# Patient Record
Sex: FEMALE | Race: WHITE | NOT HISPANIC OR LATINO | Employment: FULL TIME | URBAN - NONMETROPOLITAN AREA
[De-identification: names, ages, dates, MRNs, and addresses within clinical notes are randomized per-mention and may not be internally consistent; named-entity substitution may affect disease eponyms.]

---

## 2017-05-10 ENCOUNTER — OFFICE VISIT (OUTPATIENT)
Dept: FAMILY MEDICINE CLINIC | Facility: CLINIC | Age: 27
End: 2017-05-10

## 2017-05-10 VITALS
HEART RATE: 83 BPM | HEIGHT: 68 IN | SYSTOLIC BLOOD PRESSURE: 110 MMHG | OXYGEN SATURATION: 98 % | DIASTOLIC BLOOD PRESSURE: 72 MMHG | TEMPERATURE: 97.7 F | BODY MASS INDEX: 39.25 KG/M2 | WEIGHT: 259 LBS

## 2017-05-10 DIAGNOSIS — Z14.8 HEMOCHROMATOSIS CARRIER: ICD-10-CM

## 2017-05-10 DIAGNOSIS — E55.9 VITAMIN D DEFICIENCY: ICD-10-CM

## 2017-05-10 DIAGNOSIS — R74.8 ELEVATED LIVER ENZYMES: ICD-10-CM

## 2017-05-10 DIAGNOSIS — F43.23 ADJUSTMENT DISORDER WITH MIXED ANXIETY AND DEPRESSED MOOD: Primary | ICD-10-CM

## 2017-05-10 DIAGNOSIS — G56.03 BILATERAL CARPAL TUNNEL SYNDROME: ICD-10-CM

## 2017-05-10 DIAGNOSIS — Z83.49 FAMILY HISTORY OF HEMOCHROMATOSIS: ICD-10-CM

## 2017-05-10 LAB
B-HCG UR QL: NEGATIVE
INTERNAL NEGATIVE CONTROL: NEGATIVE
INTERNAL POSITIVE CONTROL: POSITIVE
Lab: NORMAL

## 2017-05-10 PROCEDURE — 81025 URINE PREGNANCY TEST: CPT | Performed by: PHYSICIAN ASSISTANT

## 2017-05-10 PROCEDURE — 99214 OFFICE O/P EST MOD 30 MIN: CPT | Performed by: PHYSICIAN ASSISTANT

## 2017-07-19 ENCOUNTER — OFFICE VISIT (OUTPATIENT)
Dept: FAMILY MEDICINE CLINIC | Facility: CLINIC | Age: 27
End: 2017-07-19

## 2017-07-19 VITALS
DIASTOLIC BLOOD PRESSURE: 74 MMHG | WEIGHT: 263 LBS | HEIGHT: 68 IN | SYSTOLIC BLOOD PRESSURE: 102 MMHG | BODY MASS INDEX: 39.86 KG/M2 | OXYGEN SATURATION: 97 % | HEART RATE: 90 BPM | TEMPERATURE: 98.6 F

## 2017-07-19 DIAGNOSIS — F43.23 ADJUSTMENT DISORDER WITH MIXED ANXIETY AND DEPRESSED MOOD: Primary | ICD-10-CM

## 2017-07-19 DIAGNOSIS — F33.1 MODERATE EPISODE OF RECURRENT MAJOR DEPRESSIVE DISORDER (HCC): ICD-10-CM

## 2017-07-19 DIAGNOSIS — F41.9 ANXIETY: ICD-10-CM

## 2017-07-19 DIAGNOSIS — F51.02 ADJUSTMENT INSOMNIA: ICD-10-CM

## 2017-07-19 PROCEDURE — 99213 OFFICE O/P EST LOW 20 MIN: CPT | Performed by: PHYSICIAN ASSISTANT

## 2017-07-19 RX ORDER — TRAZODONE HYDROCHLORIDE 50 MG/1
TABLET ORAL
Qty: 60 TABLET | Refills: 0 | Status: SHIPPED | OUTPATIENT
Start: 2017-07-19 | End: 2017-08-14 | Stop reason: SDUPTHER
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
## 2017-08-09 ENCOUNTER — OFFICE VISIT (OUTPATIENT)
Dept: FAMILY MEDICINE CLINIC | Facility: CLINIC | Age: 27
End: 2017-08-09

## 2017-08-09 VITALS — RESPIRATION RATE: 16 BRPM | WEIGHT: 258 LBS | HEIGHT: 68 IN | BODY MASS INDEX: 39.1 KG/M2

## 2017-08-09 DIAGNOSIS — F43.23 ADJUSTMENT DISORDER WITH MIXED ANXIETY AND DEPRESSED MOOD: Primary | ICD-10-CM

## 2017-08-09 DIAGNOSIS — L73.2 SUPPURATIVE HIDRADENITIS: ICD-10-CM

## 2017-08-09 PROCEDURE — 99213 OFFICE O/P EST LOW 20 MIN: CPT | Performed by: PHYSICIAN ASSISTANT

## 2017-08-09 NOTE — PROGRESS NOTES
Subjective   Jocelyn Mcadams is a 26 y.o. female seen today for medication follow-up for anxiety/depression     History of Present Illness     MOODS HAVE IMPROVED. COULD NOT TOLERATE TRAZODONE- KEPT UP ALL NIGHT. PATIENT WITHOUT MEDICATION. HAS BEEN TO 2 SESSIONS OF MARRIAGE COUNSELING BUT IS GOING TO 1 SESSION FOR INDIVIDUAL TODAY.  IS NOW ON SUBOXONE AND HAS MOVED BACK IN WITH HIS DAUGHTER. THEY ARE GETTING ALONG WELL. HE IS STARTING NEW JOB AS Guide[employer] THIS WEEK. STILL NEEDS COUNSELING BUT OVERALL DOING BETTER.     SUPPURATIVE HIDRADENITIS IS WORSE WITH MORE SWELLING AND INFLAMMATION AND PAIN. WOULD LIKE TO SEE SPECIALIST.     The following portions of the patient's history were reviewed and updated as appropriate: allergies, current medications, past family history, past medical history, past social history, past surgical history and problem list.    Review of Systems   Skin: Positive for wound.   All other systems reviewed and are negative.      Objective   Physical Exam   Constitutional: She is oriented to person, place, and time. She appears well-developed and well-nourished.   HENT:   Head: Normocephalic and atraumatic.   Right Ear: External ear normal.   Left Ear: External ear normal.   Eyes: Conjunctivae are normal.   Neck: Neck supple.   Cardiovascular: Normal rate, regular rhythm, normal heart sounds and intact distal pulses.  Exam reveals no gallop and no friction rub.    No murmur heard.  Pulmonary/Chest: Effort normal and breath sounds normal. No respiratory distress. She has no wheezes. She has no rales.   Musculoskeletal: She exhibits no edema or deformity.   Neurological: She is alert and oriented to person, place, and time.   Skin: Skin is warm and dry.   Psychiatric: She has a normal mood and affect. Her speech is normal and behavior is normal. Judgment and thought content normal. Cognition and memory are normal.   Nursing note and vitals reviewed.      Assessment/Plan   Jocelyn was seen today  for medication check for anxiety and depression.    Diagnoses and all orders for this visit:    Adjustment disorder with mixed anxiety and depressed mood    Suppurative hidradenitis  -     Ambulatory Referral to Dermatology      Patient Instructions   26 YEAR OLD FEMALE WHO PRESENTS TODAY IN FOLLOW UP OF DEPRESSION AND ANXIETY. SHE IS DOING BETTER, HOPEFUL FOR POSITIVE CHANGES WITH HER  AND WITH HERSELF WITH COUNSELING. SHE TOOK WHAT SHE THINKS IS TRAZODONE AND WAS UP ALL NIGHT, UNABLE TO SLEEP. I ASKED HER TO ENSURE SHE WAS NOT TAKING TRINTELLIX AT BEDTIME. CALL TO LET ME KNOW THE MEDICATION. IF SHE HAS NOT RECEIVED TRINTELLIX, I WILL HER TRY TRINTELLIX TO SEE IF THIS WORKS TO HELP MOODS WITH COUNSELING.     PATIENT ALSO HAS HAD WORSENING OF SUPPURATIVE HIDRADENITIS. I WILL REFER TO DERMATOLOGY TODAY.     FOLLOW UP IN 4-6 WEEKS AFTER STARTING TRINTELLIX OR ASAP IF NEEDED OR AE TO MEDICATION.

## 2017-08-14 DIAGNOSIS — F51.02 ADJUSTMENT INSOMNIA: ICD-10-CM

## 2017-08-14 RX ORDER — TRAZODONE HYDROCHLORIDE 50 MG/1
TABLET ORAL
Qty: 60 TABLET | Refills: 0 | Status: SHIPPED | OUTPATIENT
Start: 2017-08-14 | End: 2018-11-20

## 2017-08-15 NOTE — PATIENT INSTRUCTIONS
26 YEAR OLD FEMALE WHO PRESENTS TODAY IN FOLLOW UP OF DEPRESSION AND ANXIETY. SHE IS DOING BETTER, HOPEFUL FOR POSITIVE CHANGES WITH HER  AND WITH HERSELF WITH COUNSELING. SHE TOOK WHAT SHE THINKS IS TRAZODONE AND WAS UP ALL NIGHT, UNABLE TO SLEEP. I ASKED HER TO ENSURE SHE WAS NOT TAKING TRINTELLIX AT BEDTIME. CALL TO LET ME KNOW THE MEDICATION. IF SHE HAS NOT RECEIVED TRINTELLIX, I WILL HER TRY TRINTELLIX TO SEE IF THIS WORKS TO HELP MOODS WITH COUNSELING.     PATIENT ALSO HAS HAD WORSENING OF SUPPURATIVE HIDRADENITIS. I WILL REFER TO DERMATOLOGY TODAY.     FOLLOW UP IN 4-6 WEEKS AFTER STARTING TRINTELLIX OR ASAP IF NEEDED OR AE TO MEDICATION.

## 2017-09-05 ENCOUNTER — OFFICE VISIT (OUTPATIENT)
Dept: FAMILY MEDICINE CLINIC | Facility: CLINIC | Age: 27
End: 2017-09-05

## 2017-09-05 VITALS
WEIGHT: 257.6 LBS | SYSTOLIC BLOOD PRESSURE: 112 MMHG | OXYGEN SATURATION: 96 % | BODY MASS INDEX: 39.04 KG/M2 | TEMPERATURE: 98.8 F | HEIGHT: 68 IN | HEART RATE: 86 BPM | DIASTOLIC BLOOD PRESSURE: 72 MMHG

## 2017-09-05 DIAGNOSIS — N93.8 DUB (DYSFUNCTIONAL UTERINE BLEEDING): Primary | ICD-10-CM

## 2017-09-05 DIAGNOSIS — L91.8 SKIN TAG: ICD-10-CM

## 2017-09-05 LAB
EXPIRATION DATE: NORMAL
HBA1C MFR BLD: 5.2 %
Lab: 698

## 2017-09-05 PROCEDURE — 83036 HEMOGLOBIN GLYCOSYLATED A1C: CPT | Performed by: PHYSICIAN ASSISTANT

## 2017-09-05 PROCEDURE — 99214 OFFICE O/P EST MOD 30 MIN: CPT | Performed by: PHYSICIAN ASSISTANT

## 2017-09-05 PROCEDURE — 11200 RMVL SKIN TAGS UP TO&INC 15: CPT | Performed by: PHYSICIAN ASSISTANT

## 2017-09-05 NOTE — PROGRESS NOTES
Subjective   Jocelyn Mcadams is a 27 y.o. female here today for skin tag removal from left side of neck, fasting labs    History of Present Illness     MISSED MENSES IN AUGUST. WAS TO START 8/11/17 AND STARTED TODAY. PATIENT WENT TO URGENT CARE 8/30/17 FOR SEVERE ABDOMINAL PAIN.     SKIN TAGS FOR YEARS. LEFT SIDE OF NECK.     FASTING TODAY FOR LABS.     The following portions of the patient's history were reviewed and updated as appropriate: allergies, current medications, past family history, past medical history, past social history, past surgical history and problem list.    Review of Systems   Skin:        Skin tag left side of neck        Objective   Physical Exam   Constitutional: She is oriented to person, place, and time. She appears well-developed and well-nourished.   HENT:   Head: Normocephalic and atraumatic.   Right Ear: External ear normal.   Left Ear: External ear normal.   Eyes: Conjunctivae are normal.   Neck: Neck supple.   Cardiovascular: Normal rate.    Pulmonary/Chest: Effort normal.   Musculoskeletal: She exhibits no edema or deformity.   Neurological: She is alert and oriented to person, place, and time.   Skin: Skin is warm and dry.   1 SMALL AND 2 TINY SKIN TAGS LEFT LATERAL NECK. 3 AREAS CLEANED WITH ALCOHOL X 3. 27 GAUGE NEEDLE USED TO INJECT TINY AMOUNT OF LIDOCAINE WITH EPINEPHRINE AT BASE OF SKIN TAG- TOTAL LOCAL ANESTHETIC < 0.5 ML. LESIONS GRASPED WITH FORCEPS AND CUT WITH TISSUE SCISSORS AT THE BASE. SENT FOR PATHOLOGY. HEMOSTASIS ACHIEVED. TRIPLE ANTIBIOTIC OINTMENT AND BANDAID APPLIED. PATIENT TOLERATED PROCEDURE WELL.    Psychiatric: She has a normal mood and affect. Her speech is normal and behavior is normal. Judgment and thought content normal. Cognition and memory are normal.   Nursing note and vitals reviewed.      Assessment/Plan   Jocelyn was seen today for skin tag removal from left side of neck.    Diagnoses and all orders for this visit:    DUB (dysfunctional uterine  bleeding)  -     DHEA  -     Estradiol  -     Follicle Stimulating Hormone  -     Luteinizing Hormone  -     Progesterone  -     Testosterone, Total, Women & Children  -     POC Glycated Hemoglobin, Total    Skin tag  -     DHEA  -     Estradiol  -     Follicle Stimulating Hormone  -     Luteinizing Hormone  -     Progesterone  -     Testosterone, Total, Women & Children  -     POC Glycated Hemoglobin, Total      Patient Instructions   27 YEAR OLD FEMALE WHO PRESENTS TODAY IN FOLLOW UP OF SKIN TAGS, MISSED MENSES, AND FOR FASTING LABS TO INCLUDE IRON. PATIENT HAD 3 SMALL SKIN TAGS ON LEFT NECK. THESE WERE REMOVED WITHOUT DIFFICULTY AND SENT FOR PATHOLOGY. PATIENT TO CONTINUE TO APPLY BACITRACIN OR TRIPLE ANTIBIOTIC OINTMENT AND KEEP COVERED UNTIL HEALED. TO BE SEEN IF DEVELOPS ANY ERYTHEMA, EDEMA, INDURATION, OR D/C.     SHE MISSED MENSES IN AUGUST, WHICH IS ABNORMAL FOR HER BUT STARTED MENSES TODAY. PATIENT SHOULD HAVE STARTED MENSES 8/11/17. ON 8/30/17, SHE WAS SEEN AT URGENT CARE FOR SEVERE ABDOMINAL PAIN. SHE MAY HAVE HAD OVARIAN CYST THAT RUPTURED. I WILL CHECK FEMALE HORMONES TODAY WITH ADDITIONAL LABS. PATIENT TO CALL IF NO RESULTS IN 1 WEEK. CONSIDERATION OF FOLLOW UP WITH GYN IF CONTINUED ABNORMAL MENSES.

## 2017-09-06 NOTE — PATIENT INSTRUCTIONS
27 YEAR OLD FEMALE WHO PRESENTS TODAY IN FOLLOW UP OF SKIN TAGS, MISSED MENSES, AND FOR FASTING LABS TO INCLUDE IRON. PATIENT HAD 3 SMALL SKIN TAGS ON LEFT NECK. THESE WERE REMOVED WITHOUT DIFFICULTY AND SENT FOR PATHOLOGY. PATIENT TO CONTINUE TO APPLY BACITRACIN OR TRIPLE ANTIBIOTIC OINTMENT AND KEEP COVERED UNTIL HEALED. TO BE SEEN IF DEVELOPS ANY ERYTHEMA, EDEMA, INDURATION, OR D/C.     SHE MISSED MENSES IN AUGUST, WHICH IS ABNORMAL FOR HER BUT STARTED MENSES TODAY. PATIENT SHOULD HAVE STARTED MENSES 8/11/17. ON 8/30/17, SHE WAS SEEN AT URGENT CARE FOR SEVERE ABDOMINAL PAIN. SHE MAY HAVE HAD OVARIAN CYST THAT RUPTURED. I WILL CHECK FEMALE HORMONES TODAY WITH ADDITIONAL LABS. PATIENT TO CALL IF NO RESULTS IN 1 WEEK. CONSIDERATION OF FOLLOW UP WITH GYN IF CONTINUED ABNORMAL MENSES.

## 2017-09-07 LAB
DX ICD CODE: NORMAL
DX ICD CODE: NORMAL
PATH REPORT.FINAL DX SPEC: NORMAL
PATH REPORT.GROSS SPEC: NORMAL
PATH REPORT.SITE OF ORIGIN SPEC: NORMAL
PATHOLOGIST NAME: NORMAL
PAYMENT PROCEDURE: NORMAL

## 2017-09-10 LAB
25(OH)D3+25(OH)D2 SERPL-MCNC: 24.7 NG/ML (ref 30–100)
ALBUMIN SERPL-MCNC: 4.6 G/DL (ref 3.5–5.5)
ALBUMIN/GLOB SERPL: 1.7 {RATIO} (ref 1.2–2.2)
ALP SERPL-CCNC: 92 IU/L (ref 39–117)
ALT SERPL-CCNC: 46 IU/L (ref 0–32)
AST SERPL-CCNC: 38 IU/L (ref 0–40)
BASOPHILS # BLD AUTO: 0 X10E3/UL (ref 0–0.2)
BASOPHILS NFR BLD AUTO: 1 %
BILIRUB SERPL-MCNC: 0.4 MG/DL (ref 0–1.2)
BUN SERPL-MCNC: 10 MG/DL (ref 6–20)
BUN/CREAT SERPL: 15 (ref 9–23)
CALCIUM SERPL-MCNC: 9.5 MG/DL (ref 8.7–10.2)
CHLORIDE SERPL-SCNC: 100 MMOL/L (ref 96–106)
CHOLEST SERPL-MCNC: 203 MG/DL (ref 100–199)
CO2 SERPL-SCNC: 21 MMOL/L (ref 18–29)
CREAT SERPL-MCNC: 0.68 MG/DL (ref 0.57–1)
DHEA SERPL-MCNC: 129 NG/DL (ref 31–701)
EOSINOPHIL # BLD AUTO: 0.2 X10E3/UL (ref 0–0.4)
EOSINOPHIL NFR BLD AUTO: 3 %
ERYTHROCYTE [DISTWIDTH] IN BLOOD BY AUTOMATED COUNT: 13.7 % (ref 12.3–15.4)
ESTRADIOL SERPL-MCNC: 33.9 PG/ML
FERRITIN SERPL-MCNC: 132 NG/ML (ref 15–150)
FOLATE SERPL-MCNC: 11 NG/ML
FSH SERPL-ACNC: 5.8 MIU/ML
FT4I SERPL CALC-MCNC: 1.8 (ref 1.2–4.9)
GLOBULIN SER CALC-MCNC: 2.7 G/DL (ref 1.5–4.5)
GLUCOSE SERPL-MCNC: 82 MG/DL (ref 65–99)
HCT VFR BLD AUTO: 41.8 % (ref 34–46.6)
HDLC SERPL-MCNC: 37 MG/DL
HGB BLD-MCNC: 13.8 G/DL (ref 11.1–15.9)
IMM GRANULOCYTES # BLD: 0 X10E3/UL (ref 0–0.1)
IMM GRANULOCYTES NFR BLD: 0 %
IRON SATN MFR SERPL: 29 % (ref 15–55)
IRON SERPL-MCNC: 99 UG/DL (ref 27–159)
LDLC SERPL CALC-MCNC: 132 MG/DL (ref 0–99)
LDLC/HDLC SERPL: 3.6 RATIO UNITS (ref 0–3.2)
LH SERPL-ACNC: 3.2 MIU/ML
LYMPHOCYTES # BLD AUTO: 2.5 X10E3/UL (ref 0.7–3.1)
LYMPHOCYTES NFR BLD AUTO: 30 %
MCH RBC QN AUTO: 29.7 PG (ref 26.6–33)
MCHC RBC AUTO-ENTMCNC: 33 G/DL (ref 31.5–35.7)
MCV RBC AUTO: 90 FL (ref 79–97)
MONOCYTES # BLD AUTO: 0.5 X10E3/UL (ref 0.1–0.9)
MONOCYTES NFR BLD AUTO: 6 %
NEUTROPHILS # BLD AUTO: 5.2 X10E3/UL (ref 1.4–7)
NEUTROPHILS NFR BLD AUTO: 60 %
PLATELET # BLD AUTO: 372 X10E3/UL (ref 150–379)
POTASSIUM SERPL-SCNC: 4.6 MMOL/L (ref 3.5–5.2)
PROGEST SERPL-MCNC: 0.1 NG/ML
PROT SERPL-MCNC: 7.3 G/DL (ref 6–8.5)
RBC # BLD AUTO: 4.64 X10E6/UL (ref 3.77–5.28)
SODIUM SERPL-SCNC: 139 MMOL/L (ref 134–144)
T3RU NFR SERPL: 22 % (ref 24–39)
T4 SERPL-MCNC: 8.2 UG/DL (ref 4.5–12)
TESTOST SERPL-MCNC: 28.2 NG/DL (ref 10–55)
TIBC SERPL-MCNC: 345 UG/DL (ref 250–450)
TRIGL SERPL-MCNC: 172 MG/DL (ref 0–149)
TSH SERPL DL<=0.005 MIU/L-ACNC: 1.81 UIU/ML (ref 0.45–4.5)
UIBC SERPL-MCNC: 246 UG/DL (ref 131–425)
VIT B12 SERPL-MCNC: 763 PG/ML (ref 211–946)
VLDLC SERPL CALC-MCNC: 34 MG/DL (ref 5–40)
WBC # BLD AUTO: 8.5 X10E3/UL (ref 3.4–10.8)

## 2017-11-28 PROCEDURE — 88304 TISSUE EXAM BY PATHOLOGIST: CPT | Performed by: OTOLARYNGOLOGY

## 2017-11-29 ENCOUNTER — LAB REQUISITION (OUTPATIENT)
Dept: LAB | Facility: HOSPITAL | Age: 27
End: 2017-11-29

## 2017-11-29 DIAGNOSIS — J35.01 CHRONIC TONSILLITIS: ICD-10-CM

## 2017-11-30 LAB
CYTO UR: NORMAL
LAB AP CASE REPORT: NORMAL
LAB AP CLINICAL INFORMATION: NORMAL
Lab: NORMAL
PATH REPORT.FINAL DX SPEC: NORMAL
PATH REPORT.GROSS SPEC: NORMAL

## 2018-05-01 ENCOUNTER — OFFICE VISIT (OUTPATIENT)
Dept: FAMILY MEDICINE CLINIC | Facility: CLINIC | Age: 28
End: 2018-05-01

## 2018-05-01 VITALS
TEMPERATURE: 98.7 F | SYSTOLIC BLOOD PRESSURE: 112 MMHG | DIASTOLIC BLOOD PRESSURE: 74 MMHG | WEIGHT: 256.4 LBS | HEIGHT: 68 IN | HEART RATE: 83 BPM | OXYGEN SATURATION: 98 % | BODY MASS INDEX: 38.86 KG/M2

## 2018-05-01 DIAGNOSIS — E55.9 VITAMIN D DEFICIENCY: ICD-10-CM

## 2018-05-01 DIAGNOSIS — E78.49 OTHER HYPERLIPIDEMIA: ICD-10-CM

## 2018-05-01 DIAGNOSIS — J30.89 CHRONIC NON-SEASONAL ALLERGIC RHINITIS, UNSPECIFIED TRIGGER: Primary | ICD-10-CM

## 2018-05-01 DIAGNOSIS — R74.8 ELEVATED LIVER ENZYMES: ICD-10-CM

## 2018-05-01 DIAGNOSIS — M77.11 LATERAL EPICONDYLITIS OF BOTH ELBOWS: ICD-10-CM

## 2018-05-01 DIAGNOSIS — M77.12 LATERAL EPICONDYLITIS OF BOTH ELBOWS: ICD-10-CM

## 2018-05-01 DIAGNOSIS — F43.23 ADJUSTMENT DISORDER WITH MIXED ANXIETY AND DEPRESSED MOOD: ICD-10-CM

## 2018-05-01 DIAGNOSIS — M77.02 MEDIAL EPICONDYLITIS OF LEFT ELBOW: ICD-10-CM

## 2018-05-01 PROCEDURE — 99214 OFFICE O/P EST MOD 30 MIN: CPT | Performed by: PHYSICIAN ASSISTANT

## 2018-05-01 NOTE — PATIENT INSTRUCTIONS
27 YEAR OLD FEMALE WHO PRESENTS TODAY IN FOLLOW UP OF HYPERLIPIDEMIA, ELEVATED LFT, AND VITAMIN D DEFICIENCY, AS WELL AS ANXIETY AND DEPRESSION. FASTING LABS TODAY- CALL IF NO RESULTS IN 1 WEEK. FURTHER RECOMMENDATIONS PENDING LABS. SHE STARTED WITH THE [facility] THEN STARTED WEEKLY INDIVIDUAL COUNSELING. SHE REPORTS THIS HAS HELPED HER MOODS AND CLARITY TREMENDOUSLY. PATIENT CONTINUES WITH SOME ANXIETY AND SLEEP DISTURBANCE. SHE CONTINUES WITH STRUGGLING WITH HER RELATIONSHIP AND THE RELATIONSHIP WITH HER STEP CHILDREN, HOWEVER, SHE IS WORKING WITH HER COUNSELOR TO ENSURE SHE IS MAKING HEALTHY, PRODUCTIVE DECISIONS AND LIFE CHOICES. PATIENT PREFERS NOT TO TAKE MEDICATION, AND HAS BEEN SUCCESSFUL WITH THERAPY. SHE MAY CONSIDER PSYCHIATRY IF SHE NEEDS MEDICATION IN ADDITION TO COUNSELING.     PATIENT HAS CONTINUED WITH ALLERGY ISSUES. SHE IS CONCERNED SHE COULD HAVE A DOG ALLERGY AND HAS 3 DOGS. I WILL REFER TO DR ALISON HAYES FOR EVALUATION AND TREATMENT IF INDICATED. SHE IS NOT CURRENTLY TAKING MEDICATIONS, AS SHE DOES NOT WANT TO TAKE MEDICATIONS DAILY. SHE WOULD PREFER TO FIND HER ALLERGENS AND MAKE CHANGES IF NEEDED OR DO IMMUNOTHERAPY RATHER THAN TAKE DAILY MEDICATIONS. SHE MAY TRY NASACORT 2 SPRAYS IN EACH NOSTRIL ONCE DAILY IF NEEDED.     SHE HAS NOTED BILATERAL ELBOW PAIN. SHE WAS SEEN AT MEDICAL DEPARTMENT AT [facility] AND WAS ADVISED SHE HAD TENNIS ELBOW AND WAS GIVEN TENDON STRAPS. PATIENT HAS BILATERAL LATERAL EPICONDYLITIS AND LEFT MEDIAL EPICONDYLITIS ON EXAM TODAY. I DISCUSSED ICING BEFORE AND AFTER WORK OR REPETITIVE USE ACTIVITIES. ALSO TO CONSIDER TENDONITIS STRAP FOR 4-6 WEEKS WITH WORKING. I WILL GIVE STRETCHES AND REHAB INFORMATION FOR THIS TODAY. CONSIDER PHYSICAL THERAPY IF CONTINUED SYMPTOMS.       Lateral Epicondylitis With Rehab  Lateral epicondylitis involves inflammation and pain around the outer portion of the elbow. The pain is caused by inflammation of the tendons in the forearm that bring  back (extend) the wrist. Lateral epicondylitis is also called tennis elbow, because it is very common in tennis players. However, it may occur in any individual who extends the wrist repetitively. If lateral epicondylitis is left untreated, it may become a chronic problem.  SYMPTOMS   · Pain, tenderness, and inflammation on the outer (lateral) side of the elbow.  · Pain or weakness with gripping activities.  · Pain that increases with wrist-twisting motions (playing tennis, using a screwdriver, opening a door or a jar).  · Pain with lifting objects, including a coffee cup.  CAUSES   Lateral epicondylitis is caused by inflammation of the tendons that extend the wrist. Causes of injury may include:  · Repetitive stress and strain on the muscles and tendons that extend the wrist.  · Sudden change in activity level or intensity.  · Incorrect  in racquet sports.  · Incorrect  size of racquet (often too large).  · Incorrect hitting position or technique (usually backhand, leading with the elbow).  · Using a racket that is too heavy.  RISK INCREASES WITH:  · Sports or occupations that require repetitive and/or strenuous forearm and wrist movements (tennis, squash, racquetball, carpentry).  · Poor wrist and forearm strength and flexibility.  · Failure to warm up properly before activity.  · Resuming activity before healing, rehabilitation, and conditioning are complete.  PREVENTION   · Warm up and stretch properly before activity.  · Maintain physical fitness:    Strength, flexibility, and endurance.    Cardiovascular fitness.  · Wear and use properly fitted equipment.  · Learn and use proper technique and have a  correct improper technique.  · Wear a tennis elbow (counterforce) brace.  PROGNOSIS   The course of this condition depends on the degree of the injury. If treated properly, acute cases (symptoms lasting less than 4 weeks) are often resolved in 2 to 6 weeks. Chronic (longer lasting cases) often resolve  in 3 to 6 months but may require physical therapy.  RELATED COMPLICATIONS   · Frequently recurring symptoms, resulting in a chronic problem. Properly treating the problem the first time decreases frequency of recurrence.  · Chronic inflammation, scarring tendon degeneration, and partial tendon tear, requiring surgery.  · Delayed healing or resolution of symptoms.  TREATMENT   Treatment first involves the use of ice and medicine to reduce pain and inflammation. Strengthening and stretching exercises may help reduce discomfort if performed regularly. These exercises may be performed at home if the condition is an acute injury. Chronic cases may require a referral to a physical therapist for evaluation and treatment. Your caregiver may advise a corticosteroid injection to help reduce inflammation. Rarely, surgery is needed.  MEDICATION  · If pain medicine is needed, nonsteroidal anti-inflammatory medicines (aspirin and ibuprofen), or other minor pain relievers (acetaminophen), are often advised.  · Do not take pain medicine for 7 days before surgery.  · Prescription pain relievers may be given, if your caregiver thinks they are needed. Use only as directed and only as much as you need.  · Corticosteroid injections may be recommended. These injections should be reserved only for the most severe cases, because they can only be given a certain number of times.  HEAT AND COLD  · Cold treatment (icing) should be applied for 10 to 15 minutes every 2 to 3 hours for inflammation and pain, and immediately after activity that aggravates your symptoms. Use ice packs or an ice massage.  · Heat treatment may be used before performing stretching and strengthening activities prescribed by your caregiver, physical therapist, or . Use a heat pack or a warm water soak.  SEEK MEDICAL CARE IF:  Symptoms get worse or do not improve in 2 weeks, despite treatment.  EXERCISES   RANGE OF MOTION (ROM) AND STRETCHING EXERCISES -  Epicondylitis, Lateral (Tennis Elbow)  These exercises may help you when beginning to rehabilitate your injury. Your symptoms may go away with or without further involvement from your physician, physical therapist, or . While completing these exercises, remember:   · Restoring tissue flexibility helps normal motion to return to the joints. This allows healthier, less painful movement and activity.  · An effective stretch should be held for at least 30 seconds.  · A stretch should never be painful. You should only feel a gentle lengthening or release in the stretched tissue.  RANGE OF MOTION - Wrist Flexion, Active-Assisted  · Extend your right / left elbow with your fingers pointing down.*  · Gently pull the back of your hand towards you, until you feel a gentle stretch on the top of your forearm.  · Hold this position for __________ seconds.  Repeat __________ times. Complete this exercise __________ times per day.   *If directed by your physician, physical therapist or , complete this stretch with your elbow bent, rather than extended.  RANGE OF MOTION - Wrist Extension, Active-Assisted  · Extend your right / left elbow and turn your palm upwards.*  · Gently pull your palm and fingertips back, so your wrist extends and your fingers point more toward the ground.  · You should feel a gentle stretch on the inside of your forearm.  · Hold this position for __________ seconds.  Repeat __________ times. Complete this exercise __________ times per day.  *If directed by your physician, physical therapist or , complete this stretch with your elbow bent, rather than extended.  STRETCH - Wrist Flexion  · Place the back of your right / left hand on a tabletop, leaving your elbow slightly bent. Your fingers should point away from your body.  · Gently press the back of your hand down onto the table by straightening your elbow. You should feel a stretch on the top of your  forearm.  · Hold this position for __________ seconds.  Repeat __________ times. Complete this stretch __________ times per day.   STRETCH - Wrist Extension   · Place your right / left fingertips on a tabletop, leaving your elbow slightly bent. Your fingers should point backwards.  · Gently press your fingers and palm down onto the table by straightening your elbow. You should feel a stretch on the inside of your forearm.  · Hold this position for __________ seconds.  Repeat __________ times. Complete this stretch __________ times per day.   STRENGTHENING EXERCISES - Epicondylitis, Lateral (Tennis Elbow)  These exercises may help you when beginning to rehabilitate your injury. They may resolve your symptoms with or without further involvement from your physician, physical therapist, or . While completing these exercises, remember:   · Muscles can gain both the endurance and the strength needed for everyday activities through controlled exercises.  · Complete these exercises as instructed by your physician, physical therapist or . Increase the resistance and repetitions only as guided.  · You may experience muscle soreness or fatigue, but the pain or discomfort you are trying to eliminate should never worsen during these exercises. If this pain does get worse, stop and make sure you are following the directions exactly. If the pain is still present after adjustments, discontinue the exercise until you can discuss the trouble with your caregiver.  STRENGTH - Wrist Flexors  · Sit with your right / left forearm palm-up and fully supported on a table or countertop. Your elbow should be resting below the height of your shoulder. Allow your wrist to extend over the edge of the surface.  · Loosely holding a __________ weight, or a piece of rubber exercise band or tubing, slowly curl your hand up toward your forearm.  · Hold this position for __________ seconds. Slowly lower the wrist back to  the starting position in a controlled manner.  Repeat __________ times. Complete this exercise __________ times per day.   STRENGTH - Wrist Extensors  · Sit with your right / left forearm palm-down and fully supported on a table or countertop. Your elbow should be resting below the height of your shoulder. Allow your wrist to extend over the edge of the surface.  · Loosely holding a __________ weight, or a piece of rubber exercise band or tubing, slowly curl your hand up toward your forearm.  · Hold this position for __________ seconds. Slowly lower the wrist back to the starting position in a controlled manner.  Repeat __________ times. Complete this exercise __________ times per day.   STRENGTH - Ulnar Deviators  · Stand with a ____________________ weight in your right / left hand, or sit while holding a rubber exercise band or tubing, with your healthy arm supported on a table or countertop.  · Move your wrist, so that your pinkie travels toward your forearm and your thumb moves away from your forearm.  · Hold this position for __________ seconds and then slowly lower the wrist back to the starting position.  Repeat __________ times. Complete this exercise __________ times per day  STRENGTH - Radial Deviators  · Stand with a ____________________ weight in your right / left hand, or sit while holding a rubber exercise band or tubing, with your injured arm supported on a table or countertop.  · Raise your hand upward in front of you or pull up on the rubber tubing.  · Hold this position for __________ seconds and then slowly lower the wrist back to the starting position.  Repeat __________ times. Complete this exercise __________ times per day.  STRENGTH - Forearm Supinators   · Sit with your right / left forearm supported on a table, keeping your elbow below shoulder height. Rest your hand over the edge, palm down.  · Gently  a hammer or a soup ladle.  · Without moving your elbow, slowly turn your palm and  "hand upward to a \"thumbs-up\" position.  · Hold this position for __________ seconds. Slowly return to the starting position.  Repeat __________ times. Complete this exercise __________ times per day.   STRENGTH - Forearm Pronators   · Sit with your right / left forearm supported on a table, keeping your elbow below shoulder height. Rest your hand over the edge, palm up.  · Gently  a hammer or a soup ladle.  · Without moving your elbow, slowly turn your palm and hand upward to a \"thumbs-up\" position.  · Hold this position for __________ seconds. Slowly return to the starting position.  Repeat __________ times. Complete this exercise __________ times per day.   STRENGTH -   · Grasp a tennis ball, a dense sponge, or a large, rolled sock in your hand.  · Squeeze as hard as you can, without increasing any pain.  · Hold this position for __________ seconds. Release your  slowly.  Repeat __________ times. Complete this exercise __________ times per day.   STRENGTH - Elbow Extensors, Isometric  · Stand or sit upright, on a firm surface. Place your right / left arm so that your palm faces your stomach, and it is at the height of your waist.  · Place your opposite hand on the underside of your forearm. Gently push up as your right / left arm resists. Push as hard as you can with both arms, without causing any pain or movement at your right / left elbow. Hold this stationary position for __________ seconds.  Gradually release the tension in both arms. Allow your muscles to relax completely before repeating.     This information is not intended to replace advice given to you by your health care provider. Make sure you discuss any questions you have with your health care provider.     Document Released: 12/18/2006 Document Revised: 01/08/2016 Document Reviewed: 09/15/2016  [employer] Interactive Patient Education ©2017 [employer]    Golfer's Elbow Rehab  Ask your health care provider which exercises are safe for " you. Do exercises exactly as told by your health care provider and adjust them as directed. It is normal to feel mild stretching, pulling, tightness, or discomfort as you do these exercises, but you should stop right away if you feel sudden pain or your pain gets worse. Do not begin these exercises until told by your health care provider.  Stretching and range of motion exercises  These exercises warm up your muscles and joints and improve the movement and flexibility of your elbow.  Exercise A: Wrist flexors     1. Straighten your left / right elbow in front of you with your palm up. If told by your health care provider, do this stretch with your elbow bent rather than straight.  2. With your other hand, gently pull your left / right hand and fingers toward you until you feel a gentle stretch on the top of your forearm.  3. Hold this position for __________ seconds.  Repeat __________ times. Complete this exercise __________ times a day.  Strengthening exercises  These exercises build strength and endurance in your elbow. Endurance is the ability to use your muscles for a long time, even after several repetitions.  Exercise B: Wrist flexion     1. Sit with your left / right forearm palm-up and supported on a table or other surface.  2. Let your left / right wrist extend over the edge of the surface.  3. Hold a __________ weight or a piece of rubber exercise band or tubing. Slowly curl your hand up toward your forearm. Try to only move your hand and keep the rest of your arm still.  4. Hold this position for __________ seconds.  5. Slowly return to the starting position.  Repeat __________ times. Complete this exercise __________ times a day.  Exercise C: Eccentric wrist flexion   1. Sit with your left / right forearm palm-up and supported on a table or other surface.  2. Let your left / right wrist extend over the edge of the surface.  3. Hold a __________ weight or a piece of rubber exercise band or  tubing.  4. Use your other hand to move your left / right hand up toward your forearm.  5. Slowly return to the starting position using only your left / right hand.  Repeat __________ times. Complete this exercise __________ times a day.  Exercise D: Hand turns, pronation i   1. Sit with your left / right forearm supported on a table or other surface.  2. Move your wrist so your pinkie travels toward your forearm and your thumb moves away from your forearm.  3. Hold this position for __________ seconds.  4. Slowly return to the starting position.  Exercise E: Hand turns, pronation ii     1. Sit with your left / right forearm supported on a table or other surface.  2. Hold a hammer in your left / right hand.  ¨ The exercise will be easier if you hold on near the head of the hammer.  ¨ If you hold on toward the end of the handle, the exercise will be harder.  3. Rest your left / right hand over the edge of the table with your palm facing up.  4. Without moving your elbow, slowly turn your palm and your hand down toward the table.  5. Hold this position for __________ seconds.  6. Slowly return to the starting position.  Repeat __________ times. Complete this exercise __________ times a day.  Exercise F: Shoulder blade squeeze   1. Sit in a stable chair with good posture. Do not let your back touch the back of the chair.  2. Your arms should be at your sides with your elbows bent. You can rest your forearms on a pillow.  3. Squeeze your shoulder blades together. Keep your shoulders level. Do not lift your shoulders up toward your ears.  4. Hold this position for __________ seconds.  5. Slowly release.  Repeat __________ times. Complete this exercise __________ times a day.  This information is not intended to replace advice given to you by your health care provider. Make sure you discuss any questions you have with your health care provider.  Document Released: 12/18/2006 Document Revised: 08/24/2017 Document Reviewed:  08/29/2016  [employer] Interactive Patient Education © 2017 [employer]

## 2018-05-01 NOTE — PROGRESS NOTES
Subjective   Jocelyn Merlos is a 27 y.o. female. Patient seen today for follow-up anxiety/depression, requesting labs    History of Present Illness     PAIN IN BILATERAL ELBOWS- LATERAL EPICONDYLE BILATERALLY AND LEFT MEDIAL EPICONDYLE. WAS SEEN AT MEDICAL AT WORK AND TOLD TO TAKE NSAIDS. SHE TOLD THEM SHE SHOULD NOT TAKE THEM AND THEY GAVE TENDONITIS STRAPS.     WENT TO THE [facility] FOR IOP 5 HOURS PER DAY- GROUP THERAPY - HELPED A LOT. THEN STARTED SEEING COUNSELOR IN [place]- [facility]- SEEING ONCE WEEKLY. HAS HELPED A LOT WITH COPING.  FROM  OVER PARENTING ISSUES. HE IS ALSO STILL ON SUBOXONE.     MOODS HAVE BEEN BETTER- STILL ANXIETY BUT THEY ARE WORKING ON CBT AND MEDITATION.     CONTINUES WITH SIGNIFICANT CONGESTION, PND, AND ALLERGY ISSUES. SHE TRIED TO CALL DR HAYES, HOWEVER, THEY ADVISED SHE WOULD HAVE TO SEE HIM AS PCP. SHE IS CONCERNED SHE COULD BE ALLERGIC TO DOGS AND SHE HAS 3 DOGS. SHE WOULD LIKE TESTING RATHER THAN TAKING DAILY MEDICATION.     The following portions of the patient's history were reviewed and updated as appropriate: allergies, current medications, past family history, past medical history, past social history, past surgical history and problem list.    Review of Systems   Psychiatric/Behavioral: Positive for sleep disturbance. The patient is nervous/anxious.    All other systems reviewed and are negative.      Objective   Physical Exam   Constitutional: She is oriented to person, place, and time. She appears well-developed and well-nourished.   HENT:   Head: Normocephalic and atraumatic.   Right Ear: External ear normal.   Left Ear: External ear normal.   Nose: Nose normal.   Eyes: Conjunctivae and lids are normal.   Neck: Neck supple. Carotid bruit is not present.   Cardiovascular: Normal rate, regular rhythm, normal heart sounds and intact distal pulses.  Exam reveals no gallop and no friction rub.    No murmur heard.  Pulmonary/Chest: Effort normal and breath  sounds normal. No respiratory distress. She has no wheezes. She has no rhonchi. She has no rales.   Musculoskeletal: She exhibits no edema or deformity.        Right elbow: She exhibits normal range of motion, no swelling, no effusion, no deformity and no laceration. Tenderness found. Lateral epicondyle tenderness noted. No medial epicondyle tenderness noted.        Left elbow: She exhibits normal range of motion, no swelling, no effusion, no deformity and no laceration. Tenderness found. Medial epicondyle and lateral epicondyle tenderness noted.   Neurological: She is alert and oriented to person, place, and time. Gait normal.   Skin: Skin is warm and dry.   Psychiatric: She has a normal mood and affect. Her speech is normal and behavior is normal. Judgment and thought content normal. Cognition and memory are normal.   Nursing note and vitals reviewed.      Assessment/Plan   Jocelyn was seen today for follow-up.    Diagnoses and all orders for this visit:    Chronic non-seasonal allergic rhinitis, unspecified trigger  -     Ambulatory Referral to Allergy  -     CBC & Differential    Vitamin D deficiency  -     CBC & Differential  -     Comprehensive Metabolic Panel  -     Vitamin D 25 Hydroxy    Adjustment disorder with mixed anxiety and depressed mood  -     CBC & Differential  -     TSH    Elevated liver enzymes  -     Comprehensive Metabolic Panel    Other hyperlipidemia  -     Comprehensive Metabolic Panel  -     TSH  -     Lipid Panel With LDL / HDL Ratio    Lateral epicondylitis of both elbows    Medial epicondylitis of left elbow        Patient Instructions   27 YEAR OLD FEMALE WHO PRESENTS TODAY IN FOLLOW UP OF HYPERLIPIDEMIA, ELEVATED LFT, AND VITAMIN D DEFICIENCY, AS WELL AS ANXIETY AND DEPRESSION. FASTING LABS TODAY- CALL IF NO RESULTS IN 1 WEEK. FURTHER RECOMMENDATIONS PENDING LABS. SHE STARTED WITH THE [facility] THEN STARTED WEEKLY INDIVIDUAL COUNSELING. SHE REPORTS THIS HAS HELPED HER MOODS AND CLARITY  TREMENDOUSLY. PATIENT CONTINUES WITH SOME ANXIETY AND SLEEP DISTURBANCE. SHE CONTINUES WITH STRUGGLING WITH HER RELATIONSHIP AND THE RELATIONSHIP WITH HER STEP CHILDREN, HOWEVER, SHE IS WORKING WITH HER COUNSELOR TO ENSURE SHE IS MAKING HEALTHY, PRODUCTIVE DECISIONS AND LIFE CHOICES. PATIENT PREFERS NOT TO TAKE MEDICATION, AND HAS BEEN SUCCESSFUL WITH THERAPY. SHE MAY CONSIDER PSYCHIATRY IF SHE NEEDS MEDICATION IN ADDITION TO COUNSELING.     PATIENT HAS CONTINUED WITH ALLERGY ISSUES. SHE IS CONCERNED SHE COULD HAVE A DOG ALLERGY AND HAS 3 DOGS. I WILL REFER TO DR ALISON HAYES FOR EVALUATION AND TREATMENT IF INDICATED. SHE IS NOT CURRENTLY TAKING MEDICATIONS, AS SHE DOES NOT WANT TO TAKE MEDICATIONS DAILY. SHE WOULD PREFER TO FIND HER ALLERGENS AND MAKE CHANGES IF NEEDED OR DO IMMUNOTHERAPY RATHER THAN TAKE DAILY MEDICATIONS. SHE MAY TRY [facility] 2 SPRAYS IN EACH NOSTRIL ONCE DAILY IF NEEDED.     SHE HAS NOTED BILATERAL ELBOW PAIN. SHE WAS SEEN AT MEDICAL DEPARTMENT AT [facility] AND WAS ADVISED SHE HAD TENNIS ELBOW AND WAS GIVEN TENDON STRAPS. PATIENT HAS BILATERAL LATERAL EPICONDYLITIS AND LEFT MEDIAL EPICONDYLITIS ON EXAM TODAY. I DISCUSSED ICING BEFORE AND AFTER WORK OR REPETITIVE USE ACTIVITIES. ALSO TO CONSIDER TENDONITIS STRAP FOR 4-6 WEEKS WITH WORKING. I WILL GIVE STRETCHES AND REHAB INFORMATION FOR THIS TODAY. CONSIDER PHYSICAL THERAPY IF CONTINUED SYMPTOMS.       Lateral Epicondylitis With Rehab  Lateral epicondylitis involves inflammation and pain around the outer portion of the elbow. The pain is caused by inflammation of the tendons in the forearm that bring back (extend) the wrist. Lateral epicondylitis is also called tennis elbow, because it is very common in tennis players. However, it may occur in any individual who extends the wrist repetitively. If lateral epicondylitis is left untreated, it may become a chronic problem.  SYMPTOMS   · Pain, tenderness, and inflammation on the outer (lateral) side of the  elbow.  · Pain or weakness with gripping activities.  · Pain that increases with wrist-twisting motions (playing tennis, using a screwdriver, opening a door or a jar).  · Pain with lifting objects, including a coffee cup.  CAUSES   Lateral epicondylitis is caused by inflammation of the tendons that extend the wrist. Causes of injury may include:  · Repetitive stress and strain on the muscles and tendons that extend the wrist.  · Sudden change in activity level or intensity.  · Incorrect  in racquet sports.  · Incorrect  size of racquet (often too large).  · Incorrect hitting position or technique (usually backhand, leading with the elbow).  · Using a racket that is too heavy.  RISK INCREASES WITH:  · Sports or occupations that require repetitive and/or strenuous forearm and wrist movements (tennis, squash, racquetball, carpentry).  · Poor wrist and forearm strength and flexibility.  · Failure to warm up properly before activity.  · Resuming activity before healing, rehabilitation, and conditioning are complete.  PREVENTION   · Warm up and stretch properly before activity.  · Maintain physical fitness:    Strength, flexibility, and endurance.    Cardiovascular fitness.  · Wear and use properly fitted equipment.  · Learn and use proper technique and have a  correct improper technique.  · Wear a tennis elbow (counterforce) brace.  PROGNOSIS   The course of this condition depends on the degree of the injury. If treated properly, acute cases (symptoms lasting less than 4 weeks) are often resolved in 2 to 6 weeks. Chronic (longer lasting cases) often resolve in 3 to 6 months but may require physical therapy.  RELATED COMPLICATIONS   · Frequently recurring symptoms, resulting in a chronic problem. Properly treating the problem the first time decreases frequency of recurrence.  · Chronic inflammation, scarring tendon degeneration, and partial tendon tear, requiring surgery.  · Delayed healing or resolution of  symptoms.  TREATMENT   Treatment first involves the use of ice and medicine to reduce pain and inflammation. Strengthening and stretching exercises may help reduce discomfort if performed regularly. These exercises may be performed at home if the condition is an acute injury. Chronic cases may require a referral to a physical therapist for evaluation and treatment. Your caregiver may advise a corticosteroid injection to help reduce inflammation. Rarely, surgery is needed.  MEDICATION  · If pain medicine is needed, nonsteroidal anti-inflammatory medicines (aspirin and ibuprofen), or other minor pain relievers (acetaminophen), are often advised.  · Do not take pain medicine for 7 days before surgery.  · Prescription pain relievers may be given, if your caregiver thinks they are needed. Use only as directed and only as much as you need.  · Corticosteroid injections may be recommended. These injections should be reserved only for the most severe cases, because they can only be given a certain number of times.  HEAT AND COLD  · Cold treatment (icing) should be applied for 10 to 15 minutes every 2 to 3 hours for inflammation and pain, and immediately after activity that aggravates your symptoms. Use ice packs or an ice massage.  · Heat treatment may be used before performing stretching and strengthening activities prescribed by your caregiver, physical therapist, or . Use a heat pack or a warm water soak.  SEEK MEDICAL CARE IF:  Symptoms get worse or do not improve in 2 weeks, despite treatment.  EXERCISES   RANGE OF MOTION (ROM) AND STRETCHING EXERCISES - Epicondylitis, Lateral (Tennis Elbow)  These exercises may help you when beginning to rehabilitate your injury. Your symptoms may go away with or without further involvement from your physician, physical therapist, or . While completing these exercises, remember:   · Restoring tissue flexibility helps normal motion to return to the  joints. This allows healthier, less painful movement and activity.  · An effective stretch should be held for at least 30 seconds.  · A stretch should never be painful. You should only feel a gentle lengthening or release in the stretched tissue.  RANGE OF MOTION - Wrist Flexion, Active-Assisted  · Extend your right / left elbow with your fingers pointing down.*  · Gently pull the back of your hand towards you, until you feel a gentle stretch on the top of your forearm.  · Hold this position for __________ seconds.  Repeat __________ times. Complete this exercise __________ times per day.   *If directed by your physician, physical therapist or , complete this stretch with your elbow bent, rather than extended.  RANGE OF MOTION - Wrist Extension, Active-Assisted  · Extend your right / left elbow and turn your palm upwards.*  · Gently pull your palm and fingertips back, so your wrist extends and your fingers point more toward the ground.  · You should feel a gentle stretch on the inside of your forearm.  · Hold this position for __________ seconds.  Repeat __________ times. Complete this exercise __________ times per day.  *If directed by your physician, physical therapist or , complete this stretch with your elbow bent, rather than extended.  STRETCH - Wrist Flexion  · Place the back of your right / left hand on a tabletop, leaving your elbow slightly bent. Your fingers should point away from your body.  · Gently press the back of your hand down onto the table by straightening your elbow. You should feel a stretch on the top of your forearm.  · Hold this position for __________ seconds.  Repeat __________ times. Complete this stretch __________ times per day.   STRETCH - Wrist Extension   · Place your right / left fingertips on a tabletop, leaving your elbow slightly bent. Your fingers should point backwards.  · Gently press your fingers and palm down onto the table by straightening  your elbow. You should feel a stretch on the inside of your forearm.  · Hold this position for __________ seconds.  Repeat __________ times. Complete this stretch __________ times per day.   STRENGTHENING EXERCISES - Epicondylitis, Lateral (Tennis Elbow)  These exercises may help you when beginning to rehabilitate your injury. They may resolve your symptoms with or without further involvement from your physician, physical therapist, or . While completing these exercises, remember:   · Muscles can gain both the endurance and the strength needed for everyday activities through controlled exercises.  · Complete these exercises as instructed by your physician, physical therapist or . Increase the resistance and repetitions only as guided.  · You may experience muscle soreness or fatigue, but the pain or discomfort you are trying to eliminate should never worsen during these exercises. If this pain does get worse, stop and make sure you are following the directions exactly. If the pain is still present after adjustments, discontinue the exercise until you can discuss the trouble with your caregiver.  STRENGTH - Wrist Flexors  · Sit with your right / left forearm palm-up and fully supported on a table or countertop. Your elbow should be resting below the height of your shoulder. Allow your wrist to extend over the edge of the surface.  · Loosely holding a __________ weight, or a piece of rubber exercise band or tubing, slowly curl your hand up toward your forearm.  · Hold this position for __________ seconds. Slowly lower the wrist back to the starting position in a controlled manner.  Repeat __________ times. Complete this exercise __________ times per day.   STRENGTH - Wrist Extensors  · Sit with your right / left forearm palm-down and fully supported on a table or countertop. Your elbow should be resting below the height of your shoulder. Allow your wrist to extend over the edge of the  "surface.  · Loosely holding a __________ weight, or a piece of rubber exercise band or tubing, slowly curl your hand up toward your forearm.  · Hold this position for __________ seconds. Slowly lower the wrist back to the starting position in a controlled manner.  Repeat __________ times. Complete this exercise __________ times per day.   STRENGTH - Ulnar Deviators  · Stand with a ____________________ weight in your right / left hand, or sit while holding a rubber exercise band or tubing, with your healthy arm supported on a table or countertop.  · Move your wrist, so that your pinkie travels toward your forearm and your thumb moves away from your forearm.  · Hold this position for __________ seconds and then slowly lower the wrist back to the starting position.  Repeat __________ times. Complete this exercise __________ times per day  STRENGTH - Radial Deviators  · Stand with a ____________________ weight in your right / left hand, or sit while holding a rubber exercise band or tubing, with your injured arm supported on a table or countertop.  · Raise your hand upward in front of you or pull up on the rubber tubing.  · Hold this position for __________ seconds and then slowly lower the wrist back to the starting position.  Repeat __________ times. Complete this exercise __________ times per day.  STRENGTH - Forearm Supinators   · Sit with your right / left forearm supported on a table, keeping your elbow below shoulder height. Rest your hand over the edge, palm down.  · Gently  a hammer or a soup ladle.  · Without moving your elbow, slowly turn your palm and hand upward to a \"thumbs-up\" position.  · Hold this position for __________ seconds. Slowly return to the starting position.  Repeat __________ times. Complete this exercise __________ times per day.   STRENGTH - Forearm Pronators   · Sit with your right / left forearm supported on a table, keeping your elbow below shoulder height. Rest your hand over the " "edge, palm up.  · Gently  a hammer or a soup ladle.  · Without moving your elbow, slowly turn your palm and hand upward to a \"thumbs-up\" position.  · Hold this position for __________ seconds. Slowly return to the starting position.  Repeat __________ times. Complete this exercise __________ times per day.   STRENGTH -   · Grasp a tennis ball, a dense sponge, or a large, rolled sock in your hand.  · Squeeze as hard as you can, without increasing any pain.  · Hold this position for __________ seconds. Release your  slowly.  Repeat __________ times. Complete this exercise __________ times per day.   STRENGTH - Elbow Extensors, Isometric  · Stand or sit upright, on a firm surface. Place your right / left arm so that your palm faces your stomach, and it is at the height of your waist.  · Place your opposite hand on the underside of your forearm. Gently push up as your right / left arm resists. Push as hard as you can with both arms, without causing any pain or movement at your right / left elbow. Hold this stationary position for __________ seconds.  Gradually release the tension in both arms. Allow your muscles to relax completely before repeating.     This information is not intended to replace advice given to you by your health care provider. Make sure you discuss any questions you have with your health care provider.     Document Released: 12/18/2006 Document Revised: 01/08/2016 Document Reviewed: 09/15/2016  [employer] Interactive Patient Education ©2017 [employer]    Golfer's Elbow Rehab  Ask your health care provider which exercises are safe for you. Do exercises exactly as told by your health care provider and adjust them as directed. It is normal to feel mild stretching, pulling, tightness, or discomfort as you do these exercises, but you should stop right away if you feel sudden pain or your pain gets worse. Do not begin these exercises until told by your health care provider.  Stretching and " range of motion exercises  These exercises warm up your muscles and joints and improve the movement and flexibility of your elbow.  Exercise A: Wrist flexors     1. Straighten your left / right elbow in front of you with your palm up. If told by your health care provider, do this stretch with your elbow bent rather than straight.  2. With your other hand, gently pull your left / right hand and fingers toward you until you feel a gentle stretch on the top of your forearm.  3. Hold this position for __________ seconds.  Repeat __________ times. Complete this exercise __________ times a day.  Strengthening exercises  These exercises build strength and endurance in your elbow. Endurance is the ability to use your muscles for a long time, even after several repetitions.  Exercise B: Wrist flexion     1. Sit with your left / right forearm palm-up and supported on a table or other surface.  2. Let your left / right wrist extend over the edge of the surface.  3. Hold a __________ weight or a piece of rubber exercise band or tubing. Slowly curl your hand up toward your forearm. Try to only move your hand and keep the rest of your arm still.  4. Hold this position for __________ seconds.  5. Slowly return to the starting position.  Repeat __________ times. Complete this exercise __________ times a day.  Exercise C: Eccentric wrist flexion   1. Sit with your left / right forearm palm-up and supported on a table or other surface.  2. Let your left / right wrist extend over the edge of the surface.  3. Hold a __________ weight or a piece of rubber exercise band or tubing.  4. Use your other hand to move your left / right hand up toward your forearm.  5. Slowly return to the starting position using only your left / right hand.  Repeat __________ times. Complete this exercise __________ times a day.  Exercise D: Hand turns, pronation i   1. Sit with your left / right forearm supported on a table or other surface.  2. Move your  wrist so your pinkie travels toward your forearm and your thumb moves away from your forearm.  3. Hold this position for __________ seconds.  4. Slowly return to the starting position.  Exercise E: Hand turns, pronation ii     1. Sit with your left / right forearm supported on a table or other surface.  2. Hold a hammer in your left / right hand.  ¨ The exercise will be easier if you hold on near the head of the hammer.  ¨ If you hold on toward the end of the handle, the exercise will be harder.  3. Rest your left / right hand over the edge of the table with your palm facing up.  4. Without moving your elbow, slowly turn your palm and your hand down toward the table.  5. Hold this position for __________ seconds.  6. Slowly return to the starting position.  Repeat __________ times. Complete this exercise __________ times a day.  Exercise F: Shoulder blade squeeze   1. Sit in a stable chair with good posture. Do not let your back touch the back of the chair.  2. Your arms should be at your sides with your elbows bent. You can rest your forearms on a pillow.  3. Squeeze your shoulder blades together. Keep your shoulders level. Do not lift your shoulders up toward your ears.  4. Hold this position for __________ seconds.  5. Slowly release.  Repeat __________ times. Complete this exercise __________ times a day.  This information is not intended to replace advice given to you by your health care provider. Make sure you discuss any questions you have with your health care provider.  Document Released: 12/18/2006 Document Revised: 08/24/2017 Document Reviewed: 08/29/2016  [employer] Interactive Patient Education © 2017 [employer]

## 2018-05-02 LAB
25(OH)D3+25(OH)D2 SERPL-MCNC: 18.3 NG/ML (ref 30–100)
ALBUMIN SERPL-MCNC: 4.3 G/DL (ref 3.5–5.5)
ALBUMIN/GLOB SERPL: 1.7 {RATIO} (ref 1.2–2.2)
ALP SERPL-CCNC: 84 IU/L (ref 39–117)
ALT SERPL-CCNC: 30 IU/L (ref 0–32)
AST SERPL-CCNC: 22 IU/L (ref 0–40)
BASOPHILS # BLD AUTO: 0 X10E3/UL (ref 0–0.2)
BASOPHILS NFR BLD AUTO: 0 %
BILIRUB SERPL-MCNC: 0.4 MG/DL (ref 0–1.2)
BUN SERPL-MCNC: 14 MG/DL (ref 6–20)
BUN/CREAT SERPL: 18 (ref 9–23)
CALCIUM SERPL-MCNC: 9 MG/DL (ref 8.7–10.2)
CHLORIDE SERPL-SCNC: 101 MMOL/L (ref 96–106)
CHOLEST SERPL-MCNC: 170 MG/DL (ref 100–199)
CO2 SERPL-SCNC: 22 MMOL/L (ref 18–29)
CREAT SERPL-MCNC: 0.77 MG/DL (ref 0.57–1)
EOSINOPHIL # BLD AUTO: 0.4 X10E3/UL (ref 0–0.4)
EOSINOPHIL NFR BLD AUTO: 4 %
ERYTHROCYTE [DISTWIDTH] IN BLOOD BY AUTOMATED COUNT: 13.7 % (ref 12.3–15.4)
GFR SERPLBLD CREATININE-BSD FMLA CKD-EPI: 106 ML/MIN/1.73
GFR SERPLBLD CREATININE-BSD FMLA CKD-EPI: 122 ML/MIN/1.73
GLOBULIN SER CALC-MCNC: 2.5 G/DL (ref 1.5–4.5)
GLUCOSE SERPL-MCNC: 82 MG/DL (ref 65–99)
HCT VFR BLD AUTO: 42.6 % (ref 34–46.6)
HDLC SERPL-MCNC: 40 MG/DL
HGB BLD-MCNC: 13.5 G/DL (ref 11.1–15.9)
IMM GRANULOCYTES # BLD: 0 X10E3/UL (ref 0–0.1)
IMM GRANULOCYTES NFR BLD: 0 %
LDLC SERPL CALC-MCNC: 114 MG/DL (ref 0–99)
LDLC/HDLC SERPL: 2.9 RATIO (ref 0–3.2)
LYMPHOCYTES # BLD AUTO: 2.4 X10E3/UL (ref 0.7–3.1)
LYMPHOCYTES NFR BLD AUTO: 27 %
MCH RBC QN AUTO: 29.1 PG (ref 26.6–33)
MCHC RBC AUTO-ENTMCNC: 31.7 G/DL (ref 31.5–35.7)
MCV RBC AUTO: 92 FL (ref 79–97)
MONOCYTES # BLD AUTO: 0.7 X10E3/UL (ref 0.1–0.9)
MONOCYTES NFR BLD AUTO: 8 %
NEUTROPHILS # BLD AUTO: 5.3 X10E3/UL (ref 1.4–7)
NEUTROPHILS NFR BLD AUTO: 61 %
PLATELET # BLD AUTO: 388 X10E3/UL (ref 150–379)
POTASSIUM SERPL-SCNC: 4.6 MMOL/L (ref 3.5–5.2)
PROT SERPL-MCNC: 6.8 G/DL (ref 6–8.5)
RBC # BLD AUTO: 4.64 X10E6/UL (ref 3.77–5.28)
SODIUM SERPL-SCNC: 140 MMOL/L (ref 134–144)
TRIGL SERPL-MCNC: 82 MG/DL (ref 0–149)
TSH SERPL DL<=0.005 MIU/L-ACNC: 1.21 UIU/ML (ref 0.45–4.5)
VLDLC SERPL CALC-MCNC: 16 MG/DL (ref 5–40)
WBC # BLD AUTO: 8.9 X10E3/UL (ref 3.4–10.8)

## 2018-05-06 NOTE — PROGRESS NOTES
I have reviewed the notes, assessments, and/or procedures performed by Mirta Bergeron, I concur with her/his documentation of Jocelyn Merlos.

## 2018-06-07 ENCOUNTER — TELEPHONE (OUTPATIENT)
Dept: FAMILY MEDICINE CLINIC | Facility: CLINIC | Age: 28
End: 2018-06-07

## 2018-06-07 DIAGNOSIS — Z23 NEED FOR HEPATITIS A VACCINATION: Primary | ICD-10-CM

## 2018-06-07 NOTE — TELEPHONE ENCOUNTER
Patient called and would like to get the Hepatitis A vaccine, she has not been exposed or ever had the disease  Her best call back is [phone]

## 2018-06-10 NOTE — TELEPHONE ENCOUNTER
ENSURE NO PREVIOUS HEP A VACCINE AND NO VACCINE ADVERSE REACTIONS. SHE SHOULD CHECK WITH HER INSURANCE TO ENSURE COVERED FROM HERE. I WILL PLACE ORDER. SHE NEEDS TO RETURN IN 6 MONTHS FOR 2ND VACCINE

## 2018-06-19 ENCOUNTER — CLINICAL SUPPORT (OUTPATIENT)
Dept: FAMILY MEDICINE CLINIC | Facility: CLINIC | Age: 28
End: 2018-06-19

## 2018-06-19 PROCEDURE — 90471 IMMUNIZATION ADMIN: CPT | Performed by: PHYSICIAN ASSISTANT

## 2018-06-19 PROCEDURE — 90632 HEPA VACCINE ADULT IM: CPT | Performed by: PHYSICIAN ASSISTANT

## 2018-10-31 ENCOUNTER — OFFICE VISIT (OUTPATIENT)
Dept: FAMILY MEDICINE CLINIC | Facility: CLINIC | Age: 28
End: 2018-10-31

## 2018-10-31 VITALS
HEIGHT: 68 IN | DIASTOLIC BLOOD PRESSURE: 80 MMHG | WEIGHT: 261 LBS | TEMPERATURE: 98.4 F | BODY MASS INDEX: 39.56 KG/M2 | SYSTOLIC BLOOD PRESSURE: 120 MMHG | OXYGEN SATURATION: 98 % | HEART RATE: 85 BPM

## 2018-10-31 DIAGNOSIS — Z87.440 HISTORY OF UTI: ICD-10-CM

## 2018-10-31 DIAGNOSIS — R35.0 URINE FREQUENCY: Primary | ICD-10-CM

## 2018-10-31 DIAGNOSIS — M67.979 ACHILLES TENDON DISORDER, UNSPECIFIED LATERALITY: ICD-10-CM

## 2018-10-31 DIAGNOSIS — M77.11 LATERAL EPICONDYLITIS OF BOTH ELBOWS: ICD-10-CM

## 2018-10-31 DIAGNOSIS — M77.12 LATERAL EPICONDYLITIS OF BOTH ELBOWS: ICD-10-CM

## 2018-10-31 LAB
B-HCG UR QL: NEGATIVE
BILIRUB BLD-MCNC: NEGATIVE MG/DL
CLARITY, POC: CLEAR
COLOR UR: YELLOW
GLUCOSE UR STRIP-MCNC: NEGATIVE MG/DL
INTERNAL NEGATIVE CONTROL: NEGATIVE
INTERNAL POSITIVE CONTROL: POSITIVE
KETONES UR QL: NEGATIVE
LEUKOCYTE EST, POC: ABNORMAL
Lab: NORMAL
NITRITE UR-MCNC: NEGATIVE MG/ML
PH UR: 5.5 [PH] (ref 5–8)
PROT UR STRIP-MCNC: NEGATIVE MG/DL
RBC # UR STRIP: ABNORMAL /UL
SP GR UR: 1.03 (ref 1–1.03)
UROBILINOGEN UR QL: NORMAL

## 2018-10-31 PROCEDURE — 81003 URINALYSIS AUTO W/O SCOPE: CPT | Performed by: PHYSICIAN ASSISTANT

## 2018-10-31 PROCEDURE — 81025 URINE PREGNANCY TEST: CPT | Performed by: PHYSICIAN ASSISTANT

## 2018-10-31 PROCEDURE — 99213 OFFICE O/P EST LOW 20 MIN: CPT | Performed by: PHYSICIAN ASSISTANT

## 2018-11-01 NOTE — PATIENT INSTRUCTIONS
28 YEAR OLD FEMALE WHO PRESENTS TODAY IN FOLLOW UP OF URGENT CARE FOR UTI AND ABDOMINAL PAIN. SHE HAS VERY MILD, INTERMITTENT ABDOMINAL PRESSURE. I WILL CHECK URINE CULTURE TODAY. TO BE SEEN ASAP IF WORSENING OR NEW SYMPTOMS.     REPORTS SHE HAD DECREASED FLEXIBILITY IN BILATERAL ACHILLES TENDONS. SHE HAS SEEN A PODIATRIST WITH INSERTS AND NO IMPROVEMENT. I WILL HAVE HER TRY PT AND IF NO RESOLUTION, I WILL REFER TO ORTHOPEDIST. SHE ALSO HAS LATERAL EPICONDYLITIS BILATERALLY. I WILL REFER TO PT FOR THAT AS WELL. CONSIDER ORTHO REFERRAL IF NEEDED FOR THAT AS WELL.     PATIENT IS DUE FOR ROUTINE FOLLOW UP. SHE WILL BE SEEN IN A COUPLE WEEKS FOR THAT.

## 2018-11-02 LAB
BACTERIA UR CULT: NORMAL
BACTERIA UR CULT: NORMAL

## 2018-11-20 ENCOUNTER — OFFICE VISIT (OUTPATIENT)
Dept: FAMILY MEDICINE CLINIC | Facility: CLINIC | Age: 28
End: 2018-11-20

## 2018-11-20 VITALS
HEIGHT: 68 IN | DIASTOLIC BLOOD PRESSURE: 78 MMHG | WEIGHT: 256 LBS | OXYGEN SATURATION: 98 % | SYSTOLIC BLOOD PRESSURE: 120 MMHG | TEMPERATURE: 97.9 F | HEART RATE: 63 BPM | BODY MASS INDEX: 38.8 KG/M2

## 2018-11-20 DIAGNOSIS — N76.0 BACTERIAL VAGINOSIS: ICD-10-CM

## 2018-11-20 DIAGNOSIS — Z83.49 FAMILY HISTORY OF HEMOCHROMATOSIS: ICD-10-CM

## 2018-11-20 DIAGNOSIS — F43.23 ADJUSTMENT DISORDER WITH MIXED ANXIETY AND DEPRESSED MOOD: Primary | ICD-10-CM

## 2018-11-20 DIAGNOSIS — Z14.8 HEMOCHROMATOSIS CARRIER: ICD-10-CM

## 2018-11-20 DIAGNOSIS — R74.8 ELEVATED LIVER ENZYMES: ICD-10-CM

## 2018-11-20 DIAGNOSIS — B96.89 BACTERIAL VAGINOSIS: ICD-10-CM

## 2018-11-20 DIAGNOSIS — G43.909 MIGRAINE WITHOUT STATUS MIGRAINOSUS, NOT INTRACTABLE, UNSPECIFIED MIGRAINE TYPE: ICD-10-CM

## 2018-11-20 DIAGNOSIS — E55.9 VITAMIN D DEFICIENCY: ICD-10-CM

## 2018-11-20 PROCEDURE — 99214 OFFICE O/P EST MOD 30 MIN: CPT | Performed by: PHYSICIAN ASSISTANT

## 2018-11-20 RX ORDER — METRONIDAZOLE 7.5 MG/G
GEL VAGINAL NIGHTLY
Qty: 70 G | Refills: 0 | Status: SHIPPED | OUTPATIENT
Start: 2018-11-20 | End: 2018-11-25

## 2018-11-20 NOTE — PROGRESS NOTES
Subjective   Jcoelyn Merlos is a 28 y.o. female. Patient seen today for anxiety, FMLA papers     History of Present Illness     Has been doing pretty well. Has intermittent anxiety that causes occasionally missing work. Needs FMLA renewed. She continues with psychotherapy. She has tried a couple medications with AE or not tolerating.     Patient with BV about twice yearly. She needs Rx for BV.     Ready for fasting labs.     The following portions of the patient's history were reviewed and updated as appropriate: allergies, current medications, past family history, past medical history, past social history, past surgical history and problem list.    Review of Systems   All other systems reviewed and are negative.      Objective   Physical Exam   Constitutional: She is oriented to person, place, and time. She appears well-developed and well-nourished.   HENT:   Head: Normocephalic and atraumatic.   Right Ear: External ear normal.   Left Ear: External ear normal.   Nose: Nose normal.   Eyes: Conjunctivae and lids are normal.   Neck: Neck supple. Carotid bruit is not present.   Cardiovascular: Normal rate, regular rhythm, normal heart sounds and intact distal pulses. Exam reveals no gallop and no friction rub.   No murmur heard.  Pulmonary/Chest: Effort normal and breath sounds normal. No respiratory distress. She has no wheezes. She has no rhonchi. She has no rales.   Musculoskeletal: She exhibits no edema or deformity.   Neurological: She is alert and oriented to person, place, and time. Gait normal.   Skin: Skin is warm and dry.   Psychiatric: She has a normal mood and affect. Her speech is normal and behavior is normal. Judgment and thought content normal. Cognition and memory are normal.   Nursing note and vitals reviewed.      Assessment/Plan   Jocelyn was seen today for follow-up and fmla.    Diagnoses and all orders for this visit:    Adjustment disorder with mixed anxiety and depressed mood  -     Thyroid Panel With  TSH  -     Vitamin B12 & Folate  -     POC Urinalysis Dipstick, Automated    Migraine without status migrainosus, not intractable, unspecified migraine type  -     Thyroid Panel With TSH  -     Vitamin B12 & Folate  -     POC Urinalysis Dipstick, Automated    Vitamin D deficiency  -     Comprehensive Metabolic Panel  -     Thyroid Panel With TSH  -     Vitamin B12 & Folate  -     Vitamin D 25 Hydroxy  -     POC Urinalysis Dipstick, Automated    Elevated liver enzymes  -     Comprehensive Metabolic Panel  -     Lipid Panel With LDL / HDL Ratio  -     Thyroid Panel With TSH  -     Iron and TIBC  -     Ferritin  -     Vitamin B12 & Folate  -     POC Urinalysis Dipstick, Automated    Hemochromatosis carrier  -     CBC & Differential  -     Thyroid Panel With TSH  -     Vitamin B12 & Folate  -     POC Urinalysis Dipstick, Automated    Family history of hemochromatosis  -     CBC & Differential  -     Thyroid Panel With TSH  -     Vitamin B12 & Folate  -     POC Urinalysis Dipstick, Automated    Bacterial vaginosis    Other orders  -     metroNIDAZOLE (METROGEL VAGINAL) 0.75 % vaginal gel; Insert  into the vagina Every Night for 5 days.      Patient Instructions   28 year old female who presents today in follow up of depression with anxiety, hemochromatosis carrier, elevated LFT, and vitamin D deficiency. She has continued to do well and continues with mental health counseling. I will complete FMLA for her anxiety in case of exacerbations. I will also check fasting labs today- call if no results in 1 week. Follow up pending labs.     Patient has had recurrent BV about 1-2 x yearly. She is having an episode of BV currently. I will give Metrogel nightly for 5 days. To see GYN if recurrence of symptoms.

## 2018-11-21 LAB
25(OH)D3+25(OH)D2 SERPL-MCNC: 29.7 NG/ML (ref 30–100)
ALBUMIN SERPL-MCNC: 4.8 G/DL (ref 3.5–5.5)
ALBUMIN/GLOB SERPL: 2.1 {RATIO} (ref 1.2–2.2)
ALP SERPL-CCNC: 75 IU/L (ref 39–117)
ALT SERPL-CCNC: 41 IU/L (ref 0–32)
AST SERPL-CCNC: 30 IU/L (ref 0–40)
BASOPHILS # BLD AUTO: 0 X10E3/UL (ref 0–0.2)
BASOPHILS NFR BLD AUTO: 0 %
BILIRUB SERPL-MCNC: 0.4 MG/DL (ref 0–1.2)
BUN SERPL-MCNC: 12 MG/DL (ref 6–20)
BUN/CREAT SERPL: 16 (ref 9–23)
CALCIUM SERPL-MCNC: 9.2 MG/DL (ref 8.7–10.2)
CHLORIDE SERPL-SCNC: 102 MMOL/L (ref 96–106)
CHOLEST SERPL-MCNC: 187 MG/DL (ref 100–199)
CO2 SERPL-SCNC: 21 MMOL/L (ref 20–29)
CREAT SERPL-MCNC: 0.76 MG/DL (ref 0.57–1)
EOSINOPHIL # BLD AUTO: 0.3 X10E3/UL (ref 0–0.4)
EOSINOPHIL NFR BLD AUTO: 3 %
ERYTHROCYTE [DISTWIDTH] IN BLOOD BY AUTOMATED COUNT: 13 % (ref 12.3–15.4)
FERRITIN SERPL-MCNC: 62 NG/ML (ref 15–150)
FOLATE SERPL-MCNC: 13.8 NG/ML
FT4I SERPL CALC-MCNC: 1.9 (ref 1.2–4.9)
GLOBULIN SER CALC-MCNC: 2.3 G/DL (ref 1.5–4.5)
GLUCOSE SERPL-MCNC: 83 MG/DL (ref 65–99)
HCT VFR BLD AUTO: 42 % (ref 34–46.6)
HDLC SERPL-MCNC: 37 MG/DL
HGB BLD-MCNC: 13.9 G/DL (ref 11.1–15.9)
IMM GRANULOCYTES # BLD: 0 X10E3/UL (ref 0–0.1)
IMM GRANULOCYTES NFR BLD: 0 %
IRON SATN MFR SERPL: 36 % (ref 15–55)
IRON SERPL-MCNC: 122 UG/DL (ref 27–159)
LDLC SERPL CALC-MCNC: 125 MG/DL (ref 0–99)
LDLC/HDLC SERPL: 3.4 RATIO (ref 0–3.2)
LYMPHOCYTES # BLD AUTO: 2.5 X10E3/UL (ref 0.7–3.1)
LYMPHOCYTES NFR BLD AUTO: 26 %
MCH RBC QN AUTO: 29.2 PG (ref 26.6–33)
MCHC RBC AUTO-ENTMCNC: 33.1 G/DL (ref 31.5–35.7)
MCV RBC AUTO: 88 FL (ref 79–97)
MONOCYTES # BLD AUTO: 0.8 X10E3/UL (ref 0.1–0.9)
MONOCYTES NFR BLD AUTO: 9 %
NEUTROPHILS # BLD AUTO: 5.9 X10E3/UL (ref 1.4–7)
NEUTROPHILS NFR BLD AUTO: 62 %
PLATELET # BLD AUTO: 359 X10E3/UL (ref 150–379)
POTASSIUM SERPL-SCNC: 4.4 MMOL/L (ref 3.5–5.2)
PROT SERPL-MCNC: 7.1 G/DL (ref 6–8.5)
RBC # BLD AUTO: 4.76 X10E6/UL (ref 3.77–5.28)
SODIUM SERPL-SCNC: 140 MMOL/L (ref 134–144)
T3RU NFR SERPL: 23 % (ref 24–39)
T4 SERPL-MCNC: 8.3 UG/DL (ref 4.5–12)
TIBC SERPL-MCNC: 338 UG/DL (ref 250–450)
TRIGL SERPL-MCNC: 124 MG/DL (ref 0–149)
TSH SERPL DL<=0.005 MIU/L-ACNC: 1.45 UIU/ML (ref 0.45–4.5)
UIBC SERPL-MCNC: 216 UG/DL (ref 131–425)
VIT B12 SERPL-MCNC: 642 PG/ML (ref 232–1245)
VLDLC SERPL CALC-MCNC: 25 MG/DL (ref 5–40)
WBC # BLD AUTO: 9.5 X10E3/UL (ref 3.4–10.8)

## 2018-11-22 NOTE — PATIENT INSTRUCTIONS
28 year old female who presents today in follow up of depression with anxiety, hemochromatosis carrier, elevated LFT, and vitamin D deficiency. She has continued to do well and continues with mental health counseling. I will complete FMLA for her anxiety in case of exacerbations. I will also check fasting labs today- call if no results in 1 week. Follow up pending labs.     Patient has had recurrent BV about 1-2 x yearly. She is having an episode of BV currently. I will give Metrogel nightly for 5 days. To see GYN if recurrence of symptoms.

## 2019-01-10 ENCOUNTER — OFFICE VISIT (OUTPATIENT)
Dept: OBSTETRICS AND GYNECOLOGY | Facility: CLINIC | Age: 29
End: 2019-01-10

## 2019-01-10 VITALS
BODY MASS INDEX: 39.71 KG/M2 | DIASTOLIC BLOOD PRESSURE: 82 MMHG | HEIGHT: 68 IN | SYSTOLIC BLOOD PRESSURE: 128 MMHG | WEIGHT: 262 LBS

## 2019-01-10 DIAGNOSIS — Z13.9 SCREENING FOR CONDITION: ICD-10-CM

## 2019-01-10 DIAGNOSIS — Z01.419 ENCOUNTER FOR GYNECOLOGICAL EXAMINATION WITHOUT ABNORMAL FINDING: Primary | ICD-10-CM

## 2019-01-10 LAB
B-HCG UR QL: NEGATIVE
BILIRUB BLD-MCNC: NEGATIVE MG/DL
CLARITY, POC: CLEAR
COLOR UR: YELLOW
GLUCOSE UR STRIP-MCNC: NEGATIVE MG/DL
INTERNAL NEGATIVE CONTROL: NEGATIVE
INTERNAL POSITIVE CONTROL: POSITIVE
KETONES UR QL: NEGATIVE
LEUKOCYTE EST, POC: NEGATIVE
Lab: NORMAL
NITRITE UR-MCNC: NEGATIVE MG/ML
PH UR: 5 [PH] (ref 5–8)
PROT UR STRIP-MCNC: NEGATIVE MG/DL
RBC # UR STRIP: NEGATIVE /UL
SP GR UR: 1 (ref 1–1.03)
UROBILINOGEN UR QL: NORMAL

## 2019-01-10 PROCEDURE — 81002 URINALYSIS NONAUTO W/O SCOPE: CPT | Performed by: OBSTETRICS & GYNECOLOGY

## 2019-01-10 PROCEDURE — 99395 PREV VISIT EST AGE 18-39: CPT | Performed by: OBSTETRICS & GYNECOLOGY

## 2019-01-10 PROCEDURE — 81025 URINE PREGNANCY TEST: CPT | Performed by: OBSTETRICS & GYNECOLOGY

## 2019-01-10 NOTE — PROGRESS NOTES
GYN Annual Exam     CC- Here for annual exam.     Jocelyn Merlos is a 28 y.o. female who presents for annual well woman exam. Periods are regular every 28-30 days, lasting 5 days. Dysmenorrhea:none. Cyclic symptoms include none. No intermenstrual bleeding, spotting, or discharge.    OB History     No data available          Current contraception: none  History of abnormal Pap smear: yes - LEEP  Family history of uterine, colon or ovarian cancer: no  History of abnormal mammogram: no  Family history of breast cancer: no  Last Pap : 2017    Past Medical History:   Diagnosis Date   • Abnormal Pap smear of cervix    • Palpitations        Past Surgical History:   Procedure Laterality Date   • BREAST AUGMENTATION     • CERVICAL BIOPSY  W/ LOOP ELECTRODE EXCISION     • LAPAROSCOPIC GASTRIC BANDING     • LEEP     • TONSILLECTOMY         No current outpatient medications on file.    Allergies   Allergen Reactions   • No Known Drug Allergy        Social History     Tobacco Use   • Smoking status: Former Smoker     Packs/day: 0.25   • Smokeless tobacco: Never Used   Substance Use Topics   • Alcohol use: Yes     Alcohol/week: 2.4 - 3.0 oz     Types: 4 - 5 Standard drinks or equivalent per week     Comment: Socially.   • Drug use: No       Family History   Problem Relation Age of Onset   • Skin cancer Mother    • Depression Mother    • Anxiety disorder Mother    • Stroke Mother        Review of Systems   Constitutional: Negative for appetite change, fever and unexpected weight change.   HENT: Negative for congestion and sore throat.    Respiratory: Negative for cough and shortness of breath.    Cardiovascular: Negative for chest pain and palpitations.   Gastrointestinal: Negative for abdominal distention, abdominal pain, constipation, diarrhea, nausea and vomiting.   Endocrine: Negative.    Genitourinary: Negative for dyspareunia, menstrual problem, pelvic pain and vaginal discharge.   Skin: Negative.    Neurological: Negative for  "dizziness and syncope.   Hematological: Negative.    Psychiatric/Behavioral: Negative for dysphoric mood and sleep disturbance. The patient is not nervous/anxious.        /82   Ht 172.7 cm (68\")   Wt 119 kg (262 lb)   LMP 12/03/2018   BMI 39.84 kg/m²     Physical Exam   Constitutional: She is oriented to person, place, and time. She appears well-developed and well-nourished.   HENT:   Head: Normocephalic and atraumatic.   Neck: Normal range of motion. Neck supple. No thyromegaly present.   Cardiovascular: Normal rate and regular rhythm.   Pulmonary/Chest: Effort normal and breath sounds normal. Right breast exhibits no mass and no nipple discharge. Left breast exhibits no mass and no nipple discharge. Breasts are symmetrical. There is no breast swelling.   Abdominal: Soft. Bowel sounds are normal. She exhibits no distension and no mass. There is no tenderness. There is no rebound and no guarding.   Genitourinary: Vagina normal and uterus normal. No breast tenderness, discharge or bleeding. Pelvic exam was performed with patient prone. There is no lesion on the right labia. There is no lesion on the left labia. Cervix exhibits no motion tenderness and no discharge. Right adnexum displays no mass. Left adnexum displays no mass.   Musculoskeletal: Normal range of motion. She exhibits no edema.   Neurological: She is alert and oriented to person, place, and time.   Skin: Skin is warm and dry.   Psychiatric: She has a normal mood and affect. Her behavior is normal. Judgment and thought content normal.   Nursing note and vitals reviewed.      Diagnoses and all orders for this visit:    Encounter for gynecological examination without abnormal finding    Screening for condition  -     POC Urinalysis Dipstick  -     POC Pregnancy, Urine        Assessment     1) GYN annual well woman exam.   2) Attempting pregnancy - recommend daily PNV     Plan     1) Breast Health - Clinical breast exam & mammogram yearly, Self " breast awareness monthly  2) Pap - done today  3) Smoking status- Never smoker  4) Colon health - screening colonoscopy recommended if not up to date  5) Bone health - Weight bearing exercise, dietary calcium recommendations and vitamin D reviewed.   6) Seat belts recommended  7) Follow up prn and one year    Encounter Diagnoses   Name Primary?   • Encounter for gynecological examination without abnormal finding Yes   • Screening for condition          Henrique Giles MD  1/10/2019  1:07 PM

## 2019-01-11 LAB
CONV .: NORMAL
CYTOLOGIST CVX/VAG CYTO: NORMAL
CYTOLOGY CVX/VAG DOC THIN PREP: NORMAL
DX ICD CODE: NORMAL
HIV 1 & 2 AB SER-IMP: NORMAL
OTHER STN SPEC: NORMAL
PATH REPORT.FINAL DX SPEC: NORMAL
STAT OF ADQ CVX/VAG CYTO-IMP: NORMAL

## 2019-05-30 ENCOUNTER — OFFICE VISIT (OUTPATIENT)
Dept: OBSTETRICS AND GYNECOLOGY | Facility: CLINIC | Age: 29
End: 2019-05-30

## 2019-05-30 VITALS
SYSTOLIC BLOOD PRESSURE: 118 MMHG | DIASTOLIC BLOOD PRESSURE: 82 MMHG | BODY MASS INDEX: 39.4 KG/M2 | WEIGHT: 260 LBS | HEIGHT: 68 IN

## 2019-05-30 DIAGNOSIS — Z13.9 SCREENING FOR CONDITION: Primary | ICD-10-CM

## 2019-05-30 DIAGNOSIS — B00.9 HERPES SIMPLEX: ICD-10-CM

## 2019-05-30 PROCEDURE — 81025 URINE PREGNANCY TEST: CPT | Performed by: OBSTETRICS & GYNECOLOGY

## 2019-05-30 PROCEDURE — 99213 OFFICE O/P EST LOW 20 MIN: CPT | Performed by: OBSTETRICS & GYNECOLOGY

## 2019-05-30 PROCEDURE — 81002 URINALYSIS NONAUTO W/O SCOPE: CPT | Performed by: OBSTETRICS & GYNECOLOGY

## 2019-05-30 RX ORDER — VALACYCLOVIR HYDROCHLORIDE 500 MG/1
500 TABLET, FILM COATED ORAL 2 TIMES DAILY
Qty: 6 TABLET | Refills: 6 | Status: SHIPPED | OUTPATIENT
Start: 2019-05-30 | End: 2019-06-02

## 2019-05-30 NOTE — PROGRESS NOTES
"      Jocelyn Merlos is a 28 y.o. patient who presents for follow up of   Chief Complaint   Patient presents with   • Follow-up     alexandra       HPI 28-year-old with second episode of vulvar lesion with pain.  This once lasted 4 days.  One previously was 2 weeks ago that lasted about 5 days.  It is painful and swollen.  Patient got on the Internet and thinks she has vulvar cancer.  She has a history of herpes in the past.  She is not taking any medicine or done any local treatment for this.    The following portions of the patient's history were reviewed and updated as appropriate: allergies, current medications and problem list.    Review of Systems   Constitutional: Negative for appetite change, fever and unexpected weight change.   HENT: Negative for congestion and sore throat.    Respiratory: Negative for cough and shortness of breath.    Cardiovascular: Negative for chest pain and palpitations.   Gastrointestinal: Negative for abdominal distention, abdominal pain, constipation, diarrhea, nausea and vomiting.   Endocrine: Negative.    Genitourinary: Negative for dyspareunia, menstrual problem, pelvic pain and vaginal discharge.   Skin: Negative.    Neurological: Negative for dizziness and syncope.   Hematological: Negative.    Psychiatric/Behavioral: Negative for dysphoric mood and sleep disturbance. The patient is not nervous/anxious.        /82   Ht 172.7 cm (68\")   Wt 118 kg (260 lb)   LMP 04/16/2019   BMI 39.53 kg/m²     Physical Exam   Constitutional: She is oriented to person, place, and time. She appears well-developed and well-nourished.   HENT:   Head: Normocephalic and atraumatic.   Pulmonary/Chest: Effort normal. No respiratory distress.   Abdominal: Soft. She exhibits no distension and no mass. There is no tenderness. There is no rebound and no guarding.   Genitourinary:       There is no rash, tenderness or lesion on the right labia. There is tenderness and lesion on the left labia. "   Musculoskeletal: Normal range of motion.   Neurological: She is alert and oriented to person, place, and time.   Skin: Skin is warm and dry.   Psychiatric: She has a normal mood and affect. Her behavior is normal. Judgment and thought content normal.   Nursing note and vitals reviewed.        Assessment/Plan    Jocelyn was seen today for follow-up.    Diagnoses and all orders for this visit:    Screening for condition  -     POC Urinalysis Dipstick  -     POC Pregnancy, Urine    Herpes simplex    Other orders  -     valACYclovir (VALTREX) 500 MG tablet; Take 1 tablet by mouth 2 (Two) Times a Day for 3 days.    Treat with Valtrex.  Send 1 swab for PCR confirmation.    Return if symptoms worsen or fail to improve.      Henrique Giles MD  5/30/2019  9:26 AM

## 2019-06-04 LAB
A VAGINAE DNA VAG QL NAA+PROBE: ABNORMAL SCORE
BVAB2 DNA VAG QL NAA+PROBE: ABNORMAL SCORE
C ALBICANS DNA VAG QL NAA+PROBE: NEGATIVE
C GLABRATA DNA VAG QL NAA+PROBE: NEGATIVE
C TRACH RRNA SPEC QL NAA+PROBE: NEGATIVE
HSV1 DNA SPEC QL NAA+PROBE: NEGATIVE
HSV2 DNA SPEC QL NAA+PROBE: NEGATIVE
MEGA1 DNA VAG QL NAA+PROBE: ABNORMAL SCORE
N GONORRHOEA RRNA SPEC QL NAA+PROBE: NEGATIVE
T VAGINALIS RRNA SPEC QL NAA+PROBE: NEGATIVE

## 2020-01-16 ENCOUNTER — OFFICE VISIT (OUTPATIENT)
Dept: OBSTETRICS AND GYNECOLOGY | Facility: CLINIC | Age: 30
End: 2020-01-16

## 2020-01-16 VITALS
HEIGHT: 68 IN | BODY MASS INDEX: 40.47 KG/M2 | WEIGHT: 267 LBS | SYSTOLIC BLOOD PRESSURE: 120 MMHG | DIASTOLIC BLOOD PRESSURE: 80 MMHG

## 2020-01-16 DIAGNOSIS — Z01.419 WELL WOMAN EXAM: Primary | ICD-10-CM

## 2020-01-16 DIAGNOSIS — Z01.419 PAP SMEAR, LOW-RISK: ICD-10-CM

## 2020-01-16 PROCEDURE — 99395 PREV VISIT EST AGE 18-39: CPT | Performed by: OBSTETRICS & GYNECOLOGY

## 2020-01-16 PROCEDURE — 81025 URINE PREGNANCY TEST: CPT | Performed by: OBSTETRICS & GYNECOLOGY

## 2020-01-16 PROCEDURE — 81002 URINALYSIS NONAUTO W/O SCOPE: CPT | Performed by: OBSTETRICS & GYNECOLOGY

## 2020-01-16 NOTE — PROGRESS NOTES
GYN Annual Exam     CC- Here for annual exam.     Jocelyn Merlos is a 29 y.o. female who presents for annual well woman exam. Periods are irregular, lasting 6 days. Dysmenorrhea:none. Cyclic symptoms include none. No intermenstrual bleeding, spotting, or discharge.    OB History    None         Current contraception: none  History of abnormal Pap smear: yes - LEEP  Family history of uterine, colon or ovarian cancer: no  History of abnormal mammogram: no  Family history of breast cancer: no  Last Pap : 2019 normal    Past Medical History:   Diagnosis Date   • Abnormal Pap smear of cervix    • Palpitations        Past Surgical History:   Procedure Laterality Date   • BREAST AUGMENTATION     • CERVICAL BIOPSY  W/ LOOP ELECTRODE EXCISION     • LAPAROSCOPIC GASTRIC BANDING     • LEEP     • TONSILLECTOMY         No current outpatient medications on file.    Allergies   Allergen Reactions   • No Known Drug Allergy        Social History     Tobacco Use   • Smoking status: Former Smoker     Packs/day: 0.25   • Smokeless tobacco: Never Used   Substance Use Topics   • Alcohol use: Yes     Alcohol/week: 4.0 - 5.0 standard drinks     Types: 4 - 5 Standard drinks or equivalent per week     Comment: Socially.   • Drug use: No       Family History   Problem Relation Age of Onset   • Skin cancer Mother    • Depression Mother    • Anxiety disorder Mother    • Stroke Mother        Review of Systems   Constitutional: Negative for appetite change, fever and unexpected weight change.   HENT: Negative for congestion and sore throat.    Respiratory: Negative for cough and shortness of breath.    Cardiovascular: Negative for chest pain and palpitations.   Gastrointestinal: Negative for abdominal distention, abdominal pain, constipation, diarrhea, nausea and vomiting.   Endocrine: Negative.    Genitourinary: Negative for dyspareunia, menstrual problem, pelvic pain and vaginal discharge.   Skin: Negative.    Neurological: Negative for dizziness  "and syncope.   Hematological: Negative.    Psychiatric/Behavioral: Negative for dysphoric mood and sleep disturbance. The patient is not nervous/anxious.        /80   Ht 172.7 cm (68\")   Wt 121 kg (267 lb)   LMP 12/30/2019 (Exact Date)   Breastfeeding No   BMI 40.60 kg/m²     Physical Exam   Constitutional: She is oriented to person, place, and time. She appears well-developed and well-nourished.   HENT:   Head: Normocephalic and atraumatic.   Neck: Normal range of motion. Neck supple. No thyromegaly present.   Cardiovascular: Normal rate and regular rhythm.   Pulmonary/Chest: Effort normal and breath sounds normal. Right breast exhibits no mass and no nipple discharge. Left breast exhibits no mass and no nipple discharge. No breast swelling, tenderness, discharge or bleeding. Breasts are symmetrical.   Abdominal: Soft. Bowel sounds are normal. She exhibits no distension and no mass. There is no tenderness. There is no rebound and no guarding.   Genitourinary: Vagina normal and uterus normal. No breast swelling, tenderness, discharge or bleeding. Pelvic exam was performed with patient prone. There is no lesion on the right labia. There is no lesion on the left labia. Cervix exhibits no motion tenderness and no discharge. Right adnexum displays no mass. Left adnexum displays no mass.   Musculoskeletal: Normal range of motion. She exhibits no edema.   Neurological: She is alert and oriented to person, place, and time.   Skin: Skin is warm and dry.   Psychiatric: She has a normal mood and affect. Her behavior is normal. Judgment and thought content normal.   Nursing note and vitals reviewed.      Diagnoses and all orders for this visit:    Well woman exam  -     POC Pregnancy, Urine  -     POC Urinalysis Dipstick  -     Pap IG, Rfx HPV ASCU    Pap smear, low-risk  -     Pap IG, Rfx HPV ASCU        Assessment     1) GYN annual well woman exam.   2) to STEPHANIE for consult     Plan     1) Breast Health - Clinical " breast exam & mammogram yearly, Self breast awareness monthly  2) Pap - done today  3) Smoking status- Never smoker  4) Colon health - screening colonoscopy recommended if not up to date  5) Bone health - Weight bearing exercise, dietary calcium recommendations and vitamin D reviewed.   6) Seat belts recommended  7) Follow up prn and one year    Encounter Diagnoses   Name Primary?   • Well woman exam Yes   • Pap smear, low-risk          Henrique Giles MD  1/16/2020  1:20 PM

## 2020-01-20 LAB
CONV .: NORMAL
CYTOLOGIST CVX/VAG CYTO: NORMAL
CYTOLOGY CVX/VAG DOC CYTO: NORMAL
CYTOLOGY CVX/VAG DOC THIN PREP: NORMAL
DX ICD CODE: NORMAL
HIV 1 & 2 AB SER-IMP: NORMAL
OTHER STN SPEC: NORMAL
STAT OF ADQ CVX/VAG CYTO-IMP: NORMAL

## 2020-10-05 ENCOUNTER — OFFICE VISIT (OUTPATIENT)
Dept: FAMILY MEDICINE CLINIC | Facility: CLINIC | Age: 30
End: 2020-10-05

## 2020-10-05 VITALS
BODY MASS INDEX: 39.71 KG/M2 | HEIGHT: 68 IN | DIASTOLIC BLOOD PRESSURE: 80 MMHG | WEIGHT: 262 LBS | SYSTOLIC BLOOD PRESSURE: 120 MMHG | TEMPERATURE: 98.1 F | OXYGEN SATURATION: 98 % | HEART RATE: 104 BPM

## 2020-10-05 DIAGNOSIS — E55.9 VITAMIN D DEFICIENCY: ICD-10-CM

## 2020-10-05 DIAGNOSIS — N97.9 INFERTILITY, FEMALE: ICD-10-CM

## 2020-10-05 DIAGNOSIS — G43.709 CHRONIC MIGRAINE WITHOUT AURA WITHOUT STATUS MIGRAINOSUS, NOT INTRACTABLE: ICD-10-CM

## 2020-10-05 DIAGNOSIS — R74.8 ELEVATED LIVER ENZYMES: ICD-10-CM

## 2020-10-05 DIAGNOSIS — F33.42 MAJOR DEPRESSIVE DISORDER, RECURRENT, IN FULL REMISSION (HCC): ICD-10-CM

## 2020-10-05 DIAGNOSIS — R00.2 PALPITATIONS: ICD-10-CM

## 2020-10-05 DIAGNOSIS — Z14.8 HEMOCHROMATOSIS CARRIER: Primary | ICD-10-CM

## 2020-10-05 DIAGNOSIS — F41.9 ANXIETY: ICD-10-CM

## 2020-10-05 PROCEDURE — 99214 OFFICE O/P EST MOD 30 MIN: CPT | Performed by: PHYSICIAN ASSISTANT

## 2020-10-05 RX ORDER — METFORMIN HYDROCHLORIDE 750 MG/1
750 TABLET, EXTENDED RELEASE ORAL 2 TIMES DAILY
COMMUNITY
Start: 2020-09-28 | End: 2021-12-17

## 2020-10-05 NOTE — PROGRESS NOTES
Subjective   Jocelyn Merlos is a 30 y.o. female who presents today in follow up of hemochromatosis carrier, elevated LFT, vitamin D deficiency, migraine headaches, moods, and fertility.     History of Present Illness     Hemochromatosis carrier- is not taking iron and has had no recent phlebotomy or blood donation.  Elevated liver function tests- patient is under the care of fertility specialist and has had regular labs without significantly abnormal liver tests.  Vitamin D- taking vitamin D 2000IU daily and women's one a day    Has lost about 15 lbs. Has restricted carbs and sugar. Eating eggs- 2-3 daily. Also eating allison, rotisserie chicken, [place] or ranch, increased cheese- 2 cheese strings and cheese on  Tortilla with ranch, burger with may/mustard on lettuce,      Moods- he has been doing very well with her moods.  She states recently, she has been able to lose weight and is in a good place mentally and in her relationship with her .  Work is going well.  Fertility- patient is seeing Dr. Brand for fertility treatments.  She is on metformin twice daily and just underwent intrauterine insemination.  Migraine headaches- increased headaches with keto diet.     Patient has been experiencing rapid heart beats, worse when she is laying down at night.  He may have some of this while she is up moving around, however, she dreads going to bed at night because she is having worsening palpitations which creates more anxiety.  She is also having some anxiety at that time.  She is not sure if this is related to anxiety or something cardiac.  She does have family history of cardiac disease.    The following portions of the patient's history were reviewed and updated as appropriate: allergies, current medications, past family history, past medical history, past social history, past surgical history and problem list.    Review of Systems   Constitutional: Negative.    HENT: Negative for congestion, ear pain and sore  throat.    Respiratory: Negative.    Cardiovascular: Positive for palpitations.   Gastrointestinal: Negative.    Genitourinary: Negative.    Musculoskeletal: Negative.    Neurological: Positive for headaches.   Psychiatric/Behavioral: Positive for dysphoric mood and sleep disturbance. Negative for self-injury and suicidal ideas. The patient is nervous/anxious.    All other systems reviewed and are negative.      Objective    Vitals:    10/05/20 1446   BP: 120/80   Pulse: 104   Temp: 98.1 °F (36.7 °C)   SpO2: 98%     Body mass index is 40.17 kg/m².    Physical Exam   Constitutional: She is oriented to person, place, and time. She appears well-developed.   HENT:   Head: Normocephalic and atraumatic.   Right Ear: External ear normal.   Left Ear: External ear normal.   Nose: Nose normal.   Eyes: Conjunctivae and lids are normal.   Neck: Neck supple. Carotid bruit is not present.   Cardiovascular: Normal rate, regular rhythm and normal heart sounds. Exam reveals no gallop and no friction rub.   No murmur heard.  Pulmonary/Chest: Effort normal and breath sounds normal. No respiratory distress. She has no wheezes. She has no rhonchi. She has no rales.   Musculoskeletal: No deformity.   Neurological: She is alert and oriented to person, place, and time. Gait normal.   Skin: Skin is warm and dry.   Psychiatric: Her speech is normal and behavior is normal. Judgment and thought content normal. Her mood appears anxious. Her affect is not angry, not blunt and not labile. She does not exhibit a depressed mood. She does not have a flat affect.   Nursing note and vitals reviewed.      Assessment/Plan   Jocelyn was seen today for anxiety, headache and vitamin d deficiency.    Diagnoses and all orders for this visit:    Hemochromatosis carrier  -     CBC & Differential  -     Comprehensive Metabolic Panel  -     Iron and TIBC  -     Ferritin  -     Vitamin B12 & Folate    Elevated liver enzymes  -     Comprehensive Metabolic Panel  -      Lipid Panel With LDL / HDL Ratio  -     Iron and TIBC  -     Ferritin  -     TSH  -     T4, free  -     T3, Free    Vitamin D deficiency  -     Comprehensive Metabolic Panel  -     Lipid Panel With LDL / HDL Ratio  -     Vitamin B12 & Folate  -     Vitamin D 25 Hydroxy  -     TSH  -     T4, free  -     T3, Free    Major depressive disorder, recurrent, in full remission (CMS/HCC)  -     Vitamin B12 & Folate  -     TSH  -     T4, free  -     T3, Free    Anxiety  -     Vitamin B12 & Folate  -     TSH  -     T4, free  -     T3, Free    Chronic migraine without aura without status migrainosus, not intractable  -     Vitamin B12 & Folate  -     TSH  -     T4, free  -     T3, Free    Infertility, female  -     TSH  -     T4, free  -     T3, Free    Palpitations  -     Ambulatory Referral to Cardiology      Assessment and plan  30 y.o. female who presents today in follow up of hemochromatosis carrier, elevated LFT, vitamin D deficiency, migraine headaches, moods, and fertility. Patient will have fasting labs. Call if no results in 1 week. Stability of conditions, plan, follow up, and further recommendations pending labs.  If stable, follow-up in no more than 6 months.    1. Hemochromatosis carrier- Patient is not taking any iron products, multivitamin with iron, and has had no recent blood donations or phlebotomy.  Her mother does have hemochromatosis, however, she is a carrier and likely unaffected.  We will continue to monitor and make recommendations.  I will have her see hematology again if she has elevated iron or liver function test.  We may want to consider consultation to ensure no additional complications or concerns in relation to pregnancy.  2. Elevated liver function test- She has been counseled to avoid NSAIDs, excessive Tylenol, and regular alcohol use.  She is not taking medications regularly other than metformin.  She does not have excessive alcohol use.  She is a hemochromatosis carrier, however, this  should not affect her liver function test without iron overload.  We will continue to monitor and if elevated, refer to GI.  3. Vitamin D deficiency- She is not taking vitamin D supplement at this time.  I will make further recommendations pending labs.  4. Major depressive disorder and anxiety- Moods have been pretty well controlled until recently with increased anxiety.  She went through mental health counseling and developed a good relationship with her counselor in the past, helping her to work through her relationship with her , the death of her grandfather and nephew, relationship with her mother, and other stressors. She is actually been able to work on healthy lifestyle and has lost some weight.  Patient has developed recurrence of panic attacks/anxiety attacks and increased anxiety.  She does not want to take a daily medication.  I asked that she contact her mental health counselor to see about CBT/E MDR and if they are unable to provide this, see if they can recommend groups or individuals that can provide this for her.  She should let me know if she needs any further referrals.  Patient should also talk with her fertility specialist/OB/GYN regarding medications if she needs to use them.  5. Infertility- Patient has had trouble with conception and pregnancy loss in the past.  She is seeing Dr. Brand who was previously at [facility] and has been started on metformin  mg daily, is undergoing work-up, as well as getting ready to complete intrauterine insemination.  6. Migraine headaches- had been stable without regular headaches until she recently started keto diet in the last few weeks.  She has been able to lose weight, however, she has had increased headaches and is feeling overall poorly with palpitations.  I discussed with her stopping a keto diet, eating healthy, low-fat, balanced meals, especially with trying to conceive.  We will consider further work-up, specialist  referral, or medications if needed.  7. Palpitations-Patient has recently started to notice heart racing.  She is unsure if this could be related to anxiety or something cardiac.  She does have family history of cardiac issues.  I will refer to cardiology for evaluation of recurrent palpitations, have advised she increase hydration, ensure avoidance of caffeine, healthy diet, and working on medication and anxiety reduction.

## 2020-10-06 LAB
25(OH)D3+25(OH)D2 SERPL-MCNC: 35.6 NG/ML (ref 30–100)
ALBUMIN SERPL-MCNC: 4.5 G/DL (ref 3.9–5)
ALBUMIN/GLOB SERPL: 1.7 {RATIO} (ref 1.2–2.2)
ALP SERPL-CCNC: 76 IU/L (ref 39–117)
ALT SERPL-CCNC: 39 IU/L (ref 0–32)
AST SERPL-CCNC: 26 IU/L (ref 0–40)
BASOPHILS # BLD AUTO: 0 X10E3/UL (ref 0–0.2)
BASOPHILS NFR BLD AUTO: 1 %
BILIRUB SERPL-MCNC: 0.3 MG/DL (ref 0–1.2)
BUN SERPL-MCNC: 17 MG/DL (ref 6–20)
BUN/CREAT SERPL: 19 (ref 9–23)
CALCIUM SERPL-MCNC: 9.6 MG/DL (ref 8.7–10.2)
CHLORIDE SERPL-SCNC: 105 MMOL/L (ref 96–106)
CHOLEST SERPL-MCNC: 183 MG/DL (ref 100–199)
CO2 SERPL-SCNC: 23 MMOL/L (ref 20–29)
CREAT SERPL-MCNC: 0.89 MG/DL (ref 0.57–1)
EOSINOPHIL # BLD AUTO: 0.2 X10E3/UL (ref 0–0.4)
EOSINOPHIL NFR BLD AUTO: 3 %
ERYTHROCYTE [DISTWIDTH] IN BLOOD BY AUTOMATED COUNT: 12.7 % (ref 11.7–15.4)
FERRITIN SERPL-MCNC: 77 NG/ML (ref 15–150)
FOLATE SERPL-MCNC: >20 NG/ML
GLOBULIN SER CALC-MCNC: 2.7 G/DL (ref 1.5–4.5)
GLUCOSE SERPL-MCNC: 102 MG/DL (ref 65–99)
HCT VFR BLD AUTO: 42.6 % (ref 34–46.6)
HDLC SERPL-MCNC: 32 MG/DL
HGB BLD-MCNC: 14.2 G/DL (ref 11.1–15.9)
IMM GRANULOCYTES # BLD AUTO: 0 X10E3/UL (ref 0–0.1)
IMM GRANULOCYTES NFR BLD AUTO: 0 %
IRON SATN MFR SERPL: 21 % (ref 15–55)
IRON SERPL-MCNC: 78 UG/DL (ref 27–159)
LDLC SERPL CALC-MCNC: 124 MG/DL (ref 0–99)
LDLC/HDLC SERPL: 3.9 RATIO (ref 0–3.2)
LYMPHOCYTES # BLD AUTO: 2.7 X10E3/UL (ref 0.7–3.1)
LYMPHOCYTES NFR BLD AUTO: 37 %
MCH RBC QN AUTO: 29.3 PG (ref 26.6–33)
MCHC RBC AUTO-ENTMCNC: 33.3 G/DL (ref 31.5–35.7)
MCV RBC AUTO: 88 FL (ref 79–97)
MONOCYTES # BLD AUTO: 0.6 X10E3/UL (ref 0.1–0.9)
MONOCYTES NFR BLD AUTO: 9 %
NEUTROPHILS # BLD AUTO: 3.8 X10E3/UL (ref 1.4–7)
NEUTROPHILS NFR BLD AUTO: 50 %
PLATELET # BLD AUTO: 381 X10E3/UL (ref 150–450)
POTASSIUM SERPL-SCNC: 5.1 MMOL/L (ref 3.5–5.2)
PROT SERPL-MCNC: 7.2 G/DL (ref 6–8.5)
RBC # BLD AUTO: 4.85 X10E6/UL (ref 3.77–5.28)
SODIUM SERPL-SCNC: 142 MMOL/L (ref 134–144)
T3FREE SERPL-MCNC: 3.8 PG/ML (ref 2–4.4)
T4 FREE SERPL-MCNC: 1.32 NG/DL (ref 0.82–1.77)
TIBC SERPL-MCNC: 369 UG/DL (ref 250–450)
TRIGL SERPL-MCNC: 148 MG/DL (ref 0–149)
TSH SERPL DL<=0.005 MIU/L-ACNC: 1.58 UIU/ML (ref 0.45–4.5)
UIBC SERPL-MCNC: 291 UG/DL (ref 131–425)
VIT B12 SERPL-MCNC: 951 PG/ML (ref 232–1245)
VLDLC SERPL CALC-MCNC: 27 MG/DL (ref 5–40)
WBC # BLD AUTO: 7.3 X10E3/UL (ref 3.4–10.8)

## 2020-11-23 ENCOUNTER — OFFICE VISIT (OUTPATIENT)
Dept: CARDIOLOGY | Facility: CLINIC | Age: 30
End: 2020-11-23

## 2020-11-23 VITALS
HEIGHT: 68 IN | WEIGHT: 264.2 LBS | DIASTOLIC BLOOD PRESSURE: 80 MMHG | HEART RATE: 92 BPM | SYSTOLIC BLOOD PRESSURE: 112 MMHG | BODY MASS INDEX: 40.04 KG/M2

## 2020-11-23 DIAGNOSIS — R00.2 PALPITATIONS: ICD-10-CM

## 2020-11-23 DIAGNOSIS — R07.9 CHEST PAIN, UNSPECIFIED TYPE: Primary | ICD-10-CM

## 2020-11-23 PROCEDURE — 93000 ELECTROCARDIOGRAM COMPLETE: CPT | Performed by: INTERNAL MEDICINE

## 2020-11-23 PROCEDURE — 99204 OFFICE O/P NEW MOD 45 MIN: CPT | Performed by: INTERNAL MEDICINE

## 2020-11-23 RX ORDER — PRENATAL VIT NO.126/IRON/FOLIC 28MG-0.8MG
TABLET ORAL DAILY
COMMUNITY

## 2020-11-23 NOTE — PROGRESS NOTES
[facility] New Patient Office Note     Encounter Date:20  Patient:Jocelyn Merlos  :1990  MRN:[id number]    Referring Provider: HUMBERTO Riddle    Consulted for: Chest pain    Chief Complaint:   Chief Complaint   Patient presents with   • Palpitations   • Chest Pain     History of Presenting Illness:      Ms. Merlos is a 30 y.o. with past medical history notable for carrier for hemochromatosis who presents her office for initial evaluation regarding episodes of chest discomfort and palpitations.  Patient noted over the last couple of months new onset chest discomfort.  Her chest pain is typically in the evening time.  Her chest pain typically occurs usually at times of rest and is not exertional in nature.  Her chest pain is left-sided and nonradiating.  For the most part it is mild to moderate in severity however she did have a couple episodes were so severe that she was contemplating going to the emergency room.  One time she did try putting a bag of frozen peas on her chest which did seem to help out with her chest discomfort.  Her pain is not positional or reproducible.  She denies any associated symptoms such as diaphoresis or nausea.  She does have some shortness of breath with it.    She does work at [employer] on the [employer] line but has recently switched over to be a .  She denies any limitations in her daily activities such as dyspnea on exertion or chest discomfort with activities at work or walking the dogs with her .    Of note she is working on trying to get pregnant and is seeing a fertility specialist.  She unfortunately had a miscarriage last month.      Review of Systems:  Review of Systems   Constitution: Negative.   HENT: Negative.    Eyes: Negative.    Cardiovascular: Positive for chest pain.   Respiratory: Negative.    Endocrine: Negative.    Hematologic/Lymphatic: Negative.    Skin: Negative.    Musculoskeletal: Negative.    Gastrointestinal:  Negative.    Genitourinary: Negative.    Neurological: Negative.    Psychiatric/Behavioral: Negative.    Allergic/Immunologic: Negative.        Prior to Admission medications    Medication Sig Start Date End Date Taking? Authorizing Provider   metFORMIN ER (GLUCOPHAGE-XR) 750 MG 24 hr tablet Take 750 mg by mouth. 9/28/20  Yes Naz Melendrez MD   prenatal vitamin (prenatal, CLASSIC, vitamin) tablet Take  by mouth Daily.   Yes Naz Melendrez MD   VITAMIN D PO Take  by mouth.   Yes Naz Melendrez MD       Allergies   Allergen Reactions   • No Known Drug Allergy        Past Medical History:   Diagnosis Date   • Abnormal Pap smear of cervix    • Adjustment disorder with mixed anxiety and depressed mood 4/6/2016   • Palpitations        Past Surgical History:   Procedure Laterality Date   • BREAST AUGMENTATION     • CERVICAL BIOPSY  W/ LOOP ELECTRODE EXCISION     • LAPAROSCOPIC GASTRIC BANDING     • LEEP     • TONSILLECTOMY         Social History     Socioeconomic History   • Marital status:      Spouse name: Not on file   • Number of children: Not on file   • Years of education: Not on file   • Highest education level: Not on file   Tobacco Use   • Smoking status: Former Smoker     Packs/day: 0.25   • Smokeless tobacco: Never Used   Substance and Sexual Activity   • Alcohol use: Yes     Alcohol/week: 4.0 - 5.0 standard drinks     Types: 4 - 5 Standard drinks or equivalent per week     Comment: Socially./caffeine use    • Drug use: No   • Sexual activity: Yes     Partners: Male       Family History   Problem Relation Age of Onset   • Skin cancer Mother    • Depression Mother    • Anxiety disorder Mother    • Stroke Mother    • Stroke Maternal Grandmother        The following portions of the patient's history were reviewed and updated as appropriate: allergies, current medications, past family history, past medical history, past social history, past surgical history and problem list.      "  Objective:       Vitals:    11/23/20 1042   BP: 112/80   BP Location: Right arm   Patient Position: Sitting   Pulse: 92   Weight: 120 kg (264 lb 3.2 oz)   Height: 172 cm (67.72\")       Physical Exam:  Constitutional: Well appearing, well developed, no acute distress   HENT: Oropharynx clear and membrane moist  Eyes: Normal conjunctiva, no sclera icterus.  Neck: Supple, no carotid bruit bilaterally.  Cardiovascular: Regular rate and rhythm, No Murmur, No bilateral lower extremity edema.  Pulmonary: Normal respiratory effort, normal lung sounds, no wheezing.  Abdominal: Soft, nontender, no hepatosplenomegaly, liver is non-pulsatile.  Neurological: Alert and orient x 3.   Skin: Warm, dry, no ecchymosis, no rash.  Psych: Appropriate mood and affect. Normal judgment and insight.      Lab Results   Component Value Date    BUN 17 10/05/2020    CREATININE 0.89 10/05/2020    EGFRIFNONA 87 10/05/2020    EGFRIFAFRI 101 10/05/2020    BCR 19 10/05/2020    K 5.1 10/05/2020    CO2 23 10/05/2020    CALCIUM 9.6 10/05/2020    PROTENTOTREF 7.2 10/05/2020    ALBUMIN 4.5 10/05/2020    LABIL2 1.7 10/05/2020    AST 26 10/05/2020    ALT 39 (H) 10/05/2020       Lab Results   Component Value Date    WBC 7.3 10/05/2020    HGB 14.2 10/05/2020    HCT 42.6 10/05/2020    MCV 88 10/05/2020     10/05/2020       No results found for: CKTOTAL, CKMB, CKMBINDEX, TROPONINI, TROPONINT    Lab Results   Component Value Date    CHLPL 183 10/05/2020    CHLPL 187 11/20/2018    CHLPL 170 05/01/2018     Lab Results   Component Value Date    TRIG 148 10/05/2020    TRIG 124 11/20/2018    TRIG 82 05/01/2018     Lab Results   Component Value Date    HDL 32 (L) 10/05/2020    HDL 37 (L) 11/20/2018    HDL 40 05/01/2018     Lab Results   Component Value Date     (H) 10/05/2020     (H) 11/20/2018     (H) 05/01/2018       Lab Results   Component Value Date    TSH 1.580 10/05/2020         ECG 12 Lead    Date/Time: 11/23/2020 11:21 " AM  Performed by: Immanuel Negrete MD  Authorized by: Immanuel Negrete MD   Comparison: compared with previous ECG from 9/16/2016  Similar to previous ECG  Rhythm: sinus rhythm    Clinical impression: normal ECG              Assessment:          Diagnosis Plan   1. Chest pain, unspecified type  Adult Transthoracic Echo Complete W/ Cont if Necessary Per Protocol    Treadmill Stress Test   2. Palpitations  Holter Monitor - 48 Hour          Plan:       Ms. Merlos is a 30 y.o.  with past medical history notable for carrier for hemochromatosis who presents her office for initial evaluation regarding episodes of chest discomfort and palpitations.  Overall patient is doing reasonably well but the symptoms are somewhat concerning.  I do think excluding the possibility of coronary artery disease would be helpful with an exercise treadmill stress test.  Furthermore I would recommend getting an echocardiogram to rule out any significant structural abnormalities which may be contributing.  Finally getting a 48-hour Holter monitor might also be beneficial as she does have issues of palpitations in the past and she might be misconstrued in her chest discomfort was palpitations such as PVCs would be nice to know if this is the case.  Would hold off on starting any medical therapy at this time and reassessing symptoms after further cardiac testing.    Chest pain:  · Follow-up on treadmill stress test, echocardiogram, and Holter monitor    Palpitations:  · Follow-up on Holter monitor    Follow-up:  6 weeks      Thank you for allowing me to participate in the care of Jocelyn Merlos. Feel free to contact me directly with any further questions or concerns.    Immanuel Negrete MD  [facility]  11/23/20  11:18 EST

## 2020-12-21 ENCOUNTER — HOSPITAL ENCOUNTER (OUTPATIENT)
Dept: CARDIOLOGY | Facility: HOSPITAL | Age: 30
End: 2020-12-21

## 2020-12-21 ENCOUNTER — APPOINTMENT (OUTPATIENT)
Dept: CARDIOLOGY | Facility: HOSPITAL | Age: 30
End: 2020-12-21

## 2021-01-08 ENCOUNTER — TELEMEDICINE (OUTPATIENT)
Dept: FAMILY MEDICINE CLINIC | Facility: CLINIC | Age: 31
End: 2021-01-08

## 2021-01-08 DIAGNOSIS — U07.1 COVID-19 VIRUS INFECTION: Primary | ICD-10-CM

## 2021-01-08 DIAGNOSIS — L29.9 PRURITUS: ICD-10-CM

## 2021-01-08 DIAGNOSIS — R21 RASH: ICD-10-CM

## 2021-01-08 PROCEDURE — 99213 OFFICE O/P EST LOW 20 MIN: CPT | Performed by: PHYSICIAN ASSISTANT

## 2021-01-08 RX ORDER — PREDNISONE 20 MG/1
TABLET ORAL
Qty: 15 TABLET | Refills: 0 | Status: SHIPPED | OUTPATIENT
Start: 2021-01-08 | End: 2021-01-25

## 2021-01-08 RX ORDER — HYDROXYZINE HYDROCHLORIDE 10 MG/1
TABLET, FILM COATED ORAL
Qty: 60 TABLET | Refills: 0 | Status: SHIPPED | OUTPATIENT
Start: 2021-01-08 | End: 2021-01-25

## 2021-01-08 NOTE — PROGRESS NOTES
Subjective   Jocelyn Merlos is a 30 y.o. female who is being evaluated by video visit in follow up of Covid 19 infection.     History of Present Illness   You have chosen to receive care through a telehealth visit.  Do you consent to use a video/audio connection for your medical care today? Yes    Thinks she got it from work- she reports she rarely does anything. Goes to the grocery and does not unnecessarily go anywhere. There are numerous positive cases - worried about forklift, microwave, refrigerator.     Initially developed symptoms 12/24/2020- started with a very light cough. Thought her throat was dry. Then progressed to bad flu symptoms- body aches, chills without fever, congestion, fatigued, hard time waking herself up- lasted about 5-6 days. During that time, lost taste and smell. since flu symptoms improved, woke up today with a headache and feels like she can't think. She feels like she is in a daze. can't count or do simple math in her head. The rash started 1/3/21- that night, she went to bed and had a few bumps on her chest. She woke up with hives/ rash up to TMAX 101. The rash resolved but has never itched so bad in her life. 2 nights ago, she was in the bathroom, scratching herself with a brush. The itching remains- she has taken benadryl and pepcid every 12 hours and continues with symptoms. if itches really bad, she will have a rash pop up. She woke up this morning with about 30 spots on her chest. Headache and rash are keeping her up at night. Not sleeping. No sore throat that is significant or other signs or symptoms of strep. Has not regained taste and smell.     Had SOA- she had some serious SOA the 1st 10 days. Trouble taking a deep breath and felt SOA with doing laundry. Never had SOA at rest but had with activity. Now, if takes a deep breath, she feels she cannot complete her breath and has pain with deep breath.     She had a few nights that she was crying and afraid that something could  happen. She had a harder time catching her breath with anxiety. Has insomnia as well- when had flu like symptoms, slept longer. Now insomnia, headaches, itching, and sleep is terrible.     Reports she feels like she is better then something happens and feels worse than ever.      also tested positive- he has had hardly any symptoms.     The following portions of the patient's history were reviewed and updated as appropriate: allergies, current medications, past family history, past medical history, past social history, past surgical history and problem list.    Review of Systems   Constitutional: Positive for chills, fatigue and fever.   HENT: Positive for congestion, postnasal drip, rhinorrhea and sinus pressure. Negative for ear pain.         Loss of taste and smell   Respiratory: Positive for cough and shortness of breath.    Cardiovascular: Positive for palpitations.   Gastrointestinal: Positive for nausea.   Musculoskeletal: Positive for arthralgias and myalgias.   Skin: Positive for rash.   Neurological: Positive for weakness, light-headedness and headaches.   Psychiatric/Behavioral: Positive for sleep disturbance.       Objective   There were no vitals filed for this visit.  There is no height or weight on file to calculate BMI.    Physical Exam  Constitutional:       General: She is not in acute distress.     Appearance: She is well-developed.   HENT:      Head: Normocephalic and atraumatic.   Eyes:      General:         Right eye: No discharge.         Left eye: No discharge.   Pulmonary:      Effort: Pulmonary effort is normal. No respiratory distress.   Skin:     General: Skin is dry.   Psychiatric:         Attention and Perception: She is attentive.         Speech: Speech normal.         Behavior: Behavior normal.         Assessment/Plan   Diagnoses and all orders for this visit:    1. COVID-19 virus infection (Primary)  -     predniSONE (DELTASONE) 20 MG tablet; Take 1 tablet twice daily for 4  days then 1 tablet once daily for 7 days  Dispense: 15 tablet; Refill: 0  -     hydrOXYzine (ATARAX) 10 MG tablet; Take 1 tablet twice daily as needed for itching and 2 tablets at bedtime as needed for itching/ insomnia  Dispense: 60 tablet; Refill: 0    2. Rash  -     predniSONE (DELTASONE) 20 MG tablet; Take 1 tablet twice daily for 4 days then 1 tablet once daily for 7 days  Dispense: 15 tablet; Refill: 0  -     hydrOXYzine (ATARAX) 10 MG tablet; Take 1 tablet twice daily as needed for itching and 2 tablets at bedtime as needed for itching/ insomnia  Dispense: 60 tablet; Refill: 0    3. Pruritus  -     predniSONE (DELTASONE) 20 MG tablet; Take 1 tablet twice daily for 4 days then 1 tablet once daily for 7 days  Dispense: 15 tablet; Refill: 0  -     hydrOXYzine (ATARAX) 10 MG tablet; Take 1 tablet twice daily as needed for itching and 2 tablets at bedtime as needed for itching/ insomnia  Dispense: 60 tablet; Refill: 0        Assessment and Plan  Patient with Covid 19 infection. She developed symptoms 12/24/2020 and tested positive 12/28/2020. She has had significant symptoms and debilitation with URI symptoms- congestion, cough, and SOA, neurological symptoms- headache, loss of taste and smell, weakness, inability to count or perform basic math in her head, systemic symptoms with fever, chills, weakness, fatigue, and feeling overall poorly, and a pruritic rash. I discussed with her my concern with her significant symptoms and the possibility of pneumonia or more long term effects. I will have her use Tylenol, nasacort or Flonase as needed, and Mucinex DM twice daily. I will also treat rash, itching, respiratory symptoms, and nasal congestion with Prednisone and Atarax as needed. I also advise she get pulse oximeter to monitor pulse and oxygen levels while she is sick. To ER ASAP if worsening, new, or changing symptoms. Follow up with me in 1 week for re-evaluation or ASAP if AE with medication.      About 25  minutes spent reviewing the patient's chart and video visit, medical decision-making, and treatment plan.

## 2021-01-15 ENCOUNTER — TELEMEDICINE (OUTPATIENT)
Dept: FAMILY MEDICINE CLINIC | Facility: CLINIC | Age: 31
End: 2021-01-15

## 2021-01-15 DIAGNOSIS — R09.02 HYPOXIA: ICD-10-CM

## 2021-01-15 DIAGNOSIS — U07.1 COVID-19 VIRUS INFECTION: Primary | ICD-10-CM

## 2021-01-15 DIAGNOSIS — U07.1 PNEUMONIA DUE TO COVID-19 VIRUS: ICD-10-CM

## 2021-01-15 DIAGNOSIS — J12.82 PNEUMONIA DUE TO COVID-19 VIRUS: ICD-10-CM

## 2021-01-15 PROCEDURE — 99443 PR PHYS/QHP TELEPHONE EVALUATION 21-30 MIN: CPT | Performed by: PHYSICIAN ASSISTANT

## 2021-01-15 NOTE — PROGRESS NOTES
Subjective   Jocelyn Merlos is a 30 y.o. female who is being evaluated by video visit in follow up of ER for Covid 19 infection.     History of Present Illness   You have chosen to receive care through a telehealth visit.  Do you consent to use a video/audio connection for your medical care today? Yes    Today- she reports she is still feeling the same as every day- very tired, brain fog, pressure on chest, and diarrhea. Has these every day, no matter what. With the brain fog, feels like she is in brain fog and does not feel like herself. [facility] is the 1st place she has been since testing positive for Covid - which is abnormal for her.     Does not have SOA but has pressure on chest- feels like a weight on her chest. Oxygen saturation went down to 88% last night and was sent to the ER. Usually 92-95% but at the ER, they told her 99%. They were supposed to walk her with oxygen check but they did not check it.     Nausea intermittent. The 1st day of prednisone and hydroxyzine stopped the itching. The hydroxyzine makes have a hard time getting up. She has not taken hydroxyzine the last couple days- still no itching or rash.     Thinks she checks her oxygen lower at home with getting up and moving around.   Right now- 96-97%, pulse 90 with sitting.   With walking, 88% and pulse 124.     Trying to drink pedialyte due to diarrhea. Trying to drink fluids.     The following portions of the patient's history were reviewed and updated as appropriate: allergies, current medications, past family history, past medical history, past social history, past surgical history and problem list.    Review of Systems   Constitutional: Positive for fatigue.   HENT: Positive for congestion.    Respiratory: Positive for chest tightness and shortness of breath.    Cardiovascular: Negative.    Gastrointestinal: Positive for diarrhea and nausea.   Musculoskeletal: Positive for arthralgias and myalgias.   Neurological: Positive for weakness,  light-headedness and headaches.   Psychiatric/Behavioral:        Brain fog       Objective   There were no vitals filed for this visit.  There is no height or weight on file to calculate BMI.    Physical Exam  Constitutional:       Appearance: Normal appearance. She is well-developed. She is ill-appearing.   HENT:      Head: Normocephalic and atraumatic.   Eyes:      General:         Right eye: No discharge.         Left eye: No discharge.   Pulmonary:      Comments: SOA with ambulation  Skin:     General: Skin is dry.   Neurological:      Mental Status: She is alert.   Psychiatric:         Attention and Perception: She is attentive.         Mood and Affect: Mood normal.         Speech: Speech normal.         Behavior: Behavior normal.           Assessment/Plan   Diagnoses and all orders for this visit:    1. COVID-19 virus infection (Primary)  -     CBC & Differential  -     Comprehensive Metabolic Panel  -     CK  -     C-reactive Protein  -     Iron and TIBC  -     Ferritin  -     Lactate Dehydrogenase    2. Pneumonia due to COVID-19 virus  -     CBC & Differential  -     Comprehensive Metabolic Panel  -     CK  -     C-reactive Protein  -     Iron and TIBC  -     Ferritin  -     Lactate Dehydrogenase    3. Hypoxia  -     CBC & Differential  -     Comprehensive Metabolic Panel  -     CK  -     C-reactive Protein  -     Iron and TIBC  -     Ferritin  -     Lactate Dehydrogenase        Assessment and Plan  Patient has had significant symptoms with Covid 19 infection, starting 12/24/2020 and continuing with hypoxia and tachycardia with ambulation, SOA, chest tightness, weakness, fatigue, brain fog, diarrhea, nausea. She went to ER yesterday with oxygen saturation of 88%. Unfortunately, the ER never checked her pulse and O2 with her ambulating as discussed and she was d/c without further recommendations. They did perform CTA chest with mild pneumonia and negative for PE. Inflammatory markers were not performed. While  on video visit, patient had oxygen saturation of 97% and pulse in 90s then I asked her to ambulate with pulse ox. With relatively little ambulation or exertion, she had pulse 127 and oxygen saturation down to 88%. I counseled her at length about my concerns with her hypoxia and tachycardia. I am hesitant to send her back to ER, as they set her home without additional evaluation. She should ensure proper hydration, have labs today for inflammatory markers, and to ER ASAP if worsening, new, or changing symptoms or worsening/ persistent hypoxia or tachycardia.     About 25 minutes spent reviewing the patient's chart and video visit, medical decision-making, and treatment plan.

## 2021-01-17 LAB
ALBUMIN SERPL-MCNC: 4.2 G/DL (ref 3.9–5)
ALBUMIN/GLOB SERPL: 1.6 {RATIO} (ref 1.2–2.2)
ALP SERPL-CCNC: 73 IU/L (ref 39–117)
ALT SERPL-CCNC: 39 IU/L (ref 0–32)
AST SERPL-CCNC: 15 IU/L (ref 0–40)
BASOPHILS # BLD AUTO: 0.1 X10E3/UL (ref 0–0.2)
BASOPHILS NFR BLD AUTO: 1 %
BILIRUB SERPL-MCNC: 0.2 MG/DL (ref 0–1.2)
BUN SERPL-MCNC: 17 MG/DL (ref 6–20)
BUN/CREAT SERPL: 19 (ref 9–23)
CALCIUM SERPL-MCNC: 9.3 MG/DL (ref 8.7–10.2)
CHLORIDE SERPL-SCNC: 104 MMOL/L (ref 96–106)
CK SERPL-CCNC: 26 U/L (ref 32–182)
CO2 SERPL-SCNC: 23 MMOL/L (ref 20–29)
CREAT SERPL-MCNC: 0.89 MG/DL (ref 0.57–1)
CRP SERPL-MCNC: 4 MG/L (ref 0–10)
EOSINOPHIL # BLD AUTO: 0.3 X10E3/UL (ref 0–0.4)
EOSINOPHIL NFR BLD AUTO: 2 %
ERYTHROCYTE [DISTWIDTH] IN BLOOD BY AUTOMATED COUNT: 12.6 % (ref 11.7–15.4)
FERRITIN SERPL-MCNC: 88 NG/ML (ref 15–150)
GLOBULIN SER CALC-MCNC: 2.6 G/DL (ref 1.5–4.5)
GLUCOSE SERPL-MCNC: 81 MG/DL (ref 65–99)
HCT VFR BLD AUTO: 41.5 % (ref 34–46.6)
HGB BLD-MCNC: 13.6 G/DL (ref 11.1–15.9)
IMM GRANULOCYTES # BLD AUTO: 0 X10E3/UL (ref 0–0.1)
IMM GRANULOCYTES NFR BLD AUTO: 0 %
IRON SATN MFR SERPL: 25 % (ref 15–55)
IRON SERPL-MCNC: 77 UG/DL (ref 27–159)
LDH SERPL-CCNC: 188 IU/L (ref 119–226)
LYMPHOCYTES # BLD AUTO: 6.1 X10E3/UL (ref 0.7–3.1)
LYMPHOCYTES NFR BLD AUTO: 43 %
MCH RBC QN AUTO: 29.8 PG (ref 26.6–33)
MCHC RBC AUTO-ENTMCNC: 32.8 G/DL (ref 31.5–35.7)
MCV RBC AUTO: 91 FL (ref 79–97)
MONOCYTES # BLD AUTO: 1 X10E3/UL (ref 0.1–0.9)
MONOCYTES NFR BLD AUTO: 7 %
NEUTROPHILS # BLD AUTO: 6.7 X10E3/UL (ref 1.4–7)
NEUTROPHILS NFR BLD AUTO: 47 %
PLATELET # BLD AUTO: 435 X10E3/UL (ref 150–450)
POTASSIUM SERPL-SCNC: 5 MMOL/L (ref 3.5–5.2)
PROT SERPL-MCNC: 6.8 G/DL (ref 6–8.5)
RBC # BLD AUTO: 4.56 X10E6/UL (ref 3.77–5.28)
SODIUM SERPL-SCNC: 143 MMOL/L (ref 134–144)
TIBC SERPL-MCNC: 311 UG/DL (ref 250–450)
UIBC SERPL-MCNC: 234 UG/DL (ref 131–425)
WBC # BLD AUTO: 14.1 X10E3/UL (ref 3.4–10.8)

## 2021-01-22 ENCOUNTER — TELEPHONE (OUTPATIENT)
Dept: FAMILY MEDICINE CLINIC | Facility: CLINIC | Age: 31
End: 2021-01-22

## 2021-01-22 NOTE — TELEPHONE ENCOUNTER
.    Caller: Jocelyn Merlos    Relationship: Self    Best call back number: [phone]    What form or medical record are you requesting: DISABILITY PAPERS     Who is requesting this form or medical record from you: [employer] (WORK ORDER)     How would you like to receive the form or medical records (pick-up, mail, fax):     FAX, PATIENT STATED FAX INFORMATION HAS ALREADY NEEM RECEIVED     Timeframe paperwork needed: ASAP    Additional notes: PATIENT STATED JOB NEEDS PAPERWORK IN ASAP OTHERWISE SHE COULD LOSE HER JOB. PLEASE ADVISE.

## 2021-01-22 NOTE — TELEPHONE ENCOUNTER
This was already completed- she needed to complete her section and send to us through [email or web address] before we can send in. Please find her paperwork, reach out to patient to ensure she was contacted about her need to complete her portion, and ensure we receive her portion then send in.

## 2021-01-25 ENCOUNTER — OFFICE VISIT (OUTPATIENT)
Dept: FAMILY MEDICINE CLINIC | Facility: CLINIC | Age: 31
End: 2021-01-25

## 2021-01-25 VITALS
DIASTOLIC BLOOD PRESSURE: 80 MMHG | TEMPERATURE: 98.2 F | HEIGHT: 68 IN | BODY MASS INDEX: 40.92 KG/M2 | WEIGHT: 270 LBS | HEART RATE: 108 BPM | RESPIRATION RATE: 16 BRPM | OXYGEN SATURATION: 97 % | SYSTOLIC BLOOD PRESSURE: 130 MMHG

## 2021-01-25 DIAGNOSIS — J12.82 PNEUMONIA DUE TO COVID-19 VIRUS: ICD-10-CM

## 2021-01-25 DIAGNOSIS — R09.02 HYPOXIA: ICD-10-CM

## 2021-01-25 DIAGNOSIS — U07.1 COVID-19 VIRUS INFECTION: Primary | ICD-10-CM

## 2021-01-25 DIAGNOSIS — U07.1 PNEUMONIA DUE TO COVID-19 VIRUS: ICD-10-CM

## 2021-01-25 PROCEDURE — 99213 OFFICE O/P EST LOW 20 MIN: CPT | Performed by: PHYSICIAN ASSISTANT

## 2021-01-25 NOTE — TELEPHONE ENCOUNTER
Caller: EMILIA     Relationship to patient: [employer]    Best call back number: [phone]    Concerns or Questions if Applicable:  EMILIA WITH [employer] IS NEEDING A CALL BACK WITH A RETURN TO WORK DATE FOR [employer]

## 2021-01-25 NOTE — PROGRESS NOTES
"Subjective   Jocelyn Merlos is a 30 y.o. female who presents today follow up of ER for Covid 19 infection.     History of Present Illness     Patient continues with oxygen desaturation and with walking-O2 sat at rest 96 to 97% and 88 to 90% with walking.  She has noticed in the last couple days, oxygen saturation is staying about 90 with walking.  Pulse continues to increase to 110s- 120s with ambulation.  She continues to have intermittent \"zoning out\".  She reports having episodes where she does not remember things or does not seem focused.  She thinks she probably has some issues with counting still but has not tried to play cards again to see if she is able to count her cards or add.  She did have an episode of dizziness the only time she left the house to go to the pet store.  Dizziness was brief and she felt she needed to go home.  She continues with significant weakness and not feeling herself.  She is otherwise only been to the ER, [facility] for labs, and here today.  No recurrence of rash or itching.  No significant cough that she has noticed significant postnasal drainage and congestion.    1/5/2021- she was still feeling the same as every day- very tired, brain fog, pressure on chest, diarrhea, and brain fog- daily symptoms. She felt mentally foggy and did not feel like herself. [facility] is the 1st place she has been since testing positive for Covid - which is abnormal for her. SOA only with walking around but no SOA at rest and had chest- felt like a weight on her chest. Oxygen saturation went down to 88% and she was advised to go to the ER. Usually 92-95% but at the ER, they told her 99%. They were supposed to walk her with oxygen check but they did not check it. Nausea intermittent. The 1st day of prednisone and hydroxyzine stopped the itching. She had not taken hydroxyzine the couple days prior because it made it difficult to get up which she already experiences without the medication- still no " recurrence of itching or rash without medication. She was trying to drink pedialyte due to diarrhea and increased fluids.     The following portions of the patient's history were reviewed and updated as appropriate: allergies, current medications, past family history, past medical history, past social history, past surgical history and problem list.    Review of Systems   Constitutional: Positive for fatigue.   HENT: Positive for congestion.    Respiratory: Positive for chest tightness and shortness of breath.    Cardiovascular: Negative.    Gastrointestinal: Positive for diarrhea and nausea.   Musculoskeletal: Positive for arthralgias and myalgias.   Neurological: Positive for weakness, light-headedness and headaches.   Psychiatric/Behavioral:        Brain fog       Objective   Vitals:    01/25/21 0659   BP: 130/80   Pulse: 108   Resp: 16   Temp: 98.2 °F (36.8 °C)   SpO2: 97%     Body mass index is 41.39 kg/m².    Physical Exam  Constitutional:       Appearance: Normal appearance. She is well-developed. She is ill-appearing.   HENT:      Head: Normocephalic and atraumatic.   Eyes:      General:         Right eye: No discharge.         Left eye: No discharge.   Pulmonary:      Comments: SOA with ambulation  Skin:     General: Skin is dry.   Neurological:      Mental Status: She is alert.   Psychiatric:         Attention and Perception: She is attentive.         Mood and Affect: Mood normal.         Speech: Speech normal.         Behavior: Behavior normal.         Assessment/Plan   Diagnoses and all orders for this visit:    1. COVID-19 virus infection (Primary)  -     CBC & Differential  -     Comprehensive Metabolic Panel  -     C-reactive Protein  -     Ferritin  -     Ambulatory Referral to Multi-Disciplinary Clinic    2. Pneumonia due to COVID-19 virus  -     CBC & Differential  -     Comprehensive Metabolic Panel  -     C-reactive Protein  -     Ferritin  -     Ambulatory Referral to Multi-Disciplinary  Clinic    3. Hypoxia  -     CBC & Differential  -     Comprehensive Metabolic Panel  -     C-reactive Protein  -     Ferritin  -     Ambulatory Referral to Multi-Disciplinary Clinic        Assessment and Plan  Patient has had significant symptoms with Covid 19 infection, starting 12/24/2020 and continuing with hypoxia and tachycardia with ambulation, SOA, chest tightness, weakness, fatigue, brain fog, diarrhea, and nausea with resolution of severe rash. She went to ER with oxygen saturation of 88%. CTA chest with mild pneumonia and negative for PE. Inflammatory markers were not performed. Unfortunately, the ER never checked her pulse and O2 with her ambulating as discussed and she was d/c without further recommendations.  While on video visit, patient had oxygen saturation of 97% and pulse in 90s then I asked her to ambulate with pulse ox. With relatively little ambulation or exertion, she had pulse 127 and oxygen saturation down to 88%. I counseled her at length about my concerns with her hypoxia and tachycardia.  On exam today, she did have a pulse but decreased to 124 with ambulation and oxygen saturation decreased from 95% to 90% with ambulation.  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  I will recheck labs with inflammatory markers today and refer to the long-term Covid clinic.  To ER ASAP if worsening, new, or changing symptoms or worsening/ persistent hypoxia or tachycardia.    I spent 25 minutes caring for Jocelyn Merlos on this date of service. This time includes time spent by me in the following activities: preparing for the visit, reviewing tests, specialists records, and previous visits, obtaining and/or reviewing a separately obtained history, performing a medically appropriate examination and/or evaluation, counseling and educating the patient/family/caregiver, referring and/or communicating with other health care professionals as necessary, documenting  information in the medical record, independently interpreting results and communicating that information with the patient/family/caregiver, and developing a medically appropriate treatment plan with consideration of other conditions, medications, and treatments.

## 2021-01-25 NOTE — TELEPHONE ENCOUNTER
I discussed with patient this was going to be difficult, as I have no estimated return to work date.  It is dependent on her symptoms.  Patient is not safe to operate machinery or work with her current symptoms.  I do not have a timeframe of when we expect improvement.  I have referred to Covid clinic.  Once this appointment is scheduled, we can keep her off work until the day after her Covid clinic appointment to await their recommendations.

## 2021-01-26 LAB
ALBUMIN SERPL-MCNC: 4.5 G/DL (ref 3.5–5.2)
ALBUMIN/GLOB SERPL: 1.9 G/DL
ALP SERPL-CCNC: 77 U/L (ref 39–117)
ALT SERPL-CCNC: 31 U/L (ref 1–33)
AST SERPL-CCNC: 21 U/L (ref 1–32)
BASOPHILS # BLD AUTO: 0.05 10*3/MM3 (ref 0–0.2)
BASOPHILS NFR BLD AUTO: 0.5 % (ref 0–1.5)
BILIRUB SERPL-MCNC: 0.3 MG/DL (ref 0–1.2)
BUN SERPL-MCNC: 13 MG/DL (ref 6–20)
BUN/CREAT SERPL: 16.9 (ref 7–25)
CALCIUM SERPL-MCNC: 9.5 MG/DL (ref 8.6–10.5)
CHLORIDE SERPL-SCNC: 105 MMOL/L (ref 98–107)
CO2 SERPL-SCNC: 23.6 MMOL/L (ref 22–29)
CREAT SERPL-MCNC: 0.77 MG/DL (ref 0.57–1)
CRP SERPL-MCNC: 1.4 MG/DL (ref 0–0.5)
EOSINOPHIL # BLD AUTO: 0.31 10*3/MM3 (ref 0–0.4)
EOSINOPHIL NFR BLD AUTO: 2.8 % (ref 0.3–6.2)
ERYTHROCYTE [DISTWIDTH] IN BLOOD BY AUTOMATED COUNT: 13.1 % (ref 12.3–15.4)
FERRITIN SERPL-MCNC: 103 NG/ML (ref 13–150)
GLOBULIN SER CALC-MCNC: 2.4 GM/DL
GLUCOSE SERPL-MCNC: 95 MG/DL (ref 65–99)
HCT VFR BLD AUTO: 41.3 % (ref 34–46.6)
HGB BLD-MCNC: 14 G/DL (ref 12–15.9)
IMM GRANULOCYTES # BLD AUTO: 0.03 10*3/MM3 (ref 0–0.05)
IMM GRANULOCYTES NFR BLD AUTO: 0.3 % (ref 0–0.5)
LYMPHOCYTES # BLD AUTO: 3.32 10*3/MM3 (ref 0.7–3.1)
LYMPHOCYTES NFR BLD AUTO: 30.5 % (ref 19.6–45.3)
MCH RBC QN AUTO: 30.5 PG (ref 26.6–33)
MCHC RBC AUTO-ENTMCNC: 33.9 G/DL (ref 31.5–35.7)
MCV RBC AUTO: 90 FL (ref 79–97)
MONOCYTES # BLD AUTO: 0.95 10*3/MM3 (ref 0.1–0.9)
MONOCYTES NFR BLD AUTO: 8.7 % (ref 5–12)
NEUTROPHILS # BLD AUTO: 6.22 10*3/MM3 (ref 1.7–7)
NEUTROPHILS NFR BLD AUTO: 57.2 % (ref 42.7–76)
NRBC BLD AUTO-RTO: 0 /100 WBC (ref 0–0.2)
PLATELET # BLD AUTO: 391 10*3/MM3 (ref 140–450)
POTASSIUM SERPL-SCNC: 4.3 MMOL/L (ref 3.5–5.2)
PROT SERPL-MCNC: 6.9 G/DL (ref 6–8.5)
RBC # BLD AUTO: 4.59 10*6/MM3 (ref 3.77–5.28)
SODIUM SERPL-SCNC: 143 MMOL/L (ref 136–145)
WBC # BLD AUTO: 10.88 10*3/MM3 (ref 3.4–10.8)

## 2021-01-27 ENCOUNTER — TELEPHONE (OUTPATIENT)
Dept: FAMILY MEDICINE CLINIC | Facility: CLINIC | Age: 31
End: 2021-01-27

## 2021-01-27 NOTE — TELEPHONE ENCOUNTER
Caller: Scooter    Relationship to patient:     Best call back number: [phone]    Concerns or Questions if Applicable: Mr. Helms with [facility]placed by [facility] is calling with questions regarding this patient.  He is asking if he can get a return call.        Please advise.

## 2021-01-29 NOTE — TELEPHONE ENCOUNTER
Called and spoke with someone from [facility]. Waiting for callback from patient to get an answer of if she has made an appt with the COVID Clinic or not. See other telephone note to keep everything together.

## 2021-01-29 NOTE — TELEPHONE ENCOUNTER
It is still Dr. Wilson for that paperwork.  There will be a change in physicians in the next week or so.

## 2021-02-02 ENCOUNTER — TELEPHONE (OUTPATIENT)
Dept: FAMILY MEDICINE CLINIC | Facility: CLINIC | Age: 31
End: 2021-02-02

## 2021-02-02 NOTE — TELEPHONE ENCOUNTER
PATIENT CALLED STATING THAT [employer] CHILD NEEDS A RETURN TO WORK DATE EVEN THOUGH UNABLE TO GIVE AN EXACT DATE, THEY WOULD LIKE AN ESTIMATE. PATIENT ASKED IF  COULD WRITE 3/1/2021 AND IF SHE NEEDS TO GO BEFORE OR AFTERWARDS IT CAN BE CHANGED.       CALL AT #[phone] [phone]  PATIENT SAID YOU CAN LEAVE A MESSAGE AT ABOVE NUMBER REGARDING THIS.     PLEASE ADVISE.

## 2021-02-04 ENCOUNTER — TELEPHONE (OUTPATIENT)
Dept: FAMILY MEDICINE CLINIC | Facility: CLINIC | Age: 31
End: 2021-02-04

## 2021-02-04 NOTE — TELEPHONE ENCOUNTER
Spoke with patient and she says that the [facility] worker is wanting specific dates on some of the questions in order to file the paper work. He says it's fine to just add in the dates and resubmit the paper work. He also said he is needing a signature of the over seeing physician, so I'm guessing this means I will need to send to Dr. Wilson to sign as well? I have the dates he is needing and will place this on your desk to complete tomorrow.

## 2021-02-05 NOTE — TELEPHONE ENCOUNTER
I completed the additional paperwork-there was only a place for me to put her return to work date.  She got sick in December, so I am not sure why the date of disability and date of appointment are listed in January.  Paperwork will go back to when she initially got sick and will extend through 3/1/2021.  She will need an appointment to be seen prior to her return to work date.  If she is feeling poorly, she should be seen next week.  If she is improving, she should follow-up with me 2/24 or 2/26/2021

## 2021-02-09 NOTE — TELEPHONE ENCOUNTER
I already took care of updating the forms.  Please ensure these were faxed, received, and that they do not need anything further.

## 2021-02-09 NOTE — TELEPHONE ENCOUNTER
Spoke with patient. She stated [employer] was on shut down in December so disability didn't technically start on disability until January. I have scheduled patient an appointment for 2/17/2021.

## 2021-02-23 ENCOUNTER — OFFICE VISIT (OUTPATIENT)
Dept: FAMILY MEDICINE CLINIC | Facility: CLINIC | Age: 31
End: 2021-02-23

## 2021-02-23 VITALS
WEIGHT: 263 LBS | RESPIRATION RATE: 16 BRPM | OXYGEN SATURATION: 99 % | HEIGHT: 68 IN | BODY MASS INDEX: 39.86 KG/M2 | SYSTOLIC BLOOD PRESSURE: 122 MMHG | HEART RATE: 81 BPM | TEMPERATURE: 98.2 F | DIASTOLIC BLOOD PRESSURE: 80 MMHG

## 2021-02-23 DIAGNOSIS — U09.9 PERSISTENT NEUROLOGIC SYMPTOMS AFTER COVID-19: ICD-10-CM

## 2021-02-23 DIAGNOSIS — R06.00 PERSISTENT DYSPNEA AFTER COVID-19: ICD-10-CM

## 2021-02-23 DIAGNOSIS — I49.8 OTHER SPECIFIED CARDIAC ARRHYTHMIAS: ICD-10-CM

## 2021-02-23 DIAGNOSIS — U09.9 PERSISTENT DYSPNEA AFTER COVID-19: ICD-10-CM

## 2021-02-23 DIAGNOSIS — R53.83 PERSISTENT FATIGUE AFTER COVID-19: ICD-10-CM

## 2021-02-23 DIAGNOSIS — R00.0 TACHYCARDIA: ICD-10-CM

## 2021-02-23 DIAGNOSIS — R41.89 BRAIN FOG: ICD-10-CM

## 2021-02-23 DIAGNOSIS — U09.9 POST-COVID-19 SYNDROME: Primary | ICD-10-CM

## 2021-02-23 DIAGNOSIS — R09.02 HYPOXIA: ICD-10-CM

## 2021-02-23 DIAGNOSIS — R29.90 PERSISTENT NEUROLOGIC SYMPTOMS AFTER COVID-19: ICD-10-CM

## 2021-02-23 DIAGNOSIS — U07.1 PNEUMONIA DUE TO COVID-19 VIRUS: ICD-10-CM

## 2021-02-23 DIAGNOSIS — U09.9 PERSISTENT FATIGUE AFTER COVID-19: ICD-10-CM

## 2021-02-23 DIAGNOSIS — J12.82 PNEUMONIA DUE TO COVID-19 VIRUS: ICD-10-CM

## 2021-02-23 PROCEDURE — 99214 OFFICE O/P EST MOD 30 MIN: CPT | Performed by: PHYSICIAN ASSISTANT

## 2021-02-23 RX ORDER — MULTIVIT WITH MINERALS/LUTEIN
250 TABLET ORAL DAILY
COMMUNITY

## 2021-02-23 RX ORDER — ERGOCALCIFEROL 1.25 MG/1
1000 CAPSULE ORAL
COMMUNITY

## 2021-02-23 NOTE — PROGRESS NOTES
"Subjective   Jocelyn Merlos is a 30 y.o. female who presents today follow up of Covid 19 infection with persistent symptoms and long term Covid clinic yesterday.     History of Present Illness     Brain fog is the same- no improvement. Still feeling tired. Has gotten embarrassing.  will talk to her and she has no idea of what he said. She reports they discussed contract at work- she reports the last was 2019 and new contract in 4 years but took a minute to figure out when that would be. She still struggles with numbers but also with focus and zoning out. Usually good at remembering things. He seems to have lost short term memory retention. Has a calendar in her purse to keep track of things.     Her pulse has not improved but her oxygen improves some days and not good others. At home- lowest is 80% when exert or push self- going up and down steps 7 times, decreases more. Just mild walking, down to 90%.   With walking around- oxygen down to 90% and pulse up to 117.     She wanted to return to work next week but reports the ID provider did not \"seem thrilled\" about it but \"wasn't against\" return to work. 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      Long term Covid clinic- Seen yesterday and had labs, referred her to Speech and cognitive therapy, PT- [facility] has a program that helps with physical therapy aspect but also the brain fog. Advised to call her cardiologist, Dr Negrete for follow up with tachycardia. Follow up 4/2021.     Patient continues with oxygen desaturation and with walking-O2 sat at rest 96 to 97% and 88 to 90% with walking.  She has noticed in the last couple days, oxygen saturation is staying about 90 with walking.  Pulse continues to increase to 110s- 120s with ambulation.  She continues to have intermittent \"zoning out\".  She reports having episodes where she does not remember things or does not seem focused.  She thinks she probably has some issues " with counting still but has not tried to play cards again to see if she is able to count her cards or add.  She did have an episode of dizziness the only time she left the house to go to the pet store.  Dizziness was brief and she felt she needed to go home.  She continues with significant weakness and not feeling herself.  She is otherwise only been to the ER, [facility] for labs, and here today.  No recurrence of rash or itching.  No significant cough that she has noticed significant postnasal drainage and congestion.    1/5/2021- she was still feeling the same as every day- very tired, brain fog, pressure on chest, diarrhea, and brain fog- daily symptoms. She felt mentally foggy and did not feel like herself. [facility] is the 1st place she has been since testing positive for Covid - which is abnormal for her. SOA only with walking around but no SOA at rest and had chest- felt like a weight on her chest. Oxygen saturation went down to 88% and she was advised to go to the ER. Usually 92-95% but at the ER, they told her 99%. They were supposed to walk her with oxygen check but they did not check it. Nausea intermittent. The 1st day of prednisone and hydroxyzine stopped the itching. She had not taken hydroxyzine the couple days prior because it made it difficult to get up which she already experiences without the medication- still no recurrence of itching or rash without medication. She was trying to drink pedialyte due to diarrhea and increased fluids.     The following portions of the patient's history were reviewed and updated as appropriate: allergies, current medications, past family history, past medical history, past social history, past surgical history and problem list.    Review of Systems   Constitutional: Positive for fatigue.   HENT: Positive for congestion.    Respiratory: Positive for chest tightness and shortness of breath.    Cardiovascular: Positive for palpitations.   Gastrointestinal: Positive  for nausea.   Genitourinary: Negative.    Musculoskeletal: Positive for arthralgias and myalgias.   Neurological: Positive for weakness, light-headedness and headaches.   Psychiatric/Behavioral: Positive for confusion, decreased concentration, dysphoric mood and sleep disturbance. The patient is nervous/anxious.         Brain fog       Objective   Vitals:    02/23/21 1005   BP: 122/80   Pulse: 81   Resp: 16   Temp: 98.2 °F (36.8 °C)   SpO2: 99%     Body mass index is 40.32 kg/m².    Physical Exam  Vitals signs and nursing note reviewed.   Constitutional:       General: She is not in acute distress.     Appearance: Normal appearance. She is well-developed.   HENT:      Head: Normocephalic and atraumatic.      Right Ear: External ear normal.      Left Ear: External ear normal.      Nose: Nose normal.   Eyes:      General: Lids are normal.         Right eye: No discharge.         Left eye: No discharge.      Conjunctiva/sclera: Conjunctivae normal.   Neck:      Musculoskeletal: Neck supple.      Vascular: No carotid bruit.   Cardiovascular:      Rate and Rhythm: Normal rate and regular rhythm.      Heart sounds: Normal heart sounds. No murmur. No friction rub. No gallop.    Pulmonary:      Effort: Pulmonary effort is normal. No respiratory distress.      Breath sounds: Normal breath sounds. No wheezing, rhonchi or rales.      Comments: SOA with ambulation  Musculoskeletal:         General: No deformity.   Skin:     General: Skin is warm and dry.   Neurological:      Mental Status: She is alert and oriented to person, place, and time.      Gait: Gait normal.   Psychiatric:         Attention and Perception: She is attentive.         Mood and Affect: Mood normal.         Speech: Speech normal.         Behavior: Behavior normal.         Thought Content: Thought content normal.         Judgment: Judgment normal.         Assessment/Plan   Diagnoses and all orders for this visit:    1. 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  Persistent neurologic symptoms after COVID-19    3. Brain fog    4. Persistent dyspnea after COVID-19    5. Hypoxia    6. Pneumonia due to COVID-19 virus    7. Other specified cardiac arrhythmias    8. Tachycardia    9. Persistent fatigue after COVID-19        Assessment and Plan  Patient has had significant symptoms with Covid 19 infection, starting 12/24/2020 and continuing with hypoxia and tachycardia with ambulation, SOA, chest tightness, weakness, fatigue, brain fog, diarrhea, and nausea with resolution of severe rash. She went to ER with oxygen saturation of 88%. CTA chest with mild pneumonia and negative for PE. Inflammatory markers were not performed. Unfortunately, the ER never checked her pulse and O2 with her ambulating as discussed and she was d/c without further recommendations.  While on video visit, patient had oxygen saturation of 97% and pulse in 90s then I asked her to ambulate with pulse ox. With relatively little ambulation or exertion, she had pulse 127 and oxygen saturation down to 88%. In office, she did have a normal pulse that increased to 124 with ambulation and oxygen saturation decreased from 95% to 90% with ambulation.     Today, she has persistent cognitive impairment, fatigue, tachycardia and hypoxia with walking short distances, headaches, and feeling poorly. She was seen by the long term Covid infectious disease clinic, had additional labs, was referred to physical therapy, speech, and cognitive therapy, and was advised to contact her cardiologist for follow up with persistent tachycardia with ambulation. I contacted infectious disease provider for recommendations for return to work vs work restrictions. She recommends either waiting to return to work until she has some improvement with therapy or may try light duty if available to see if she will tolerate this until she has improvement. I will allow return to work only as tolerated- she may need to work shorter hours due to  intolerance/ fatigue with post-Covid syndrome. She will likely need to take more frequent breaks and rest. 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 She cannot do excessive walking or standing due to tachycardia and SOA/ hypoxia. She cannot push, pull, or lift, as this could further decrease oxygen saturation and increased heart rate. To ER ASAP if worsening, new, or changing symptoms or worsening/ persistent hypoxia or tachycardia.    I spent 35 minutes caring for Jocelyn Merlos on this date of service. This time includes time spent by me in the following activities: preparing for the visit, reviewing tests, specialists records, and previous visits, obtaining and/or reviewing a separately obtained history, performing a medically appropriate examination and/or evaluation, counseling and educating the patient/family/caregiver, referring and/or communicating with other health care professionals as necessary, documenting information in the medical record, independently interpreting results and communicating that information with the patient/family/caregiver, and developing a medically appropriate treatment plan with consideration of other conditions, medications, and treatments.

## 2021-02-28 PROBLEM — Z86.16 HISTORY OF 2019 NOVEL CORONAVIRUS DISEASE (COVID-19): Status: ACTIVE | Noted: 2021-02-22

## 2021-02-28 PROBLEM — R41.89 BRAIN FOG: Status: ACTIVE | Noted: 2021-02-22

## 2021-02-28 PROBLEM — G93.31 POSTVIRAL FATIGUE SYNDROME: Status: ACTIVE | Noted: 2021-02-22

## 2021-02-28 PROBLEM — G93.31 POST VIRAL SYNDROME: Status: ACTIVE | Noted: 2021-02-22

## 2021-03-08 ENCOUNTER — OFFICE VISIT (OUTPATIENT)
Dept: FAMILY MEDICINE CLINIC | Facility: CLINIC | Age: 31
End: 2021-03-08

## 2021-03-08 VITALS
HEIGHT: 68 IN | OXYGEN SATURATION: 95 % | RESPIRATION RATE: 20 BRPM | SYSTOLIC BLOOD PRESSURE: 142 MMHG | BODY MASS INDEX: 39.86 KG/M2 | HEART RATE: 97 BPM | TEMPERATURE: 96.7 F | DIASTOLIC BLOOD PRESSURE: 78 MMHG | WEIGHT: 263 LBS

## 2021-03-08 DIAGNOSIS — R00.0 TACHYCARDIA: ICD-10-CM

## 2021-03-08 DIAGNOSIS — R29.90 PERSISTENT NEUROLOGIC SYMPTOMS AFTER COVID-19: ICD-10-CM

## 2021-03-08 DIAGNOSIS — U09.9 PERSISTENT FATIGUE AFTER COVID-19: ICD-10-CM

## 2021-03-08 DIAGNOSIS — R09.02 HYPOXIA: ICD-10-CM

## 2021-03-08 DIAGNOSIS — R06.00 PERSISTENT DYSPNEA AFTER COVID-19: ICD-10-CM

## 2021-03-08 DIAGNOSIS — U09.9 PERSISTENT DYSPNEA AFTER COVID-19: ICD-10-CM

## 2021-03-08 DIAGNOSIS — R41.89 BRAIN FOG: ICD-10-CM

## 2021-03-08 DIAGNOSIS — R53.83 PERSISTENT FATIGUE AFTER COVID-19: ICD-10-CM

## 2021-03-08 DIAGNOSIS — R51.9 ACUTE NONINTRACTABLE HEADACHE, UNSPECIFIED HEADACHE TYPE: ICD-10-CM

## 2021-03-08 DIAGNOSIS — U09.9 PERSISTENT NEUROLOGIC SYMPTOMS AFTER COVID-19: ICD-10-CM

## 2021-03-08 DIAGNOSIS — U09.9 POST-COVID-19 SYNDROME: Primary | ICD-10-CM

## 2021-03-08 DIAGNOSIS — I49.8 OTHER SPECIFIED CARDIAC ARRHYTHMIAS: ICD-10-CM

## 2021-03-08 DIAGNOSIS — U07.1 PNEUMONIA DUE TO COVID-19 VIRUS: ICD-10-CM

## 2021-03-08 DIAGNOSIS — J12.82 PNEUMONIA DUE TO COVID-19 VIRUS: ICD-10-CM

## 2021-03-08 PROCEDURE — 99213 OFFICE O/P EST LOW 20 MIN: CPT | Performed by: PHYSICIAN ASSISTANT

## 2021-03-08 NOTE — PROGRESS NOTES
"Subjective   Jocelyn Merlos is a 30 y.o. female who presents today follow up of post-Covid 19 syndrome with fatigue, cognitive deficits, tachycardia, hypoxia,shortness of breath, and weakness. She is having significant headaches.     History of Present Illness     With appts- she reports she never had trouble with mask in the past but at PT, she feels like she needs to rip her mask off.     Headaches- occur almost always when she is asleep. Taking Tylenol and tried Excedrin Migraine the past few days. They are waking her up at night, which is abnormal for her. Last 20 minutes up to 3 hours.   · 2/22- 8 am, 2/23 9 am, 2/24 4 am and 8 pm, 2/25 9 am and 2 pm. 2/26 6:30 am, 2/27/2021 7 am, 2/28 11 am, 3/1 7:30 am, 9:30 am, 11:30 am 3/5 2:45-6 am- most severe, 3/6 4:45 am.   · 3/5/2021- She has had right sided headache and headache behind her eyes, nausea with vomiting, eye was red and watering, eyelid drooping, eyebrow was not normal. She reports it woke her up at 6 am and she could not sleep. No other facial drooping.   · 3/6 had another headache that was not as bad    Cognitive dysfunction- Still \"brain farts\", short term memory issues, headaches, oxygen levels- lowest has been about 90s% and maybe 88% if pushing it. Pulse with activity- at PT around 120, she stops her and if pushes self at home she gets up to 150.     Tachycardia- Has appt with cardiology in a couple weeks for echo, stress test, and holter monitor but this is from 11/2020 appt. They are unaware of her postcovid syndrome. P increased recently. Only had elevated BP while she was younger on OCP.   Hypoxia- She thinks her O2 is improving- decreased to 90% at PT but not < 90%.    Physical therapy- Started PT. Going once weekly since she is going back to work. If she is off work, she can go 2-3 x but not while working.   Has not heard from speech therapy.     The following portions of the patient's history were reviewed and updated as appropriate: allergies, " current medications, past family history, past medical history, past social history, past surgical history and problem list.    Review of Systems   Constitutional: Positive for fatigue.   HENT: Positive for congestion.    Eyes: Positive for discharge and redness.   Respiratory: Positive for chest tightness and shortness of breath.    Cardiovascular: Positive for palpitations.   Gastrointestinal: Negative.    Genitourinary: Negative.    Musculoskeletal: Positive for arthralgias and myalgias.   Neurological: Positive for facial asymmetry, weakness, light-headedness and headaches.   Psychiatric/Behavioral: Positive for confusion, decreased concentration, dysphoric mood and sleep disturbance. The patient is nervous/anxious.         Brain fog       Objective   Vitals:    03/08/21 1033   BP: 142/78   Pulse: 97   Resp: 20   Temp: 96.7 °F (35.9 °C)   SpO2: 95%     Body mass index is 39.99 kg/m².    Physical Exam  Vitals and nursing note reviewed.   Constitutional:       General: She is not in acute distress.     Appearance: Normal appearance. She is well-developed.   HENT:      Head: Normocephalic and atraumatic.      Right Ear: External ear normal.      Left Ear: External ear normal.      Nose: Nose normal.   Eyes:      General: Lids are normal.         Right eye: No discharge.         Left eye: No discharge.      Conjunctiva/sclera: Conjunctivae normal.      Pupils: Pupils are equal, round, and reactive to light.   Neck:      Vascular: No carotid bruit.   Cardiovascular:      Rate and Rhythm: Normal rate and regular rhythm.      Pulses: Normal pulses.      Heart sounds: Normal heart sounds. No murmur heard.   No friction rub. No gallop.    Pulmonary:      Effort: Pulmonary effort is normal. No respiratory distress.      Breath sounds: Normal breath sounds. No wheezing, rhonchi or rales.      Comments: SOA with ambulation  Musculoskeletal:         General: No deformity.      Cervical back: Neck supple.   Skin:     General:  Skin is warm and dry.   Neurological:      Mental Status: She is alert and oriented to person, place, and time. She is not disoriented.      Cranial Nerves: No cranial nerve deficit.      Sensory: No sensory deficit.      Gait: Gait normal.      Deep Tendon Reflexes: Reflexes are normal and symmetric.   Psychiatric:         Attention and Perception: She is attentive.         Mood and Affect: Mood normal.         Speech: Speech normal.         Behavior: Behavior normal.         Thought Content: Thought content normal.         Judgment: Judgment normal.         Assessment/Plan   Diagnoses and all orders for this visit:    1. Post-COVID-19 syndrome (Primary)  -     MRI Brain With & Without Contrast  -     Ambulatory Referral to Neurology    2. Persistent neurologic symptoms after COVID-19  -     MRI Brain With & Without Contrast  -     Ambulatory Referral to Neurology    3. Acute nonintractable headache, unspecified headache type  -     MRI Brain With & Without Contrast  -     Ambulatory Referral to Neurology    4. Brain fog    5. Persistent dyspnea after COVID-19    6. Hypoxia    7. Pneumonia due to COVID-19 virus    8. Other specified cardiac arrhythmias    9. Tachycardia    10. Persistent fatigue after COVID-19        Assessment and Plan  Patient has had significant symptoms with Covid 19 infection, starting 12/24/2020 and continuing with hypoxia and tachycardia with ambulation, SOA, chest tightness, weakness, fatigue, brain fog, diarrhea, and nausea with resolution of severe rash. She went to ER with oxygen saturation of 88%. CTA chest with mild pneumonia and negative for PE. Inflammatory markers were not performed. Unfortunately, the ER never checked her pulse and O2 with her ambulating as discussed and she was d/c without further recommendations.  While on video visit, patient had oxygen saturation of 97% and pulse in 90s then I asked her to ambulate with pulse ox. With relatively little ambulation or exertion,  she had pulse 127 and oxygen saturation down to 88%. In office, she did have a normal pulse that increased to 124 with ambulation and oxygen saturation decreased from 95% to 90% with ambulation.     She has persistent cognitive impairment, fatigue, tachycardia and hypoxia with walking short distances, headaches, and feeling poorly. She was seen by the long term Covid infectious disease clinic, had additional labs, was referred to physical therapy, speech, and cognitive therapy, and was advised to contact her cardiologist for follow up with persistent tachycardia with ambulation. I contacted infectious disease provider for recommendations for return to work vs work restrictions. She recommends either waiting to return to work until she has some improvement with therapy or may try light duty if available to see if she will tolerate this until she has improvement. 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 She cannot do excessive walking or standing, push, pull, or lift due to tachycardia and SOA/ hypoxia and the potential to further decrease oxygen saturation and increase heart rate. To ER ASAP if worsening, new, or changing symptoms or worsening/ persistent hypoxia or tachycardia.    Her headaches have worsened and are waking her up in the early morning some mornings and occur throughout the day intermittent. Headaches are sometimes very severe with associated eye watering, redness, and drooping. I discussed with her the possibility of beta blocker to help with pulse and headaches vs Elavil to help decrease headaches ( but could increase the risk for arrhythmia). Both medications can cause fatigue, which is not desirable. I also discussed  Topamax and possible AE of cognitive slowing or deficits. Since these are some of her biggest complaints and she is getting ready to return to work, she is hesitant to consider medication. I will refer for MRI brain and to neurology for evaluation and treatment recommendations. Patient to be seen here or ER ASAP if worsening, new, or changing symptoms.     She should continue PT and needs to contact the long term [facility] clinic about the referral to speech/cognitive therapy, as she still has not received appt for this. I discussed with her that I would not recommend having her testing without seeing cardiology prior to testing and am not sure I recommend a stress test at this point. The testing scheduled was ordered when she saw cardiology prior to Covid 19, however, she has not seen or discussed symptoms, tachycardia, hypoxia, or other symptoms or been evaluated by cardiology for postCovid tachycardia/ arrhythmia. I asked that she contact cardiology for an appt with them prior to scheduled testing. Patient will call long [facility] clinic and cardiology.     I spent 35 minutes cariting for Jocelyn Merlos on this date of service. This time includes time spent by me in the following activities as necessary: preparing for the visit, reviewing tests, specialists records and previous visits, obtaining and/or reviewing a separately obtained history, performing a medically appropriate exam and/or evaluation, counseling and educating the patient, family, garegiver, referring and/or communicating with other healthcare professionals, documenting information in the medical record, independently interpreting results and communicating that information with the patient, family, caregiver, and developing a medically appropriate treatment plan with consideration of other conditions, medications, and treatments.

## 2021-03-15 ENCOUNTER — OFFICE VISIT (OUTPATIENT)
Dept: FAMILY MEDICINE CLINIC | Facility: CLINIC | Age: 31
End: 2021-03-15

## 2021-03-15 VITALS
HEIGHT: 68 IN | BODY MASS INDEX: 40.25 KG/M2 | TEMPERATURE: 96 F | HEART RATE: 98 BPM | RESPIRATION RATE: 20 BRPM | DIASTOLIC BLOOD PRESSURE: 82 MMHG | SYSTOLIC BLOOD PRESSURE: 130 MMHG | WEIGHT: 265.6 LBS | OXYGEN SATURATION: 98 %

## 2021-03-15 DIAGNOSIS — R51.9 ACUTE NONINTRACTABLE HEADACHE, UNSPECIFIED HEADACHE TYPE: ICD-10-CM

## 2021-03-15 DIAGNOSIS — I49.8 OTHER SPECIFIED CARDIAC ARRHYTHMIAS: ICD-10-CM

## 2021-03-15 DIAGNOSIS — R41.89 BRAIN FOG: ICD-10-CM

## 2021-03-15 DIAGNOSIS — R09.02 HYPOXIA: ICD-10-CM

## 2021-03-15 DIAGNOSIS — R53.83 PERSISTENT FATIGUE AFTER COVID-19: ICD-10-CM

## 2021-03-15 DIAGNOSIS — U09.9 PERSISTENT DYSPNEA AFTER COVID-19: ICD-10-CM

## 2021-03-15 DIAGNOSIS — R29.90 PERSISTENT NEUROLOGIC SYMPTOMS AFTER COVID-19: ICD-10-CM

## 2021-03-15 DIAGNOSIS — R06.00 PERSISTENT DYSPNEA AFTER COVID-19: ICD-10-CM

## 2021-03-15 DIAGNOSIS — U09.9 PERSISTENT NEUROLOGIC SYMPTOMS AFTER COVID-19: ICD-10-CM

## 2021-03-15 DIAGNOSIS — R00.0 TACHYCARDIA: ICD-10-CM

## 2021-03-15 DIAGNOSIS — U09.9 POST-COVID-19 SYNDROME: Primary | ICD-10-CM

## 2021-03-15 DIAGNOSIS — U09.9 PERSISTENT FATIGUE AFTER COVID-19: ICD-10-CM

## 2021-03-15 PROCEDURE — 99214 OFFICE O/P EST MOD 30 MIN: CPT | Performed by: PHYSICIAN ASSISTANT

## 2021-03-15 RX ORDER — PROPRANOLOL HCL 60 MG
60 CAPSULE, EXTENDED RELEASE 24HR ORAL DAILY
Qty: 30 CAPSULE | Refills: 0 | Status: SHIPPED | OUTPATIENT
Start: 2021-03-15 | End: 2021-03-25

## 2021-03-15 RX ORDER — ONDANSETRON 8 MG/1
8 TABLET, ORALLY DISINTEGRATING ORAL
COMMUNITY
Start: 2021-03-11 | End: 2021-03-20

## 2021-03-15 RX ORDER — BUTALBITAL, ACETAMINOPHEN AND CAFFEINE 50; 325; 40 MG/1; MG/1; MG/1
TABLET ORAL
COMMUNITY
Start: 2021-03-12 | End: 2021-03-20

## 2021-03-15 RX ORDER — CHLORAL HYDRATE 500 MG
CAPSULE ORAL
COMMUNITY

## 2021-03-15 NOTE — PROGRESS NOTES
"Subjective   Jocelyn Merlos is a 30 y.o. female who presents today in follow up of post-Covid syndrome with hypoxia, SOA, tachycardia, fatigue, cognitive deficits, headaches, specialists/therapy, and return to work.     History of Present Illness     Hypoxia- lowest has been about 90s% and maybe 88% if pushing it.   Tachycardia/ elevated BP- Pulse with activity- at PT around 120, she stops her and if pushes self at home she gets up to 150. Has appt with cardiology in a couple weeks for echo, stress test, and holter monitor but this is from 11/2020 appt. They are unaware of her postcovid syndrome. BP and pulse have improved some but continues with pulse that is elevated but oxygen has stayed above 90.     Fatigue- continues with significant fatigue- feeling tired all day.   Cognitive deficit/dysfunction- Still \"brain farts\", short term memory issues, headaches, oxygen levels    Headaches- Occur almost always when she is asleep. Taking Tylenol and tried Excedrin Migraine the past few days. They are waking her up at night, which is abnormal for her.   · She has had right sided headache and headache behind her eyes, nausea with vomiting, eye was red and watering, right sided facial and eyelid drooping, eyebrow was not normal. She reports it wakes her up from sleep.   · She went to ER 3/11/2021 with drooping eyelid, bloodshot eye, watering. She was in tears due to severe pain but they did no testing. By the time they gave her a headache cocktail, her headache was already improving. She was ready to go home because she did not feel she was getting any help. They discharged her with Fiorcet and Zofran that have not helped her headaches.   · She was given pain medication from previous foot surgery and never took it. She tried it and it makes her sick but did not help her pain.     Physical therapy- going once weekly since she is going back to work. If she is off work, she can go 2-3 x but not while working. Has been walking " with her dog per PT but rpeorts she is scared of triggering a migraine because she is scared that a bump or anything will cause migraine.   Speech and cognitive therapy- Has not heard from speech therapy.     She went back to work and had to leave after 2 hours. She reports they asked her to tell someone something and she couldn't remember who that was. They thought this was someone she should know but she had no idea. Also, the process of going into work- line, temperature, getting mask- couldn't remember how to do it. She states it was like she was a new hire. Things that she thought should have come back as soon as she saw it did not come back. They had her sit at a picnic table x 2 hours until they decided she should go home on no work available. They still have the heat on and with the heat and her mask, she felt claustrophobic. No other symptoms that she is aware.     The following portions of the patient's history were reviewed and updated as appropriate: allergies, current medications, past family history, past medical history, past social history, past surgical history and problem list.    Review of Systems   Constitutional: Positive for fatigue.   Eyes: Positive for discharge and redness.   Respiratory: Positive for shortness of breath.    Cardiovascular: Positive for palpitations.   Gastrointestinal: Positive for nausea.   Genitourinary: Negative.    Musculoskeletal: Positive for arthralgias and myalgias.   Neurological: Positive for facial asymmetry, weakness, light-headedness and headaches.   Psychiatric/Behavioral: Positive for confusion, decreased concentration, dysphoric mood and sleep disturbance. The patient is nervous/anxious.         Brain fog       Objective   Vitals:    03/15/21 1453   BP: 130/82   Pulse:    Resp:    Temp:    SpO2:      Body mass index is 40.38 kg/m².    Physical Exam  Vitals and nursing note reviewed.   Constitutional:       General: She is not in acute distress.      Appearance: Normal appearance. She is well-developed.   HENT:      Head: Normocephalic and atraumatic.      Right Ear: External ear normal.      Left Ear: External ear normal.      Nose: Nose normal.   Eyes:      General: Lids are normal.         Right eye: No discharge.         Left eye: No discharge.      Conjunctiva/sclera: Conjunctivae normal.   Neck:      Vascular: No carotid bruit.   Cardiovascular:      Rate and Rhythm: Normal rate and regular rhythm.      Heart sounds: Normal heart sounds. No murmur heard.   No friction rub. No gallop.    Pulmonary:      Effort: Pulmonary effort is normal. No respiratory distress.      Breath sounds: Normal breath sounds. No wheezing, rhonchi or rales.   Musculoskeletal:         General: No deformity.      Cervical back: Neck supple.   Skin:     General: Skin is warm and dry.   Neurological:      Mental Status: She is alert and oriented to person, place, and time.      Gait: Gait normal.   Psychiatric:         Attention and Perception: She is attentive.         Mood and Affect: Mood normal.         Speech: Speech normal.         Behavior: Behavior normal.         Thought Content: Thought content normal.         Judgment: Judgment normal.         Assessment/Plan   Diagnoses and all orders for this visit:    1. 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 Hypoxia    3. Persistent dyspnea after COVID-19    4. Other specified cardiac arrhythmias    5. Tachycardia  -     propranolol LA (INDERAL LA) 60 MG 24 hr capsule; Take 1 capsule by mouth Daily.  Dispense: 30 capsule; Refill: 0    6. Persistent fatigue after COVID-19    7. Persistent neurologic symptoms after COVID-19    8. Brain fog    9. Acute nonintractable headache, unspecified headache type  -     propranolol LA (INDERAL LA) 60 MG 24 hr capsule; Take 1 capsule by mouth Daily.  Dispense: 30 capsule; Refill: 0        Assessment and Plan  Patient has had significant symptoms following Covid 19 infection. She developed symptoms of  Covid 19 12/24/2020 and tested positive 12/28/2020. She had hypoxia and tachycardia with ambulation, SOA, chest tightness, weakness, fatigue, brain fog, diarrhea, and nausea with resolution of severe rash. She went to ER with oxygen saturation of 88%. CTA chest with mild pneumonia and negative for PE. Inflammatory markers were not performed. They did not evaluate symptoms with ambulation and resting vital signs were ok.  While on video visit, patient had oxygen saturation of 97% and pulse in 90s then with relatively little ambulation/ exertion, she had pulse 127 and oxygen saturation down to 88%. In office, she had a normal resting pulse that increased to 124 with ambulation and oxygen saturation decreased from 95% to 90% with ambulation.     She has persistent tachycardia and hypoxia with walking short distances (that is slowly improving), elevated BP that is improving, cognitive impairment, fatigue, headaches, and feeling overall poorly. She was seen by the long term Covid infectious disease clinic, had additional labs, was referred to physical therapy, speech, and cognitive therapy, and was advised to contact her cardiologist for follow up with persistent tachycardia with ambulation. She is undergoing PT but has not received an appointment for speech/cognitive therapy. She called cardiology to schedule testing ordered at appt prior to Covid 19 but has not scheduled cardiology appt for evaluation of post-Covid tachycardia/ arrhythmia. She needs to call cardiology to schedule appt for evaluation and treatment recommendations and the long term Covid clinic to get appt with speech/cognitive therapy.     I referred for MRI brain and to neurology for severe headaches. MRI brain and neurology appt scheduled for 3/30/2021, however, she has had severe, debilitating headaches. She had to go to ER but had no testing performed there. Excedrin Migraine, Fiorcet, Zofran, and narcotic pain medication have not helped headaches.  I discussed beta blocker, Elavil, and Topamax at her last visit, however, she was concerned about possible AE with medication, as fatigue, arrhythmia, and cognitive dysfunction are already some of her biggest issues. She is now unable to tolerate her headaches and feels the possible AE with medication are worth the risk for improvement in headaches. She will start Inderal LA 60 mg nightly and must monitor BP and pulse closely. She should call if hypotension, bradycardia, or AE with medication. Otherwise, I will re-evaluate in 1 week.     I contacted infectious disease provider for recommendations for return to work vs work restrictions. She recommends either waiting to return to work until she has some improvement with therapy or may try light duty if available to see if she will tolerate this until she has improvement. 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 To ER ASAP if worsening, new, or changing symptoms or worsening/ persistent hypoxia or tachycardia.    She returned to work and noted multiple cognitive issues. She reports they had her work 2 hours then sent her home with no work available. 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 She feels she needs more cognitive therapy to safely return  to work. I will have her remain off work until her follow up with long term Covid clinic. She has been advised to remain in contact with her employer to ensure her job is secure and leave is approved.     I spent 40 minutes cariting for Jocelyn Merlos on this date of service. This time includes time spent by me in the following activities as necessary: preparing for the visit, reviewing tests, specialists records and previous visits, obtaining and/or reviewing a separately obtained history, performing a medically appropriate exam and/or evaluation, counseling and educating the patient, family, garegiver, referring and/or communicating with other healthcare professionals, documenting information in the medical record, independently interpreting results and communicating that information with the patient, family, caregiver, and developing a medically appropriate treatment plan with consideration of other conditions, medications, and treatments.

## 2021-03-23 ENCOUNTER — TELEPHONE (OUTPATIENT)
Dept: CARDIOLOGY | Facility: CLINIC | Age: 31
End: 2021-03-23

## 2021-03-23 NOTE — TELEPHONE ENCOUNTER
I agree that I should see her first.  Those test were ordered almost 5 months ago when I first saw her.  Given significant changes agree with repeat visit first or ED if sustained tachycardia

## 2021-03-23 NOTE — TELEPHONE ENCOUNTER
Pt is calling, per the request of her PCP for an apt for tachycardia.    She is scheduled for and echo, holter and stress test 3/30/21  Her PCP told her she didn't think it was safe for her to perform these tests due to her heart rate, and o2 sat.     She tested +COVID 12/28 and is having a lot of post COVID tachycardia.  She said when she goes up stairs her heart rate is 150-160.  She stated when her heart rate goes up her o2 sat drops below 90%. (this is why her PCP told her testing wasn't safe)  She also gets Hypertensive at that time and her sbp is in the 190s.    She wanted to see you prior to her testing and I have scheduled her to see you 3/25/21.  I have instructed her to go to the ER for sustained hr of 130 or greater for more than an hour.    Thanks  Martina Barahona RN  Triage nurse

## 2021-03-25 ENCOUNTER — OFFICE VISIT (OUTPATIENT)
Dept: CARDIOLOGY | Facility: CLINIC | Age: 31
End: 2021-03-25

## 2021-03-25 VITALS
HEART RATE: 80 BPM | WEIGHT: 271 LBS | DIASTOLIC BLOOD PRESSURE: 80 MMHG | BODY MASS INDEX: 41.07 KG/M2 | HEIGHT: 68 IN | SYSTOLIC BLOOD PRESSURE: 158 MMHG

## 2021-03-25 DIAGNOSIS — R00.2 PALPITATIONS: Primary | ICD-10-CM

## 2021-03-25 DIAGNOSIS — R07.9 CHEST PAIN, UNSPECIFIED TYPE: ICD-10-CM

## 2021-03-25 DIAGNOSIS — I10 ESSENTIAL HYPERTENSION: ICD-10-CM

## 2021-03-25 PROCEDURE — 93000 ELECTROCARDIOGRAM COMPLETE: CPT | Performed by: INTERNAL MEDICINE

## 2021-03-25 PROCEDURE — 99214 OFFICE O/P EST MOD 30 MIN: CPT | Performed by: INTERNAL MEDICINE

## 2021-03-25 RX ORDER — ATENOLOL 25 MG/1
25 TABLET ORAL DAILY
Qty: 90 TABLET | Refills: 3 | Status: SHIPPED | OUTPATIENT
Start: 2021-03-25 | End: 2022-05-31

## 2021-03-25 NOTE — PROGRESS NOTES
[facility] Follow Up Office Note     Encounter Date:21  Patient:oJcelyn Merlos  :1990  MRN:[id number]      Chief Complaint: Chest pain and tachycardia  Chief Complaint   Patient presents with   • Rapid Heart Rate     History of Presenting Illness:      Ms. Merlos is a 30 y.o. with past medical history notable for carrier for hemochromatosis who presents her office for scheduled follow-up appointment.  She was last seen approximately 3 months ago for an initial visit for symptoms of chest discomfort and occasional palpitations in her chest.  At that time we did plan on getting an echocardiogram, Holter monitor and exercise treadmill stress test to evaluate her symptoms.  At that time her symptoms seemed more noncardiac and more muscular skeletal in nature as they were reproducible and positional.  Unfortunately prior to getting these testing done she started to feel bad with symptoms concerning for COVID-19.  She did cancel these test and unfortunately was positive for COVID-19 in late December.  She fortunately did not require hospitalization but unfortunately since then has had a lot of issues with tachycardia general fatigue and significant shortness of breath.  She states that her heart rate will race at times and be significantly bothersome.    In addition to her cardiac issues she also mentions migraines and it was seen by a neurologist for this who started her on propanolol a few days ago but really has had no relief in her migraine symptoms.    Additionally she has had issues with hypertension which was never an issue prior to this and has been unrelenting since her COVID-19 infection.    She was seen by her primary care physician earlier this month did and I did review her office note.  Given her worsening symptoms and since she has yet to complete her work-up for her prior cardiac issues she was asked to reestablish with us again.  She had some concerns about her completing a stress  test given her current symptoms.      Review of Systems:  Review of Systems   Constitutional: Negative.   HENT: Negative.    Eyes: Negative.    Cardiovascular: Positive for chest pain.   Respiratory: Negative.    Endocrine: Negative.    Hematologic/Lymphatic: Negative.    Skin: Negative.    Musculoskeletal: Negative.    Gastrointestinal: Negative.    Genitourinary: Negative.    Neurological: Negative.    Psychiatric/Behavioral: Negative.    Allergic/Immunologic: Negative.        Current Outpatient Medications on File Prior to Visit   Medication Sig Dispense Refill   • BIOTIN PO Take  by mouth.     • metFORMIN ER (GLUCOPHAGE-XR) 750 MG 24 hr tablet Take 750 mg by mouth 2 (two) times a day.     • Omega-3 Fatty Acids (fish oil) 1000 MG capsule capsule Take  by mouth Daily With Breakfast.     • prenatal vitamin (prenatal, CLASSIC, vitamin) tablet Take  by mouth Daily.     • vitamin C (ASCORBIC ACID) 250 MG tablet Take 250 mg by mouth Daily.     • vitamin D (ERGOCALCIFEROL) 1.25 MG (45183 UT) capsule capsule Take 1,000 Units by mouth.     • vitamin E 100 UNIT capsule Take 100 Units by mouth Daily.     • [DISCONTINUED] propranolol LA (INDERAL LA) 60 MG 24 hr capsule Take 1 capsule by mouth Daily. 30 capsule 0     No current facility-administered medications on file prior to visit.        Allergies   Allergen Reactions   • No Known Drug Allergy        Past Medical History:   Diagnosis Date   • Abnormal Pap smear of cervix    • Adjustment disorder with mixed anxiety and depressed mood 4/6/2016   • Palpitations        Past Surgical History:   Procedure Laterality Date   • BREAST AUGMENTATION     • CERVICAL BIOPSY  W/ LOOP ELECTRODE EXCISION     • LAPAROSCOPIC GASTRIC BANDING     • LEEP     • TONSILLECTOMY         Social History     Socioeconomic History   • Marital status:      Spouse name: Not on file   • Number of children: Not on file   • Years of education: Not on file   • Highest education level: Not on file  "  Tobacco Use   • Smoking status: Former Smoker     Packs/day: 0.25     Years: 0.50     Pack years: 0.12     Types: Cigarettes     Quit date: 2015     Years since quittin.9   • Smokeless tobacco: Never Used   Substance and Sexual Activity   • Alcohol use: Yes     Alcohol/week: 4.0 - 5.0 standard drinks     Types: 4 - 5 Standard drinks or equivalent per week     Comment: Socially./caffeine use    • Drug use: No   • Sexual activity: Yes     Partners: Male       Family History   Problem Relation Age of Onset   • Skin cancer Mother    • Depression Mother    • Anxiety disorder Mother    • Stroke Mother    • Stroke Maternal Grandmother        The following portions of the patient's history were reviewed and updated as appropriate: allergies, current medications, past family history, past medical history, past social history, past surgical history and problem list.       Objective:       Vitals:    21 1228   BP: 158/80   BP Location: Left arm   Patient Position: Sitting   Pulse: 80   Weight: 123 kg (271 lb)   Height: 172.7 cm (68\")       Physical Exam:  Constitutional: Well appearing, well developed, no acute distress   HENT: Oropharynx clear and membrane moist  Eyes: Normal conjunctiva, no sclera icterus.  Neck: Supple, no carotid bruit bilaterally.  Cardiovascular: Regular rate and rhythm, No Murmur, No bilateral lower extremity edema.  Pulmonary: Normal respiratory effort, normal lung sounds, no wheezing.  Abdominal: Soft, nontender, no hepatosplenomegaly, liver is non-pulsatile.  Neurological: Alert and orient x 3.   Skin: Warm, dry, no ecchymosis, no rash.  Psych: Appropriate mood and affect. Normal judgment and insight.      Lab Results   Component Value Date    BUN 13 2021    CREATININE 0.77 2021    EGFRIFNONA 88 2021    EGFRIFAFRI 107 2021    BCR 16.9 2021    K 4.3 2021    CO2 23.6 2021    CALCIUM 9.5 2021    PROTENTOTREF 6.9 2021    ALBUMIN 4.50 " 01/25/2021    LABIL2 1.9 01/25/2021    AST 21 01/25/2021    ALT 31 01/25/2021       Lab Results   Component Value Date    WBC 10.88 (H) 01/25/2021    HGB 14.0 01/25/2021    HCT 41.3 01/25/2021    MCV 90.0 01/25/2021     01/25/2021       Lab Results   Component Value Date    CKTOTAL 26 (L) 01/16/2021    TROPONINI <0.012 01/14/2021       Lab Results   Component Value Date    CHLPL 183 10/05/2020    CHLPL 187 11/20/2018    CHLPL 170 05/01/2018     Lab Results   Component Value Date    TRIG 148 10/05/2020    TRIG 124 11/20/2018    TRIG 82 05/01/2018     Lab Results   Component Value Date    HDL 32 (L) 10/05/2020    HDL 37 (L) 11/20/2018    HDL 40 05/01/2018     Lab Results   Component Value Date     (H) 10/05/2020     (H) 11/20/2018     (H) 05/01/2018       Lab Results   Component Value Date    TSH 0.940 02/22/2021         ECG 12 Lead    Date/Time: 3/25/2021 12:58 PM  Performed by: Immanuel Negrete MD  Authorized by: Immanuel Negrete MD   Comparison: compared with previous ECG from 11/23/2020  Similar to previous ECG  Rhythm: sinus rhythm    Clinical impression: normal ECG               Assessment:          Diagnosis Plan   1. Palpitations  ECG 12 Lead   2. Essential hypertension     3. Chest pain, unspecified type            Plan:       Ms. Merlos is a 30 y.o.  with past medical history notable for carrier for hemochromatosis who presents to our office for scheduled follow-up.  In general she seems like she is having a rough time recovering from COVID-19.  I have seen this sequelae post COVID-19 and multiple patients and obviously she was having issues prior to her infection.  She does already have a Holter monitor and echocardiogram scheduled for next Monday and will continue with those testings as this would be very helpful to further define what is going on with her.  With regards to her stress testing this was ordered for fairly atypical chest pain and in light of her recent issues I  would hold off on stress testing at this point and will decide if this is appropriate based upon her Holter and echocardiogram results.  With regards to her severe tachycardia symptoms as well as elevated blood pressure I would like to try and change her propanolol to atenolol given that it has not helped her migraines and I think atenolol might be a better choice for her blood pressure and heart rate issues.  She does have follow-up with her neurologist next week and can potentially discuss other options.    Chest pain:  · Atypical would hold off on stress testing at this time until Holter and echocardiogram are completed    Palpitations:  · Follow-up on Holter monitor  · Follw up echocardiogram  · Thyroid function 2/2021 demonstrates normal TSH  · BMP 1/2021 demonstrates normal sodium and potassium  · CBC 1/2021 demonstrates normal hemoglobin hematocrit    Hypertension:  · We will monitor response to changing from propanolol to atenolol    Follow-up:  4 weeks      Thank you for allowing me to participate in the care of Jocelyn Merlos. Feel free to contact me directly with any further questions or concerns.    Immanuel Negrete MD  [facility]  03/25/21  13:02 EDT

## 2021-03-29 ENCOUNTER — HOSPITAL ENCOUNTER (OUTPATIENT)
Dept: CARDIOLOGY | Facility: HOSPITAL | Age: 31
Discharge: HOME | End: 2021-03-29
Admitting: INTERNAL MEDICINE

## 2021-03-29 VITALS
DIASTOLIC BLOOD PRESSURE: 80 MMHG | OXYGEN SATURATION: 99 % | WEIGHT: 271 LBS | HEIGHT: 68 IN | SYSTOLIC BLOOD PRESSURE: 110 MMHG | BODY MASS INDEX: 41.07 KG/M2 | HEART RATE: 83 BPM

## 2021-03-29 DIAGNOSIS — R07.9 CHEST PAIN, UNSPECIFIED TYPE: ICD-10-CM

## 2021-03-29 PROCEDURE — 93306 TTE W/DOPPLER COMPLETE: CPT

## 2021-03-29 PROCEDURE — 93306 TTE W/DOPPLER COMPLETE: CPT | Performed by: INTERNAL MEDICINE

## 2021-03-30 ENCOUNTER — TELEPHONE (OUTPATIENT)
Dept: CARDIOLOGY | Facility: CLINIC | Age: 31
End: 2021-03-30

## 2021-03-30 ENCOUNTER — OFFICE VISIT (OUTPATIENT)
Dept: NEUROLOGY | Facility: CLINIC | Age: 31
End: 2021-03-30

## 2021-03-30 ENCOUNTER — HOSPITAL ENCOUNTER (OUTPATIENT)
Dept: MRI IMAGING | Facility: HOSPITAL | Age: 31
Discharge: HOME | End: 2021-03-30
Admitting: PHYSICIAN ASSISTANT

## 2021-03-30 VITALS
HEART RATE: 77 BPM | SYSTOLIC BLOOD PRESSURE: 132 MMHG | DIASTOLIC BLOOD PRESSURE: 84 MMHG | HEIGHT: 68 IN | WEIGHT: 265 LBS | BODY MASS INDEX: 40.16 KG/M2 | OXYGEN SATURATION: 98 %

## 2021-03-30 DIAGNOSIS — G43.701 CHRONIC MIGRAINE WITHOUT AURA WITH STATUS MIGRAINOSUS, NOT INTRACTABLE: Primary | ICD-10-CM

## 2021-03-30 LAB
AORTIC ARCH: 2.4 CM
ASCENDING AORTA: 2.9 CM
BH CV ECHO MEAS - AO MAX PG (FULL): 3.8 MMHG
BH CV ECHO MEAS - AO MAX PG: 6.1 MMHG
BH CV ECHO MEAS - AO MEAN PG (FULL): 1.5 MMHG
BH CV ECHO MEAS - AO MEAN PG: 3 MMHG
BH CV ECHO MEAS - AO ROOT AREA (BSA CORRECTED): 1
BH CV ECHO MEAS - AO ROOT AREA: 4.5 CM^2
BH CV ECHO MEAS - AO ROOT DIAM: 2.4 CM
BH CV ECHO MEAS - AO V2 MAX: 123.4 CM/SEC
BH CV ECHO MEAS - AO V2 MEAN: 77.9 CM/SEC
BH CV ECHO MEAS - AO V2 VTI: 24.7 CM
BH CV ECHO MEAS - ASC AORTA: 2.9 CM
BH CV ECHO MEAS - AVA(I,A): 2 CM^2
BH CV ECHO MEAS - AVA(I,D): 2 CM^2
BH CV ECHO MEAS - AVA(V,A): 1.6 CM^2
BH CV ECHO MEAS - AVA(V,D): 1.6 CM^2
BH CV ECHO MEAS - BSA(HAYCOCK): 2.5 M^2
BH CV ECHO MEAS - BSA: 2.3 M^2
BH CV ECHO MEAS - BZI_BMI: 41.2 KILOGRAMS/M^2
BH CV ECHO MEAS - BZI_METRIC_HEIGHT: 172.7 CM
BH CV ECHO MEAS - BZI_METRIC_WEIGHT: 122.9 KG
BH CV ECHO MEAS - EDV(MOD-SP2): 52 ML
BH CV ECHO MEAS - EDV(MOD-SP4): 72 ML
BH CV ECHO MEAS - EDV(TEICH): 117.4 ML
BH CV ECHO MEAS - EF(CUBED): 72.4 %
BH CV ECHO MEAS - EF(MOD-BP): 60 %
BH CV ECHO MEAS - EF(MOD-SP2): 59.6 %
BH CV ECHO MEAS - EF(MOD-SP4): 56.9 %
BH CV ECHO MEAS - EF(TEICH): 63.9 %
BH CV ECHO MEAS - ESV(MOD-SP2): 21 ML
BH CV ECHO MEAS - ESV(MOD-SP4): 31 ML
BH CV ECHO MEAS - ESV(TEICH): 42.4 ML
BH CV ECHO MEAS - FS: 34.9 %
BH CV ECHO MEAS - IVS/LVPW: 1
BH CV ECHO MEAS - IVSD: 1.1 CM
BH CV ECHO MEAS - LAT PEAK E' VEL: 8.1 CM/SEC
BH CV ECHO MEAS - LV DIASTOLIC VOL/BSA (35-75): 31 ML/M^2
BH CV ECHO MEAS - LV MASS(C)D: 201.3 GRAMS
BH CV ECHO MEAS - LV MASS(C)DI: 86.5 GRAMS/M^2
BH CV ECHO MEAS - LV MAX PG: 2.2 MMHG
BH CV ECHO MEAS - LV MEAN PG: 1.5 MMHG
BH CV ECHO MEAS - LV SYSTOLIC VOL/BSA (12-30): 13.3 ML/M^2
BH CV ECHO MEAS - LV V1 MAX: 74.9 CM/SEC
BH CV ECHO MEAS - LV V1 MEAN: 60 CM/SEC
BH CV ECHO MEAS - LV V1 VTI: 19 CM
BH CV ECHO MEAS - LVIDD: 5 CM
BH CV ECHO MEAS - LVIDS: 3.2 CM
BH CV ECHO MEAS - LVLD AP2: 5.7 CM
BH CV ECHO MEAS - LVLD AP4: 7 CM
BH CV ECHO MEAS - LVLS AP2: 4.6 CM
BH CV ECHO MEAS - LVLS AP4: 5.5 CM
BH CV ECHO MEAS - LVOT AREA (M): 2.5 CM^2
BH CV ECHO MEAS - LVOT AREA: 2.6 CM^2
BH CV ECHO MEAS - LVOT DIAM: 1.8 CM
BH CV ECHO MEAS - LVPWD: 1.1 CM
BH CV ECHO MEAS - MED PEAK E' VEL: 9.7 CM/SEC
BH CV ECHO MEAS - MV A DUR: 0.12 SEC
BH CV ECHO MEAS - MV A MAX VEL: 62.9 CM/SEC
BH CV ECHO MEAS - MV DEC SLOPE: 684 CM/SEC^2
BH CV ECHO MEAS - MV DEC TIME: 0.13 SEC
BH CV ECHO MEAS - MV E MAX VEL: 97.9 CM/SEC
BH CV ECHO MEAS - MV E/A: 1.6
BH CV ECHO MEAS - MV MAX PG: 5.6 MMHG
BH CV ECHO MEAS - MV MEAN PG: 2.5 MMHG
BH CV ECHO MEAS - MV P1/2T MAX VEL: 115.5 CM/SEC
BH CV ECHO MEAS - MV P1/2T: 49.5 MSEC
BH CV ECHO MEAS - MV V2 MAX: 118.1 CM/SEC
BH CV ECHO MEAS - MV V2 MEAN: 73.5 CM/SEC
BH CV ECHO MEAS - MV V2 VTI: 24.1 CM
BH CV ECHO MEAS - MVA P1/2T LCG: 1.9 CM^2
BH CV ECHO MEAS - MVA(P1/2T): 4.4 CM^2
BH CV ECHO MEAS - MVA(VTI): 2 CM^2
BH CV ECHO MEAS - PA ACC TIME: 0.09 SEC
BH CV ECHO MEAS - PA MAX PG (FULL): 4 MMHG
BH CV ECHO MEAS - PA MAX PG: 6.7 MMHG
BH CV ECHO MEAS - PA PR(ACCEL): 37.4 MMHG
BH CV ECHO MEAS - PA V2 MAX: 129 CM/SEC
BH CV ECHO MEAS - PULM A REVS DUR: 0.08 SEC
BH CV ECHO MEAS - PULM A REVS VEL: 24.7 CM/SEC
BH CV ECHO MEAS - PULM DIAS VEL: 27.3 CM/SEC
BH CV ECHO MEAS - PULM S/D: 1.7
BH CV ECHO MEAS - PULM SYS VEL: 45.8 CM/SEC
BH CV ECHO MEAS - PVA(V,A): 2.4 CM^2
BH CV ECHO MEAS - PVA(V,D): 2.4 CM^2
BH CV ECHO MEAS - QP/QS: 1.1
BH CV ECHO MEAS - RAP SYSTOLE: 3 MMHG
BH CV ECHO MEAS - RV MAX PG: 2.7 MMHG
BH CV ECHO MEAS - RV MEAN PG: 1.3 MMHG
BH CV ECHO MEAS - RV V1 MAX: 81.4 CM/SEC
BH CV ECHO MEAS - RV V1 MEAN: 54 CM/SEC
BH CV ECHO MEAS - RV V1 VTI: 14.5 CM
BH CV ECHO MEAS - RVOT AREA: 3.8 CM^2
BH CV ECHO MEAS - RVOT DIAM: 2.2 CM
BH CV ECHO MEAS - RVSP: 20 MMHG
BH CV ECHO MEAS - SI(AO): 48.1 ML/M^2
BH CV ECHO MEAS - SI(CUBED): 38.5 ML/M^2
BH CV ECHO MEAS - SI(LVOT): 21 ML/M^2
BH CV ECHO MEAS - SI(MOD-SP2): 13.3 ML/M^2
BH CV ECHO MEAS - SI(MOD-SP4): 17.6 ML/M^2
BH CV ECHO MEAS - SI(TEICH): 32.2 ML/M^2
BH CV ECHO MEAS - SUP REN AO DIAM: 2.7 CM
BH CV ECHO MEAS - SV(AO): 111.8 ML
BH CV ECHO MEAS - SV(CUBED): 89.6 ML
BH CV ECHO MEAS - SV(LVOT): 48.8 ML
BH CV ECHO MEAS - SV(MOD-SP2): 31 ML
BH CV ECHO MEAS - SV(MOD-SP4): 41 ML
BH CV ECHO MEAS - SV(RVOT): 55.8 ML
BH CV ECHO MEAS - SV(TEICH): 75 ML
BH CV ECHO MEAS - TAPSE (>1.6): 2 CM
BH CV ECHO MEAS - TR MAX VEL: 203.6 CM/SEC
BH CV ECHO MEASUREMENTS AVERAGE E/E' RATIO: 11
BH CV XLRA - RV BASE: 2.6 CM
BH CV XLRA - RV LENGTH: 6.4 CM
BH CV XLRA - RV MID: 3.1 CM
BH CV XLRA - TDI S': 13.4 CM/SEC
LEFT ATRIUM VOLUME INDEX: 19 ML/M2
MAXIMAL PREDICTED HEART RATE: 190 BPM
SINUS: 2.5 CM
STJ: 2.7 CM
STRESS TARGET HR: 162 BPM

## 2021-03-30 PROCEDURE — 70553 MRI BRAIN STEM W/O & W/DYE: CPT

## 2021-03-30 PROCEDURE — 99204 OFFICE O/P NEW MOD 45 MIN: CPT | Performed by: PSYCHIATRY & NEUROLOGY

## 2021-03-30 PROCEDURE — 0 GADOBENATE DIMEGLUMINE 529 MG/ML SOLUTION: Performed by: PHYSICIAN ASSISTANT

## 2021-03-30 PROCEDURE — A9577 INJ MULTIHANCE: HCPCS | Performed by: PHYSICIAN ASSISTANT

## 2021-03-30 RX ORDER — PROPRANOLOL HYDROCHLORIDE 40 MG/1
60 TABLET ORAL DAILY
COMMUNITY
End: 2021-05-01

## 2021-03-30 RX ORDER — SUMATRIPTAN 50 MG/1
TABLET, FILM COATED ORAL
Qty: 12 TABLET | Refills: 1 | Status: SHIPPED | OUTPATIENT
Start: 2021-03-30 | End: 2021-05-10

## 2021-03-30 RX ORDER — TOPIRAMATE 25 MG/1
25 TABLET ORAL 2 TIMES DAILY
Qty: 60 TABLET | Refills: 3 | Status: SHIPPED | OUTPATIENT
Start: 2021-03-30 | End: 2021-05-24 | Stop reason: SDUPTHER

## 2021-03-30 RX ADMIN — GADOBENATE DIMEGLUMINE 20 ML: 529 INJECTION, SOLUTION INTRAVENOUS at 11:06

## 2021-03-30 NOTE — PATIENT INSTRUCTIONS
Migraine Headache  A migraine headache is an intense, throbbing pain on one side or both sides of the head. Migraine headaches may also cause other symptoms, such as nausea, vomiting, and sensitivity to light and noise. A migraine headache can last from 4 hours to 3 days. Talk with your doctor about what things may bring on (trigger) your migraine headaches.  What are the causes?  The exact cause of this condition is not known. However, a migraine may be caused when nerves in the brain become irritated and release chemicals that cause inflammation of blood vessels. This inflammation causes pain. This condition may be triggered or caused by:  · Drinking alcohol.  · Smoking.  · Taking medicines, such as:  ? Medicine used to treat chest pain (nitroglycerin).  ? Birth control pills.  ? Estrogen.  ? Certain blood pressure medicines.  · Eating or drinking products that contain nitrates, glutamate, aspartame, or tyramine. Aged cheeses, chocolate, or caffeine may also be triggers.  · Doing physical activity.  Other things that may trigger a migraine headache include:  · Menstruation.  · Pregnancy.  · Hunger.  · Stress.  · Lack of sleep or too much sleep.  · Weather changes.  · Fatigue.  What increases the risk?  The following factors may make you more likely to experience migraine headaches:  · Being a certain age. This condition is more common in people who are 25-55 years old.  · Being female.  · Having a family history of migraine headaches.  · Being .  · Having a mental health condition, such as depression or anxiety.  · Being obese.  What are the signs or symptoms?  The main symptom of this condition is pulsating or throbbing pain. This pain may:  · Happen in any area of the head, such as on one side or both sides.  · Interfere with daily activities.  · Get worse with physical activity.  · Get worse with exposure to bright lights or loud noises.  Other symptoms may  include:  · Nausea.  · Vomiting.  · Dizziness.  · General sensitivity to bright lights, loud noises, or smells.  Before you get a migraine headache, you may get warning signs (an aura). An aura may include:  · Seeing flashing lights or having blind spots.  · Seeing bright spots, halos, or zigzag lines.  · Having tunnel vision or blurred vision.  · Having numbness or a tingling feeling.  · Having trouble talking.  · Having muscle weakness.  Some people have symptoms after a migraine headache (postdromal phase), such as:  · Feeling tired.  · Difficulty concentrating.  How is this diagnosed?  A migraine headache can be diagnosed based on:  · Your symptoms.  · A physical exam.  · Tests, such as:  ? CT scan or an MRI of the head. These imaging tests can help rule out other causes of headaches.  ? Taking fluid from the spine (lumbar puncture) and analyzing it (cerebrospinal fluid analysis, or CSF analysis).  How is this treated?  This condition may be treated with medicines that:  · Relieve pain.  · Relieve nausea.  · Prevent migraine headaches.  Treatment for this condition may also include:  · Acupuncture.  · Lifestyle changes like avoiding foods that trigger migraine headaches.  · Biofeedback.  · Cognitive behavioral therapy.  Follow these instructions at home:  Medicines  · Take over-the-counter and prescription medicines only as told by your health care provider.  · Ask your health care provider if the medicine prescribed to you:  ? Requires you to avoid driving or using heavy machinery.  ? Can cause constipation. You may need to take these actions to prevent or treat constipation:  § Drink enough fluid to keep your urine pale yellow.  § Take over-the-counter or prescription medicines.  § Eat foods that are high in fiber, such as beans, whole grains, and fresh fruits and vegetables.  § Limit foods that are high in fat and processed sugars, such as fried or sweet foods.  Lifestyle  · Do not drink alcohol.  · Do not  use any products that contain nicotine or tobacco, such as cigarettes, e-cigarettes, and chewing tobacco. If you need help quitting, ask your health care provider.  · Get at least 8 hours of sleep every night.  · Find ways to manage stress, such as meditation, deep breathing, or yoga.  General instructions               · Keep a journal to find out what may trigger your migraine headaches. For example, write down:  ? What you eat and drink.  ? How much sleep you get.  ? Any change to your diet or medicines.  · If you have a migraine headache:  ? Avoid things that make your symptoms worse, such as bright lights.  ? It may help to lie down in a dark, quiet room.  ? Do not drive or use heavy machinery.  ? Ask your health care provider what activities are safe for you while you are experiencing symptoms.  · Keep all follow-up visits as told by your health care provider. This is important.  Contact a health care provider if:  · You develop symptoms that are different or more severe than your usual migraine headache symptoms.  · You have more than 15 headache days in one month.  Get help right away if:  · Your migraine headache becomes severe.  · Your migraine headache lasts longer than 72 hours.  · You have a fever.  · You have a stiff neck.  · You have vision loss.  · Your muscles feel weak or like you cannot control them.  · You start to lose your balance often.  · You have trouble walking.  · You faint.  · You have a seizure.  Summary  · A migraine headache is an intense, throbbing pain on one side or both sides of the head. Migraines may also cause other symptoms, such as nausea, vomiting, and sensitivity to light and noise.  · This condition may be treated with medicines and lifestyle changes. You may also need to avoid certain things that trigger a migraine headache.  · Keep a journal to find out what may trigger your migraine headaches.  · Contact your health care provider if you have more than 15 headache days in  a month or you develop symptoms that are different or more severe than your usual migraine headache symptoms.  This information is not intended to replace advice given to you by your health care provider. Make sure you discuss any questions you have with your health care provider.  Document Revised: 04/10/2020 Document Reviewed: 01/30/2020  Elsevier Patient Education © 2020 [employer]

## 2021-03-30 NOTE — ASSESSMENT & PLAN NOTE
30 year old woman with chronic migraine headaches.  She feels that her headaches became more frequent and severe following COVID19 infection.  She is scheduled to have a brain MRI scan done today.  Her symptoms do sound like migraines from description.  She has tried beta blockers for her migraines which have not been effective.  I will start her on topiramate for migraine prevention after discussing potential side effects including risk for birth defects if she were to get pregnant on this medicine.  I will also start her on sumatriptan for acute treatment of her migraines.  Discussed potential side effects.  Also discussed migraine triggers and lifestyle modifications on her visit today and provided patient education information on these.  I advised her to discuss with her PCP about getting a sleep study done as EVERETT can also cause headaches in the morning and headaches that wake someone up in the middle of the night.  I'll see her back in a month to reevaluate.

## 2021-03-30 NOTE — PROGRESS NOTES
Chief Complaint  Migraine (post covid migraines began middle of Feb 2021, symptoms worsening, seen in ED for Migraine coctail, tried Propranol, didn't help, but was placed on atenlol)    Subjective          Jocelyn Merlos presents to [facility] for   HISTORY OF PRESENT ILLNESS:  Jocelyn Merlos is a 30 year old right handed woman who presents to neurology clinic for initial evaluation and treatment of headaches.  She reports testing positive for COVID19 in 12/28/2021 and she had headaches as a symptom but started getting more migraines around 2/22/2021.  She does report history of migraines when she was 12 years old.  The headaches are located above her right eye and right side of her head with throbbing quality which she rates as 10/10 on pain scale 1-10 when most severe with associated light and sound sensitivity along with nausea without vomiting.  She does report some tearing of her right eye most recently with the headaches.  Headaches last 30 minutes to 19 hours in duration.  She is worried because the headaches wake her up in the middle of the night.  She does report snoring and has not had a sleep study done.  She has a brain MRI scan scheduled today.  She has the 10/10 headaches 3 times since February.  She tells me she gets a daily migraine however that is not as severe.  There is family history of migraines in her mother and mother's parents.  She has tried propranolol and atenolol.  She has tried Tylenol and Excedrin which she thinks dulls the headaches.  She has tried Fioricet which she did not think was very helpful.   She denies history of kidney stones.      Past Medical History:   Diagnosis Date   • Abnormal Pap smear of cervix    • Adjustment disorder with mixed anxiety and depressed mood 4/6/2016   • Palpitations         Family History   Problem Relation Age of Onset   • Skin cancer Mother    • Depression Mother    • Anxiety disorder Mother    • Stroke Mother    • Aneurysm  Mother    • Alcohol abuse Mother    • Migraines Mother    • Stroke Maternal Grandmother         Social History     Socioeconomic History   • Marital status:      Spouse name: Not on file   • Number of children: Not on file   • Years of education: Not on file   • Highest education level: Not on file   Tobacco Use   • Smoking status: Former Smoker     Packs/day: 0.25     Years: 0.50     Pack years: 0.12     Types: Cigarettes     Quit date: 2015     Years since quittin.9   • Smokeless tobacco: Never Used   Substance and Sexual Activity   • Alcohol use: Yes     Alcohol/week: 4.0 - 5.0 standard drinks     Types: 4 - 5 Standard drinks or equivalent per week     Comment: Socially./caffeine use    • Drug use: No   • Sexual activity: Yes     Partners: Male        I have personally reviewed the ROS as stated below.     Review of Systems   Constitutional: Positive for activity change and fatigue. Negative for appetite change.   HENT: Negative for rhinorrhea, swollen glands and tinnitus.    Eyes: Negative for blurred vision, double vision and discharge.   Respiratory: Positive for shortness of breath. Negative for cough and wheezing.    Cardiovascular: Positive for chest pain. Negative for leg swelling.   Gastrointestinal: Positive for diarrhea. Negative for nausea, rectal pain and vomiting.   Endocrine: Negative for cold intolerance, heat intolerance and polydipsia.   Genitourinary: Negative for decreased urine volume, urgency and urinary incontinence.   Musculoskeletal: Negative for back pain, gait problem and joint swelling.   Skin: Negative for color change, pallor and bruise.   Neurological: Positive for dizziness, headache and confusion. Negative for tremors, seizures, syncope, facial asymmetry, speech difficulty, weakness, light-headedness, numbness and memory problem.   Hematological: Does not bruise/bleed easily.   Psychiatric/Behavioral: Positive for decreased concentration and sleep disturbance.  "Negative for agitation, behavioral problems, dysphoric mood, hallucinations, self-injury, suicidal ideas, negative for hyperactivity, depressed mood and stress. The patient is nervous/anxious.         Objective   Vital Signs:   /84 (BP Location: Left arm, Patient Position: Sitting)   Pulse 77   Ht 172.7 cm (68\")   Wt 120 kg (265 lb)   SpO2 98%   BMI 40.29 kg/m²       PHYSICAL EXAM:    General   Mental Status - Alert. General Appearance - Over weight, Well groomed, Oriented and Cooperative. Orientation - Oriented X3.       Head and Neck  Head - normocephalic, atraumatic with no lesions or palpable masses.  Neck    Global Assessment - supple.       Eye   Sclera/Conjunctiva - Bilateral - Normal.    ENMT  Mouth and Throat   Oral Cavity/Oropharynx: Oropharynx - the soft palate,uvula and tongue are normal in appearance.    Chest and Lung Exam   Chest - lung clear to auscultation bilaterally.    Cardiovascular   Cardiovascular examination reveals  - normal heart sounds, regular rate and rhythm.    Neurologic   Mental Status: Speech - Normal. Cognitive function - appropriate fund of knowledge. No impairment of attention, Impairment of concentration, impairment of long term memory or impairment of short term memory.  Cranial Nerves:   II Optic: Visual acuity - Left - Normal. Right - Normal. Visual fields - Normal (to confrontation).  III Oculomotor: Pupillary constriction - Left - Normal. Right - Normal.  VII Facial: - Normal Bilaterally.  VIII Acoustic - Bilateral - Hearing normal and (Hearing tested by finger rub).   IX Glossopharyngeal / X Vagus - Normal.  XI Accessory: Trapezius - Bilateral - Normal. Sternocleidomastoid - Bilateral - Normal.  XII Hypoglossal - Bilateral - Normal.  Eye Movements: - Normal Bilaterally.  Sensory:   Light Touch: Intact - Globally.  Motor:   Bulk and Contour: - Normal.  Tone: - Normal.  Tremor: Not present.  Strength: 5/5 normal muscle strength - All Muscles.   General Assessment " of Reflexes: - deep tendon reflexes are normal. Coordination - No Impairment of finger-to-nose or Impairment of rapid alternating movements. Gait - Normal.       Result Review :                 Assessment and Plan    Problem List Items Addressed This Visit        Neuro    Headache, migraine - Primary    Current Assessment & Plan     30 year old woman with chronic migraine headaches.  She feels that her headaches became more frequent and severe following COVID19 infection.  She is scheduled to have a brain MRI scan done today.  Her symptoms do sound like migraines from description.  She has tried beta blockers for her migraines which have not been effective.  I will start her on topiramate for migraine prevention after discussing potential side effects including risk for birth defects if she were to get pregnant on this medicine.  I will also start her on sumatriptan for acute treatment of her migraines.  Discussed potential side effects.  Also discussed migraine triggers and lifestyle modifications on her visit today and provided patient education information on these.  I advised her to discuss with her PCP about getting a sleep study done as EVERETT can also cause headaches in the morning and headaches that wake someone up in the middle of the night.  I'll see her back in a month to reevaluate.               Relevant Medications    propranolol (INDERAL) 40 MG tablet    topiramate (TOPAMAX) 25 MG tablet    SUMAtriptan (Imitrex) 50 MG tablet          I spent time caring for Jocelyn on this date of service. This time includes time spent by me in the following activities:preparing for the visit, reviewing tests, obtaining and/or reviewing a separately obtained history, performing a medically appropriate examination and/or evaluation , counseling and educating the patient/family/caregiver, ordering medications, tests, or procedures, documenting information in the medical record and care coordination    Follow Up {Instructions  Charge Capture  Follow-up Communications :23}  Return in about 4 weeks (around 4/27/2021).  Patient was given instructions and counseling regarding her condition or for health maintenance advice. Please see specific information pulled into the AVS if appropriate.

## 2021-04-01 DIAGNOSIS — H05.89 ORBITAL MASS: Primary | ICD-10-CM

## 2021-04-01 DIAGNOSIS — H04.132: ICD-10-CM

## 2021-04-02 DIAGNOSIS — U09.9 PERSISTENT FATIGUE AFTER COVID-19: ICD-10-CM

## 2021-04-02 DIAGNOSIS — U09.9 PERSISTENT NEUROLOGIC SYMPTOMS AFTER COVID-19: ICD-10-CM

## 2021-04-02 DIAGNOSIS — R51.9 ACUTE NONINTRACTABLE HEADACHE, UNSPECIFIED HEADACHE TYPE: Primary | ICD-10-CM

## 2021-04-02 DIAGNOSIS — R53.83 PERSISTENT FATIGUE AFTER COVID-19: ICD-10-CM

## 2021-04-02 DIAGNOSIS — U09.9 PERSISTENT DYSPNEA AFTER COVID-19: ICD-10-CM

## 2021-04-02 DIAGNOSIS — R29.90 PERSISTENT NEUROLOGIC SYMPTOMS AFTER COVID-19: ICD-10-CM

## 2021-04-02 DIAGNOSIS — G43.709 CHRONIC MIGRAINE WITHOUT AURA WITHOUT STATUS MIGRAINOSUS, NOT INTRACTABLE: ICD-10-CM

## 2021-04-02 DIAGNOSIS — R41.89 BRAIN FOG: ICD-10-CM

## 2021-04-02 DIAGNOSIS — R06.00 PERSISTENT DYSPNEA AFTER COVID-19: ICD-10-CM

## 2021-04-02 DIAGNOSIS — I49.8 OTHER SPECIFIED CARDIAC ARRHYTHMIAS: ICD-10-CM

## 2021-04-05 ENCOUNTER — OFFICE VISIT (OUTPATIENT)
Dept: FAMILY MEDICINE CLINIC | Facility: CLINIC | Age: 31
End: 2021-04-05

## 2021-04-05 VITALS
SYSTOLIC BLOOD PRESSURE: 122 MMHG | HEIGHT: 68 IN | TEMPERATURE: 96.6 F | DIASTOLIC BLOOD PRESSURE: 70 MMHG | OXYGEN SATURATION: 96 % | WEIGHT: 265.8 LBS | BODY MASS INDEX: 40.28 KG/M2 | RESPIRATION RATE: 20 BRPM | HEART RATE: 88 BPM

## 2021-04-05 DIAGNOSIS — M79.671 RIGHT FOOT PAIN: ICD-10-CM

## 2021-04-05 DIAGNOSIS — M25.50 MULTIPLE JOINT PAIN: ICD-10-CM

## 2021-04-05 DIAGNOSIS — M79.10 MYALGIA: ICD-10-CM

## 2021-04-05 DIAGNOSIS — G43.709 CHRONIC MIGRAINE WITHOUT AURA WITHOUT STATUS MIGRAINOSUS, NOT INTRACTABLE: ICD-10-CM

## 2021-04-05 DIAGNOSIS — E55.9 VITAMIN D DEFICIENCY: ICD-10-CM

## 2021-04-05 DIAGNOSIS — R53.83 PERSISTENT FATIGUE AFTER COVID-19: Primary | ICD-10-CM

## 2021-04-05 DIAGNOSIS — H04.132: ICD-10-CM

## 2021-04-05 DIAGNOSIS — M54.9 OTHER ACUTE BACK PAIN: ICD-10-CM

## 2021-04-05 DIAGNOSIS — U09.9 PERSISTENT NEUROLOGIC SYMPTOMS AFTER COVID-19: ICD-10-CM

## 2021-04-05 DIAGNOSIS — I49.8 OTHER SPECIFIED CARDIAC ARRHYTHMIAS: ICD-10-CM

## 2021-04-05 DIAGNOSIS — U09.9 PERSISTENT FATIGUE AFTER COVID-19: Primary | ICD-10-CM

## 2021-04-05 DIAGNOSIS — R79.82 ELEVATED C-REACTIVE PROTEIN (CRP): ICD-10-CM

## 2021-04-05 DIAGNOSIS — L65.9 HAIR LOSS: ICD-10-CM

## 2021-04-05 DIAGNOSIS — M54.2 NECK PAIN: ICD-10-CM

## 2021-04-05 DIAGNOSIS — R29.90 PERSISTENT NEUROLOGIC SYMPTOMS AFTER COVID-19: ICD-10-CM

## 2021-04-05 DIAGNOSIS — H05.89 ORBITAL MASS: ICD-10-CM

## 2021-04-05 DIAGNOSIS — E78.5 DYSLIPIDEMIA: ICD-10-CM

## 2021-04-05 DIAGNOSIS — R09.02 HYPOXIA: ICD-10-CM

## 2021-04-05 DIAGNOSIS — R06.83 SNORING: ICD-10-CM

## 2021-04-05 DIAGNOSIS — Z14.8 HEMOCHROMATOSIS CARRIER: ICD-10-CM

## 2021-04-05 DIAGNOSIS — R74.8 ELEVATED LIVER ENZYMES: ICD-10-CM

## 2021-04-05 DIAGNOSIS — R41.89 BRAIN FOG: ICD-10-CM

## 2021-04-05 DIAGNOSIS — U09.9 POST-COVID-19 SYNDROME: ICD-10-CM

## 2021-04-05 PROCEDURE — 99214 OFFICE O/P EST MOD 30 MIN: CPT | Performed by: PHYSICIAN ASSISTANT

## 2021-04-05 NOTE — PATIENT INSTRUCTIONS
AKUA DIET  • Movie: [place] Over [place]ives - [Netflix]  • Book: Prevent & Reverse Heart Disease    Frequently Asked Questions: FAQ  • PROTEIN:  WHERE DO I GET MY PROTEIN  ARE PROTEIN DRINKS OKAY?  Protein powder and shakes are truly unnecessary and have potential for harm if they contain animal protein. The protein available through plant-based nutrition is adequate to nourish profession champion athletes. [i.e: iron man, professional football, mixed martial arts, track & field, etc.]    • CALCIUM:  WHERE DO I GET MY CALCIUM?  Calcium supplements are unnecessary. There is more than adequate calcium in a plant-based diet of whole grains, legumes and grains, especially green leafy vegetables.    • VITAMINS:  WHAT VITAMINS SHOULD I TAKE?  A multivitamin is a reasonable way to be sure of obtaining the full spectrum of vitamins, but my preference, especially if one is eating copious amounts of green leafy vegetables, is to limit supplements to Vitamin D as necessary to maintain blood level in the normal range.    • OMEGA-3:  HOW DO I GET MY OMEGA-3?  Omega-3’s are essential fatty acids supplied in adequate amounts in people consuming plant-based nutrition with plenty of green, leafy vegetables.    • FLAX SEED OIL  FLAX SEED MEAL:  Flax seed meal is well tolerated and supplied a bonus of Omega-3 using one or two tablespoons in cereal daily. AVOID FLAX SEED OIL.    • FISH OIL:  SHOULD I TAKE FISH OIL?  Fish oil is not essential. Fish get their Omega-3 from plants. It is difficult to be deficient in Omega-3 if eating one to two tablespoons of green, leafy vegetables at several meals. There is research that suggests that those on plant based diet nutrition become highly efficient in in their own manufacture of Omega-3. Patients on fish oil are also at increased risk for bleeding.    • OLIVE OIL; CANOLA OIL; COCONUT OIL; SUNFLOWER OIL; SOYBEAN OIL; PEANUT OIL; PEANUT OIL; ANY OIL; WHICH OIL IS BEST?  Avoid oils. They injure  the endothelium, the innermost lining of the artery, which is an injury gateway to vascular disease.    • LOTIONS WITH OIL:  IS IT OKAY TO USE LOTIONS WITH OIL ON MY SKIN?  It is okay to use lotions with oil on your skin.        • SEEDS: [SUNFLOWER; PUMPKIN; SESAME]  ARE THEY OKAY TO EAT?  No handfuls. Seeds sprinkled on bread or crackers are okay.    • NUTS: I HEAR SO MANY DIFFERENT OPINIONS.  - For those with established heart disease taking additional and unnecessary saturated fats are inappropriate. No nuts for heart disease patients include peanuts and peanut butter, even though peanuts are officially a legume.  - For people with no heart disease who want to eat nuts and avocado, who are able to achieve a cholesterol of 150 and LDL of 80 or under without cholesterol lowering drugs, some nuts and avocado are acceptable.    • JUICING:  IS IT OKAY TO JUICE  IS IT OKAY TO DRINK FRUIT JUICE?  DO NOT JUICE. All fiber and its benefits will be lost.  Drinking fruit juice is similar to pouring a sugar bowl down your throat. Although, it is okay to eat whole fruit.    • COCONUT WATER:  IS IT OKAY TO DRINK COCONUT WATER?  Coconut water is 8% saturated fat. [If the fat is less than .5 per serving it does not have to be listed on the label.] and 50% sugar.    • EGG WHITES; FAT FREE MILK; YOGURT:  Egg whites, fat free milk and yogurt are ALL animal protein. Animal protein injures the lining of the arteries. DO NOT EAT.      • SOY PRODUCTS:  Soy products are 40% fat and most of them are highly processed. An excess of soy protein may stimulate insulin growth factor, which is a tumor promotor. Use cautiously.    • COFFEE:  IS IT OKAY TO DRINK COFFEE?  Avoid coffee with caffeine. Studies indicate it may contribute injury to the lining of the artery.    • SMOOTHIES:  IS IT OKAY TO DRINK SMOOTHIES?  Once the fiber is finely pureed its helpful properties are destroyed. The sugar is stripped from the fruit bypasses salivary  digestion and results in a surge of glucose and the accompanying fructose contributes to inflammation and hypertension. AVOID SMOOTHIES.    • SUGAR:  HOW MUCH SUGAR IS SAFE?  Newer information suggests that excess sugar is harmful, especially fructose, the most commonly ingested sugars, which can contribute to inflammation, hypertension, weight gain and diabetes.    • FAMILY HISTORY: GOOD VS. BAD - DOES IT MATTER?  Family history can shed light on whether one has increased risk, but multiple autopsy studies of the young confirm that all who are consuming the standard western diet have established early vascular disease. “Family history loads the gun, but lifestyle pulls the trigger.”      • LOSING WEIGHT:  WHAT CAN I DO TO STOP LOSING TOO MUCH WEIGHT?  EAT MORE CALORIES. Increase portion size; whole grains; beans; and snacks.    • NOT LOSING WEIGHT:  WHY HAVEN’T I LOST  WHY HAVE I STOPPED?  Eliminate flour products. [i.e: bread, pasta, bagels, etc.]  Reduce portion size. Increase exercise; green, leafy vegetables; whole grains. [i.e: rice, barley, farro, quinoa.]    • FATIGUE:  WHY DON’T I HAVE ENERGY SINCE EATING PLANT-BASED?  If you feel tired and lacking in energy, be sure you are eating enough calories and exercising; you need to use energy to make energy.    • ERECTILE DYSFUNCTION:  WILL ERECTILE DYSFUNCTION REVERSE WITH PLANT-BASED?  Those who are committed to the program have the best results. It varies between patient to patient how long it takes to reverse, but studies have been proven on multiple occasions.    • ATRIAL FIBRILLATION:  WILL A-FIB BE CURED WITH PLANT-BASED?  As atrial fibrillation is a heart rhythm abnormality and largely independent of nutrition, there are some cases which are presumably related to less than optimal heart circulation. It is inappropriate for me to suggest plant-based nutrition would cure atrial fibrillation, but many ancillary benefits would indicate plant-based nutrition  is the best value.    • COUMADIN:  CAN I EAT GREEN VEGETABLES WHEN I’M ON COUMADIN?  Coumadin [Warfarin] is an anti-clotting drug shown to have significant benefit in protecting people with atrial fibrillation form having a stroke. YES. Merely inform the physician who is monitoring the Coumadin and clotting time that you are regularly eating copious amounts of green, leafy vegetables. The physician will appropriately adjust the dose.    • PROSTATE CANCER  DOES PLANT-BASED NUTRITION BENEFIT PROSTATE CANCER?  In 1958’s confirming autopsies for 18 deaths in the entire country of [facility], plant-based nutrition is lessoned.    • DO CHOLESTEROL NUMERS MATTER?  Bad Cholesterol or LDL 85 or under is ideal, but it can vary if you eat plant-based. What you eat matters most.    • LDL:  WHERE SHOULD MY LDL BE?  LDL is the bad cholesterol. The closer it can be to 80-85, the better. However, if one is unable to take statin drugs and eating plant-based nutrition, the LDL won’t go lower than . It would appear that they will still be fine. The lesson we learned from the [place], who never have cardiovascular disease, is that the most key protective element is not so much the pure LDL number, but instead knowing that nothing is eating which is a building block of vascular disease or can injure the endothelium.          • HDL:  WHERE SHOULD MY HDL BE?  It is not uncommon for the HDL to fall when cholesterol falls. DO NOT BE ALARMED. The capacity of HDL to do its job has been shown recently by scientific research that there is no relationship between the capacity of the HDL molecule to function optimally and its blood level. Recent research has confirmed that the HDL molecule can be injured and weakened when one is ingesting a pro-inflammatory western diet and conversely it appears, despite a lower than normal level to be optimized by anti-inflammatory plant-based nutrition.    • CHOLESTEROL NUMBER FLUCTUATION:  WHY DO  MY CHOLESTEROL NUMBERS FLUCTUATE?  Fluctuation of cholesterol numbers is normal. Fluctuating in range would indicate you are unlikely to have cardiovascular problems.    • TRIGLYCERIDES:  WHY DO MY TRIGLYCERIDES ELEVATE?  If your triglycerides are high, decrease your intake of simple carbohydrates. [i.e: alcohol; wine; beer; white flour products; sugars including dried fruit; honey maple syrup; molasses; rich desserts; fruit juice or excess of fruit.]    • STATINS:  SHOULD I TAKE CHOLESTEROL MEDICATION?  Statins are not the reason that cultures, such as the [facility] and the [facility], do not have cardiovascular disease. Statins appear to have no benefit in the primary prevention, but are of some help in slowing the disease progression for those who already have an established diagnosis of cardiovascular disease. Some of our most profound successes in arresting and reversing disease were with patients who either refused or were incapable of taking statins.    • LIFESTYLE CHANGE:  WHERE CAN I FIND SOMEONE WHO PRACTICES IN MY AREA?  There are few, but hopefully there will be more. For those wanting guidance, Dr. Reed has developed a single-day intensive seminar at the [facility] once a month.      OIL FREE FOODS  • HUMMUS:  Whole Foods: Pierre Zero Fat Mediterranean Medley Hummus  • CRACKERS  CHIPS:  Whole Foods: 365 Brand Baked Woven Wheats  Whole Foods: Unsalted Baked Organic Corn Tortilla Chips  • BREAD:   Joes or Whole Foods: 100% Whole Wheat Josette Pockets   [place]: 100% Whole Wheat Sprouted Sliced Bread  • SALAD DRESSING:  [employer]: Shashank’s Martha onion Summer Tomato Dressing  [employer] or [employer]: [facility] Fat-Free Balsamic Vinaigrette or Fat-Free Honey Erasmo Dressing  Whole Foods: 365 Brand Organic Sundried Tomato Dressing  • PASTA  PIZZA SAUCES:  Whole Foods: 365 Brand Organic Pasta Sauce   Joes:  Robin’s Organic Spaghetti Sauce with  Mushrooms;  Robin’s Fat-Free Pizza Sauce; Prego Simple Smart    • FRENCH “FRIES”  INGREDIENTS:  - One whole russet potato [yellow potatoes]  - Seasonings of your choice  Cut the potato into slices or wedges. [FYI - wedges take longer to cook.] Arrange slices on a baking stone or cookie sheet with parchment paper. Sprinkle seasoning as desired. Bake at 425° for 15Min. Remove from oven, turn fries, sprinkle the same seasonings as desired. Place back in the oven for an additional 15-20Min.    • HERRON BURGERS  INGREDIENTS:  - 15OZ. can black beans [drained & rinsed]  - 1/3 cup of instant oats  - 2tbsp. ketchup  - 1tbsp. mustard  - 1tsp. onion powder  - 1tsp. garlic powder  Preheat oven to 400°. Use a baking stone or cookie sheet with parchment paper. Mix all the ingredients together; mash with fork or hands into patties. Place the patties on baking stone or cookie sheet for 7Min. on one side, flip, bake for an additional 7Min. This can be eaten with Oil-Free Whole Wheat Buns at [facility] [employer].    • NACHOS:  INGREDIENTS:  - Whole Foods Unsalted Baked Organic Corn Tortilla Chip  - Oil-Free Salsa [read the label]  - Toppings [brown rice, jalapenos, corn, tomatoes]  Arrange tortilla chips and top with toppings you desire.    • HUMMUS AND CRACKERS OR VEGETABLES  INGREDIENTS:  - Whole Foods [place] Zero Fat Mediterranean Medley Hummus  - Whole Foods 365 Brand Baked Woven Wheat Crackers  - Vegetables  Dip crackers or vegetables in hummus.    • TOAST  INGREDIENTS:  -  Joes 100% Whole Sprouted Wheat Bread  - Jam or Jelly  Toast bread and apply jam.    • PAPA POCKET SANDWICHES:  INGREDIENTS:  -  Joes Papa Bread 100% Whole Papa Pockets or  Joes 100% Whole Sprouted Wheat Bread  - Vegetables [i.e: spinach, tomatoes, onion, peppers]  - Salad Dressing [i.e: Agilys [facility] Brand in Fat-Free Honey Erasmo or Fat-Free Balsamic Vinaigrette]  Slice whole, round papa in half to create two pockets. Stuff with  vegetables you desire and pour salad dressing into pocket.    • PIZZA BITES  INGREDIENTS:  - Whole Foods 365 Brand Baked Woven Wheats  -  Joes:  Robin’s Fat-Free Pizza Sauce  - Nutritional yeast  - Vegetables  Preheat oven to 350°. Put wheat crackers on a baking stone or cookie sheet. Add a small amount of pizza sauce to each cracker. Top with a vegetable of your choice and sprinkle nutritional yeast. Bake until heated through.    • PASTA  INGREDIENTS:  - One box of 100% Whole Wheat Pasta [linguine or spaghetti]  - One jar of oil-free marinara sauce.  o Whole Foods 365 Brand Fat-Free Organic Sauce  o  Joes:  Robin’s organic spaghetti sauce with mushroom  - Vegetables [white onion, fresh mushrooms, fresh chopped garlic]  Sautee selected vegetables in a skillet using water or vegetable broth. Sautee until tender. Mix cooked pasta with pasta sauce and sautéed vegetables. [FYI - Nutritional Yeast can substitute as cheese.]    • PASTA SALAD  INGREDIENTS:  - One box 100% Whole Wheat Pasta  - One pepper [green, red, or yellow]  - One container of cherry tomatoes  - One bottle of [facility] Fat-Free Vinaigrette Salad Dressing  - ½ white onion  Cook pasta and drain. Cut cherry tomatoes in half. Slice peppers and onion thin. In a large bowl, mix cooked pasta, pepper, onion, tomatoes and one bottle of salad dressing. Add seasonings as desired. Keep refrigerated.        [facility] “FRIENDLY” RESTAURANTS  • [employer]  [address], [place] [address]  [phone]    • [employer]  [address] , [place] [address]  [phone]    • [employer]  [address], [place] [address]  [phone]    • [employer]  [address], [place] [address]  [phone]    • [employer]  [address], [place] [address]  [phone]    • [employer]  [address], [place] [address]  [phone]    • [facility]  [address]  [facility], [place] [address]    • [employer]  [address] [place] [id number]  [phone]

## 2021-04-13 ENCOUNTER — TELEPHONE (OUTPATIENT)
Dept: FAMILY MEDICINE CLINIC | Facility: CLINIC | Age: 31
End: 2021-04-13

## 2021-04-13 NOTE — TELEPHONE ENCOUNTER
Spoke with pt she said that she thought you were going to order something for her to check her for her hair loss.

## 2021-04-13 NOTE — TELEPHONE ENCOUNTER
Caller: LUCA WITH [facility]    Relationship:     Best call back number: [phone]    What form or medical record are you requesting: DISABILITY PAPERWORK     Who is requesting this form or medical record from you: JOB    How would you like to receive the form or medical records (pick-up, mail, fax): FAX # [phone]  Timeframe paperwork needed: ASAP    Additional notes: LUCA CALLED TO INQUIRE ABOUT PATIENT DISABILITY PAPERWORK BEING FAXED BACK TO THEM ASAP.    THANKS

## 2021-04-13 NOTE — TELEPHONE ENCOUNTER
PATIENT WAS IN LAST WEEK AND NEEDS TO GET BLOOD WORK DONE AT [facility] BUT THEY DO NOT HAVE THE ORDERS. PLEASE -[phone]    PLEASE ADVISE  [phone]

## 2021-04-14 NOTE — TELEPHONE ENCOUNTER
PATIENT CALLED BACK AND I ADVISED HER PER OFFICE WHEN I CALLED THAT PCP WILL BE FILLING OUT THIS AFTERNOON. SHE STATES SHE DOESN'T HAVE TO  IT CAN BE FAXED TO [facility] AT FAX# [phone].

## 2021-04-14 NOTE — TELEPHONE ENCOUNTER
I received paperwork either Friday or Monday and was out of office Monday. I will complete today.

## 2021-04-16 ENCOUNTER — BULK ORDERING (OUTPATIENT)
Dept: CASE MANAGEMENT | Facility: OTHER | Age: 31
End: 2021-04-16

## 2021-04-16 DIAGNOSIS — Z23 IMMUNIZATION DUE: ICD-10-CM

## 2021-04-22 ENCOUNTER — TELEPHONE (OUTPATIENT)
Dept: CARDIOLOGY | Facility: CLINIC | Age: 31
End: 2021-04-22

## 2021-04-22 NOTE — TELEPHONE ENCOUNTER
Called patient and relayed normal Holter monitor results.  Unfortunately despite multiple times I have not been able to reach the patient and left a message with her on her answering machine.  Would continue with atenolol at current dosing.  Would hold off on any further testing at this time.  See back as scheduled.

## 2021-04-23 ENCOUNTER — OFFICE VISIT (OUTPATIENT)
Dept: FAMILY MEDICINE CLINIC | Facility: CLINIC | Age: 31
End: 2021-04-23

## 2021-04-23 VITALS
WEIGHT: 262.2 LBS | TEMPERATURE: 97.8 F | DIASTOLIC BLOOD PRESSURE: 74 MMHG | BODY MASS INDEX: 39.74 KG/M2 | RESPIRATION RATE: 18 BRPM | HEIGHT: 68 IN | HEART RATE: 78 BPM | SYSTOLIC BLOOD PRESSURE: 118 MMHG | OXYGEN SATURATION: 100 %

## 2021-04-23 DIAGNOSIS — R74.8 ELEVATED LIVER ENZYMES: ICD-10-CM

## 2021-04-23 DIAGNOSIS — R53.83 PERSISTENT FATIGUE AFTER COVID-19: ICD-10-CM

## 2021-04-23 DIAGNOSIS — E55.9 VITAMIN D DEFICIENCY: ICD-10-CM

## 2021-04-23 DIAGNOSIS — R51.9 ACUTE NONINTRACTABLE HEADACHE, UNSPECIFIED HEADACHE TYPE: ICD-10-CM

## 2021-04-23 DIAGNOSIS — U09.9 PERSISTENT NEUROLOGIC SYMPTOMS AFTER COVID-19: ICD-10-CM

## 2021-04-23 DIAGNOSIS — M54.9 OTHER ACUTE BACK PAIN: ICD-10-CM

## 2021-04-23 DIAGNOSIS — U09.9 PERSISTENT DYSPNEA AFTER COVID-19: ICD-10-CM

## 2021-04-23 DIAGNOSIS — R00.0 TACHYCARDIA: ICD-10-CM

## 2021-04-23 DIAGNOSIS — R29.90 PERSISTENT NEUROLOGIC SYMPTOMS AFTER COVID-19: ICD-10-CM

## 2021-04-23 DIAGNOSIS — H04.132: ICD-10-CM

## 2021-04-23 DIAGNOSIS — U09.9 PERSISTENT FATIGUE AFTER COVID-19: ICD-10-CM

## 2021-04-23 DIAGNOSIS — I49.8 OTHER SPECIFIED CARDIAC ARRHYTHMIAS: ICD-10-CM

## 2021-04-23 DIAGNOSIS — E78.5 DYSLIPIDEMIA: ICD-10-CM

## 2021-04-23 DIAGNOSIS — M79.10 MYALGIA: ICD-10-CM

## 2021-04-23 DIAGNOSIS — R09.02 HYPOXIA: ICD-10-CM

## 2021-04-23 DIAGNOSIS — G43.709 CHRONIC MIGRAINE WITHOUT AURA WITHOUT STATUS MIGRAINOSUS, NOT INTRACTABLE: ICD-10-CM

## 2021-04-23 DIAGNOSIS — M79.671 RIGHT FOOT PAIN: ICD-10-CM

## 2021-04-23 DIAGNOSIS — R06.00 PERSISTENT DYSPNEA AFTER COVID-19: ICD-10-CM

## 2021-04-23 DIAGNOSIS — R41.89 BRAIN FOG: ICD-10-CM

## 2021-04-23 DIAGNOSIS — M54.2 NECK PAIN: ICD-10-CM

## 2021-04-23 DIAGNOSIS — H05.89 ORBITAL MASS: ICD-10-CM

## 2021-04-23 DIAGNOSIS — Z14.8 HEMOCHROMATOSIS CARRIER: ICD-10-CM

## 2021-04-23 DIAGNOSIS — U09.9 POST-COVID-19 SYNDROME: Primary | ICD-10-CM

## 2021-04-23 DIAGNOSIS — R06.83 SNORING: ICD-10-CM

## 2021-04-23 DIAGNOSIS — R79.82 ELEVATED C-REACTIVE PROTEIN (CRP): ICD-10-CM

## 2021-04-23 DIAGNOSIS — L65.9 HAIR LOSS: ICD-10-CM

## 2021-04-23 DIAGNOSIS — M25.50 MULTIPLE JOINT PAIN: ICD-10-CM

## 2021-04-23 PROCEDURE — 99214 OFFICE O/P EST MOD 30 MIN: CPT | Performed by: PHYSICIAN ASSISTANT

## 2021-04-26 ENCOUNTER — TELEPHONE (OUTPATIENT)
Dept: FAMILY MEDICINE CLINIC | Facility: CLINIC | Age: 31
End: 2021-04-26

## 2021-04-26 NOTE — TELEPHONE ENCOUNTER
Please let the patient know that her cardiologist would prefer she stay on atenolol since she is tolerating it well.  Allergist could consider changing medication in the future if needed, however, since she is doing well, I would continue atenolol instead.

## 2021-04-26 NOTE — TELEPHONE ENCOUNTER
Caller: Jocelyn Merlos    Relationship: Self    Best call back number: [phone]    What form or medical record are you requesting: RETURN TO WORK PAPER    Who is requesting this form or medical record from you: Sing [employer]ing [employer]    How would you like to receive the form or medical records (pick-up, mail, fax): FAX  If fax, what is the fax number: FAX NUMBER ON FORM    Timeframe paperwork needed: 04/26/21, TODAY    Additional notes: THE PATIENT CALLED AND SAID THAT HER RETURN TO WORK FORM WITH DOCTOR NOTES SHOULD HAVE BEEN FAXED OVER THIS MORNING, SHE NEEDS PHAM TO REVIEW THIS AND SEND IT BACK TODAY, 04/26/21 WITH WHAT RESTRICTIONS SHE WILL HAVE AT WORK    WOULD LIKE A CALLBACK WHEN THE FORM IS FAXED

## 2021-04-26 NOTE — TELEPHONE ENCOUNTER
----- Message from Immanuel Negrete MD sent at 4/26/2021  1:17 PM EDT -----  Regarding: RE: mutual patient medications  If she is doing reasonably well I would try and stick with the atenolol so not to make too many changes at once.  I find this to be a little bit more effective with heart rate issues over nadolol assuming that she is tolerating it well.  ----- Message -----  From: Mirta Bergeron PA  Sent: 4/23/2021   3:29 PM EDT  To: Immanuel Negrete MD  Subject: mutual patient medications                       Patient is in the office today. I was able to talk with her about Holter monitor results. She was seen by Dr Shelia Mc 4/19/2021. He asked that she stop atenolol and start nadolol. However, I knew you had just started the atenolol in place of propranolol. I wanted to check with you before she made any changes. Her headaches are ok on Topamax through neurology and she is tolerating atenolol ok. However, allergist wanted her to make the change. Please let me know what you recommend.

## 2021-04-29 LAB
25(OH)D3+25(OH)D2 SERPL-MCNC: 44.4 NG/ML (ref 30–100)
ALBUMIN SERPL-MCNC: 4.7 G/DL (ref 3.9–5)
ALBUMIN/GLOB SERPL: 2.1 {RATIO} (ref 1.2–2.2)
ALP SERPL-CCNC: 70 IU/L (ref 39–117)
ALT SERPL-CCNC: 66 IU/L (ref 0–32)
ANA SER QL: NEGATIVE
AST SERPL-CCNC: 43 IU/L (ref 0–40)
BASOPHILS # BLD AUTO: 0.1 X10E3/UL (ref 0–0.2)
BASOPHILS NFR BLD AUTO: 1 %
BILIRUB SERPL-MCNC: 0.2 MG/DL (ref 0–1.2)
BIOTIN SERPL-MCNC: 4.25 NG/ML (ref 0.05–0.83)
BUN SERPL-MCNC: 9 MG/DL (ref 6–20)
BUN/CREAT SERPL: 10 (ref 9–23)
CALCIUM SERPL-MCNC: 9.8 MG/DL (ref 8.7–10.2)
CCP IGA+IGG SERPL IA-ACNC: 4 UNITS (ref 0–19)
CHLORIDE SERPL-SCNC: 106 MMOL/L (ref 96–106)
CHOLEST SERPL-MCNC: 186 MG/DL (ref 100–199)
CO2 SERPL-SCNC: 23 MMOL/L (ref 20–29)
CREAT SERPL-MCNC: 0.86 MG/DL (ref 0.57–1)
CRP SERPL-MCNC: 8 MG/L (ref 0–10)
EOSINOPHIL # BLD AUTO: 0.2 X10E3/UL (ref 0–0.4)
EOSINOPHIL NFR BLD AUTO: 3 %
ERYTHROCYTE [DISTWIDTH] IN BLOOD BY AUTOMATED COUNT: 12.7 % (ref 11.7–15.4)
ERYTHROCYTE [SEDIMENTATION RATE] IN BLOOD BY WESTERGREN METHOD: 12 MM/HR (ref 0–32)
FERRITIN SERPL-MCNC: 103 NG/ML (ref 15–150)
FOLATE SERPL-MCNC: >20 NG/ML
GLOBULIN SER CALC-MCNC: 2.2 G/DL (ref 1.5–4.5)
GLUCOSE SERPL-MCNC: 89 MG/DL (ref 65–99)
HBA1C MFR BLD: 5.4 % (ref 4.8–5.6)
HCT VFR BLD AUTO: 43 % (ref 34–46.6)
HDLC SERPL-MCNC: 35 MG/DL
HGB BLD-MCNC: 14 G/DL (ref 11.1–15.9)
IMM GRANULOCYTES # BLD AUTO: 0 X10E3/UL (ref 0–0.1)
IMM GRANULOCYTES NFR BLD AUTO: 0 %
IRON SATN MFR SERPL: 27 % (ref 15–55)
IRON SERPL-MCNC: 83 UG/DL (ref 27–159)
LDLC SERPL CALC-MCNC: 113 MG/DL (ref 0–99)
LDLC/HDLC SERPL: 3.2 RATIO (ref 0–3.2)
LYMPHOCYTES # BLD AUTO: 2.9 X10E3/UL (ref 0.7–3.1)
LYMPHOCYTES NFR BLD AUTO: 37 %
MCH RBC QN AUTO: 30.4 PG (ref 26.6–33)
MCHC RBC AUTO-ENTMCNC: 32.6 G/DL (ref 31.5–35.7)
MCV RBC AUTO: 94 FL (ref 79–97)
MONOCYTES # BLD AUTO: 0.4 X10E3/UL (ref 0.1–0.9)
MONOCYTES NFR BLD AUTO: 5 %
NEUTROPHILS # BLD AUTO: 4.2 X10E3/UL (ref 1.4–7)
NEUTROPHILS NFR BLD AUTO: 54 %
PLATELET # BLD AUTO: 336 X10E3/UL (ref 150–450)
POTASSIUM SERPL-SCNC: 4.6 MMOL/L (ref 3.5–5.2)
PROT SERPL-MCNC: 6.9 G/DL (ref 6–8.5)
RBC # BLD AUTO: 4.6 X10E6/UL (ref 3.77–5.28)
RHEUMATOID FACT SERPL-ACNC: <10 IU/ML (ref 0–13.9)
SODIUM SERPL-SCNC: 143 MMOL/L (ref 134–144)
T3FREE SERPL-MCNC: 3.4 PG/ML (ref 2–4.4)
T4 FREE SERPL-MCNC: 1.19 NG/DL (ref 0.82–1.77)
TIBC SERPL-MCNC: 305 UG/DL (ref 250–450)
TRIGL SERPL-MCNC: 219 MG/DL (ref 0–149)
TSH SERPL DL<=0.005 MIU/L-ACNC: 2.05 UIU/ML (ref 0.45–4.5)
UIBC SERPL-MCNC: 222 UG/DL (ref 131–425)
URATE SERPL-MCNC: 6.3 MG/DL (ref 2.6–6.2)
VIT B1 BLD-SCNC: 164.6 NMOL/L (ref 66.5–200)
VIT B12 SERPL-MCNC: 993 PG/ML (ref 232–1245)
VIT B6 SERPL-MCNC: 17.7 UG/L (ref 2–32.8)
VLDLC SERPL CALC-MCNC: 38 MG/DL (ref 5–40)
WBC # BLD AUTO: 7.8 X10E3/UL (ref 3.4–10.8)

## 2021-04-30 ENCOUNTER — APPOINTMENT (OUTPATIENT)
Dept: SLEEP MEDICINE | Facility: HOSPITAL | Age: 31
End: 2021-04-30

## 2021-05-01 RX ORDER — LANOLIN ALCOHOL/MO/W.PET/CERES
2500 CREAM (GRAM) TOPICAL WEEKLY
COMMUNITY
End: 2022-10-26

## 2021-05-10 ENCOUNTER — OFFICE VISIT (OUTPATIENT)
Dept: SLEEP MEDICINE | Facility: HOSPITAL | Age: 31
End: 2021-05-10

## 2021-05-10 ENCOUNTER — OFFICE VISIT (OUTPATIENT)
Dept: CARDIOLOGY | Facility: CLINIC | Age: 31
End: 2021-05-10

## 2021-05-10 VITALS
WEIGHT: 261 LBS | DIASTOLIC BLOOD PRESSURE: 89 MMHG | OXYGEN SATURATION: 99 % | HEART RATE: 91 BPM | HEIGHT: 68 IN | SYSTOLIC BLOOD PRESSURE: 135 MMHG | BODY MASS INDEX: 39.56 KG/M2

## 2021-05-10 VITALS
BODY MASS INDEX: 40.01 KG/M2 | HEART RATE: 102 BPM | HEIGHT: 68 IN | RESPIRATION RATE: 16 BRPM | OXYGEN SATURATION: 99 % | WEIGHT: 264 LBS | SYSTOLIC BLOOD PRESSURE: 124 MMHG | DIASTOLIC BLOOD PRESSURE: 74 MMHG

## 2021-05-10 DIAGNOSIS — R00.2 PALPITATIONS: ICD-10-CM

## 2021-05-10 DIAGNOSIS — U09.9 PERSISTENT NEUROLOGIC SYMPTOMS AFTER COVID-19: ICD-10-CM

## 2021-05-10 DIAGNOSIS — R51.9 ACUTE NONINTRACTABLE HEADACHE, UNSPECIFIED HEADACHE TYPE: ICD-10-CM

## 2021-05-10 DIAGNOSIS — R29.90 PERSISTENT NEUROLOGIC SYMPTOMS AFTER COVID-19: ICD-10-CM

## 2021-05-10 DIAGNOSIS — U09.9 PERSISTENT FATIGUE AFTER COVID-19: ICD-10-CM

## 2021-05-10 DIAGNOSIS — I49.8 OTHER SPECIFIED CARDIAC ARRHYTHMIAS: ICD-10-CM

## 2021-05-10 DIAGNOSIS — R06.00 PERSISTENT DYSPNEA AFTER COVID-19: ICD-10-CM

## 2021-05-10 DIAGNOSIS — R41.89 BRAIN FOG: ICD-10-CM

## 2021-05-10 DIAGNOSIS — U09.9 PERSISTENT DYSPNEA AFTER COVID-19: ICD-10-CM

## 2021-05-10 DIAGNOSIS — R53.83 PERSISTENT FATIGUE AFTER COVID-19: ICD-10-CM

## 2021-05-10 DIAGNOSIS — G43.709 CHRONIC MIGRAINE WITHOUT AURA WITHOUT STATUS MIGRAINOSUS, NOT INTRACTABLE: ICD-10-CM

## 2021-05-10 DIAGNOSIS — I10 ESSENTIAL HYPERTENSION: Primary | ICD-10-CM

## 2021-05-10 PROCEDURE — G0463 HOSPITAL OUTPT CLINIC VISIT: HCPCS

## 2021-05-10 PROCEDURE — 99214 OFFICE O/P EST MOD 30 MIN: CPT | Performed by: INTERNAL MEDICINE

## 2021-05-10 PROCEDURE — 93000 ELECTROCARDIOGRAM COMPLETE: CPT | Performed by: INTERNAL MEDICINE

## 2021-05-10 NOTE — PROGRESS NOTES
CONSULT NOTE    Patient Identification:  Jocelyn Merlos  30 y.o.  female  1990  [id number]            Requesting physician: Dr. Bergeron    Reason for Consultation: Snoring with worsening migraine headaches    CC:     History of Present Illness:  Very pleasant 30-year-old females she tells me that she had Covid in December and post Covid she developed severe migraines.  She is currently being evaluated by her neurologist and she also reports some snoring that has developed post Covid.  She tells me she generally feels rested in the morning and denies any excessive daytime sleepiness as the day progresses no alcohol abuse.  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  No significant weight gain as such was reported.  She reports morning headaches and migraine headaches worsening.  Sleep schedule was not recorded.        Review of Systems  Completely -12 point documented on sleep questionnaire  Past Medical History:  Past Medical History:   Diagnosis Date   • Abnormal Pap smear of cervix    • Adjustment disorder with mixed anxiety and depressed mood 2016   • Palpitations        Past Surgical History:  Past Surgical History:   Procedure Laterality Date   • BREAST AUGMENTATION     • CERVICAL BIOPSY  W/ LOOP ELECTRODE EXCISION     • LAPAROSCOPIC GASTRIC BANDING     • LEEP     • TONSILLECTOMY          Home Meds:  (Not in a hospital admission)      Allergies:  Allergies   Allergen Reactions   • No Known Drug Allergy        Social History:   Social History     Socioeconomic History   • Marital status:      Spouse name: Not on file   • Number of children: Not on file   • Years of education: Not on file   • Highest education level: Not on file   Tobacco Use   • Smoking status: Former Smoker     Packs/day: 0.25     Years: 0.50     Pack years: 0.12     Types: Cigarettes     Quit date: 2015     Years since quittin.1   • Smokeless tobacco: Never Used   Substance and Sexual Activity  "  • Alcohol use: Yes     Alcohol/week: 4.0 - 5.0 standard drinks     Types: 4 - 5 Standard drinks or equivalent per week     Comment: Socially./caffeine use    • Drug use: No   • Sexual activity: Yes     Partners: Male       Family History:  Family History   Problem Relation Age of Onset   • Skin cancer Mother    • Depression Mother    • Anxiety disorder Mother    • Stroke Mother    • Aneurysm Mother    • Alcohol abuse Mother    • Migraines Mother    • Stroke Maternal Grandmother        Physical Exam:  /89   Pulse 91   Ht 172.7 cm (68\")   Wt 118 kg (261 lb)   SpO2 99%   BMI 39.68 kg/m²  Body mass index is 39.68 kg/m². 99% 118 kg (261 lb)  Physical Exam  Patient is examined using the personal protective equipment as per guidelines from infection control for this particular patient as enacted.  Hand hygiene was performed before and after patient interaction.  Well-developed normal body habitus  Eyes normal conjunctivae and pupils reactive to light  ENT Mallampati between 3 and 4 normal nasal exam  Neck midline trachea no thyromegaly  Chest no labored breathing clear  CVS regular rate and rhythm no lower extremity edema  Abdomen soft nontender hepatosplenomegaly  CNS intact normal sensory exam  Skin no rashes no nodules  Psych oriented to time place and person normal memory  Musculoskeletal no cyanosis no clubbing normal range of motion        LABS:  Lab Results   Component Value Date    CALCIUM 9.8 04/19/2021       Lab Results   Component Value Date    CKTOTAL 26 (L) 01/16/2021    TROPONINI <0.012 01/14/2021                                 Lab Results   Component Value Date    TSH 2.050 04/19/2021     Estimated Creatinine Clearance: 129.1 mL/min (by C-G formula based on SCr of 0.86 mg/dL).         Imaging: I personally visualized the images of scans/x-rays performed within last 3 days.      Assessment:  Hypersomnia  Shift worker  Migraine headaches worse  Obesity        Recommendations:  At this point we " have very young female with worsening migraine headaches she is a third shift worker with significant snoring.  Suspect sleep apnea as a possible etiology.  Discussed pathophysiology consequences of untreated sleep apnea.  Patient agreeable wishing to proceed for home sleep study.  Sleep hygiene measures discussed in detail  Sleep hygiene measures related to shift worker including use of melatonin possibly during the daytime sleep was also discussed  Weight loss encouraged  Avoidance of alcohol at bedtime  We will see her back after home sleep study to make further recommendations            Tea Pantoja MD  5/10/2021  12:03 EDT      Much of this encounter note is an electronic transcription/translation of spoken language to printed text using Dragon Software.

## 2021-05-10 NOTE — PROGRESS NOTES
[facility] Follow Up Office Note     Encounter Date:05/10/21  Patient:Jocelyn Merlos  :1990  MRN:[id number]      Chief Complaint: Chest pain and tachycardia  Chief Complaint   Patient presents with   • Palpitations     History of Presenting Illness:      Ms. Merlos is a 30 y.o. with past medical history notable for carrier for essential hypertension, palpitations, and hemochromatosis who presents her office for scheduled follow-up appointment.  She was seen approximately 1 month ago due to worsening symptoms of palpitations and headaches and chest pain.  At that time we did make some adjustments with her medical regimen.  She was previously on propanolol for headaches but was not getting much relief.  We did change her over to atenolol to help improve some of her palpitations and blood pressure control.  Fortunately with these changes significantly helped out both her heart rate and blood pressure.  Overall she feels great.  She was also seen by our neurology colleagues and started on Topamax and has not had any further headaches.  Overall she is feeling excellent.  She is having no further episodes of chest discomfort.  In general she is doing great.  She states that she was starting to feel better after recess she received her first COVID-19 vaccination which I have seen improve people's symptoms who are long-haul Covid patients.    Review of Systems:  Review of Systems   Constitutional: Negative.   HENT: Negative.    Eyes: Negative.    Cardiovascular: Positive for chest pain.   Respiratory: Negative.    Endocrine: Negative.    Hematologic/Lymphatic: Negative.    Skin: Negative.    Musculoskeletal: Negative.    Gastrointestinal: Negative.    Genitourinary: Negative.    Neurological: Negative.    Psychiatric/Behavioral: Negative.    Allergic/Immunologic: Negative.        Current Outpatient Medications on File Prior to Visit   Medication Sig Dispense Refill   • atenolol (TENORMIN) 25 MG tablet Take 1  tablet by mouth Daily. 90 tablet 3   • Biotin 300 MCG tablet Take 2,500 mcg by mouth Daily.     • metFORMIN ER (GLUCOPHAGE-XR) 750 MG 24 hr tablet Take 750 mg by mouth 2 (two) times a day.     • Omega-3 Fatty Acids (fish oil) 1000 MG capsule capsule Take  by mouth Daily With Breakfast.     • prenatal vitamin (prenatal, CLASSIC, vitamin) tablet Take  by mouth Daily.     • topiramate (TOPAMAX) 25 MG tablet Take 1 tablet by mouth 2 (Two) Times a Day. 60 tablet 3   • vitamin C (ASCORBIC ACID) 250 MG tablet Take 250 mg by mouth Daily.     • vitamin D (ERGOCALCIFEROL) 1.25 MG (87633 UT) capsule capsule Take 1,000 Units by mouth.     • vitamin E 100 UNIT capsule Take 100 Units by mouth Daily.     • [DISCONTINUED] SUMAtriptan (Imitrex) 50 MG tablet Take one tablet at onset of headache. May repeat dose one time in 2 hours if headache not relieved. 12 tablet 1     No current facility-administered medications on file prior to visit.        Allergies   Allergen Reactions   • No Known Drug Allergy        Past Medical History:   Diagnosis Date   • Abnormal Pap smear of cervix    • Adjustment disorder with mixed anxiety and depressed mood 2016   • Palpitations        Past Surgical History:   Procedure Laterality Date   • BREAST AUGMENTATION     • CERVICAL BIOPSY  W/ LOOP ELECTRODE EXCISION     • LAPAROSCOPIC GASTRIC BANDING     • LEEP     • TONSILLECTOMY         Social History     Socioeconomic History   • Marital status:      Spouse name: Not on file   • Number of children: Not on file   • Years of education: Not on file   • Highest education level: Not on file   Tobacco Use   • Smoking status: Former Smoker     Packs/day: 0.25     Years: 0.50     Pack years: 0.12     Types: Cigarettes     Quit date: 2015     Years since quittin.1   • Smokeless tobacco: Never Used   Substance and Sexual Activity   • Alcohol use: Yes     Alcohol/week: 4.0 - 5.0 standard drinks     Types: 4 - 5 Standard drinks or equivalent per  "week     Comment: Socially./caffeine use    • Drug use: No   • Sexual activity: Yes     Partners: Male       Family History   Problem Relation Age of Onset   • Skin cancer Mother    • Depression Mother    • Anxiety disorder Mother    • Stroke Mother    • Aneurysm Mother    • Alcohol abuse Mother    • Migraines Mother    • Stroke Maternal Grandmother        The following portions of the patient's history were reviewed and updated as appropriate: allergies, current medications, past family history, past medical history, past social history, past surgical history and problem list.       Objective:       Vitals:    05/10/21 1519   BP: 124/74   BP Location: Right arm   Patient Position: Sitting   Cuff Size: Adult   Pulse: 102   Resp: 16   SpO2: 99%   Weight: 120 kg (264 lb)   Height: 172.7 cm (67.99\")     Body mass index is 40.15 kg/m².     Physical Exam:  Constitutional: Well appearing, well developed, no acute distress   HENT: Oropharynx clear and membrane moist  Eyes: Normal conjunctiva, no sclera icterus.  Neck: Supple, no carotid bruit bilaterally.  Cardiovascular: Regular rate and rhythm, No Murmur, No bilateral lower extremity edema.  Pulmonary: Normal respiratory effort, normal lung sounds, no wheezing.  Abdominal: Soft, nontender, no hepatosplenomegaly, liver is non-pulsatile.  Neurological: Alert and orient x 3.   Skin: Warm, dry, no ecchymosis, no rash.  Psych: Appropriate mood and affect. Normal judgment and insight.      Lab Results   Component Value Date    BUN 9 04/19/2021    CREATININE 0.86 04/19/2021    EGFRIFNONA 91 04/19/2021    EGFRIFAFRI 105 04/19/2021    BCR 10 04/19/2021    K 4.6 04/19/2021    CO2 23 04/19/2021    CALCIUM 9.8 04/19/2021    PROTENTOTREF 6.9 04/19/2021    ALBUMIN 4.7 04/19/2021    LABIL2 2.1 04/19/2021    AST 43 (H) 04/19/2021    ALT 66 (H) 04/19/2021       Lab Results   Component Value Date    WBC 7.8 04/19/2021    HGB 14.0 04/19/2021    HCT 43.0 04/19/2021    MCV 94 04/19/2021    "  04/19/2021       Lab Results   Component Value Date    CKTOTAL 26 (L) 01/16/2021    TROPONINI <0.012 01/14/2021       Lab Results   Component Value Date    CHLPL 186 04/19/2021    CHLPL 183 10/05/2020    CHLPL 187 11/20/2018     Lab Results   Component Value Date    TRIG 219 (H) 04/19/2021    TRIG 148 10/05/2020    TRIG 124 11/20/2018     Lab Results   Component Value Date    HDL 35 (L) 04/19/2021    HDL 32 (L) 10/05/2020    HDL 37 (L) 11/20/2018     Lab Results   Component Value Date     (H) 04/19/2021     (H) 10/05/2020     (H) 11/20/2018       Lab Results   Component Value Date    TSH 2.050 04/19/2021         ECG 12 Lead    Date/Time: 5/10/2021 3:37 PM  Performed by: Immanuel Negrete MD  Authorized by: Immanuel Negrete MD   Comparison: compared with previous ECG from 3/25/2021  Similar to previous ECG  Rhythm: sinus tachycardia  Other findings: non-specific ST-T wave changes and prolonged QTc interval    Clinical impression: non-specific ECG         Holter monitor 4/7/2021 with tracings reviewed by myself:  · A normal monitor study.  · Underlying heart rhythm was sinus rhythm with an average heart rate of 85 bpm and a range of 62 bpm up to 137 bpm.  · No significant arrhythmias noted during study.  · No diary submitted.    Echocardiogram 3/29/2021 with images reviewed by myself:  · The quality of the study is limited due to breast implants.  · Calculated left ventricular EF = 60%  · Normal left ventricular cavity size and wall thickness noted.  · Normal right ventricular cavity size and systolic function noted.  · The left atrial cavity is borderline dilated.  · Calculated right ventricular systolic pressure from tricuspid regurgitation is 20 mmHg.  · There is no evidence of pericardial effusion.        Assessment:          Diagnosis Plan   1. Essential hypertension  ECG 12 Lead   2. Palpitations  ECG 12 Lead          Plan:       Ms. Merlos is a 30 y.o.  with past medical history  notable for essential hypertension, palpitations, and carrier for hemochromatosis who presents to our office for scheduled follow-up.  She is feeling much better.  I would hold off on doing a stress test at this time since symptomatically doing better with better blood pressure and heart rate control.  I would like to continue her current medical regimen for the next couple of months and then reassess how she is doing.  Potentially we could try and wean her atenolol but in general her blood pressure and heart rate are much better controlled on this would like to try and keep going for some time.  She might need medicines for blood pressure.  Given that she is interested in having a child we might try and stick with beta-blocker therapy and could even transition over to labetalol if she were to get pregnant as guided from our OB/GYN colleagues.    Chest pain:  · Atypical would hold off on stress testing at this time given symptoms improved with controlling blood pressure and heart rate    Palpitations:  · Normal Holter monitor 4/2021  · Normal echocardiogram 3/2021  · Thyroid function 2/2021 demonstrates normal TSH  · BMP 1/2021 demonstrates normal sodium and potassium  · CBC 1/2021 demonstrates normal hemoglobin hematocrit  · Continue atenolol    Hypertension:  · Continue atenolol    Follow-up:  3 Months      Thank you for allowing me to participate in the care of Jocelyn Merlos. Feel free to contact me directly with any further questions or concerns.    Immanuel Negrete MD  [facility]  05/10/21  15:38 EDT

## 2021-05-12 ENCOUNTER — TELEPHONE (OUTPATIENT)
Dept: FAMILY MEDICINE CLINIC | Facility: CLINIC | Age: 31
End: 2021-05-12

## 2021-05-12 NOTE — TELEPHONE ENCOUNTER
PATIENT CALLED AND STATED SHE ATTEMPTED TO RETURN TO WORK YESTERDAY, AND DOES NOT FEEL COMFORTABLE YET. PATIENT STATES HER AND HER SUPERVISORS FEEL AS SHE NEEDS TO BE OFF FOR ONE MORE WEEK. PATIENT WILL DROP OFF EXTENDED MEDICAL LEAVE PAPERWORK TO BE FILLED OUT     CALL BACK   [phone]

## 2021-05-13 ENCOUNTER — TELEPHONE (OUTPATIENT)
Dept: FAMILY MEDICINE CLINIC | Facility: CLINIC | Age: 31
End: 2021-05-13

## 2021-05-13 NOTE — TELEPHONE ENCOUNTER
Caller: MAEVE    Relationship: EMPLOYEE AT [employer]    Best call back number:     What is the best time to reach you: ANYTIME    Who are you requesting to speak with (clinical staff, provider,  specific staff member):     Do you know the name of the person who called:     What was the call regarding: MAEVE IS CALLING IN STATING THAT THE PATIENT WAS ON DISABILITY BUT WAS CLEARED NOW THE PATIENT HAS CONTACTED HER STATING THAT SHE NEEDS TO BE BACK ON DISABILITY AND MAEVE WANTS TO KNOW WHY.     Do you require a callback:

## 2021-05-14 ENCOUNTER — OFFICE VISIT (OUTPATIENT)
Dept: FAMILY MEDICINE CLINIC | Facility: CLINIC | Age: 31
End: 2021-05-14

## 2021-05-14 VITALS
OXYGEN SATURATION: 99 % | RESPIRATION RATE: 14 BRPM | TEMPERATURE: 98.4 F | SYSTOLIC BLOOD PRESSURE: 126 MMHG | DIASTOLIC BLOOD PRESSURE: 80 MMHG | WEIGHT: 264 LBS | HEIGHT: 68 IN | HEART RATE: 86 BPM | BODY MASS INDEX: 40.01 KG/M2

## 2021-05-14 DIAGNOSIS — F33.42 MAJOR DEPRESSIVE DISORDER, RECURRENT, IN FULL REMISSION (HCC): ICD-10-CM

## 2021-05-14 DIAGNOSIS — U09.9 POST-COVID-19 SYNDROME: Primary | ICD-10-CM

## 2021-05-14 DIAGNOSIS — F43.22 ADJUSTMENT DISORDER WITH ANXIOUS MOOD: ICD-10-CM

## 2021-05-14 DIAGNOSIS — F41.9 ANXIETY: ICD-10-CM

## 2021-05-14 PROCEDURE — 99214 OFFICE O/P EST MOD 30 MIN: CPT | Performed by: PHYSICIAN ASSISTANT

## 2021-05-14 RX ORDER — ESCITALOPRAM OXALATE 5 MG/1
5 TABLET ORAL DAILY
Qty: 30 TABLET | Refills: 0 | Status: SHIPPED | OUTPATIENT
Start: 2021-05-14 | End: 2021-06-04 | Stop reason: SDUPTHER

## 2021-05-24 ENCOUNTER — OFFICE VISIT (OUTPATIENT)
Dept: NEUROLOGY | Facility: CLINIC | Age: 31
End: 2021-05-24

## 2021-05-24 ENCOUNTER — TELEPHONE (OUTPATIENT)
Dept: FAMILY MEDICINE CLINIC | Facility: CLINIC | Age: 31
End: 2021-05-24

## 2021-05-24 VITALS
WEIGHT: 271 LBS | BODY MASS INDEX: 41.07 KG/M2 | HEART RATE: 72 BPM | SYSTOLIC BLOOD PRESSURE: 102 MMHG | OXYGEN SATURATION: 98 % | DIASTOLIC BLOOD PRESSURE: 74 MMHG | HEIGHT: 68 IN

## 2021-05-24 DIAGNOSIS — G43.701 CHRONIC MIGRAINE WITHOUT AURA WITH STATUS MIGRAINOSUS, NOT INTRACTABLE: Primary | ICD-10-CM

## 2021-05-24 PROCEDURE — 99214 OFFICE O/P EST MOD 30 MIN: CPT | Performed by: PSYCHIATRY & NEUROLOGY

## 2021-05-24 RX ORDER — TOPIRAMATE 25 MG/1
25 TABLET ORAL 2 TIMES DAILY
Qty: 60 TABLET | Refills: 3 | Status: SHIPPED | OUTPATIENT
Start: 2021-05-24 | End: 2021-06-24

## 2021-05-24 NOTE — PROGRESS NOTES
Chief Complaint  Migraine (LV 3/30/2021- Started on Topamax and Imitrex, is doing well.)    Subjective          Jocelyn Merlos presents to [facility] for   HISTORY OF PRESENT ILLNESS:    Jocelyn Merlos is a 30 year old right handed woman who returns to neurology clinic for follow up evaluation and treatment of headaches.  She reports testing positive for COVID19 in 12/28/2021 and she had headaches as a symptom but started getting more migraines around 2/22/2021.  She does report history of migraines when she was 12 years old.  The headaches are located above her right eye and right side of her head with throbbing quality which she rates as 10/10 on pain scale 1-10 when most severe with associated light and sound sensitivity along with nausea without vomiting.  She does report some tearing of her right eye most recently with the headaches.  Headaches last 30 minutes to 19 hours in duration.  She does report snoring and is scheduled to have a sleep study done on 6/4/2021.  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  She had the 10/10 headaches 3 times since February and she was having daily migraines which have improved significantly since her initial visit on combination of topiramate for migraine prevention and Imitrex for acute treatment which she tells me she has not had to take since starting the topiramate.  There is family history of migraines in her mother and mother's parents.  She has tried propranolol and atenolol.  She has tried Tylenol and Excedrin which she thinks dulls the headaches.  She has tried Fioricet which she did not think was very helpful.   She denies history of kidney stones.  She is doing overall very well with her migraines.      Past Medical History:   Diagnosis Date   •  "Abnormal Pap smear of cervix    • Adjustment disorder with mixed anxiety and depressed mood 2016   • Palpitations         Family History   Problem Relation Age of Onset   • Skin cancer Mother    • Depression Mother    • Anxiety disorder Mother    • Stroke Mother    • Aneurysm Mother    • Alcohol abuse Mother    • Migraines Mother    • Stroke Maternal Grandmother         Social History     Socioeconomic History   • Marital status:      Spouse name: Not on file   • Number of children: Not on file   • Years of education: Not on file   • Highest education level: Not on file   Tobacco Use   • Smoking status: Former Smoker     Packs/day: 0.25     Years: 0.50     Pack years: 0.12     Types: Cigarettes     Quit date: 2015     Years since quittin.1   • Smokeless tobacco: Never Used   Substance and Sexual Activity   • Alcohol use: Yes     Alcohol/week: 4.0 - 5.0 standard drinks     Types: 4 - 5 Standard drinks or equivalent per week     Comment: Socially./caffeine use    • Drug use: No   • Sexual activity: Yes     Partners: Male        I have personally reviewed the ROS as stated below.     Review of Systems   Eyes: Negative for blurred vision and double vision.   Respiratory: Negative for chest tightness and shortness of breath.    Cardiovascular: Negative for chest pain.   Gastrointestinal: Negative for nausea and vomiting.   Allergic/Immunologic: Negative for environmental allergies and food allergies.   Neurological: Positive for headache.   Psychiatric/Behavioral: Negative for agitation, behavioral problems and decreased concentration.        Objective   Vital Signs:   /74   Pulse 72   Ht 172.7 cm (68\")   Wt 123 kg (271 lb)   SpO2 98%   BMI 41.21 kg/m²       PHYSICAL EXAM:    General   Mental Status - Alert. General Appearance - Over weight, Well groomed, Oriented and Cooperative. Orientation - Oriented X3.                            Head and Neck  Head - normocephalic, atraumatic with no " lesions or palpable masses.  Neck                 Global Assessment - supple.                                         Eye   Sclera/Conjunctiva - Bilateral - Normal.     ENMT  Mouth and Throat   Oral Cavity/Oropharynx: Oropharynx - the soft palate,uvula and tongue are normal in appearance.     Chest and Lung Exam   Chest - lung clear to auscultation bilaterally.     Cardiovascular   Cardiovascular examination reveals  - normal heart sounds, regular rate and rhythm.     Neurologic   Mental Status: Speech - Normal. Cognitive function - appropriate fund of knowledge. No impairment of attention, Impairment of concentration, impairment of long term memory or impairment of short term memory.  Cranial Nerves:   II Optic: Visual acuity - Left - Normal. Right - Normal. Visual fields - Normal (to confrontation).  III Oculomotor: Pupillary constriction - Left - Normal. Right - Normal.  VII Facial: - Normal Bilaterally.  VIII Acoustic - Bilateral - Hearing normal and (Hearing tested by finger rub).   IX Glossopharyngeal / X Vagus - Normal.  XI Accessory: Trapezius - Bilateral - Normal. Sternocleidomastoid - Bilateral - Normal.  XII Hypoglossal - Bilateral - Normal.  Eye Movements: - Normal Bilaterally.  Sensory:   Light Touch: Intact - Globally.  Motor:   Bulk and Contour: - Normal.  Tone: - Normal.  Tremor: Not present.  Strength: 5/5 normal muscle strength - All Muscles.                                                        General Assessment of Reflexes: - deep tendon reflexes are normal. Coordination - No Impairment of finger-to-nose or Impairment of rapid alternating movements. Gait - Normal.       Result Review :                 Assessment and Plan    Problem List Items Addressed This Visit        Neuro    Headache, migraine - Primary    Current Assessment & Plan     30 year old woman with chronic migraine headaches which have responded very well to treatment with topiramate.  She feels that her headaches became more  frequent and severe following COVID19 infection.  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 She has tried beta blockers for her migraines which have not been effective.  I will continue her on topiramate for migraine prevention after discussing potential side effects including risk for birth defects if she were to get pregnant on this medicine.  She is not taking the sumatriptan currently.  Discussed potential side effects.  Also discussed migraine triggers and lifestyle modifications on her visit today and provided patient education information on these.  She is also scheduled to be evaluated for EVERETT with a sleep study in 6/2021.  I'll see her back in 3 months to reevaluate.             Relevant Medications    topiramate (TOPAMAX) 25 MG tablet          I spent time caring for Jocelyn on this date of service. This time includes time spent by me in the following activities:preparing for the visit, reviewing tests, obtaining and/or reviewing a separately obtained history, performing a medically appropriate examination and/or evaluation , counseling and educating the patient/family/caregiver, ordering medications, tests, or procedures, documenting information in the medical record, independently interpreting results and communicating that information with the patient/family/caregiver and care coordination    Follow Up   Return in about 3 months (around 8/24/2021).  Patient was given instructions and counseling regarding her condition or for health maintenance advice. Please see specific information pulled into the AVS if appropriate.      no abnormalities noted

## 2021-05-24 NOTE — ASSESSMENT & PLAN NOTE
30 year old woman with chronic migraine headaches which have responded very well to treatment with topiramate.  She feels that her headaches became more frequent and severe following COVID19 infection.  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 She has tried beta blockers for her migraines which have not been effective.  I will continue her on topiramate for migraine prevention after discussing potential side effects including risk for birth defects if she were to get pregnant on this medicine.  She is not taking the sumatriptan currently.  Discussed potential side effects.  Also discussed migraine triggers and lifestyle modifications on her visit today and provided patient education information on these.  She is also scheduled to be evaluated for EVERETT with a sleep study in 6/2021.  I'll see her back in 3 months to reevaluate.

## 2021-05-24 NOTE — TELEPHONE ENCOUNTER
UNABLE TO WARM TRANSFER/NO NURSE AVAILABLE    Caller: [facility] [employer]    Relationship to patient: Other    Best call back number: [phone]    Patient is needing: THEY ARE NEEDING TO FOLLOW UP ON THE DISABILITY CLAIM.    ALSO NEEDING FAX OF LAST OFFICE NOTE AND WORK NOTE     [phone]

## 2021-06-01 ENCOUNTER — OFFICE VISIT (OUTPATIENT)
Dept: FAMILY MEDICINE CLINIC | Facility: CLINIC | Age: 31
End: 2021-06-01

## 2021-06-01 VITALS
SYSTOLIC BLOOD PRESSURE: 106 MMHG | BODY MASS INDEX: 41.07 KG/M2 | HEART RATE: 70 BPM | RESPIRATION RATE: 18 BRPM | DIASTOLIC BLOOD PRESSURE: 74 MMHG | WEIGHT: 271 LBS | OXYGEN SATURATION: 98 % | TEMPERATURE: 98.2 F | HEIGHT: 68 IN

## 2021-06-01 DIAGNOSIS — F33.42 MAJOR DEPRESSIVE DISORDER, RECURRENT, IN FULL REMISSION (HCC): ICD-10-CM

## 2021-06-01 DIAGNOSIS — U09.9 POST-COVID-19 SYNDROME: ICD-10-CM

## 2021-06-01 DIAGNOSIS — F41.9 ANXIETY: ICD-10-CM

## 2021-06-01 DIAGNOSIS — F43.22 ADJUSTMENT DISORDER WITH ANXIOUS MOOD: ICD-10-CM

## 2021-06-01 PROCEDURE — 99214 OFFICE O/P EST MOD 30 MIN: CPT | Performed by: PHYSICIAN ASSISTANT

## 2021-06-01 NOTE — PROGRESS NOTES
Subjective   Jocelyn Merlos is a 30 y.o. female who presents today in follow up of post-Covid syndrome with hypoxia, SOA, tachycardia, fatigue, cognitive deficits, anxiety/panic attacks, headaches, specialists/therapy, and return to work.    History of Present Illness     Watched [employer]- not eating meat and changed to almond milk. She has not stopped cheese/dairy completely yet. Feels she can do it. She is not completely plant based but is her goal.     Covid 19 vaccine (Pfizer)- got initial vaccine 4/12/2021. She was tired that day then back to baseline.     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  Patient has had significant symptoms following Covid 19 infection. She developed symptoms of Covid 19 12/24/2020 and tested positive 12/28/2020. She had hypoxia and tachycardia with ambulation, SOA, chest tightness, weakness, fatigue, brain fog, diarrhea, and nausea with resolution of severe rash. She went to ER with oxygen saturation of 88%. CTA chest with mild pneumonia and negative for PE. Inflammatory markers were not performed. They did not evaluate symptoms with ambulation and resting vital signs were ok.  While on video visit, patient had oxygen saturation of 97% and pulse in 90s then with relatively little ambulation/ exertion, she had pulse 127 and oxygen saturation down to 88%. In office, she had a normal resting pulse that increased to 124 with ambulation and oxygen saturation decreased from 95% to 90% with ambulation.     She has persistent tachycardia and hypoxia with walking short distances (that is slowly improving), elevated BP that is improving, cognitive impairment, fatigue, headaches, and feeling overall poorly. She was seen by the long term Covid infectious disease clinic, had additional labs, was referred to physical therapy, speech, and cognitive therapy, and was advised to contact her  cardiologist for follow up with persistent tachycardia with ambulation. She is undergoing PT but has not received an appointment for speech/cognitive therapy. She called cardiology to schedule testing ordered at appt prior to Covid 19 then scheduled follow up evaluation of post-Covid tachycardia/ arrhythmia as directed. He changed her propranolol to atenolol for tachycardia and is awaiting Holter monitor and echocardiogram. She is tolerating medication well.     I referred for MRI brain and to neurology for severe headaches. MRI brain and neurology appt scheduled for 3/30/2021, however, she has had severe, debilitating headaches. She had to go to ER but had no testing performed there. Excedrin Migraine, Fiorcet, Zofran, and narcotic pain medication have not helped headaches. I discussed beta blocker, Elavil, and Topamax at her last visit, however, she was concerned about possible AE with medication, as fatigue, arrhythmia, and cognitive dysfunction are already some of her biggest issues. She was unable to tolerate her headaches and felt the possible AE with medication were worth the risk for improvement in headaches. She tried Inderal LA 60 mg nightly without improvement and was started on Topamax by neurology. She has had significant AE with medication. I had her decrease to 25 mg at bedtime then she was able to increase again to BID and is now tolerating better. She has had return to post-covid baseline.     I contacted infectious disease provider for recommendations for return to work vs work restrictions. She recommends either waiting to return to work until she has some improvement with therapy or may try light duty if available to see if she will tolerate this until she has improvement. 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    She returned to work and noted multiple cognitive issues. She reports they had her work 2 hours then sent her home with no work available. 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 She feels she needs more cognitive therapy to safely return to work. I will have her remain off work until her follow up with long term Covid clinic. She has been advised to remain in contact with her employer to ensure her job is secure and leave is approved.     Hypoxia- improving- some days are worse than others but not below 90%. Not feeling as bad with SOA but still notices some.   lowest has been about 90s% and maybe 88% if pushing it. Improving gradually  · She had hypoxia and tachycardia with ambulation, SOA, chest tightness, weakness, fatigue, brain fog, diarrhea, and nausea with resolution of severe rash.   · She went to ER with oxygen saturation of 88%. CTA chest with mild pneumonia and negative for PE. Inflammatory markers were not performed. They did not evaluate symptoms with ambulation and resting vital signs were ok.    · While on video visit, patient had oxygen saturation of 97% and pulse in 90s then with relatively little ambulation/ exertion, she had pulse 127 and oxygen saturation down to 88%. In office, she had a normal resting pulse that increased to 124 with ambulation and oxygen saturation decreased from 95% to 90% with ambulation.   Tachycardia/ elevated BP- has had more  "controlled pulse on atenolol- tolerating without AE.  cardiology- changed inderal to atenolol. Echo- ok.   · Pulse with activity- at PT around 120, she stops her and if pushes self at home she gets up to 150. BP and pulse have improved some but continues with pulse that is elevated but oxygen has stayed above 90.   · She has persistent tachycardia and hypoxia with walking short distances (that is slowly improving), elevated BP that is improving  · She called cardiology to schedule testing ordered at appt prior to Covid 19 then scheduled follow up evaluation of post-Covid tachycardia/ arrhythmia as directed.   · He changed her propranolol to atenolol for tachycardia and is awaiting Holter monitor and echocardiogram. She is tolerating medication well.     Fatigue- still struggling but is improving slowly. Now having to fight to get up and do things but not as much. This is the 1st time she noticed improvement.   continues with significant fatigue- feeling tired all day.     Cognitive deficit/dysfunction- most worrisome issue at this point.  She is still struggling with cognitive issues which are causing her increased anxiety.  Still \"brain farts\", short term memory issues, headaches, oxygen levels- not sure if Topamax has amplified brain fog but has been prevalent.   Headaches- has had improvement in headaches on Topamax. Decreased as directed then was able to increase to twice daily again and has had improved headaches and no worsening neurological/cognitive symptoms. She has had no severe, debilitating headaches and not waking up with headaches.   · She has had right sided headache and headache behind her eyes, nausea with vomiting, eye was red and watering, right sided facial and eyelid drooping, eyebrow was not normal. She reports it wakes her up from sleep.   · She went to ER 3/11/2021 with drooping eyelid, bloodshot eye, watering. She was in tears due to severe pain but they did no testing. By the time they gave " her a headache cocktail, her headache was already improving. She was ready to go home because she did not feel she was getting any help. They discharged her with Fiorcet and Zofran that have not helped her headaches.   · She was given pain medication from previous foot surgery and never took it. She tried it and it makes her sick but did not help her pain.  · I referred for MRI brain and to neurology for severe headaches. She had severe, debilitating headaches.   · I initially discussed beta blocker, Elavil, and Topamax, however, we were concerned about possible AE with medication, as fatigue, arrhythmia, and cognitive dysfunction are already some of her biggest issues. She was unable to tolerate her headaches and felt the possible AE with medication were worth the risk for improvement in headaches.   · She tried Inderal LA 60 mg nightly without improvement.   · Headaches occurred almost always when she was asleep. She took Tylenol and tried Excedrin Migraine. They were waking her up at night, which was abnormal for her.    · She was seen by neurology 3/30/2021 started on Topamax 25 mg twice daily and Imitrex as needed for headaches by neurology. She has had significant symptoms with this. I discussed possibly cutting dose in 1/2 initially to 25 mg at bedtime then possible increase in dosage as tolerated.   · I referred to sleep medicine as recommended by neurology.   · Topamax helped headaches. She had a headache all day once but otherwise had no other bad headaches. She had paresthesias and worsening cognitive funciton, brain fog, ability to think clearly. I asked that she decrease to nightly dosing only for a couple weeks then could increase to twice daily if tolerated better.     Snoring- Has never been someone who snored but since Covid, she is snoring loud, sometimes waking her .   Hair loss- slowed down.     Moods- Moods have improved. She continues with anxiety, however, she is able to calm herself  "down many times. She is working with mental health therapist twice weekly and is starting to improve. She feels she should try to go back to work slowly. There may be a layoff and she can still go in to work but the line will not be moving, all the people are not there, and she can come and go- does not have to work full 12 hours. She wonders if this would be more helpful to try to go in and start trying to do her job without the pressure and interaction with others. She reports she would be able to leave if she had a panic attack. She is doing well on Lexapro. She had to increase to 10 mg once daily. She did have significant fatigue upon starting medication, however, she is improving. She does not think she needs to increase dose at this time. Despite still having anxiety, she thinks this will improve some if she can get back to work and on a routine. She would like to try.   · Work was laid off for 2 weeks-she attempted to go back to work after layoff. When she started to feel better, something mentally started crashing down. As she started to feel better physically, she started \"freaking out\". She was having panic attacks.   · Patient relates the feeling to being on a plane to [place] years ago, she feels like she is back on the plane and ready to crash.   · She worries about going back to work without FMLA and worries about losing her job. She reports her friend at work told her they are firing people \"left and right\". She thinks she will lose her job and does not have FMLA  (She has to acquire so much leave to qualify for FMLA).    · She had anxiety doing anything, worst at night with laying down. She thought something could be wrong with her heart. Cardiology cleared her but she feels like something is wrong with her heart when she has episodes. She tries to hold it in.   · Patient knows her father struggled with his mental health, and she worried that she could have issues.   · She has had bad anxiety since " her grandfather , when she has anxiety, playing her grandfather's death over and over.   · She moved a couple years ago and started worrying her house will burn down.   · With anxiety in the past, she was seeing a therapist and reported she was doing much better. She would occasionally miss work with anxiety/depression but had FMLA. She usually did well and was able to work, do things socially.    Joint pain, back and neck pain- Foot that had tendon release surgery has hurt the worst- some improvement in other pain. When she was a kid, she had a tubing accident but had hip pain after that. He hip hurts more. Has not improved but has not worsened. Foot with tendon release bothers the most. No recommendations from PT. States they did not have any additional treatment. Going back next week.     Therapy-   · Physical therapy- going once weekly since she is going back to work. If she is off work, she can go 2-3 x but not while working. Has been walking with her dog per PT but rpeorts she is scared of triggering a migraine because she is scared that a bump or anything will cause migraine.   · Speech and cognitive therapy- Speech therapy- states none of their other patients have gone back to full time. States all the other people have gone back 4 hours per day and gradually increased to 6 then 8 hours. They were concerned they would not be able to go back 12 hours. They advised   · Just heard from speech therapy. Starting next week- it was a mistake on their part.   · Mental health counseling- seeing her therapist and she has her using an joaquim for relaxation and mind sharpness- doing that once daily.    Return to work- 2021 -she tried to return to work again and had a panic attack.  She started having severe anxiety about doing her job, remembering, and being able to function at work.  She has never had anything this severe in the past.  She went to work but had to leave.  She previously went back to work and had  to leave after 2 hours. She reports they asked her to tell someone something and she couldn't remember who that was. They thought this was someone she should know but she had no idea. Also, the process of going into work- line, temperature, getting mask- couldn't remember how to do it. She states it was like she was a new hire. Things that she thought should have come back as soon as she saw it did not come back. They had her sit at a picnic table x 2 hours until they decided she should go home on no work available. They still have the heat on and with the heat and her mask, she felt claustrophobic. No other symptoms that she is aware.     Patient's specialists:  Cardiology -Dr. Negrete-last appointment 3/25/2021 for palpitations, hypertension, and chest pain.  She had been seen 11/2020 for atypical chest pain and had echo, Holter, and stress test ordered.  They held stress test with post Covid symptoms.  They may consider at follow-up.  Started atenolol 25 mg daily for elevated blood pressure and tachycardia.  Holter monitor and stress test normal.  Continue atenolol and keep follow-up.  Patient was to be seen 5/6/2021, however, it looks like she canceled appointment and will need to reschedule.  Infectious disease-MONICA Sawyer-last appointment due 2021 in follow-up of COVID-19 infection.  Diagnosed with post viral fatigue syndrome and brain fog.  Advised physical therapy, cognitive therapy, and advised follow-up with cardiology for tachycardia.  Performed additional labs.  Follow-up in 2 months.  OB/GYN-Dr. Giles-last appointment 1/2020 for annual exam/well woman exam.  Pap completed.  Follow-up in 1 year.  Neurology-Dr Hogan-last appointment 5/24/2021 for migraine headache-headaches post Covid.  Headaches improved on Topamax 25 mg twice daily- continue medication.  Also given Imitrex and referred to sleep medicine. Home sleep study tomorrow. Follow-up in 3 months.  Sleep medicine-  Kit-scheduled 5/10/2021 for evaluation.  Speech therapy-started 4/27/2021 for speech and cognitive therapy. She was discharged the end of 5/2021. She has noticed improvement.   Ophthalmology-Dr. Heydi Vera-last appointment 4/2021 for abnormal MRI orbit.  Referred to Dr. Bowman.  Allergist- Dr Shelia Mc- went 4/19/2021- and they wanted to change Atenolol to Nadolol and gave Zyrtec, Nasacort, and albuterol as needed. Allergy testing- allergic to outdoor issues and not indoor things. He asked her worst symptoms. He was concerned about Topamax- she did cut back to nightly. She had worsening headaches (not severe but continued with headaches). States when she increased again, she feels back to baseline prior to starting Topamax.     The following portions of the patient's history were reviewed and updated as appropriate: allergies, current medications, past family history, past medical history, past social history, past surgical history and problem list.    Review of Systems   Constitutional: Positive for fatigue.   Eyes: Positive for discharge and redness.   Respiratory: Shortness of breath: improving.    Cardiovascular: Positive for palpitations (improving).   Gastrointestinal: Nausea: no constant nausea.   Genitourinary: Negative.    Musculoskeletal: Arthralgias: improving. Myalgias: improving.   Neurological: Facial asymmetry: improved. Weakness: improving. Light-headedness: improved. Headaches: improved with treatment.   Psychiatric/Behavioral: Positive for confusion (improving), decreased concentration (improving), dysphoric mood (improving) and sleep disturbance (new- increased nightly snoring since Covid 19). Negative for self-injury and suicidal ideas. The patient is nervous/anxious (improving).         Brain fog improving       Objective   Vitals:    06/01/21 1506   BP: 106/74   Pulse: 70   Resp: 18   Temp: 98.2 °F (36.8 °C)   SpO2: 98%     Body mass index is 41.21 kg/m².    Physical Exam  Vitals and  nursing note reviewed.   Constitutional:       General: She is not in acute distress.     Appearance: Normal appearance. She is well-developed.   HENT:      Head: Normocephalic and atraumatic.      Right Ear: External ear normal.      Left Ear: External ear normal.      Nose: Nose normal.   Eyes:      General: Lids are normal.      Conjunctiva/sclera: Conjunctivae normal.   Neck:      Vascular: No carotid bruit.   Cardiovascular:      Rate and Rhythm: Normal rate and regular rhythm.      Pulses: Normal pulses.      Heart sounds: Normal heart sounds. No murmur heard.   No friction rub. No gallop.    Pulmonary:      Effort: Pulmonary effort is normal. No respiratory distress.      Breath sounds: Normal breath sounds. No wheezing, rhonchi or rales.   Musculoskeletal:         General: No deformity.      Cervical back: Neck supple.   Skin:     General: Skin is warm and dry.   Neurological:      Mental Status: She is alert and oriented to person, place, and time.      Gait: Gait normal.   Psychiatric:         Mood and Affect: Affect normal. Mood is anxious.         Speech: Speech normal.         Behavior: Behavior normal.         Thought Content: Thought content normal.         Cognition and Memory: Cognition normal.         Judgment: Judgment normal.         Assessment/Plan   Diagnoses and all orders for this visit:    1. Post-COVID-19 syndrome  -     escitalopram (Lexapro) 10 MG tablet; Take 1 tablet by mouth Daily. May increase to 2 tablets once daily in 2 weeks if needed  Dispense: 90 tablet; Refill: 0    2. Anxiety  -     escitalopram (Lexapro) 10 MG tablet; Take 1 tablet by mouth Daily. May increase to 2 tablets once daily in 2 weeks if needed  Dispense: 90 tablet; Refill: 0    3. Major depressive disorder, recurrent, in full remission (CMS/HCC)  -     escitalopram (Lexapro) 10 MG tablet; Take 1 tablet by mouth Daily. May increase to 2 tablets once daily in 2 weeks if needed  Dispense: 90 tablet; Refill: 0    4.  Adjustment disorder with anxious mood  -     escitalopram (Lexapro) 10 MG tablet; Take 1 tablet by mouth Daily. May increase to 2 tablets once daily in 2 weeks if needed  Dispense: 90 tablet; Refill: 0        Assessment and Plan  Patient to continue physical therapy, mental health counseling, and speech and cognitive therapy, and to continue follow up with cardiology, neurology, infectious disease/ long term Covid clinic, ophthalmology, sleep medicine, and allergist as referred. She is trying to transition to a whole food plant based diet. Information given. Follow up with me in no more than 1 month. To be seen ASAP if worsening, new, or changing symptoms.     · History of Covid 19 infection, post-Covid syndrome- Patient has continued with debilitating symptoms as noted below, preventing her from safely returning to work and affecting all aspects of her daily life. For the 1st visit, she continues with debilitation but has started to see some improvement. Continue follow up with specialists and therapy as recommended.   · Hypoxia following Covid 19 infection-  She has having some slow, gradual improvement. Continue PT and monitoring at home.   · Tachycardia/ elevated BP, post-Covid arrhythmia- Continue follow up with cardiology as directed by them. Echocardiogram and Holter monitor were ok. Patient to continue Atenolol 25 mg once daily and monitor BP, pulse. Blood pressure is getting a little low. We may have to decrease Atenolol by 1/2 has her BP returns to normal. Allergist gave her Nadolol. I will contact cardiology to see if they are ok with her changing atenolol to nadolol or if they would prefer she stay on atenolol. She has had no increased SOA with beta blocker and is tolerating well. To see sleep medicine.   · Fatigue following Covid 19 infection- Patient to continue mental health therapy, physical therapy, and cognitive therapy.   · Cognitive deficit/dysfunction- Continue home exercises recommended.  Continue follow up with neurology as directed.   · Persistent headaches following Covid 19 infection- Continue follow up with neurology and Topamax to 25 mg twice daily. Cognitive function has returned to her post-Covid baseline. Her allergist wanted her to stop Topamax due to fatigue and cognitive impairment, however, she reports her headaches are finally controlled and she does not want to stop medication at this time since she is tolerating medication better. To use Imitrex as needed for acute headaches.   · Adjustment disorder with anxious mood- Patient has had some underlying anxiety in the past, however, she developed very severe, debilitating anxiety following COvid 19 infection and rehab, causing panic attacks.  She is seeing her mental health counselor.  She has been resistant to medication in the past because she was always able to control her moods, calm down herself, and continue with working.  However, following her COVID-19 infection, she had more severe symptoms that were worsened with trying to return to work.  I started Lexapro 5 mg once daily. Once she was tolerating medication better, she increased to Lexapro 10 mg once daily. She has improved some with therapy and medication. Continue Lexapro 10 mg once daily and therapy twice weekly. I will return her to work. She should work with her therapist to work on panic and anxiety issues to help her return to work. To be seen ASAP if worsening moods or AE with medication.  Follow-up with me in no more than 2 weeks to ensure she continues to do ok, especially with returning to work.   · Snoring- Since having Covid 19, she has started with significant snoring and has had headaches that wake her up at night, tachycardia, hypoxia, and significant brain fog and fatigue. I referred to sleep medicine. I will await evaluation and treatment recommendations pending sleep medicine.    · Generalized body aches, back aches, neck pain, and joint pain- I will have her  check autoimmune testing and inflammatory markers. She should continue PT as well. I also referred to allergist, with symptoms similar to MIS-A. No diagnosis of MISC with allergist. Continue physical therapy.      Patient continues to see specialists as noted above.  I have reviewed available records, including consult notes, labs, and imaging/testing.  Patient to continue follow-up with specialists as directed by them.    I spent 35 minutes cariting for Jocelyn Merlos on this date of service. This time includes time spent by me in the following activities as necessary: preparing for the visit, reviewing tests, specialists records and previous visits, obtaining and/or reviewing a separately obtained history, performing a medically appropriate exam and/or evaluation, counseling and educating the patient, family, garegiver, referring and/or communicating with other healthcare professionals, documenting information in the medical record, independently interpreting results and communicating that information with the patient, family, caregiver, and developing a medically appropriate treatment plan with consideration of other conditions, medications, and treatments.

## 2021-06-02 ENCOUNTER — HOSPITAL ENCOUNTER (OUTPATIENT)
Dept: SLEEP MEDICINE | Facility: HOSPITAL | Age: 31
Discharge: HOME | End: 2021-06-02
Admitting: INTERNAL MEDICINE

## 2021-06-02 DIAGNOSIS — G43.709 CHRONIC MIGRAINE WITHOUT AURA WITHOUT STATUS MIGRAINOSUS, NOT INTRACTABLE: ICD-10-CM

## 2021-06-02 DIAGNOSIS — R41.89 BRAIN FOG: ICD-10-CM

## 2021-06-02 PROCEDURE — G0463 HOSPITAL OUTPT CLINIC VISIT: HCPCS

## 2021-06-02 PROCEDURE — 95806 SLEEP STUDY UNATT&RESP EFFT: CPT

## 2021-06-04 RX ORDER — ESCITALOPRAM OXALATE 10 MG/1
10 TABLET ORAL DAILY
Qty: 90 TABLET | Refills: 0 | Status: SHIPPED | OUTPATIENT
Start: 2021-06-04 | End: 2021-06-25 | Stop reason: SDUPTHER

## 2021-06-08 ENCOUNTER — TELEPHONE (OUTPATIENT)
Dept: NEUROLOGY | Facility: CLINIC | Age: 31
End: 2021-06-08

## 2021-06-21 ENCOUNTER — OFFICE VISIT (OUTPATIENT)
Dept: FAMILY MEDICINE CLINIC | Facility: CLINIC | Age: 31
End: 2021-06-21

## 2021-06-21 ENCOUNTER — APPOINTMENT (OUTPATIENT)
Dept: SLEEP MEDICINE | Facility: HOSPITAL | Age: 31
End: 2021-06-21

## 2021-06-21 VITALS
TEMPERATURE: 97.8 F | RESPIRATION RATE: 18 BRPM | OXYGEN SATURATION: 95 % | BODY MASS INDEX: 41.07 KG/M2 | SYSTOLIC BLOOD PRESSURE: 126 MMHG | WEIGHT: 271 LBS | HEIGHT: 68 IN | DIASTOLIC BLOOD PRESSURE: 84 MMHG | HEART RATE: 98 BPM

## 2021-06-21 DIAGNOSIS — E78.5 DYSLIPIDEMIA: ICD-10-CM

## 2021-06-21 DIAGNOSIS — I49.8 OTHER SPECIFIED CARDIAC ARRHYTHMIAS: ICD-10-CM

## 2021-06-21 DIAGNOSIS — L65.9 HAIR LOSS: ICD-10-CM

## 2021-06-21 DIAGNOSIS — M54.9 OTHER ACUTE BACK PAIN: ICD-10-CM

## 2021-06-21 DIAGNOSIS — F33.42 MAJOR DEPRESSIVE DISORDER, RECURRENT, IN FULL REMISSION (HCC): ICD-10-CM

## 2021-06-21 DIAGNOSIS — U09.9 POST-COVID-19 SYNDROME: Primary | ICD-10-CM

## 2021-06-21 DIAGNOSIS — U09.9 PERSISTENT FATIGUE AFTER COVID-19: ICD-10-CM

## 2021-06-21 DIAGNOSIS — R79.82 ELEVATED C-REACTIVE PROTEIN (CRP): ICD-10-CM

## 2021-06-21 DIAGNOSIS — R41.89 BRAIN FOG: ICD-10-CM

## 2021-06-21 DIAGNOSIS — G43.709 CHRONIC MIGRAINE WITHOUT AURA WITHOUT STATUS MIGRAINOSUS, NOT INTRACTABLE: ICD-10-CM

## 2021-06-21 DIAGNOSIS — R09.02 HYPOXIA: ICD-10-CM

## 2021-06-21 DIAGNOSIS — R29.90 PERSISTENT NEUROLOGIC SYMPTOMS AFTER COVID-19: ICD-10-CM

## 2021-06-21 DIAGNOSIS — R74.8 ELEVATED LIVER ENZYMES: ICD-10-CM

## 2021-06-21 DIAGNOSIS — M79.671 RIGHT FOOT PAIN: ICD-10-CM

## 2021-06-21 DIAGNOSIS — F43.22 ADJUSTMENT DISORDER WITH ANXIOUS MOOD: ICD-10-CM

## 2021-06-21 DIAGNOSIS — H04.132: ICD-10-CM

## 2021-06-21 DIAGNOSIS — R53.83 PERSISTENT FATIGUE AFTER COVID-19: ICD-10-CM

## 2021-06-21 DIAGNOSIS — E55.9 VITAMIN D DEFICIENCY: ICD-10-CM

## 2021-06-21 DIAGNOSIS — F41.9 ANXIETY: ICD-10-CM

## 2021-06-21 DIAGNOSIS — U09.9 PERSISTENT NEUROLOGIC SYMPTOMS AFTER COVID-19: ICD-10-CM

## 2021-06-21 DIAGNOSIS — Z14.8 HEMOCHROMATOSIS CARRIER: ICD-10-CM

## 2021-06-21 DIAGNOSIS — R19.7 DIARRHEA, UNSPECIFIED TYPE: ICD-10-CM

## 2021-06-21 DIAGNOSIS — M54.2 NECK PAIN: ICD-10-CM

## 2021-06-21 DIAGNOSIS — E79.0 HYPERURICEMIA: ICD-10-CM

## 2021-06-21 DIAGNOSIS — M25.50 MULTIPLE JOINT PAIN: ICD-10-CM

## 2021-06-21 DIAGNOSIS — H05.89 ORBITAL MASS: ICD-10-CM

## 2021-06-21 DIAGNOSIS — M79.10 MYALGIA: ICD-10-CM

## 2021-06-21 PROCEDURE — 99214 OFFICE O/P EST MOD 30 MIN: CPT | Performed by: PHYSICIAN ASSISTANT

## 2021-06-21 RX ORDER — HYDROCORTISONE 25 MG/G
CREAM TOPICAL 2 TIMES DAILY
Qty: 28 G | Refills: 0 | Status: SHIPPED | OUTPATIENT
Start: 2021-06-21 | End: 2021-10-08

## 2021-06-21 RX ORDER — FOLIC ACID 1 MG/1
1 TABLET ORAL DAILY
COMMUNITY
End: 2023-03-13

## 2021-06-22 LAB
25(OH)D3+25(OH)D2 SERPL-MCNC: 35.6 NG/ML (ref 30–100)
ALBUMIN SERPL-MCNC: 4.6 G/DL (ref 3.9–5)
ALBUMIN/GLOB SERPL: 2.2 {RATIO} (ref 1.2–2.2)
ALP SERPL-CCNC: 67 IU/L (ref 48–121)
ALT SERPL-CCNC: 45 IU/L (ref 0–32)
AST SERPL-CCNC: 26 IU/L (ref 0–40)
BASOPHILS # BLD AUTO: 0 X10E3/UL (ref 0–0.2)
BASOPHILS NFR BLD AUTO: 1 %
BILIRUB SERPL-MCNC: 0.3 MG/DL (ref 0–1.2)
BUN SERPL-MCNC: 8 MG/DL (ref 6–20)
BUN/CREAT SERPL: 11 (ref 9–23)
CALCIUM SERPL-MCNC: 9.3 MG/DL (ref 8.7–10.2)
CHLORIDE SERPL-SCNC: 109 MMOL/L (ref 96–106)
CHOLEST SERPL-MCNC: 201 MG/DL (ref 100–199)
CO2 SERPL-SCNC: 23 MMOL/L (ref 20–29)
CREAT SERPL-MCNC: 0.73 MG/DL (ref 0.57–1)
EOSINOPHIL # BLD AUTO: 0.2 X10E3/UL (ref 0–0.4)
EOSINOPHIL NFR BLD AUTO: 2 %
ERYTHROCYTE [DISTWIDTH] IN BLOOD BY AUTOMATED COUNT: 12.7 % (ref 11.7–15.4)
FERRITIN SERPL-MCNC: 87 NG/ML (ref 15–150)
GLOBULIN SER CALC-MCNC: 2.1 G/DL (ref 1.5–4.5)
GLUCOSE SERPL-MCNC: 85 MG/DL (ref 65–99)
HCT VFR BLD AUTO: 40.3 % (ref 34–46.6)
HDLC SERPL-MCNC: 36 MG/DL
HGB BLD-MCNC: 13.5 G/DL (ref 11.1–15.9)
IMM GRANULOCYTES # BLD AUTO: 0 X10E3/UL (ref 0–0.1)
IMM GRANULOCYTES NFR BLD AUTO: 0 %
IRON SATN MFR SERPL: 28 % (ref 15–55)
IRON SERPL-MCNC: 77 UG/DL (ref 27–159)
LDLC SERPL CALC-MCNC: 137 MG/DL (ref 0–99)
LDLC/HDLC SERPL: 3.8 RATIO (ref 0–3.2)
LYMPHOCYTES # BLD AUTO: 2.7 X10E3/UL (ref 0.7–3.1)
LYMPHOCYTES NFR BLD AUTO: 37 %
MCH RBC QN AUTO: 31.3 PG (ref 26.6–33)
MCHC RBC AUTO-ENTMCNC: 33.5 G/DL (ref 31.5–35.7)
MCV RBC AUTO: 93 FL (ref 79–97)
MONOCYTES # BLD AUTO: 0.5 X10E3/UL (ref 0.1–0.9)
MONOCYTES NFR BLD AUTO: 7 %
NEUTROPHILS # BLD AUTO: 4 X10E3/UL (ref 1.4–7)
NEUTROPHILS NFR BLD AUTO: 53 %
PLATELET # BLD AUTO: 325 X10E3/UL (ref 150–450)
POTASSIUM SERPL-SCNC: 4.9 MMOL/L (ref 3.5–5.2)
PROT SERPL-MCNC: 6.7 G/DL (ref 6–8.5)
RBC # BLD AUTO: 4.32 X10E6/UL (ref 3.77–5.28)
SODIUM SERPL-SCNC: 143 MMOL/L (ref 134–144)
TIBC SERPL-MCNC: 275 UG/DL (ref 250–450)
TRIGL SERPL-MCNC: 152 MG/DL (ref 0–149)
UIBC SERPL-MCNC: 198 UG/DL (ref 131–425)
URATE SERPL-MCNC: 5.8 MG/DL (ref 2.6–6.2)
VLDLC SERPL CALC-MCNC: 28 MG/DL (ref 5–40)
WBC # BLD AUTO: 7.4 X10E3/UL (ref 3.4–10.8)

## 2021-06-24 DIAGNOSIS — G43.701 CHRONIC MIGRAINE WITHOUT AURA WITH STATUS MIGRAINOSUS, NOT INTRACTABLE: ICD-10-CM

## 2021-06-24 RX ORDER — TOPIRAMATE 25 MG/1
TABLET ORAL
Qty: 180 TABLET | Refills: 2 | Status: SHIPPED | OUTPATIENT
Start: 2021-06-24 | End: 2021-10-18 | Stop reason: SDUPTHER

## 2021-06-25 DIAGNOSIS — F43.22 ADJUSTMENT DISORDER WITH ANXIOUS MOOD: ICD-10-CM

## 2021-06-25 DIAGNOSIS — F33.42 MAJOR DEPRESSIVE DISORDER, RECURRENT, IN FULL REMISSION (HCC): ICD-10-CM

## 2021-06-25 DIAGNOSIS — U09.9 POST-COVID-19 SYNDROME: ICD-10-CM

## 2021-06-25 DIAGNOSIS — F41.9 ANXIETY: ICD-10-CM

## 2021-06-25 RX ORDER — ESCITALOPRAM OXALATE 10 MG/1
10 TABLET ORAL DAILY
Qty: 90 TABLET | Refills: 0 | Status: SHIPPED | OUTPATIENT
Start: 2021-06-25 | End: 2021-07-12 | Stop reason: SDUPTHER

## 2021-06-25 NOTE — TELEPHONE ENCOUNTER
Pt office and labs 6/21/21  Not resulted  No follow up yet recommended  This is for a mail order refill

## 2021-06-30 LAB
ADV 40+41 DNA STL QL NAA+NON-PROBE: NOT DETECTED
ASTRO TYP 1-8 RNA STL QL NAA+NON-PROBE: NOT DETECTED
C CAYETANENSIS DNA STL QL NAA+NON-PROBE: NOT DETECTED
C COLI+JEJ+UPSA DNA STL QL NAA+NON-PROBE: NOT DETECTED
C DIF TOX TCDA+TCDB STL QL NAA+NON-PROBE: NOT DETECTED
CRYPTOSP DNA STL QL NAA+NON-PROBE: NOT DETECTED
E COLI O157 DNA STL QL NAA+NON-PROBE: NORMAL
E HISTOLYT DNA STL QL NAA+NON-PROBE: NOT DETECTED
EAEC PAA PLAS AGGR+AATA ST NAA+NON-PRB: NOT DETECTED
EC STX1+STX2 GENES STL QL NAA+NON-PROBE: NOT DETECTED
EPEC EAE GENE STL QL NAA+NON-PROBE: NOT DETECTED
ETEC LTA+ST1A+ST1B TOX ST NAA+NON-PROBE: NOT DETECTED
G LAMBLIA DNA STL QL NAA+NON-PROBE: NOT DETECTED
NOROVIRUS GI+II RNA STL QL NAA+NON-PROBE: NOT DETECTED
P SHIGELLOIDES DNA STL QL NAA+NON-PROBE: NOT DETECTED
RVA RNA STL QL NAA+NON-PROBE: NOT DETECTED
S ENT+BONG DNA STL QL NAA+NON-PROBE: NOT DETECTED
SAPO I+II+IV+V RNA STL QL NAA+NON-PROBE: NOT DETECTED
SHIGELLA SP+EIEC IPAH ST NAA+NON-PROBE: NOT DETECTED
V CHOL+PARA+VUL DNA STL QL NAA+NON-PROBE: NOT DETECTED
V CHOLERAE DNA STL QL NAA+NON-PROBE: NOT DETECTED
Y ENTEROCOL DNA STL QL NAA+NON-PROBE: NOT DETECTED

## 2021-07-01 DIAGNOSIS — Z14.8 HEMOCHROMATOSIS CARRIER: ICD-10-CM

## 2021-07-01 DIAGNOSIS — R74.8 ELEVATED LIVER ENZYMES: ICD-10-CM

## 2021-07-01 DIAGNOSIS — E79.0 HYPERURICEMIA: ICD-10-CM

## 2021-07-01 DIAGNOSIS — E78.2 MIXED HYPERLIPIDEMIA: Primary | ICD-10-CM

## 2021-07-01 DIAGNOSIS — E55.9 VITAMIN D DEFICIENCY: ICD-10-CM

## 2021-07-03 NOTE — PROGRESS NOTES
Subjective   Jocelyn Merlos is a 30 y.o. female who presents today in follow up of post-Covid syndrome with hypoxia, SOA, tachycardia, fatigue, cognitive deficits, headaches, specialists/therapy, and return to work.     History of Present Illness     Has never been someone who snored but since Covid, she is snoring loud, sometimes waking her .   Increased hair loss.   Has had some joint pain, back and neck pain. Foot that had tendon release surgery has hurt. When she was a kid, she had a tubing accident but had hip pain after that. He hip hurts more.     Started seeing her therapist and she has her using an joaquim for relaxation and mind sharpness- doing that once daily.     History of Covid 19 infection/ post Covid syndrome-   Patient has had significant symptoms following Covid 19 infection. She developed symptoms of Covid 19 12/24/2020 and tested positive 12/28/2020. She had hypoxia and tachycardia with ambulation, SOA, chest tightness, weakness, fatigue, brain fog, diarrhea, and nausea with resolution of severe rash. She went to ER with oxygen saturation of 88%. CTA chest with mild pneumonia and negative for PE. Inflammatory markers were not performed. They did not evaluate symptoms with ambulation and resting vital signs were ok.  While on video visit, patient had oxygen saturation of 97% and pulse in 90s then with relatively little ambulation/ exertion, she had pulse 127 and oxygen saturation down to 88%. In office, she had a normal resting pulse that increased to 124 with ambulation and oxygen saturation decreased from 95% to 90% with ambulation.     She has persistent tachycardia and hypoxia with walking short distances (that is slowly improving), elevated BP that is improving, cognitive impairment, fatigue, headaches, and feeling overall poorly. She was seen by the long term Covid infectious disease clinic, had additional labs, was referred to physical therapy, speech, and cognitive therapy, and was  advised to contact her cardiologist for follow up with persistent tachycardia with ambulation. She is undergoing PT but has not received an appointment for speech/cognitive therapy. She called cardiology to schedule testing ordered at appt prior to Covid 19 then scheduled follow up evaluation of post-Covid tachycardia/ arrhythmia as directed. He changed her propranolol to atenolol for tachycardia and is awaiting Holter monitor and echocardiogram. She is tolerating medication well.     I referred for MRI brain and to neurology for severe headaches. MRI brain and neurology appt scheduled for 3/30/2021, however, she has had severe, debilitating headaches. She had to go to ER but had no testing performed there. Excedrin Migraine, Fiorcet, Zofran, and narcotic pain medication have not helped headaches. I discussed beta blocker, Elavil, and Topamax at her last visit, however, she was concerned about possible AE with medication, as fatigue, arrhythmia, and cognitive dysfunction are already some of her biggest issues. She was unable to tolerate her headaches and felt the possible AE with medication were worth the risk for improvement in headaches. She tried Inderal LA 60 mg nightly without improvement and was started on Topamax by neurology. She has had significant symptoms with this. I discussed possibly cutting dose in 1/2 initially to 25 mg at bedtime. If she does well with this, she can increase dosage as tolerated.     I contacted infectious disease provider for recommendations for return to work vs work restrictions. She recommends either waiting to return to work until she has some improvement with therapy or may try light duty if available to see if she will tolerate this until she has improvement. I allowed return to work only as tolerated with the following restrictions- she may need to work shorter hours due to intolerance/ fatigue with post-Covid syndrome, need to take more frequent breaks and rest, unable to  39 y/o M with PMH HTN, ESRD on dialysis, hyperthyroidism (no meds), recent hemorrhoidectomy who presents for BRBPR.     #. BRBPR  -mgmt per surgery  -s/p packing of wound  -pain control  -transfuse prn    #. 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    She returned to work and noted multiple cognitive issues. She reports they had her work 2 hours then sent her home with no work available. 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 She feels she needs more cognitive therapy to safely return to work. I will have her remain off work until her follow up with long term Covid clinic. She has been advised to remain in contact with her employer to ensure her job is secure and leave is approved.     Hypoxia- lowest has been about 90s% and maybe 88% if pushing it. Improving gradually  · She had hypoxia and tachycardia with ambulation, SOA, chest tightness, weakness, fatigue, brain fog, diarrhea, and nausea with resolution of severe rash.   · She went to ER with oxygen saturation of 88%. CTA chest with mild pneumonia and negative for PE. Inflammatory markers were not performed. They did not evaluate symptoms with ambulation and resting vital signs were ok.    · While on video visit, patient had oxygen saturation of 97% and pulse in 90s then with relatively little ambulation/ exertion, she had pulse 127 and oxygen saturation down to 88%. In office, she had a normal resting pulse that increased to 124 with ambulation and oxygen saturation decreased from 95% to 90% with ambulation.   Tachycardia/ elevated BP- cardiology- changed inderal to atenolol. Echo- ok.   · Pulse with activity- at PT around 120, she stops her and if pushes  "self at home she gets up to 150. BP and pulse have improved some but continues with pulse that is elevated but oxygen has stayed above 90.   · She has persistent tachycardia and hypoxia with walking short distances (that is slowly improving), elevated BP that is improving  · She called cardiology to schedule testing ordered at [facility] prior to Covid 19 then scheduled follow up evaluation of post-Covid tachycardia/ arrhythmia as directed.   · He changed her propranolol to atenolol for tachycardia and is awaiting Holter monitor and echocardiogram. She is tolerating medication well.     Fatigue- continues with significant fatigue- feeling tired all day.   Cognitive deficit/dysfunction- Still \"brain farts\", short term memory issues, headaches, oxygen levels- not sure if Topamax has amplified brain fog but has been prevalent.     Headaches- Topamax helped headaches. She had a headache all day yesterday but otherwise has not had bad headaches. She reports it was not a good experience. She would prefer to do follow up with someone else. She had a bad experience with provider in office.   Occur almost always when she is asleep. Taking Tylenol and tried Excedrin Migraine the past few days. They are waking her up at night, which is abnormal for her.    · She has had right sided headache and headache behind her eyes, nausea with vomiting, eye was red and watering, right sided facial and eyelid drooping, eyebrow was not normal. She reports it wakes her up from sleep.   · She went to ER 3/11/2021 with drooping eyelid, bloodshot eye, watering. She was in tears due to severe pain but they did no testing. By the time they gave her a headache cocktail, her headache was already improving. She was ready to go home because she did not feel she was getting any help. They discharged her with Fiorcet and Zofran that have not helped her headaches.   · She was given pain medication from previous foot surgery and never took it. She tried it and " it makes her sick but did not help her pain.  · I referred for MRI brain and to neurology for severe headaches. MRI brain and neurology appt scheduled for 3/30/2021, however, she has had severe, debilitating headaches.   · I initially discussed beta blocker, Elavil, and Topamax at her last visit, however, she was concerned about possible AE with medication, as fatigue, arrhythmia, and cognitive dysfunction are already some of her biggest issues. She was unable to tolerate her headaches and felt the possible AE with medication were worth the risk for improvement in headaches.   · She tried Inderal LA 60 mg nightly without improvement.   · She was seen by neurology 3/30/2021 started on Topamax 25 mg twice daily and Imitrex as needed for headaches by neurology. She has had significant symptoms with this. I discussed possibly cutting dose in 1/2 initially to 25 mg at bedtime. If she does well with this, she can increase dosage as tolerated.   · I referred to sleep medicine as recommended by neurology.     Physical therapy- going once weekly since she is going back to work. If she is off work, she can go 2-3 x but not while working. Has been walking with her dog per PT but rpeorts she is scared of triggering a migraine because she is scared that a bump or anything will cause migraine.   Speech and cognitive therapy- Just heard from speech therapy. Starting next week- it was a mistake on their part.     She went back to work and had to leave after 2 hours. She reports they asked her to tell someone something and she couldn't remember who that was. They thought this was someone she should know but she had no idea. Also, the process of going into work- line, temperature, getting mask- couldn't remember how to do it. She states it was like she was a new hire. Things that she thought should have come back as soon as she saw it did not come back. They had her sit at a picnic table x 2 hours until they decided she should go  home on no work available. They still have the heat on and with the heat and her mask, she felt claustrophobic. No other symptoms that she is aware.     The following portions of the patient's history were reviewed and updated as appropriate: allergies, current medications, past family history, past medical history, past social history, past surgical history and problem list.    Review of Systems   Constitutional: Positive for fatigue.   Eyes: Positive for discharge and redness.   Respiratory: Positive for shortness of breath.    Cardiovascular: Positive for palpitations.   Gastrointestinal: Positive for nausea.   Genitourinary: Negative.    Musculoskeletal: Positive for arthralgias and myalgias.   Neurological: Positive for facial asymmetry, weakness, light-headedness and headaches.   Psychiatric/Behavioral: Positive for confusion, decreased concentration, dysphoric mood and sleep disturbance. The patient is nervous/anxious.         Brain fog and now snoring       Objective   Vitals:    04/05/21 1536   BP: 122/70   Pulse: 88   Resp: 20   Temp: 96.6 °F (35.9 °C)   SpO2: 96%     Body mass index is 40.41 kg/m².    Physical Exam  Vitals and nursing note reviewed.   Constitutional:       General: She is not in acute distress.     Appearance: Normal appearance. She is well-developed.   HENT:      Head: Normocephalic and atraumatic.      Right Ear: External ear normal.      Left Ear: External ear normal.      Nose: Nose normal.   Eyes:      General: Lids are normal.         Right eye: No discharge.         Left eye: No discharge.      Conjunctiva/sclera: Conjunctivae normal.   Neck:      Vascular: No carotid bruit.   Cardiovascular:      Rate and Rhythm: Normal rate and regular rhythm.      Heart sounds: Normal heart sounds. No murmur heard.   No friction rub. No gallop.    Pulmonary:      Effort: Pulmonary effort is normal. No respiratory distress.      Breath sounds: Normal breath sounds. No wheezing, rhonchi or rales.    Musculoskeletal:         General: No deformity.      Cervical back: Neck supple.   Skin:     General: Skin is warm and dry.   Neurological:      Mental Status: She is alert and oriented to person, place, and time.      Gait: Gait normal.   Psychiatric:         Attention and Perception: She is attentive.         Mood and Affect: Mood is anxious and depressed. Mood is not elated. Affect is not labile, blunt, flat, angry, tearful or inappropriate.         Speech: Speech normal.         Behavior: Behavior normal.         Thought Content: Thought content normal.         Judgment: Judgment normal.         Assessment/Plan   Diagnoses and all orders for this visit:    1. Persistent fatigue after COVID-19 (Primary)  -     Ambulatory Referral to Allergy  -     CBC & Differential  -     Comprehensive Metabolic Panel  -     Hemoglobin A1c  -     Iron and TIBC  -     Ferritin  -     Vitamin B12 & Folate  -     Vitamin D 25 Hydroxy  -     Vitamin B7 (Biotin)  -     Vitamin B6  -     Vitamin B1, Whole Blood  -     TSH  -     T4, free  -     T3, Free  -     JHOAN With / DsDNA, RNP, Sjogrens A / B, Smith  -     C-reactive Protein  -     Cyclic Citrul Peptide Antibody, IgG / IgA  -     Rheumatoid Factor  -     Sedimentation Rate  -     Uric Acid    2. Persistent neurologic symptoms after COVID-19  -     Ambulatory Referral to Allergy  -     CBC & Differential  -     Comprehensive Metabolic Panel  -     Hemoglobin A1c  -     Iron and TIBC  -     Ferritin  -     Vitamin B12 & Folate  -     Vitamin D 25 Hydroxy  -     Vitamin B7 (Biotin)  -     Vitamin B6  -     Vitamin B1, Whole Blood  -     TSH  -     T4, free  -     T3, Free  -     JHOAN With / DsDNA, RNP, Sjogrens A / B, Smith  -     C-reactive Protein  -     Cyclic Citrul Peptide Antibody, IgG / IgA  -     Rheumatoid Factor  -     Sedimentation Rate  -     Uric Acid    3. Chronic migraine without aura without status migrainosus, not intractable  -     Ambulatory Referral to  Allergy  -     CBC & Differential  -     Comprehensive Metabolic Panel  -     Hemoglobin A1c  -     Iron and TIBC  -     Ferritin  -     Vitamin B12 & Folate  -     Vitamin D 25 Hydroxy  -     Vitamin B7 (Biotin)  -     Vitamin B6  -     Vitamin B1, Whole Blood  -     TSH  -     T4, free  -     T3, Free  -     JHOAN With / DsDNA, RNP, Sjogrens A / B, Smith  -     C-reactive Protein  -     Cyclic Citrul Peptide Antibody, IgG / IgA  -     Rheumatoid Factor  -     Sedimentation Rate  -     Uric Acid    4. Other specified cardiac arrhythmias  -     Ambulatory Referral to Allergy  -     CBC & Differential  -     Comprehensive Metabolic Panel  -     Hemoglobin A1c  -     Iron and TIBC  -     Ferritin  -     Vitamin B12 & Folate  -     Vitamin D 25 Hydroxy  -     Vitamin B7 (Biotin)  -     Vitamin B6  -     Vitamin B1, Whole Blood  -     TSH  -     T4, free  -     T3, Free  -     JHOAN With / DsDNA, RNP, Sjogrens A / B, Smith  -     C-reactive Protein  -     Cyclic Citrul Peptide Antibody, IgG / IgA  -     Rheumatoid Factor  -     Sedimentation Rate  -     Uric Acid    5. Brain fog  -     Ambulatory Referral to Allergy  -     CBC & Differential  -     Comprehensive Metabolic Panel  -     Hemoglobin A1c  -     Iron and TIBC  -     Ferritin  -     Vitamin B12 & Folate  -     Vitamin D 25 Hydroxy  -     Vitamin B7 (Biotin)  -     Vitamin B6  -     Vitamin B1, Whole Blood  -     TSH  -     T4, free  -     T3, Free  -     JHOAN With / DsDNA, RNP, Sjogrens A / B, Smith  -     C-reactive Protein  -     Cyclic Citrul Peptide Antibody, IgG / IgA  -     Rheumatoid Factor  -     Sedimentation Rate  -     Uric Acid    6. Other acute back pain  -     Ambulatory Referral to Allergy  -     CBC & Differential  -     Comprehensive Metabolic Panel  -     Hemoglobin A1c  -     Iron and TIBC  -     Ferritin  -     Vitamin B12 & Folate  -     Vitamin D 25 Hydroxy  -     Vitamin B7 (Biotin)  -     Vitamin B6  -     Vitamin B1, Whole Blood  -      TSH  -     T4, free  -     T3, Free  -     JHOAN With / DsDNA, RNP, Sjogrens A / B, Smith  -     C-reactive Protein  -     Cyclic Citrul Peptide Antibody, IgG / IgA  -     Rheumatoid Factor  -     Sedimentation Rate  -     Uric Acid    7. Neck pain  -     Ambulatory Referral to Allergy  -     CBC & Differential  -     Comprehensive Metabolic Panel  -     Hemoglobin A1c  -     Iron and TIBC  -     Ferritin  -     Vitamin B12 & Folate  -     Vitamin D 25 Hydroxy  -     Vitamin B7 (Biotin)  -     Vitamin B6  -     Vitamin B1, Whole Blood  -     TSH  -     T4, free  -     T3, Free  -     JHOAN With / DsDNA, RNP, Sjogrens A / B, Smith  -     C-reactive Protein  -     Cyclic Citrul Peptide Antibody, IgG / IgA  -     Rheumatoid Factor  -     Sedimentation Rate  -     Uric Acid    8. Myalgia  -     Ambulatory Referral to Allergy  -     CBC & Differential  -     Comprehensive Metabolic Panel  -     Hemoglobin A1c  -     Iron and TIBC  -     Ferritin  -     Vitamin B12 & Folate  -     Vitamin D 25 Hydroxy  -     Vitamin B7 (Biotin)  -     Vitamin B6  -     Vitamin B1, Whole Blood  -     TSH  -     T4, free  -     T3, Free  -     JHOAN With / DsDNA, RNP, Sjogrens A / B, Smith  -     C-reactive Protein  -     Cyclic Citrul Peptide Antibody, IgG / IgA  -     Rheumatoid Factor  -     Sedimentation Rate  -     Uric Acid    9. Multiple joint pain  -     Ambulatory Referral to Allergy  -     CBC & Differential  -     Comprehensive Metabolic Panel  -     Hemoglobin A1c  -     Iron and TIBC  -     Ferritin  -     Vitamin B12 & Folate  -     Vitamin D 25 Hydroxy  -     Vitamin B7 (Biotin)  -     Vitamin B6  -     Vitamin B1, Whole Blood  -     TSH  -     T4, free  -     T3, Free  -     JHOAN With / DsDNA, RNP, Sjogrens A / B, Smith  -     C-reactive Protein  -     Cyclic Citrul Peptide Antibody, IgG / IgA  -     Rheumatoid Factor  -     Sedimentation Rate  -     Uric Acid    10. Right foot pain  -     Ambulatory Referral to Allergy  -     CBC  & Differential  -     Comprehensive Metabolic Panel  -     Hemoglobin A1c  -     Iron and TIBC  -     Ferritin  -     Vitamin B12 & Folate  -     Vitamin D 25 Hydroxy  -     Vitamin B7 (Biotin)  -     Vitamin B6  -     Vitamin B1, Whole Blood  -     TSH  -     T4, free  -     T3, Free  -     JHOAN With / DsDNA, RNP, Sjogrens A / B, Smith  -     C-reactive Protein  -     Cyclic Citrul Peptide Antibody, IgG / IgA  -     Rheumatoid Factor  -     Sedimentation Rate  -     Uric Acid    11. Orbital mass  -     Ambulatory Referral to Allergy    12. Lacrimal cyst, left  -     Ambulatory Referral to Allergy    13. Post-COVID-19 syndrome  -     Ambulatory Referral to Allergy    14. Hypoxia  -     Ambulatory Referral to Allergy    15. Elevated C-reactive protein (CRP)  -     Ambulatory Referral to Allergy  -     CBC & Differential  -     Comprehensive Metabolic Panel  -     Hemoglobin A1c  -     Iron and TIBC  -     Ferritin  -     Vitamin B12 & Folate  -     Vitamin D 25 Hydroxy  -     Vitamin B7 (Biotin)  -     Vitamin B6  -     Vitamin B1, Whole Blood  -     TSH  -     T4, free  -     T3, Free  -     JHOAN With / DsDNA, RNP, Sjogrens A / B, Smith  -     C-reactive Protein  -     Cyclic Citrul Peptide Antibody, IgG / IgA  -     Rheumatoid Factor  -     Sedimentation Rate  -     Uric Acid    16. Hair loss  -     CBC & Differential  -     Comprehensive Metabolic Panel  -     Hemoglobin A1c  -     Iron and TIBC  -     Ferritin  -     Vitamin B12 & Folate  -     Vitamin D 25 Hydroxy  -     Vitamin B7 (Biotin)  -     Vitamin B6  -     Vitamin B1, Whole Blood  -     TSH  -     T4, free  -     T3, Free  -     JHOAN With / DsDNA, RNP, Sjogrens A / B, Smith  -     C-reactive Protein  -     Cyclic Citrul Peptide Antibody, IgG / IgA  -     Rheumatoid Factor  -     Sedimentation Rate  -     Uric Acid    17. 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 Elevated liver enzymes  -     Comprehensive Metabolic Panel    20. Dyslipidemia  -     Lipid Panel With LDL / HDL Ratio    21. Snoring        Assessment and Plan  Patient to continue physical therapy and mental health counseling, start speech and cognitive therapy, and to continue follow up with cardiology, neurology, infectious disease/ long term Covid clinic, and to see ophthalmology, sleep medicine, and allergist as referred. She will consider starting whole food plant based diet. Information given today. Follow up with me in 2-3 weeks. To be seen ASAP if worsening, new, or changing symptoms.     · History of Covid 19 infection, post-Covid syndrome- Patient has continued with debilitating symptoms as noted below, preventing her from safely returning to work and affecting all aspects of her daily life. Continue follow up with specialists and therapy as recommended.   · Hypoxia following Covid 19 infection-  She is having some slow, gradual improvement. Continue PT and monitoring at home.   · Tachycardia/ elevated BP, post-Covid arrhythmia- Continue follow up with cardiology as directed by them. Echocardiogram was ok. Awaiting Holter monitor. Patient to continue Atenolol 25 mg once daily and monitor BP, pulse. She has had no increased SOA with beta blocker and is tolerating well. I will also refer to sleep medicine.   · Fatigue following Covid 19 infection- Patient to continue mental health therapy, physical therapy, and will start cognitive therapy.   · Cognitive deficit/dysfunction- continue mental health therapy and the joaquim exercises recommended and start cognitive therapy as scheduled. Continue follow up with neurology as directed.    · Persistent headaches following Covid 19 infection- Continue follow up with neurology. She will decrease Topamax to 25 mg at bedtime for 1-2 weeks due to worsening cognitive function and paresthesias, however, she will  increase as tolerated. To use Imitrex as needed for acute headaches.   · Physical therapy- Continue PT as scheduled to work on strengthening, fatigue, tachycardia, hypoxia, and brain fog.   · Speech and cognitive therapy- Patient to start speech and cognitive therapy. I am hopeful to see some progress with this.  · Snoring- Since having Covid 19, she has started with significant snoring and has had headaches that wake her up at night, tachycardia, hypoxia, and significant brain fog and fatigue. I referred to sleep medicine. I will await evaluation and treatment recommendations pending sleep medicine.    · Generalized body aches, back aches, neck pain, and joint pain- I will have her check autoimmune testing and inflammatory markers. She should continue PT as well. I will refer to allergist, as I had another pos-covid adult who developed similar symptoms and was thought to have MIS-A. I will have them evaluate and make recommendations.     I spent 32 minutes cariting for Jocelyn Merlos on this date of service. This time includes time spent by me in the following activities as necessary: preparing for the visit, reviewing tests, specialists records and previous visits, obtaining and/or reviewing a separately obtained history, performing a medically appropriate exam and/or evaluation, counseling and educating the patient, family, garegiver, referring and/or communicating with other healthcare professionals, documenting information in the medical record, independently interpreting results and communicating that information with the patient, family, caregiver, and developing a medically appropriate treatment plan with consideration of other conditions, medications, and treatments.

## 2021-07-12 DIAGNOSIS — U09.9 POST-COVID-19 SYNDROME: ICD-10-CM

## 2021-07-12 DIAGNOSIS — F33.42 MAJOR DEPRESSIVE DISORDER, RECURRENT, IN FULL REMISSION (HCC): ICD-10-CM

## 2021-07-12 DIAGNOSIS — F41.9 ANXIETY: ICD-10-CM

## 2021-07-12 DIAGNOSIS — F43.22 ADJUSTMENT DISORDER WITH ANXIOUS MOOD: ICD-10-CM

## 2021-07-12 NOTE — TELEPHONE ENCOUNTER
Patient requesting 90 day be sent to [facility]. Sent to [facility] on 6/25/21.

## 2021-07-13 RX ORDER — ESCITALOPRAM OXALATE 10 MG/1
10 TABLET ORAL DAILY
Qty: 90 TABLET | Refills: 0 | Status: SHIPPED | OUTPATIENT
Start: 2021-07-13 | End: 2021-10-19

## 2021-08-16 PROBLEM — R00.2 PALPITATIONS: Status: ACTIVE | Noted: 2021-08-16

## 2021-08-30 ENCOUNTER — TELEPHONE (OUTPATIENT)
Dept: NEUROLOGY | Facility: CLINIC | Age: 31
End: 2021-08-30

## 2021-08-30 NOTE — TELEPHONE ENCOUNTER
Pt called answering service to cancel appt with Dr Hogan on 08/30/21. Called pt to reschedule but, had to leave a message to return call.

## 2021-09-15 ENCOUNTER — OFFICE VISIT (OUTPATIENT)
Dept: FAMILY MEDICINE CLINIC | Facility: CLINIC | Age: 31
End: 2021-09-15

## 2021-09-15 VITALS
SYSTOLIC BLOOD PRESSURE: 114 MMHG | TEMPERATURE: 97.6 F | RESPIRATION RATE: 18 BRPM | HEART RATE: 88 BPM | OXYGEN SATURATION: 98 % | HEIGHT: 68 IN | BODY MASS INDEX: 41.07 KG/M2 | DIASTOLIC BLOOD PRESSURE: 78 MMHG | WEIGHT: 271 LBS

## 2021-09-15 DIAGNOSIS — H05.89 ORBITAL MASS: ICD-10-CM

## 2021-09-15 DIAGNOSIS — H04.132: ICD-10-CM

## 2021-09-15 DIAGNOSIS — R41.89 BRAIN FOG: ICD-10-CM

## 2021-09-15 DIAGNOSIS — G43.709 CHRONIC MIGRAINE WITHOUT AURA WITHOUT STATUS MIGRAINOSUS, NOT INTRACTABLE: ICD-10-CM

## 2021-09-15 DIAGNOSIS — R42 DIZZINESS: Primary | ICD-10-CM

## 2021-09-15 PROCEDURE — 99214 OFFICE O/P EST MOD 30 MIN: CPT | Performed by: PHYSICIAN ASSISTANT

## 2021-09-15 RX ORDER — METHYLPREDNISOLONE 4 MG/1
TABLET ORAL
COMMUNITY
Start: 2021-08-12 | End: 2021-10-08

## 2021-09-20 ENCOUNTER — TELEPHONE (OUTPATIENT)
Dept: FAMILY MEDICINE CLINIC | Facility: CLINIC | Age: 31
End: 2021-09-20

## 2021-09-20 NOTE — TELEPHONE ENCOUNTER
Caller: Jocelyn Merlos    Relationship: Self    Best call back number: [phone]    What orders are you requesting (i.e. lab or imaging): LABS     In what timeframe would the patient need to come in: ASAP    Where will you receive your lab/imaging services: [facility]      Additional notes:

## 2021-10-08 ENCOUNTER — OFFICE VISIT (OUTPATIENT)
Dept: FAMILY MEDICINE CLINIC | Facility: CLINIC | Age: 31
End: 2021-10-08

## 2021-10-08 VITALS
TEMPERATURE: 97.1 F | HEART RATE: 79 BPM | SYSTOLIC BLOOD PRESSURE: 122 MMHG | HEIGHT: 68 IN | DIASTOLIC BLOOD PRESSURE: 74 MMHG | BODY MASS INDEX: 39.56 KG/M2 | OXYGEN SATURATION: 97 % | WEIGHT: 261 LBS

## 2021-10-08 DIAGNOSIS — R41.89 BRAIN FOG: ICD-10-CM

## 2021-10-08 DIAGNOSIS — F33.42 MAJOR DEPRESSIVE DISORDER, RECURRENT, IN FULL REMISSION (HCC): ICD-10-CM

## 2021-10-08 DIAGNOSIS — R42 DIZZINESS: Primary | ICD-10-CM

## 2021-10-08 PROCEDURE — 99214 OFFICE O/P EST MOD 30 MIN: CPT | Performed by: PHYSICIAN ASSISTANT

## 2021-10-08 RX ORDER — BUPROPION HYDROCHLORIDE 150 MG/1
150 TABLET ORAL EVERY MORNING
Qty: 30 TABLET | Refills: 0 | Status: SHIPPED | OUTPATIENT
Start: 2021-10-08 | End: 2021-11-07

## 2021-10-09 DIAGNOSIS — R74.8 ELEVATED LIVER ENZYMES: Primary | ICD-10-CM

## 2021-10-09 DIAGNOSIS — E78.2 MIXED HYPERLIPIDEMIA: ICD-10-CM

## 2021-10-09 DIAGNOSIS — Z14.8 HEMOCHROMATOSIS CARRIER: ICD-10-CM

## 2021-10-18 ENCOUNTER — OFFICE VISIT (OUTPATIENT)
Dept: NEUROLOGY | Facility: CLINIC | Age: 31
End: 2021-10-18

## 2021-10-18 VITALS
WEIGHT: 259 LBS | DIASTOLIC BLOOD PRESSURE: 84 MMHG | HEIGHT: 68 IN | HEART RATE: 76 BPM | SYSTOLIC BLOOD PRESSURE: 126 MMHG | OXYGEN SATURATION: 98 % | BODY MASS INDEX: 39.25 KG/M2

## 2021-10-18 DIAGNOSIS — U09.9 POST-COVID-19 SYNDROME: ICD-10-CM

## 2021-10-18 DIAGNOSIS — F33.42 MAJOR DEPRESSIVE DISORDER, RECURRENT, IN FULL REMISSION (HCC): ICD-10-CM

## 2021-10-18 DIAGNOSIS — F43.22 ADJUSTMENT DISORDER WITH ANXIOUS MOOD: ICD-10-CM

## 2021-10-18 DIAGNOSIS — G43.701 CHRONIC MIGRAINE WITHOUT AURA WITH STATUS MIGRAINOSUS, NOT INTRACTABLE: Primary | ICD-10-CM

## 2021-10-18 DIAGNOSIS — F41.9 ANXIETY: ICD-10-CM

## 2021-10-18 PROCEDURE — 99213 OFFICE O/P EST LOW 20 MIN: CPT | Performed by: PSYCHIATRY & NEUROLOGY

## 2021-10-18 RX ORDER — TOPIRAMATE 25 MG/1
25 TABLET ORAL 2 TIMES DAILY
Qty: 180 TABLET | Refills: 1 | Status: SHIPPED | OUTPATIENT
Start: 2021-10-18 | End: 2021-12-06 | Stop reason: SDUPTHER

## 2021-10-18 RX ORDER — TOPIRAMATE 25 MG/1
25 TABLET ORAL 2 TIMES DAILY
Qty: 180 TABLET | Refills: 2 | Status: SHIPPED | OUTPATIENT
Start: 2021-10-18 | End: 2021-10-18

## 2021-10-18 NOTE — PROGRESS NOTES
Chief Complaint  Migraine (LV: 5/24 on topirmate has helped , increased brain fog and dizziness, has seen ENT, and MRi is schedule)    Subjective          Jocelyn Merlos presents to [facility] for   HISTORY OF PRESENT ILLNESS:    Jocelyn Merlos is a 31 year old right handed woman who returns to neurology clinic for follow up evaluation and treatment of headaches.  She reports testing positive for COVID19 in 12/28/2021 and she had headaches as a symptom but started getting more migraines around 2/22/2021.  She does report history of migraines when she was 12 years old.  The headaches are located above her right eye and right side of her head with throbbing quality which she rates as 10/10 on pain scale 1-10 when most severe with associated light and sound sensitivity along with nausea without vomiting.  She does report some tearing of her right eye most recently with the headaches.  Headaches last 30 minutes to 19 hours in duration.  She does report snoring and is scheduled to have a sleep study done on 6/4/2021.  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  She had the 10/10 headaches 3 times since February and she was having daily migraines which have improved significantly since her initial visit on combination of topiramate for migraine prevention and Imitrex for acute treatment which she tells me she has not had to take since starting the topiramate.  There is family history of migraines in her mother and mother's parents.  She has tried propranolol and atenolol.  She has tried Tylenol and Excedrin which she thinks dulls the headaches.  She has tried Fioricet which she did not think was very helpful.   She denies history of kidney stones.  She is doing overall very well with her migraines.  She had a sleep study done which was not diagnostic of EVERETT.   "       Past Medical History:   Diagnosis Date   • Abnormal Pap smear of cervix    • Adjustment disorder with mixed anxiety and depressed mood 2016   • Palpitations         Family History   Problem Relation Age of Onset   • Skin cancer Mother    • Depression Mother    • Anxiety disorder Mother    • Stroke Mother    • Aneurysm Mother    • Alcohol abuse Mother    • Migraines Mother    • Stroke Maternal Grandmother         Social History     Socioeconomic History   • Marital status:    Tobacco Use   • Smoking status: Former Smoker     Packs/day: 0.25     Years: 0.50     Pack years: 0.12     Types: Cigarettes     Quit date: 2015     Years since quittin.5   • Smokeless tobacco: Never Used   Substance and Sexual Activity   • Alcohol use: Yes     Alcohol/week: 4.0 - 5.0 standard drinks     Types: 4 - 5 Standard drinks or equivalent per week     Comment: Socially./caffeine use    • Drug use: No   • Sexual activity: Yes     Partners: Male        I have personally reviewed the ROS as stated below.     Review of Systems   Neurological: Positive for dizziness, light-headedness and headache. Negative for tremors, seizures, syncope, facial asymmetry, speech difficulty, weakness, numbness, memory problem and confusion.   Psychiatric/Behavioral: Positive for decreased concentration. Negative for agitation, behavioral problems, dysphoric mood, hallucinations, self-injury, sleep disturbance, suicidal ideas, negative for hyperactivity, depressed mood and stress. The patient is nervous/anxious.         Objective   Vital Signs:   /84 (BP Location: Right arm, Patient Position: Sitting)   Pulse 76   Ht 172 cm (67.72\")   Wt 117 kg (259 lb)   SpO2 98%   BMI 39.71 kg/m²       PHYSICAL EXAM:    General   Mental Status - Alert. General Appearance - Over weight, Well groomed, Oriented and Cooperative. Orientation - Oriented X3.       Head and Neck  Head - normocephalic, atraumatic with no lesions or palpable " masses.  Neck    Global Assessment - supple.       Eye   Sclera/Conjunctiva - Bilateral - Normal.    ENMT  Mouth and Throat   Oral Cavity/Oropharynx: Oropharynx - the soft palate,uvula and tongue are normal in appearance.    Chest and Lung Exam   Chest - lung clear to auscultation bilaterally.    Cardiovascular   Cardiovascular examination reveals  - normal heart sounds, regular rate and rhythm.    Neurologic   Mental Status: Speech - Normal. Cognitive function - appropriate fund of knowledge. No impairment of attention, Impairment of concentration, impairment of long term memory or impairment of short term memory.  Cranial Nerves:   II Optic: Visual acuity - Left - Normal. Right - Normal. Visual fields - Normal (to confrontation).  III Oculomotor: Pupillary constriction - Left - Normal. Right - Normal.  VII Facial: - Normal Bilaterally.  VIII Acoustic - Bilateral - Hearing normal and (Hearing tested by finger rub).   IX Glossopharyngeal / X Vagus - Normal.  XI Accessory: Trapezius - Bilateral - Normal. Sternocleidomastoid - Bilateral - Normal.  XII Hypoglossal - Bilateral - Normal.  Eye Movements: - Normal Bilaterally.  Sensory:   Light Touch: Intact - Globally.  Motor:   Bulk and Contour: - Normal.  Tone: - Normal.  Tremor: Not present.  Strength: 5/5 normal muscle strength - All Muscles.   General Assessment of Reflexes: - deep tendon reflexes are normal. Coordination - No Impairment of finger-to-nose or Impairment of rapid alternating movements. Gait - Normal.       Result Review :                 Assessment and Plan    Problem List Items Addressed This Visit        Neuro    Headache, migraine - Primary    Current Assessment & Plan     31 year old woman with chronic migraine headaches which have responded very well to treatment with topiramate.  She feels that her headaches became more frequent and severe following COVID19 infection.  She had a brain MRI scan done on 3/30/2021 which demonstrated a dermoid cyst  in the left orbit which she has since had surgically removed and her brain MRI images otherwise look normal. She has tried beta blockers for her migraines which have not been effective.  I will continue her on topiramate for migraine prevention after discussing potential side effects including risk for birth defects if she were to get pregnant on this medicine.  She is not taking the sumatriptan currently.  Discussed potential side effects.  Also discussed migraine triggers and lifestyle modifications on her visit today and provided patient education information on these.  She does not have EVERETT.  She has had some dizziness for which she is being evaluated by ENT for possible BPPV.  I'll see her back in 4 months to reevaluate.           Relevant Medications    topiramate (TOPAMAX) 25 MG tablet              Follow Up   Return in about 4 months (around 2/18/2022).  Patient was given instructions and counseling regarding her condition or for health maintenance advice. Please see specific information pulled into the AVS if appropriate.

## 2021-10-18 NOTE — ASSESSMENT & PLAN NOTE
31 year old woman with chronic migraine headaches which have responded very well to treatment with topiramate.  She feels that her headaches became more frequent and severe following COVID19 infection.  She had a brain MRI scan done on 3/30/2021 which demonstrated a dermoid cyst in the left orbit which she has since had surgically removed and her brain MRI images otherwise look normal. She has tried beta blockers for her migraines which have not been effective.  I will continue her on topiramate for migraine prevention after discussing potential side effects including risk for birth defects if she were to get pregnant on this medicine.  She is not taking the sumatriptan currently.  Discussed potential side effects.  Also discussed migraine triggers and lifestyle modifications on her visit today and provided patient education information on these.  She does not have EVERETT.  She has had some dizziness for which she is being evaluated by ENT for possible BPPV.  I'll see her back in 4 months to reevaluate.

## 2021-10-19 RX ORDER — ESCITALOPRAM OXALATE 10 MG/1
TABLET ORAL
Qty: 90 TABLET | Refills: 1 | Status: SHIPPED | OUTPATIENT
Start: 2021-10-19 | End: 2022-06-10 | Stop reason: SDUPTHER

## 2021-10-21 ENCOUNTER — HOSPITAL ENCOUNTER (OUTPATIENT)
Dept: MRI IMAGING | Facility: HOSPITAL | Age: 31
Discharge: HOME | End: 2021-10-21
Admitting: PHYSICIAN ASSISTANT

## 2021-10-21 DIAGNOSIS — H05.89 ORBITAL MASS: ICD-10-CM

## 2021-10-21 DIAGNOSIS — G43.709 CHRONIC MIGRAINE WITHOUT AURA WITHOUT STATUS MIGRAINOSUS, NOT INTRACTABLE: ICD-10-CM

## 2021-10-21 DIAGNOSIS — R41.89 BRAIN FOG: ICD-10-CM

## 2021-10-21 DIAGNOSIS — H04.132: ICD-10-CM

## 2021-10-21 DIAGNOSIS — R42 DIZZINESS: ICD-10-CM

## 2021-10-21 PROCEDURE — 70553 MRI BRAIN STEM W/O & W/DYE: CPT

## 2021-10-21 PROCEDURE — A9577 INJ MULTIHANCE: HCPCS | Performed by: PHYSICIAN ASSISTANT

## 2021-10-21 PROCEDURE — 0 GADOBENATE DIMEGLUMINE 529 MG/ML SOLUTION: Performed by: PHYSICIAN ASSISTANT

## 2021-10-21 RX ADMIN — GADOBENATE DIMEGLUMINE 20 ML: 529 INJECTION, SOLUTION INTRAVENOUS at 10:50

## 2021-11-03 ENCOUNTER — OFFICE VISIT (OUTPATIENT)
Dept: FAMILY MEDICINE CLINIC | Facility: CLINIC | Age: 31
End: 2021-11-03

## 2021-11-03 VITALS
TEMPERATURE: 97.4 F | OXYGEN SATURATION: 98 % | BODY MASS INDEX: 40.01 KG/M2 | HEART RATE: 89 BPM | SYSTOLIC BLOOD PRESSURE: 118 MMHG | WEIGHT: 264 LBS | DIASTOLIC BLOOD PRESSURE: 84 MMHG | HEIGHT: 68 IN

## 2021-11-03 DIAGNOSIS — E79.0 HYPERURICEMIA: ICD-10-CM

## 2021-11-03 DIAGNOSIS — R41.89 BRAIN FOG: ICD-10-CM

## 2021-11-03 DIAGNOSIS — F41.9 ANXIETY: ICD-10-CM

## 2021-11-03 DIAGNOSIS — E55.9 VITAMIN D DEFICIENCY: ICD-10-CM

## 2021-11-03 DIAGNOSIS — F33.42 MAJOR DEPRESSIVE DISORDER, RECURRENT, IN FULL REMISSION (HCC): ICD-10-CM

## 2021-11-03 DIAGNOSIS — I49.8 OTHER SPECIFIED CARDIAC ARRHYTHMIAS: ICD-10-CM

## 2021-11-03 DIAGNOSIS — D48.5 NEOPLASM OF UNCERTAIN BEHAVIOR OF SKIN OF FACE: ICD-10-CM

## 2021-11-03 DIAGNOSIS — U09.9 PERSISTENT FATIGUE AFTER COVID-19: ICD-10-CM

## 2021-11-03 DIAGNOSIS — E78.2 MIXED HYPERLIPIDEMIA: ICD-10-CM

## 2021-11-03 DIAGNOSIS — R74.8 ELEVATED LIVER ENZYMES: ICD-10-CM

## 2021-11-03 DIAGNOSIS — G43.709 CHRONIC MIGRAINE WITHOUT AURA WITHOUT STATUS MIGRAINOSUS, NOT INTRACTABLE: ICD-10-CM

## 2021-11-03 DIAGNOSIS — Z14.8 HEMOCHROMATOSIS CARRIER: ICD-10-CM

## 2021-11-03 DIAGNOSIS — H05.89 ORBITAL MASS: ICD-10-CM

## 2021-11-03 DIAGNOSIS — R42 DIZZINESS: Primary | ICD-10-CM

## 2021-11-03 DIAGNOSIS — R06.83 SNORING: ICD-10-CM

## 2021-11-03 DIAGNOSIS — U09.9 PERSISTENT NEUROLOGIC SYMPTOMS AFTER COVID-19: ICD-10-CM

## 2021-11-03 DIAGNOSIS — M25.50 MULTIPLE JOINT PAIN: ICD-10-CM

## 2021-11-03 DIAGNOSIS — U09.9 POST-COVID-19 SYNDROME: ICD-10-CM

## 2021-11-03 DIAGNOSIS — F43.22 ADJUSTMENT DISORDER WITH ANXIOUS MOOD: ICD-10-CM

## 2021-11-03 DIAGNOSIS — R53.83 PERSISTENT FATIGUE AFTER COVID-19: ICD-10-CM

## 2021-11-03 DIAGNOSIS — R29.90 PERSISTENT NEUROLOGIC SYMPTOMS AFTER COVID-19: ICD-10-CM

## 2021-11-03 PROCEDURE — 99214 OFFICE O/P EST MOD 30 MIN: CPT | Performed by: PHYSICIAN ASSISTANT

## 2021-11-03 NOTE — PROGRESS NOTES
Subjective   Jocelyn Merlos is a 31 y.o. female who presents today in follow up of dizziness and moods.    Dizziness  Associated symptoms include fatigue (improving). Arthralgias: improving. Headaches: improved with treatment. Myalgias: improving. Nausea: no constant nausea. Weakness: improving.   DepressionPatient presents with the following symptoms: confusion (improving), decreased concentration (improving), nervousness/anxiety (worse with concerns of surgery) and palpitations (improving).  Patient is not experiencing: suicidal ideas.  Shortness of breath: improving.     NOTE TO [facility] [phone]- ayo Ms Gregorio    Dizziness- seen by neurologist- he advised she go to ENT and advised she follow up in a couple months. She then went to ENT twice and had increased testing. They advised everything was ok. They thought dizziness was positional and treated for vertigo- positional treatment. No improvement.   She went to ENT- they did hearing test and lay down and sit up without concerns. They want to come back and do testing that takes 1 hour. They have seen a lot of people that struggle with migraines after Covid have dizziness.Last month, she had a terrible period- dizziness, nauseated, felt like she would not make it from the toilet to the bed, cold sweats, pale, felt like death for a full day then improved. She has always struggled with bad cramping. This was much worse. No contraception. Previously Nexplanon was not good. They could not get IUD in and has felt poorly on birth control. Patient's step daughter had mood issues on patch.   MRI scheduled 10/21/2021.   Appt with neurology 10/18/2021  Eye  Surgeon appt 10/26/2021.   ENT follow up 10/26/2021.  · She had dizziness prior to eye surgery but has had significant worsening after surgery. She went back after surgery ,told him she was dizzy, and he told her that was normal and that she lost a lot of blood in surgery.   · She had 2 accidents on [employer].   · The  1st was 8/31/2021- it was her 1st week back after her accident. She was picking up parts that were up high and she gets most dizzy and wobbly. She thinks she may have wobbled and dropped the parts.   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 When she lifted it, she has to tilt it back. 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 They have boxes hanging down from the line. The She has never hit it before. She was due for physical last night. They asked if she had dizziness or light-headedness. She was failed because she has dizziness. 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 No associated BP, palp.   · Most dizzy when she is bending, looking up, doing thing around her house. Work advised she see me with a note that gave restrictions.   · She has been cutting atenolol in 1/2- she then felt CP or heart beating fast. She had her BP checked and had elevated BP 16 systolic. After 5-6 days, she started taking whole Atenolol and had improvement in BP. No change in dizziness with elevated or normal BP.   · She continues with palpitations- cleared by cardiology. She reports she wonders what causes it.     Depression and anxiety- feels like the Lexapro has helped- feels like it does not help with major situations but does help with daily symptoms. wellbutrin- not sure she has adjusted to it and still feeling tired in the day. She thinks it may help daily when things are normal. Since she has increased stressors and uncertainties, she is not sure it is controlled. She is seeing her therapist weekly and helps- feels better upon leaving every week. If it starts to build up again through the week. It is almost time to see her again.    · Lexapro- for a while, having bad dreams. The dreams improved but she still has them intermittent that she dreams constantly. She has had improvement in anxiety daily but if something happens and she has a  stressful situation, she still has some panic and emotional issues. Still having counseling every week. She is working on things to do when she has these episodes. Reminding herself that it will be ok and why it will be ok and why it was a good thing. She mentioned increasing Lexapro.   · She previously reported she had more depression- she gets down on herself about her year. Her therapist thought that she may need to increase Lexapro. Patient reported she was not depressed or sad all the time but had days that she was more down. No SI/HI.   · She then had increased depression- she has had decreased interest in anything, exhaustion, not wanting to be around people, sleeping a lot, not keeping up with home chores or things she would normally do at home. It was not abnormal to be in her feelings or in a funk for a few days but she had severe symptoms. Not wanting to shower or take care of herself. She has had increased depression. Talked to therapist about her moods and they discussed possible changes in medication. She felt some better after talking but she still has depression. Therapist was out a week but follow up 10/19/2021. She thought that contributes to her depression- she is tired of trying to figure out what is contributing to her symptoms since Covid. She previously knew her body and when something was going on. She does not now and is bothersome.  · Patient knows her father struggled with his mental health, and she worried that she could have issues.   · She has had bad anxiety since her grandfather , when she has anxiety, playing her grandfather's death over and over.   · She moved a couple years ago and started worrying her house will burn down.   · With anxiety in the past (prior to Covid 19 infection), she was seeing a therapist and reported she was doing much better. She would occasionally miss work with anxiety/depression but had FMLA. She usually did well and was able to work, do things  "socially.  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 When she started to feel better, something mentally started crashing down. As she started to feel better physically, she started \"freaking out\". She was having panic attacks.   · Patient related the feeling to being on a plane to [place] years ago, she felt like she was back on the plane and ready to crash.   · She worried about going back to work without FMLA and worries about losing her job. She reports her friend at work told her they are firing people \"left and right\". She thinks she will lose her job and does not have FMLA  (She has to acquire so much leave to qualify for FMLA).    · She had anxiety doing anything, worst at night with laying down. She thought something could be wrong with her heart. Cardiology cleared her but she feels like something is wrong with her heart when she has episodes. She tries to hold it in.   · She was doing well on Lexapro. She increased to 10 mg once daily. She did have significant fatigue upon starting medication, however, this improved. She continued with anxiety, however, she was able to calm herself down many times. She was working with mental health therapist twice weekly and was starting to improve. She felt she should try to go back to work slowly. There may be a layoff but she could go in to work but the line will not be moving, all the people were not there, and she could come and go- did not have to work full 12 hours. She thought this would be more helpful to try to go in and start trying to do her job without the pressure and interaction with others. She reported she would be able to leave if she had a panic attack. Despite still having anxiety, she thought this would improve some if she could get back to work and on a routine. She wanted to try.  · She then had increased depression- she has had decreased interest in anything, exhaustion, not wanting " "to be around people, sleeping a lot, not keeping up with home chores or things she would normally do at home. It was not abnormal to be in her feelings or in a funk for a few days but she had severe symptoms. Not wanting to shower or take care of herself. She has had increased depression. Talked to therapist about her moods and they discussed possible changes in medication. She felt some better after talking but she still has depression. Therapist was out a week but follow up 10/19/2021. She thought that contributes to her depression- she is tired of trying to figure out what is contributing to her symptoms since Covid. She previously knew her body and when something was going on. She does not now and is bothersome.    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 She had follow up with Dr Bowman and was advised PRN follow up 10/28/2021. He did not seem to think her dizziness was related to her surgery.   · Patient was seen by Dr Bowman who recommended removal of mass. She was very nervous about surgery. Patient reports she was so shocked that she could not think of questions then had questions about incisions, recovery, and others.   · Surgeon told her not a big deal and 1-2 hour surgery. She was in surgery for 3-4 hours. Had trouble waking up. They advised they could keep her but that she would do better at home. Still numb above her eye and could not raise her left eyebrow. She was told that it should improve in 6 months.     Skin lesion- Spot on her face that has been there- looked like a dark, Aging\" spot. However, she noticed it to be larger, more raise, and bothersome in the last week. Also would like to consider dermatology for right forearm scar that is raised to see about removal. Dermatology a long time ago but unsure who she saw.     Mixed hyperlipidemia- Watched [place]- not eating meat and changed to almond milk. She has not stopped " cheese/dairy completely yet. Feels she can do it. She is not completely plant based but is her goal.   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  Elevated liver function tests- patient withuot regular NSAIDS, Tylenol, or alcohol.    Covid 19 vaccine (Pfizer)- got initial vaccine 4/12/2021. She was tired that day then back to baseline.   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  Patient had significant symptoms following Covid 19 infection. She developed symptoms of Covid 19 12/24/2020 and tested positive 12/28/2020. She had hypoxia and tachycardia with ambulation, SOA, chest tightness, weakness, fatigue, brain fog, diarrhea, and nausea with resolution of severe rash. She went to ER with oxygen saturation of 88%. CTA chest with mild pneumonia and negative for PE. Inflammatory markers were not performed. They did not evaluate symptoms with ambulation and resting vital signs were ok.  While on video visit, patient had oxygen saturation of 97% and pulse in 90s then with relatively little ambulation/ exertion, she had pulse 127 and oxygen saturation down to 88%. In office, she had a normal resting pulse that increased to 124 with ambulation and oxygen saturation decreased from 95% to 90% with ambulation.     She had persistent tachycardia and hypoxia with walking short distances (that is slowly improving), elevated BP that improved, cognitive impairment, fatigue, headaches, and feeling overall poorly. She was seen by the long term Covid infectious disease clinic, had additional labs, was referred to physical therapy, speech, and cognitive therapy, and was advised to contact her cardiologist for follow up with persistent tachycardia with ambulation. She underwent PTand speech/cognitive  therapy. She called cardiology to schedule testing ordered at appt prior to Covid 19 then scheduled follow up evaluation of post-Covid tachycardia/ arrhythmia as directed. He changed her propranolol to atenolol for tachycardia and is awaiting Holter monitor and echocardiogram. She is tolerating medication well.     I referred for MRI brain and to neurology for severe headaches. MRI brain and neurology appt scheduled for 3/30/2021, however, she has had severe, debilitating headaches. She had to go to ER but had no testing performed there. Excedrin Migraine, Fiorcet, Zofran, and narcotic pain medication have not helped headaches. I discussed beta blocker, Elavil, and Topamax at her last visit, however, she was concerned about possible AE with medication, as fatigue, arrhythmia, and cognitive dysfunction are already some of her biggest issues. She was unable to tolerate her headaches and felt the possible AE with medication were worth the risk for improvement in headaches. She tried Inderal LA 60 mg nightly without improvement and was started on Topamax by neurology. She has had significant AE with medication. I had her decrease to 25 mg at bedtime then she was able to increase again to BID and is now tolerating better. She has had return to post-covid baseline.     I contacted infectious disease provider for recommendations for return to work vs work restrictions. She recommended either waiting to return to work until she had some improvement with therapy or may try light duty if available to see if she will tolerate this until she has improvement. 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    She returned to work and noted multiple cognitive issues. She reports they had her work 2 hours then sent her home with no work available. 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 She felt she needed more cognitive therapy to safely return to work. I had her remain off work until her follow up with long term Covid clinic. She has been advised to remain in contact with her employer to ensure her job is secure and leave is approved.     Hypoxia- improved. Not feeling as bad with SOA but still notices some.   lowest has been about 90s% and maybe 88% if pushing it. Improving gradually  · She had hypoxia and tachycardia with ambulation, SOA, chest tightness, weakness, fatigue, brain fog, diarrhea, and nausea with resolution of severe rash.   · She went to ER with oxygen saturation of 88%. CTA chest with mild pneumonia and negative for PE. Inflammatory markers were not performed. They did not evaluate symptoms with ambulation and resting vital signs were ok.    · While on video visit, patient had oxygen saturation of 97% and pulse in 90s then with relatively little ambulation/ exertion, she had pulse 127 and oxygen saturation down to 88%. In office, she had a normal resting pulse that increased to 124 with ambulation and oxygen saturation decreased from 95% to 90% with ambulation.   Tachycardia/ elevated BP- has had more controlled pulse on atenolol- tolerating without AE.  · Pulse with activity- at PT around 120, she stops her and if pushes self at home she gets up to 150. BP and pulse have improved some but continues with pulse that is  "elevated but oxygen has stayed above 90.   · She has persistent tachycardia and hypoxia with walking short distances (that is slowly improving), elevated BP that is improving  · She called cardiology to schedule testing ordered at appt prior to Covid 19 then scheduled follow up evaluation of post-Covid tachycardia/ arrhythmia as directed.   · He changed her propranolol to atenolol for tachycardia and is awaiting Holter monitor and echocardiogram. She is tolerating medication well.   · Cardiology- changed inderal to atenolol.   · Echo- ok.   Fatigue- still struggling. Now having to fight to get up and do things but not as much. She has had improvement but not resolution.   Cognitive deficit/dysfunction- has improved significantly. She still notices some but not as severe.   · Still \"brain farts\", short term memory issues, headaches, oxygen levels- not sure if Topamax has amplified brain fog but has been prevalent.   · This symptom was her most worrisome issue.  She was struggling with cognitive issues which cause increased anxiety.  Headaches- has had improvement in headaches on Topamax. Decreased as directed then was able to increase to twice daily again and has had improved headaches and no worsening neurological/cognitive symptoms. She has had no severe, debilitating headaches and not waking up with headaches.   · She has had right sided headache and headache behind her eyes, nausea with vomiting, eye was red and watering, right sided facial and eyelid drooping, eyebrow was not normal. She reports it wakes her up from sleep.   · She went to ER 3/11/2021 with drooping eyelid, bloodshot eye, watering. She was in tears due to severe pain but they did no testing. By the time they gave her a headache cocktail, her headache was already improving. She was ready to go home because she did not feel she was getting any help. They discharged her with Fiorcet and Zofran that have not helped her headaches.   · She was given " "pain medication from previous foot surgery and never took it. She tried it and it makes her sick but did not help her pain.  · I referred for MRI brain and to neurology for severe headaches. She had severe, debilitating headaches.   · I initially discussed beta blocker, Elavil, and Topamax, however, we were concerned about possible AE with medication, as fatigue, arrhythmia, and cognitive dysfunction are already some of her biggest issues. She was unable to tolerate her headaches and felt the possible AE with medication were worth the risk for improvement in headaches.   · She tried Inderal LA 60 mg nightly without improvement.   · Headaches occurred almost always when she was asleep. She took Tylenol and tried Excedrin Migraine. They were waking her up at night, which was abnormal for her.    · She was seen by neurology 3/30/2021 started on Topamax 25 mg twice daily and Imitrex as needed for headaches by neurology. She has had significant symptoms with this. I discussed possibly cutting dose in 1/2 initially to 25 mg at bedtime then possible increase in dosage as tolerated.   · I referred to sleep medicine as recommended by neurology.   · Topamax helped headaches. She had a headache all day once but otherwise had no other bad headaches. She had paresthesias and worsening cognitive funciton, brain fog, ability to think clearly. I asked that she decrease to nightly dosing only for a couple weeks then could increase to twice daily if tolerated better.   Snoring- Has never been someone who snored but since Covid, she is snoring loud, sometimes waking her . Negative Sleep study- no [facility].   Hair loss- slowed down.   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 When she started to feel better, something mentally started crashing down. As she started to feel better physically, she started \"freaking out\". She was having panic attacks.   · Patient related the " "feeling to being on a plane to [place] years ago, she felt like she was back on the plane and ready to crash.   · She worried about going back to work without FMLA and worries about losing her job. She reports her friend at work told her they are firing people \"left and right\". She thinks she will lose her job and does not have FMLA  (She has to acquire so much leave to qualify for FMLA).    · She had anxiety doing anything, worst at night with laying down. She thought something could be wrong with her heart. Cardiology cleared her but she feels like something is wrong with her heart when she has episodes. She tries to hold it in.   · She was doing well on Lexapro. She increased to 10 mg once daily. She did have significant fatigue upon starting medication, however, this improved. She continued with anxiety, however, she was able to calm herself down many times. She was working with mental health therapist twice weekly and was starting to improve. She felt she should try to go back to work slowly. There may be a layoff but she could go in to work but the line will not be moving, all the people were not there, and she could come and go- did not have to work full 12 hours. She thought this would be more helpful to try to go in and start trying to do her job without the pressure and interaction with others. She reported she would be able to leave if she had a panic attack. Despite still having anxiety, she thought this would improve some if she could get back to work and on a routine. She wanted to try.  · She then had increased depression- she has had decreased interest in anything, exhaustion, not wanting to be around people, sleeping a lot, not keeping up with home chores or things she would normally do at home. It was not abnormal to be in her feelings or in a funk for a few days but she had severe symptoms. Not wanting to shower or take care of herself. She has had increased depression. Talked to therapist about " her moods and they discussed possible changes in medication. She felt some better after talking but she still has depression. Therapist was out a week but follow up 10/19/2021. She thought that contributes to her depression- she is tired of trying to figure out what is contributing to her symptoms since Covid. She previously knew her body and when something was going on. She does not now and is bothersome.    Joint pain, back and neck pain- She has had improvement in the pain. Still not back to baseline but improved.   Foot that had tendon release surgery hurt the worst- some improvement in other pain.   When she was a kid, she had a tubing accident but had hip pain after that. He hip hurt more after Covid. No recommendations from PT. States they did not have any additional treatment.      Therapy- still in mental health counseling.   · Physical therapy- once weekly once she went back to work. When she was off work, she went 2-3 x. Has been walking with her dog per PT but she was scared of triggering a migraine because she was scared that a bump or anything would cause migraine.   · Speech and cognitive therapy- There was initially a mistake on their part. Speech therapy- told her none of their other patients went back to full time. Other people had gone back 4 hours per day and gradually increased to 6 then 8 hours. They were concerned they would not be able to go back 12 hours.    · Mental health counseling- seeing her therapist and she has her using an joaquim for relaxation and mind sharpness- doing that once daily.    Return to work- 5/11/2021 -she tried to return to work again and had a panic attack.  She started having severe anxiety about doing her job, remembering, and being able to function at work.  She has never had anything this severe in the past.  She went to work but had to leave.  She previously went back to work and had to leave after 2 hours. She reports they asked her to tell someone something and  she couldn't remember who that was. They thought this was someone she should know but she had no idea. Also, the process of going into work- line, temperature, getting mask- couldn't remember how to do it. She states it was like she was a new hire. Things that she thought should have come back as soon as she saw it did not come back. They had her sit at a picnic table x 2 hours until they decided she should go home on no work available. They still have the heat on and with the heat and her mask, she felt claustrophobic. No other symptoms that she is aware.     Patient's specialists:  Bariatrics- Dr Noah Koroma- last appt 5/2012 in follow up of [facility].   Cardiology -Dr. Negrete-last appointment 5/2021 for palpitations, hypertension, and chest pain. Hold off on stress test since she was improving. Continue medication for a couple months then re-evaluate. Consider weaning atenolol but if she needs medication for BP, she may need to continue beta blocker and consider labetolol with desire to conceive. Follow up in 3 months. Appt scheduled 11/29/2021  · She had been seen 11/2020 for atypical chest pain. Echo, Holter, and stress test ordered.    · She was seen again after Covid 19 with tachycardia, SOA. They held stress test with post Covid symptoms. Started atenolol 25 mg daily for elevated blood pressure and tachycardia.    · 4/2021- Holter monitor normal.   · 3/2021- Echo normal.   GI- Dr Jensen- appt 11/18/2021 for elevated LFT.   Infectious disease-MONICA Sawyer-last appointment 4/2021 in follow-up of COVID-19 infection.  Diagnosed with post viral fatigue syndrome and brain fog.  Advised physical therapy, cognitive therapy, and advised follow-up with cardiology for tachycardia.  Performed additional labs. 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  Follow-up PRN.  Neurology-Dr Hogan-last appointment 10/2021 for migraine headache-headaches post Covid and  dizziness.  Headaches improved on Topamax 25 mg twice daily- continue medication.  Also given Imitrex. Neagative sleep study. See ENT for dizziness. Follow-up in 4 months.  OB/GYN-Dr. Giles-last appointment 1/2020 for annual exam/well woman exam.  Pap completed.  Follow-up in 1 year.  Sleep medicine-Dr. Pantoja-last appt 5/10/2021 for evaluation. Home sleep study 6/2021- no EVERETT.   Speech therapy-started 4/27/2021 for speech and cognitive therapy. She was discharged the end of 5/2021. She has noticed improvement.   Ophthalmology-Dr. Heydi Vera-last appointment 4/2021 for abnormal MRI orbit.  Referred to Dr. Bowman.  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 Follow up PRN.    ENT- Rita Johnson, MONICA- last appt 10/26/2021 for dizziness. Epley maneuver did not work. Advised she get a mouth guard and take Magnesium oxide 400 mg QHS. Follow up if persistent symptoms.   Allergist- Dr Shelia Mc- went 4/19/2021- and they wanted to change Atenolol to Nadolol and gave Zyrtec, Nasacort, and albuterol as needed. Allergy testing- allergic to outdoor issues and not indoor things. He asked her worst symptoms. He was concerned about Topamax- she did cut back to nightly. She had worsening headaches (not severe but continued with headaches). States when she increased again, she feels back to baseline prior to starting Topamax.     The following portions of the patient's history were reviewed and updated as appropriate: allergies, current medications, past family history, past medical history, past social history, past surgical history and problem list.    Review of Systems   Constitutional: Positive for fatigue (improving).   Respiratory: Shortness of breath: improving.    Cardiovascular: Positive for palpitations (improving).   Gastrointestinal: Nausea: no constant nausea.   Genitourinary: Negative.    Musculoskeletal: Arthralgias: improving.  Myalgias: improving.   Neurological: Positive for dizziness. Negative for seizures and syncope. Facial asymmetry: improved. Weakness: improving. Light-headedness: improved. Headaches: improved with treatment.   Psychiatric/Behavioral: Positive for confusion (improving), decreased concentration (improving), dysphoric mood (improving) and sleep disturbance (new- increased nightly snoring since Covid 19). Negative for self-injury and suicidal ideas. The patient is nervous/anxious (worse with concerns of surgery).         Brain fog improving       Objective   Vitals:    11/03/21 1541   BP: 118/84   Pulse: 89   Temp: 97.4 °F (36.3 °C)   SpO2: 98%     Body mass index is 40.48 kg/m².    Physical Exam  Vitals and nursing note reviewed.   Constitutional:       General: She is not in acute distress.     Appearance: Normal appearance. She is well-developed.   HENT:      Head: Normocephalic and atraumatic.      Right Ear: External ear normal.      Left Ear: External ear normal.      Nose: Nose normal.   Eyes:      General: Lids are normal.      Conjunctiva/sclera: Conjunctivae normal.   Neck:      Vascular: No carotid bruit.   Cardiovascular:      Rate and Rhythm: Normal rate and regular rhythm.      Pulses: Normal pulses.      Heart sounds: Normal heart sounds. No murmur heard.  No friction rub. No gallop.    Pulmonary:      Effort: Pulmonary effort is normal. No respiratory distress.      Breath sounds: Normal breath sounds. No wheezing, rhonchi or rales.   Musculoskeletal:         General: No deformity.      Cervical back: Neck supple.   Skin:     General: Skin is warm and dry.   Neurological:      General: No focal deficit present.      Mental Status: She is alert and oriented to person, place, and time. Mental status is at baseline.      Gait: Gait normal.   Psychiatric:         Mood and Affect: Affect normal. Mood is anxious and depressed.         Speech: Speech normal.         Behavior: Behavior normal.         Thought  Content: Thought content normal.         Cognition and Memory: Cognition normal.         Judgment: Judgment normal.         Assessment/Plan   Diagnoses and all orders for this visit:    1. Dizziness (Primary)    2. Major depressive disorder, recurrent, in full remission (HCC)    3. Anxiety    4. Adjustment disorder with anxious mood    5. Orbital mass    6. Neoplasm of uncertain behavior of skin of face    7. Mixed hyperlipidemia    8. Vitamin D deficiency    9. Elevated liver enzymes    10. Hemochromatosis carrier    11. Hyperuricemia    12. Post-COVID-19 syndrome    13. Chronic migraine without aura without status migrainosus, not intractable    14. Brain fog    15. Persistent neurologic symptoms after COVID-19    16. Other specified cardiac arrhythmias    17. Persistent fatigue after COVID-19    18. Multiple joint pain    19. Snoring        Assessment and Plan  · Dizziness- Jocelyn has had dizziness with looking up, bending, and doing things. She had dizziness following Covid 19 infection, however, following an orbital mass removal, she had increasing dizziness symptoms. She has some lightheadedness and some spinning dizziness. I referred for MRI brain and IAC and referred back to ENT and neurology. She was seen by ophthalmology and cleared for PRN follow up. Neurology advised she see ENT. ENT advised mouthguard and Magnesium oxide 400 mg QHS. Patient to see neurology if ENT does not find source. Consider dizziness specialist at [facility] if neurology is unable to determine etiology and treat. Consider tertiary center for evaluation if no improvement. 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 She did have a couple accidents at work. I will have her off work until she is cleared by specialists.   · Depression with anxious mood- Patient has had some underlying anxiety in the past, however, she developed very severe, debilitating anxiety following Covid 19 infection  and rehab, causing panic attacks.  She is seeing her mental health counselor.  She was resistant to medication in the past because she was always able to control her moods, calm down herself, and continue with working.  However, following her COVID-19 infection, she had more severe symptoms that were worsened with trying to return to work.  I started Lexapro 5 mg once daily. Once she was tolerating medication better, she increased to Lexapro 10 mg once daily. She had improved anxiety with therapy and medication. However, she had increased depressive symptoms, fatigue, decreased motivation. I had her continue Lexapro 10 mg once daily and will added Wellbutrin  mg daily. She has not noted significant difference. We may consider contacting cardiology to see about decreasing or changing beta blocker.  To be seen ASAP if worsening moods or AE with medication.   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 She will need to see dermatology once she has completed follow up with ENT, neurology.   · Mixed hyperlipidemia- Patient to be compliant with whole food plant based diet. Await labs for further recommendations.    · Vitamin D deficiency- Continue vitamin D 2000IU daily and women's one a day.   · Hemochromatosis carrier- I will continue to monitor and make further recommendations. Follow up with GI as referred.   · Elevated liver function tests- Avoid NSAIDS and alcohol and limit Tylenol to < 2 grams daily. Keep appt with GI as referred.     Patient had significant long term Covid 19 symptoms. She completed physical therapy, speech, and cognitive therapy, and continues follow up with mental health counselor weekly, cardiology, neurology, infectious disease/ long term Covid clinic, ophthalmology, sleep medicine, and allergist as directed by them. She is trying  to transition to a whole food plant based diet. Information given. We will consider tertiary center if she does not return to baseline and all specialist appts have been completed. To be seen ASAP if worsening, new, or changing symptoms.   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    · Hypoxia following Covid 19 infection-  She had slow, gradual improvement and completed PT. She did have improvement in oxygenation.   · Tachycardia/ elevated BP, post-Covid arrhythmia- Continue follow up with cardiology as directed by them. Echocardiogram and Holter monitor were ok. Patient to continue Atenolol 25 mg once daily and monitor BP, pulse. Blood pressure was a little low and she decreased Atenolol by 1/2 has her BP returns to normal. Allergist gave her Nadolol. Cardiology to determine any changes from atenolol to nadolol. She has had no increased SOA with beta blocker but is having increased fatigue and decreased motivation. We will consider consulting cardiology and decreasing medication.   · Fatigue following Covid 19 infection- Patient to continue mental health therapy. She completed physical therapy, and cognitive therapy.   · Cognitive deficit/dysfunction- Continue home exercises. Continue follow up with neurology as directed.   · Depression and anxiety- Patient previously had some depression and anxiety that were managed with counseling and techniques. However, she had severe anxiety following Covid 19 then had improvement with medication but has had severe depression. Continue Mental health counseling and medication.   · Persistent headaches following Covid 19 infection- Patient to follow up with neurology and Topamax as directed. Cognitive function returned to her baseline. Her allergist wanted her to stop Topamax due to fatigue and cognitive impairment, however,  headaches are controlled, and she did not want to stop medication. She did try to stop medication, and headaches recurred. To use Imitrex as needed for acute headaches.   · Snoring- Since having Covid 19, she developed significant snoring, headaches that woke her up at night, tachycardia, hypoxia, and significant brain fog and fatigue. Negative sleep study.   · Generalized body aches, back aches, neck pain, and joint pain- Elevated uric acid by 0.01 but otherwise negative autoimmune testing 4/2021. PT and home exercises as needed. I also referred to allergist, with symptoms similar to MIS-A. No diagnosis of MISC with allergist.      Patient continues to see specialists as noted above.  I have reviewed available records, including consult notes, labs, and imaging/testing.  Patient to continue follow-up with specialists as directed by them.    I spent 39 minutes caring for Jocelyn Merlos on this date of service. This time includes time spent by me in the following activities as necessary: preparing for the visit, reviewing tests, specialists records and previous visits, obtaining and/or reviewing a separately obtained history, performing a medically appropriate exam and/or evaluation, counseling and educating the patient, family, caregiver, referring and/or communicating with other healthcare professionals, documenting information in the medical record, independently interpreting results and communicating that information with the patient, family, caregiver, and developing a medically appropriate treatment plan with consideration of other conditions, medications, and treatments.

## 2021-11-06 DIAGNOSIS — F33.42 MAJOR DEPRESSIVE DISORDER, RECURRENT, IN FULL REMISSION (HCC): ICD-10-CM

## 2021-11-06 DIAGNOSIS — R41.89 BRAIN FOG: ICD-10-CM

## 2021-11-07 RX ORDER — BUPROPION HYDROCHLORIDE 150 MG/1
150 TABLET ORAL EVERY MORNING
Qty: 30 TABLET | Refills: 0 | Status: SHIPPED | OUTPATIENT
Start: 2021-11-07 | End: 2021-12-08

## 2021-11-11 ENCOUNTER — TELEPHONE (OUTPATIENT)
Dept: FAMILY MEDICINE CLINIC | Facility: CLINIC | Age: 31
End: 2021-11-11

## 2021-11-11 NOTE — TELEPHONE ENCOUNTER
This is what you said in last note.  I will have her off work until she is cleared by specialists.   Please sign note and I can send that to them.

## 2021-11-11 NOTE — TELEPHONE ENCOUNTER
LUCA WITH [employer] IS CALLING TO VERIFY SOME OF PATIENTS DISABILITY CLAIM... HAS HER CLAIM BEEN EXTENDED BEYOND 11/10 IF SO CAN WE EITHER CALL LUCA AND VERBALLY INFORM HER OR SEND HER AN OFFICE NOTE AND WORK NOTE WITH PROOF OF EXTENSION.    LUCA>[phone]   FAX> [phone]

## 2021-11-17 ENCOUNTER — OFFICE VISIT (OUTPATIENT)
Dept: FAMILY MEDICINE CLINIC | Facility: CLINIC | Age: 31
End: 2021-11-17

## 2021-11-17 VITALS
WEIGHT: 264 LBS | OXYGEN SATURATION: 98 % | BODY MASS INDEX: 40.01 KG/M2 | HEART RATE: 82 BPM | HEIGHT: 68 IN | DIASTOLIC BLOOD PRESSURE: 74 MMHG | TEMPERATURE: 97.1 F | SYSTOLIC BLOOD PRESSURE: 122 MMHG

## 2021-11-17 DIAGNOSIS — R06.83 SNORING: ICD-10-CM

## 2021-11-17 DIAGNOSIS — F43.22 ADJUSTMENT DISORDER WITH ANXIOUS MOOD: ICD-10-CM

## 2021-11-17 DIAGNOSIS — U09.9 PERSISTENT FATIGUE AFTER COVID-19: ICD-10-CM

## 2021-11-17 DIAGNOSIS — U09.9 POST-COVID-19 SYNDROME: ICD-10-CM

## 2021-11-17 DIAGNOSIS — G43.709 CHRONIC MIGRAINE WITHOUT AURA WITHOUT STATUS MIGRAINOSUS, NOT INTRACTABLE: ICD-10-CM

## 2021-11-17 DIAGNOSIS — R29.90 PERSISTENT NEUROLOGIC SYMPTOMS AFTER COVID-19: ICD-10-CM

## 2021-11-17 DIAGNOSIS — I49.8 OTHER SPECIFIED CARDIAC ARRHYTHMIAS: ICD-10-CM

## 2021-11-17 DIAGNOSIS — F33.42 MAJOR DEPRESSIVE DISORDER, RECURRENT, IN FULL REMISSION (HCC): ICD-10-CM

## 2021-11-17 DIAGNOSIS — H05.89 ORBITAL MASS: ICD-10-CM

## 2021-11-17 DIAGNOSIS — E79.0 HYPERURICEMIA: ICD-10-CM

## 2021-11-17 DIAGNOSIS — E55.9 VITAMIN D DEFICIENCY: ICD-10-CM

## 2021-11-17 DIAGNOSIS — R41.89 BRAIN FOG: ICD-10-CM

## 2021-11-17 DIAGNOSIS — M25.50 MULTIPLE JOINT PAIN: ICD-10-CM

## 2021-11-17 DIAGNOSIS — D48.5 NEOPLASM OF UNCERTAIN BEHAVIOR OF SKIN OF FACE: ICD-10-CM

## 2021-11-17 DIAGNOSIS — R42 DIZZINESS: Primary | ICD-10-CM

## 2021-11-17 DIAGNOSIS — E78.2 MIXED HYPERLIPIDEMIA: ICD-10-CM

## 2021-11-17 DIAGNOSIS — U09.9 PERSISTENT NEUROLOGIC SYMPTOMS AFTER COVID-19: ICD-10-CM

## 2021-11-17 DIAGNOSIS — R74.8 ELEVATED LIVER ENZYMES: ICD-10-CM

## 2021-11-17 DIAGNOSIS — Z12.83 SKIN CANCER SCREENING: ICD-10-CM

## 2021-11-17 DIAGNOSIS — R53.83 PERSISTENT FATIGUE AFTER COVID-19: ICD-10-CM

## 2021-11-17 DIAGNOSIS — Z14.8 HEMOCHROMATOSIS CARRIER: ICD-10-CM

## 2021-11-17 DIAGNOSIS — F41.9 ANXIETY: ICD-10-CM

## 2021-11-17 PROCEDURE — 99214 OFFICE O/P EST MOD 30 MIN: CPT | Performed by: PHYSICIAN ASSISTANT

## 2021-11-18 ENCOUNTER — OFFICE VISIT (OUTPATIENT)
Dept: GASTROENTEROLOGY | Facility: CLINIC | Age: 31
End: 2021-11-18

## 2021-11-18 VITALS — WEIGHT: 264.8 LBS | TEMPERATURE: 97.1 F | BODY MASS INDEX: 40.13 KG/M2 | HEIGHT: 68 IN

## 2021-11-18 DIAGNOSIS — R79.89 ELEVATED LFTS: Primary | ICD-10-CM

## 2021-11-18 PROCEDURE — 99203 OFFICE O/P NEW LOW 30 MIN: CPT | Performed by: INTERNAL MEDICINE

## 2021-11-18 NOTE — PROGRESS NOTES
Chief Complaint   Patient presents with   • ABN LFT's        Jocelyn Merlos is a  31 y.o. female here for an initial visit for elevated LFTs    HPI this 31-year-old white female patient of Mirta PAUL presents with recent findings of elevated LFTs.  Specifically ALT of 43 this October and ALT of 57.  There was mild elevation of the ALT in June of this year with normal AST.  She was tested positive for hemochromatosis carrier status.  Evidently this is a family trait also involving her mother.  Iron studies were all normal.  Its not clear if any other laboratory work was done except for a lipid panel which did show mixed hyperlipidemia.  She denies significant alcohol use, IV drug use or other exposure history.  We did discuss the possibility of fatty infiltration of the liver and would offer a CT scan to assess for this.  Other laboratory work may be applied pending her findings as well as the trends related to her transaminase levels.    Past Medical History:   Diagnosis Date   • Abnormal Pap smear of cervix    • Adjustment disorder with mixed anxiety and depressed mood 4/6/2016   • COVID-19 12/28/2020   • Hypertension    • Palpitations        Current Outpatient Medications   Medication Sig Dispense Refill   • atenolol (TENORMIN) 25 MG tablet Take 1 tablet by mouth Daily. 90 tablet 3   • Biotin 300 MCG tablet Take 2,500 mcg by mouth 1 (One) Time Per Week.     • buPROPion XL (WELLBUTRIN XL) 150 MG 24 hr tablet TAKE 1 TABLET BY MOUTH EVERY MORNING 30 tablet 0   • escitalopram (LEXAPRO) 10 MG tablet TAKE 1 TABLET DAILY 90 tablet 1   • folic acid (FOLVITE) 1 MG tablet Take 1 mg by mouth Daily.     • metFORMIN ER (GLUCOPHAGE-XR) 750 MG 24 hr tablet Take 750 mg by mouth 2 (two) times a day.     • Omega-3 Fatty Acids (fish oil) 1000 MG capsule capsule Take  by mouth Daily With Breakfast.     • prenatal vitamin (prenatal, CLASSIC, vitamin) tablet Take  by mouth Daily.     • topiramate (TOPAMAX) 25 MG tablet Take 1  tablet by mouth 2 (Two) Times a Day for 90 days. 180 tablet 1   • vitamin C (ASCORBIC ACID) 250 MG tablet Take 250 mg by mouth Daily.     • vitamin D (ERGOCALCIFEROL) 1.25 MG (70052 UT) capsule capsule Take 1,000 Units by mouth.     • vitamin E 100 UNIT capsule Take 100 Units by mouth Daily.       No current facility-administered medications for this visit.       PRN Meds:.    Allergies   Allergen Reactions   • No Known Drug Allergy        Social History     Socioeconomic History   • Marital status:    Tobacco Use   • Smoking status: Former Smoker     Packs/day: 0.25     Years: 0.50     Pack years: 0.12     Types: Cigarettes     Quit date: 2015     Years since quittin.6   • Smokeless tobacco: Never Used   Substance and Sexual Activity   • Alcohol use: Yes     Alcohol/week: 4.0 - 5.0 standard drinks     Types: 4 - 5 Standard drinks or equivalent per week     Comment: Socially./caffeine use    • Drug use: No   • Sexual activity: Yes     Partners: Male       Family History   Problem Relation Age of Onset   • Skin cancer Mother    • Depression Mother    • Anxiety disorder Mother    • Stroke Mother    • Aneurysm Mother    • Alcohol abuse Mother    • Migraines Mother    • Stroke Maternal Grandmother        Review of Systems   Constitutional: Negative for activity change, appetite change, fatigue and unexpected weight change.   HENT: Negative for congestion, facial swelling, sore throat, trouble swallowing and voice change.    Eyes: Negative for photophobia and visual disturbance.   Respiratory: Negative for cough and choking.    Cardiovascular: Negative for chest pain.   Gastrointestinal: Negative for abdominal distention, abdominal pain, anal bleeding, blood in stool, constipation, diarrhea, nausea, rectal pain and vomiting.   Endocrine: Negative for polyphagia.   Musculoskeletal: Negative for arthralgias, gait problem and joint swelling.   Skin: Negative for color change, pallor and rash.    Allergic/Immunologic: Negative for food allergies.   Neurological: Negative for speech difficulty and headaches.   Hematological: Does not bruise/bleed easily.   Psychiatric/Behavioral: Negative for agitation, confusion and sleep disturbance.       Vitals:    11/18/21 1330   Temp: 97.1 °F (36.2 °C)       Physical Exam  Vitals reviewed.   Constitutional:       General: She is not in acute distress.     Appearance: She is well-developed. She is not diaphoretic.   HENT:      Head: Normocephalic.      Mouth/Throat:      Pharynx: No oropharyngeal exudate.   Eyes:      General: No scleral icterus.     Conjunctiva/sclera: Conjunctivae normal.   Neck:      Thyroid: No thyromegaly.   Cardiovascular:      Rate and Rhythm: Normal rate and regular rhythm.      Heart sounds: No murmur heard.      Pulmonary:      Effort: No respiratory distress.      Breath sounds: Normal breath sounds. No wheezing or rales.   Abdominal:      General: Bowel sounds are normal. There is no distension.      Palpations: Abdomen is soft. There is no mass.      Tenderness: There is no abdominal tenderness.   Musculoskeletal:         General: No tenderness. Normal range of motion.      Cervical back: Normal range of motion.   Lymphadenopathy:      Cervical: No cervical adenopathy.   Skin:     General: Skin is warm and dry.      Findings: No erythema or rash.   Neurological:      Mental Status: She is alert and oriented to person, place, and time.   Psychiatric:         Behavior: Behavior normal.         ASSESSMENT   #1 elevated LFTs: Very mild elevation, etiology not yet defined  #2 history of hemochromatosis carrier state        PLAN  Schedule CT scan of the abdomen to assess for possible steatosis or other etiology to explain transaminase elevation  Pending results may consider diet and medication to address hyperlipidemia  More formal laboratory data to be considered if CT is negative and enzymes continue to elevate      ICD-10-CM ICD-9-CM   1.  Elevated LFTs  R79.89 790.6

## 2021-11-23 ENCOUNTER — DOCUMENTATION (OUTPATIENT)
Dept: FAMILY MEDICINE CLINIC | Facility: CLINIC | Age: 31
End: 2021-11-23

## 2021-11-23 NOTE — TELEPHONE ENCOUNTER
[employer] IS CALLING TO SEE IF DISABILITY HAS BEEN EXTENDED.    PLEASE ADVISE  [phone]     FAX LETTER TO   [phone]  CASE# [id number]

## 2021-12-06 ENCOUNTER — OFFICE VISIT (OUTPATIENT)
Dept: NEUROLOGY | Facility: CLINIC | Age: 31
End: 2021-12-06

## 2021-12-06 VITALS
HEART RATE: 72 BPM | SYSTOLIC BLOOD PRESSURE: 126 MMHG | OXYGEN SATURATION: 97 % | DIASTOLIC BLOOD PRESSURE: 84 MMHG | HEIGHT: 68 IN | BODY MASS INDEX: 40.01 KG/M2 | WEIGHT: 264 LBS

## 2021-12-06 DIAGNOSIS — R42 DIZZINESS: ICD-10-CM

## 2021-12-06 DIAGNOSIS — G43.701 CHRONIC MIGRAINE WITHOUT AURA WITH STATUS MIGRAINOSUS, NOT INTRACTABLE: Primary | ICD-10-CM

## 2021-12-06 PROCEDURE — 99214 OFFICE O/P EST MOD 30 MIN: CPT | Performed by: PSYCHIATRY & NEUROLOGY

## 2021-12-06 RX ORDER — MECLIZINE HYDROCHLORIDE 25 MG/1
25 TABLET ORAL 3 TIMES DAILY PRN
Qty: 30 TABLET | Refills: 2 | Status: SHIPPED | OUTPATIENT
Start: 2021-12-06 | End: 2021-12-17

## 2021-12-06 RX ORDER — TOPIRAMATE 50 MG/1
50 TABLET, FILM COATED ORAL 2 TIMES DAILY
Qty: 180 TABLET | Refills: 1 | Status: SHIPPED | OUTPATIENT
Start: 2021-12-06 | End: 2022-02-10

## 2021-12-06 NOTE — PROGRESS NOTES
Chief Complaint  Dizziness (episodes have become more frequent) and Migraine    Subjective          Jocelyn Merlos presents to [facility]UR[facility] for   HISTORY OF PRESENT ILLNESS:    Jocelyn Merlos is a 31 year old right handed woman who returns to neurology clinic for follow up evaluation and treatment of headaches.  She reports testing positive for COVID19 in 12/28/2021 and she had headaches as a symptom but started getting more migraines around 2/22/2021.  She does report history of migraines when she was 12 years old.  The headaches are located above her right eye and right side of her head with throbbing quality which she rates as 10/10 on pain scale 1-10 when most severe with associated light and sound sensitivity along with nausea without vomiting.  She does report some tearing of her right eye most recently with the headaches.  Headaches last 30 minutes to 19 hours in duration.  She does report snoring and is scheduled to have a sleep study done on 6/4/2021.  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  She had the 10/10 headaches 3 times since February and she was having daily migraines which have improved significantly since her initial visit on combination of topiramate for migraine prevention and Imitrex for acute treatment which she tells me she has not had to take since starting the topiramate.  There is family history of migraines in her mother and mother's parents.  She has tried propranolol and atenolol.  She has tried Tylenol and Excedrin which she thinks dulls the headaches.  She has tried Fioricet which she did not think was very helpful.   She denies history of kidney stones.  She is doing overall very well with her migraines.  She had a sleep study done which was not diagnostic of EVERETT.  She tells me she has had dizziness since being diagnosed with  "COVID19.  The dizziness is noted at all positions.  She has been evaluated by ENT. She has noticed that her migraines seems to be well controlled.  Her main concern today is the dizziness for which she would like further assistance.      Past Medical History:   Diagnosis Date   • Abnormal Pap smear of cervix    • Adjustment disorder with mixed anxiety and depressed mood 2016   • COVID-19 2020   • Hypertension    • Palpitations         Family History   Problem Relation Age of Onset   • Skin cancer Mother    • Depression Mother    • Anxiety disorder Mother    • Stroke Mother    • Aneurysm Mother    • Alcohol abuse Mother    • Migraines Mother    • Stroke Maternal Grandmother         Social History     Socioeconomic History   • Marital status:    Tobacco Use   • Smoking status: Former Smoker     Packs/day: 0.25     Years: 0.50     Pack years: 0.12     Types: Cigarettes     Quit date: 2015     Years since quittin.6   • Smokeless tobacco: Never Used   Substance and Sexual Activity   • Alcohol use: Yes     Alcohol/week: 4.0 - 5.0 standard drinks     Types: 4 - 5 Standard drinks or equivalent per week     Comment: Socially./caffeine use    • Drug use: No   • Sexual activity: Yes     Partners: Male        I have personally reviewed the ROS as stated below.     Review of Systems   Neurological: Positive for dizziness and syncope. Negative for tremors, seizures, facial asymmetry, speech difficulty, weakness, light-headedness, numbness, headache, memory problem and confusion.   Psychiatric/Behavioral: Negative for agitation, behavioral problems, decreased concentration, dysphoric mood, hallucinations, self-injury, sleep disturbance, suicidal ideas, negative for hyperactivity, depressed mood and stress. The patient is nervous/anxious.         Objective   Vital Signs:   /84   Pulse 72   Ht 172 cm (67.72\")   Wt 120 kg (264 lb)   SpO2 97%   BMI 40.48 kg/m²       PHYSICAL EXAM:    General "   Mental Status - Alert. General Appearance - Well developed, Well groomed, Oriented and Cooperative. Orientation - Oriented X3.       Head and Neck  Head - normocephalic, atraumatic with no lesions or palpable masses.  Neck    Global Assessment - supple.       Eye   Sclera/Conjunctiva - Bilateral - Normal.    ENMT  Mouth and Throat   Oral Cavity/Oropharynx: Oropharynx - the soft palate,uvula and tongue are normal in appearance.    Chest and Lung Exam   Chest - lung clear to auscultation bilaterally.    Cardiovascular   Cardiovascular examination reveals  - normal heart sounds, regular rate and rhythm.    Neurologic   Mental Status: Speech - Normal. Cognitive function - appropriate fund of knowledge. No impairment of attention, Impairment of concentration, impairment of long term memory or impairment of short term memory.  Cranial Nerves:   II Optic: Visual acuity - Left - Normal. Right - Normal. Visual fields - Normal (to confrontation).  III Oculomotor: Pupillary constriction - Left - Normal. Right - Normal.  VII Facial: - Normal Bilaterally.  VIII Acoustic - Bilateral - Hearing normal and (Hearing tested by finger rub).   IX Glossopharyngeal / X Vagus - Normal.  XI Accessory: Trapezius - Bilateral - Normal. Sternocleidomastoid - Bilateral - Normal.  XII Hypoglossal - Bilateral - Normal.  Eye Movements: - Normal Bilaterally.  Sensory:   Light Touch: Intact - Globally.  Motor:   Bulk and Contour: - Normal.  Tone: - Normal.  Tremor: Not present.  Strength: 5/5 normal muscle strength - All Muscles.   General Assessment of Reflexes: - deep tendon reflexes are normal. Coordination - No Impairment of finger-to-nose or Impairment of rapid alternating movements. Gait - Normal.       Result Review :                 Assessment and Plan    Problem List Items Addressed This Visit        Neuro    Headache, migraine - Primary    Current Assessment & Plan     31 year old woman with chronic migraine headaches which have responded  very well to treatment with topiramate.  She feels that her headaches became more frequent and severe following COVID19 infection.  She had a brain MRI scan done on 3/30/2021 which demonstrated a dermoid cyst in the left orbit which she has since had surgically removed and her brain MRI images otherwise look normal. She has tried beta blockers for her migraines which have not been effective.  I will continue her on topiramate for migraine prevention after discussing potential side effects including risk for birth defects if she were to get pregnant on this medicine.  I will increase her dose of the topiramate to see if she maybe having vestibular migraines and see if this helps prevent her symptoms.  She is not taking the sumatriptan currently.  Discussed potential side effects.  Also discussed migraine triggers and lifestyle modifications on her visit today and provided patient education information on these.  She does not have EVERETT.  She has had some dizziness for which she has been evaluated by ENT for possible BPPV and I will refer her to vestibular therapy as well.  I'll see her back in 4 months to reevaluate.           Relevant Medications    topiramate (TOPAMAX) 50 MG tablet       Symptoms and Signs    Dizziness    Current Assessment & Plan     I will refer her to vestibular therapy and also start her on some meclizine to see if this helps.           Relevant Medications    meclizine (ANTIVERT) 25 MG tablet    Other Relevant Orders    Ambulatory Referral to Physical Therapy Vestibular              Follow Up   Return in about 4 months (around 4/6/2022).  Patient was given instructions and counseling regarding her condition or for health maintenance advice. Please see specific information pulled into the AVS if appropriate.

## 2021-12-06 NOTE — ASSESSMENT & PLAN NOTE
31 year old woman with chronic migraine headaches which have responded very well to treatment with topiramate.  She feels that her headaches became more frequent and severe following COVID19 infection.  She had a brain MRI scan done on 3/30/2021 which demonstrated a dermoid cyst in the left orbit which she has since had surgically removed and her brain MRI images otherwise look normal. She has tried beta blockers for her migraines which have not been effective.  I will continue her on topiramate for migraine prevention after discussing potential side effects including risk for birth defects if she were to get pregnant on this medicine.  I will increase her dose of the topiramate to see if she maybe having vestibular migraines and see if this helps prevent her symptoms.  She is not taking the sumatriptan currently.  Discussed potential side effects.  Also discussed migraine triggers and lifestyle modifications on her visit today and provided patient education information on these.  She does not have EVERETT.  She has had some dizziness for which she has been evaluated by ENT for possible BPPV and I will refer her to vestibular therapy as well.  I'll see her back in 4 months to reevaluate.

## 2021-12-06 NOTE — ASSESSMENT & PLAN NOTE
I will refer her to vestibular therapy and also start her on some meclizine to see if this helps.

## 2021-12-08 DIAGNOSIS — F33.42 MAJOR DEPRESSIVE DISORDER, RECURRENT, IN FULL REMISSION (HCC): ICD-10-CM

## 2021-12-08 DIAGNOSIS — R41.89 BRAIN FOG: ICD-10-CM

## 2021-12-08 RX ORDER — BUPROPION HYDROCHLORIDE 150 MG/1
150 TABLET ORAL EVERY MORNING
Qty: 30 TABLET | Refills: 0 | Status: SHIPPED | OUTPATIENT
Start: 2021-12-08 | End: 2022-01-05 | Stop reason: SDUPTHER

## 2021-12-17 ENCOUNTER — OFFICE VISIT (OUTPATIENT)
Dept: FAMILY MEDICINE CLINIC | Facility: CLINIC | Age: 31
End: 2021-12-17

## 2021-12-17 VITALS
TEMPERATURE: 98.6 F | HEIGHT: 68 IN | OXYGEN SATURATION: 98 % | HEART RATE: 82 BPM | RESPIRATION RATE: 20 BRPM | WEIGHT: 267 LBS | BODY MASS INDEX: 40.47 KG/M2 | DIASTOLIC BLOOD PRESSURE: 98 MMHG | SYSTOLIC BLOOD PRESSURE: 122 MMHG

## 2021-12-17 DIAGNOSIS — D48.5 NEOPLASM OF UNCERTAIN BEHAVIOR OF SKIN OF FACE: ICD-10-CM

## 2021-12-17 DIAGNOSIS — U09.9 PERSISTENT NEUROLOGIC SYMPTOMS AFTER COVID-19: ICD-10-CM

## 2021-12-17 DIAGNOSIS — Z14.8 HEMOCHROMATOSIS CARRIER: ICD-10-CM

## 2021-12-17 DIAGNOSIS — E78.2 MIXED HYPERLIPIDEMIA: ICD-10-CM

## 2021-12-17 DIAGNOSIS — E55.9 VITAMIN D DEFICIENCY: ICD-10-CM

## 2021-12-17 DIAGNOSIS — R42 DIZZINESS: Primary | ICD-10-CM

## 2021-12-17 DIAGNOSIS — G43.709 CHRONIC MIGRAINE WITHOUT AURA WITHOUT STATUS MIGRAINOSUS, NOT INTRACTABLE: ICD-10-CM

## 2021-12-17 DIAGNOSIS — E79.0 HYPERURICEMIA: ICD-10-CM

## 2021-12-17 DIAGNOSIS — F41.9 ANXIETY: ICD-10-CM

## 2021-12-17 DIAGNOSIS — R29.90 PERSISTENT NEUROLOGIC SYMPTOMS AFTER COVID-19: ICD-10-CM

## 2021-12-17 DIAGNOSIS — H53.9 VISION DISTURBANCE: ICD-10-CM

## 2021-12-17 DIAGNOSIS — Z23 NEED FOR INFLUENZA VACCINATION: ICD-10-CM

## 2021-12-17 DIAGNOSIS — R06.83 SNORING: ICD-10-CM

## 2021-12-17 DIAGNOSIS — U09.9 PERSISTENT FATIGUE AFTER COVID-19: ICD-10-CM

## 2021-12-17 DIAGNOSIS — F43.22 ADJUSTMENT DISORDER WITH ANXIOUS MOOD: ICD-10-CM

## 2021-12-17 DIAGNOSIS — H05.89 ORBITAL MASS: ICD-10-CM

## 2021-12-17 DIAGNOSIS — R53.83 PERSISTENT FATIGUE AFTER COVID-19: ICD-10-CM

## 2021-12-17 DIAGNOSIS — R41.89 BRAIN FOG: ICD-10-CM

## 2021-12-17 DIAGNOSIS — U09.9 POST-COVID-19 SYNDROME: ICD-10-CM

## 2021-12-17 DIAGNOSIS — F33.42 MAJOR DEPRESSIVE DISORDER, RECURRENT, IN FULL REMISSION (HCC): ICD-10-CM

## 2021-12-17 DIAGNOSIS — M25.50 MULTIPLE JOINT PAIN: ICD-10-CM

## 2021-12-17 DIAGNOSIS — R74.8 ELEVATED LIVER ENZYMES: ICD-10-CM

## 2021-12-17 PROCEDURE — 90471 IMMUNIZATION ADMIN: CPT | Performed by: PHYSICIAN ASSISTANT

## 2021-12-17 PROCEDURE — 90686 IIV4 VACC NO PRSV 0.5 ML IM: CPT | Performed by: PHYSICIAN ASSISTANT

## 2021-12-17 PROCEDURE — 99214 OFFICE O/P EST MOD 30 MIN: CPT | Performed by: PHYSICIAN ASSISTANT

## 2021-12-17 NOTE — PROGRESS NOTES
Subjective   Jocelyn Merlos is a 31 y.o. female who presents today in follow up of dizziness and moods.    Dizziness  Associated symptoms include fatigue (improving). Arthralgias: improving. Headaches: improved with treatment. Myalgias: improving. Nausea: no constant nausea. Weakness: improving.   DepressionPatient presents with the following symptoms: confusion (improving), decreased concentration (improving), nervousness/anxiety (worse with concerns of surgery) and palpitations (improving).  Patient is not experiencing: suicidal ideas.  Shortness of breath: improving.     NOTE TO [facility]- [phone]- attn Ms Gregorio    Told that it was the wrong Mg but ENT thought it should be a neurological issue    Seen by neurology- they gave Meclizine- did not help. She also doubled Topamax as directed- flt poorly but did not help dizziness. She has decreased again to 50 mg.     She is having 12-13 episodes per day.     Constantly seeing things out of the krystina of both of her eyes. There are times that she feels like it is there and is surprised when it is not there- size ranges between a mouse and a person. Has every day at different points.     Dermatology- they removed a lesion right face and left axilla- both  Benign. The spot on her right forearm- it is a scar. They did remove 1/10/2022.     Tickle cough at night only. Started about 1 month ago. No coughing in the day. No SOA or other symptoms, signs of illness.     She will eventually get a notice from [facility] and may change to long term disability- still keep insurance. Will get benefits even if she   Dizziness- she has had not improvement in dizziness. She reports she has been using mouthguard. Also taking Mg- now has a rash under chin/neck. 5-20 episodes of dizziness per day- last 20-30 seconds. Long enough to disrupt what she is doing. It is spinning dizziness- feels like if you were drunk. She has also had the sensation of shakiness- things shaking in her vision-  intermittent but not every day. She has had a couple times with nausea with the dizziness but not bad. Sometimes has wondered if it is associated- is not every time.   Seen by neurologist- he advised she go to ENT and advised she follow up in a couple months. She then went to ENT twice and had increased testing. They advised   everything was ok. They thought dizziness was positional and treated for vertigo- positional treatment. No improvement.   She went to ENT- they did hearing test and lay down and sit up without concerns. They want to come back and do testing that takes 1 hour. They have seen a lot of people that struggle with migraines after Covid have dizziness.Last month, she had a terrible period- dizziness, nauseated, felt like she would not make it from the toilet to the bed, cold sweats, pale, felt like death for a full day then improved. She has always struggled with bad cramping. This was much worse. No contraception. Previously Nexplanon was not good. They could not get IUD in and has felt poorly on birth control. Patient's step daughter had mood issues on patch.   MRI scheduled 10/21/2021.   Appt with neurology 10/18/2021  Eye  Surgeon appt 10/26/2021.   ENT follow up 10/26/2021.  · She had dizziness prior to eye surgery but has had significant worsening after surgery. She went back after surgery ,told him she was dizzy, and he told her that was normal and that she lost a lot of blood in surgery.   · She had 2 accidents on [employer].   · The 1st was 8/31/2021- it was her 1st week back after her accident. She was picking up parts that were up high and she gets most dizzy and wobbly. She thinks she may have wobbled and dropped the parts.   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 When she lifted it, she has to tilt it back. 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 They have boxes hanging down from the line. The She has never hit it  before. She was due for physical last night. They asked if she had dizziness or light-headedness. She was failed because she has dizziness. 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 No associated BP, palp.   · Most dizzy when she is bending, looking up, doing thing around her house. Work advised she see me with a note that gave restrictions.   · She has been cutting atenolol in 1/2- she then felt CP or heart beating fast. She had her BP checked and had elevated BP 16 systolic. After 5-6 days, she started taking whole Atenolol and had improvement in BP. No change in dizziness with elevated or normal BP.   · She continues with palpitations- cleared by cardiology. She reports she wonders what causes it.     Depression and anxiety- feels like the Lexapro has helped- feels like it does not help with major situations but does help with daily symptoms. wellbutrin- not sure she has adjusted to it and still feeling tired in the day. She thinks it may help daily when things are normal. Since she has increased stressors and uncertainties, she is not sure it is controlled. She is seeing her therapist weekly and helps- feels better upon leaving every week. If it starts to build up again through the week. It is almost time to see her again.    · Lexapro- for a while, having bad dreams. The dreams improved but she still has them intermittent that she dreams constantly. She has had improvement in anxiety daily but if something happens and she has a stressful situation, she still has some panic and emotional issues. Still having counseling every week. She is working on things to do when she has these episodes. Reminding herself that it will be ok and why it will be ok and why it was a good thing. She mentioned increasing Lexapro.   · She previously reported she had more depression- she gets down on herself about her year. Her therapist thought that she may need to increase Lexapro. Patient reported she was not  "depressed or sad all the time but had days that she was more down. No SI/HI.   · She then had increased depression- she has had decreased interest in anything, exhaustion, not wanting to be around people, sleeping a lot, not keeping up with home chores or things she would normally do at home. It was not abnormal to be in her feelings or in a funk for a few days but she had severe symptoms. Not wanting to shower or take care of herself. She has had increased depression. Talked to therapist about her moods and they discussed possible changes in medication. She felt some better after talking but she still has depression. Therapist was out a week but follow up 10/19/2021. She thought that contributes to her depression- she is tired of trying to figure out what is contributing to her symptoms since Covid. She previously knew her body and when something was going on. She does not now and is bothersome.  · Patient knows her father struggled with his mental health, and she worried that she could have issues.   · She has had bad anxiety since her grandfather , when she has anxiety, playing her grandfather's death over and over.   · She moved a couple years ago and started worrying her house will burn down.   · With anxiety in the past (prior to Covid 19 infection), she was seeing a therapist and reported she was doing much better. She would occasionally miss work with anxiety/depression but had FMLA. She usually did well and was able to work, do things socially.  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 When she started to feel better, something mentally started crashing down. As she started to feel better physically, she started \"freaking out\". She was having panic attacks.   · Patient related the feeling to being on a plane to [place] years ago, she felt like she was back on the plane and ready to crash.   · She worried about going back to work without " "FMLA and worries about losing her job. She reports her friend at work told her they are firing people \"left and right\". She thinks she will lose her job and does not have FMLA  (She has to acquire so much leave to qualify for FMLA).    · She had anxiety doing anything, worst at night with laying down. She thought something could be wrong with her heart. Cardiology cleared her but she feels like something is wrong with her heart when she has episodes. She tries to hold it in.   · She was doing well on Lexapro. She increased to 10 mg once daily. She did have significant fatigue upon starting medication, however, this improved. She continued with anxiety, however, she was able to calm herself down many times. She was working with mental health therapist twice weekly and was starting to improve. She felt she should try to go back to work slowly. There may be a layoff but she could go in to work but the line will not be moving, all the people were not there, and she could come and go- did not have to work full 12 hours. She thought this would be more helpful to try to go in and start trying to do her job without the pressure and interaction with others. She reported she would be able to leave if she had a panic attack. Despite still having anxiety, she thought this would improve some if she could get back to work and on a routine. She wanted to try.  · She then had increased depression- she has had decreased interest in anything, exhaustion, not wanting to be around people, sleeping a lot, not keeping up with home chores or things she would normally do at home. It was not abnormal to be in her feelings or in a funk for a few days but she had severe symptoms. Not wanting to shower or take care of herself. She has had increased depression. Talked to therapist about her moods and they discussed possible changes in medication. She felt some better after talking but she still has depression. Therapist was out a week but " "follow up 10/19/2021. She thought that contributes to her depression- she is tired of trying to figure out what is contributing to her symptoms since Covid. She previously knew her body and when something was going on. She does not now and is bothersome.    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 She had follow up with Dr Bowman and was advised PRN follow up 10/28/2021. He did not seem to think her dizziness was related to her surgery.   · Patient was seen by Dr Bowman who recommended removal of mass. She was very nervous about surgery. Patient reports she was so shocked that she could not think of questions then had questions about incisions, recovery, and others.   · Surgeon told her not a big deal and 1-2 hour surgery. She was in surgery for 3-4 hours. Had trouble waking up. They advised they could keep her but that she would do better at home. Still numb above her eye and could not raise her left eyebrow. She was told that it should improve in 6 months.     Skin lesion- Spot on her face that has been there- looked like a dark, Aging\" spot. However, she noticed it to be larger, more raise, and bothersome in the last week. Also would like to consider dermatology for right forearm scar that is raised to see about removal. Dermatology a long time ago but unsure who she saw.     Mixed hyperlipidemia- Watched [employer]- not eating meat and changed to almond milk. She has not stopped cheese/dairy completely yet. Feels she can do it. She is not completely plant based but is her goal.   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  Elevated liver function tests- patient withuot regular NSAIDS, Tylenol, or alcohol.    Covid 19 vaccine (Pfizer)- got initial vaccine 4/12/2021. She was tired that day then back to baseline.   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  Patient had significant symptoms following Covid 19 infection. She developed symptoms of Covid 19 12/24/2020 and tested positive 12/28/2020. She had hypoxia and tachycardia with ambulation, SOA, chest tightness, weakness, fatigue, brain fog, diarrhea, and nausea with resolution of severe rash. She went to ER with oxygen saturation of 88%. CTA chest with mild pneumonia and negative for PE. Inflammatory markers were not performed. They did not evaluate symptoms with ambulation and resting vital signs were ok.  While on video visit, patient had oxygen saturation of 97% and pulse in 90s then with relatively little ambulation/ exertion, she had pulse 127 and oxygen saturation down to 88%. In office, she had a normal resting pulse that increased to 124 with ambulation and oxygen saturation decreased from 95% to 90% with ambulation.     She had persistent tachycardia and hypoxia with walking short distances (that is slowly improving), elevated BP that improved, cognitive impairment, fatigue, headaches, and feeling overall poorly. She was seen by the long term Covid infectious disease clinic, had additional labs, was referred to physical therapy, speech, and cognitive therapy, and was advised to contact her cardiologist for follow up with persistent tachycardia with ambulation. She underwent PTand speech/cognitive therapy. She called cardiology to schedule testing ordered at appt prior to Covid 19 then scheduled follow up evaluation of post-Covid tachycardia/ arrhythmia as directed. He changed her propranolol to atenolol for tachycardia and is awaiting Holter monitor and echocardiogram. She is tolerating medication well.     I referred for MRI brain and to neurology for severe headaches. MRI brain and neurology appt scheduled for 3/30/2021, however, she has had severe, debilitating  headaches. She had to go to ER but had no testing performed there. Excedrin Migraine, Fiorcet, Zofran, and narcotic pain medication have not helped headaches. I discussed beta blocker, Elavil, and Topamax at her last visit, however, she was concerned about possible AE with medication, as fatigue, arrhythmia, and cognitive dysfunction are already some of her biggest issues. She was unable to tolerate her headaches and felt the possible AE with medication were worth the risk for improvement in headaches. She tried Inderal LA 60 mg nightly without improvement and was started on Topamax by neurology. She has had significant AE with medication. I had her decrease to 25 mg at bedtime then she was able to increase again to BID and is now tolerating better. She has had return to post-covid baseline.     I contacted infectious disease provider for recommendations for return to work vs work restrictions. She recommended either waiting to return to work until she had some improvement with therapy or may try light duty if available to see if she will tolerate this until she has improvement. 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    She returned to work and noted multiple cognitive issues. She reports they had her work 2 hours then sent her home with no work available. 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 She felt she needed more cognitive therapy to safely return to work. I had her remain off work until her follow up with long term Covid clinic. She has been advised to remain in contact with her employer to ensure her job is secure and leave is approved.     Hypoxia- improved. Not feeling as bad with SOA but still notices some.   lowest has been about 90s% and maybe 88% if pushing it. Improving gradually  · She had hypoxia and tachycardia with ambulation, SOA, chest tightness, weakness, fatigue, brain fog, diarrhea, and nausea with resolution of severe rash.   · She went to ER with oxygen saturation of 88%. CTA chest with mild pneumonia and negative for PE. Inflammatory markers were not performed. They did not evaluate symptoms with ambulation and resting vital signs were ok.    · While on video visit, patient had oxygen saturation of 97% and pulse in 90s then with relatively little ambulation/ exertion, she had pulse 127 and oxygen saturation down to 88%. In office, she had a normal resting pulse that increased to 124 with ambulation and oxygen saturation decreased from 95% to 90% with ambulation.   Tachycardia/ elevated BP- has had more controlled pulse on atenolol- tolerating without AE.  · Pulse with activity- at PT around 120, she stops her and if pushes self at home she gets up to 150. BP and pulse have improved some but continues with pulse that is elevated but oxygen has stayed above 90.   · She has persistent tachycardia and hypoxia with walking short distances (that is slowly improving), elevated BP that is improving  · [facility] then scheduled follow up evaluation of post-Covid tachycardia/ arrhythmia as directed.   · He changed her propranolol to atenolol for tachycardia and is awaiting Holter monitor and echocardiogram. She is tolerating  "medication well.   · Cardiology- changed inderal to atenolol.   · Echo- ok.   Fatigue- still struggling. Now having to fight to get up and do things but not as much. She has had improvement but not resolution.   Cognitive deficit/dysfunction- has improved significantly. She still notices some but not as severe.   · Still \"brain farts\", short term memory issues, headaches, oxygen levels- not sure if Topamax has amplified brain fog but has been prevalent.   · This symptom was her most worrisome issue.  She was struggling with cognitive issues which cause increased anxiety.  Headaches- has had improvement in headaches on Topamax. Decreased as directed then was able to increase to twice daily again and has had improved headaches and no worsening neurological/cognitive symptoms. She has had no severe, debilitating headaches and not waking up with headaches.   · She has had right sided headache and headache behind her eyes, nausea with vomiting, eye was red and watering, right sided facial and eyelid drooping, eyebrow was not normal. She reports it wakes her up from sleep.   · She went to ER 3/11/2021 with drooping eyelid, bloodshot eye, watering. She was in tears due to severe pain but they did no testing. By the time they gave her a headache cocktail, her headache was already improving. She was ready to go home because she did not feel she was getting any help. They discharged her with Fiorcet and Zofran that have not helped her headaches.   · She was given pain medication from previous foot surgery and never took it. She tried it and it makes her sick but did not help her pain.  · I referred for MRI brain and to neurology for severe headaches. She had severe, debilitating headaches.   · I initially discussed beta blocker, Elavil, and Topamax, however, we were concerned about possible AE with medication, as fatigue, arrhythmia, and cognitive dysfunction are already some of her biggest issues. She was unable to tolerate " "her headaches and felt the possible AE with medication were worth the risk for improvement in headaches.   · She tried Inderal LA 60 mg nightly without improvement.   · Headaches occurred almost always when she was asleep. She took Tylenol and tried Excedrin Migraine. They were waking her up at night, which was abnormal for her.    · She was seen by neurology 3/30/2021 started on Topamax 25 mg twice daily and Imitrex as needed for headaches by neurology. She has had significant symptoms with this. I discussed possibly cutting dose in 1/2 initially to 25 mg at bedtime then possible increase in dosage as tolerated.   · I referred to sleep medicine as recommended by neurology.   · Topamax helped headaches. She had a headache all day once but otherwise had no other bad headaches. She had paresthesias and worsening cognitive funciton, brain fog, ability to think clearly. I asked that she decrease to nightly dosing only for a couple weeks then could increase to twice daily if tolerated better.   Snoring- Has never been someone who snored but since Covid, she is snoring loud, sometimes waking her . Negative Sleep study- no [facility]ET[facility].   Hair loss- slowed down.   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 When she started to feel better, something mentally started crashing down. As she started to feel better physically, she started \"freaking out\". She was having panic attacks.   · Patient related the feeling to being on a plane to [place] years ago, she felt like she was back on the plane and ready to crash.   · She worried about going back to work without FMLA and worries about losing her job. She reports her friend at work told her they are firing people \"left and right\". She thinks she will lose her job and does not have FMLA  (She has to acquire so much leave to qualify for FMLA).    · She had anxiety doing anything, worst at night with laying down. She " thought something could be wrong with her heart. Cardiology cleared her but she feels like something is wrong with her heart when she has episodes. She tries to hold it in.   · She was doing well on Lexapro. She increased to 10 mg once daily. She did have significant fatigue upon starting medication, however, this improved. She continued with anxiety, however, she was able to calm herself down many times. She was working with mental health therapist twice weekly and was starting to improve. She felt she should try to go back to work slowly. There may be a layoff but she could go in to work but the line will not be moving, all the people were not there, and she could come and go- did not have to work full 12 hours. She thought this would be more helpful to try to go in and start trying to do her job without the pressure and interaction with others. She reported she would be able to leave if she had a panic attack. Despite still having anxiety, she thought this would improve some if she could get back to work and on a routine. She wanted to try.  · She then had increased depression- she has had decreased interest in anything, exhaustion, not wanting to be around people, sleeping a lot, not keeping up with home chores or things she would normally do at home. It was not abnormal to be in her feelings or in a funk for a few days but she had severe symptoms. Not wanting to shower or take care of herself. She has had increased depression. Talked to therapist about her moods and they discussed possible changes in medication. She felt some better after talking but she still has depression. Therapist was out a week but follow up 10/19/2021. She thought that contributes to her depression- she is tired of trying to figure out what is contributing to her symptoms since Covid. She previously knew her body and when something was going on. She does not now and is bothersome.    Joint pain, back and neck pain- She has had  improvement in the pain. Still not back to baseline but improved.   Foot that had tendon release surgery hurt the worst- some improvement in other pain.   When she was a kid, she had a tubing accident but had hip pain after that. He hip hurt more after Covid. No recommendations from PT. States they did not have any additional treatment.      Therapy- still in mental health counseling.   · Physical therapy- once weekly once she went back to work. When she was off work, she went 2-3 x. Has been walking with her dog per PT but she was scared of triggering a migraine because she was scared that a bump or anything would cause migraine.   · Speech and cognitive therapy- There was initially a mistake on their part. Speech therapy- told her none of their other patients went back to full time. Other people had gone back 4 hours per day and gradually increased to 6 then 8 hours. They were concerned they would not be able to go back 12 hours.    · Mental health counseling- seeing her therapist and she has her using an joaquim for relaxation and mind sharpness- doing that once daily.    Return to work- 5/11/2021 -she tried to return to work again and had a panic attack.  She started having severe anxiety about doing her job, remembering, and being able to function at work.  She has never had anything this severe in the past.  She went to work but had to leave.  She previously went back to work and had to leave after 2 hours. She reports they asked her to tell someone something and she couldn't remember who that was. They thought this was someone she should know but she had no idea. Also, the process of going into work- line, temperature, getting mask- couldn't remember how to do it. She states it was like she was a new hire. Things that she thought should have come back as soon as she saw it did not come back. They had her sit at a picnic table x 2 hours until they decided she should go home on no work available. They still have  the heat on and with the heat and her mask, she felt claustrophobic. No other symptoms that she is aware.     Patient's specialists:  Bariatrics- Dr Noah Koroma- last appt 5/2012 in follow up of [facility].   Cardiology -Dr. Negrete-last appointment 5/2021 for palpitations, hypertension, and chest pain. Hold off on stress test since she was improving. Continue medication for a couple months then re-evaluate. Consider weaning atenolol but if she needs medication for BP, she may need to continue beta blocker and consider labetolol with desire to conceive. Follow up in 3 months. Appt scheduled 11/29/2021  · She had been seen 11/2020 for atypical chest pain. Echo, Holter, and stress test ordered.    · She was seen again after Covid 19 with tachycardia, SOA. They held stress test with post Covid symptoms. Started atenolol 25 mg daily for elevated blood pressure and tachycardia.    · 4/2021- Holter monitor normal.   · 3/2021- Echo normal.   GI- Dr Jensen- appt 11/18/2021 for elevated LFT.   Infectious disease-April MONICA Beck-last appointment 4/2021 in follow-up of COVID-19 infection.  Diagnosed with post viral fatigue syndrome and brain fog.  Advised physical therapy, cognitive therapy, and advised follow-up with cardiology for tachycardia.  Performed additional labs. 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  Follow-up PRN.  Neurology-Dr Hogan-last appointment 10/2021 for migraine headache-headaches post Covid and dizziness.  Headaches improved on Topamax 25 mg twice daily- continue medication.  Also given Imitrex. Neagative sleep study. See ENT for dizziness. Follow-up in 4 months.  OB/GYN-Dr. Giles-last appointment 1/2020 for annual exam/well woman exam.  Pap completed.  Follow-up in 1 year.  Sleep medicine-Dr. Pantoja-last appt 5/10/2021 for evaluation. Home sleep study 6/2021- no [place]ERETT.   Speech therapy-started 4/27/2021 for speech and cognitive therapy. She  was discharged the end of 5/2021. She has noticed improvement.   Ophthalmology-Dr. Heydi Vera-last appointment 4/2021 for abnormal MRI orbit.  Referred to Dr. Bowman.  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 Follow up PRN.    ENT- Rita Johnsno, APRCARLOZ- last appt 10/26/2021 for dizziness. Epley maneuver did not work. Advised she get a mouth guard and take Magnesium oxide 400 mg QHS. Follow up if persistent symptoms.   Allergist- Dr Shelia Mc- went 4/19/2021- and they wanted to change Atenolol to Nadolol and gave Zyrtec, Nasacort, and albuterol as needed. Allergy testing- allergic to outdoor issues and not indoor things. He asked her worst symptoms. He was concerned about Topamax- she did cut back to nightly. She had worsening headaches (not severe but continued with headaches). States when she increased again, she feels back to baseline prior to starting Topamax.     The following portions of the patient's history were reviewed and updated as appropriate: allergies, current medications, past family history, past medical history, past social history, past surgical history and problem list.    Review of Systems   Constitutional: Positive for fatigue (improving).   Respiratory: Shortness of breath: improving.    Cardiovascular: Positive for palpitations (improving).   Gastrointestinal: Nausea: no constant nausea.   Genitourinary: Negative.    Musculoskeletal: Arthralgias: improving. Myalgias: improving.   Neurological: Positive for dizziness. Negative for seizures and syncope. Facial asymmetry: improved. Weakness: improving. Light-headedness: improved. Headaches: improved with treatment.   Psychiatric/Behavioral: Positive for confusion (improving), decreased concentration (improving), dysphoric mood (improving) and sleep disturbance (new- increased nightly snoring since Covid 19). Negative for self-injury and suicidal ideas. The  patient is nervous/anxious (worse with concerns of surgery).         Brain fog improving       Objective   Vitals:    12/17/21 1430   BP: 122/98   Pulse: 82   Resp: 20   Temp: 98.6 °F (37 °C)   SpO2: 98%     Body mass index is 40.94 kg/m².    Physical Exam  Vitals and nursing note reviewed.   Constitutional:       General: She is not in acute distress.     Appearance: Normal appearance. She is well-developed.   HENT:      Head: Normocephalic and atraumatic.      Right Ear: External ear normal.      Left Ear: External ear normal.      Nose: Nose normal.   Eyes:      General: Lids are normal.      Conjunctiva/sclera: Conjunctivae normal.   Neck:      Vascular: No carotid bruit.   Cardiovascular:      Rate and Rhythm: Normal rate and regular rhythm.      Pulses: Normal pulses.      Heart sounds: Normal heart sounds. No murmur heard.  No friction rub. No gallop.    Pulmonary:      Effort: Pulmonary effort is normal. No respiratory distress.      Breath sounds: Normal breath sounds. No wheezing, rhonchi or rales.   Musculoskeletal:         General: No deformity.      Cervical back: Neck supple.   Skin:     General: Skin is warm and dry.   Neurological:      General: No focal deficit present.      Mental Status: She is alert and oriented to person, place, and time. Mental status is at baseline.      Gait: Gait normal.   Psychiatric:         Mood and Affect: Affect normal. Mood is anxious and depressed.         Speech: Speech normal.         Behavior: Behavior normal.         Thought Content: Thought content normal.         Cognition and Memory: Cognition normal.         Judgment: Judgment normal.         Assessment/Plan   Diagnoses and all orders for this visit:    1. Need for influenza vaccination (Primary)  -     FluLaval/Fluarix/Fluzone >6 Months        Assessment and Plan  · Dizziness- Jocelyn has had dizziness with looking up, bending, and doing things. She had dizziness following Covid 19 infection, however, following  an orbital mass removal, she had increasing dizziness symptoms. She has some lightheadedness and some spinning dizziness. I referred for MRI brain and IAC and referred back to ENT and neurology. She was seen by ophthalmology and cleared for PRN follow up. Neurology advised she see ENT. ENT advised mouthguard and Magnesium oxide 400 mg QHS. Patient to see neurology if ENT does not find source. Consider dizziness specialist at [facility] if neurology is unable to determine etiology and treat. Consider tertiary center for evaluation if no improvement. 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 She did have a couple accidents at work. I will have her off work until she is cleared by specialists.   · Depression with anxious mood- Patient has had some underlying anxiety in the past, however, she developed very severe, debilitating anxiety following Covid 19 infection and rehab, causing panic attacks.  She is seeing her mental health counselor.  She was resistant to medication in the past because she was always able to control her moods, calm down herself, and continue with working.  However, following her COVID-19 infection, she had more severe symptoms that were worsened with trying to return to work.  I started Lexapro 5 mg once daily. Once she was tolerating medication better, she increased to Lexapro 10 mg once daily. She had improved anxiety with therapy and medication. However, she had increased depressive symptoms, fatigue, decreased motivation. I had her continue Lexapro 10 mg once daily and will added Wellbutrin  mg daily. She has not noted significant difference. We may consider contacting cardiology to see about decreasing or changing beta blocker.  To be seen ASAP if worsening moods or AE with medication.   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   · Neoplasm uncertain behavior face- Patient has a  skin lesion on her face and a scar on her forearm. She will need to see dermatology once she has completed follow up with ENT, neurology.   · Mixed hyperlipidemia- Patient to be compliant with whole food plant based diet. Await labs for further recommendations.    · Vitamin D deficiency- Continue vitamin D 2000IU daily and women's one a day.   · Hemochromatosis carrier- I will continue to monitor and make further recommendations. Follow up with GI as referred.   · Elevated liver function tests- Avoid NSAIDS and alcohol and limit Tylenol to < 2 grams daily. Keep appt with GI as referred.     Patient had significant long term Covid 19 symptoms. She completed physical therapy, speech, and cognitive therapy, and continues follow up with mental health counselor weekly, cardiology, neurology, infectious disease/ long term Covid clinic, ophthalmology, sleep medicine, and allergist as directed by them. She is trying to transition to a whole food plant based diet. Information given. We will consider tertiary center if she does not return to baseline and all specialist appts have been completed. To be seen ASAP if worsening, new, or changing symptoms.   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    · Hypoxia following Covid 19 infection-  She had slow, gradual improvement and completed PT. She did have improvement in oxygenation.   · Tachycardia/ elevated BP, post-Covid arrhythmia- Continue follow up with cardiology as directed by them. Echocardiogram and Holter monitor were ok. Patient to continue Atenolol 25 mg once daily and monitor BP, pulse. Blood pressure was a little low and she decreased Atenolol by 1/2 has her BP returns to normal. Allergist gave her Nadolol. Cardiology to determine any changes from atenolol to nadolol. She has had no increased SOA with beta blocker but  is having increased fatigue and decreased motivation. We will consider consulting cardiology and decreasing medication.   · Fatigue following Covid 19 infection- Patient to continue mental health therapy. She completed physical therapy, and cognitive therapy.   · Cognitive deficit/dysfunction- Continue home exercises. Continue follow up with neurology as directed.   · Depression and anxiety- Patient previously had some depression and anxiety that were managed with counseling and techniques. However, she had severe anxiety following Covid 19 then had improvement with medication but has had severe depression. Continue Mental health counseling and medication.   · Persistent headaches following Covid 19 infection- Patient to follow up with neurology and Topamax as directed. Cognitive function returned to her baseline. Her allergist wanted her to stop Topamax due to fatigue and cognitive impairment, however, headaches are controlled, and she did not want to stop medication. She did try to stop medication, and headaches recurred. To use Imitrex as needed for acute headaches.   · Snoring- Since having Covid 19, she developed significant snoring, headaches that woke her up at night, tachycardia, hypoxia, and significant brain fog and fatigue. Negative sleep study.   · Generalized body aches, back aches, neck pain, and joint pain- Elevated uric acid by 0.01 but otherwise negative autoimmune testing 4/2021. PT and home exercises as needed. I also referred to allergist, with symptoms similar to MIS-A. No diagnosis of MISC with allergist.      Patient continues to see specialists as noted above.  I have reviewed available records, including consult notes, labs, and imaging/testing.  Patient to continue follow-up with specialists as directed by them.    I spent 39 minutes caring for Jocelyn Merlos on this date of service. This time includes time spent by me in the following activities as necessary: preparing for the visit,  reviewing tests, specialists records and previous visits, obtaining and/or reviewing a separately obtained history, performing a medically appropriate exam and/or evaluation, counseling and educating the patient, family, caregiver, referring and/or communicating with other healthcare professionals, documenting information in the medical record, independently interpreting results and communicating that information with the patient, family, caregiver, and developing a medically appropriate treatment plan with consideration of other conditions, medications, and treatments.

## 2021-12-18 NOTE — PROGRESS NOTES
Subjective   Jocelyn Merlos is a 31 y.o. female who presents today in follow up of dizziness, moods, headaches, post-Covid syndrome/ long term symptoms, hyperlipidemia, vitamin D deficiency, elevated LFT, hemochromatosis carrier, skin lesions, and specialists.    Dizziness  Associated symptoms include fatigue (improving). Arthralgias: improving. Headaches: improved with treatment. Myalgias: improving. Nausea: no constant nausea. Weakness: improving.   DepressionPatient presents with the following symptoms: confusion (improving), decreased concentration (improving), nervousness/anxiety (worse with concerns of surgery) and palpitations (improving).  Patient is not experiencing: suicidal ideas.  Shortness of breath: improving.     NOTE TO [facility]- [phone]- attn Ms Gregorio  She will eventually get a notice from [facility] and may change to long term disability- still keep insurance. Will get benefits even if she     Dizziness- She has had not improvement in dizziness. Told that it was the wrong Mg but ENT thought it should be a neurological issue  Seen by neurology- they gave Meclizine- did not help. She also doubled Topamax as directed- flt poorly but did not help dizziness. She has decreased again to 50 mg.   She kept a tally sheet to ensure she could answer the amount of episodes per day rather than estimating- is having 12-13 episodes per day.   Constantly seeing things out of the krystina of both of her eyes. There are times that she feels like it is there and is surprised when it is not there- size ranges between a mouse and a person. Has every day at different points.   MRI brain w/wo 3/2021- ovoid mass left orbit. Otherwise, normal MRI brain.   MRI brain and IAC w/wo 10/21/2021- evidence of removal of orbital mass, scalp incision. Otherwise normal.   Eye Surgeon- Dr Bowman 10/28/2021- cleared for PRN follow up.  Neurology- Dr Hogan- has advised she see ENT, referred for sleep study- negative, treated with Topamax  and Imitrex as needed, now increased Topamax and referred for vestibular therapy and given Meclizine PRN. She reports the Meclizine did not help, did not tolerate increased dose of Topamax, and is awaiting PT. She was advised to follow up in 4 months and is hesitant about this, as her symptoms are debilitating and preventing safe return to work.    ENT- Dr Holt/ MON[facility] Daniels- last seen 12/2021 and was advised she took the wrong Mg (she took Magnesium oxide as listed in the chart), tried mouth guard without relief, and was advised this couldbe more neurological and to follow up with neurology.  · She had dizziness prior to eye surgery but has had significant worsening after surgery. She went back after surgery ,told him she was dizzy, and he told her that was normal and that she lost a lot of blood in surgery.   · She had 2 accidents on [employer].   · The 1st was 8/31/2021- it was her 1st week back after her accident. She was picking up parts that were up high and she gets most dizzy and wobbly. She thinks she may have wobbled and dropped the parts.   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 When she lifted it, she has to tilt it back. 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 They have boxes hanging down from the line. The She has never hit it before. She was due for physical last night. They asked if she had dizziness or light-headedness. She was failed because she has dizziness. 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 No associated BP, palp.   · Most dizzy when she is bending, looking up, doing thing around her house. Work advised she see me with a note that gave restrictions.   · She has been cutting atenolol in 1/2- she then felt CP or heart beating fast. She had her BP checked and had elevated BP 16 systolic. After 5-6 days, she started taking whole Atenolol and had improvement in BP. No change in dizziness with  elevated or normal BP.   · She had palpitations- cleared by cardiology. She thought this could contribute but has improved.   · They thought dizziness was positional and treated for vertigo- positional treatment. No improvement.   · She went to ENT- they did hearing test and lay down and sit up without concerns. They want to come back and do testing that takes 1 hour. They have seen a lot of people that struggle with migraines after Covid have dizziness.  · 10/2021 she had a terrible period- dizziness, nauseated, felt like she would not make it from the toilet to the bed, cold sweats, pale, felt like death for a full day then improved. She has always struggled with bad cramping. This was much worse. No contraception. Previously Nexplanon was not good. They could not get IUD in and has felt poorly on birth control. Patient's step daughter had mood issues on patch.   · 11/2021- She reported she was using mouthguard. Also took Mg and developed a rash under chin/neck. She estimated about 5-20 episodes of dizziness per day- last 20-30 seconds. Long enough to disrupt what she is doing. It is spinning dizziness- feels like if you were drunk. She has also had the sensation of shakiness- things shaking in her vision- intermittent but not every day. She had a couple episodes with nausea with the dizziness but not bad. Sometimes has wondered if it is associated- is not every time.   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 She had follow up with Dr Bowman and was advised PRN follow up 10/28/2021. He did not seem to think her dizziness was related to her surgery.   · Patient was seen by Dr Bowman who recommended removal of mass. She was very nervous about surgery. Patient reports she was so shocked that she could not think of questions then had questions about incisions, recovery, and others.   · Surgeon told her not a big deal and 1-2 hour surgery. She was in surgery  for 3-4 hours. Had trouble waking up. They advised they could keep her but that she would do better at home. Still numb above her eye and could not raise her left eyebrow. She was told that it should improve in 6 months.     Depression and anxiety- Moods are difficult to  with feeling poorly all the time, inability to return to normal life and work, and uncertainty of prognosis. She has felt the Lexapro helped- feels like it does not help with major situations but does help with daily symptoms. Wellbutrin- not sure she has adjusted to it and still feeling tired in the day. She thinks it may help daily when things are normal. Since she has increased stressors and uncertainties, she is not sure it is controlled. She is seeing her therapist weekly which helps- feels better upon leaving every week. If it starts to build up again through the week. It is almost time to see her again.    · Lexapro- for a while, having bad dreams. The dreams improved but she still has them intermittent that she dreams constantly. She has had improvement in anxiety daily but if something happens and she has a stressful situation, she still has some panic and emotional issues. Still having counseling every week. She is working on things to do when she has these episodes. Reminding herself that it will be ok and why it will be ok and why it was a good thing. She mentioned increasing Lexapro.   · She previously reported she had more depression- she gets down on herself about her year. Her therapist thought that she may need to increase Lexapro. Patient reported she was not depressed or sad all the time but had days that she was more down. No SI/HI.   · She then had increased depression- she has had decreased interest in anything, exhaustion, not wanting to be around people, sleeping a lot, not keeping up with home chores or things she would normally do at home. It was not abnormal to be in her feelings or in a funk for a few days but she  "had severe symptoms. Not wanting to shower or take care of herself. She has had increased depression. Talked to therapist about her moods and they discussed possible changes in medication. She felt some better after talking but she still has depression. Therapist was out a week but follow up 10/19/2021. She thought that contributes to her depression- she is tired of trying to figure out what is contributing to her symptoms since Covid. She previously knew her body and when something was going on. She does not now and is bothersome.  · Patient knows her father struggled with his mental health, and she worried that she could have issues.   · She has had bad anxiety since her grandfather , when she has anxiety, playing her grandfather's death over and over.   · She moved a couple years ago and started worrying her house will burn down.   · With anxiety in the past (prior to Covid 19 infection), she was seeing a therapist and reported she was doing much better. She would occasionally miss work with anxiety/depression but had FMLA. She usually did well and was able to work, do things socially.  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 When she started to feel better, something mentally started crashing down. As she started to feel better physically, she started \"freaking out\". She was having panic attacks.   · Patient related the feeling to being on a plane to [place] years ago, she felt like she was back on the plane and ready to crash.   · She worried about going back to work without FMLA and worries about losing her job. She reports her friend at work told her they are firing people \"left and right\". She thinks she will lose her job and does not have FMLA  (She has to acquire so much leave to qualify for FMLA).    · She had anxiety doing anything, worst at night with laying down. She thought something could be wrong with her heart. Cardiology " cleared her but she feels like something is wrong with her heart when she has episodes. She tries to hold it in.   · She was doing well on Lexapro. She increased to 10 mg once daily. She did have significant fatigue upon starting medication, however, this improved. She continued with anxiety, however, she was able to calm herself down many times. She was working with mental health therapist twice weekly and was starting to improve. She felt she should try to go back to work slowly. There may be a layoff but she could go in to work but the line will not be moving, all the people were not there, and she could come and go- did not have to work full 12 hours. She thought this would be more helpful to try to go in and start trying to do her job without the pressure and interaction with others. She reported she would be able to leave if she had a panic attack. Despite still having anxiety, she thought this would improve some if she could get back to work and on a routine. She wanted to try.  · She then had increased depression- she has had decreased interest in anything, exhaustion, not wanting to be around people, sleeping a lot, not keeping up with home chores or things she would normally do at home. It was not abnormal to be in her feelings or in a funk for a few days but she had severe symptoms. Not wanting to shower or take care of herself. She has had increased depression. Talked to therapist about her moods and they discussed possible changes in medication. She felt some better after talking but she still has depression. Therapist was out a week but follow up 10/19/2021. She thought that contributes to her depression- she is tired of trying to figure out what is contributing to her symptoms since Covid. She previously knew her body and when something was going on. She does not now and is bothersome.    Cough- no complaints today.   · 11/17/2021- she had a tickle cough at night only that started about 1 month ago.  No coughing in the day. No SOA or other symptoms, signs of illness.   · She was advised can treat GERD and use Mucinex DM if needed. Otherwise consider albuterol as needed.     Covid 19 vaccine (Pfizer)- got initial vaccine 4/12/2021. She was tired that day then back to baseline.   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  Patient had significant symptoms following Covid 19 infection. She developed symptoms of Covid 19 12/24/2020 and tested positive 12/28/2020. She had hypoxia and tachycardia with ambulation, SOA, chest tightness, weakness, fatigue, brain fog, diarrhea, and nausea with resolution of severe rash. She went to ER with oxygen saturation of 88%. CTA chest with mild pneumonia and negative for PE. Inflammatory markers were not performed. They did not evaluate symptoms with ambulation and resting vital signs were ok.  While on video visit, patient had oxygen saturation of 97% and pulse in 90s then with relatively little ambulation/ exertion, she had pulse 127 and oxygen saturation down to 88%. In office, she had a normal resting pulse that increased to 124 with ambulation and oxygen saturation decreased from 95% to 90% with ambulation.     She had persistent tachycardia and hypoxia with walking short distances (that is slowly improving), elevated BP that improved, cognitive impairment, fatigue, headaches, and feeling overall poorly. She was seen by the long term Covid infectious disease clinic, had additional labs, was referred to physical therapy, speech, and cognitive therapy, and was advised to contact her cardiologist for follow up with persistent tachycardia with ambulation. She underwent PTand speech/cognitive therapy. She called cardiology to schedule testing ordered [facility] prior to Covid 19 then scheduled follow up evaluation of post-Covid tachycardia/ arrhythmia as directed. He changed her  propranolol to atenolol for tachycardia and is awaiting Holter monitor and echocardiogram. She is tolerating medication well.     I referred for MRI brain and to neurology for severe headaches. She had severe, debilitating headaches. She had to go to ER but had no testing performed there. Excedrin Migraine, Fiorcet, Zofran, and narcotic pain medication have not helped headaches. I discussed beta blocker, Elavil, and Topamax at her last visit, however, she was concerned about possible AE with medication, as fatigue, arrhythmia, and cognitive dysfunction are already some of her biggest issues. She was unable to tolerate her headaches and felt the possible AE with medication were worth the risk for improvement in headaches. She tried Inderal LA 60 mg nightly without improvement and was started on Topamax by neurology. She has had significant AE with medication. I had her decrease to 25 mg at bedtime then she was able to increase again to BID and tolerated better.      I contacted infectious disease provider for recommendations for return to work vs work restrictions. She recommended either waiting to return to work until she had some improvement with therapy or may try light duty if available to see if she will tolerate this until she has improvement. 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    She returned to work and noted multiple cognitive issues. She reports they had her work 2 hours then  sent her home with no work available. 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 She felt she needed more cognitive therapy to safely return to work. I had her remain off work until her follow up with long term Covid clinic. She has been advised to remain in contact with her employer to ensure her job is secure and leave is approved.     She felt ready to return to work and returned 6/2021. 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 She was seen by medical for CPE and was advised not to work until she was no longer dizzy, as she was a risk on the line, on the floor, and with forklift.     Hypoxia- improved. Not feeling as bad with SOA but still notices some.   lowest has been about 90s% and maybe 88% if pushing it. Improving gradually  · She had hypoxia and tachycardia with ambulation, SOA, chest tightness, weakness, fatigue, brain fog, diarrhea, and nausea with resolution of severe rash.   · She went to ER with oxygen saturation of 88%. CTA chest with mild pneumonia and negative for PE. Inflammatory markers were not performed. They did not evaluate symptoms with ambulation and resting vital signs were ok.    · While on video visit, patient had oxygen saturation of 97% and pulse in 90s then with relatively little ambulation/ exertion, she had pulse 127 and oxygen saturation down to 88%. In office, she had a normal resting pulse that increased to 124 with ambulation and oxygen saturation decreased from 95% to 90% with ambulation.   Tachycardia/ elevated BP- has had more controlled pulse on atenolol- tolerating without AE.  · Pulse with activity- at PT around 120, she stops her and if pushes self at home she gets up to 150. BP and pulse have improved some but continues with pulse that is elevated but oxygen has  "stayed above 90.   · She has persistent tachycardia and hypoxia with walking short distances (that is slowly improving), elevated BP that is improving  · She called [facility]ology to [facility] then scheduled follow up evaluation of post-Covid tachycardia/ arrhythmia as directed.   · He changed her propranolol to atenolol for tachycardia and is awaiting Holter monitor and echocardiogram. She is tolerating medication well.   · Cardiology- changed inderal to atenolol.   · Echo- ok.   Fatigue- still struggling. Now having to fight to get up and do things but not as much. She has had improvement but not resolution.   Cognitive deficit/dysfunction- has improved significantly. She still notices some but not as severe.   · Still \"brain farts\", short term memory issues, headaches, oxygen levels- not sure if Topamax has amplified brain fog but has been prevalent.   · This symptom was her most worrisome issue.  She was struggling with cognitive issues which cause increased anxiety.  Headaches- has had improvement in headaches on Topamax. Decreased as directed then was able to increase to twice daily again and had improved headaches and no worsening neurological/cognitive symptoms. She stopped having severe, debilitating headaches and stopped waking up with headaches. Now following with neurology.   · She had right sided headache and headache behind her eyes, nausea with vomiting, eye was red and watering, right sided facial and eyelid drooping, eyebrow was not normal. She reported it woke her up from sleep.   · She went to ER 3/11/2021 with drooping eyelid, bloodshot eye, watering. She was in tears due to severe pain but they did no testing. By the time they gave her a headache cocktail, her headache was already improving. She was ready to go home because she did not feel she was getting any help. They discharged her with Fiorcet and Zofran that have not helped her headaches.   · She was " "given pain medication from previous foot surgery and never took it. She tried it and got sick but did not help her pain.  · I referred for MRI brain and to neurology for severe, debilitating headaches.    · I initially discussed beta blocker, Elavil, and Topamax, however, we were concerned about possible AE with medication, as fatigue, arrhythmia, and cognitive dysfunction were already some of her biggest issues. She was unable to tolerate her headaches and felt the possible AE with medication were worth the risk for improvement in headaches.   · She tried Inderal LA 60 mg nightly without improvement.   · Headaches occurred almost always when she was asleep. She took Tylenol and tried Excedrin Migraine. They were waking her up at night, which was abnormal for her.    · She was seen by neurology 3/30/2021 started on Topamax 25 mg twice daily and Imitrex as needed for headaches by neurology. She has had significant symptoms with this. I discussed possibly cutting dose in 1/2 initially to 25 mg at bedtime then possible increase in dosage as tolerated.   · I referred to sleep medicine as recommended by neurology.   · Topamax helped headaches. She had a headache all day once but otherwise had no other bad headaches. She had paresthesias and worsening cognitive funciton, brain fog, ability to think clearly. I asked that she decrease to nightly dosing only for a couple weeks then could increase to twice daily. She then tolerated better.   Snoring- Has never been someone who snored but since Covid, she is snoring loud, sometimes waking her . Negative Sleep study- no [facility].   Hair loss- slowed down.   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 When she started to feel better, something mentally started crashing down. As she started to feel better physically, she started \"freaking out\". She was having panic attacks.   · Patient related the feeling to being on " "a plane to [place] years ago, she felt like she was back on the plane and ready to crash.   · She worried about going back to work without FMLA and worries about losing her job. She reports her friend at work told her they are firing people \"left and right\". She thinks she will lose her job and does not have FMLA  (She has to acquire so much leave to qualify for FMLA).    · She had anxiety doing anything, worst at night with laying down. She thought something could be wrong with her heart. Cardiology cleared her but she feels like something is wrong with her heart when she has episodes. She tries to hold it in.   · She was doing well on Lexapro. She increased to 10 mg once daily. She did have significant fatigue upon starting medication, however, this improved. She continued with anxiety, however, she was able to calm herself down many times. She was working with mental health therapist twice weekly and was starting to improve. She felt she should try to go back to work slowly. There may be a layoff but she could go in to work but the line will not be moving, all the people were not there, and she could come and go- did not have to work full 12 hours. She thought this would be more helpful to try to go in and start trying to do her job without the pressure and interaction with others. She reported she would be able to leave if she had a panic attack. Despite still having anxiety, she thought this would improve some if she could get back to work and on a routine. She wanted to try.  · She then had increased depression- she has had decreased interest in anything, exhaustion, not wanting to be around people, sleeping a lot, not keeping up with home chores or things she would normally do at home. It was not abnormal to be in her feelings or in a funk for a few days but she had severe symptoms. Not wanting to shower or take care of herself. She has had increased depression. Talked to therapist about her moods and they " discussed possible changes in medication. She felt some better after talking but she still has depression. Therapist was out a week but follow up 10/19/2021. She thought that contributes to her depression- she is tired of trying to figure out what is contributing to her symptoms since Covid. She previously knew her body and when something was going on. She does not now and is bothersome.    Joint pain, back and neck pain- She has had improvement in the pain. She is still not back to baseline but improved.   Foot that had tendon release surgery hurt the worst- some improvement in other pain.   When she was a kid, she had a tubing accident but had hip pain after that. He hip hurt more after Covid. No recommendations from PT. States they did not have any additional treatment.      Therapy- still in mental health counseling.   · Physical therapy- once weekly once she went back to work. When she was off work, she went 2-3 x. Has been walking with her dog per PT but she was scared of triggering a migraine because she was scared that a bump or anything would cause migraine.   · Speech and cognitive therapy- There was initially a mistake on their part. Speech therapy- told her none of their other patients went back to full time. Other people had gone back 4 hours per day and gradually increased to 6 then 8 hours. They were concerned they would not be able to go back 12 hours.    · Mental health counseling- seeing her therapist and she has her using an joaquim for relaxation and mind sharpness- doing that once daily.    Return to work- 5/11/2021 -she tried to return to work again and had a panic attack.  She started having severe anxiety about doing her job, remembering, and being able to function at work.  She has never had anything this severe in the past.  She went to work but had to leave. She then returned to work again 6/2021 and had 2 accidents with the [employer]- 1 dropping parts and another hitting a cable. She was seen  "for physical by medical and was advised she could not work with dizziness due to the risks.   She previously went back to work and had to leave after 2 hours. She reports they asked her to tell someone something and she couldn't remember who that was. They thought this was someone she should know but she had no idea. Also, the process of going into work- line, temperature, getting mask- couldn't remember how to do it. She states it was like she was a new hire. Things that she thought should have come back as soon as she saw it did not come back. They had her sit at a picnic table x 2 hours until they decided she should go home on no work available. They still have the heat on and with the heat and her mask, she felt claustrophobic. No other symptoms that she is aware.     Skin lesions- Dermatology removed a lesion right face and left axilla- both  Benign. The spot on her right forearm- it is a scar. They scheduled removal 1/10/2022.   She was concerned about a spot on her face that has been there- looked like a dark, Aging\" spot. However, she noticed it to be larger, more raise, and bothersome. She wanted to consider dermatology for right forearm scar that is raised to see about removal. Dermatology a long time ago but unsure who she saw.     Mixed hyperlipidemia- Watched [place] [employer]- she tried no eating meat and changed to almond milk. She has not stopped cheese/dairy completely yet. Feels she can do it. She is not completely plant based but is her goal.   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  Elevated liver function tests- patient withuot regular NSAIDS, Tylenol, or alcohol. Seen by GI- they ordered CT abd/pelvis.     Patient's specialists:  Bariatrics- Dr Noah Koroma- last appt 5/2012 in follow up of [facility].   Cardiology -Dr. Negrete-last appointment 5/2021 for " palpitations, hypertension, and chest pain. Hold off on stress test since she was improving. Continue medication for a couple months then re-evaluate. Consider weaning atenolol but if she needs medication for BP, she may need to continue beta blocker and consider labetolol with desire to conceive. Follow up in 3 months. Appt scheduled 11/29/2021  · She had been seen 11/2020 for atypical chest pain. Echo, Holter, and stress test ordered.    · She was seen again after Covid 19 with tachycardia, SOA. They held stress test with post Covid symptoms. Started atenolol 25 mg daily for elevated blood pressure and tachycardia.    · 4/2021- Holter monitor normal.   · 3/2021- Echo normal.   GI- Dr Jensen- last appt 11/18/2021 for elevated LFT and hemochromatosis carrier state. Referred for CT abd/pelvis. Advised diet and consideration of lipid medication. Consider additional labs if CT is negative and enzymes continue to increase.    Infectious disease-MONICA Sawyer-last appointment 4/2021 in follow-up of COVID-19 infection.  Diagnosed with post viral fatigue syndrome and brain fog.  Advised physical therapy, cognitive therapy, and advised follow-up with cardiology for tachycardia.  Performed additional labs. 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  Follow-up PRN.  Neurology-Dr Hogan-last appointment 12/2021 for migraine headache-headaches post Covid and dizziness.  Headaches improved on Topamax 25 mg twice daily- continue medication.  Also given Imitrex. Neagative sleep study. Consider vestibular migraines- trial increase Topamax, referred for vestibular therapy, and given Meclizine for dizziness. Follow-up in 4 months.  OB/GYN-Dr. Giles-last appointment 1/2020 for annual exam/well woman exam.  Pap completed.  Follow-up in 1 year.  Sleep medicine-Dr. Pantoja-last appt 5/10/2021 for evaluation. Home sleep study 6/2021- no [place]ET[place].   Speech therapy-started 4/27/2021 for  speech and cognitive therapy. She was discharged the end of 5/2021. She has noticed improvement.   Ophthalmology-Dr. Heydi Vera-last appointment 4/2021 for abnormal MRI orbit.  Referred to Dr. Bowman.  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 Follow up PRN.    ENT- Rita Johnson, MONICA- last appt 10/26/2021 for dizziness. Epley maneuver did not work. Advised she get a mouth guard and take Magnesium oxide 400 mg QHS. Follow up if persistent symptoms.   Allergist- Dr Shelia Mc- went 4/19/2021- and they wanted to change Atenolol to Nadolol and gave Zyrtec, Nasacort, and albuterol as needed. Allergy testing- allergic to outdoor issues and not indoor things. He asked her worst symptoms. He was concerned about Topamax- she did cut back to nightly. She had worsening headaches (not severe but continued with headaches). States when she increased again, she feels back to baseline prior to starting Topamax.     The following portions of the patient's history were reviewed and updated as appropriate: allergies, current medications, past family history, past medical history, past social history, past surgical history and problem list.    Review of Systems   Constitutional: Positive for fatigue (improving).   Eyes: Positive for visual disturbance.   Respiratory: Shortness of breath: improving.    Cardiovascular: Positive for palpitations (improving).   Gastrointestinal: Nausea: no constant nausea.   Genitourinary: Negative.    Musculoskeletal: Arthralgias: improving. Myalgias: improving.   Neurological: Positive for dizziness. Negative for seizures and syncope. Facial asymmetry: improved. Weakness: improving. Light-headedness: improved. Headaches: improved with treatment.   Psychiatric/Behavioral: Positive for confusion (improving), decreased concentration (improving), dysphoric mood (improving) and sleep disturbance (new- increased nightly  snoring since Covid 19). Negative for self-injury and suicidal ideas. The patient is nervous/anxious (worse with concerns of surgery).         Brain fog improving       Objective   Vitals:    12/17/21 1430   BP: 122/98   Pulse: 82   Resp: 20   Temp: 98.6 °F (37 °C)   SpO2: 98%     Body mass index is 40.94 kg/m².    Physical Exam  Vitals and nursing note reviewed.   Constitutional:       General: She is not in acute distress.     Appearance: Normal appearance. She is well-developed.   HENT:      Head: Normocephalic and atraumatic.      Right Ear: External ear normal.      Left Ear: External ear normal.      Nose: Nose normal.   Eyes:      General: Lids are normal.      Conjunctiva/sclera: Conjunctivae normal.   Neck:      Vascular: No carotid bruit.   Cardiovascular:      Rate and Rhythm: Normal rate and regular rhythm.      Pulses: Normal pulses.      Heart sounds: Normal heart sounds. No murmur heard.  No friction rub. No gallop.    Pulmonary:      Effort: Pulmonary effort is normal. No respiratory distress.      Breath sounds: Normal breath sounds. No wheezing, rhonchi or rales.   Musculoskeletal:         General: No deformity.      Cervical back: Neck supple.   Skin:     General: Skin is warm and dry.   Neurological:      Mental Status: She is alert and oriented to person, place, and time. Mental status is at baseline.      Gait: Gait normal.   Psychiatric:         Attention and Perception: Perception normal.         Mood and Affect: Affect normal. Mood is anxious and depressed.         Speech: Speech normal.         Behavior: Behavior normal.         Thought Content: Thought content normal.         Cognition and Memory: Cognition normal.         Judgment: Judgment normal.         Assessment/Plan   Diagnoses and all orders for this visit:    1. Dizziness (Primary)  -     Ambulatory Referral to Physical Therapy  -     Ambulatory Referral to ENT (Otolaryngology)    2. Vision disturbance  -     Ambulatory Referral  to ENT (Otolaryngology)    3. Major depressive disorder, recurrent, in full remission (HCC)    4. Anxiety    5. Adjustment disorder with anxious mood    6. Brain fog  -     Ambulatory Referral to ENT (Otolaryngology)  -     CBC & Differential; Future  -     Comprehensive Metabolic Panel; Future  -     Hemoglobin A1c; Future  -     Iron and TIBC; Future  -     Ferritin; Future  -     Vitamin D 25 Hydroxy; Future  -     TSH; Future  -     T4, free; Future  -     T3, Free; Future    7. Orbital mass  -     Ambulatory Referral to ENT (Otolaryngology)    8. Chronic migraine without aura without status migrainosus, not intractable  -     Ambulatory Referral to ENT (Otolaryngology)    9. Post-COVID-19 syndrome  -     Ambulatory Referral to Physical Therapy  -     Ambulatory Referral to ENT (Otolaryngology)    10. Persistent neurologic symptoms after COVID-19  -     Ambulatory Referral to Physical Therapy  -     Ambulatory Referral to ENT (Otolaryngology)    11. Persistent fatigue after COVID-19  -     Ambulatory Referral to Physical Therapy  -     Ambulatory Referral to ENT (Otolaryngology)  -     CBC & Differential; Future  -     Comprehensive Metabolic Panel; Future  -     Hemoglobin A1c; Future  -     Iron and TIBC; Future  -     Ferritin; Future  -     Vitamin D 25 Hydroxy; Future  -     TSH; Future  -     T4, free; Future  -     T3, Free; Future    12. Multiple joint pain  -     Uric Acid; Future    13. Snoring    14. Mixed hyperlipidemia  -     Comprehensive Metabolic Panel; Future  -     Hemoglobin A1c; Future  -     Lipid Panel With LDL / HDL Ratio; Future  -     TSH; Future  -     T4, free; Future  -     T3, Free; Future    15. Vitamin D deficiency  -     Comprehensive Metabolic Panel; Future  -     Hemoglobin A1c; Future  -     Lipid Panel With LDL / HDL Ratio; Future  -     Vitamin D 25 Hydroxy; Future  -     TSH; Future  -     T4, free; Future  -     T3, Free; Future    16. Hemochromatosis carrier  -     CBC &  Differential; Future  -     Iron and TIBC; Future  -     Ferritin; Future  -     TSH; Future  -     T4, free; Future  -     T3, Free; Future    17. Elevated liver enzymes  -     Comprehensive Metabolic Panel; Future  -     TSH; Future  -     T4, free; Future  -     T3, Free; Future    18. Hyperuricemia  -     TSH; Future  -     T4, free; Future  -     T3, Free; Future  -     Uric Acid; Future    19. Neoplasm of uncertain behavior of skin of face    20. Need for influenza vaccination  -     FluLaval/Fluarix/Fluzone >6 Months        Assessment and Plan  Patient had significant long term Covid 19 symptoms. She completed physical therapy, speech, and cognitive therapy, and continues follow up with mental health counselor weekly, cardiology, neurology, infectious disease/ long term Covid clinic, ophthalmology, sleep medicine, and allergist as directed by them. She is trying to transition to a whole food plant based diet. Information given. We will try PT with long term Covid PT at [place] and dizziness specialist, Dr Izquierdo, and will consider tertiary center if she does not return to baseline and all specialist appts have been completed. To be seen ASAP if worsening, new, or changing symptoms.   She will return in 6 weeks in follow up of PT, Dr Izquierdo, and re-evaluation of symptoms. She should have fasting labs at [facility] nearest her 5 days prior to appt.     · Dizziness- Jocelyn has had dizziness with looking up, bending, and doing things. She had dizziness following Covid 19 infection, however, following an orbital mass removal, she had increasing dizziness symptoms. She has some lightheadedness and some spinning dizziness. She has undergone MRI brain, MRI brain and IAC and been evaluated by ENT and neurology. She was seen by ophthalmology and cleared for PRN follow up. ENT advised mouthguard and Magnesium oxide 400 mg QHS (then reported she should be taking a different Mg supplement). Without improvement, she advised follow up  with neurology. Neurology advised trial of increasing Topamax (which she did not tolerate), trial Meclizine (no improvement in dizziness with Meclizine), and referred to PT for vestibular therapy. I agree with vestibular therapy. She is also having vision issues in the peripheral vision. I would like her to try PT through [facility] long term Covid PT program, as they have developed therapy program for vision and dizziness post-Covid. I will refer today. I will also refer to Dr Izquierdo at [facility], as they specialize in dizziness. Consider tertiary center for evaluation if no improvement. 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 She did have a couple accidents at work. I will have her off work until she is cleared by specialists.   · Depression with anxious mood- Patient has had some underlying anxiety in the past, however, she developed very severe, debilitating anxiety following Covid 19 infection and rehab, causing panic attacks.  She is seeing her mental health counselor.  She was resistant to medication in the past because she was always able to control her moods, calm down herself, and continue with working.  However, following her COVID-19 infection, she had more severe symptoms that were worsened with trying to return to work.  I started Lexapro 5 mg once daily. Once she was tolerating medication better, she increased to Lexapro 10 mg once daily. She had improved anxiety with therapy and medication. However, she had increased depressive symptoms, fatigue, decreased motivation. I had her continue Lexapro 10 mg once daily and added Wellbutrin  mg daily. She has not noted significant difference. We may consider contacting cardiology to see about decreasing or changing beta blocker.  To be seen ASAP if worsening moods or AE with medication.   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    · Nocturnal cough- Patient to be seen if persistent symptoms. We will ensure no GERD- can use Pepcid 20 mg before her evening meal. She can try Mucinex DM twice daily as well. She may also need albuterol inhaler if needed. To be seen if worsening, new or changing symptoms or no resolution of cough.     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    · Hypoxia following Covid 19 infection-  She had slow, gradual improvement and completed PT. She did have improvement in oxygenation.   · Tachycardia/ elevated BP, post-Covid arrhythmia- Continue follow up with cardiology as directed by them. Echocardiogram and Holter monitor were ok. Patient to continue Atenolol 25 mg once daily and monitor BP, pulse. Blood pressure was a little low and she decreased Atenolol by 1/2 has her BP returns to normal. Allergist gave her Nadolol. Cardiology to determine any changes from atenolol to nadolol. She has had no increased SOA with beta blocker but is having increased fatigue and decreased motivation. We will consider consulting cardiology and decreasing medication.   · Fatigue following Covid 19 infection- Patient to continue mental health therapy. She completed physical therapy, and cognitive therapy.   · Cognitive deficit/dysfunction- Continue home exercises. Continue follow up with neurology as directed.   · Depression and anxiety- Patient previously had some depression and anxiety that were managed with counseling and techniques. However, she had severe anxiety following Covid 19 then had improvement with medication but has had severe depression. Continue Mental health counseling and medication.   · Persistent headaches following Covid 19 infection- Patient to follow up with neurology and [facility] as directed. Cognitive function returned to her baseline. Her allergist wanted her to  stop Topamax due to fatigue and cognitive impairment, however, headaches are controlled, and she did not want to stop medication. She did try to stop medication, and headaches recurred. To use Imitrex as needed for acute headaches.   · Snoring- Since having Covid 19, she developed significant snoring, headaches that woke her up at night, tachycardia, hypoxia, and significant brain fog and fatigue. Negative sleep study.   · Generalized body aches, back aches, neck pain, and joint pain- Elevated uric acid by 0.01 but otherwise negative autoimmune testing 4/2021. PT and home exercises as needed. I also referred to allergist, with symptoms similar to MIS-A. No diagnosis of MISC with allergist.      In follow up of chronic, non-Covid related symptoms:  · Neoplasm uncertain behavior face- Patient has a skin lesion on her face and a scar on her forearm. She will need to see dermatology once she has completed follow up with ENT, neurology. I will refer.   · Mixed hyperlipidemia- Patient to be compliant with whole food plant based diet. Await labs for further recommendations.    · Vitamin D deficiency- Continue vitamin D 2000IU daily and women's one a day.   · Hemochromatosis carrier- I will continue to monitor and make further recommendations.  · Elevated liver function tests- Avoid NSAIDS and alcohol and limit Tylenol to < 2 grams daily. She will contact GI office, as she has not received CT abd/pelvis as referred. She will have CT and follow up with GI as directed.     Patient continues to see specialists as noted above.  I have reviewed available records, including consult notes, labs, and imaging/testing.  Patient to continue follow-up with specialists as directed by them.    I spent 30 minutes caring for Jocelyn Merlos on this date of service. This time includes time spent by me in the following activities as necessary: preparing for the visit, reviewing tests, specialists records and previous visits, obtaining and/or  reviewing a separately obtained history, performing a medically appropriate exam and/or evaluation, counseling and educating the patient, family, caregiver, referring and/or communicating with other healthcare professionals, documenting information in the medical record, independently interpreting results and communicating that information with the patient, family, caregiver, and developing a medically appropriate treatment plan with consideration of other conditions, medications, and treatments.

## 2021-12-22 ENCOUNTER — TELEPHONE (OUTPATIENT)
Dept: FAMILY MEDICINE CLINIC | Facility: CLINIC | Age: 31
End: 2021-12-22

## 2021-12-22 NOTE — TELEPHONE ENCOUNTER
Provider: PHAM STYLES  Caller: GABY  Relationship to Patient: [employer]    Phone Number: [phone]  Reason for Call: A FAX WAS SENT TODAY TO BE FILLED OUT REGARDING THE PATIENT'S EXTENSION ON HER DISABILITY.  GABY JUST WANTED TO MAKE THE OFFICE AWARE THAT IT WAS FAXED OVER TODAY AND NEEDS FILLED OUT AND RETURNED AS SOON AS POSSIBLE.

## 2021-12-28 NOTE — TELEPHONE ENCOUNTER
GABY FROM [facility] CALLED TO CHECK TO SEE IF PAPERWORK WAS RECEIVED FOR PATIENT.    UNABLE TO WARM TRANSFER DUE TO EMERGENCY IN OFFICE.    GABY WILL RESEND FAX TODAY.    GABY REQUESTING THE PAPERWORK FAXED BACK AS SOON AS POSSIBLE.  THIS WILL BE THE THIRD REQUEST TO HAVE PAPERWORK SENT BACK.    [phone]  GABY  [phone]  EXT [id number]

## 2021-12-30 ENCOUNTER — OFFICE VISIT (OUTPATIENT)
Dept: CARDIOLOGY | Facility: CLINIC | Age: 31
End: 2021-12-30

## 2021-12-30 VITALS
SYSTOLIC BLOOD PRESSURE: 114 MMHG | HEIGHT: 68 IN | DIASTOLIC BLOOD PRESSURE: 72 MMHG | HEART RATE: 70 BPM | BODY MASS INDEX: 40.5 KG/M2 | WEIGHT: 267.2 LBS

## 2021-12-30 DIAGNOSIS — I10 ESSENTIAL HYPERTENSION: ICD-10-CM

## 2021-12-30 DIAGNOSIS — R00.2 PALPITATIONS: Primary | ICD-10-CM

## 2021-12-30 PROCEDURE — 93000 ELECTROCARDIOGRAM COMPLETE: CPT | Performed by: INTERNAL MEDICINE

## 2021-12-30 PROCEDURE — 99213 OFFICE O/P EST LOW 20 MIN: CPT | Performed by: INTERNAL MEDICINE

## 2021-12-30 NOTE — PROGRESS NOTES
[facility] Follow Up Office Note     Encounter Date:21  Patient:Jocelyn Merlos  :1990  MRN:[id number]      Chief Complaint: Chest pain and tachycardia  Chief Complaint   Patient presents with   • Hypertension     3 month f/u   • Palpitations     History of Presenting Illness:      Ms. Merlos is a 31 y.o. with past medical history notable for carrier for essential hypertension, palpitations, and hemochromatosis who presents her office for scheduled follow-up appointment.  She was last seen approximate 6 months ago and at that time was doing well after starting atenolol.  Since then she is still doing reasonably well.  She is scheduled to see the [facility] clinic this coming month.  Her blood pressure and heart rate seem to be well controlled on low-dose atenolol.    Review of Systems:  Review of Systems   Constitutional: Positive for malaise/fatigue.   HENT: Negative.    Eyes: Negative.    Cardiovascular: Negative.    Respiratory: Negative.    Endocrine: Negative.    Hematologic/Lymphatic: Negative.    Skin: Negative.    Musculoskeletal: Negative.    Gastrointestinal: Negative.    Genitourinary: Negative.    Neurological: Negative.    Psychiatric/Behavioral: Negative.    Allergic/Immunologic: Negative.        Current Outpatient Medications on File Prior to Visit   Medication Sig Dispense Refill   • atenolol (TENORMIN) 25 MG tablet Take 1 tablet by mouth Daily. 90 tablet 3   • Biotin 300 MCG tablet Take 2,500 mcg by mouth 1 (One) Time Per Week.     • buPROPion XL (WELLBUTRIN XL) 150 MG 24 hr tablet TAKE 1 TABLET BY MOUTH EVERY MORNING 30 tablet 0   • escitalopram (LEXAPRO) 10 MG tablet TAKE 1 TABLET DAILY 90 tablet 1   • folic acid (FOLVITE) 1 MG tablet Take 1 mg by mouth Daily.     • Omega-3 Fatty Acids (fish oil) 1000 MG capsule capsule Take  by mouth Daily With Breakfast.     • prenatal vitamin (prenatal, CLASSIC, vitamin) tablet Take  by mouth Daily.     • topiramate  (TOPAMAX) 50 MG tablet Take 1 tablet by mouth 2 (Two) Times a Day for 90 days. (Patient taking differently: Take 25 mg by mouth 2 (Two) Times a Day.) 180 tablet 1   • vitamin C (ASCORBIC ACID) 250 MG tablet Take 250 mg by mouth Daily.     • vitamin D (ERGOCALCIFEROL) 1.25 MG (87380 UT) capsule capsule Take 1,000 Units by mouth.     • vitamin E 100 UNIT capsule Take 100 Units by mouth Daily.       No current facility-administered medications on file prior to visit.        Allergies   Allergen Reactions   • No Known Drug Allergy        Past Medical History:   Diagnosis Date   • Abnormal Pap smear of cervix    • Adjustment disorder with mixed anxiety and depressed mood 2016   • COVID-19 2020   • Hypertension    • Palpitations        Past Surgical History:   Procedure Laterality Date   • BREAST AUGMENTATION     • CERVICAL BIOPSY  W/ LOOP ELECTRODE EXCISION     • LAPAROSCOPIC GASTRIC BANDING     • LEEP     • OTHER SURGICAL HISTORY      removal of eye tumor   • TONSILLECTOMY         Social History     Socioeconomic History   • Marital status:    Tobacco Use   • Smoking status: Former Smoker     Packs/day: 0.25     Years: 0.50     Pack years: 0.12     Types: Cigarettes     Quit date: 2015     Years since quittin.7   • Smokeless tobacco: Never Used   Substance and Sexual Activity   • Alcohol use: Yes     Alcohol/week: 4.0 - 5.0 standard drinks     Types: 4 - 5 Standard drinks or equivalent per week     Comment: Socially./caffeine use    • Drug use: No   • Sexual activity: Yes     Partners: Male       Family History   Problem Relation Age of Onset   • Skin cancer Mother    • Depression Mother    • Anxiety disorder Mother    • Stroke Mother    • Aneurysm Mother    • Alcohol abuse Mother    • Migraines Mother    • Stroke Maternal Grandmother        The following portions of the patient's history were reviewed and updated as appropriate: allergies, current medications, past family history, past medical  "history, past social history, past surgical history and problem list.       Objective:       Vitals:    12/30/21 1309   BP: 114/72   BP Location: Left arm   Patient Position: Sitting   Pulse: 70   Weight: 121 kg (267 lb 3.2 oz)   Height: 172 cm (67.72\")     Body mass index is 40.96 kg/m².     Physical Exam:  Constitutional: Well appearing, well developed, no acute distress   HENT: Oropharynx clear and membrane moist  Eyes: Normal conjunctiva, no sclera icterus.  Neck: Supple, no carotid bruit bilaterally.  Cardiovascular: Regular rate and rhythm, No Murmur, No bilateral lower extremity edema.  Pulmonary: Normal respiratory effort, normal lung sounds, no wheezing.  Abdominal: Soft, nontender, no hepatosplenomegaly, liver is non-pulsatile.  Neurological: Alert and orient x 3.   Skin: Warm, dry, no ecchymosis, no rash.  Psych: Appropriate mood and affect. Normal judgment and insight.      Lab Results   Component Value Date    GLUCOSE 80 10/08/2021    BUN 10 10/08/2021    CREATININE 0.78 10/08/2021    EGFRIFNONA 102 10/08/2021    EGFRIFAFRI 117 10/08/2021    BCR 13 10/08/2021    K 4.5 10/08/2021    CO2 22 10/08/2021    CALCIUM 9.5 10/08/2021    PROTENTOTREF 6.9 10/08/2021    ALBUMIN 4.5 10/08/2021    LABIL2 1.9 10/08/2021    AST 43 (H) 10/08/2021    ALT 57 (H) 10/08/2021       Lab Results   Component Value Date    WBC 6.7 10/08/2021    HGB 13.2 10/08/2021    HCT 41.4 10/08/2021    MCV 93 10/08/2021     10/08/2021       Lab Results   Component Value Date    CKTOTAL 26 (L) 01/16/2021    TROPONINI <0.012 01/14/2021       Lab Results   Component Value Date    CHLPL 219 (H) 10/08/2021    CHLPL 201 (H) 06/21/2021    CHLPL 186 04/19/2021     Lab Results   Component Value Date    TRIG 233 (H) 10/08/2021    TRIG 152 (H) 06/21/2021    TRIG 219 (H) 04/19/2021     Lab Results   Component Value Date    HDL 35 (L) 10/08/2021    HDL 36 (L) 06/21/2021    HDL 35 (L) 04/19/2021     Lab Results   Component Value Date     (H) " 10/08/2021     (H) 06/21/2021     (H) 04/19/2021       Lab Results   Component Value Date    TSH 1.260 10/08/2021         ECG 12 Lead    Date/Time: 12/30/2021 1:32 PM  Performed by: Immanuel Negrete MD  Authorized by: Immanuel Negrete MD   Comparison: compared with previous ECG from 5/10/2020  Similar to previous ECG  Rhythm: sinus rhythm         Holter monitor 4/7/2021:  · A normal monitor study.  · Underlying heart rhythm was sinus rhythm with an average heart rate of 85 bpm and a range of 62 bpm up to 137 bpm.  · No significant arrhythmias noted during study.  · No diary submitted.    Echocardiogram 3/29/2021:  · The quality of the study is limited due to breast implants.  · Calculated left ventricular EF = 60%  · Normal left ventricular cavity size and wall thickness noted.  · Normal right ventricular cavity size and systolic function noted.  · The left atrial cavity is borderline dilated.  · Calculated right ventricular systolic pressure from tricuspid regurgitation is 20 mmHg.  · There is no evidence of pericardial effusion.        Assessment:          Diagnosis Plan   1. Palpitations  ECG 12 Lead   2. Essential hypertension            Plan:       Ms. Merlos is a 31 y.o.  with past medical history notable for essential hypertension, palpitations, and carrier for hemochromatosis who presents to our office for scheduled follow-up.  Overall she seems to be doing well.  I offered to see her back in about a year just to see how she is doing but she like to see us back more in an as-needed basis and I will turn the care of her atenolol dosing back to her primary care team.  She can continue atenolol but if she continues to do well, lose weight, and has maintained good blood pressure control we could try weaning or stopping her atenolol in the near future.  Additionally if she were to become pregnant (which she expressed interest in the past) would transition to labetalol if need be but would first  try her off of any beta-blocker therapy altogether.    Palpitations:  · Normal Holter monitor 4/2021  · Normal echocardiogram 3/2021  · Thyroid function 2/2021 demonstrates normal TSH  · BMP 1/2021 demonstrates normal sodium and potassium  · CBC 1/2021 demonstrates normal hemoglobin hematocrit  · Continue atenolol    Hypertension:  · Continue atenolol    Follow-up:  As needed      Thank you for allowing me to participate in the care of Jocelyn Merlos. Feel free to contact me directly with any further questions or concerns.    Immanuel Negrete MD  [facility]  12/30/21  13:32 EST

## 2022-01-05 ENCOUNTER — TELEPHONE (OUTPATIENT)
Dept: FAMILY MEDICINE CLINIC | Facility: CLINIC | Age: 32
End: 2022-01-05

## 2022-01-05 DIAGNOSIS — F33.42 MAJOR DEPRESSIVE DISORDER, RECURRENT, IN FULL REMISSION: ICD-10-CM

## 2022-01-05 DIAGNOSIS — R41.89 BRAIN FOG: ICD-10-CM

## 2022-01-05 RX ORDER — BUPROPION HYDROCHLORIDE 150 MG/1
150 TABLET ORAL EVERY MORNING
Qty: 90 TABLET | Refills: 0 | Status: SHIPPED | OUTPATIENT
Start: 2022-01-05 | End: 2022-01-17

## 2022-01-15 DIAGNOSIS — F33.42 MAJOR DEPRESSIVE DISORDER, RECURRENT, IN FULL REMISSION: ICD-10-CM

## 2022-01-15 DIAGNOSIS — R41.89 BRAIN FOG: ICD-10-CM

## 2022-01-17 RX ORDER — BUPROPION HYDROCHLORIDE 150 MG/1
TABLET ORAL
Qty: 30 TABLET | Refills: 6 | Status: SHIPPED | OUTPATIENT
Start: 2022-01-17 | End: 2022-08-02 | Stop reason: SDUPTHER

## 2022-01-19 ENCOUNTER — TELEPHONE (OUTPATIENT)
Dept: FAMILY MEDICINE CLINIC | Facility: CLINIC | Age: 32
End: 2022-01-19

## 2022-01-24 ENCOUNTER — TELEPHONE (OUTPATIENT)
Dept: FAMILY MEDICINE CLINIC | Facility: CLINIC | Age: 32
End: 2022-01-24

## 2022-01-26 ENCOUNTER — HOSPITAL ENCOUNTER (OUTPATIENT)
Dept: CT IMAGING | Facility: HOSPITAL | Age: 32
Discharge: HOME | End: 2022-01-26
Admitting: INTERNAL MEDICINE

## 2022-01-26 DIAGNOSIS — R79.89 ELEVATED LFTS: ICD-10-CM

## 2022-01-26 LAB — CREAT BLDA-MCNC: 0.8 MG/DL (ref 0.6–1.3)

## 2022-01-26 PROCEDURE — 82565 ASSAY OF CREATININE: CPT

## 2022-01-26 PROCEDURE — 74160 CT ABDOMEN W/CONTRAST: CPT

## 2022-01-26 PROCEDURE — 25010000002 IOPAMIDOL 61 % SOLUTION: Performed by: INTERNAL MEDICINE

## 2022-01-26 RX ADMIN — IOPAMIDOL 95 ML: 612 INJECTION, SOLUTION INTRAVENOUS at 13:35

## 2022-01-28 ENCOUNTER — OFFICE VISIT (OUTPATIENT)
Dept: FAMILY MEDICINE CLINIC | Facility: CLINIC | Age: 32
End: 2022-01-28

## 2022-01-28 VITALS
OXYGEN SATURATION: 100 % | HEART RATE: 76 BPM | BODY MASS INDEX: 41.52 KG/M2 | DIASTOLIC BLOOD PRESSURE: 70 MMHG | SYSTOLIC BLOOD PRESSURE: 116 MMHG | WEIGHT: 274 LBS | HEIGHT: 68 IN | TEMPERATURE: 98.7 F | RESPIRATION RATE: 22 BRPM

## 2022-01-28 DIAGNOSIS — H53.9 VISION DISTURBANCE: ICD-10-CM

## 2022-01-28 DIAGNOSIS — U09.9 PERSISTENT NEUROLOGIC SYMPTOMS AFTER COVID-19: ICD-10-CM

## 2022-01-28 DIAGNOSIS — U09.9 PERSISTENT FATIGUE AFTER COVID-19: ICD-10-CM

## 2022-01-28 DIAGNOSIS — R29.90 PERSISTENT NEUROLOGIC SYMPTOMS AFTER COVID-19: ICD-10-CM

## 2022-01-28 DIAGNOSIS — F33.42 MAJOR DEPRESSIVE DISORDER, RECURRENT, IN FULL REMISSION: ICD-10-CM

## 2022-01-28 DIAGNOSIS — H05.89 ORBITAL MASS: ICD-10-CM

## 2022-01-28 DIAGNOSIS — R41.89 BRAIN FOG: ICD-10-CM

## 2022-01-28 DIAGNOSIS — R42 DIZZINESS: Primary | ICD-10-CM

## 2022-01-28 DIAGNOSIS — F43.22 ADJUSTMENT DISORDER WITH ANXIOUS MOOD: ICD-10-CM

## 2022-01-28 DIAGNOSIS — U09.9 POST-COVID-19 SYNDROME: ICD-10-CM

## 2022-01-28 DIAGNOSIS — G43.709 CHRONIC MIGRAINE WITHOUT AURA WITHOUT STATUS MIGRAINOSUS, NOT INTRACTABLE: ICD-10-CM

## 2022-01-28 DIAGNOSIS — R06.83 SNORING: ICD-10-CM

## 2022-01-28 DIAGNOSIS — R53.83 PERSISTENT FATIGUE AFTER COVID-19: ICD-10-CM

## 2022-01-28 DIAGNOSIS — F41.9 ANXIETY: ICD-10-CM

## 2022-01-28 PROCEDURE — 99214 OFFICE O/P EST MOD 30 MIN: CPT | Performed by: PHYSICIAN ASSISTANT

## 2022-01-31 ENCOUNTER — TELEPHONE (OUTPATIENT)
Dept: FAMILY MEDICINE CLINIC | Facility: CLINIC | Age: 32
End: 2022-01-31

## 2022-02-02 ENCOUNTER — TELEPHONE (OUTPATIENT)
Dept: GASTROENTEROLOGY | Facility: CLINIC | Age: 32
End: 2022-02-02

## 2022-02-02 ENCOUNTER — TELEPHONE (OUTPATIENT)
Dept: FAMILY MEDICINE CLINIC | Facility: CLINIC | Age: 32
End: 2022-02-02

## 2022-02-02 DIAGNOSIS — K76.0 FATTY LIVER: Primary | ICD-10-CM

## 2022-02-02 NOTE — TELEPHONE ENCOUNTER
Mirta Ms Merlos called stated you would take care of getting her paperwork signed by an MD and that you have done this for her since she has had disablility    Please advise on who you have sign paperwork (MD) for our patients?    Thank you  Selma

## 2022-02-02 NOTE — TELEPHONE ENCOUNTER
They are signed by Dr. Prabhakar.  We get the paperwork over to Dr. Prabhakar's office and then the patient typically picks it up from their office.

## 2022-02-02 NOTE — TELEPHONE ENCOUNTER
Can you please get this from [place] and send to Dr. Prabhakar? I did not know this was a process we were allowed to do.

## 2022-02-02 NOTE — TELEPHONE ENCOUNTER
Called pt and advised of Dr Jensen's note. Verb understandingt.     Lab appt made for 04/11 at 115p.  Cmp ordered.    Message sent to Dr Jensen to satish.

## 2022-02-02 NOTE — TELEPHONE ENCOUNTER
----- Message from Carroll MCKEON MD sent at 1/28/2022  3:48 PM EST -----  Regarding: CT scan results  Okay to notify patient CT scan consistent with diffuse steatosis or fatty infiltration of the liver.  Would monitor LFTs on a every 2 to 3-month basis and encourage a low-fat diet.  ----- Message -----  From: Interface, Rad Results [place] In  Sent: 1/28/2022   1:31 PM EST  To: Carroll MCKEON MD

## 2022-02-02 NOTE — TELEPHONE ENCOUNTER
Spoke with disability office and explained we do not have an MD who can sign off on any paperwork. The patient would need to be a patient of Dr. Prabhakar at his office for him to sign anything. She is going to review that and follow up with the patient

## 2022-02-02 NOTE — TELEPHONE ENCOUNTER
MISS AGUILAR WITH [facility] CALLED IN REFERENCE TO FAX THEY RECEIVED IN REGARDS TO DISABILITY.    SHE STATES THE FORM NEEDS TO SIGNED BY A MD.     PLEASE CALL [phone]    SHE NEEDS OFFICE NOTES AND NEXT APPOINTMENT

## 2022-02-07 NOTE — TELEPHONE ENCOUNTER
Unfortunately, this kind [place] patient showed up here at [facility]. She was told Dr. Wilson and given this address. I saw this note and advised patient the supervising MD is Dr. Bari Prabhakar. I gave the address and she will bring forms there. Ms. Bergeron completed documentation but needs co-signed by a Physician.

## 2022-02-07 NOTE — TELEPHONE ENCOUNTER
These papers were completed last week with a note that stated they would need to be sent to Dr. Prabhakar for signature.  Did we not get them to him?

## 2022-02-07 NOTE — TELEPHONE ENCOUNTER
Have we confirmed that the paperwork has been taken over to Dr. Prabhakar's office and has been signed?

## 2022-02-08 ENCOUNTER — TELEPHONE (OUTPATIENT)
Dept: FAMILY MEDICINE CLINIC | Facility: CLINIC | Age: 32
End: 2022-02-08

## 2022-02-08 NOTE — TELEPHONE ENCOUNTER
Gave the date of the first visit she was dx with dizziness and confirmed that Dr Prabhakar was over all the providers.

## 2022-02-08 NOTE — TELEPHONE ENCOUNTER
Caller: MRS. AGUILAR    Relationship to patient: Other    Best call back number: [phone]    Patient is needing:MRS. AGUILAR FROM [facility] CALLED IN TO SEE IF THE PATIENTS OFFICE NOTES COULD BE SENT OVER TO THE OFFICE. PLEASE ADVISE. THANK YOU.    FAX  NUMBER IS [phone]. THE CLAIM NUMBER IS [id number]

## 2022-02-10 ENCOUNTER — OFFICE VISIT (OUTPATIENT)
Dept: OBSTETRICS AND GYNECOLOGY | Facility: CLINIC | Age: 32
End: 2022-02-10

## 2022-02-10 VITALS
DIASTOLIC BLOOD PRESSURE: 92 MMHG | SYSTOLIC BLOOD PRESSURE: 128 MMHG | BODY MASS INDEX: 40.92 KG/M2 | HEIGHT: 68 IN | WEIGHT: 270 LBS

## 2022-02-10 DIAGNOSIS — Z01.419 PAP SMEAR, LOW-RISK: Primary | ICD-10-CM

## 2022-02-10 DIAGNOSIS — Z11.51 SCREENING FOR HUMAN PAPILLOMAVIRUS: ICD-10-CM

## 2022-02-10 DIAGNOSIS — Z01.419 ENCOUNTER FOR GYNECOLOGICAL EXAMINATION WITHOUT ABNORMAL FINDING: ICD-10-CM

## 2022-02-10 DIAGNOSIS — Z13.9 SCREENING FOR CONDITION: ICD-10-CM

## 2022-02-10 LAB
B-HCG UR QL: NEGATIVE
BILIRUB BLD-MCNC: NEGATIVE MG/DL
CLARITY, POC: CLEAR
COLOR UR: YELLOW
EXPIRATION DATE: NORMAL
GLUCOSE UR STRIP-MCNC: NEGATIVE MG/DL
INTERNAL NEGATIVE CONTROL: NORMAL
INTERNAL POSITIVE CONTROL: NORMAL
KETONES UR QL: NEGATIVE
LEUKOCYTE EST, POC: NEGATIVE
Lab: NORMAL
NITRITE UR-MCNC: NEGATIVE MG/ML
PH UR: 6.5 [PH] (ref 5–8)
PROT UR STRIP-MCNC: ABNORMAL MG/DL
RBC # UR STRIP: NEGATIVE /UL
SP GR UR: 1.02 (ref 1–1.03)
UROBILINOGEN UR QL: NORMAL

## 2022-02-10 PROCEDURE — 81025 URINE PREGNANCY TEST: CPT | Performed by: OBSTETRICS & GYNECOLOGY

## 2022-02-10 PROCEDURE — 81002 URINALYSIS NONAUTO W/O SCOPE: CPT | Performed by: OBSTETRICS & GYNECOLOGY

## 2022-02-10 PROCEDURE — 99395 PREV VISIT EST AGE 18-39: CPT | Performed by: OBSTETRICS & GYNECOLOGY

## 2022-02-10 RX ORDER — METFORMIN HYDROCHLORIDE 750 MG/1
TABLET, EXTENDED RELEASE ORAL
COMMUNITY
Start: 2022-02-07 | End: 2022-02-10

## 2022-02-10 NOTE — PROGRESS NOTES
GYN Annual Exam     CC- Here for annual exam.     Jocelyn Merlos is a 31 y.o. female who presents for annual well woman exam. Periods are rare, lasting 6 days. Dysmenorrhea:none. Cyclic symptoms include none. No intermenstrual bleeding, spotting, or discharge.  Patient reports no period for the last 3 months.  This coincides when she stopped her Metformin.  She had been seeing reproductive endocrinology and attempts to become pregnant.  She was diagnosed with PCOS and started on Metformin.  While on Metformin she had regular cycles.  She stopped her Metformin recently and her periods stopped along with it.    OB History    No obstetric history on file.         Current contraception: none  History of abnormal Pap smear: yes - remote, LEEP as teenager  Family history of uterine, colon or ovarian cancer: no  History of abnormal mammogram: no  Family history of breast cancer: no  Last Pap : 2020 normal    Past Medical History:   Diagnosis Date   • Abnormal Pap smear of cervix    • Adjustment disorder with mixed anxiety and depressed mood 4/6/2016   • COVID-19 12/28/2020   • Hypertension    • Palpitations        Past Surgical History:   Procedure Laterality Date   • BREAST AUGMENTATION     • CERVICAL BIOPSY  W/ LOOP ELECTRODE EXCISION     • LAPAROSCOPIC GASTRIC BANDING     • LEEP     • OTHER SURGICAL HISTORY      removal of eye tumor   • TONSILLECTOMY           Current Outpatient Medications:   •  atenolol (TENORMIN) 25 MG tablet, Take 1 tablet by mouth Daily., Disp: 90 tablet, Rfl: 3  •  Biotin 300 MCG tablet, Take 2,500 mcg by mouth 1 (One) Time Per Week., Disp: , Rfl:   •  buPROPion XL (WELLBUTRIN XL) 150 MG 24 hr tablet, TAKE 1 TABLET BY MOUTH EVERY MORNING, Disp: 30 tablet, Rfl: 6  •  escitalopram (LEXAPRO) 10 MG tablet, TAKE 1 TABLET DAILY, Disp: 90 tablet, Rfl: 1  •  folic acid (FOLVITE) 1 MG tablet, Take 1 mg by mouth Daily., Disp: , Rfl:   •  Omega-3 Fatty Acids (fish oil) 1000 MG capsule capsule, Take  by mouth  Daily With Breakfast., Disp: , Rfl:   •  prenatal vitamin (prenatal, CLASSIC, vitamin) tablet, Take  by mouth Daily., Disp: , Rfl:   •  vitamin C (ASCORBIC ACID) 250 MG tablet, Take 250 mg by mouth Daily., Disp: , Rfl:   •  vitamin D (ERGOCALCIFEROL) 1.25 MG (19558 UT) capsule capsule, Take 1,000 Units by mouth., Disp: , Rfl:   •  vitamin E 100 UNIT capsule, Take 100 Units by mouth Daily., Disp: , Rfl:     Allergies   Allergen Reactions   • No Known Drug Allergy        Social History     Tobacco Use   • Smoking status: Former Smoker     Packs/day: 0.25     Years: 0.50     Pack years: 0.12     Types: Cigarettes     Quit date: 2015     Years since quittin.8   • Smokeless tobacco: Never Used   Substance Use Topics   • Alcohol use: Yes     Alcohol/week: 4.0 - 5.0 standard drinks     Types: 4 - 5 Standard drinks or equivalent per week     Comment: Socially./caffeine use    • Drug use: No         Family History   Problem Relation Age of Onset   • Skin cancer Mother    • Depression Mother    • Anxiety disorder Mother    • Stroke Mother    • Aneurysm Mother    • Alcohol abuse Mother    • Migraines Mother    • Stroke Maternal Grandmother        Review of Systems   Constitutional: Negative for appetite change, fever and unexpected weight change.   HENT: Negative for congestion and sore throat.    Respiratory: Negative for cough and shortness of breath.    Cardiovascular: Negative for chest pain and palpitations.   Gastrointestinal: Negative for abdominal distention, abdominal pain, constipation, diarrhea, nausea and vomiting.   Endocrine: Negative.    Genitourinary: Positive for menstrual problem (oligomenorrhea). Negative for dyspareunia, pelvic pain and vaginal discharge.   Skin: Negative.    Neurological: Negative for dizziness and syncope.   Hematological: Negative.    Psychiatric/Behavioral: Negative for dysphoric mood and sleep disturbance. The patient is not nervous/anxious.        /92   Ht 172 cm  "(67.72\")   Wt 122 kg (270 lb)   LMP 12/27/2021 (Exact Date)   BMI 41.40 kg/m²     Physical Exam  Vitals and nursing note reviewed. Exam conducted with a chaperone present.   Constitutional:       Appearance: Normal appearance. She is well-developed. She is obese.   HENT:      Head: Normocephalic and atraumatic.   Neck:      Thyroid: No thyromegaly.   Cardiovascular:      Rate and Rhythm: Normal rate and regular rhythm.   Pulmonary:      Effort: Pulmonary effort is normal.      Breath sounds: Normal breath sounds.   Chest:   Breasts: Breasts are symmetrical.      Right: No mass or nipple discharge.      Left: No mass or nipple discharge.       Abdominal:      General: Bowel sounds are normal. There is no distension.      Palpations: Abdomen is soft. There is no mass.      Tenderness: There is no abdominal tenderness. There is no guarding or rebound.   Genitourinary:     General: Normal vulva.      Exam position: Supine.      Labia:         Right: No lesion.         Left: No lesion.       Vagina: Normal.      Cervix: No cervical motion tenderness or discharge.      Uterus: Normal.       Adnexa:         Right: No mass.          Left: No mass.     Musculoskeletal:         General: Normal range of motion.      Cervical back: Normal range of motion and neck supple.   Skin:     General: Skin is warm and dry.   Neurological:      Mental Status: She is alert and oriented to person, place, and time.   Psychiatric:         Behavior: Behavior normal.         Thought Content: Thought content normal.         Judgment: Judgment normal.         Diagnoses and all orders for this visit:    Encounter for gynecological examination without abnormal finding    Screening for condition  -     POC Pregnancy, Urine  -     POC Urinalysis Dipstick    Other orders  -     Discontinue: metFORMIN ER (GLUCOPHAGE-XR) 750 MG 24 hr tablet        Assessment     1) GYN annual well woman exam.   2) oligomenorrhea/PCOS-stopped Metformin.  Stopped " again.  Patient understands that she needs to have periods and that there is a slight increased risk of endometrial cancer in those patients with PCOS, obesity, insulin resistance and no cycles.  If this continues she understands she must come back and let us cycle her with pills or put her back on Metformin.     Plan     1) Breast Health - Clinical breast exam & mammogram yearly, Self breast awareness monthly  2) Pap - done today  3) Smoking status- Jocelyn Merlos  reports that she quit smoking about 6 years ago. Her smoking use included cigarettes. She has a 0.13 pack-year smoking history. She has never used smokeless tobacco.. I have educated her on the risk of diseases from using tobacco products such as cancer, COPD and heart disease.   4) Colon health - screening colonoscopy recommended if not up to date  5) Bone health - Weight bearing exercise, dietary calcium recommendations and vitamin D reviewed.   6) Seat belts recommended  7) Follow up prn and one year    Encounter Diagnoses   Name Primary?   • Encounter for gynecological examination without abnormal finding Yes   • Screening for condition          Henrique Giles MD  2/10/2022  15:48 EST   abdominal pain

## 2022-02-10 NOTE — TELEPHONE ENCOUNTER
I spoke with the patient. Rather than interoffice , apparently the patient was asked to  the paperwork. She took it to Dr Wilson and was directed to Dr Prabhakar. She took the paperwork to Dr Prabhakar's office. It was signed, faxed, and scanned. Patient has talked with [facility] and they have what they need at this time. I did apologize for the complications and confusion and to let me know if there are any issues in the future or anything she needs taken care of from our office.

## 2022-02-15 LAB
CYTOLOGIST CVX/VAG CYTO: NORMAL
CYTOLOGY CVX/VAG DOC CYTO: NORMAL
CYTOLOGY CVX/VAG DOC THIN PREP: NORMAL
DX ICD CODE: NORMAL
HIV 1 & 2 AB SER-IMP: NORMAL
HPV I/H RISK 4 DNA CVX QL PROBE+SIG AMP: NEGATIVE
OTHER STN SPEC: NORMAL
STAT OF ADQ CVX/VAG CYTO-IMP: NORMAL

## 2022-03-28 ENCOUNTER — OFFICE VISIT (OUTPATIENT)
Dept: FAMILY MEDICINE CLINIC | Facility: CLINIC | Age: 32
End: 2022-03-28

## 2022-03-28 VITALS
HEIGHT: 68 IN | WEIGHT: 272 LBS | OXYGEN SATURATION: 100 % | DIASTOLIC BLOOD PRESSURE: 76 MMHG | SYSTOLIC BLOOD PRESSURE: 114 MMHG | TEMPERATURE: 98 F | RESPIRATION RATE: 20 BRPM | HEART RATE: 79 BPM | BODY MASS INDEX: 41.22 KG/M2

## 2022-03-28 DIAGNOSIS — G43.709 CHRONIC MIGRAINE WITHOUT AURA WITHOUT STATUS MIGRAINOSUS, NOT INTRACTABLE: ICD-10-CM

## 2022-03-28 DIAGNOSIS — R74.8 ELEVATED LIVER ENZYMES: ICD-10-CM

## 2022-03-28 DIAGNOSIS — R29.90 PERSISTENT NEUROLOGIC SYMPTOMS AFTER COVID-19: ICD-10-CM

## 2022-03-28 DIAGNOSIS — E79.0 HYPERURICEMIA: ICD-10-CM

## 2022-03-28 DIAGNOSIS — E78.2 MIXED HYPERLIPIDEMIA: ICD-10-CM

## 2022-03-28 DIAGNOSIS — M54.2 NECK PAIN: ICD-10-CM

## 2022-03-28 DIAGNOSIS — F41.9 ANXIETY: ICD-10-CM

## 2022-03-28 DIAGNOSIS — F43.22 ADJUSTMENT DISORDER WITH ANXIOUS MOOD: ICD-10-CM

## 2022-03-28 DIAGNOSIS — F33.42 MAJOR DEPRESSIVE DISORDER, RECURRENT, IN FULL REMISSION: ICD-10-CM

## 2022-03-28 DIAGNOSIS — U09.9 PERSISTENT NEUROLOGIC SYMPTOMS AFTER COVID-19: ICD-10-CM

## 2022-03-28 DIAGNOSIS — M25.50 MULTIPLE JOINT PAIN: ICD-10-CM

## 2022-03-28 DIAGNOSIS — R41.89 BRAIN FOG: ICD-10-CM

## 2022-03-28 DIAGNOSIS — R79.82 ELEVATED C-REACTIVE PROTEIN (CRP): ICD-10-CM

## 2022-03-28 DIAGNOSIS — Z14.8 HEMOCHROMATOSIS CARRIER: ICD-10-CM

## 2022-03-28 DIAGNOSIS — H05.89 ORBITAL MASS: ICD-10-CM

## 2022-03-28 DIAGNOSIS — E55.9 VITAMIN D DEFICIENCY: ICD-10-CM

## 2022-03-28 DIAGNOSIS — R42 DIZZINESS: ICD-10-CM

## 2022-03-28 DIAGNOSIS — M79.10 MYALGIA: ICD-10-CM

## 2022-03-28 DIAGNOSIS — D48.5 NEOPLASM OF UNCERTAIN BEHAVIOR OF SKIN OF FACE: ICD-10-CM

## 2022-03-28 DIAGNOSIS — R73.03 PREDIABETES: ICD-10-CM

## 2022-03-28 DIAGNOSIS — R06.83 SNORING: ICD-10-CM

## 2022-03-28 DIAGNOSIS — U09.9 POST-COVID-19 SYNDROME: Primary | ICD-10-CM

## 2022-03-28 DIAGNOSIS — U09.9 PERSISTENT FATIGUE AFTER COVID-19: ICD-10-CM

## 2022-03-28 DIAGNOSIS — R53.83 PERSISTENT FATIGUE AFTER COVID-19: ICD-10-CM

## 2022-03-28 DIAGNOSIS — H53.9 VISION DISTURBANCE: ICD-10-CM

## 2022-03-28 PROCEDURE — 99214 OFFICE O/P EST MOD 30 MIN: CPT | Performed by: PHYSICIAN ASSISTANT

## 2022-03-28 RX ORDER — METFORMIN HYDROCHLORIDE 750 MG/1
750 TABLET, EXTENDED RELEASE ORAL 2 TIMES DAILY
COMMUNITY
End: 2022-04-08 | Stop reason: SDUPTHER

## 2022-03-28 NOTE — PROGRESS NOTES
Subjective   Jocelyn Merlos is a 31 y.o. female who presents today in follow up of dizziness, moods, headaches, post-Covid syndrome/ long term symptoms, hyperlipidemia, vitamin D deficiency, elevated LFT, hemochromatosis carrier, skin lesions, and specialists.    Dizziness  Associated symptoms include fatigue (improving). Arthralgias: improving. Headaches: improved with treatment. Myalgias: improving. Nausea: no constant nausea. Weakness: improving.   DepressionPatient presents with the following symptoms: confusion (improving), decreased concentration (improving), nervousness/anxiety (worse with concerns of surgery) and palpitations (improving).  Patient is not experiencing: suicidal ideas.  Shortness of breath: improving.    NOTE TO [facility]- [phone]- attn Ms Gregorio   She [facility] and may change to long term disability- still keep insurance. Will get benefits even if she     Dizziness-  Noticed that she has sensory overload. When she has more light or sound or things going on, she has pressure in her head and everything is amplified. She notices even in her car alone- bright and with movement is overstimulated. Still dizziness with eyes closed and with being up and moving. Has been more nauseated lately.  Dr Izquierdo- ENT [facility]- specialized in dizziness- he advised not sure but feels like it is similar to other long Covid patients. Trying verapamil  mg once   daily. Follow up 6 weeks. Scheduled for 5/11/2022 at 2 pm.     MRI brain w/wo 3/2021- ovoid mass left orbit. Otherwise, normal MRI brain.   MRI brain and IAC w/wo 10/21/2021- evidence of removal of orbital mass, scalp incision. Otherwise normal.   Eye Surgeon- Dr Bowman 10/28/2021- cleared for PRN follow up.  Neurology- Dr Hogan- has advised she see ENT, referred for sleep study- negative, treated with Topamax and Imitrex as needed, now increased Topamax and referred for vestibular therapy and given Meclizine PRN. She  reports the Meclizine did not help, did not tolerate increased dose of Topamax, and is awaiting PT. She was advised to follow up in 4 months and is hesitant about this, as her symptoms are debilitating and preventing safe return to work.    ENT- Dr Holt/ [facility]- last seen 12/2021 and was advised she took the wrong Mg (she took Magnesium oxide as listed in the chart), tried mouth guard without relief, and was advised this couldbe more neurological and to follow up with neurology.  · She had dizziness prior to eye surgery but has had significant worsening after surgery. She went back after surgery ,told him she was dizzy, and he told her that was normal and that she lost a lot of blood in surgery.   · She had 2 accidents on [employer].   · The 1st was 8/31/2021- it was her 1st week back after her accident. She was picking up parts that were up high and she gets most dizzy and wobbly. She thinks she may have wobbled and dropped the parts.   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 When she lifted it, she has to tilt it back. 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 They have boxes hanging down from the line. The She has never hit it before. She was due for physical last night. They asked if she had dizziness or light-headedness. She was failed because she has dizziness. 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 No associated BP, palp.   · Most dizzy when she is bending, looking up, doing thing around her house. Work advised she see me with a note that gave restrictions.   · She has been cutting atenolol in 1/2- she then felt CP or heart beating fast. She had her BP checked and had elevated BP 16 systolic. After 5-6 days, she started taking whole Atenolol and had improvement in BP. No change in dizziness with elevated or normal BP.   · She had palpitations- cleared by cardiology. She thought this could contribute but  has improved.   · They thought dizziness was positional and treated for vertigo- positional treatment. No improvement.   · She went to ENT- they did hearing test and lay down and sit up without concerns. They want to come back and do testing that takes 1 hour. They have seen a lot of people that struggle with migraines after Covid have dizziness.  · 10/2021 she had a terrible period- dizziness, nauseated, felt like she would not make it from the toilet to the bed, cold sweats, pale, felt like death for a full day then improved. She has always struggled with bad cramping. This was much worse. No contraception. Previously Nexplanon was not good. They could not get IUD in and has felt poorly on birth control. Patient's step daughter had mood issues on patch.   · 11/2021- She reported she was using mouthguard. Also took Mg and developed a rash under chin/neck. She estimated about 5-20 episodes of dizziness per day- last 20-30 seconds. Long enough to disrupt what she is doing. It is spinning dizziness- feels like if you were drunk. She has also had the sensation of shakiness- things shaking in her vision- intermittent but not every day. She had a couple episodes with nausea with the dizziness but not bad. Sometimes has wondered if it is associated- is not every time. Told that it was the wrong Mg but ENT thought it should be a neurological issue  · Seen by neurology- they gave Meclizine- did not help. She also doubled Topamax as directed- flt poorly but did not help dizziness. She has decreased again to 50 mg.   · She kept a tally sheet to ensure she could answer the amount of episodes per day rather than estimating- is having 12-13 episodes per day.   · Constantly seeing things out of the corner of both of her eyes. There are times that she feels like it is there and is surprised when it is not there- size ranges between a mouse and a person. Has every day at different points.  · 1/2022- She noted worsening dizziness  and was having dizziness with her eyes closed.  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 She had follow up with Dr Bowman and was advised PRN follow up 10/28/2021. He did not seem to think her dizziness was related to her surgery. She has had lazy eye more- she reports she had mild lazy eye on the right since she was a child. Now the left eye is wider and now it made the right eye more lazy.     · Patient was seen by Dr Bowman who recommended removal of mass. She was very nervous about surgery. Patient reports she was so shocked that she could not think of questions then had questions about incisions, recovery, and others.   · Surgeon told her not a big deal and 1-2 hour surgery. She was in surgery for 3-4 hours. Had trouble waking up. They advised they could keep her but that she would do better at home. Still numb above her eye and could not raise her left eyebrow. She was told that it should improve in 6 months.     Depression and anxiety- Feels medication and counseling 1-2 x weekly has helped moods. She has episodes of being hopeless and being anxious. However, she is able to feel better if she has a counseling session and it helps. Her animals also help moods- emotional support.   Moods are difficult to  with feeling poorly all the time, inability to return to normal life and work, and uncertainty of prognosis. She has felt the Lexapro helped- feels like it does not help with major situations but does help with daily symptoms. Wellbutrin- not sure she has adjusted to it and still feeling tired in the day. She thinks it may help daily when things are normal. Since she has increased stressors and uncertainties, she is not sure it is controlled. She is seeing her therapist weekly which helps- feels better upon leaving every week. If it starts to build up again through the week. It is almost time to see her again.    · Lexapro- for a while, having  bad dreams. The dreams improved but she still has them intermittent that she dreams constantly. She has had improvement in anxiety daily but if something happens and she has a stressful situation, she still has some panic and emotional issues. Still having counseling every week. She is working on things to do when she has these episodes. Reminding herself that it will be ok and why it will be ok and why it was a good thing. She mentioned increasing Lexapro.   · She previously reported she had more depression- she gets down on herself about her year. Her therapist thought that she may need to increase Lexapro. Patient reported she was not depressed or sad all the time but had days that she was more down. No SI/HI.   · She then had increased depression- she has had decreased interest in anything, exhaustion, not wanting to be around people, sleeping a lot, not keeping up with home chores or things she would normally do at home. It was not abnormal to be in her feelings or in a funk for a few days but she had severe symptoms. Not wanting to shower or take care of herself. She has had increased depression. Talked to therapist about her moods and they discussed possible changes in medication. She felt some better after talking but she still has depression. Therapist was out a week but follow up 10/19/2021. She thought that contributes to her depression- she is tired of trying to figure out what is contributing to her symptoms since Covid. She previously knew her body and when something was going on. She does not now and is bothersome.  · Patient knows her father struggled with his mental health, and she worried that she could have issues.   · She has had bad anxiety since her grandfather , when she has anxiety, playing her grandfather's death over and over.   · She moved a couple years ago and started worrying her house will burn down.   · With anxiety in the past (prior to Covid 19 infection), she was seeing a  "therapist and reported she was doing much better. She would occasionally miss work with anxiety/depression but had FMLA. She usually did well and was able to work, do things socially.  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  · Work was laid off for 2 weeks-she attempted to go back to work after layoff. When she started to feel better, something mentally started crashing down. As she started to feel better physically, she started \"freaking out\". She was having panic attacks.   · Patient related the feeling to being on a plane to [place] years ago, she felt like she was back on the plane and ready to crash.   · She worried about going back to work without FMLA and worries about losing her job. She reports her friend at work told her they are firing people \"left and right\". She thinks she will lose her job and does not have FMLA  (She has to acquire so much leave to qualify for FMLA).    · She had anxiety doing anything, worst at night with laying down. She thought something could be wrong with her heart. Cardiology cleared her but she feels like something is wrong with her heart when she has episodes. She tries to hold it in.   · She was doing well on Lexapro. She increased to 10 mg once daily. She did have significant fatigue upon starting medication, however, this improved. She continued with anxiety, however, she was able to calm herself down many times. She was working with mental health therapist twice weekly and was starting to improve. She felt she should try to go back to work slowly. There may be a layoff but she could go in to work but the line will not be moving, all the people were not there, and she could come and go- did not have to work full 12 hours. She thought this would be more helpful to try to go in and start trying to do her job without the pressure and interaction with others. She reported she would be able to leave if she had a " panic attack. Despite still having anxiety, she thought this would improve some if she could get back to work and on a routine. She wanted to try.  · She then had increased depression- she has had decreased interest in anything, exhaustion, not wanting to be around people, sleeping a lot, not keeping up with home chores or things she would normally do at home. It was not abnormal to be in her feelings or in a funk for a few days but she had severe symptoms. Not wanting to shower or take care of herself. She has had increased depression. Talked to therapist about her moods and they discussed possible changes in medication. She felt some better after talking but she still has depression. Therapist was out a week but follow up 10/19/2021. She thought that contributes to her depression- she is tired of trying to figure out what is contributing to her symptoms since Covid. She previously knew her body and when something was going on. She does not now and is bothersome.    Cough- no complaints today.   · 11/17/2021- she had a tickle cough at night only that started about 1 month ago. No coughing in the day. No SOA or other symptoms, signs of illness.   · She was advised can treat GERD and use Mucinex DM if needed. Otherwise consider albuterol as needed.     Covid 19 vaccine (Pfizer)- got initial vaccine 4/12/2021. She was tired that day then back to baseline.   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  Patient had significant symptoms following Covid 19 infection. She developed symptoms of Covid 19 12/24/2020 and tested positive 12/28/2020. She had hypoxia and tachycardia with ambulation, SOA, chest tightness, weakness, fatigue, brain fog, diarrhea, and nausea with resolution of severe rash. She went to ER with oxygen saturation of 88%. CTA chest with mild pneumonia and negative for PE. Inflammatory markers were not  performed. They did not evaluate symptoms with ambulation and resting vital signs were ok.  While on video visit, patient had oxygen saturation of 97% and pulse in 90s then with relatively little ambulation/ exertion, she had pulse 127 and oxygen saturation down to 88%. In office, she had a normal resting pulse that increased to 124 with ambulation and oxygen saturation decreased from 95% to 90% with ambulation.     She had persistent tachycardia and hypoxia with walking short distances (that is slowly improving), elevated BP that improved, cognitive impairment, fatigue, headaches, and feeling overall poorly. She was seen by the long term Covid infectious disease clinic, had additional labs, was referred to physical therapy, speech, and cognitive therapy, and was advised to contact her cardiologist for follow up with persistent tachycardia with ambulation. She underwent PTand speech/cognitive therapy. She called cardiology to schedule testing ordered at [facility] prior to Covid 19 then scheduled follow up evaluation of post-Covid tachycardia/ arrhythmia as directed. He changed her propranolol to atenolol for tachycardia and is awaiting Holter monitor and echocardiogram. She is tolerating medication well.     I referred for MRI brain and to neurology for severe headaches. She had severe, debilitating headaches. She had to go to ER but had no testing performed there. Excedrin Migraine, Fiorcet, Zofran, and narcotic pain medication have not helped headaches. I discussed beta blocker, Elavil, and Topamax at her last visit, however, she was concerned about possible AE with medication, as fatigue, arrhythmia, and cognitive dysfunction are already some of her biggest issues. She was unable to tolerate her headaches and felt the possible AE with medication were worth the risk for improvement in headaches. She tried Inderal LA 60 mg nightly without improvement and was started on Topamax by neurology. She has had significant AE  with medication. I had her decrease to 25 mg at bedtime then she was able to increase again to BID and tolerated better.      I contacted infectious disease provider for recommendations for return to work vs work restrictions. She recommended either waiting to return to work until she had some improvement with therapy or may try light duty if available to see if she will tolerate this until she has improvement. 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    She returned to work and noted multiple cognitive issues. She reports they had her work 2 hours then sent her home with no work available. 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 She felt she needed more cognitive therapy to safely return to work. I had her remain off work until her follow up with long term Covid clinic. She has been advised to remain in contact with her employer to ensure her job is secure and leave is approved.     She felt ready to return to work and returned 6/2021. 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 She was seen by medical for CPE and was advised not to work until she was no longer dizzy, as she was a risk on the line, on the floor, and with forklift.     Hypoxia- improved. Not feeling as bad with SOA but  still notices some.   lowest has been about 90s% and maybe 88% if pushing it. Improving gradually  · She had hypoxia and tachycardia with ambulation, SOA, chest tightness, weakness, fatigue, brain fog, diarrhea, and nausea with resolution of severe rash.   · She went to ER with oxygen saturation of 88%. CTA chest with mild pneumonia and negative for PE. Inflammatory markers were not performed. They did not evaluate symptoms with ambulation and resting vital signs were ok.    · While on video visit, patient had oxygen saturation of 97% and pulse in 90s then with relatively little ambulation/ exertion, she had pulse 127 and oxygen saturation down to 88%. In office, she had a normal resting pulse that increased to 124 with ambulation and oxygen saturation decreased from 95% to 90% with ambulation.   Tachycardia/ elevated BP- has had more controlled pulse on atenolol- tolerating without AE.  · Pulse with activity- at PT around 120, she stops her and if pushes self at home she gets up to 150. BP and pulse have improved some but continues with pulse that is elevated but oxygen has stayed above 90.   · She has persistent tachycardia and hypoxia with walking short distances (that is slowly improving), elevated BP that is improving  · She called cardiology to schedule testing ordered at appt prior to Covid 19 then scheduled follow up evaluation of post-Covid tachycardia/ arrhythmia as directed.   · He changed her propranolol to atenolol for tachycardia and is awaiting Holter monitor and echocardiogram. She is tolerating medication well.   · Cardiology- changed inderal to atenolol.   · Echo- ok.   Fatigue- still struggling. Now having to fight to get up and do things but not as much. She has had improvement but not resolution.   Cognitive deficit/dysfunction-has been one of the worst things too- cannot remember things. She will be getting ready to do something and forgets. Sometimes she will get it back and sometimes she will  "not. She reports she tries to think of something and totally forgets. Not as severe as right after COvid but continues to be a problem.   · Still \"brain farts\", short term memory issues, headaches, oxygen levels- not sure if Topamax has amplified brain fog but has been prevalent.   · This symptom was her most worrisome issue.  She was struggling with cognitive issues which cause increased anxiety.  Headaches- has had a few headaches since stopping Topamax. She was able to stop quickly. Did not have to taper as long. Not nearly as bad as prior to Topamax. No improvement in cognition.   · She had right sided headache and headache behind her eyes, nausea with vomiting, eye was red and watering, right sided facial and eyelid drooping, eyebrow was not normal. She reported it woke her up from sleep.   · She went to ER 3/11/2021 with drooping eyelid, bloodshot eye, watering. She was in tears due to severe pain but they did no testing. By the time they gave her a headache cocktail, her headache was already improving. She was ready to go home because she did not feel she was getting any help. They discharged her with Fiorcet and Zofran that have not helped her headaches.   · She was given pain medication from previous foot surgery and never took it. She tried it and got sick but did not help her pain.  · I referred for MRI brain and to neurology for severe, debilitating headaches.    · I initially discussed beta blocker, Elavil, and Topamax, however, we were concerned about possible AE with medication, as fatigue, arrhythmia, and cognitive dysfunction were already some of her biggest issues. She was unable to tolerate her headaches and felt the possible AE with medication were worth the risk for improvement in headaches.   · She tried Inderal LA 60 mg nightly without improvement.   · Headaches occurred almost always when she was asleep. She took Tylenol and tried Excedrin Migraine. They were waking her up at night, which was " "abnormal for her.    · She was seen by neurology 3/30/2021 started on Topamax 25 mg twice daily and Imitrex as needed for headaches by neurology. She has had significant symptoms with this. I discussed possibly cutting dose in 1/2 initially to 25 mg at bedtime then possible increase in dosage as tolerated.   · I referred to sleep medicine as recommended by neurology.   · Topamax helped headaches. She had a headache all day once but otherwise had no other bad headaches. She had paresthesias and worsening cognitive funciton, brain fog, ability to think clearly. I asked that she decrease to nightly dosing only for a couple weeks then could increase to twice daily. She then tolerated better.   · She had improvement in headaches on Topamax. Decreased as directed then was able to increase to twice daily again and had improved headaches and no worsening neurological/cognitive symptoms. She stopped having severe, debilitating headaches and stopped waking up with headaches. Now following with neurology.   · She then had to stop Topamax after developing kidney stones.   Snoring- does not think she is snoring as bad.   · Has never been someone who snored but since Covid, she is snoring loud, sometimes waking her . Negative Sleep study- no [facility].   Hair loss- slowed down.   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 When she started to feel better, something mentally started crashing down. As she started to feel better physically, she started \"freaking out\". She was having panic attacks.   · Patient related the feeling to being on a plane to [place] years ago, she felt like she was back on the plane and ready to crash.   · She worried about going back to work without FMLA and worries about losing her job. She reports her friend at work told her they are firing people \"left and right\". She thinks she will lose her job and does not have FMLA  (She has to acquire " so much leave to qualify for FMLA).    · She had anxiety doing anything, worst at night with laying down. She thought something could be wrong with her heart. Cardiology cleared her but she feels like something is wrong with her heart when she has episodes. She tries to hold it in.   · She was doing well on Lexapro. She increased to 10 mg once daily. She did have significant fatigue upon starting medication, however, this improved. She continued with anxiety, however, she was able to calm herself down many times. She was working with mental health therapist twice weekly and was starting to improve. She felt she should try to go back to work slowly. There may be a layoff but she could go in to work but the line will not be moving, all the people were not there, and she could come and go- did not have to work full 12 hours. She thought this would be more helpful to try to go in and start trying to do her job without the pressure and interaction with others. She reported she would be able to leave if she had a panic attack. Despite still having anxiety, she thought this would improve some if she could get back to work and on a routine. She wanted to try.  · She then had increased depression- she has had decreased interest in anything, exhaustion, not wanting to be around people, sleeping a lot, not keeping up with home chores or things she would normally do at home. It was not abnormal to be in her feelings or in a funk for a few days but she had severe symptoms. Not wanting to shower or take care of herself. She has had increased depression. Talked to therapist about her moods and they discussed possible changes in medication. She felt some better after talking but she still has depression. Therapist was out a week but follow up 10/19/2021. She thought that contributes to her depression- she is tired of trying to figure out what is contributing to her symptoms since Covid. She previously knew her body and when  something was going on. She does not now and is bothersome.    Joint pain, back and neck pain- she still has joint pain. Not as bad with back and neck pain. Her knees are more sore sometimes. Right foot with previous surgery hurts more.    She had improvement in the pain. She was still not back to baseline but improved.   Foot that had tendon release surgery hurt the worst- some improvement in other pain.   When she was a kid, she had a tubing accident but had hip pain after that. He hip hurt more after Covid. No recommendations from PT. States they did not have any additional treatment.      Therapy- still in mental health counseling.   · Physical therapy- once weekly once she went back to work. When she was off work, she went 2-3 x. Has been walking with her dog per PT but she was scared of triggering a migraine because she was scared that a bump or anything would cause migraine.   · Speech and cognitive therapy- There was initially a mistake on their part. Speech therapy- told her none of their other patients went back to full time. Other people had gone back 4 hours per day and gradually increased to 6 then 8 hours. They were concerned they would not be able to go back 12 hours.    · Mental health counseling- seeing her therapist and she has her using an joaquim for relaxation and mind sharpness- doing that once daily.    Return to work- 5/11/2021 -she tried to return to work again and had a panic attack.  She started having severe anxiety about doing her job, remembering, and being able to function at work.  She has never had anything this severe in the past.  She went to work but had to leave. She then returned to work again 6/2021 and had 2 accidents with the [employer]- 1 dropping parts and another hitting a cable. She was seen for physical by medical and was advised she could not work with dizziness due to the risks.   She previously went back to work and had to leave after 2 hours. She reports they asked her to  "tell someone something and she couldn't remember who that was. They thought this was someone she should know but she had no idea. Also, the process of going into work- line, temperature, getting mask- couldn't remember how to do it. She states it was like she was a new hire. Things that she thought should have come back as soon as she saw it did not come back. They had her sit at a picnic table x 2 hours until they decided she should go home on no work available. They still have the heat on and with the heat and her mask, she felt claustrophobic. No other symptoms that she is aware.     Skin lesions- Dermatology removed a lesion right face and left axilla- both  Benign. The spot on her right forearm- it is a scar. They scheduled removal 1/10/2022.   She was concerned about a spot on her face that has been there- looked like a dark, Aging\" spot. However, she noticed it to be larger, more raise, and bothersome. She wanted to consider dermatology for right forearm scar that is raised to see about removal. Dermatology a long time ago but unsure who she saw.      Stopped having menses when she stopped Metformin. She was having normal menses while on Metformin. She had menses 3/13/2022.  Mixed hyperlipidemia- Watched [employer]- she tried no eating meat and changed to almond milk. She has not stopped cheese/dairy completely yet. Feels she can do it. She is not completely plant based but is her goal. 1/2022- worsening cholesterol- elevated T Chol, LDL, and TG.   Prediabetes- 1/2022- A1C up to 5.8%. She restarted Metformin - fertility specialist refilled. Metformin gives diarrhea and she has had more nausea since restarting. Stopped having menses when she stopped Metformin. She was having normal menses while on Metformin. She had menses 3/13/2022.  Vitamin D deficiency- taking vitamin D 6000IU daily and women's one a day  Hemochromatosis carrier- is not taking iron and has had no recent phlebotomy or blood " donation.  Elevated liver function tests- patient withuot regular NSAIDS, Tylenol, or alcohol. Seen by GI- they ordered CT abd/pelvis. 1/2022- normal LFT.   Hyperuricemia- 1/2022- elevated uric acid.    Patient's specialists:  Bariatrics- Dr Noah Koroma- last appt 5/2012 in follow up of [facility].   Cardiology -Dr. Negrete-last appointment 5/2021 for palpitations, hypertension, and chest pain. Hold off on stress test since she was improving. Continue medication for a couple months then re-evaluate. Consider weaning atenolol but if she needs medication for BP, she may need to continue beta blocker and consider labetolol with desire to conceive. Follow up in 3 months. Appt scheduled 11/29/2021  · She had been seen 11/2020 for atypical chest pain. Echo, Holter, and stress test ordered.    · She was seen again after Covid 19 with tachycardia, SOA. They held stress test with post Covid symptoms. Started atenolol 25 mg daily for elevated blood pressure and tachycardia.    · 4/2021- Holter monitor normal.   · 3/2021- Echo normal.   GI- Dr Jensen- last appt 11/18/2021 for elevated LFT and hemochromatosis carrier state. Referred for CT abd/pelvis. Advised diet and consideration of lipid medication. Consider additional labs if CT is negative and enzymes continue to increase.    Infectious disease-MONICA Sawyer-last appointment 4/2021 in follow-up of COVID-19 infection.  Diagnosed with post viral fatigue syndrome and brain fog.  Advised physical therapy, cognitive therapy, and advised follow-up with cardiology for tachycardia.  Performed additional labs. 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  Follow-up PRN.  Neurology-Dr Hogan-last appointment 12/2021 for migraine headache-headaches post Covid and dizziness.  Headaches improved on Topamax 25 mg twice daily- continue medication.  Also given Imitrex. Neagative sleep study. Consider vestibular migraines- trial  increase Topamax, referred for vestibular therapy, and given Meclizine for dizziness. Follow-up in 4 months.  OB/GYN-Dr. Giles-last appointment 1/2020 for annual exam/well woman exam.  Pap completed.  Follow-up in 1 year.  Sleep medicine-Dr. Pantoja-last appt 5/10/2021 for evaluation. Home sleep study 6/2021- no EVERETT.   Speech therapy-started 4/27/2021 for speech and cognitive therapy. She was discharged the end of 5/2021. She has noticed improvement.   Ophthalmology-Dr. Heydi Vera-last appointment 4/2021 for abnormal MRI orbit.  Referred to Dr. Bowman.  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 Follow up PRN.    ENT- Rita Johnson, MONICA- last appt 10/26/2021 for dizziness. Epley maneuver did not work. Advised she get a mouth guard and take Magnesium oxide 400 mg QHS. Follow up if persistent symptoms.   Allergist- Dr Shelia Mc- went 4/19/2021- and they wanted to change Atenolol to Nadolol and gave Zyrtec, Nasacort, and albuterol as needed. Allergy testing- allergic to outdoor issues and not indoor things. He asked her worst symptoms. He was concerned about Topamax- she did cut back to nightly. She had worsening headaches (not severe but continued with headaches). States when she increased again, she feels back to baseline prior to starting Topamax.     The following portions of the patient's history were reviewed and updated as appropriate: allergies, current medications, past family history, past medical history, past social history, past surgical history and problem list.    Review of Systems   Constitutional: Positive for fatigue (improving).   Eyes: Positive for visual disturbance.   Respiratory: Shortness of breath: improving.    Cardiovascular: Positive for palpitations (improving).   Gastrointestinal: Nausea: no constant nausea.   Genitourinary: Negative.    Musculoskeletal: Arthralgias: improving. Myalgias: improving.    Neurological: Positive for dizziness ( Worsening). Negative for seizures and syncope. Facial asymmetry: improved. Weakness: improving. Light-headedness: improved. Headaches: improved with treatment.   Psychiatric/Behavioral: Positive for confusion (improving), decreased concentration (improving), dysphoric mood (improving) and sleep disturbance (new- increased nightly snoring since Covid 19). Negative for self-injury and suicidal ideas. The patient is nervous/anxious (worse with concerns of surgery).         Brain fog improving       Objective   Vitals:    03/28/22 1335   BP: 114/76   Pulse: 79   Resp: 20   Temp: 98 °F (36.7 °C)   SpO2: 100%     Body mass index is 41.7 kg/m².    Physical Exam  Vitals and nursing note reviewed.   Constitutional:       General: She is not in acute distress.     Appearance: Normal appearance. She is well-developed.   HENT:      Head: Normocephalic and atraumatic.      Right Ear: External ear normal.      Left Ear: External ear normal.      Nose: Nose normal.   Eyes:      General: Lids are normal.      Conjunctiva/sclera: Conjunctivae normal.   Neck:      Vascular: No carotid bruit.   Cardiovascular:      Rate and Rhythm: Normal rate and regular rhythm.      Pulses: Normal pulses.      Heart sounds: Normal heart sounds. No murmur heard.    No friction rub. No gallop.   Pulmonary:      Effort: Pulmonary effort is normal. No respiratory distress.      Breath sounds: Normal breath sounds. No wheezing, rhonchi or rales.   Musculoskeletal:         General: No deformity.      Cervical back: Neck supple.   Skin:     General: Skin is warm and dry.   Neurological:      Mental Status: She is alert and oriented to person, place, and time.      Gait: Gait normal.   Psychiatric:         Attention and Perception: Perception normal.         Mood and Affect: Affect normal. Mood is anxious and depressed.         Speech: Speech normal.         Behavior: Behavior normal.         Thought Content: Thought  content normal.         Cognition and Memory: Cognition normal.         Judgment: Judgment normal.         Assessment/Plan   Diagnoses and all orders for this visit:    1. Post-COVID-19 syndrome (Primary)  -     Ambulatory Referral to Internal Medicine    2. Persistent fatigue after COVID-19  -     Ambulatory Referral to Internal Medicine    3. Brain fog  -     Ambulatory Referral to Internal Medicine    4. Persistent neurologic symptoms after COVID-19  -     Ambulatory Referral to Internal Medicine    5. Chronic migraine without aura without status migrainosus, not intractable  -     Ambulatory Referral to Internal Medicine    6. Neck pain  -     Ambulatory Referral to Internal Medicine    7. Myalgia  -     Ambulatory Referral to Internal Medicine    8. Multiple joint pain  -     Ambulatory Referral to Internal Medicine    9. Dizziness  -     Ambulatory Referral to Internal Medicine    10. Vision disturbance  -     Ambulatory Referral to Internal Medicine    11. Major depressive disorder, recurrent, in full remission (HCC)  -     Ambulatory Referral to Internal Medicine    12. Anxiety  -     Ambulatory Referral to Internal Medicine    13. Adjustment disorder with anxious mood  -     Ambulatory Referral to Internal Medicine    14. Elevated C-reactive protein (CRP)  -     Ambulatory Referral to Internal Medicine    15. Mixed hyperlipidemia    16. Prediabetes    17. Vitamin D deficiency    18. Hemochromatosis carrier    19. Elevated liver enzymes    20. Hyperuricemia    21. Snoring    22. Orbital mass    23. Neoplasm of uncertain behavior of skin of face        Assessment and Plan  Patient had significant long term Covid 19 symptoms. She completed physical therapy, speech, and cognitive therapy, and continues follow up with mental health counselor weekly, cardiology, neurology, infectious disease/ long term Covid clinic, ophthalmology, sleep medicine, and allergist as directed by them. She is trying to transition to a  whole food plant based diet. Information given. We have been trying to get her in with PT with long term Covid PT at [place]. She is now seeing dizziness specialist, Dr Izquierdo. She will return in 2 months in follow up of PT, Dr Izquierdo, re-evaluation of symptoms, and fasting labs. Follow up with me 5/26/2021. I will have return to work date for 5/31/2022 if she is doing well at follow up.     · Dizziness- Jocelyn has had dizziness with looking up, bending, and doing things.  Unfortunately, she has had worsening and has dizziness with closing her eyes and newly a sensory overload with noises and light.  She had dizziness following Covid 19 infection, however, following an orbital mass removal, she had increasing dizziness symptoms. She has some lightheadedness and some spinning dizziness. She has undergone MRI brain, MRI brain and IAC and been evaluated by ENT and neurology. She was seen by ophthalmology and cleared for PRN follow up. ENT advised mouthguard and Magnesium oxide 400 mg QHS (then reported she should be taking a different Mg supplement). Without improvement, she advised follow up with neurology. Neurology advised trial of increasing Topamax (which she did not tolerate), trial Meclizine (no improvement in dizziness with Meclizine), and referred to PT for vestibular therapy. I agree with vestibular therapy. She is also having vision issues in the peripheral vision. I would like her to try PT through [place] physical therapy long term Covid PT program, as they have developed therapy program for vision and dizziness post-Covid. She is now seeing Dr Izquierdo at [facility], who has specialty in dizziness. They are starting Verapamil  mg once daily to see if this helps with dizziness. I will refer to tertiary center for evaluation and treatment. 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 She did have a couple accidents at work. I will have her off work until she is cleared  by specialists. Follow up with me 5/26/2022 after her follow up with Dr Izquierdo to allow any additional workup or treatment he may have at follow up if no resolution. If she has resolution, he can clear her for return at that visit.    · Depression with anxious mood- Patient has had some underlying anxiety in the past, however, she developed very severe, debilitating anxiety following Covid 19 infection and rehab, causing panic attacks.  She is seeing her mental health counselor.  She was resistant to medication in the past because she was always able to control her moods, calm down herself, and continue with working.  However, following her COVID-19 infection, she had more severe symptoms that were worsened with trying to return to work.  I started Lexapro 5 mg once daily. Once she was tolerating medication better, she increased to Lexapro 10 mg once daily. She had improved anxiety with therapy and medication. However, she had increased depressive symptoms, fatigue, decreased motivation. I had her continue Lexapro 10 mg once daily and added Wellbutrin  mg daily. She has not noted significant difference. We may consider contacting cardiology to see about decreasing or changing beta blocker.  To be seen ASAP if worsening moods or AE with medication.   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   · Nocturnal cough- Patient to be seen if persistent symptoms. She can continue Pepcid 20 mg before her evening meal. She can try Mucinex DM twice daily as well. She may also need albuterol inhaler if needed. To be seen if worsening, new or changing symptoms or no resolution of cough.     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    · Hypoxia following Covid 19 infection-  She had slow, gradual improvement and completed  PT. She did have improvement in oxygenation.   · Tachycardia/ elevated BP, post-Covid arrhythmia- Continue follow up with cardiology as directed by them. Echocardiogram and Holter monitor were ok. Patient to continue Atenolol 25 mg once daily and monitor BP, pulse. Blood pressure was a little low and she decreased Atenolol by 1/2 has her BP returns to normal. Allergist gave her Nadolol. Cardiology to determine any changes from atenolol to nadolol. She has had no increased SOA with beta blocker but is having increased fatigue and decreased motivation. We will consider consulting cardiology and decreasing medication.   · Fatigue following Covid 19 infection- Patient to continue mental health therapy. She completed physical therapy, and cognitive therapy.   · Cognitive deficit/dysfunction- Continue home exercises. Continue follow up with neurology as directed.   · Depression and anxiety- Patient previously had some depression and anxiety that were managed with counseling and techniques. However, she had severe anxiety following Covid 19 then had improvement with medication but has had severe depression. Continue Mental health counseling and medication.   · Persistent headaches following Covid 19 infection- Patient to follow up with neurology and treatment as directed by them. Her allergist wanted her to stop Topamax due to fatigue and cognitive impairment, however, headaches are controlled, and she did not want to stop medication. She did try to stop medication, and headaches recurred. She then developed kidney stones and was able to wean off Topamax. To use Imitrex as needed for acute headaches, follow up with neurology if worsening headaches, new, or changing neurological symptoms.   · Snoring- Since having Covid 19, she developed significant snoring, headaches that woke her up at night, tachycardia, hypoxia, and significant brain fog and fatigue. Negative sleep study. Snoring has not resolved but has improved  gradually.  · Generalized body aches, back aches, neck pain, and joint pain- Elevated uric acid but otherwise negative autoimmune testing 4/2021. PT and home exercises as needed. I also referred to allergist, with symptoms similar to MIS-A. No diagnosis of MIS-A with allergist. Patient to decrease purine rich foods.     In follow up of chronic, non-Covid related symptoms:  · Neoplasm uncertain behavior face- Patient has a skin lesion on her face and a scar on her forearm. She will need to see dermatology once she has completed follow up with ENT, neurology. I will refer.   · Mixed hyperlipidemia- Patient to be compliant with whole food plant based diet. Await labs for further recommendations.    · Prediabetes- A1C increased off Metformin and she stopped having menses. She restarted Metformin and menses is more regular again. Continue Metformin. I will recheck labs at follow up.   · Vitamin D deficiency- Continue vitamin D 6000IU daily and women's one a day.   · Hemochromatosis carrier- I will continue to monitor and make further recommendations.  · Elevated liver function tests- Most recent LFT normal. Avoid NSAIDS and alcohol and limit Tylenol to < 2 grams daily. Follow up with GI as directed.   · Hyperuricemia- Decrease purine rich foods. I will monitor at follow up.     Patient continues to see specialists as noted above.  I have reviewed available records, including consult notes, labs, and imaging/testing.  Patient to continue follow-up with specialists as directed by them.    I spent 39 minutes caring for Jocelyn Merlos on this date of service. This time includes time spent by me in the following activities as necessary: preparing for the visit, reviewing tests, specialists records and previous visits, obtaining and/or reviewing a separately obtained history, performing a medically appropriate exam and/or evaluation, counseling and educating the patient, family, caregiver, referring and/or communicating with  other healthcare professionals, documenting information in the medical record, independently interpreting results and communicating that information with the patient, family, caregiver, and developing a medically appropriate treatment plan with consideration of other conditions, medications, and treatments.     [facility] Claim# [id number]  Spoke with Sherry to update on dates of extended leave.

## 2022-03-31 ENCOUNTER — TELEPHONE (OUTPATIENT)
Dept: FAMILY MEDICINE CLINIC | Facility: CLINIC | Age: 32
End: 2022-03-31

## 2022-03-31 NOTE — TELEPHONE ENCOUNTER
Caller: KASSIDY    Best call back number: [phone]    What form or medical record are you requesting: OFFICE NOTE FROM 3/28/22, AND HER NEXT VISIT    How would you like to receive the form or medical records (pick-up, mail, fax): FAX  If fax, what is the fax number: [phone]    Additional notes: MRS AGUILAR FROM [facility] CALLED TO REQUEST INFORMATION FOR PATIENT'S DISABILITY CLAIM. REQUIRES LAST OFFICE NOTES, HOW LONG PATIENT IS OFF WORK, AND DATE OF NEXT APPOINTMENT.

## 2022-04-06 ENCOUNTER — TELEPHONE (OUTPATIENT)
Dept: FAMILY MEDICINE CLINIC | Facility: CLINIC | Age: 32
End: 2022-04-06

## 2022-04-08 RX ORDER — METFORMIN HYDROCHLORIDE 750 MG/1
750 TABLET, EXTENDED RELEASE ORAL 2 TIMES DAILY
Qty: 180 TABLET | Refills: 1 | Status: SHIPPED | OUTPATIENT
Start: 2022-04-08 | End: 2022-09-16 | Stop reason: SDUPTHER

## 2022-04-08 RX ORDER — METRONIDAZOLE 7.5 MG/G
GEL VAGINAL NIGHTLY
Qty: 70 G | Refills: 0 | Status: SHIPPED | OUTPATIENT
Start: 2022-04-08 | End: 2022-04-13

## 2022-04-11 ENCOUNTER — LAB (OUTPATIENT)
Dept: GASTROENTEROLOGY | Facility: CLINIC | Age: 32
End: 2022-04-11

## 2022-04-12 LAB
ALBUMIN SERPL-MCNC: 4.4 G/DL (ref 3.8–4.8)
ALBUMIN/GLOB SERPL: 2 {RATIO} (ref 1.2–2.2)
ALP SERPL-CCNC: 83 IU/L (ref 44–121)
ALT SERPL-CCNC: 22 IU/L (ref 0–32)
AST SERPL-CCNC: 18 IU/L (ref 0–40)
BILIRUB SERPL-MCNC: <0.2 MG/DL (ref 0–1.2)
BUN SERPL-MCNC: 11 MG/DL (ref 6–20)
BUN/CREAT SERPL: 14 (ref 9–23)
CALCIUM SERPL-MCNC: 9 MG/DL (ref 8.7–10.2)
CHLORIDE SERPL-SCNC: 104 MMOL/L (ref 96–106)
CO2 SERPL-SCNC: 20 MMOL/L (ref 20–29)
CREAT SERPL-MCNC: 0.81 MG/DL (ref 0.57–1)
EGFRCR SERPLBLD CKD-EPI 2021: 99 ML/MIN/1.73
GLOBULIN SER CALC-MCNC: 2.2 G/DL (ref 1.5–4.5)
GLUCOSE SERPL-MCNC: 95 MG/DL (ref 65–99)
POTASSIUM SERPL-SCNC: 5.2 MMOL/L (ref 3.5–5.2)
PROT SERPL-MCNC: 6.6 G/DL (ref 6–8.5)
SODIUM SERPL-SCNC: 142 MMOL/L (ref 134–144)

## 2022-04-13 ENCOUNTER — TELEPHONE (OUTPATIENT)
Dept: GASTROENTEROLOGY | Facility: CLINIC | Age: 32
End: 2022-04-13

## 2022-04-13 NOTE — TELEPHONE ENCOUNTER
----- Message from Carroll MCKEON MD sent at 4/13/2022  1:46 PM EDT -----  Regarding: Lab results  Okay to notify patient lab results reflect normalization of her LFTs.  Might recheck CMP in 6 months time.  ----- Message -----  From: Interface, Reflab Results In  Sent: 4/12/2022   4:08 AM EDT  To: Carroll MCKEON MD

## 2022-04-14 NOTE — TELEPHONE ENCOUNTER
Call returned to pt.  Advise per Dr Jensen note.  Verb understanding.     States will f/u labs with PCP.

## 2022-04-28 ENCOUNTER — TELEPHONE (OUTPATIENT)
Dept: FAMILY MEDICINE CLINIC | Facility: CLINIC | Age: 32
End: 2022-04-28

## 2022-05-26 ENCOUNTER — OFFICE VISIT (OUTPATIENT)
Dept: FAMILY MEDICINE CLINIC | Facility: CLINIC | Age: 32
End: 2022-05-26

## 2022-05-26 VITALS
OXYGEN SATURATION: 97 % | WEIGHT: 274.6 LBS | HEART RATE: 72 BPM | SYSTOLIC BLOOD PRESSURE: 131 MMHG | HEIGHT: 68 IN | BODY MASS INDEX: 41.62 KG/M2 | DIASTOLIC BLOOD PRESSURE: 91 MMHG | TEMPERATURE: 97.3 F

## 2022-05-26 DIAGNOSIS — M79.671 BILATERAL FOOT PAIN: ICD-10-CM

## 2022-05-26 DIAGNOSIS — Z14.8 HEMOCHROMATOSIS CARRIER: ICD-10-CM

## 2022-05-26 DIAGNOSIS — U09.9 PERSISTENT NEUROLOGIC SYMPTOMS AFTER COVID-19: ICD-10-CM

## 2022-05-26 DIAGNOSIS — H53.9 VISION DISTURBANCE: ICD-10-CM

## 2022-05-26 DIAGNOSIS — U09.9 PERSISTENT FATIGUE AFTER COVID-19: ICD-10-CM

## 2022-05-26 DIAGNOSIS — G43.709 CHRONIC MIGRAINE WITHOUT AURA WITHOUT STATUS MIGRAINOSUS, NOT INTRACTABLE: ICD-10-CM

## 2022-05-26 DIAGNOSIS — M79.10 MYALGIA: ICD-10-CM

## 2022-05-26 DIAGNOSIS — E79.0 HYPERURICEMIA: ICD-10-CM

## 2022-05-26 DIAGNOSIS — R73.03 PREDIABETES: ICD-10-CM

## 2022-05-26 DIAGNOSIS — F33.42 MAJOR DEPRESSIVE DISORDER, RECURRENT, IN FULL REMISSION: ICD-10-CM

## 2022-05-26 DIAGNOSIS — E55.9 VITAMIN D DEFICIENCY: ICD-10-CM

## 2022-05-26 DIAGNOSIS — M54.2 NECK PAIN: ICD-10-CM

## 2022-05-26 DIAGNOSIS — E78.2 MIXED HYPERLIPIDEMIA: ICD-10-CM

## 2022-05-26 DIAGNOSIS — R53.83 PERSISTENT FATIGUE AFTER COVID-19: ICD-10-CM

## 2022-05-26 DIAGNOSIS — F41.9 ANXIETY: ICD-10-CM

## 2022-05-26 DIAGNOSIS — R79.82 ELEVATED C-REACTIVE PROTEIN (CRP): ICD-10-CM

## 2022-05-26 DIAGNOSIS — F43.22 ADJUSTMENT DISORDER WITH ANXIOUS MOOD: ICD-10-CM

## 2022-05-26 DIAGNOSIS — U09.9 POST-COVID-19 SYNDROME: ICD-10-CM

## 2022-05-26 DIAGNOSIS — M25.50 MULTIPLE JOINT PAIN: ICD-10-CM

## 2022-05-26 DIAGNOSIS — R29.90 PERSISTENT NEUROLOGIC SYMPTOMS AFTER COVID-19: ICD-10-CM

## 2022-05-26 DIAGNOSIS — M79.672 BILATERAL FOOT PAIN: ICD-10-CM

## 2022-05-26 DIAGNOSIS — R41.89 BRAIN FOG: ICD-10-CM

## 2022-05-26 DIAGNOSIS — R42 DIZZINESS: Primary | ICD-10-CM

## 2022-05-26 DIAGNOSIS — R74.8 ELEVATED LIVER ENZYMES: ICD-10-CM

## 2022-05-26 PROCEDURE — 99214 OFFICE O/P EST MOD 30 MIN: CPT | Performed by: PHYSICIAN ASSISTANT

## 2022-05-27 LAB
25(OH)D3+25(OH)D2 SERPL-MCNC: 41.1 NG/ML (ref 30–100)
ALBUMIN SERPL-MCNC: 5.3 G/DL (ref 3.8–4.8)
ALBUMIN/GLOB SERPL: 2.1 {RATIO} (ref 1.2–2.2)
ALP SERPL-CCNC: 90 IU/L (ref 44–121)
ALT SERPL-CCNC: 39 IU/L (ref 0–32)
AST SERPL-CCNC: 33 IU/L (ref 0–40)
BASOPHILS # BLD AUTO: 0.1 X10E3/UL (ref 0–0.2)
BASOPHILS NFR BLD AUTO: 1 %
BILIRUB SERPL-MCNC: 0.4 MG/DL (ref 0–1.2)
BUN SERPL-MCNC: 11 MG/DL (ref 6–20)
BUN/CREAT SERPL: 13 (ref 9–23)
CALCIUM SERPL-MCNC: 10.2 MG/DL (ref 8.7–10.2)
CHLORIDE SERPL-SCNC: 101 MMOL/L (ref 96–106)
CHOLEST SERPL-MCNC: 246 MG/DL (ref 100–199)
CK SERPL-CCNC: 58 U/L (ref 32–182)
CO2 SERPL-SCNC: 19 MMOL/L (ref 20–29)
CREAT SERPL-MCNC: 0.88 MG/DL (ref 0.57–1)
EGFRCR SERPLBLD CKD-EPI 2021: 90 ML/MIN/1.73
EOSINOPHIL # BLD AUTO: 0.1 X10E3/UL (ref 0–0.4)
EOSINOPHIL NFR BLD AUTO: 2 %
ERYTHROCYTE [DISTWIDTH] IN BLOOD BY AUTOMATED COUNT: 12.4 % (ref 11.7–15.4)
FERRITIN SERPL-MCNC: 60 NG/ML (ref 15–150)
FOLATE SERPL-MCNC: >20 NG/ML
GLOBULIN SER CALC-MCNC: 2.5 G/DL (ref 1.5–4.5)
GLUCOSE SERPL-MCNC: 85 MG/DL (ref 65–99)
HBA1C MFR BLD: 5.7 % (ref 4.8–5.6)
HCT VFR BLD AUTO: 44 % (ref 34–46.6)
HDLC SERPL-MCNC: 39 MG/DL
HGB BLD-MCNC: 14.6 G/DL (ref 11.1–15.9)
IMM GRANULOCYTES # BLD AUTO: 0 X10E3/UL (ref 0–0.1)
IMM GRANULOCYTES NFR BLD AUTO: 0 %
IRON SATN MFR SERPL: 37 % (ref 15–55)
IRON SERPL-MCNC: 142 UG/DL (ref 27–159)
LDLC SERPL CALC-MCNC: 158 MG/DL (ref 0–99)
LDLC/HDLC SERPL: 4.1 RATIO (ref 0–3.2)
LYMPHOCYTES # BLD AUTO: 2.8 X10E3/UL (ref 0.7–3.1)
LYMPHOCYTES NFR BLD AUTO: 36 %
MCH RBC QN AUTO: 30.2 PG (ref 26.6–33)
MCHC RBC AUTO-ENTMCNC: 33.2 G/DL (ref 31.5–35.7)
MCV RBC AUTO: 91 FL (ref 79–97)
MONOCYTES # BLD AUTO: 0.5 X10E3/UL (ref 0.1–0.9)
MONOCYTES NFR BLD AUTO: 6 %
NEUTROPHILS # BLD AUTO: 4.3 X10E3/UL (ref 1.4–7)
NEUTROPHILS NFR BLD AUTO: 55 %
PLATELET # BLD AUTO: 365 X10E3/UL (ref 150–450)
POTASSIUM SERPL-SCNC: 4.5 MMOL/L (ref 3.5–5.2)
PROT SERPL-MCNC: 7.8 G/DL (ref 6–8.5)
RBC # BLD AUTO: 4.83 X10E6/UL (ref 3.77–5.28)
SODIUM SERPL-SCNC: 141 MMOL/L (ref 134–144)
T3FREE SERPL-MCNC: 3.6 PG/ML (ref 2–4.4)
T4 FREE SERPL-MCNC: 1.33 NG/DL (ref 0.82–1.77)
TIBC SERPL-MCNC: 382 UG/DL (ref 250–450)
TRIGL SERPL-MCNC: 261 MG/DL (ref 0–149)
TSH SERPL DL<=0.005 MIU/L-ACNC: 1.47 UIU/ML (ref 0.45–4.5)
UIBC SERPL-MCNC: 240 UG/DL (ref 131–425)
URATE SERPL-MCNC: 6.4 MG/DL (ref 2.6–6.2)
VIT B12 SERPL-MCNC: 858 PG/ML (ref 232–1245)
VLDLC SERPL CALC-MCNC: 49 MG/DL (ref 5–40)
WBC # BLD AUTO: 7.8 X10E3/UL (ref 3.4–10.8)

## 2022-05-31 RX ORDER — ATENOLOL 25 MG/1
25 TABLET ORAL DAILY
Qty: 90 TABLET | Refills: 3 | Status: SHIPPED | OUTPATIENT
Start: 2022-05-31

## 2022-06-06 ENCOUNTER — TELEPHONE (OUTPATIENT)
Dept: FAMILY MEDICINE CLINIC | Facility: CLINIC | Age: 32
End: 2022-06-06

## 2022-06-06 NOTE — TELEPHONE ENCOUNTER
PATIENT IS CALLING TO CONFIRM THAT HER PAPERWORK WAS FAXED OVER TO [employer].     PLEASE CALL AND ADVISE  [phone]

## 2022-06-10 DIAGNOSIS — F41.9 ANXIETY: ICD-10-CM

## 2022-06-10 DIAGNOSIS — F43.22 ADJUSTMENT DISORDER WITH ANXIOUS MOOD: ICD-10-CM

## 2022-06-10 DIAGNOSIS — U09.9 POST-COVID-19 SYNDROME: ICD-10-CM

## 2022-06-10 DIAGNOSIS — F33.42 MAJOR DEPRESSIVE DISORDER, RECURRENT, IN FULL REMISSION: ICD-10-CM

## 2022-06-10 NOTE — TELEPHONE ENCOUNTER
NEGRA FROM [facility] IS CALLING IN HE STATES THAT THEY DID RECEIVE PAPERWORK BUT THE OFFICE NOTES FROM VISIT ON 05/26/22 WERE NOT INCLUDED AND THEY ARE NEEDING THOSE.      FAX NUMBER IS  [phone]

## 2022-06-13 RX ORDER — ESCITALOPRAM OXALATE 10 MG/1
10 TABLET ORAL DAILY
Qty: 90 TABLET | Refills: 0 | Status: SHIPPED | OUTPATIENT
Start: 2022-06-13 | End: 2022-09-12

## 2022-07-06 ENCOUNTER — TELEPHONE (OUTPATIENT)
Dept: FAMILY MEDICINE CLINIC | Facility: CLINIC | Age: 32
End: 2022-07-06

## 2022-07-06 NOTE — TELEPHONE ENCOUNTER
Patient said that she was just calling because she has a appointment with the [facility] on the 14th and she has already talked to them and they said that if she has to reschedule that they would not be able to get her in until October. She just wanted to keep you updated. They said she could go to her appointment if she no longer has symptoms by that day.

## 2022-07-06 NOTE — TELEPHONE ENCOUNTER
Caller: Jocelyn Merlos    Relationship to patient: Self    Best call back number: [phone]    Date of exposure: UNKNOWN    Date of positive COVID19 test: 7/5/22    COVID19 symptoms: SYMPTOMS OF A SORE THROAT STARTED ON 7/4 , COUGHING , RUNNY NOSE, HEADACHE, FATIGUE, CHEST CONGESTION, NO FEVER AND STILL HAS TASTE AND SMELL, BUT LOOSING TASTE.    Date of initial quarantine: 7/5    Additional information or concerns:CALL BACK WITH CARE INSTRUCTIONS, PLUS PATIENT STILL HAS AN APPOINTMENT WITH [facility] ON 7/14, BUT MIGHT NEED SOME MEDICATION FOR HER CURRENT SYMPTOMS     What is the patients preferred pharmacy: [id number]   [phone].

## 2022-07-07 DIAGNOSIS — U09.9 POST-COVID-19 SYNDROME: ICD-10-CM

## 2022-07-07 DIAGNOSIS — F33.42 MAJOR DEPRESSIVE DISORDER, RECURRENT, IN FULL REMISSION: ICD-10-CM

## 2022-07-07 DIAGNOSIS — F43.22 ADJUSTMENT DISORDER WITH ANXIOUS MOOD: ICD-10-CM

## 2022-07-07 DIAGNOSIS — F41.9 ANXIETY: ICD-10-CM

## 2022-07-07 RX ORDER — ESCITALOPRAM OXALATE 10 MG/1
TABLET ORAL
Qty: 90 TABLET | Refills: 0 | OUTPATIENT
Start: 2022-07-07

## 2022-07-07 NOTE — TELEPHONE ENCOUNTER
Next ov 08/01/22    Last ov 05/26/22    Last lab 05/26/22    Im confused on how much she is supposed to take. Your note states 10mg but this medication has a note that says she can take 2 if needed.

## 2022-07-08 NOTE — TELEPHONE ENCOUNTER
Patient stated that she has sent her positive Covid results through my chart yesterday that she got done at [facility].

## 2022-07-25 RX ORDER — TOPIRAMATE 50 MG/1
TABLET, FILM COATED ORAL
COMMUNITY
Start: 2022-05-25 | End: 2022-08-07

## 2022-07-25 RX ORDER — METRONIDAZOLE 7.5 MG/G
GEL VAGINAL
COMMUNITY
Start: 2022-04-08 | End: 2022-08-07

## 2022-08-01 ENCOUNTER — OFFICE VISIT (OUTPATIENT)
Dept: FAMILY MEDICINE CLINIC | Facility: CLINIC | Age: 32
End: 2022-08-01

## 2022-08-01 VITALS
DIASTOLIC BLOOD PRESSURE: 74 MMHG | SYSTOLIC BLOOD PRESSURE: 118 MMHG | BODY MASS INDEX: 39.25 KG/M2 | TEMPERATURE: 96.9 F | HEIGHT: 68 IN | WEIGHT: 259 LBS | HEART RATE: 75 BPM | OXYGEN SATURATION: 97 %

## 2022-08-01 DIAGNOSIS — H53.9 VISION DISTURBANCE: ICD-10-CM

## 2022-08-01 DIAGNOSIS — E55.9 VITAMIN D DEFICIENCY: ICD-10-CM

## 2022-08-01 DIAGNOSIS — R74.8 ELEVATED LIVER ENZYMES: ICD-10-CM

## 2022-08-01 DIAGNOSIS — R42 DIZZINESS: ICD-10-CM

## 2022-08-01 DIAGNOSIS — U09.9 PERSISTENT NEUROLOGIC SYMPTOMS AFTER COVID-19: ICD-10-CM

## 2022-08-01 DIAGNOSIS — F41.9 ANXIETY: ICD-10-CM

## 2022-08-01 DIAGNOSIS — R73.03 PREDIABETES: ICD-10-CM

## 2022-08-01 DIAGNOSIS — H05.89 ORBITAL MASS: ICD-10-CM

## 2022-08-01 DIAGNOSIS — G43.709 CHRONIC MIGRAINE WITHOUT AURA WITHOUT STATUS MIGRAINOSUS, NOT INTRACTABLE: ICD-10-CM

## 2022-08-01 DIAGNOSIS — F33.42 MAJOR DEPRESSIVE DISORDER, RECURRENT, IN FULL REMISSION: ICD-10-CM

## 2022-08-01 DIAGNOSIS — R53.83 PERSISTENT FATIGUE AFTER COVID-19: ICD-10-CM

## 2022-08-01 DIAGNOSIS — U09.9 POST-COVID-19 SYNDROME: Primary | ICD-10-CM

## 2022-08-01 DIAGNOSIS — E78.2 MIXED HYPERLIPIDEMIA: ICD-10-CM

## 2022-08-01 DIAGNOSIS — R29.90 PERSISTENT NEUROLOGIC SYMPTOMS AFTER COVID-19: ICD-10-CM

## 2022-08-01 DIAGNOSIS — U09.9 PERSISTENT FATIGUE AFTER COVID-19: ICD-10-CM

## 2022-08-01 DIAGNOSIS — M79.10 MYALGIA: ICD-10-CM

## 2022-08-01 DIAGNOSIS — R79.82 ELEVATED C-REACTIVE PROTEIN (CRP): ICD-10-CM

## 2022-08-01 DIAGNOSIS — E79.0 HYPERURICEMIA: ICD-10-CM

## 2022-08-01 DIAGNOSIS — F43.22 ADJUSTMENT DISORDER WITH ANXIOUS MOOD: ICD-10-CM

## 2022-08-01 DIAGNOSIS — Z14.8 HEMOCHROMATOSIS CARRIER: ICD-10-CM

## 2022-08-01 DIAGNOSIS — M25.50 MULTIPLE JOINT PAIN: ICD-10-CM

## 2022-08-01 DIAGNOSIS — M54.2 NECK PAIN: ICD-10-CM

## 2022-08-01 DIAGNOSIS — R41.89 BRAIN FOG: ICD-10-CM

## 2022-08-01 PROCEDURE — 99214 OFFICE O/P EST MOD 30 MIN: CPT | Performed by: PHYSICIAN ASSISTANT

## 2022-08-02 DIAGNOSIS — F33.42 MAJOR DEPRESSIVE DISORDER, RECURRENT, IN FULL REMISSION: ICD-10-CM

## 2022-08-02 DIAGNOSIS — R41.89 BRAIN FOG: ICD-10-CM

## 2022-08-03 RX ORDER — BUPROPION HYDROCHLORIDE 150 MG/1
150 TABLET ORAL EVERY MORNING
Qty: 90 TABLET | Refills: 2 | Status: SHIPPED | OUTPATIENT
Start: 2022-08-03

## 2022-08-09 ENCOUNTER — TELEPHONE (OUTPATIENT)
Dept: FAMILY MEDICINE CLINIC | Facility: CLINIC | Age: 32
End: 2022-08-09

## 2022-08-09 NOTE — TELEPHONE ENCOUNTER
----- Message from Shabana Meyers MA sent at 8/9/2022 11:50 AM EDT -----  Regarding: [facility]   Have you seen anything regarding this. Please advise.     ----- Message -----  From: Jocelyn Merlos  Sent: 8/9/2022  11:49 AM EDT  To: Sridhar [facility]  Subject: [facility] Dr. Kirby. I was just sending a message to check the status of my [facility] paperwork. [facility] said they have not received it yet. Is there anything I can do to get it to them as soon as possible? Thank you.

## 2022-08-09 NOTE — TELEPHONE ENCOUNTER
PATIENT IS CALLING TO CHECK THE STATUS OF HER [facility] PAPERWORK.     PLEASE ADVISE [phone]

## 2022-08-09 NOTE — TELEPHONE ENCOUNTER
Please ensure this is signed by Dr. Prabhakar and faxed ASAP.  Fax is on a note on the paperwork as well as in my office note.

## 2022-08-12 ENCOUNTER — TELEPHONE (OUTPATIENT)
Dept: FAMILY MEDICINE CLINIC | Facility: CLINIC | Age: 32
End: 2022-08-12

## 2022-08-12 NOTE — TELEPHONE ENCOUNTER
Spoke with SAI GUTIERREZ with [facility] [phone] and gave her all info that was needed for elzbieta's PHYSICIAN'S STATEMENT. SAI ASKED THAT I FAX [facility] THAT IS IN CHARGE OF ELZBIETA'S CLAIM (CLAIM # IS [id number]) TO FAX THE LAST OFFICE NOTE TO HER AND THAT IS ALL THAT THEY WILL NEED AT THIS TIME.

## 2022-09-10 DIAGNOSIS — F43.22 ADJUSTMENT DISORDER WITH ANXIOUS MOOD: ICD-10-CM

## 2022-09-10 DIAGNOSIS — F33.42 MAJOR DEPRESSIVE DISORDER, RECURRENT, IN FULL REMISSION: ICD-10-CM

## 2022-09-10 DIAGNOSIS — F41.9 ANXIETY: ICD-10-CM

## 2022-09-10 DIAGNOSIS — U09.9 POST-COVID-19 SYNDROME: ICD-10-CM

## 2022-09-12 RX ORDER — ESCITALOPRAM OXALATE 10 MG/1
10 TABLET ORAL DAILY
Qty: 90 TABLET | Refills: 0 | Status: SHIPPED | OUTPATIENT
Start: 2022-09-12 | End: 2022-12-12

## 2022-09-16 RX ORDER — METFORMIN HYDROCHLORIDE 750 MG/1
750 TABLET, EXTENDED RELEASE ORAL 2 TIMES DAILY
Qty: 180 TABLET | Refills: 1 | Status: SHIPPED | OUTPATIENT
Start: 2022-09-16 | End: 2023-03-13

## 2022-09-27 ENCOUNTER — TELEPHONE (OUTPATIENT)
Dept: FAMILY MEDICINE CLINIC | Facility: CLINIC | Age: 32
End: 2022-09-27

## 2022-09-27 NOTE — TELEPHONE ENCOUNTER
Caller: Jocelyn Merlos    Relationship: Self    Best call back number: [phone]    What is the best time to reach you: ANYTIME    Who are you requesting to speak with (clinical staff, provider,  specific staff member): PHAM SORENSON    Do you know the name of the person who called: PHAM SORENSON    What was the call regarding:A REFERRAL

## 2022-10-26 ENCOUNTER — OFFICE VISIT (OUTPATIENT)
Dept: FAMILY MEDICINE CLINIC | Facility: CLINIC | Age: 32
End: 2022-10-26

## 2022-10-26 VITALS
OXYGEN SATURATION: 98 % | WEIGHT: 256 LBS | HEIGHT: 68 IN | BODY MASS INDEX: 38.8 KG/M2 | SYSTOLIC BLOOD PRESSURE: 112 MMHG | DIASTOLIC BLOOD PRESSURE: 76 MMHG | HEART RATE: 68 BPM | RESPIRATION RATE: 16 BRPM | TEMPERATURE: 98 F

## 2022-10-26 DIAGNOSIS — F41.9 ANXIETY: ICD-10-CM

## 2022-10-26 DIAGNOSIS — R29.90 PERSISTENT NEUROLOGIC SYMPTOMS AFTER COVID-19: ICD-10-CM

## 2022-10-26 DIAGNOSIS — E55.9 VITAMIN D DEFICIENCY: ICD-10-CM

## 2022-10-26 DIAGNOSIS — R53.83 PERSISTENT FATIGUE AFTER COVID-19: ICD-10-CM

## 2022-10-26 DIAGNOSIS — E79.0 HYPERURICEMIA: ICD-10-CM

## 2022-10-26 DIAGNOSIS — F33.42 MAJOR DEPRESSIVE DISORDER, RECURRENT, IN FULL REMISSION: ICD-10-CM

## 2022-10-26 DIAGNOSIS — R79.82 ELEVATED C-REACTIVE PROTEIN (CRP): ICD-10-CM

## 2022-10-26 DIAGNOSIS — U09.9 PERSISTENT FATIGUE AFTER COVID-19: ICD-10-CM

## 2022-10-26 DIAGNOSIS — H05.89 ORBITAL MASS: ICD-10-CM

## 2022-10-26 DIAGNOSIS — H53.9 VISION DISTURBANCE: ICD-10-CM

## 2022-10-26 DIAGNOSIS — R74.8 ELEVATED LIVER ENZYMES: ICD-10-CM

## 2022-10-26 DIAGNOSIS — F43.22 ADJUSTMENT DISORDER WITH ANXIOUS MOOD: ICD-10-CM

## 2022-10-26 DIAGNOSIS — R73.03 PREDIABETES: ICD-10-CM

## 2022-10-26 DIAGNOSIS — M54.2 NECK PAIN: ICD-10-CM

## 2022-10-26 DIAGNOSIS — E78.2 MIXED HYPERLIPIDEMIA: ICD-10-CM

## 2022-10-26 DIAGNOSIS — M25.50 MULTIPLE JOINT PAIN: ICD-10-CM

## 2022-10-26 DIAGNOSIS — U09.9 PERSISTENT NEUROLOGIC SYMPTOMS AFTER COVID-19: ICD-10-CM

## 2022-10-26 DIAGNOSIS — Z14.8 HEMOCHROMATOSIS CARRIER: ICD-10-CM

## 2022-10-26 DIAGNOSIS — U09.9 POST-COVID-19 SYNDROME: ICD-10-CM

## 2022-10-26 DIAGNOSIS — R41.89 BRAIN FOG: ICD-10-CM

## 2022-10-26 DIAGNOSIS — G43.709 CHRONIC MIGRAINE WITHOUT AURA WITHOUT STATUS MIGRAINOSUS, NOT INTRACTABLE: ICD-10-CM

## 2022-10-26 DIAGNOSIS — M79.10 MYALGIA: ICD-10-CM

## 2022-10-26 DIAGNOSIS — R42 DIZZINESS: Primary | ICD-10-CM

## 2022-10-26 PROCEDURE — 99214 OFFICE O/P EST MOD 30 MIN: CPT | Performed by: PHYSICIAN ASSISTANT

## 2022-10-27 ENCOUNTER — TELEPHONE (OUTPATIENT)
Dept: FAMILY MEDICINE CLINIC | Facility: CLINIC | Age: 32
End: 2022-10-27

## 2022-10-27 LAB
25(OH)D3+25(OH)D2 SERPL-MCNC: 50.2 NG/ML (ref 30–100)
ALBUMIN SERPL-MCNC: 4.3 G/DL (ref 3.5–5.2)
ALBUMIN/GLOB SERPL: 1.8 G/DL
ALP SERPL-CCNC: 83 U/L (ref 39–117)
ALT SERPL-CCNC: 17 U/L (ref 1–33)
APPEARANCE UR: CLEAR
AST SERPL-CCNC: 14 U/L (ref 1–32)
BACTERIA #/AREA URNS HPF: NORMAL /HPF
BASOPHILS # BLD AUTO: 0.05 10*3/MM3 (ref 0–0.2)
BASOPHILS NFR BLD AUTO: 0.5 % (ref 0–1.5)
BILIRUB SERPL-MCNC: 0.4 MG/DL (ref 0–1.2)
BILIRUB UR QL STRIP: NEGATIVE
BUN SERPL-MCNC: 13 MG/DL (ref 6–20)
BUN/CREAT SERPL: 16 (ref 7–25)
CALCIUM SERPL-MCNC: 9.5 MG/DL (ref 8.6–10.5)
CASTS URNS QL MICRO: NORMAL /LPF
CHLORIDE SERPL-SCNC: 103 MMOL/L (ref 98–107)
CHOLEST SERPL-MCNC: 169 MG/DL (ref 0–200)
CK SERPL-CCNC: 42 U/L (ref 20–180)
CO2 SERPL-SCNC: 27.9 MMOL/L (ref 22–29)
COLOR UR: YELLOW
CREAT SERPL-MCNC: 0.81 MG/DL (ref 0.57–1)
EGFRCR SERPLBLD CKD-EPI 2021: 99.1 ML/MIN/1.73
EOSINOPHIL # BLD AUTO: 0.22 10*3/MM3 (ref 0–0.4)
EOSINOPHIL NFR BLD AUTO: 2.2 % (ref 0.3–6.2)
EPI CELLS #/AREA URNS HPF: NORMAL /HPF (ref 0–10)
ERYTHROCYTE [DISTWIDTH] IN BLOOD BY AUTOMATED COUNT: 12.2 % (ref 12.3–15.4)
FERRITIN SERPL-MCNC: 65 NG/ML (ref 13–150)
FOLATE SERPL-MCNC: >20 NG/ML (ref 4.78–24.2)
GLOBULIN SER CALC-MCNC: 2.4 GM/DL
GLUCOSE SERPL-MCNC: 81 MG/DL (ref 65–99)
GLUCOSE UR QL STRIP: NEGATIVE
HBA1C MFR BLD: 5.3 % (ref 4.8–5.6)
HCT VFR BLD AUTO: 41.7 % (ref 34–46.6)
HDLC SERPL-MCNC: 37 MG/DL (ref 40–60)
HGB BLD-MCNC: 13.6 G/DL (ref 12–15.9)
HGB UR QL STRIP: NEGATIVE
IMM GRANULOCYTES # BLD AUTO: 0.03 10*3/MM3 (ref 0–0.05)
IMM GRANULOCYTES NFR BLD AUTO: 0.3 % (ref 0–0.5)
IRON SATN MFR SERPL: 22 % (ref 20–50)
IRON SERPL-MCNC: 90 MCG/DL (ref 37–145)
KETONES UR QL STRIP: NEGATIVE
LDLC SERPL CALC-MCNC: 106 MG/DL (ref 0–100)
LDLC/HDLC SERPL: 2.78 {RATIO}
LEUKOCYTE ESTERASE UR QL STRIP: NEGATIVE
LYMPHOCYTES # BLD AUTO: 3.37 10*3/MM3 (ref 0.7–3.1)
LYMPHOCYTES NFR BLD AUTO: 33.2 % (ref 19.6–45.3)
MCH RBC QN AUTO: 30.1 PG (ref 26.6–33)
MCHC RBC AUTO-ENTMCNC: 32.6 G/DL (ref 31.5–35.7)
MCV RBC AUTO: 92.3 FL (ref 79–97)
MICRO URNS: NORMAL
MICRO URNS: NORMAL
MONOCYTES # BLD AUTO: 0.66 10*3/MM3 (ref 0.1–0.9)
MONOCYTES NFR BLD AUTO: 6.5 % (ref 5–12)
NEUTROPHILS # BLD AUTO: 5.81 10*3/MM3 (ref 1.7–7)
NEUTROPHILS NFR BLD AUTO: 57.3 % (ref 42.7–76)
NITRITE UR QL STRIP: NEGATIVE
NRBC BLD AUTO-RTO: 0 /100 WBC (ref 0–0.2)
PH UR STRIP: 6.5 [PH] (ref 5–7.5)
PLATELET # BLD AUTO: 355 10*3/MM3 (ref 140–450)
POTASSIUM SERPL-SCNC: 4.4 MMOL/L (ref 3.5–5.2)
PROT SERPL-MCNC: 6.7 G/DL (ref 6–8.5)
PROT UR QL STRIP: NEGATIVE
RBC # BLD AUTO: 4.52 10*6/MM3 (ref 3.77–5.28)
RBC #/AREA URNS HPF: NORMAL /HPF (ref 0–2)
SODIUM SERPL-SCNC: 140 MMOL/L (ref 136–145)
SP GR UR STRIP: 1.02 (ref 1–1.03)
T3FREE SERPL-MCNC: 3.5 PG/ML (ref 2–4.4)
T4 FREE SERPL-MCNC: 1.07 NG/DL (ref 0.93–1.7)
TIBC SERPL-MCNC: 408 MCG/DL
TRIGL SERPL-MCNC: 146 MG/DL (ref 0–150)
TSH SERPL DL<=0.005 MIU/L-ACNC: 2.56 UIU/ML (ref 0.27–4.2)
UIBC SERPL-MCNC: 318 MCG/DL (ref 112–346)
URATE SERPL-MCNC: 5.1 MG/DL (ref 2.4–5.7)
URINALYSIS REFLEX: NORMAL
UROBILINOGEN UR STRIP-MCNC: 0.2 MG/DL (ref 0.2–1)
VIT B12 SERPL-MCNC: 683 PG/ML (ref 211–946)
VLDLC SERPL CALC-MCNC: 26 MG/DL (ref 5–40)
WBC # BLD AUTO: 10.14 10*3/MM3 (ref 3.4–10.8)
WBC #/AREA URNS HPF: NORMAL /HPF (ref 0–5)

## 2022-10-27 NOTE — TELEPHONE ENCOUNTER
Caller: JENNIFER/KASSIDY    Relationship to patient: Other    Best call back number: [phone] CAN LEAVE SECURE VOICEMAIL     Patient is needing: SHE HAS FAXED A CONTINUING CARE FORM TO THE OFFICE THIS WEEK, PATIENT HAS BEEN OFF WORK TILL 10/26 BY DR. DENSON AND THEY ARE NEEDING TO KNOW THE LAST TIME SHE WAS SEEN AND IF THE DISABILITY IS GOING TO BE EXTENDED. PLEASE REACH OUT AND ADVISE.

## 2022-12-11 DIAGNOSIS — U09.9 POST-COVID-19 SYNDROME: ICD-10-CM

## 2022-12-11 DIAGNOSIS — F33.42 MAJOR DEPRESSIVE DISORDER, RECURRENT, IN FULL REMISSION: ICD-10-CM

## 2022-12-11 DIAGNOSIS — F43.22 ADJUSTMENT DISORDER WITH ANXIOUS MOOD: ICD-10-CM

## 2022-12-11 DIAGNOSIS — F41.9 ANXIETY: ICD-10-CM

## 2022-12-12 RX ORDER — ESCITALOPRAM OXALATE 10 MG/1
10 TABLET ORAL DAILY
Qty: 90 TABLET | Refills: 0 | Status: SHIPPED | OUTPATIENT
Start: 2022-12-12 | End: 2023-03-10

## 2022-12-23 NOTE — TELEPHONE ENCOUNTER
Usually fax the forms back to me so that I can correct them.  Please ensure I have the forms for her to be able to update and send back.   L5/S1 radiculopathy

## 2023-01-13 ENCOUNTER — OFFICE VISIT (OUTPATIENT)
Dept: FAMILY MEDICINE CLINIC | Facility: CLINIC | Age: 33
End: 2023-01-13
Payer: COMMERCIAL

## 2023-01-13 VITALS
HEIGHT: 68 IN | DIASTOLIC BLOOD PRESSURE: 70 MMHG | HEART RATE: 70 BPM | OXYGEN SATURATION: 100 % | TEMPERATURE: 97.5 F | SYSTOLIC BLOOD PRESSURE: 118 MMHG | RESPIRATION RATE: 16 BRPM | BODY MASS INDEX: 40.16 KG/M2 | WEIGHT: 265 LBS

## 2023-01-13 DIAGNOSIS — Z14.8 HEMOCHROMATOSIS CARRIER: ICD-10-CM

## 2023-01-13 DIAGNOSIS — E78.2 MIXED HYPERLIPIDEMIA: ICD-10-CM

## 2023-01-13 DIAGNOSIS — M25.50 MULTIPLE JOINT PAIN: ICD-10-CM

## 2023-01-13 DIAGNOSIS — R42 DIZZINESS: Primary | ICD-10-CM

## 2023-01-13 DIAGNOSIS — R73.03 PREDIABETES: ICD-10-CM

## 2023-01-13 DIAGNOSIS — R74.8 ELEVATED LIVER ENZYMES: ICD-10-CM

## 2023-01-13 DIAGNOSIS — G43.709 CHRONIC MIGRAINE WITHOUT AURA WITHOUT STATUS MIGRAINOSUS, NOT INTRACTABLE: ICD-10-CM

## 2023-01-13 DIAGNOSIS — E79.0 HYPERURICEMIA: ICD-10-CM

## 2023-01-13 DIAGNOSIS — R29.90 PERSISTENT NEUROLOGIC SYMPTOMS AFTER COVID-19: ICD-10-CM

## 2023-01-13 DIAGNOSIS — U09.9 PERSISTENT FATIGUE AFTER COVID-19: ICD-10-CM

## 2023-01-13 DIAGNOSIS — M54.2 NECK PAIN: ICD-10-CM

## 2023-01-13 DIAGNOSIS — R53.83 PERSISTENT FATIGUE AFTER COVID-19: ICD-10-CM

## 2023-01-13 DIAGNOSIS — M79.10 MYALGIA: ICD-10-CM

## 2023-01-13 DIAGNOSIS — U09.9 PERSISTENT NEUROLOGIC SYMPTOMS AFTER COVID-19: ICD-10-CM

## 2023-01-13 DIAGNOSIS — R79.82 ELEVATED C-REACTIVE PROTEIN (CRP): ICD-10-CM

## 2023-01-13 DIAGNOSIS — H53.9 VISION DISTURBANCE: ICD-10-CM

## 2023-01-13 DIAGNOSIS — R41.89 BRAIN FOG: ICD-10-CM

## 2023-01-13 DIAGNOSIS — E55.9 VITAMIN D DEFICIENCY: ICD-10-CM

## 2023-01-13 PROCEDURE — 99214 OFFICE O/P EST MOD 30 MIN: CPT | Performed by: PHYSICIAN ASSISTANT

## 2023-01-13 NOTE — PROGRESS NOTES
Subjective   Jocelyn Merlos is a 32 y.o. female who presents today in follow up of dizziness, moods, headaches, post-Covid syndrome/ long term symptoms, hyperlipidemia, vitamin D deficiency, elevated LFT, hemochromatosis carrier, skin lesions, and specialists.    Dizziness  Associated symptoms include fatigue (improving). Arthralgias: improving. Headaches: improved with treatment. Myalgias: improving. Nausea: no constant nausea. Weakness: improving.   DepressionPatient presents with the following symptoms: confusion (improving), decreased concentration (improving), nervousness/anxiety (worse with concerns of surgery) and palpitations (improving).  Patient is not experiencing: suicidal ideas.  Shortness of breath: improving.    NOTE TO [facility]- Fax- [phone]- [place]. Direct # [phone].   Previously-  attn Ms Gregorio   She will eventually get a notice from [facility] and may change to long term disability- still keep insurance.    Covid 19 again 7/2022- she felt poorly, started to develop a rash. Has had resolution of symptoms back to baseline from initial Covid 19 infection.     She has been working on diet- she is working on what she can control. She got a stationary bike and has used as she can tolerate. She had dizziness all day when she tried to exercise outdoors. Dizziness was more consistent and worse.   Dizziness-Not improving- the only PT that they recommended to is not covered (Neeraj Barba) and had to see someone else. The people she was seeing are now going to the office that does not take her insurance and has to change to [facility]. Last seen 12/2022 and had fallen down the steps. She did not think much about it and did not talk with them. Did not think about it until she fell in the tub.   [facility] told when done with PT, reach out on MyChart and they would start Gabapentin.   Has fallen twice- did not remember what caused the fall. 1st fell down the steps- thinks she missed a step and fell back.  Had a bad bruise on her back.   The other day, fell in the bathtub. Thinks she slipped when getting up from the bathtub.   Has never fallen a lot and has fallen twice in 3 months.   · Noticed that she has sensory overload. When she has more light or sound or things going on, she has pressure in her head and everything is amplified. She notices even in her car alone- bright and with movement is overstimulated. Still dizziness with eyes closed and with being up and moving. She had increased nausea.    · Seen by Dr Izquierdo- [facility]- specializes in dizziness- he advised not sure but feels like it is similar to other long Covid patients. Tried verapamil  mg once  daily. Follow up 6 weeks 5/11/2022 at 2 pm and no further treatment that they could provide.   · She had appt with [facility] 7/2022 but contracted Covid 19. She was told she would have to reschedule her appt- soonest was 10/13/2022.   · Verapamil zoned out. She did not have improvement with medication. They advised she had exhausted all treatments and testing.   · 10/26/2022- Patient was seen at [facility] 10/13/2022 for peripheral vertigo, cervicocranial syndrome, intractable migraine, imbalance, vestibular neuritis.  Impression was complex issues of dizziness, imbalance, and intermittent migraine headache-likely overlap between peripheral vestibular disturbance abnormal cervical spine biomechanics, and tendency towards migraine.  They were concerned for migraine activity without headache.  Possible neuritic irritation of the inner ear as a postviral syndrome.  Advised start B2 200 mg 2 times daily as migraine supplement and referred for cervical and vestibular physical therapy with Carlos Barba.  Patient to contact provider after physical therapy was completed.    · MRI brain w/wo 3/2021- ovoid mass left orbit. Otherwise, normal MRI brain.   · MRI brain and IAC w/wo 10/21/2021- evidence of removal of orbital mass, scalp incision. Otherwise  normal.   · Eye Surgeon- Dr Bowman 10/28/2021- cleared for PRN follow up.  · Neurology- Dr Hogan- has advised she see ENT, referred for sleep study- negative, treated with Topamax and Imitrex as needed, now increased Topamax and referred for vestibular therapy and given Meclizine PRN. She reports the Meclizine did not help, did not tolerate increased dose of Topamax, and is awaiting PT. She was advised to follow up in 4 months and is hesitant about this, as her symptoms are debilitating and preventing safe return to work.    · ENT- Dr Holt/ [facility]- last seen 12/2021 and was advised she took the wrong Mg (she took Magnesium oxide as listed in the chart), tried mouth guard without relief, and was advised this couldbe more neurological and to follow up with neurology.  · She had dizziness prior to eye surgery but has had significant worsening after surgery. She went back after surgery ,told him she was dizzy, and he told her that was normal and that she lost a lot of blood in surgery.   · She had 2 accidents on [employer].   · The 1st was 8/31/2021- it was her 1st week back after her accident. She was picking up parts that were up high and she gets most dizzy and wobbly. She thinks she may have wobbled and dropped the parts.   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 When she lifted it, she has to tilt it back. 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 They have boxes hanging down from the line. The She has never hit it before. She was due for physical last night. They asked if she had dizziness or light-headedness. She was failed because she has dizziness. 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 No associated BP, palp.   · Most dizzy when she is bending, looking up, doing thing around her house. Work advised she see me with a note that gave restrictions.   · She has been cutting atenolol in 1/2- she then felt CP  or heart beating fast. She had her BP checked and had elevated BP 16 systolic. After 5-6 days, she started taking whole Atenolol and had improvement in BP. No change in dizziness with elevated or normal BP.   · She had palpitations- cleared by cardiology. She thought this could contribute but has improved.   · They thought dizziness was positional and treated for vertigo- positional treatment. No improvement.   · She went to ENT- they did hearing test and lay down and sit up without concerns. They want to come back and do testing that takes 1 hour. They have seen a lot of people that struggle with migraines after Covid have dizziness.  · 10/2021 she had a terrible period- dizziness, nauseated, felt like she would not make it from the toilet to the bed, cold sweats, pale, felt like death for a full day then improved. She has always struggled with bad cramping. This was much worse. No contraception. Previously Nexplanon was not good. They could not get IUD in and has felt poorly on birth control. Patient's step daughter had mood issues on patch.   · 11/2021- She reported she was using mouthguard. Also took Mg and developed a rash under chin/neck. She estimated about 5-20 episodes of dizziness per day- last 20-30 seconds. Long enough to disrupt what she is doing. It is spinning dizziness- feels like if you were drunk. She has also had the sensation of shakiness- things shaking in her vision- intermittent but not every day. She had a couple episodes with nausea with the dizziness but not bad. Sometimes has wondered if it is associated- is not every time. Told that it was the wrong Mg but ENT thought it should be a neurological issue  · Seen by neurology- they gave Meclizine- did not help. She also doubled Topamax as directed- flt poorly but did not help dizziness. She has decreased again to 50 mg.   · She kept a tally sheet to ensure she could answer the amount of episodes per day rather than estimating- is having 12-13  episodes per day.   · Constantly seeing things out of the corner of both of her eyes. There are times that she feels like it is there and is surprised when it is not there- size ranges between a mouse and a person. Has every day at different points.  · 1/2022- She noted worsening dizziness and was having dizziness with her eyes closed.  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 She had follow up with Dr Bowman and was advised PRN follow up 10/28/2021. He did not seem to think her dizziness was related to her surgery. She has had lazy eye more- she reports she had mild lazy eye on the right since she was a child. Now the left eye is wider and now it made the right eye more lazy.     · Patient was seen by Dr Bowman who recommended removal of mass. She was very nervous about surgery. Patient reports she was so shocked that she could not think of questions then had questions about incisions, recovery, and others.   · Surgeon told her not a big deal and 1-2 hour surgery. She was in surgery for 3-4 hours. Had trouble waking up. They advised they could keep her but that she would do better at home. Still numb above her eye and could not raise her left eyebrow. She was told that it should improve in 6 months.     Depression and anxiety- got really down for a while. Still seeing therapist weekly. She cannot do well with future and taking things day by day.   Feels medication and counseling 1-2 x weekly has helped moods. She has episodes of being hopeless and being anxious. However, she is able to feel better if she has a counseling session and it helps. Her animals also help moods- emotional support.   Moods are difficult to  with feeling poorly all the time, inability to return to normal life and work, and uncertainty of prognosis. She has felt the Lexapro helped- feels like it does not help with major situations but does help with daily symptoms. Wellbutrin-  not sure she has adjusted to it and still feeling tired in the day. She thinks it may help daily when things are normal. Since she has increased stressors and uncertainties, she is not sure it is controlled. She is seeing her therapist weekly which helps- feels better upon leaving every week. If it starts to build up again through the week. It is almost time to see her again.    · Lexapro- for a while, having bad dreams. The dreams improved but she still has them intermittent that she dreams constantly. She has had improvement in anxiety daily but if something happens and she has a stressful situation, she still has some panic and emotional issues. Still having counseling every week. She is working on things to do when she has these episodes. Reminding herself that it will be ok and why it will be ok and why it was a good thing. She mentioned increasing Lexapro.   · She previously reported she had more depression- she gets down on herself about her year. Her therapist thought that she may need to increase Lexapro. Patient reported she was not depressed or sad all the time but had days that she was more down. No SI/HI.   · She then had increased depression- she has had decreased interest in anything, exhaustion, not wanting to be around people, sleeping a lot, not keeping up with home chores or things she would normally do at home. It was not abnormal to be in her feelings or in a funk for a few days but she had severe symptoms. Not wanting to shower or take care of herself. She has had increased depression. Talked to therapist about her moods and they discussed possible changes in medication. She felt some better after talking but she still has depression. Therapist was out a week but follow up 10/19/2021. She thought that contributes to her depression- she is tired of trying to figure out what is contributing to her symptoms since Covid. She previously knew her body and when something was going on. She does not now  "and is bothersome.  · Patient knows her father struggled with his mental health, and she worried that she could have issues.   · She has had bad anxiety since her grandfather , when she has anxiety, playing her grandfather's death over and over.   · She moved a couple years ago and started worrying her house will burn down.   · With anxiety in the past (prior to Covid 19 infection), she was seeing a therapist and reported she was doing much better. She would occasionally miss work with anxiety/depression but had FMLA. She usually did well and was able to work, do things socially.  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  · Work was laid off for 2 weeks-she attempted to go back to work after layoff. When she started to feel better, something mentally started crashing down. As she started to feel better physically, she started \"freaking out\". She was having panic attacks.   · Patient related the feeling to being on a plane to [place] years ago, she felt like she was back on the plane and ready to crash.   · She worried about going back to work without FMLA and worries about losing her job. She reports her friend at work told her they are firing people \"left and right\". She thinks she will lose her job and does not have FMLA  (She has to acquire so much leave to qualify for FMLA).    · She had anxiety doing anything, worst at night with laying down. She thought something could be wrong with her heart. Cardiology cleared her but she feels like something is wrong with her heart when she has episodes. She tries to hold it in.   · She was doing well on Lexapro. She increased to 10 mg once daily. She did have significant fatigue upon starting medication, however, this improved. She continued with anxiety, however, she was able to calm herself down many times. She was working with mental health therapist twice weekly and was starting to improve. She felt she " should try to go back to work slowly. There may be a layoff but she could go in to work but the line will not be moving, all the people were not there, and she could come and go- did not have to work full 12 hours. She thought this would be more helpful to try to go in and start trying to do her job without the pressure and interaction with others. She reported she would be able to leave if she had a panic attack. Despite still having anxiety, she thought this would improve some if she could get back to work and on a routine. She wanted to try.  · She then had increased depression- she has had decreased interest in anything, exhaustion, not wanting to be around people, sleeping a lot, not keeping up with home chores or things she would normally do at home. It was not abnormal to be in her feelings or in a funk for a few days but she had severe symptoms. Not wanting to shower or take care of herself. She has had increased depression. Talked to therapist about her moods and they discussed possible changes in medication. She felt some better after talking but she still has depression. Therapist was out a week but follow up 10/19/2021. She thought that contributes to her depression- she is tired of trying to figure out what is contributing to her symptoms since Covid. She previously knew her body and when something was going on. She does not now and is bothersome.    Cough- no complaints today.   · 11/17/2021- she had a tickle cough at night only that started about 1 month ago. No coughing in the day. No SOA or other symptoms, signs of illness.   · She was advised can treat GERD and use Mucinex DM if needed. Otherwise consider albuterol as needed.     Covid 19 vaccine (Pfizer)- got initial vaccine 4/12/2021. She was tired that day then back to baseline.   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  Patient had  significant symptoms following Covid 19 infection. She developed symptoms of Covid 19 12/24/2020 and tested positive 12/28/2020. She had hypoxia and tachycardia with ambulation, SOA, chest tightness, weakness, fatigue, brain fog, diarrhea, and nausea with resolution of severe rash. She went to ER with oxygen saturation of 88%. CTA chest with mild pneumonia and negative for PE. Inflammatory markers were not performed. They did not evaluate symptoms with ambulation and resting vital signs were ok.  While on video visit, patient had oxygen saturation of 97% and pulse in 90s then with relatively little ambulation/ exertion, she had pulse 127 and oxygen saturation down to 88%. In office, she had a normal resting pulse that increased to 124 with ambulation and oxygen saturation decreased from 95% to 90% with ambulation.     She had persistent tachycardia and hypoxia with walking short distances (that is slowly improving), elevated BP that improved, cognitive impairment, fatigue, headaches, and feeling overall poorly. She was seen by the long term Covid infectious disease clinic, had additional labs, was referred to physical therapy, speech, and cognitive therapy, and was advised to contact her cardiologist for follow up with persistent tachycardia with ambulation. She underwent PTand speech/cognitive therapy. She called cardiology to schedule testing ordered [facility] prior to Covid 19 then scheduled follow up evaluation of post-Covid tachycardia/ arrhythmia as directed. He changed her propranolol to atenolol for tachycardia and is awaiting Holter monitor and echocardiogram. She is tolerating medication well.     I referred for MRI brain and to neurology for severe headaches. She had severe, debilitating headaches. She had to go to ER but had no testing performed there. Excedrin Migraine, Fiorcet, Zofran, and narcotic pain medication have not helped headaches. I discussed beta blocker, Elavil, and Topamax at her last visit,  however, she was concerned about possible AE with medication, as fatigue, arrhythmia, and cognitive dysfunction are already some of her biggest issues. She was unable to tolerate her headaches and felt the possible AE with medication were worth the risk for improvement in headaches. She tried Inderal LA 60 mg nightly without improvement and was started on Topamax by neurology. She has had significant AE with medication. I had her decrease to 25 mg at bedtime then she was able to increase again to BID and tolerated better.      I contacted infectious disease provider for recommendations for return to work vs work restrictions. She recommended either waiting to return to work until she had some improvement with therapy or may try light duty if available to see if she will tolerate this until she has improvement. 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    She returned to work and noted multiple cognitive issues. She reports they had her work 2 hours then sent her home with no work available. 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 She felt she needed more cognitive therapy to safely return to work. I had her remain off work until her follow up with long term Covid clinic. She has been advised to remain in contact  with her employer to ensure her job is secure and leave is approved.     She felt ready to return to work and returned 6/2021. 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 She was seen by medical for CPE and was advised not to work until she was no longer dizzy, as she was a risk on the line, on the floor, and with forklift.     Hypoxia- improved. Not feeling as bad with SOA but still notices some.   lowest has been about 90s% and maybe 88% if pushing it. Improving gradually  · She had hypoxia and tachycardia with ambulation, SOA, chest tightness, weakness, fatigue, brain fog, diarrhea, and nausea with resolution of severe rash.   · She went to ER with oxygen saturation of 88%. CTA chest with mild pneumonia and negative for PE. Inflammatory markers were not performed. They did not evaluate symptoms with ambulation and resting vital signs were ok.    · While on video visit, patient had oxygen saturation of 97% and pulse in 90s then with relatively little ambulation/ exertion, she had pulse 127 and oxygen saturation down to 88%. In office, she had a normal resting pulse that increased to 124 with ambulation and oxygen saturation decreased from 95% to 90% with ambulation.   Tachycardia/ elevated BP- had more controlled pulse on atenolol. Heart rate has improved  · Pulse with activity- at PT around 120, she stops her and if pushes self at home she gets up to 150. BP and pulse have improved some but continues with pulse that is elevated but oxygen has stayed above 90.   · She has persistent tachycardia and hypoxia with walking short distances (that is slowly improving), elevated BP that is improving  · She called [facility]ology to [facility] then scheduled follow up evaluation of post-Covid tachycardia/ arrhythmia as directed.   · He changed her propranolol to atenolol for tachycardia and is awaiting Holter monitor and echocardiogram. She is tolerating  "medication well.   · Cardiology- changed inderal to atenolol.   · Echo- ok.   Fatigue- still struggling. Now having to fight to get up and do things but not as much. She has had improvement but not resolution.   Cognitive deficit/dysfunction- worse with verapamil. The 1st week she took it, she felt like she should not be driving. Now back to wheere she was prior to the new medication but has not improved. Different from previous brain fog. Things slip away quickly. She will  her phone to do something and will forget   has been one of the worst things too- cannot remember things. She will be getting ready to do something and forgets. Sometimes she will get it back and sometimes she will not. She reports she tries to think of something and totally forgets. Not as severe as right after COvid but continues to be a problem.   · Still \"brain farts\", short term memory issues, headaches, oxygen levels- not sure if Topamax has amplified brain fog but has been prevalent.   · This symptom was her most worrisome issue.  She was struggling with cognitive issues which cause increased anxiety.  Headaches- none. Resolved.   has had a few headaches since stopping Topamax. She was able to stop quickly. Did not have to taper as long. Not nearly as bad as prior to Topamax. No improvement in cognition.   · She had right sided headache and headache behind her eyes, nausea with vomiting, eye was red and watering, right sided facial and eyelid drooping, eyebrow was not normal. She reported it woke her up from sleep.   · She went to ER 3/11/2021 with drooping eyelid, bloodshot eye, watering. She was in tears due to severe pain but they did no testing. By the time they gave her a headache cocktail, her headache was already improving. She was ready to go home because she did not feel she was getting any help. They discharged her with Fiorcet and Zofran that have not helped her headaches.   · She was given pain medication from previous " foot surgery and never took it. She tried it and got sick but did not help her pain.  · I referred for MRI brain and to neurology for severe, debilitating headaches.    · I initially discussed beta blocker, Elavil, and Topamax, however, we were concerned about possible AE with medication, as fatigue, arrhythmia, and cognitive dysfunction were already some of her biggest issues. She was unable to tolerate her headaches and felt the possible AE with medication were worth the risk for improvement in headaches.   · She tried Inderal LA 60 mg nightly without improvement.   · Headaches occurred almost always when she was asleep. She took Tylenol and tried Excedrin Migraine. They were waking her up at night, which was abnormal for her.    · She was seen by neurology 3/30/2021 started on Topamax 25 mg twice daily and Imitrex as needed for headaches by neurology. She has had significant symptoms with this. I discussed possibly cutting dose in 1/2 initially to 25 mg at bedtime then possible increase in dosage as tolerated.   · I referred to sleep medicine as recommended by neurology.   · Topamax helped headaches. She had a headache all day once but otherwise had no other bad headaches. She had paresthesias and worsening cognitive funciton, brain fog, ability to think clearly. I asked that she decrease to nightly dosing only for a couple weeks then could increase to twice daily. She then tolerated better.   · She had improvement in headaches on Topamax. Decreased as directed then was able to increase to twice daily again and had improved headaches and no worsening neurological/cognitive symptoms. She stopped having severe, debilitating headaches and stopped waking up with headaches. Now following with neurology.   · She then had to stop Topamax after developing kidney stones.   Snoring- does not think she is snoring as bad.   · Has never been someone who snored but since Covid, she is snoring loud, sometimes waking her  ". Negative Sleep study- no EVERETT.   Hair loss- slowed down.   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 When she started to feel better, something mentally started crashing down. As she started to feel better physically, she started \"freaking out\". She was having panic attacks.   · Patient related the feeling to being on a plane to [place] years ago, she felt like she was back on the plane and ready to crash.   · She worried about going back to work without FMLA and worries about losing her job. She reports her friend at work told her they are firing people \"left and right\". She thinks she will lose her job and does not have FMLA  (She has to acquire so much leave to qualify for FMLA).    · She had anxiety doing anything, worst at night with laying down. She thought something could be wrong with her heart. Cardiology cleared her but she feels like something is wrong with her heart when she has episodes. She tries to hold it in.   · She was doing well on Lexapro. She increased to 10 mg once daily. She did have significant fatigue upon starting medication, however, this improved. She continued with anxiety, however, she was able to calm herself down many times. She was working with mental health therapist twice weekly and was starting to improve. She felt she should try to go back to work slowly. There may be a layoff but she could go in to work but the line will not be moving, all the people were not there, and she could come and go- did not have to work full 12 hours. She thought this would be more helpful to try to go in and start trying to do her job without the pressure and interaction with others. She reported she would be able to leave if she had a panic attack. Despite still having anxiety, she thought this would improve some if she could get back to work and on a routine. She wanted to try.  · She then had increased depression- she has had " decreased interest in anything, exhaustion, not wanting to be around people, sleeping a lot, not keeping up with home chores or things she would normally do at home. It was not abnormal to be in her feelings or in a funk for a few days but she had severe symptoms. Not wanting to shower or take care of herself. She has had increased depression. Talked to therapist about her moods and they discussed possible changes in medication. She felt some better after talking but she still has depression. Therapist was out a week but follow up 10/19/2021. She thought that contributes to her depression- she is tired of trying to figure out what is contributing to her symptoms since Covid. She previously knew her body and when something was going on. She does not now and is bothersome.    Joint pain, back and neck pain- bottoms of both feet have been hurting really bad and aching. She has the most pain at the ball of the foot. Propping up helps some at night. Very tender.   she still has joint pain. Not as bad with back and neck pain. Her knees are more sore sometimes. Right foot with previous surgery hurts more.    She had improvement in the pain. She was still not back to baseline but improved.   Foot that had tendon release surgery hurt the worst- some improvement in other pain.   When she was a kid, she had a tubing accident but had hip pain after that. He hip hurt more after Covid. No recommendations from PT. States they did not have any additional treatment.      Therapy- still in mental health counseling.   · Physical therapy- doing PT with [facility] as directed by [facility]  · Once weekly once she went back to work. When she was off work, she went 2-3 x. Has been walking with her dog per PT but she was scared of triggering a migraine because she was scared that a bump or anything would cause migraine.   · Speech and cognitive therapy- There was initially a mistake on their part. Speech therapy- told her none of their  "other patients went back to full time. Other people had gone back 4 hours per day and gradually increased to 6 then 8 hours. They were concerned they would not be able to go back 12 hours.    · Mental health counseling- seeing her therapist and she has her using an joaquim for relaxation and mind sharpness- doing that once daily.    Return to work- 5/11/2021 -she tried to return to work again and had a panic attack.  She started having severe anxiety about doing her job, remembering, and being able to function at work.  She has never had anything this severe in the past.  She went to work but had to leave. 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 She was seen for physical by medical and was advised she could not work with dizziness due to the risks.   She previously went back to work and had to leave after 2 hours. She reports they asked her to tell someone something and she couldn't remember who that was. They thought this was someone she should know but she had no idea. Also, the process of going into work- line, temperature, getting mask- couldn't remember how to do it. She states it was like she was a new hire. Things that she thought should have come back as soon as she saw it did not come back. They had her sit at a picnic table x 2 hours until they decided she should go home on no work available. They still have the heat on and with the heat and her mask, she felt claustrophobic. No other symptoms that she is aware.     Skin lesions- Dermatology removed a lesion right face and left axilla- both  Benign. The spot on her right forearm- it is a scar. They scheduled removal 1/10/2022.   She was concerned about a spot on her face that has been there- looked like a dark, Aging\" spot. However, she noticed it to be larger, more raise, and bothersome. She wanted to consider dermatology for right forearm scar that is raised to see about " removal. Dermatology a long time ago but unsure who she saw.     Mixed hyperlipidemia- Slipped up through holidays and gained 10 lbs. She has been back on track for 2 weeks. She got excited and thought a few cheats and has now gotten better. Started a challenge- 5 days a week and doing a workout program to try to help with working out. Enjoying it and working on different parts of her body. Slowly but surely making lifestyle changes.   · She is not plant based at this point. She only had fast food once and felt bad and was otherwise been eating healthy and working hard. She has exercised as tolerated. She lost about 15 lbs.   · Watched [place] [employer]- she tried no eating meat and changed to almond milk. She has not stopped cheese/dairy completely yet. Feels she can do it. She is not completely plant based but is her goal. 1/2022- worsening cholesterol- elevated T Chol, LDL, and TG.   Prediabetes-still having diarrhea- has never resolved. She has BM 5-10 x daily. Prior to metformin, she was going about 2-3 x daily. It is urgent and is sudden onset.   She has been taking Metformin  · Stopped having menses when she stopped Metformin. She was having normal menses while on Metformin. She had menses 3/13/2022.  · 1/2022- A1C up to 5.8%. She restarted Metformin - fertility specialist refilled. Metformin gives diarrhea and she has had more nausea since restarting. Stopped having menses when she stopped Metformin. She was having normal menses while on Metformin. She had menses 3/13/2022.  · She was doing well- she worked on diet and exercise as tolerated. She had no beef, pork from 5/2022 and fried foods 2-3 x 5/2022. French fries 1-2 x since 5/2022. She decreased sweets but still eats.   · 10/2022- She noted trouble swallowing pills lately. She is not sure but has almost got sick with nightly medications.    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  Elevated liver function tests- patient withuot regular NSAIDS, Tylenol, or alcohol. Seen by GI- they ordered CT abd/pelvis. 1/2022- normal LFT.   Hyperuricemia- 1/2022- elevated uric acid. Prior to last labs- she ate a bunch of cocktail shrimp.     Patient's specialists:  Bariatrics- Dr Noah Koroma- last appt 5/2012 in follow up of [facility].   Cardiology -Dr. Negrete-last appointment 5/2021 for palpitations, hypertension, and chest pain. Hold off on stress test since she was improving. Continue medication for a couple months then re-evaluate. Consider weaning atenolol but if she needs medication for BP, she may need to continue beta blocker and consider labetolol with desire to conceive. Follow up in 3 months. Appt scheduled 11/29/2021  · She had been seen 11/2020 for atypical chest pain. Echo, Holter, and stress test ordered.    · She was seen again after Covid 19 with tachycardia, SOA. They held stress test with post Covid symptoms. Started atenolol 25 mg daily for elevated blood pressure and tachycardia.    · 4/2021- Holter monitor normal.   · 3/2021- Echo normal.   GI- Dr Jensen- last appt 11/18/2021 for elevated LFT and hemochromatosis carrier state. Referred for CT abd/pelvis. Advised diet and consideration of lipid medication. Consider additional labs if CT is negative and enzymes continue to increase.    Infectious disease-MONICA Sawyer-last appointment 4/2021 in follow-up of COVID-19 infection.  Diagnosed with post viral fatigue syndrome and brain fog.  Advised physical therapy, cognitive therapy, and advised follow-up with cardiology for tachycardia.  Performed additional labs. 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  Follow-up PRN.  Neurology-Dr Hogan-last appointment 12/2021 for migraine headache-headaches post Covid and dizziness.  Headaches improved on Topamax 25 mg twice daily-  continue medication.  Also given Imitrex. Neagative sleep study. Consider vestibular migraines- trial increase Topamax, referred for vestibular therapy, and given Meclizine for dizziness. Follow-up in 4 months.  OB/GYN-Dr. Giles-last appointment 1/2020 for annual exam/well woman exam.  Pap completed.  Follow-up in 1 year.  Sleep medicine-Dr. Pantoja-last appt 5/10/2021 for evaluation. Home sleep study 6/2021- no EVERETT.   Speech therapy-started 4/27/2021 for speech and cognitive therapy. She was discharged the end of 5/2021. She has noticed improvement.   Ophthalmology-Dr. Heydi Vera-last appointment 4/2021 for abnormal MRI orbit.  Referred to Dr. Bowman.  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 Follow up PRN.    ENT- Rita Johnson APRN- last appt 10/26/2021 for dizziness. Epley maneuver did not work. Advised she get a mouth guard and take Magnesium oxide 400 mg QHS. Follow up if persistent symptoms.   Allergist- Dr Shelia Mc- went 4/19/2021- and they wanted to change Atenolol to Nadolol and gave Zyrtec, Nasacort, and albuterol as needed. Allergy testing- allergic to outdoor issues and not indoor things. He asked her worst symptoms. He was concerned about Topamax- she did cut back to nightly. She had worsening headaches (not severe but continued with headaches). States when she increased again, she feels back to baseline prior to starting Topamax.     The following portions of the patient's history were reviewed and updated as appropriate: allergies, current medications, past family history, past medical history, past social history, past surgical history and problem list.    Review of Systems   Constitutional: Positive for fatigue (improving).   Eyes: Positive for visual disturbance.   Respiratory: Shortness of breath: improving.    Cardiovascular: Positive for palpitations (improving).   Gastrointestinal: Nausea: no constant  nausea.   Genitourinary: Negative.    Musculoskeletal: Positive for gait problem (falls). Arthralgias: improving. Myalgias: improving.   Skin: Negative.    Neurological: Positive for dizziness. Negative for seizures and syncope. Facial asymmetry: improved. Weakness: improving. Light-headedness: improved. Headaches: improved with treatment.   Psychiatric/Behavioral: Positive for confusion (improving), decreased concentration (improving), dysphoric mood (improving) and sleep disturbance (new- increased nightly snoring since Covid 19). Negative for self-injury and suicidal ideas. The patient is nervous/anxious (worse with concerns of surgery).         Brain fog improving       Objective   Vitals:    01/13/23 0842   BP: 118/70   Pulse: 70   Resp: 16   Temp: 97.5 °F (36.4 °C)   SpO2: 100%     Body mass index is 40.29 kg/m².    Physical Exam  Vitals and nursing note reviewed.   Constitutional:       General: She is not in acute distress.     Appearance: Normal appearance. She is well-developed.   HENT:      Head: Normocephalic and atraumatic.      Right Ear: External ear normal.      Left Ear: External ear normal.      Nose: Nose normal.   Eyes:      General: Lids are normal.      Conjunctiva/sclera: Conjunctivae normal.   Neck:      Vascular: No carotid bruit.   Cardiovascular:      Rate and Rhythm: Normal rate and regular rhythm.      Pulses: Normal pulses.      Heart sounds: Normal heart sounds. No murmur heard.    No friction rub. No gallop.   Pulmonary:      Effort: Pulmonary effort is normal. No respiratory distress.      Breath sounds: Normal breath sounds. No wheezing, rhonchi or rales.   Musculoskeletal:         General: No deformity.      Cervical back: Neck supple.   Skin:     General: Skin is warm and dry.   Neurological:      Mental Status: She is alert and oriented to person, place, and time.      Gait: Gait normal.   Psychiatric:         Attention and Perception: Perception normal.         Mood and Affect:  Affect normal. Mood is anxious.         Speech: Speech normal.         Behavior: Behavior normal.         Thought Content: Thought content normal.         Cognition and Memory: Cognition normal.         Judgment: Judgment normal.         Assessment & Plan   Diagnoses and all orders for this visit:    1. Dizziness (Primary)    2. Vision disturbance    3. Persistent fatigue after COVID-19    4. Persistent neurologic symptoms after COVID-19    5. Chronic migraine without aura without status migrainosus, not intractable    6. Neck pain    7. Myalgia    8. Multiple joint pain    9. Elevated C-reactive protein (CRP)    10. Brain fog    11. Mixed hyperlipidemia  -     Comprehensive Metabolic Panel  -     CK  -     Lipid Panel With LDL / HDL Ratio    12. Prediabetes  -     Comprehensive Metabolic Panel  -     Hemoglobin A1c  -     TSH  -     T4, free  -     T3, Free    13. Vitamin D deficiency  -     Comprehensive Metabolic Panel  -     Vitamin D,25-Hydroxy    14. Hemochromatosis carrier  -     CBC & Differential  -     Iron and TIBC  -     Ferritin    15. Elevated liver enzymes  -     Comprehensive Metabolic Panel    16. Hyperuricemia  -     Uric Acid        Assessment and Plan  Patient had significant long term Covid 19 symptoms. She completed physical therapy, speech, and cognitive therapy, and continues follow up with mental health counselor weekly, cardiology, neurology, infectious disease/ long term Covid clinic, ophthalmology, sleep medicine, and allergist as directed by them. She has restarted PT at the request of [facility]. She is trying to transition to a whole food plant based diet. Information given. She was seen by dizziness specialist, Dr Izquierdo, with intolerance to CCB. She was seen in follow up with no further treatment indicated. She is now following with [facility] and had appt 10/2022, and has restarted PT with follow up with them once she has completed PT.     · Dizziness- Jocelyn has had  dizziness with looking up, bending, and doing things. Unfortunately, she has had worsening and has dizziness with closing her eyes and newly a sensory overload with noises and light.  She had dizziness following Covid 19 infection, however, following an orbital mass removal, she had increasing dizziness symptoms. She has some lightheadedness and some spinning dizziness. She has undergone MRI brain, MRI brain and IAC and been evaluated by ENT and neurology. She was seen by ophthalmology and cleared for PRN follow up. ENT advised mouthguard and Magnesium oxide 400 mg QHS (then reported she should be taking a different Mg supplement). Without improvement, she advised follow up with neurology. Neurology advised trial of increasing Topamax (which she did not tolerate), trial Meclizine (no improvement in dizziness with Meclizine), and referred to PT for vestibular therapy. I agreed with vestibular therapy. She is also having vision issues in the peripheral vision. I advised PT through [facility] long term Covid PT program, as they have developed therapy program for vision and dizziness post-Covid. She was unable to get into this program. She was seen by Dr Izquierdo at [facility], who has specialty in dizziness. They started Verapamil  mg once daily to see if this helped with dizziness. She did not tolerate verapamil, and it did not help the dizziness. There was nothing further they felt they could do to help. I referred to tertiary center for evaluation and treatment who advised Mg, possible migraine variant, and to complete PT with specific PT and follow up with them after completing PT. Consideration of starting Gabapentin. She is continuing PT. I also referred for warm water PT for strengthening, pain, and to see if this helps with sensory, balance issues. 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 She did have a couple accidents at work. I will have her  off work until she is cleared by specialists. Follow up with me 3/2023 after follow up with [facility]. If she has resolution, he can clear her for return at that visit.    · Depression with anxious mood- Patient has had some underlying anxiety in the past, however, she developed very severe, debilitating anxiety following Covid 19 infection and rehab, causing panic attacks.  She is seeing her mental health counselor.  She was resistant to medication in the past because she was always able to control her moods, calm down herself, and continue with working.  However, following her COVID-19 infection, she had more severe symptoms that were worsened with trying to return to work.  I started Lexapro 5 mg once daily. Once she was tolerating medication better, she increased to Lexapro 10 mg once daily. She had improved anxiety with therapy and medication. However, she had increased depressive symptoms, fatigue, decreased motivation. I had her continue Lexapro 10 mg once daily and added Wellbutrin  mg daily. She has not noted significant difference. We may consider contacting cardiology to see about decreasing or changing beta blocker.  To be seen ASAP if worsening moods or AE with medication.   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   · Nocturnal cough- Patient to be seen if persistent symptoms. She can continue Pepcid 20 mg before her evening meal. She can try Mucinex DM twice daily as well. She may also need albuterol inhaler if needed. To be seen if worsening, new or changing symptoms or no resolution of cough.   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    · Hypoxia following Covid 19 infection-  She had slow, gradual improvement and completed PT. She did have improvement in oxygenation.    · Tachycardia/ elevated BP, post-Covid arrhythmia- Continue follow up with cardiology as directed by them. Echocardiogram and Holter monitor were ok. Patient to continue Atenolol 25 mg once daily and monitor BP, pulse. Blood pressure was a little low and she decreased Atenolol by 1/2 has her BP returns to normal. Allergist gave her Nadolol. Cardiology to determine any changes from atenolol to nadolol. She has had no increased SOA with beta blocker but is having increased fatigue and decreased motivation. We will consider consulting cardiology and decreasing medication.   · Fatigue following Covid 19 infection- Patient to continue mental health therapy. She completed physical therapy, and cognitive therapy.   · Cognitive deficit/dysfunction- Continue home exercises. Continue follow up with neurology as directed.   · Depression and anxiety- Patient previously had some depression and anxiety that were managed with counseling and techniques. However, she had severe anxiety following Covid 19 then had improvement with medication but has had severe depression. Continue Mental health counseling and medication.   · Persistent headaches following Covid 19 infection- Patient to follow up with neurology and treatment as directed by them. Her allergist wanted her to stop Topamax due to fatigue and cognitive impairment, however, headaches are controlled, and she did not want to stop medication. She did try to stop medication, and headaches recurred. She then developed kidney stones and was able to wean off Topamax. To use Imitrex as needed for acute headaches, follow up with neurology if worsening headaches, new, or changing neurological symptoms.   · Snoring- Since having Covid 19, she developed significant snoring, headaches that woke her up at night, tachycardia, hypoxia, and significant brain fog and fatigue. Negative sleep study. Snoring has not resolved but has improved gradually.  · Generalized body aches, back aches,  neck pain, and joint pain- Elevated uric acid but otherwise negative autoimmune testing 4/2021. PT and home exercises as needed. I also referred to allergist, with symptoms similar to MIS-A. No diagnosis of MIS-A with allergist. Patient to decrease purine rich foods. I will refer for warm water PT.     In follow up of chronic, non-Covid related symptoms:  Patient will have fasting labs. Call if no results in 1 week. Stability of conditions, plan, follow up, and further recommendations pending labs. Follow up in 2-3 months or sooner if needed.     · Mixed hyperlipidemia- Patient to continue to be compliant with diet/ exercise. She will restart strict diet and exercise. Await labs for further recommendations.    · Prediabetes- A1C increased off Metformin and she stopped having menses. She restarted Metformin and menses is more regular again. Continue Metformin. I will recheck labs at follow up.   · Vitamin D deficiency- Continue vitamin D 5000IU daily and women's one a day. Dosing recommendations pending labs.   · Hemochromatosis carrier- I will continue to monitor and make further recommendations.  · Elevated liver function tests- Most recent LFT normal. Avoid NSAIDS and alcohol and limit Tylenol to < 2 grams daily. Follow up with GI as directed.   · Hyperuricemia- Decrease purine rich foods. I will monitor and make recommendations.   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 She will need to see dermatology. I referred.     Patient continues to see specialists as noted above.  I have reviewed available records, including consult notes, labs, and imaging/testing.  Patient to continue follow-up with specialists as directed by them.    I spent 30 minutes caring for Jocelyn Merlos on this date of service. This time includes time spent by me in the following activities as necessary: preparing for the visit, reviewing tests, specialists records and previous visits,  obtaining and/or reviewing a separately obtained history, performing a medically appropriate exam and/or evaluation, counseling and educating the patient, family, caregiver, referring and/or communicating with other healthcare professionals, documenting information in the medical record, independently interpreting results and communicating that information with the patient, family, caregiver, and developing a medically appropriate treatment plan with consideration of other conditions, medications, and treatments.     [facility] Claim# [id number]

## 2023-01-28 LAB
25(OH)D3+25(OH)D2 SERPL-MCNC: 44.3 NG/ML (ref 30–100)
ALBUMIN SERPL-MCNC: 4.7 G/DL (ref 3.5–5.2)
ALBUMIN/GLOB SERPL: 2.9 G/DL
ALP SERPL-CCNC: 79 U/L (ref 39–117)
ALT SERPL-CCNC: 20 U/L (ref 1–33)
AST SERPL-CCNC: 12 U/L (ref 1–32)
BASOPHILS # BLD AUTO: 0.03 10*3/MM3 (ref 0–0.2)
BASOPHILS NFR BLD AUTO: 0.4 % (ref 0–1.5)
BILIRUB SERPL-MCNC: 0.3 MG/DL (ref 0–1.2)
BUN SERPL-MCNC: 11 MG/DL (ref 6–20)
BUN/CREAT SERPL: 12.9 (ref 7–25)
CALCIUM SERPL-MCNC: 9.1 MG/DL (ref 8.6–10.5)
CHLORIDE SERPL-SCNC: 102 MMOL/L (ref 98–107)
CHOLEST SERPL-MCNC: 207 MG/DL (ref 0–200)
CK SERPL-CCNC: 40 U/L (ref 20–180)
CO2 SERPL-SCNC: 22.6 MMOL/L (ref 22–29)
CREAT SERPL-MCNC: 0.85 MG/DL (ref 0.57–1)
EGFRCR SERPLBLD CKD-EPI 2021: 93.5 ML/MIN/1.73
EOSINOPHIL # BLD AUTO: 0.2 10*3/MM3 (ref 0–0.4)
EOSINOPHIL NFR BLD AUTO: 2.4 % (ref 0.3–6.2)
ERYTHROCYTE [DISTWIDTH] IN BLOOD BY AUTOMATED COUNT: 12.8 % (ref 12.3–15.4)
FERRITIN SERPL-MCNC: 57.6 NG/ML (ref 13–150)
GLOBULIN SER CALC-MCNC: 1.6 GM/DL
GLUCOSE SERPL-MCNC: 98 MG/DL (ref 65–99)
HBA1C MFR BLD: 5.6 % (ref 4.8–5.6)
HCT VFR BLD AUTO: 41.4 % (ref 34–46.6)
HDLC SERPL-MCNC: 41 MG/DL (ref 40–60)
HGB BLD-MCNC: 13.6 G/DL (ref 12–15.9)
IMM GRANULOCYTES # BLD AUTO: 0.02 10*3/MM3 (ref 0–0.05)
IMM GRANULOCYTES NFR BLD AUTO: 0.2 % (ref 0–0.5)
IRON SATN MFR SERPL: 22 % (ref 20–50)
IRON SERPL-MCNC: 83 MCG/DL (ref 37–145)
LDLC SERPL CALC-MCNC: 130 MG/DL (ref 0–100)
LDLC/HDLC SERPL: 3.07 {RATIO}
LYMPHOCYTES # BLD AUTO: 2.84 10*3/MM3 (ref 0.7–3.1)
LYMPHOCYTES NFR BLD AUTO: 34.1 % (ref 19.6–45.3)
MCH RBC QN AUTO: 30.8 PG (ref 26.6–33)
MCHC RBC AUTO-ENTMCNC: 32.9 G/DL (ref 31.5–35.7)
MCV RBC AUTO: 93.7 FL (ref 79–97)
MONOCYTES # BLD AUTO: 0.65 10*3/MM3 (ref 0.1–0.9)
MONOCYTES NFR BLD AUTO: 7.8 % (ref 5–12)
NEUTROPHILS # BLD AUTO: 4.58 10*3/MM3 (ref 1.7–7)
NEUTROPHILS NFR BLD AUTO: 55.1 % (ref 42.7–76)
NRBC BLD AUTO-RTO: 0 /100 WBC (ref 0–0.2)
PLATELET # BLD AUTO: 298 10*3/MM3 (ref 140–450)
POTASSIUM SERPL-SCNC: 4.1 MMOL/L (ref 3.5–5.2)
PROT SERPL-MCNC: 6.3 G/DL (ref 6–8.5)
RBC # BLD AUTO: 4.42 10*6/MM3 (ref 3.77–5.28)
SODIUM SERPL-SCNC: 140 MMOL/L (ref 136–145)
T3FREE SERPL-MCNC: 3.2 PG/ML (ref 2–4.4)
T4 FREE SERPL-MCNC: 1 NG/DL (ref 0.93–1.7)
TIBC SERPL-MCNC: 374 MCG/DL
TRIGL SERPL-MCNC: 201 MG/DL (ref 0–150)
TSH SERPL DL<=0.005 MIU/L-ACNC: 2.69 UIU/ML (ref 0.27–4.2)
UIBC SERPL-MCNC: 291 MCG/DL (ref 112–346)
URATE SERPL-MCNC: 5.7 MG/DL (ref 2.4–5.7)
VLDLC SERPL CALC-MCNC: 36 MG/DL (ref 5–40)
WBC # BLD AUTO: 8.32 10*3/MM3 (ref 3.4–10.8)

## 2023-03-10 DIAGNOSIS — F41.9 ANXIETY: ICD-10-CM

## 2023-03-10 DIAGNOSIS — U09.9 POST-COVID-19 SYNDROME: ICD-10-CM

## 2023-03-10 DIAGNOSIS — F43.22 ADJUSTMENT DISORDER WITH ANXIOUS MOOD: ICD-10-CM

## 2023-03-10 DIAGNOSIS — F33.42 MAJOR DEPRESSIVE DISORDER, RECURRENT, IN FULL REMISSION: ICD-10-CM

## 2023-03-10 RX ORDER — ESCITALOPRAM OXALATE 10 MG/1
10 TABLET ORAL DAILY
Qty: 90 TABLET | Refills: 0 | Status: SHIPPED | OUTPATIENT
Start: 2023-03-10

## 2023-03-13 ENCOUNTER — PATIENT MESSAGE (OUTPATIENT)
Dept: FAMILY MEDICINE CLINIC | Facility: CLINIC | Age: 33
End: 2023-03-13

## 2023-03-13 ENCOUNTER — OFFICE VISIT (OUTPATIENT)
Dept: FAMILY MEDICINE CLINIC | Facility: CLINIC | Age: 33
End: 2023-03-13
Payer: COMMERCIAL

## 2023-03-13 VITALS
HEIGHT: 68 IN | BODY MASS INDEX: 41.52 KG/M2 | SYSTOLIC BLOOD PRESSURE: 122 MMHG | DIASTOLIC BLOOD PRESSURE: 88 MMHG | OXYGEN SATURATION: 94 % | WEIGHT: 274 LBS | HEART RATE: 90 BPM | TEMPERATURE: 97.4 F

## 2023-03-13 DIAGNOSIS — R42 DIZZINESS: ICD-10-CM

## 2023-03-13 DIAGNOSIS — G43.709 CHRONIC MIGRAINE WITHOUT AURA WITHOUT STATUS MIGRAINOSUS, NOT INTRACTABLE: ICD-10-CM

## 2023-03-13 DIAGNOSIS — M79.672 BILATERAL FOOT PAIN: ICD-10-CM

## 2023-03-13 DIAGNOSIS — F43.22 ADJUSTMENT DISORDER WITH ANXIOUS MOOD: ICD-10-CM

## 2023-03-13 DIAGNOSIS — R06.83 SNORING: ICD-10-CM

## 2023-03-13 DIAGNOSIS — R74.8 ELEVATED LIVER ENZYMES: ICD-10-CM

## 2023-03-13 DIAGNOSIS — F33.42 MAJOR DEPRESSIVE DISORDER, RECURRENT, IN FULL REMISSION: ICD-10-CM

## 2023-03-13 DIAGNOSIS — E79.0 HYPERURICEMIA: ICD-10-CM

## 2023-03-13 DIAGNOSIS — M25.50 MULTIPLE JOINT PAIN: ICD-10-CM

## 2023-03-13 DIAGNOSIS — R41.89 BRAIN FOG: ICD-10-CM

## 2023-03-13 DIAGNOSIS — R73.03 PREDIABETES: ICD-10-CM

## 2023-03-13 DIAGNOSIS — R79.82 ELEVATED C-REACTIVE PROTEIN (CRP): ICD-10-CM

## 2023-03-13 DIAGNOSIS — H53.9 VISION DISTURBANCE: ICD-10-CM

## 2023-03-13 DIAGNOSIS — Z14.8 HEMOCHROMATOSIS CARRIER: ICD-10-CM

## 2023-03-13 DIAGNOSIS — H05.89 ORBITAL MASS: ICD-10-CM

## 2023-03-13 DIAGNOSIS — U09.9 PERSISTENT FATIGUE AFTER COVID-19: ICD-10-CM

## 2023-03-13 DIAGNOSIS — E78.2 MIXED HYPERLIPIDEMIA: ICD-10-CM

## 2023-03-13 DIAGNOSIS — R53.83 PERSISTENT FATIGUE AFTER COVID-19: ICD-10-CM

## 2023-03-13 DIAGNOSIS — U09.9 PERSISTENT NEUROLOGIC SYMPTOMS AFTER COVID-19: ICD-10-CM

## 2023-03-13 DIAGNOSIS — F41.9 ANXIETY: ICD-10-CM

## 2023-03-13 DIAGNOSIS — U09.9 POST-COVID-19 SYNDROME: Primary | ICD-10-CM

## 2023-03-13 DIAGNOSIS — E66.01 CLASS 3 SEVERE OBESITY WITH BODY MASS INDEX (BMI) OF 40.0 TO 44.9 IN ADULT, UNSPECIFIED OBESITY TYPE, UNSPECIFIED WHETHER SERIOUS COMORBIDITY PRESENT: ICD-10-CM

## 2023-03-13 DIAGNOSIS — M54.2 NECK PAIN: ICD-10-CM

## 2023-03-13 DIAGNOSIS — R29.90 PERSISTENT NEUROLOGIC SYMPTOMS AFTER COVID-19: ICD-10-CM

## 2023-03-13 DIAGNOSIS — M79.10 MYALGIA: ICD-10-CM

## 2023-03-13 DIAGNOSIS — M79.671 BILATERAL FOOT PAIN: ICD-10-CM

## 2023-03-13 DIAGNOSIS — E55.9 VITAMIN D DEFICIENCY: ICD-10-CM

## 2023-03-13 PROCEDURE — 99214 OFFICE O/P EST MOD 30 MIN: CPT | Performed by: PHYSICIAN ASSISTANT

## 2023-03-13 RX ORDER — GABAPENTIN 300 MG/1
1 CAPSULE ORAL
Qty: 30 CAPSULE | Refills: 11 | COMMUNITY
Start: 2023-01-23 | End: 2024-01-23

## 2023-03-13 NOTE — PROGRESS NOTES
Subjective   Jocelyn Merlos is a 32 y.o. female who presents today in follow up of dizziness, moods, headaches, post-Covid syndrome/ long term symptoms, hyperlipidemia, vitamin D deficiency, elevated LFT, hemochromatosis carrier, skin lesions, and specialists.    Dizziness  Associated symptoms include fatigue (improving). Arthralgias: improving. Headaches: improved with treatment. Myalgias: improving. Nausea: no constant nausea. Weakness: improving.   DepressionPatient presents with the following symptoms: confusion (improving), decreased concentration (improving), nervousness/anxiety (worse with concerns of surgery) and palpitations (improving).  Patient is not experiencing: suicidal ideas.  Shortness of breath: improving.    Answers for HPI/ROS submitted by the patient on 3/12/2023  Please describe your symptoms.: Follow up  Have you had these symptoms before?: Yes  How long have you been having these symptoms?: Greater than 2 weeks  What is the primary reason for your visit?: Other    Menses- have been a few in the past year that she has severe cramping and feeling like she could faint. Will take anything to feel better at that point. She took 5 Tylenol. No change with taking or not taking Metformin.     Her 1 great toenails removed for ingrown toenails. Cannot wear socks or shoes. Left toenail 1 month ago- still healing. He advised it looked good when he did right toenail.     NOTE TO [facility]- Fax- [phone]- [place]. Direct # [phone].   · Previously-  attn Ms Gregorio   · She will eventually get a notice from [facility] and may change to long term disability- still keep insurance.    Covid 19 again 7/2022- she felt poorly, started to develop a rash. Has had resolution of symptoms back to baseline from initial Covid 19 infection.     Has not helped. Tried Gabapentin and had   She has been working on diet- she is working on what she can control. She got a stationary bike and has used as she can tolerate. She had  dizziness all day when she tried to exercise outdoors. Dizziness was more consistent and worse.   Dizziness-Not improving- the only PT that they recommended to is not covered (Neeraj Barba) and had to see someone else. The people she was seeing are now going to the office that does not take her insurance and has to change to [facility]. Last seen 12/2022 and had fallen down the steps. She did not think much about it and did not talk with them. Did not think about it until she fell in the tub.   [facility] told when done with PT, reach out on MyChart and they would start Gabapentin.   Has fallen twice- did not remember what caused the fall. 1st fell down the steps- thinks she missed a step and fell back. Had a bad bruise on her back.   The other day, fell in the bathtub. Thinks she slipped when getting up from the bathtub.   Has never fallen a lot and has fallen twice in 3 months.   · Noticed that she has sensory overload. When she has more light or sound or things going on, she has pressure in her head and everything is amplified. She notices even in her car alone- bright and with movement is overstimulated. Still dizziness with eyes closed and with being up and moving. She had increased nausea.    · Seen by Dr Izquierdo- [facility]NT [facility]- specializes in dizziness- he advised not sure but feels like it is similar to other long Covid patients. Tried verapamil  mg once  daily. Follow up 6 weeks 5/11/2022 at 2 pm and no further treatment that they could provide.   · She had appt with [facility] 7/2022 but contracted Covid 19. She was told she would have to reschedule her appt- soonest was 10/13/2022.   · Verapamil zoned out. She did not have improvement with medication. They advised she had exhausted all treatments and testing.   · 10/26/2022- Patient was seen at [facility] 10/13/2022 for peripheral vertigo, cervicocranial syndrome, intractable migraine, imbalance, vestibular neuritis.  Impression was  complex issues of dizziness, imbalance, and intermittent migraine headache-likely overlap between peripheral vestibular disturbance abnormal cervical spine biomechanics, and tendency towards migraine.  They were concerned for migraine activity without headache.  Possible neuritic irritation of the inner ear as a postviral syndrome.  Advised start B2 200 mg 2 times daily as migraine supplement and referred for cervical and vestibular physical therapy with Carlos Barba.  Patient to contact provider after physical therapy was completed.    · MRI brain w/wo 3/2021- ovoid mass left orbit. Otherwise, normal MRI brain.   · MRI brain and IAC w/wo 10/21/2021- evidence of removal of orbital mass, scalp incision. Otherwise normal.   · Eye Surgeon- Dr Bowman 10/28/2021- cleared for PRN follow up.  · Neurology- Dr Hogan- has advised she see ENT, referred for sleep study- negative, treated with Topamax and Imitrex as needed, now increased Topamax and referred for vestibular therapy and given Meclizine PRN. She reports the Meclizine did not help, did not tolerate increased dose of Topamax, and is awaiting PT. She was advised to follow up in 4 months and is hesitant about this, as her symptoms are debilitating and preventing safe return to work.    · ENT- Dr oHlt/ MONICA Daniels- last seen 12/2021 and was advised she took the wrong Mg (she took Magnesium oxide as listed in the chart), tried mouth guard without relief, and was advised this couldbe more neurological and to follow up with neurology.  · She had dizziness prior to eye surgery but has had significant worsening after surgery. She went back after surgery ,told him she was dizzy, and he told her that was normal and that she lost a lot of blood in surgery.   · She had 2 accidents on [employer].   · The 1st was 8/31/2021- it was her 1st week back after her accident. She was picking up parts that were up high and she gets most dizzy and wobbly. She thinks she may have  wobbled and dropped the parts.   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 When she lifted it, she has to tilt it back. 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 They have boxes hanging down from the line. The She has never hit it before. She was due for physical last night. They asked if she had dizziness or light-headedness. She was failed because she has dizziness. 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 No associated BP, palp.   · Most dizzy when she is bending, looking up, doing thing around her house. Work advised she see me with a note that gave restrictions.   · She has been cutting atenolol in 1/2- she then felt CP or heart beating fast. She had her BP checked and had elevated BP 16 systolic. After 5-6 days, she started taking whole Atenolol and had improvement in BP. No change in dizziness with elevated or normal BP.   · She had palpitations- cleared by cardiology. She thought this could contribute but has improved.   · They thought dizziness was positional and treated for vertigo- positional treatment. No improvement.   · She went to ENT- they did hearing test and lay down and sit up without concerns. They want to come back and do testing that takes 1 hour. They have seen a lot of people that struggle with migraines after Covid have dizziness.  · 10/2021 she had a terrible period- dizziness, nauseated, felt like she would not make it from the toilet to the bed, cold sweats, pale, felt like death for a full day then improved. She has always struggled with bad cramping. This was much worse. No contraception. Previously Nexplanon was not good. They could not get IUD in and has felt poorly on birth control. Patient's step daughter had mood issues on patch.   · 11/2021- She reported she was using mouthguard. Also took Mg and developed a rash under chin/neck. She estimated about 5-20 episodes of dizziness per day- last  20-30 seconds. Long enough to disrupt what she is doing. It is spinning dizziness- feels like if you were drunk. She has also had the sensation of shakiness- things shaking in her vision- intermittent but not every day. She had a couple episodes with nausea with the dizziness but not bad. Sometimes has wondered if it is associated- is not every time. Told that it was the wrong Mg but ENT thought it should be a neurological issue  · Seen by neurology- they gave Meclizine- did not help. She also doubled Topamax as directed- flt poorly but did not help dizziness. She has decreased again to 50 mg.   · She kept a tally sheet to ensure she could answer the amount of episodes per day rather than estimating- is having 12-13 episodes per day.   · Constantly seeing things out of the corner of both of her eyes. There are times that she feels like it is there and is surprised when it is not there- size ranges between a mouse and a person. Has every day at different points.  · 1/2022- She noted worsening dizziness and was having dizziness with her eyes closed.  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 She had follow up with Dr Bowman and was advised PRN follow up 10/28/2021. He did not seem to think her dizziness was related to her surgery. She has had lazy eye more- she reports she had mild lazy eye on the right since she was a child. Now the left eye is wider and now it made the right eye more lazy.     · Patient was seen by Dr Bowman who recommended removal of mass. She was very nervous about surgery. Patient reports she was so shocked that she could not think of questions then had questions about incisions, recovery, and others.   · Surgeon told her not a big deal and 1-2 hour surgery. She was in surgery for 3-4 hours. Had trouble waking up. They advised they could keep her but that she would do better at home. Still numb above her eye and could not raise her left  eyebrow. She was told that it should improve in 6 months.     Depression and anxiety- got really down for a while. Still seeing therapist weekly. She cannot do well with future and taking things day by day.   Feels medication and counseling 1-2 x weekly has helped moods. She has episodes of being hopeless and being anxious. However, she is able to feel better if she has a counseling session and it helps. Her animals also help moods- emotional support.   Moods are difficult to  with feeling poorly all the time, inability to return to normal life and work, and uncertainty of prognosis. She has felt the Lexapro helped- feels like it does not help with major situations but does help with daily symptoms. Wellbutrin- not sure she has adjusted to it and still feeling tired in the day. She thinks it may help daily when things are normal. Since she has increased stressors and uncertainties, she is not sure it is controlled. She is seeing her therapist weekly which helps- feels better upon leaving every week. If it starts to build up again through the week. It is almost time to see her again.    · Lexapro- for a while, having bad dreams. The dreams improved but she still has them intermittent that she dreams constantly. She has had improvement in anxiety daily but if something happens and she has a stressful situation, she still has some panic and emotional issues. Still having counseling every week. She is working on things to do when she has these episodes. Reminding herself that it will be ok and why it will be ok and why it was a good thing. She mentioned increasing Lexapro.   · She previously reported she had more depression- she gets down on herself about her year. Her therapist thought that she may need to increase Lexapro. Patient reported she was not depressed or sad all the time but had days that she was more down. No SI/HI.   · She then had increased depression- she has had decreased interest in anything,  "exhaustion, not wanting to be around people, sleeping a lot, not keeping up with home chores or things she would normally do at home. It was not abnormal to be in her feelings or in a funk for a few days but she had severe symptoms. Not wanting to shower or take care of herself. She has had increased depression. Talked to therapist about her moods and they discussed possible changes in medication. She felt some better after talking but she still has depression. Therapist was out a week but follow up 10/19/2021. She thought that contributes to her depression- she is tired of trying to figure out what is contributing to her symptoms since Covid. She previously knew her body and when something was going on. She does not now and is bothersome.  · Patient knows her father struggled with his mental health, and she worried that she could have issues.   · She has had bad anxiety since her grandfather , when she has anxiety, playing her grandfather's death over and over.   · She moved a couple years ago and started worrying her house will burn down.   · With anxiety in the past (prior to Covid 19 infection), she was seeing a therapist and reported she was doing much better. She would occasionally miss work with anxiety/depression but had FMLA. She usually did well and was able to work, do things socially.  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  · Work was laid off for 2 weeks-she attempted to go back to work after layoff. When she started to feel better, something mentally started crashing down. As she started to feel better physically, she started \"freaking out\". She was having panic attacks.   · Patient related the feeling to being on a plane to [place] years ago, she felt like she was back on the plane and ready to crash.   · She worried about going back to work without FMLA and worries about losing her job. She reports her friend at work told her they " "are firing people \"left and right\". She thinks she will lose her job and does not have FMLA  (She has to acquire so much leave to qualify for FMLA).    · She had anxiety doing anything, worst at night with laying down. She thought something could be wrong with her heart. Cardiology cleared her but she feels like something is wrong with her heart when she has episodes. She tries to hold it in.   · She was doing well on Lexapro. She increased to 10 mg once daily. She did have significant fatigue upon starting medication, however, this improved. She continued with anxiety, however, she was able to calm herself down many times. She was working with mental health therapist twice weekly and was starting to improve. She felt she should try to go back to work slowly. There may be a layoff but she could go in to work but the line will not be moving, all the people were not there, and she could come and go- did not have to work full 12 hours. She thought this would be more helpful to try to go in and start trying to do her job without the pressure and interaction with others. She reported she would be able to leave if she had a panic attack. Despite still having anxiety, she thought this would improve some if she could get back to work and on a routine. She wanted to try.  · She then had increased depression- she has had decreased interest in anything, exhaustion, not wanting to be around people, sleeping a lot, not keeping up with home chores or things she would normally do at home. It was not abnormal to be in her feelings or in a funk for a few days but she had severe symptoms. Not wanting to shower or take care of herself. She has had increased depression. Talked to therapist about her moods and they discussed possible changes in medication. She felt some better after talking but she still has depression. Therapist was out a week but follow up 10/19/2021. She thought that contributes to her depression- she is tired of " trying to figure out what is contributing to her symptoms since Covid. She previously knew her body and when something was going on. She does not now and is bothersome.    Cough- no complaints today.   · 11/17/2021- she had a tickle cough at night only that started about 1 month ago. No coughing in the day. No SOA or other symptoms, signs of illness.   · She was advised can treat GERD and use Mucinex DM if needed. Otherwise consider albuterol as needed.     Covid 19 vaccine (Pfizer)- got initial vaccine 4/12/2021. She was tired that day then back to baseline.   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  Patient had significant symptoms following Covid 19 infection. She developed symptoms of Covid 19 12/24/2020 and tested positive 12/28/2020. She had hypoxia and tachycardia with ambulation, SOA, chest tightness, weakness, fatigue, brain fog, diarrhea, and nausea with resolution of severe rash. She went to ER with oxygen saturation of 88%. CTA chest with mild pneumonia and negative for PE. Inflammatory markers were not performed. They did not evaluate symptoms with ambulation and resting vital signs were ok.  While on video visit, patient had oxygen saturation of 97% and pulse in 90s then with relatively little ambulation/ exertion, she had pulse 127 and oxygen saturation down to 88%. In office, she had a normal resting pulse that increased to 124 with ambulation and oxygen saturation decreased from 95% to 90% with ambulation.     She had persistent tachycardia and hypoxia with walking short distances (that is slowly improving), elevated BP that improved, cognitive impairment, fatigue, headaches, and feeling overall poorly. She was seen by the long term Covid infectious disease clinic, had additional labs, was referred to physical therapy, speech, and cognitive therapy, and was advised to contact her cardiologist  for follow up with persistent tachycardia with ambulation. She underwent PTand speech/cognitive therapy. She called cardiology to schedule [facility] prior to Covid 19 then scheduled follow up evaluation of post-Covid tachycardia/ arrhythmia as directed. He changed her propranolol to atenolol for tachycardia and is awaiting Holter monitor and echocardiogram. She is tolerating medication well.     I referred for MRI brain and to neurology for severe headaches. She had severe, debilitating headaches. She had to go to ER but had no testing performed there. Excedrin Migraine, Fiorcet, Zofran, and narcotic pain medication have not helped headaches. I discussed beta blocker, Elavil, and Topamax at her last visit, however, she was concerned about possible AE with medication, as fatigue, arrhythmia, and cognitive dysfunction are already some of her biggest issues. She was unable to tolerate her headaches and felt the possible AE with medication were worth the risk for improvement in headaches. She tried Inderal LA 60 mg nightly without improvement and was started on Topamax by neurology. She has had significant AE with medication. I had her decrease to 25 mg at bedtime then she was able to increase again to BID and tolerated better.      I contacted infectious disease provider for recommendations for return to work vs work restrictions. She recommended either waiting to return to work until she had some improvement with therapy or may try light duty if available to see if she will tolerate this until she has improvement. 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    She returned to work and noted multiple cognitive issues. She reports they had her work 2 hours then sent her home with no work available. 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 She felt she needed more cognitive therapy to safely return to work. I had her remain off work until her follow up with long term Covid clinic. She has been advised to remain in contact with her employer to ensure her job is secure and leave is approved.     She felt ready to return to work and returned 6/2021. 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 She was seen by medical for CPE and was advised not to work until she was no longer dizzy, as she was a risk on the line, on the floor, and with forklift.     Hypoxia- improved. Not feeling as bad with SOA but still notices some.   lowest has been about 90s% and maybe 88% if pushing it. Improving gradually  · She had hypoxia and tachycardia with ambulation, SOA, chest tightness, weakness, fatigue, brain fog, diarrhea, and nausea with resolution of severe rash.   · She went to ER with oxygen saturation of 88%. CTA chest with mild pneumonia and negative for PE. Inflammatory markers were not performed. They did not evaluate symptoms with ambulation and resting vital signs were ok.    · While on video visit, patient had oxygen saturation of 97% and pulse in 90s then with relatively little ambulation/ exertion, she had pulse 127 and oxygen saturation down to 88%. In office, she had a normal resting pulse that increased to 124 with ambulation and oxygen saturation decreased from 95% to 90% with  "ambulation.   Tachycardia/ elevated BP- had more controlled pulse on atenolol. Heart rate has improved  · Pulse with activity- at PT around 120, she stops her and if pushes self at home she gets up to 150. BP and pulse have improved some but continues with pulse that is elevated but oxygen has stayed above 90.   · She has persistent tachycardia and hypoxia with walking short distances (that is slowly improving), elevated BP that is improving  · She called cardiology to [facility] then scheduled follow up evaluation of post-Covid tachycardia/ arrhythmia as directed.   · He changed her propranolol to atenolol for tachycardia and is awaiting Holter monitor and echocardiogram. She is tolerating medication well.   · Cardiology- changed inderal to atenolol.   · Echo- ok.   Fatigue- still struggling. Now having to fight to get up and do things but not as much. She has had improvement but not resolution.   Cognitive deficit/dysfunction- worse with verapamil. The 1st week she took it, she felt like she should not be driving. Now back to wheere she was prior to the new medication but has not improved. Different from previous brain fog. Things slip away quickly. She will  her phone to do something and will forget   has been one of the worst things too- cannot remember things. She will be getting ready to do something and forgets. Sometimes she will get it back and sometimes she will not. She reports she tries to think of something and totally forgets. Not as severe as right after COvid but continues to be a problem.   · Still \"brain farts\", short term memory issues, headaches, oxygen levels- not sure if Topamax has amplified brain fog but has been prevalent.   · This symptom was her most worrisome issue.  She was struggling with cognitive issues which cause increased anxiety.  Headaches- none. Resolved.   has had a few headaches since stopping Topamax. She was able to stop " quickly. Did not have to taper as long. Not nearly as bad as prior to Topamax. No improvement in cognition.   · She had right sided headache and headache behind her eyes, nausea with vomiting, eye was red and watering, right sided facial and eyelid drooping, eyebrow was not normal. She reported it woke her up from sleep.   · She went to ER 3/11/2021 with drooping eyelid, bloodshot eye, watering. She was in tears due to severe pain but they did no testing. By the time they gave her a headache cocktail, her headache was already improving. She was ready to go home because she did not feel she was getting any help. They discharged her with Fiorcet and Zofran that have not helped her headaches.   · She was given pain medication from previous foot surgery and never took it. She tried it and got sick but did not help her pain.  · I referred for MRI brain and to neurology for severe, debilitating headaches.    · I initially discussed beta blocker, Elavil, and Topamax, however, we were concerned about possible AE with medication, as fatigue, arrhythmia, and cognitive dysfunction were already some of her biggest issues. She was unable to tolerate her headaches and felt the possible AE with medication were worth the risk for improvement in headaches.   · She tried Inderal LA 60 mg nightly without improvement.   · Headaches occurred almost always when she was asleep. She took Tylenol and tried Excedrin Migraine. They were waking her up at night, which was abnormal for her.    · She was seen by neurology 3/30/2021 started on Topamax 25 mg twice daily and Imitrex as needed for headaches by neurology. She has had significant symptoms with this. I discussed possibly cutting dose in 1/2 initially to 25 mg at bedtime then possible increase in dosage as tolerated.   · I referred to sleep medicine as recommended by neurology.   · Topamax helped headaches. She had a headache all day once but otherwise had no other bad headaches. She had  "paresthesias and worsening cognitive funciton, brain fog, ability to think clearly. I asked that she decrease to nightly dosing only for a couple weeks then could increase to twice daily. She then tolerated better.   · She had improvement in headaches on Topamax. Decreased as directed then was able to increase to twice daily again and had improved headaches and no worsening neurological/cognitive symptoms. She stopped having severe, debilitating headaches and stopped waking up with headaches. Now following with neurology.   · She then had to stop Topamax after developing kidney stones.   Snoring- does not think she is snoring as bad.   · Has never been someone who snored but since Covid, she is snoring loud, sometimes waking her . Negative Sleep study- no EVERETT.   Hair loss- slowed down.   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 When she started to feel better, something mentally started crashing down. As she started to feel better physically, she started \"freaking out\". She was having panic attacks.   · Patient related the feeling to being on a plane to [place] years ago, she felt like she was back on the plane and ready to crash.   · She worried about going back to work without FMLA and worries about losing her job. She reports her friend at work told her they are firing people \"left and right\". She thinks she will lose her job and does not have FMLA  (She has to acquire so much leave to qualify for FMLA).    · She had anxiety doing anything, worst at night with laying down. She thought something could be wrong with her heart. Cardiology cleared her but she feels like something is wrong with her heart when she has episodes. She tries to hold it in.   · She was doing well on Lexapro. She increased to 10 mg once daily. She did have significant fatigue upon starting medication, however, this improved. She continued with anxiety, however, she was " able to calm herself down many times. She was working with mental health therapist twice weekly and was starting to improve. She felt she should try to go back to work slowly. There may be a layoff but she could go in to work but the line will not be moving, all the people were not there, and she could come and go- did not have to work full 12 hours. She thought this would be more helpful to try to go in and start trying to do her job without the pressure and interaction with others. She reported she would be able to leave if she had a panic attack. Despite still having anxiety, she thought this would improve some if she could get back to work and on a routine. She wanted to try.  · She then had increased depression- she has had decreased interest in anything, exhaustion, not wanting to be around people, sleeping a lot, not keeping up with home chores or things she would normally do at home. It was not abnormal to be in her feelings or in a funk for a few days but she had severe symptoms. Not wanting to shower or take care of herself. She has had increased depression. Talked to therapist about her moods and they discussed possible changes in medication. She felt some better after talking but she still has depression. Therapist was out a week but follow up 10/19/2021. She thought that contributes to her depression- she is tired of trying to figure out what is contributing to her symptoms since Covid. She previously knew her body and when something was going on. She does not now and is bothersome.    Joint pain, back and neck pain- bottoms of both feet have been hurting really bad and aching. She has the most pain at the ball of the foot. Propping up helps some at night. Very tender.   she still has joint pain. Not as bad with back and neck pain. Her knees are more sore sometimes. Right foot with previous surgery hurts more.    She had improvement in the pain. She was still not back to baseline but improved.   Foot  that had tendon release surgery hurt the worst- some improvement in other pain.   When she was a kid, she had a tubing accident but had hip pain after that. He hip hurt more after Covid. No recommendations from PT. States they did not have any additional treatment.      Therapy- still in mental health counseling.   · Physical therapy- doing PT with [facility] as directed by [facility]  · Once weekly once she went back to work. When she was off work, she went 2-3 x. Has been walking with her dog per PT but she was scared of triggering a migraine because she was scared that a bump or anything would cause migraine.   · Speech and cognitive therapy- There was initially a mistake on their part. Speech therapy- told her none of their other patients went back to full time. Other people had gone back 4 hours per day and gradually increased to 6 then 8 hours. They were concerned they would not be able to go back 12 hours.    · Mental health counseling- seeing her therapist and she has her using an joaquim for relaxation and mind sharpness- doing that once daily.    Return to work- 5/11/2021 -she tried to return to work again and had a panic attack.  She started having severe anxiety about doing her job, remembering, and being able to function at work.  She has never had anything this severe in the past.  She went to work but had to leave. She then returned to work again 6/2021 and had 2 accidents with the [employer]- 1 dropping parts and another hitting a cable. She was seen for physical by medical and was advised she could not work with dizziness due to the risks.   She previously went back to work and had to leave after 2 hours. She reports they asked her to tell someone something and she couldn't remember who that was. They thought this was someone she should know but she had no idea. Also, the process of going into work- line, temperature, getting mask- couldn't remember how to do it. She states it was like she was a new hire.  "Things that she thought should have come back as soon as she saw it did not come back. They had her sit at a picnic table x 2 hours until they decided she should go home on no work available. They still have the heat on and with the heat and her mask, she felt claustrophobic. No other symptoms that she is aware.     Skin lesions- Dermatology removed a lesion right face and left axilla- both  Benign. The spot on her right forearm- it is a scar. They scheduled removal 1/10/2022.   She was concerned about a spot on her face that has been there- looked like a dark, Aging\" spot. However, she noticed it to be larger, more raise, and bothersome. She wanted to consider dermatology for right forearm scar that is raised to see about removal. Dermatology a long time ago but unsure who she saw.     Mixed hyperlipidemia- Slipped up through holidays and gained 10 lbs. She has been back on track for 2 weeks. She got excited and thought a few cheats and has now gotten better. Started a challenge- 5 days a week and doing a workout program to try to help with working out. Enjoying it and working on different parts of her body. Slowly but surely making lifestyle changes.   · She is not plant based at this point. She only had fast food once and felt bad and was otherwise been eating healthy and working hard. She has exercised as tolerated. She lost about 15 lbs.   · Watched [employer]- she tried no eating meat and changed to almond milk. She has not stopped cheese/dairy completely yet. Feels she can do it. She is not completely plant based but is her goal. 1/2022- worsening cholesterol- elevated T Chol, LDL, and TG.   Prediabetes-still having diarrhea- has never resolved. She has BM 5-10 x daily. Prior to metformin, she was going about 2-3 x daily. It is urgent and is sudden onset.   She has been taking Metformin.   Stopped Metformin and had significant improvement. She had not had solid BM in a long time. Now has improvement in " BM. She felt terrible on medication.  · Stopped having menses when she stopped Metformin. She was having normal menses while on Metformin. She had menses 3/13/2022.  · 1/2022- A1C up to 5.8%. She restarted Metformin - fertility specialist refilled. Metformin gives diarrhea and she has had more nausea since restarting. Stopped having menses when she stopped Metformin. She was having normal menses while on Metformin. She had menses 3/13/2022.  · She was doing well- she worked on diet and exercise as tolerated. She had no beef, pork from 5/2022 and fried foods 2-3 x 5/2022. French fries 1-2 x since 5/2022. She decreased sweets but still eats.   · 10/2022- She noted trouble swallowing pills lately. She is not sure but has almost got sick with nightly medications.    Vitamin D deficiency- taking vitamin D 5000IU daily and women's one a day  Hemochromatosis carrier- is not taking iron and has had no recent phlebotomy or blood donation.  Elevated liver function tests- patient withuot regular NSAIDS, Tylenol, or alcohol. Seen by GI- they ordered CT abd/pelvis. 1/2022- normal LFT.   Hyperuricemia- 1/2022- elevated uric acid. Prior to last labs- she ate a bunch of cocktail shrimp.     Patient's specialists:  Bariatrics- Dr Noah Koroma- last appt 5/2012 in follow up of [facility].   Cardiology -Dr. Negrete-last appointment 5/2021 for palpitations, hypertension, and chest pain. Hold off on stress test since she was improving. Continue medication for a couple months then re-evaluate. Consider weaning atenolol but if she needs medication for BP, she may need to continue beta blocker and consider labetolol with desire to conceive. Follow up in 3 months. Appt scheduled 11/29/2021  · She had been seen 11/2020 for atypical chest pain. Echo, Holter, and stress test ordered.    · She was seen again after Covid 19 with tachycardia, SOA. They held stress test with post Covid symptoms. Started atenolol 25 mg daily for elevated blood  pressure and tachycardia.    · 4/2021- Holter monitor normal.   · 3/2021- Echo normal.   GI- Dr Jensen- last appt 11/18/2021 for elevated LFT and hemochromatosis carrier state. Referred for CT abd/pelvis. Advised diet and consideration of lipid medication. Consider additional labs if CT is negative and enzymes continue to increase.    Infectious disease-MONICA Sawyer-last appointment 4/2021 in follow-up of COVID-19 infection.  Diagnosed with post viral fatigue syndrome and brain fog.  Advised physical therapy, cognitive therapy, and advised follow-up with cardiology for tachycardia.  Performed additional labs. 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  Follow-up PRN.  Neurology-Dr Hogan-last appointment 12/2021 for migraine headache-headaches post Covid and dizziness.  Headaches improved on Topamax 25 mg twice daily- continue medication.  Also given Imitrex. Neagative sleep study. Consider vestibular migraines- trial increase Topamax, referred for vestibular therapy, and given Meclizine for dizziness. Follow-up in 4 months.  OB/GYN-Dr. Giles-last appointment 1/2020 for annual exam/well woman exam.  Pap completed.  Follow-up in 1 year.  Sleep medicine-Dr. Pantoja-last appt 5/10/2021 for evaluation. Home sleep study 6/2021- no EVERETT.   Speech therapy-started 4/27/2021 for speech and cognitive therapy. She was discharged the end of 5/2021. She has noticed improvement.   Ophthalmology-Dr. Heydi Vera-last appointment 4/2021 for abnormal MRI orbit.  Referred to Dr. Bowman.  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 Follow up PRN.    ENT- Rita Johnson APRN- last appt 10/26/2021 for dizziness. Epley maneuver did not work. Advised she get a mouth guard and take Magnesium oxide 400 mg QHS. Follow up if persistent symptoms.   Allergist- Dr Shelia Mc- went 4/19/2021- and they  wanted to change Atenolol to Nadolol and gave Zyrtec, Nasacort, and albuterol as needed. Allergy testing- allergic to outdoor issues and not indoor things. He asked her worst symptoms. He was concerned about Topamax- she did cut back to nightly. She had worsening headaches (not severe but continued with headaches). States when she increased again, she feels back to baseline prior to starting Topamax.     The following portions of the patient's history were reviewed and updated as appropriate: allergies, current medications, past family history, past medical history, past social history, past surgical history and problem list.    Review of Systems   Constitutional: Positive for fatigue (improving).   Eyes: Positive for visual disturbance.   Respiratory: Shortness of breath: improving.    Cardiovascular: Positive for palpitations (improving).   Gastrointestinal: Nausea: no constant nausea.   Genitourinary: Negative.    Musculoskeletal: Positive for gait problem (falls). Arthralgias: improving. Myalgias: improving.   Skin: Negative.    Neurological: Positive for dizziness. Negative for seizures and syncope. Facial asymmetry: improved. Weakness: improving. Light-headedness: improved. Headaches: improved with treatment.   Psychiatric/Behavioral: Positive for confusion (improving), decreased concentration (improving), dysphoric mood (improving) and sleep disturbance (new- increased nightly snoring since Covid 19). Negative for self-injury and suicidal ideas. The patient is nervous/anxious (worse with concerns of surgery).         Brain fog improving       Objective   Vitals:    03/13/23 1147   BP: 122/88   Pulse: 90   Temp: 97.4 °F (36.3 °C)   SpO2: 94%     Body mass index is 41.67 kg/m².    Physical Exam  Vitals and nursing note reviewed.   Constitutional:       General: She is not in acute distress.     Appearance: Normal appearance. She is well-developed.   HENT:      Head: Normocephalic and atraumatic.      Right Ear:  External ear normal.      Left Ear: External ear normal.      Nose: Nose normal.   Eyes:      General: Lids are normal.      Conjunctiva/sclera: Conjunctivae normal.   Neck:      Vascular: No carotid bruit.   Cardiovascular:      Rate and Rhythm: Normal rate and regular rhythm.      Pulses: Normal pulses.      Heart sounds: Normal heart sounds. No murmur heard.    No friction rub. No gallop.   Pulmonary:      Effort: Pulmonary effort is normal. No respiratory distress.      Breath sounds: Normal breath sounds. No wheezing, rhonchi or rales.   Musculoskeletal:         General: No deformity.      Cervical back: Neck supple.   Skin:     General: Skin is warm and dry.   Neurological:      Mental Status: She is alert and oriented to person, place, and time.      Gait: Gait normal.   Psychiatric:         Attention and Perception: Perception normal.         Mood and Affect: Affect normal. Mood is anxious.         Speech: Speech normal.         Behavior: Behavior normal.         Thought Content: Thought content normal.         Cognition and Memory: Cognition normal.         Judgment: Judgment normal.         Assessment & Plan   Diagnoses and all orders for this visit:    1. 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 Dizziness    3. Vision disturbance    4. Persistent fatigue after COVID-19    5. Persistent neurologic symptoms after COVID-19    6. Anxiety    7. Major depressive disorder, recurrent, in full remission (HCC)    8. Adjustment disorder with anxious mood    9. Chronic migraine without aura without status migrainosus, not intractable    10. Neck pain    11. Myalgia    12. Multiple joint pain    13. Elevated C-reactive protein (CRP)    14. Brain fog    15. Mixed hyperlipidemia    16. Prediabetes    17. Vitamin D deficiency    18. Hemochromatosis carrier    19. Elevated liver enzymes    20. Hyperuricemia    21. Orbital mass    22. Bilateral foot pain    23. Snoring        Assessment and Plan  Patient had significant long  term Covid 19 symptoms. She completed physical therapy, speech, and cognitive therapy, and continues follow up with mental health counselor weekly, cardiology, neurology, infectious disease/ long term Covid clinic, ophthalmology, sleep medicine, and allergist as directed by them. She has restarted PT at the request of [facility]. She is trying to transition to a whole food plant based diet. Information given. She was seen by dizziness specialist, Dr Izquierdo, with intolerance to CCB. She was seen in follow up with no further treatment indicated. She is now following with [facility] and had appt 10/2022, and has restarted PT with follow up with them once she has completed PT.     · Dizziness- Jocelyn has had dizziness with looking up, bending, and doing things. Unfortunately, she has had worsening and has dizziness with closing her eyes and newly a sensory overload with noises and light.  She had dizziness following Covid 19 infection, however, following an orbital mass removal, she had increasing dizziness symptoms. She has some lightheadedness and some spinning dizziness. She has undergone MRI brain, MRI brain and IAC and been evaluated by ENT and neurology. She was seen by ophthalmology and cleared for PRN follow up. ENT advised mouthguard and Magnesium oxide 400 mg QHS (then reported she should be taking a different Mg supplement). Without improvement, she advised follow up with neurology. Neurology advised trial of increasing Topamax (which she did not tolerate), trial Meclizine (no improvement in dizziness with Meclizine), and referred to PT for vestibular therapy. I agreed with vestibular therapy. She is also having vision issues in the peripheral vision. I advised PT through [facility] long term Covid PT program, as they have developed therapy program for vision and dizziness post-Covid. She was unable to get into this program. She was seen by Dr Izquierdo at [facility], [place], who has specialty in dizziness.  They started Verapamil  mg once daily to see if this helped with dizziness. She did not tolerate verapamil, and it did not help the dizziness. There was nothing further they felt they could do to help. I referred to tertiary center for evaluation and treatment who advised Mg, possible migraine variant, and to complete PT with specific PT and follow up with them after completing PT. She then tried Gabapentin from specialist and could not tolerate AE but also had no improvement in dizziness with medication. She completed PT. I also referred for warm water PT for strengthening, pain, and to see if this helps with sensory, balance issues. 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 She did have a couple accidents at work. I will have her off work until she is cleared by specialists. Follow up with me 5/2023 and she will see [facility] 6/5/2023. She should remain off until 6/6/2023 and I will have them make further recommendations for follow up or return to work at that time.  If she has resolution, he can clear her for return at that visit.    · Depression with anxious mood- Patient has had some underlying anxiety in the past, however, she developed very severe, debilitating anxiety following Covid 19 infection and rehab, causing panic attacks.  She is seeing her mental health counselor.  She was resistant to medication in the past because she was always able to control her moods, calm down herself, and continue with working.  However, following her COVID-19 infection, she had more severe symptoms that were worsened with trying to return to work.  I started Lexapro 5 mg once daily. Once she was tolerating medication better, she increased to Lexapro 10 mg once daily. She had improved anxiety with therapy and medication. However, she had increased depressive symptoms, fatigue, decreased motivation. I had her continue Lexapro 10 mg once daily and added  Wellbutrin  mg daily. She has not noted significant difference. We may consider contacting cardiology to see about decreasing or changing beta blocker.  To be seen ASAP if worsening moods or AE with medication.   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   · Nocturnal cough- Patient to be seen if persistent symptoms. She can continue Pepcid 20 mg before her evening meal. She can try Mucinex DM twice daily as well. She may also need albuterol inhaler if needed. To be seen if worsening, new or changing symptoms or no resolution of cough.   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    · Hypoxia following Covid 19 infection-  She had slow, gradual improvement and completed PT. She did have improvement in oxygenation.   · Tachycardia/ elevated BP, post-Covid arrhythmia- Continue follow up with cardiology as directed by them. Echocardiogram and Holter monitor were ok. Patient to continue Atenolol 25 mg once daily and monitor BP, pulse. Blood pressure was a little low and she decreased Atenolol by 1/2 has her BP returns to normal. Allergist gave her Nadolol. Cardiology to determine any changes from atenolol to nadolol. She has had no increased SOA with beta blocker but is having increased fatigue and decreased motivation. We will consider consulting cardiology and decreasing medication.   · Fatigue following Covid 19 infection- Patient to continue mental health therapy. She completed physical therapy, and cognitive therapy.   · Cognitive deficit/dysfunction- Continue home exercises. Continue follow up with neurology as directed.   · Depression and anxiety- Patient previously had some depression and anxiety that were managed with counseling and techniques. However, she had severe anxiety following Covid 19 then had  improvement with medication but has had severe depression. Continue Mental health counseling and medication.   · Persistent headaches following Covid 19 infection- Patient to follow up with neurology and treatment as directed by them. Her allergist wanted her to stop Topamax due to fatigue and cognitive impairment, however, headaches are controlled, and she did not want to stop medication. She did try to stop medication, and headaches recurred. She then developed kidney stones and was able to wean off Topamax. To use Imitrex as needed for acute headaches, follow up with neurology if worsening headaches, new, or changing neurological symptoms.   · Snoring- Since having Covid 19, she developed significant snoring, headaches that woke her up at night, tachycardia, hypoxia, and significant brain fog and fatigue. Negative sleep study. Snoring has not resolved but has improved gradually.  · Generalized body aches, back aches, neck pain, and joint pain- Elevated uric acid but otherwise negative autoimmune testing 4/2021. PT and home exercises as needed. I also referred to allergist, with symptoms similar to MIS-A. No diagnosis of MIS-A with allergist. Patient to decrease purine rich foods. I will refer for warm water PT.     In follow up of chronic, non-Covid related symptoms:  Patient will have fasting labs. Call if no results in 1 week. Stability of conditions, plan, follow up, and further recommendations pending labs. Follow up in 2-3 months or sooner if needed.     · Mixed hyperlipidemia- Patient to continue to be compliant with diet/ exercise. She will restart strict diet and exercise. Await labs for further recommendations.    · Prediabetes- A1C increased off Metformin and she stopped having menses. She restarted Metformin and menses was more regular again but she had severe GI AE. I will consider GLP1. She will check with her insurance about coverage.   · Vitamin D deficiency- Continue vitamin D 5000IU daily and  women's one a day. Dosing recommendations pending labs.   · Hemochromatosis carrier- I will continue to monitor and make further recommendations.  · Elevated liver function tests- Most recent LFT normal. Avoid NSAIDS and alcohol and limit Tylenol to < 2 grams daily. Follow up with GI as directed.   · Hyperuricemia- Decrease purine rich foods. I will monitor and make recommendations.   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 She will need to see dermatology. I referred.     Patient continues to see specialists as noted above.  I have reviewed available records, including consult notes, labs, and imaging/testing.  Patient to continue follow-up with specialists as directed by them.    I spent 30 minutes caring for Jocelyn Merlos on this date of service. This time includes time spent by me in the following activities as necessary: preparing for the visit, reviewing tests, specialists records and previous visits, obtaining and/or reviewing a separately obtained history, performing a medically appropriate exam and/or evaluation, counseling and educating the patient, family, caregiver, referring and/or communicating with other healthcare professionals, documenting information in the medical record, independently interpreting results and communicating that information with the patient, family, caregiver, and developing a medically appropriate treatment plan with consideration of other conditions, medications, and treatments.     [facility] Claim# [id number]

## 2023-03-15 ENCOUNTER — TELEPHONE (OUTPATIENT)
Dept: FAMILY MEDICINE CLINIC | Facility: CLINIC | Age: 33
End: 2023-03-15

## 2023-03-15 RX ORDER — SEMAGLUTIDE 0.25 MG/.5ML
0.25 INJECTION, SOLUTION SUBCUTANEOUS
Qty: 2 ML | Refills: 0 | Status: SHIPPED | OUTPATIENT
Start: 2023-03-15 | End: 2023-04-03

## 2023-03-15 NOTE — TELEPHONE ENCOUNTER
Please let this patient know once we receive the signature from Dr. Prabhakar and get the submit to [facility]

## 2023-03-15 NOTE — TELEPHONE ENCOUNTER
Caller: Jocelyn Merlos    Relationship: Self    Best call back number: [phone]    What is the best time to reach you: END OF DAY IS BEST    Who are you requesting to speak with (clinical staff, provider,  specific staff member): KVNG    What was the call regarding: PATIENT IS REQUESTING AN UPDATE ON FMLA PAPERWORK. PATIENT STATES IF ANY FURTHER INFORMATION IS NEEDED PLEASE LET HER KNOW. IF PATIENT IS UNABLE TO ANSWER CALL, PLEASE LEAVE A MESSAGE.     Do you require a callback: YES

## 2023-03-17 RX ORDER — METFORMIN HYDROCHLORIDE 750 MG/1
TABLET, EXTENDED RELEASE ORAL
Qty: 180 TABLET | Refills: 1 | OUTPATIENT
Start: 2023-03-17

## 2023-04-03 ENCOUNTER — PATIENT MESSAGE (OUTPATIENT)
Dept: FAMILY MEDICINE CLINIC | Facility: CLINIC | Age: 33
End: 2023-04-03
Payer: COMMERCIAL

## 2023-04-03 DIAGNOSIS — R74.8 ELEVATED LIVER ENZYMES: ICD-10-CM

## 2023-04-03 DIAGNOSIS — E66.01 CLASS 3 SEVERE OBESITY WITH BODY MASS INDEX (BMI) OF 40.0 TO 44.9 IN ADULT, UNSPECIFIED OBESITY TYPE, UNSPECIFIED WHETHER SERIOUS COMORBIDITY PRESENT: ICD-10-CM

## 2023-04-03 DIAGNOSIS — R73.03 PREDIABETES: ICD-10-CM

## 2023-04-03 DIAGNOSIS — E79.0 HYPERURICEMIA: ICD-10-CM

## 2023-04-03 DIAGNOSIS — E78.2 MIXED HYPERLIPIDEMIA: ICD-10-CM

## 2023-04-03 RX ORDER — SEMAGLUTIDE 0.5 MG/.5ML
0.5 INJECTION, SOLUTION SUBCUTANEOUS WEEKLY
Qty: 2 ML | Refills: 0 | Status: SHIPPED | OUTPATIENT
Start: 2023-04-03

## 2023-04-03 NOTE — TELEPHONE ENCOUNTER
From: Jocelyn Merlos  To: Mirta Bergeron  Sent: 4/3/2023 12:50 AM EDT  Subject: [place]    Hi Dr. Kirby. I did my last 0.25 Wegovy shot tonight. I only did 3 of them. The one I did tonight malfunctioned. I guess I didn’t press hard enough because when I released, all of the liquid spilled onto the floor & the needle did not poke me. Not sure if you want to call in the 0.25’s again or up me to the next dose. I’m really sorry. (I swear this type of stuff only happens to me lol) Thank you!

## 2023-04-30 ENCOUNTER — PATIENT MESSAGE (OUTPATIENT)
Dept: FAMILY MEDICINE CLINIC | Facility: CLINIC | Age: 33
End: 2023-04-30
Payer: COMMERCIAL

## 2023-04-30 DIAGNOSIS — E66.01 CLASS 3 SEVERE OBESITY WITH BODY MASS INDEX (BMI) OF 40.0 TO 44.9 IN ADULT, UNSPECIFIED OBESITY TYPE, UNSPECIFIED WHETHER SERIOUS COMORBIDITY PRESENT: ICD-10-CM

## 2023-04-30 DIAGNOSIS — Z14.8 HEMOCHROMATOSIS CARRIER: ICD-10-CM

## 2023-04-30 DIAGNOSIS — E79.0 HYPERURICEMIA: ICD-10-CM

## 2023-04-30 DIAGNOSIS — E78.2 MIXED HYPERLIPIDEMIA: ICD-10-CM

## 2023-04-30 DIAGNOSIS — R74.8 ELEVATED LIVER ENZYMES: ICD-10-CM

## 2023-04-30 DIAGNOSIS — R73.03 PREDIABETES: ICD-10-CM

## 2023-05-02 RX ORDER — SEMAGLUTIDE 1 MG/.5ML
1 INJECTION, SOLUTION SUBCUTANEOUS WEEKLY
Qty: 2 ML | Refills: 0 | Status: SHIPPED | OUTPATIENT
Start: 2023-05-02

## 2023-05-02 NOTE — TELEPHONE ENCOUNTER
From: Jocelyn Merlos  To: Mirta Bergeron  Sent: 4/30/2023 3:49 PM EDT  Subject: Kali    Hi Dr. Kirby. I finished the 0.5 round of Wegovy. I am ready for the next dose. Thank you!

## 2023-05-08 DIAGNOSIS — F33.42 MAJOR DEPRESSIVE DISORDER, RECURRENT, IN FULL REMISSION: ICD-10-CM

## 2023-05-08 DIAGNOSIS — R41.89 BRAIN FOG: ICD-10-CM

## 2023-05-10 RX ORDER — BUPROPION HYDROCHLORIDE 150 MG/1
150 TABLET ORAL EVERY MORNING
Qty: 90 TABLET | Refills: 2 | Status: SHIPPED | OUTPATIENT
Start: 2023-05-10

## 2023-05-25 RX ORDER — ATENOLOL 25 MG/1
25 TABLET ORAL DAILY
Qty: 90 TABLET | Refills: 3 | OUTPATIENT
Start: 2023-05-25

## 2023-05-25 NOTE — TELEPHONE ENCOUNTER
Per her PCP 03/13/23.     Tachycardia/ elevated BP, post-Covid arrhythmia- Continue follow up with cardiology as directed by them. Echocardiogram and Holter monitor were ok. Patient to continue Atenolol 25 mg once daily and monitor BP, pulse. Blood pressure was a little low and she decreased Atenolol by 1/2 has her BP returns to normal. Allergist gave her Nadolol. Cardiology to determine any changes from atenolol to nadolol. She has had no increased SOA with beta blocker but is having increased fatigue and decreased motivation. We will consider consulting cardiology and decreasing medication

## 2023-05-31 ENCOUNTER — OFFICE VISIT (OUTPATIENT)
Dept: FAMILY MEDICINE CLINIC | Facility: CLINIC | Age: 33
End: 2023-05-31
Payer: COMMERCIAL

## 2023-05-31 VITALS
OXYGEN SATURATION: 99 % | DIASTOLIC BLOOD PRESSURE: 74 MMHG | TEMPERATURE: 97.6 F | HEART RATE: 90 BPM | HEIGHT: 68 IN | WEIGHT: 263 LBS | RESPIRATION RATE: 16 BRPM | BODY MASS INDEX: 39.86 KG/M2 | SYSTOLIC BLOOD PRESSURE: 118 MMHG

## 2023-05-31 DIAGNOSIS — Z11.59 ENCOUNTER FOR HEPATITIS C SCREENING TEST FOR LOW RISK PATIENT: ICD-10-CM

## 2023-05-31 DIAGNOSIS — R74.8 ELEVATED LIVER ENZYMES: ICD-10-CM

## 2023-05-31 DIAGNOSIS — E78.2 MIXED HYPERLIPIDEMIA: ICD-10-CM

## 2023-05-31 DIAGNOSIS — U09.9 POST-COVID-19 SYNDROME: ICD-10-CM

## 2023-05-31 DIAGNOSIS — F33.42 MAJOR DEPRESSIVE DISORDER, RECURRENT, IN FULL REMISSION: ICD-10-CM

## 2023-05-31 DIAGNOSIS — E79.0 HYPERURICEMIA: ICD-10-CM

## 2023-05-31 DIAGNOSIS — U09.9 PERSISTENT NEUROLOGIC SYMPTOMS AFTER COVID-19: ICD-10-CM

## 2023-05-31 DIAGNOSIS — R41.89 BRAIN FOG: ICD-10-CM

## 2023-05-31 DIAGNOSIS — U09.9 PERSISTENT FATIGUE AFTER COVID-19: ICD-10-CM

## 2023-05-31 DIAGNOSIS — E55.9 VITAMIN D DEFICIENCY: ICD-10-CM

## 2023-05-31 DIAGNOSIS — R29.90 PERSISTENT NEUROLOGIC SYMPTOMS AFTER COVID-19: ICD-10-CM

## 2023-05-31 DIAGNOSIS — R42 DIZZINESS: Primary | ICD-10-CM

## 2023-05-31 DIAGNOSIS — R53.83 PERSISTENT FATIGUE AFTER COVID-19: ICD-10-CM

## 2023-05-31 DIAGNOSIS — Z14.8 HEMOCHROMATOSIS CARRIER: ICD-10-CM

## 2023-05-31 DIAGNOSIS — R73.03 PREDIABETES: ICD-10-CM

## 2023-05-31 PROCEDURE — 99214 OFFICE O/P EST MOD 30 MIN: CPT | Performed by: PHYSICIAN ASSISTANT

## 2023-05-31 RX ORDER — ATENOLOL 25 MG/1
25 TABLET ORAL DAILY
Qty: 90 TABLET | Refills: 0 | Status: SHIPPED | OUTPATIENT
Start: 2023-05-31

## 2023-05-31 NOTE — PROGRESS NOTES
Subjective   Jocelyn Merlos is a 32 y.o. female who presents today in follow up of dizziness, moods, headaches, post-Covid syndrome/ long term symptoms, hyperlipidemia, vitamin D deficiency, elevated LFT, hemochromatosis carrier, skin lesions, and specialists.    Dizziness  Associated symptoms include fatigue (improving). Arthralgias: improving. Headaches: improved with treatment. Myalgias: improving. Nausea: no constant nausea. Weakness: improving.   DepressionPatient presents with the following symptoms: confusion (improving), decreased concentration (improving), nervousness/anxiety (worse with concerns of surgery) and palpitations (improving).  Patient is not experiencing: suicidal ideas.  Shortness of breath: improving.    Answers for HPI/ROS submitted by the patient on 3/12/2023  Please describe your symptoms.: Follow up  Have you had these symptoms before?: Yes  How long have you been having these symptoms?: Greater than 2 weeks  What is the primary reason for your visit?: Other    No better-     Has been nervous- Wegovy- made her exhausted all day every day. She has never experienced that for this long. Had a shot this past Saturday. Started 4/20/2023 and has lost 6 lbs. Same time-  and mother have been on it. Mother gained weight and  did not lose a lb. She is not as active because she is exhausted. Even with dizziness, she tries to fight through it but cannot. Will take a 4 hour nap.       Menses- have been a few in the past year that she has severe cramping and feeling like she could faint. Will take anything to feel better at that point. She took 5 Tylenol. No change with taking or not taking Metformin.     Her 1 great toenails removed for ingrown toenails. Cannot wear socks or shoes. Left toenail 1 month ago- still healing. He advised it looked good when he did right toenail.     NOTE TO [facility]- Fax- [phone]- Vic[place]. Direct # [phone].   · Previously-  attn Ms Gregorio   · She will  eventually get a notice from [facility] and may change to long term disability- still keep insurance.    Covid 19 again 7/2022- she felt poorly, started to develop a rash. Has had resolution of symptoms back to baseline from initial Covid 19 infection.     She was on Neurontin- he advised to try 1 month. She did not tolerate and did not help. Has appt by telehealth 6/5/2023. He will try to determine any additional treatment.   No changes in dizziness. She tries to read about long term Covid- a lot of people with dizziness.   Has not helped.   She has been working on diet- she is working on what she can control. She got a stationary bike and has used as she can tolerate. She had dizziness all day when she tried to exercise outdoors. Dizziness was more consistent and worse.   Dizziness-Not improving- the only PT that they recommended to is not covered (Neeraj Barba) and had to see someone else. The people she was seeing are now going to the office that does not take her insurance and has to change to [facility]. Last seen 12/2022 and had fallen down the steps. She did not think much about it and did not talk with them. Did not think about it until she fell in the tub.   [facility] told when done with PT, reach out on MyChart and they would start Gabapentin.   Has fallen twice- did not remember what caused the fall. 1st fell down the steps- thinks she missed a step and fell back. Had a bad bruise on her back.   The other day, fell in the bathtub. Thinks she slipped when getting up from the bathtub.   Has never fallen a lot and has fallen twice in 3 months.   · Noticed that she has sensory overload. When she has more light or sound or things going on, she has pressure in her head and everything is amplified. She notices even in her car alone- bright and with movement is overstimulated. Still dizziness with eyes closed and with being up and moving. She had increased nausea.    · Seen by Dr Izquierdo- [facility]- specializes  in dizziness- he advised not sure but feels like it is similar to other long Covid patients. Tried verapamil  mg once  daily. Follow up 6 weeks 5/11/2022 at 2 pm and no further treatment that they could provide.   · She had appt with [facility] 7/2022 but contracted Covid 19. She was told she would have to reschedule her appt- soonest was 10/13/2022.   · Verapamil zoned out. She did not have improvement with medication. They advised she had exhausted all treatments and testing.   · 10/26/2022- Patient was seen at [facility] 10/13/2022 for peripheral vertigo, cervicocranial syndrome, intractable migraine, imbalance, vestibular neuritis.  Impression was complex issues of dizziness, imbalance, and intermittent migraine headache-likely overlap between peripheral vestibular disturbance abnormal cervical spine biomechanics, and tendency towards migraine.  They were concerned for migraine activity without headache.  Possible neuritic irritation of the inner ear as a postviral syndrome.  Advised start B2 200 mg 2 times daily as migraine supplement and referred for cervical and vestibular physical therapy with Carlos Barba.  Patient to contact provider after physical therapy was completed.    · MRI brain w/wo 3/2021- ovoid mass left orbit. Otherwise, normal MRI brain.   · MRI brain and IAC w/wo 10/21/2021- evidence of removal of orbital mass, scalp incision. Otherwise normal.   · Eye Surgeon- Dr Bowman 10/28/2021- cleared for PRN follow up.  · Neurology- Dr Hogan- has advised she see ENT, referred for sleep study- negative, treated with Topamax and Imitrex as needed, now increased Topamax and referred for vestibular therapy and given Meclizine PRN. She reports the Meclizine did not help, did not tolerate increased dose of Topamax, and is awaiting PT. She was advised to follow up in 4 months and is hesitant about this, as her symptoms are debilitating and preventing safe return to work.    · ENT- Dr Holt/  [facility]- last seen 12/2021 and was advised she took the wrong Mg (she took Magnesium oxide as listed in the chart), tried mouth guard without relief, and was advised this couldbe more neurological and to follow up with neurology.  · She had dizziness prior to eye surgery but has had significant worsening after surgery. She went back after surgery ,told him she was dizzy, and he told her that was normal and that she lost a lot of blood in surgery.   · She had 2 accidents on [employer].   · The 1st was 8/31/2021- it was her 1st week back after her accident. She was picking up parts that were up high and she gets most dizzy and wobbly. She thinks she may have wobbled and dropped the parts.   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 When she lifted it, she has to tilt it back. 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 They have boxes hanging down from the line. The She has never hit it before. She was due for physical last night. They asked if she had dizziness or light-headedness. She was failed because she has dizziness. 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 No associated BP, palp.   · Most dizzy when she is bending, looking up, doing thing around her house. Work advised she see me with a note that gave restrictions.   · She has been cutting atenolol in 1/2- she then felt CP or heart beating fast. She had her BP checked and had elevated BP 16 systolic. After 5-6 days, she started taking whole Atenolol and had improvement in BP. No change in dizziness with elevated or normal BP.   · She had palpitations- cleared by cardiology. She thought this could contribute but has improved.   · They thought dizziness was positional and treated for vertigo- positional treatment. No improvement.   · She went to ENT- they did hearing test and lay down and sit up without concerns. They want to come back and do testing that takes 1  hour. They have seen a lot of people that struggle with migraines after Covid have dizziness.  · 10/2021 she had a terrible period- dizziness, nauseated, felt like she would not make it from the toilet to the bed, cold sweats, pale, felt like death for a full day then improved. She has always struggled with bad cramping. This was much worse. No contraception. Previously Nexplanon was not good. They could not get IUD in and has felt poorly on birth control. Patient's step daughter had mood issues on patch.   · 11/2021- She reported she was using mouthguard. Also took Mg and developed a rash under chin/neck. She estimated about 5-20 episodes of dizziness per day- last 20-30 seconds. Long enough to disrupt what she is doing. It is spinning dizziness- feels like if you were drunk. She has also had the sensation of shakiness- things shaking in her vision- intermittent but not every day. She had a couple episodes with nausea with the dizziness but not bad. Sometimes has wondered if it is associated- is not every time. Told that it was the wrong Mg but ENT thought it should be a neurological issue  · Seen by neurology- they gave Meclizine- did not help. She also doubled Topamax as directed- flt poorly but did not help dizziness. She has decreased again to 50 mg.   · She kept a tally sheet to ensure she could answer the amount of episodes per day rather than estimating- is having 12-13 episodes per day.   · Constantly seeing things out of the corner of both of her eyes. There are times that she feels like it is there and is surprised when it is not there- size ranges between a mouse and a person. Has every day at different points.  · 1/2022- She noted worsening dizziness and was having dizziness with her eyes closed.  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 She had follow up with Dr Bowman and was advised PRN follow up 10/28/2021. He did not seem to think her  dizziness was related to her surgery. She has had lazy eye more- she reports she had mild lazy eye on the right since she was a child. Now the left eye is wider and now it made the right eye more lazy.     · Patient was seen by Dr Bowman who recommended removal of mass. She was very nervous about surgery. Patient reports she was so shocked that she could not think of questions then had questions about incisions, recovery, and others.   · Surgeon told her not a big deal and 1-2 hour surgery. She was in surgery for 3-4 hours. Had trouble waking up. They advised they could keep her but that she would do better at home. Still numb above her eye and could not raise her left eyebrow. She was told that it should improve in 6 months.     Depression and anxiety- not sure if fatigue but has been more grumpy. Still seeing therapist a little more spread out. She has migraines and has to reschedule.   got really down for a while. Still seeing therapist weekly. She cannot do well with future and taking things day by day.   Feels medication and counseling 1-2 x weekly has helped moods. She has episodes of being hopeless and being anxious. However, she is able to feel better if she has a counseling session and it helps. Her animals also help moods- emotional support.   Moods are difficult to  with feeling poorly all the time, inability to return to normal life and work, and uncertainty of prognosis. She has felt the Lexapro helped- feels like it does not help with major situations but does help with daily symptoms. Wellbutrin- not sure she has adjusted to it and still feeling tired in the day. She thinks it may help daily when things are normal. Since she has increased stressors and uncertainties, she is not sure it is controlled. She is seeing her therapist weekly which helps- feels better upon leaving every week. If it starts to build up again through the week. It is almost time to see her again.    · Lexapro- for a while,  having bad dreams. The dreams improved but she still has them intermittent that she dreams constantly. She has had improvement in anxiety daily but if something happens and she has a stressful situation, she still has some panic and emotional issues. Still having counseling every week. She is working on things to do when she has these episodes. Reminding herself that it will be ok and why it will be ok and why it was a good thing. She mentioned increasing Lexapro.   · She previously reported she had more depression- she gets down on herself about her year. Her therapist thought that she may need to increase Lexapro. Patient reported she was not depressed or sad all the time but had days that she was more down. No SI/HI.   · She then had increased depression- she has had decreased interest in anything, exhaustion, not wanting to be around people, sleeping a lot, not keeping up with home chores or things she would normally do at home. It was not abnormal to be in her feelings or in a funk for a few days but she had severe symptoms. Not wanting to shower or take care of herself. She has had increased depression. Talked to therapist about her moods and they discussed possible changes in medication. She felt some better after talking but she still has depression. Therapist was out a week but follow up 10/19/2021. She thought that contributes to her depression- she is tired of trying to figure out what is contributing to her symptoms since Covid. She previously knew her body and when something was going on. She does not now and is bothersome.  · Patient knows her father struggled with his mental health, and she worried that she could have issues.   · She has had bad anxiety since her grandfather , when she has anxiety, playing her grandfather's death over and over.   · She moved a couple years ago and started worrying her house will burn down.   · With anxiety in the past (prior to Covid 19 infection), she was seeing  "a therapist and reported she was doing much better. She would occasionally miss work with anxiety/depression but had FMLA. She usually did well and was able to work, do things socially.  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  · Work was laid off for 2 weeks-she attempted to go back to work after layoff. When she started to feel better, something mentally started crashing down. As she started to feel better physically, she started \"freaking out\". She was having panic attacks.   · Patient related the feeling to being on a plane to [place] years ago, she felt like she was back on the plane and ready to crash.   · She worried about going back to work without FMLA and worries about losing her job. She reports her friend at work told her they are firing people \"left and right\". She thinks she will lose her job and does not have FMLA  (She has to acquire so much leave to qualify for FMLA).    · She had anxiety doing anything, worst at night with laying down. She thought something could be wrong with her heart. Cardiology cleared her but she feels like something is wrong with her heart when she has episodes. She tries to hold it in.   · She was doing well on Lexapro. She increased to 10 mg once daily. She did have significant fatigue upon starting medication, however, this improved. She continued with anxiety, however, she was able to calm herself down many times. She was working with mental health therapist twice weekly and was starting to improve. She felt she should try to go back to work slowly. There may be a layoff but she could go in to work but the line will not be moving, all the people were not there, and she could come and go- did not have to work full 12 hours. She thought this would be more helpful to try to go in and start trying to do her job without the pressure and interaction with others. She reported she would be able to leave if she had a " panic attack. Despite still having anxiety, she thought this would improve some if she could get back to work and on a routine. She wanted to try.  · She then had increased depression- she has had decreased interest in anything, exhaustion, not wanting to be around people, sleeping a lot, not keeping up with home chores or things she would normally do at home. It was not abnormal to be in her feelings or in a funk for a few days but she had severe symptoms. Not wanting to shower or take care of herself. She has had increased depression. Talked to therapist about her moods and they discussed possible changes in medication. She felt some better after talking but she still has depression. Therapist was out a week but follow up 10/19/2021. She thought that contributes to her depression- she is tired of trying to figure out what is contributing to her symptoms since Covid. She previously knew her body and when something was going on. She does not now and is bothersome.    Cough- no complaints today.   · 11/17/2021- she had a tickle cough at night only that started about 1 month ago. No coughing in the day. No SOA or other symptoms, signs of illness.   · She was advised can treat GERD and use Mucinex DM if needed. Otherwise consider albuterol as needed.     Covid 19 vaccine (Pfizer)- got initial vaccine 4/12/2021. She was tired that day then back to baseline.   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  Patient had significant symptoms following Covid 19 infection. She developed symptoms of Covid 19 12/24/2020 and tested positive 12/28/2020. She had hypoxia and tachycardia with ambulation, SOA, chest tightness, weakness, fatigue, brain fog, diarrhea, and nausea with resolution of severe rash. She went to ER with oxygen saturation of 88%. CTA chest with mild pneumonia and negative for PE. Inflammatory markers were not  performed. They did not evaluate symptoms with ambulation and resting vital signs were ok.  While on video visit, patient had oxygen saturation of 97% and pulse in 90s then with relatively little ambulation/ exertion, she had pulse 127 and oxygen saturation down to 88%. In office, she had a normal resting pulse that increased to 124 with ambulation and oxygen saturation decreased from 95% to 90% with ambulation.     She had persistent tachycardia and hypoxia with walking short distances (that is slowly improving), elevated BP that improved, cognitive impairment, fatigue, headaches, and feeling overall poorly. She was seen by the long term Covid infectious disease clinic, had additional labs, was referred to physical therapy, speech, and cognitive therapy, and was advised to contact her cardiologist for follow up with persistent tachycardia with ambulation. She underwent PTand speech/cognitive therapy. She called cardiology to schedule testing ordered at [facility] prior to Covid 19 then scheduled follow up evaluation of post-Covid tachycardia/ arrhythmia as directed. He changed her propranolol to atenolol for tachycardia and is awaiting Holter monitor and echocardiogram. She is tolerating medication well.     I referred for MRI brain and to neurology for severe headaches. She had severe, debilitating headaches. She had to go to ER but had no testing performed there. Excedrin Migraine, Fiorcet, Zofran, and narcotic pain medication have not helped headaches. I discussed beta blocker, Elavil, and Topamax at her last visit, however, she was concerned about possible AE with medication, as fatigue, arrhythmia, and cognitive dysfunction are already some of her biggest issues. She was unable to tolerate her headaches and felt the possible AE with medication were worth the risk for improvement in headaches. She tried Inderal LA 60 mg nightly without improvement and was started on Topamax by neurology. She has had significant AE  with medication. I had her decrease to 25 mg at bedtime then she was able to increase again to BID and tolerated better.      I contacted infectious disease provider for recommendations for return to work vs work restrictions. She recommended either waiting to return to work until she had some improvement with therapy or may try light duty if available to see if she will tolerate this until she has improvement. 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    She returned to work and noted multiple cognitive issues. She reports they had her work 2 hours then sent her home with no work available. 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 She felt she needed more cognitive therapy to safely return to work. I had her remain off work until her follow up with long term Covid clinic. She has been advised to remain in contact with her employer to ensure her job is secure and leave is approved.     She felt ready to return to work and returned 6/2021. 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 She was seen by medical for CPE and was advised not to work until she was no longer dizzy, as she was a risk on the line, on the floor, and with forklift.     Hypoxia- improved. Not feeling as bad with SOA but  still notices some.   lowest has been about 90s% and maybe 88% if pushing it. Improving gradually  · She had hypoxia and tachycardia with ambulation, SOA, chest tightness, weakness, fatigue, brain fog, diarrhea, and nausea with resolution of severe rash.   · She went to ER with oxygen saturation of 88%. CTA chest with mild pneumonia and negative for PE. Inflammatory markers were not performed. They did not evaluate symptoms with ambulation and resting vital signs were ok.    · While on video visit, patient had oxygen saturation of 97% and pulse in 90s then with relatively little ambulation/ exertion, she had pulse 127 and oxygen saturation down to 88%. In office, she had a normal resting pulse that increased to 124 with ambulation and oxygen saturation decreased from 95% to 90% with ambulation.   Tachycardia/ elevated BP- had more controlled pulse on atenolol. Heart rate has improved. She tried to get off and worsens. She does not have to see cardiology any longer. PRN follow up.   · Pulse with activity- at PT around 120, she stops her and if pushes self at home she gets up to 150. BP and pulse have improved some but continues with pulse that is elevated but oxygen has stayed above 90.   · She has persistent tachycardia and hypoxia with walking short distances (that is slowly improving), elevated BP that is improving  · She called cardiology to schedule testing ordered at appt prior to Covid 19 then scheduled follow up evaluation of post-Covid tachycardia/ arrhythmia as directed.   · He changed her propranolol to atenolol for tachycardia and is awaiting Holter monitor and echocardiogram. She is tolerating medication well.   · Cardiology- changed inderal to atenolol.   · Echo- ok.   Fatigue- still struggling. Now having to fight to get up and do things but not as much. She has had improvement but not resolution. Worse with Wegovy.   Cognitive deficit/dysfunction- worse with verapamil. The 1st week she took it, she  "felt like she should not be driving. Now back to wheere she was prior to the new medication but has not improved. Different from previous brain fog. Things slip away quickly. She will  her phone to do something and will forget   has been one of the worst things too- cannot remember things. She will be getting ready to do something and forgets. Sometimes she will get it back and sometimes she will not. She reports she tries to think of something and totally forgets. Not as severe as right after COvid but continues to be a problem.   · Still \"brain farts\", short term memory issues, headaches, oxygen levels- not sure if Topamax has amplified brain fog but has been prevalent.   · This symptom was her most worrisome issue.  She was struggling with cognitive issues which cause increased anxiety.  Headaches- has had some headaches more recently. Not migraine headache but all over nagging headaches. Started about when starting Wegovy.    has had a few headaches since stopping Topamax. She was able to stop quickly. Did not have to taper as long. Not nearly as bad as prior to Topamax. No improvement in cognition.   · She had right sided headache and headache behind her eyes, nausea with vomiting, eye was red and watering, right sided facial and eyelid drooping, eyebrow was not normal. She reported it woke her up from sleep.   · She went to ER 3/11/2021 with drooping eyelid, bloodshot eye, watering. She was in tears due to severe pain but they did no testing. By the time they gave her a headache cocktail, her headache was already improving. She was ready to go home because she did not feel she was getting any help. They discharged her with Fiorcet and Zofran that have not helped her headaches.   · She was given pain medication from previous foot surgery and never took it. She tried it and got sick but did not help her pain.  · I referred for MRI brain and to neurology for severe, debilitating headaches.    · I initially " discussed beta blocker, Elavil, and Topamax, however, we were concerned about possible AE with medication, as fatigue, arrhythmia, and cognitive dysfunction were already some of her biggest issues. She was unable to tolerate her headaches and felt the possible AE with medication were worth the risk for improvement in headaches.   · She tried Inderal LA 60 mg nightly without improvement.   · Headaches occurred almost always when she was asleep. She took Tylenol and tried Excedrin Migraine. They were waking her up at night, which was abnormal for her.    · She was seen by neurology 3/30/2021 started on Topamax 25 mg twice daily and Imitrex as needed for headaches by neurology. She has had significant symptoms with this. I discussed possibly cutting dose in 1/2 initially to 25 mg at bedtime then possible increase in dosage as tolerated.   · I referred to sleep medicine as recommended by neurology.   · Topamax helped headaches. She had a headache all day once but otherwise had no other bad headaches. She had paresthesias and worsening cognitive funciton, brain fog, ability to think clearly. I asked that she decrease to nightly dosing only for a couple weeks then could increase to twice daily. She then tolerated better.   · She had improvement in headaches on Topamax. Decreased as directed then was able to increase to twice daily again and had improved headaches and no worsening neurological/cognitive symptoms. She stopped having severe, debilitating headaches and stopped waking up with headaches. Now following with neurology.   · She then had to stop Topamax after developing kidney stones.   Snoring- does not think she is snoring as bad.   · Has never been someone who snored but since Covid, she is snoring loud, sometimes waking her . Negative Sleep study- no EVERETT.   Hair loss- slowed down.   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 When she started  "to feel better, something mentally started crashing down. As she started to feel better physically, she started \"freaking out\". She was having panic attacks.   · Patient related the feeling to being on a plane to [place] years ago, she felt like she was back on the plane and ready to crash.   · She worried about going back to work without FMLA and worries about losing her job. She reports her friend at work told her they are firing people \"left and right\". She thinks she will lose her job and does not have FMLA  (She has to acquire so much leave to qualify for FMLA).    · She had anxiety doing anything, worst at night with laying down. She thought something could be wrong with her heart. Cardiology cleared her but she feels like something is wrong with her heart when she has episodes. She tries to hold it in.   · She was doing well on Lexapro. She increased to 10 mg once daily. She did have significant fatigue upon starting medication, however, this improved. She continued with anxiety, however, she was able to calm herself down many times. She was working with mental health therapist twice weekly and was starting to improve. She felt she should try to go back to work slowly. There may be a layoff but she could go in to work but the line will not be moving, all the people were not there, and she could come and go- did not have to work full 12 hours. She thought this would be more helpful to try to go in and start trying to do her job without the pressure and interaction with others. She reported she would be able to leave if she had a panic attack. Despite still having anxiety, she thought this would improve some if she could get back to work and on a routine. She wanted to try.  · She then had increased depression- she has had decreased interest in anything, exhaustion, not wanting to be around people, sleeping a lot, not keeping up with home chores or things she would normally do at home. It was not abnormal to " be in her feelings or in a funk for a few days but she had severe symptoms. Not wanting to shower or take care of herself. She has had increased depression. Talked to therapist about her moods and they discussed possible changes in medication. She felt some better after talking but she still has depression. Therapist was out a week but follow up 10/19/2021. She thought that contributes to her depression- she is tired of trying to figure out what is contributing to her symptoms since Covid. She previously knew her body and when something was going on. She does not now and is bothersome.    Joint pain, back and neck pain- bottoms of both feet have been hurting really bad and aching. She has the most pain at the ball of the foot. Propping up helps some at night. Very tender.   she still has joint pain. Not as bad with back and neck pain. Her knees are more sore sometimes. Right foot with previous surgery hurts more.    She had improvement in the pain. She was still not back to baseline but improved.   Foot that had tendon release surgery hurt the worst- some improvement in other pain.   When she was a kid, she had a tubing accident but had hip pain after that. He hip hurt more after Covid. No recommendations from PT. States they did not have any additional treatment.      Therapy- still in mental health counseling.   · Physical therapy- doing PT with [facility] as directed by [facility]  · Once weekly once she went back to work. When she was off work, she went 2-3 x. Has been walking with her dog per PT but she was scared of triggering a migraine because she was scared that a bump or anything would cause migraine.   · Speech and cognitive therapy- There was initially a mistake on their part. Speech therapy- told her none of their other patients went back to full time. Other people had gone back 4 hours per day and gradually increased to 6 then 8 hours. They were concerned they would not be able to go back 12 hours.   "  · Mental health counseling- seeing her therapist and she has her using an joaquim for relaxation and mind sharpness- doing that once daily.    Return to work- 5/11/2021 -she tried to return to work again and had a panic attack.  She started having severe anxiety about doing her job, remembering, and being able to function at work.  She has never had anything this severe in the past.  She went to work but had to leave. 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 She was seen for physical by medical and was advised she could not work with dizziness due to the risks.   She previously went back to work and had to leave after 2 hours. She reports they asked her to tell someone something and she couldn't remember who that was. They thought this was someone she should know but she had no idea. Also, the process of going into work- line, temperature, getting mask- couldn't remember how to do it. She states it was like she was a new hire. Things that she thought should have come back as soon as she saw it did not come back. They had her sit at a picnic table x 2 hours until they decided she should go home on no work available. They still have the heat on and with the heat and her mask, she felt claustrophobic. No other symptoms that she is aware.     Skin lesions- Dermatology removed a lesion right face and left axilla- both  Benign. The spot on her right forearm- it is a scar. They scheduled removal 1/10/2022.   She was concerned about a spot on her face that has been there- looked like a dark, Aging\" spot. However, she noticed it to be larger, more raise, and bothersome. She wanted to consider dermatology for right forearm scar that is raised to see about removal. Dermatology a long time ago but unsure who she saw.     Mixed hyperlipidemia- not sure about numbers. Has not lost significantly.   Slipped up through holidays and gained 10 lbs. She " has been back on track for 2 weeks. She got excited and thought a few cheats and has now gotten better. Started a challenge- 5 days a week and doing a workout program to try to help with working out. Enjoying it and working on different parts of her body. Slowly but surely making lifestyle changes.   · She is not plant based at this point. She only had fast food once and felt bad and was otherwise been eating healthy and working hard. She has exercised as tolerated. She lost about 15 lbs.   · Watched [place] [employer]- she tried no eating meat and changed to almond milk. She has not stopped cheese/dairy completely yet. Feels she can do it. She is not completely plant based but is her goal. 1/2022- worsening cholesterol- elevated T Chol, LDL, and TG.   Prediabetes-still having diarrhea- has never resolved. She has BM 5-10 x daily. Prior to metformin, she was going about 2-3 x daily. It is urgent and is sudden onset.   She has been taking Metformin.   Stopped Metformin and had significant improvement. She had not had solid BM in a long time. Now has improvement in BM. She felt terrible on medication.  · Stopped having menses when she stopped Metformin. She was having normal menses while on Metformin. She had menses 3/13/2022.  · 1/2022- A1C up to 5.8%. She restarted Metformin - fertility specialist refilled. Metformin gives diarrhea and she has had more nausea since restarting. Stopped having menses when she stopped Metformin. She was having normal menses while on Metformin. She had menses 3/13/2022.  · She was doing well- she worked on diet and exercise as tolerated. She had no beef, pork from 5/2022 and fried foods 2-3 x 5/2022. French fries 1-2 x since 5/2022. She decreased sweets but still eats.   · 10/2022- She noted trouble swallowing pills lately. She is not sure but has almost got sick with nightly medications.    Vitamin D deficiency- taking vitamin D 5000IU daily and women's one a day  Hemochromatosis  carrier- is not taking iron and has had no recent phlebotomy or blood donation.  Elevated liver function tests- patient withuot regular NSAIDS, Tylenol, or alcohol. Seen by GI- they ordered CT abd/pelvis. 1/2022- normal LFT.   Hyperuricemia- 1/2022- elevated uric acid. Prior to last labs- she ate a bunch of cocktail shrimp.     Patient's specialists:  Bariatrics- Dr Noah Koroma- last appt 5/2012 in follow up of [facility].   Cardiology -Dr. Negrete-last appointment 5/2021 for palpitations, hypertension, and chest pain. Hold off on stress test since she was improving. Continue medication for a couple months then re-evaluate. Consider weaning atenolol but if she needs medication for BP, she may need to continue beta blocker and consider labetolol with desire to conceive. Follow up in 3 months. Appt scheduled 11/29/2021  · She had been seen 11/2020 for atypical chest pain. Echo, Holter, and stress test ordered.    · She was seen again after Covid 19 with tachycardia, SOA. They held stress test with post Covid symptoms. Started atenolol 25 mg daily for elevated blood pressure and tachycardia.    · 4/2021- Holter monitor normal.   · 3/2021- Echo normal.   GI- Dr Jensen- last appt 11/18/2021 for elevated LFT and hemochromatosis carrier state. Referred for CT abd/pelvis. Advised diet and consideration of lipid medication. Consider additional labs if CT is negative and enzymes continue to increase.    Infectious disease-MONICA Sawyer-last appointment 4/2021 in follow-up of COVID-19 infection.  Diagnosed with post viral fatigue syndrome and brain fog.  Advised physical therapy, cognitive therapy, and advised follow-up with cardiology for tachycardia.  Performed additional labs. 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  Follow-up PRN.  Neurology-Dr Hogan-last appointment 12/2021 for migraine headache-headaches post Covid and dizziness.  Headaches improved on Topamax  25 mg twice daily- continue medication.  Also given Imitrex. Neagative sleep study. Consider vestibular migraines- trial increase Topamax, referred for vestibular therapy, and given Meclizine for dizziness. Follow-up in 4 months.  OB/GYN-Dr. Giles-last appointment 1/2020 for annual exam/well woman exam.  Pap completed.  Follow-up in 1 year.  Sleep medicine-Dr. Pantoja-last appt 5/10/2021 for evaluation. Home sleep study 6/2021- no EVERETT.   Speech therapy-started 4/27/2021 for speech and cognitive therapy. She was discharged the end of 5/2021. She has noticed improvement.   Ophthalmology-Dr. Heydi Vera-last appointment 4/2021 for abnormal MRI orbit.  Referred to Dr. Bowman.  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 Follow up PRN.    ENT- Rita Johnson APRN- last appt 10/26/2021 for dizziness. Epley maneuver did not work. Advised she get a mouth guard and take Magnesium oxide 400 mg QHS. Follow up if persistent symptoms.   Allergist- Dr Shelia Mc- went 4/19/2021- and they wanted to change Atenolol to Nadolol and gave Zyrtec, Nasacort, and albuterol as needed. Allergy testing- allergic to outdoor issues and not indoor things. He asked her worst symptoms. He was concerned about Topamax- she did cut back to nightly. She had worsening headaches (not severe but continued with headaches). States when she increased again, she feels back to baseline prior to starting Topamax.     The following portions of the patient's history were reviewed and updated as appropriate: allergies, current medications, past family history, past medical history, past social history, past surgical history and problem list.    Review of Systems   Constitutional: Positive for fatigue (improving).   Eyes: Positive for visual disturbance.   Respiratory: Shortness of breath: improving.    Cardiovascular: Positive for palpitations (improving).   Gastrointestinal:  Nausea: no constant nausea.   Genitourinary: Negative.    Musculoskeletal: Positive for gait problem (falls). Arthralgias: improving. Myalgias: improving.   Skin: Negative.    Neurological: Positive for dizziness. Negative for seizures and syncope. Facial asymmetry: improved. Weakness: improving. Light-headedness: improved. Headaches: improved with treatment.   Psychiatric/Behavioral: Positive for confusion (improving), decreased concentration (improving), dysphoric mood (improving) and sleep disturbance (new- increased nightly snoring since Covid 19). Negative for self-injury and suicidal ideas. The patient is nervous/anxious (worse with concerns of surgery).         Brain fog improving       Objective   Vitals:    05/31/23 1111   BP: 118/74   Pulse: 90   Resp: 16   Temp: 97.6 °F (36.4 °C)   SpO2: 99%     Body mass index is 40 kg/m².    Physical Exam  Vitals and nursing note reviewed.   Constitutional:       General: She is not in acute distress.     Appearance: Normal appearance. She is well-developed.   HENT:      Head: Normocephalic and atraumatic.      Right Ear: External ear normal.      Left Ear: External ear normal.      Nose: Nose normal.   Eyes:      General: Lids are normal.      Conjunctiva/sclera: Conjunctivae normal.   Neck:      Vascular: No carotid bruit.   Cardiovascular:      Rate and Rhythm: Normal rate and regular rhythm.      Pulses: Normal pulses.      Heart sounds: Normal heart sounds. No murmur heard.    No friction rub. No gallop.   Pulmonary:      Effort: Pulmonary effort is normal. No respiratory distress.      Breath sounds: Normal breath sounds. No wheezing, rhonchi or rales.   Musculoskeletal:         General: No deformity.      Cervical back: Neck supple.   Skin:     General: Skin is warm and dry.   Neurological:      Mental Status: She is alert and oriented to person, place, and time.      Gait: Gait normal.   Psychiatric:         Attention and Perception: Perception normal.          Mood and Affect: Affect normal. Mood is anxious.         Speech: Speech normal.         Behavior: Behavior normal.         Thought Content: Thought content normal.         Cognition and Memory: Cognition normal.         Judgment: Judgment normal.         Assessment & Plan   Diagnoses and all orders for this visit:    1. Dizziness (Primary)    2. Persistent fatigue after COVID-19    3. Persistent neurologic symptoms after COVID-19    4. Post-COVID-19 syndrome    5. Brain fog    6. Major depressive disorder, recurrent, in full remission    7. Mixed hyperlipidemia  -     Comprehensive Metabolic Panel  -     CK  -     Lipid Panel With LDL / HDL Ratio    8. Prediabetes  -     Comprehensive Metabolic Panel  -     Hemoglobin A1c    9. Vitamin D deficiency  -     Comprehensive Metabolic Panel  -     Vitamin D,25-Hydroxy    10. Elevated liver enzymes  -     Comprehensive Metabolic Panel    11. Hyperuricemia  -     Uric Acid    12. Hemochromatosis carrier  -     CBC & Differential  -     Iron and TIBC  -     Ferritin    13. Encounter for hepatitis C screening test for low risk patient  -     Hepatitis C antibody        Assessment and Plan  Patient had significant long term Covid 19 symptoms. She completed physical therapy, speech, and cognitive therapy, and continues follow up with mental health counselor weekly, cardiology, neurology, infectious disease/ long term Covid clinic, ophthalmology, sleep medicine, and allergist as directed by them. She has restarted PT at the request of [facility]. She is trying to transition to a whole food plant based diet. Information given. She was seen by dizziness specialist, Dr Izquierdo, with intolerance to CCB. She was seen in follow up with no further treatment indicated. She is now following with [facility] and had appt 10/2022, and has restarted PT with follow up with them once she has completed PT.     · Dizziness- Jocelyn has had dizziness with looking up, bending, and doing  things. Unfortunately, she has had worsening and has dizziness with closing her eyes and newly a sensory overload with noises and light.  She had dizziness following Covid 19 infection, however, following an orbital mass removal, she had increasing dizziness symptoms. She has some lightheadedness and some spinning dizziness. She has undergone MRI brain, MRI brain and IAC and been evaluated by ENT and neurology. She was seen by ophthalmology and cleared for PRN follow up. ENT advised mouthguard and Magnesium oxide 400 mg QHS (then reported she should be taking a different Mg supplement). Without improvement, she advised follow up with neurology. Neurology advised trial of increasing Topamax (which she did not tolerate), trial Meclizine (no improvement in dizziness with Meclizine), and referred to PT for vestibular therapy. I agreed with vestibular therapy. She is also having vision issues in the peripheral vision. I advised PT through [facility] long term Covid PT program, as they have developed therapy program for vision and dizziness post-Covid. She was unable to get into this program. She was seen by Dr Izquierdo at [facility], who has specialty in dizziness. They started Verapamil  mg once daily to see if this helped with dizziness. She did not tolerate verapamil, and it did not help the dizziness. There was nothing further they felt they could do to help. I referred to tertiary center for evaluation and treatment who advised Mg, possible migraine variant, and to complete PT with specific PT and follow up with them after completing PT. She then tried Gabapentin from specialist and could not tolerate AE but also had no improvement in dizziness with medication. She completed PT. I also referred for warm water PT for strengthening, pain, and to see if this helps with sensory, balance issues. 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 She did have a couple  accidents at work. I will have her off work until she is cleared by specialists. Follow up with me 5/2023 and she will see [facility] 6/5/2023. She should remain off until 6/6/2023 and I will have them make further recommendations for follow up or return to work at that time.  If she has resolution, he can clear her for return at that visit.    · Depression with anxious mood- Patient has had some underlying anxiety in the past, however, she developed very severe, debilitating anxiety following Covid 19 infection and rehab, causing panic attacks.  She is seeing her mental health counselor.  She was resistant to medication in the past because she was always able to control her moods, calm down herself, and continue with working.  However, following her COVID-19 infection, she had more severe symptoms that were worsened with trying to return to work.  I started Lexapro 5 mg once daily. Once she was tolerating medication better, she increased to Lexapro 10 mg once daily. She had improved anxiety with therapy and medication. However, she had increased depressive symptoms, fatigue, decreased motivation. I had her continue Lexapro 10 mg once daily and added Wellbutrin  mg daily. She has not noted significant difference. We may consider contacting cardiology to see about decreasing or changing beta blocker.  To be seen ASAP if worsening moods or AE with medication.   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   · Nocturnal cough- Patient to be seen if persistent symptoms. She can continue Pepcid 20 mg before her evening meal. She can try Mucinex DM twice daily as well. She may also need albuterol inhaler if needed. To be seen if worsening, new or changing symptoms or no resolution of cough.   · History of Covid 19 infection, post-Covid syndrome- Patient had prolonged, debilitating symptoms as noted below, that delayed her safe return to work and have  continued to affect her life.    · Hypoxia following Covid 19 infection-  She had slow, gradual improvement and completed PT. She did have improvement in oxygenation.   · Tachycardia/ elevated BP, post-Covid arrhythmia- Continue follow up with cardiology as directed by them. Echocardiogram and Holter monitor were ok. Patient to continue Atenolol 25 mg once daily and monitor BP, pulse. Blood pressure was a little low and she decreased Atenolol by 1/2 has her BP returns to normal. Allergist gave her Nadolol. Cardiology to determine any changes from atenolol to nadolol. She has had no increased SOA with beta blocker but is having increased fatigue and decreased motivation. We will consider consulting cardiology and decreasing medication.   · Fatigue following Covid 19 infection- Patient to continue mental health therapy. She completed physical therapy, and cognitive therapy.   · Cognitive deficit/dysfunction- Continue home exercises. Continue follow up with neurology as directed.   · Depression and anxiety- Patient previously had some depression and anxiety that were managed with counseling and techniques. However, she had severe anxiety following Covid 19 then had improvement with medication but has had severe depression. Continue Mental health counseling and medication.   · Persistent headaches following Covid 19 infection- Patient to follow up with neurology and treatment as directed by them. Her allergist wanted her to stop Topamax due to fatigue and cognitive impairment, however, headaches are controlled, and she did not want to stop medication. She did try to stop medication, and headaches recurred. She then developed kidney stones and was able to wean off Topamax. To use Imitrex as needed for acute headaches, follow up with neurology if worsening headaches, new, or changing neurological symptoms.   · Snoring- Since having Covid 19, she developed significant snoring, headaches that woke her up at night,  tachycardia, hypoxia, and significant brain fog and fatigue. Negative sleep study. Snoring has not resolved but has improved gradually.  · Generalized body aches, back aches, neck pain, and joint pain- Elevated uric acid but otherwise negative autoimmune testing 4/2021. PT and home exercises as needed. I also referred to allergist, with symptoms similar to MIS-A. No diagnosis of MIS-A with allergist. Patient to decrease purine rich foods. I will refer for warm water PT.     In follow up of chronic, non-Covid related symptoms:  Patient will have fasting labs. Call if no results in 1 week. Stability of conditions, plan, follow up, and further recommendations pending labs. Follow up in 2-3 months or sooner if needed.     · Mixed hyperlipidemia- Patient to continue to be compliant with diet/ exercise. She will restart strict diet and exercise. Await labs for further recommendations.    · Prediabetes- A1C increased off Metformin and she stopped having menses. She restarted Metformin and menses was more regular again but she had severe GI AE. I will consider GLP1. She will check with her insurance about coverage.   · Vitamin D deficiency- Continue vitamin D 5000IU daily and women's one a day. Dosing recommendations pending labs.   · Hemochromatosis carrier- I will continue to monitor and make further recommendations.  · Elevated liver function tests- Most recent LFT normal. Avoid NSAIDS and alcohol and limit Tylenol to < 2 grams daily. Follow up with GI as directed.   · Hyperuricemia- Decrease purine rich foods. I will monitor and make recommendations.   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 She will need to see dermatology. I referred.     Patient continues to see specialists as noted above.  I have reviewed available records, including consult notes, labs, and imaging/testing.  Patient to continue follow-up with specialists as directed by them.    I spent 30  minutes caring for Jocelyn Merlos on this date of service. This time includes time spent by me in the following activities as necessary: preparing for the visit, reviewing tests, specialists records and previous visits, obtaining and/or reviewing a separately obtained history, performing a medically appropriate exam and/or evaluation, counseling and educating the patient, family, caregiver, referring and/or communicating with other healthcare professionals, documenting information in the medical record, independently interpreting results and communicating that information with the patient, family, caregiver, and developing a medically appropriate treatment plan with consideration of other conditions, medications, and treatments.     [facility] Claim# [id number]

## 2023-06-01 LAB
25(OH)D3+25(OH)D2 SERPL-MCNC: 52.6 NG/ML (ref 30–100)
ALBUMIN SERPL-MCNC: 4.7 G/DL (ref 3.8–4.8)
ALBUMIN/GLOB SERPL: 1.9 {RATIO} (ref 1.2–2.2)
ALP SERPL-CCNC: 78 IU/L (ref 44–121)
ALT SERPL-CCNC: 23 IU/L (ref 0–32)
AST SERPL-CCNC: 18 IU/L (ref 0–40)
BASOPHILS # BLD AUTO: 0 X10E3/UL (ref 0–0.2)
BASOPHILS NFR BLD AUTO: 0 %
BILIRUB SERPL-MCNC: 0.3 MG/DL (ref 0–1.2)
BUN SERPL-MCNC: 11 MG/DL (ref 6–20)
BUN/CREAT SERPL: 14 (ref 9–23)
CALCIUM SERPL-MCNC: 9.7 MG/DL (ref 8.7–10.2)
CHLORIDE SERPL-SCNC: 106 MMOL/L (ref 96–106)
CHOLEST SERPL-MCNC: 203 MG/DL (ref 100–199)
CK SERPL-CCNC: 45 U/L (ref 32–182)
CO2 SERPL-SCNC: 21 MMOL/L (ref 20–29)
CREAT SERPL-MCNC: 0.76 MG/DL (ref 0.57–1)
EGFRCR SERPLBLD CKD-EPI 2021: 107 ML/MIN/1.73
EOSINOPHIL # BLD AUTO: 0.2 X10E3/UL (ref 0–0.4)
EOSINOPHIL NFR BLD AUTO: 2 %
ERYTHROCYTE [DISTWIDTH] IN BLOOD BY AUTOMATED COUNT: 12.7 % (ref 11.7–15.4)
FERRITIN SERPL-MCNC: 76 NG/ML (ref 15–150)
GLOBULIN SER CALC-MCNC: 2.5 G/DL (ref 1.5–4.5)
GLUCOSE SERPL-MCNC: 77 MG/DL (ref 70–99)
HBA1C MFR BLD: 5.5 % (ref 4.8–5.6)
HCT VFR BLD AUTO: 45.3 % (ref 34–46.6)
HCV IGG SERPL QL IA: NON REACTIVE
HDLC SERPL-MCNC: 40 MG/DL
HGB BLD-MCNC: 15.2 G/DL (ref 11.1–15.9)
IMM GRANULOCYTES # BLD AUTO: 0.1 X10E3/UL (ref 0–0.1)
IMM GRANULOCYTES NFR BLD AUTO: 1 %
IRON SATN MFR SERPL: 20 % (ref 15–55)
IRON SERPL-MCNC: 63 UG/DL (ref 27–159)
LDLC SERPL CALC-MCNC: 139 MG/DL (ref 0–99)
LDLC/HDLC SERPL: 3.5 RATIO (ref 0–3.2)
LYMPHOCYTES # BLD AUTO: 2.2 X10E3/UL (ref 0.7–3.1)
LYMPHOCYTES NFR BLD AUTO: 23 %
MCH RBC QN AUTO: 30.9 PG (ref 26.6–33)
MCHC RBC AUTO-ENTMCNC: 33.6 G/DL (ref 31.5–35.7)
MCV RBC AUTO: 92 FL (ref 79–97)
MONOCYTES # BLD AUTO: 0.8 X10E3/UL (ref 0.1–0.9)
MONOCYTES NFR BLD AUTO: 8 %
NEUTROPHILS # BLD AUTO: 6.3 X10E3/UL (ref 1.4–7)
NEUTROPHILS NFR BLD AUTO: 66 %
PLATELET # BLD AUTO: 335 X10E3/UL (ref 150–450)
POTASSIUM SERPL-SCNC: 4.8 MMOL/L (ref 3.5–5.2)
PROT SERPL-MCNC: 7.2 G/DL (ref 6–8.5)
RBC # BLD AUTO: 4.92 X10E6/UL (ref 3.77–5.28)
SODIUM SERPL-SCNC: 143 MMOL/L (ref 134–144)
TIBC SERPL-MCNC: 316 UG/DL (ref 250–450)
TRIGL SERPL-MCNC: 130 MG/DL (ref 0–149)
UIBC SERPL-MCNC: 253 UG/DL (ref 131–425)
URATE SERPL-MCNC: 5.3 MG/DL (ref 2.6–6.2)
VLDLC SERPL CALC-MCNC: 24 MG/DL (ref 5–40)
WBC # BLD AUTO: 9.6 X10E3/UL (ref 3.4–10.8)

## 2023-06-07 DIAGNOSIS — U09.9 POST-COVID-19 SYNDROME: ICD-10-CM

## 2023-06-07 DIAGNOSIS — F41.9 ANXIETY: ICD-10-CM

## 2023-06-07 DIAGNOSIS — F43.22 ADJUSTMENT DISORDER WITH ANXIOUS MOOD: ICD-10-CM

## 2023-06-07 DIAGNOSIS — F33.42 MAJOR DEPRESSIVE DISORDER, RECURRENT, IN FULL REMISSION: ICD-10-CM

## 2023-06-08 RX ORDER — ESCITALOPRAM OXALATE 10 MG/1
10 TABLET ORAL DAILY
Qty: 90 TABLET | Refills: 0 | Status: SHIPPED | OUTPATIENT
Start: 2023-06-08

## 2023-09-05 ENCOUNTER — OFFICE VISIT (OUTPATIENT)
Dept: FAMILY MEDICINE CLINIC | Facility: CLINIC | Age: 33
End: 2023-09-05
Payer: COMMERCIAL

## 2023-09-05 ENCOUNTER — OFFICE VISIT (OUTPATIENT)
Dept: OBSTETRICS AND GYNECOLOGY | Facility: CLINIC | Age: 33
End: 2023-09-05
Payer: COMMERCIAL

## 2023-09-05 VITALS
HEART RATE: 86 BPM | SYSTOLIC BLOOD PRESSURE: 116 MMHG | RESPIRATION RATE: 16 BRPM | BODY MASS INDEX: 41.83 KG/M2 | TEMPERATURE: 98.9 F | WEIGHT: 276 LBS | OXYGEN SATURATION: 99 % | DIASTOLIC BLOOD PRESSURE: 70 MMHG | HEIGHT: 68 IN

## 2023-09-05 VITALS
SYSTOLIC BLOOD PRESSURE: 128 MMHG | BODY MASS INDEX: 41.89 KG/M2 | HEIGHT: 68 IN | DIASTOLIC BLOOD PRESSURE: 84 MMHG | WEIGHT: 276.4 LBS

## 2023-09-05 DIAGNOSIS — R74.8 ELEVATED LIVER ENZYMES: ICD-10-CM

## 2023-09-05 DIAGNOSIS — M79.10 MYALGIA: ICD-10-CM

## 2023-09-05 DIAGNOSIS — E78.2 MIXED HYPERLIPIDEMIA: ICD-10-CM

## 2023-09-05 DIAGNOSIS — R73.03 PREDIABETES: ICD-10-CM

## 2023-09-05 DIAGNOSIS — E79.0 HYPERURICEMIA: ICD-10-CM

## 2023-09-05 DIAGNOSIS — M54.2 NECK PAIN: ICD-10-CM

## 2023-09-05 DIAGNOSIS — F43.22 ADJUSTMENT DISORDER WITH ANXIOUS MOOD: ICD-10-CM

## 2023-09-05 DIAGNOSIS — R41.89 BRAIN FOG: ICD-10-CM

## 2023-09-05 DIAGNOSIS — O10.919 CHRONIC HYPERTENSION DURING PREGNANCY, ANTEPARTUM: ICD-10-CM

## 2023-09-05 DIAGNOSIS — U09.9 PERSISTENT NEUROLOGIC SYMPTOMS AFTER COVID-19: ICD-10-CM

## 2023-09-05 DIAGNOSIS — F41.9 ANXIETY: ICD-10-CM

## 2023-09-05 DIAGNOSIS — E66.01 CLASS 3 SEVERE OBESITY WITH BODY MASS INDEX (BMI) OF 40.0 TO 44.9 IN ADULT, UNSPECIFIED OBESITY TYPE, UNSPECIFIED WHETHER SERIOUS COMORBIDITY PRESENT: ICD-10-CM

## 2023-09-05 DIAGNOSIS — M25.50 MULTIPLE JOINT PAIN: ICD-10-CM

## 2023-09-05 DIAGNOSIS — N92.6 MISSED MENSES: Primary | ICD-10-CM

## 2023-09-05 DIAGNOSIS — E55.9 VITAMIN D DEFICIENCY: ICD-10-CM

## 2023-09-05 DIAGNOSIS — F33.42 MAJOR DEPRESSIVE DISORDER, RECURRENT, IN FULL REMISSION: ICD-10-CM

## 2023-09-05 DIAGNOSIS — G43.709 CHRONIC MIGRAINE WITHOUT AURA WITHOUT STATUS MIGRAINOSUS, NOT INTRACTABLE: ICD-10-CM

## 2023-09-05 DIAGNOSIS — R53.83 PERSISTENT FATIGUE AFTER COVID-19: ICD-10-CM

## 2023-09-05 DIAGNOSIS — R42 DIZZINESS: ICD-10-CM

## 2023-09-05 DIAGNOSIS — R29.90 PERSISTENT NEUROLOGIC SYMPTOMS AFTER COVID-19: ICD-10-CM

## 2023-09-05 DIAGNOSIS — Z14.8 HEMOCHROMATOSIS CARRIER: ICD-10-CM

## 2023-09-05 DIAGNOSIS — U09.9 PERSISTENT FATIGUE AFTER COVID-19: ICD-10-CM

## 2023-09-05 DIAGNOSIS — Z34.90 PREGNANCY, UNSPECIFIED GESTATIONAL AGE: Primary | ICD-10-CM

## 2023-09-05 DIAGNOSIS — U09.9 POST-COVID-19 SYNDROME: ICD-10-CM

## 2023-09-05 PROBLEM — G93.31 POSTVIRAL FATIGUE SYNDROME: Status: RESOLVED | Noted: 2021-02-22 | Resolved: 2023-09-05

## 2023-09-05 PROBLEM — G93.31 POST VIRAL SYNDROME: Status: RESOLVED | Noted: 2021-02-22 | Resolved: 2023-09-05

## 2023-09-05 PROBLEM — E28.2 PCOS (POLYCYSTIC OVARIAN SYNDROME): Status: ACTIVE | Noted: 2023-09-05

## 2023-09-05 LAB
B-HCG UR QL: POSITIVE
BILIRUB BLD-MCNC: NEGATIVE MG/DL
CLARITY, POC: CLEAR
COLOR UR: YELLOW
EXPIRATION DATE: ABNORMAL
GLUCOSE UR STRIP-MCNC: NEGATIVE MG/DL
HCG INTACT+B SERPL-ACNC: NORMAL MIU/ML
INTERNAL NEGATIVE CONTROL: ABNORMAL
INTERNAL POSITIVE CONTROL: ABNORMAL
KETONES UR QL: NEGATIVE
LEUKOCYTE EST, POC: NEGATIVE
Lab: ABNORMAL
NITRITE UR-MCNC: NEGATIVE MG/ML
PH UR: 5 [PH] (ref 5–8)
PROT UR STRIP-MCNC: NEGATIVE MG/DL
RBC # UR STRIP: NEGATIVE /UL
SP GR UR: 1 (ref 1–1.03)
UROBILINOGEN UR QL: NORMAL

## 2023-09-05 RX ORDER — ATENOLOL 25 MG/1
25 TABLET ORAL DAILY
Qty: 90 TABLET | Refills: 0 | OUTPATIENT
Start: 2023-09-05

## 2023-09-05 RX ORDER — CYPROHEPTADINE HYDROCHLORIDE 4 MG/1
TABLET ORAL
COMMUNITY
End: 2023-09-05

## 2023-09-05 RX ORDER — INDOMETHACIN 50 MG/1
1 CAPSULE ORAL 2 TIMES DAILY WITH MEALS
COMMUNITY
Start: 2023-06-05 | End: 2023-09-05

## 2023-09-05 RX ORDER — LABETALOL 100 MG/1
100 TABLET, FILM COATED ORAL 2 TIMES DAILY
Qty: 60 TABLET | Refills: 6 | Status: SHIPPED | OUTPATIENT
Start: 2023-09-05 | End: 2023-09-11 | Stop reason: ALTCHOICE

## 2023-09-05 NOTE — PROGRESS NOTES
Confirmation of pregnancy     Chief Complaint   Patient presents with    Amenorrhea         Jocelyn Merlos is being seen today for evaluation of absence of menses. Due to her period being late, she tested for pregnancy and had + home UPT. She is a 33 y.o. . This problem is new to me, the examiner.       HPI     LNMP: 7/15/23  Confident with date: Yes  Taking prenatal vitamins: Yes. Needs RX: No  Planned pregnancy: Yes  Prior obstetric issues, potential pregnancy concerns: hx of SAB x 3; went to fertility in  ([place]); hx of PCOS and endometriosis   Family history of genetic issues (includes FOB): no  Varicella Hx: unsure  Flu vaccine: yes  COVID 19 vaccine: yes. Booster vaccine: yes  History of STDs: no  Current medications: PNV, folic acid, vit. D. Fish Oil; PCP told her to stop Wellbutrin XL (150mg daily) and Lexapro (10mg daily) until she saw OB; Tapering off Atenolol (25mg daily) x 5 days- plans to make appt soon with cardiology (Dr. Immanuel Negrete)  Last pap smear: 2022- NIL; hx of LEEP  Smoker: former  Drug or alcohol abuse: No  H/O physical, emotional or sexual abuse:no  H/O mental health disorder: anxiety/depression- ok to restart Wellbutrin and Lexapro; goes to therapy; [facility]  Prior testing for Cystic Fibrosis Carrier or Sickle Cell Trait- no  Prepregnancy BMI - Body mass index is 42.03 kg/m².    Past Medical History:   Diagnosis Date    Abnormal Pap smear of cervix     Adjustment disorder with mixed anxiety and depressed mood 2016    COVID-19 2020    Hypertension     Palpitations        Past Surgical History:   Procedure Laterality Date    BREAST AUGMENTATION      CERVICAL BIOPSY  W/ LOOP ELECTRODE EXCISION      FOOT SURGERY Right     plantar fascitis tendon release    LAPAROSCOPIC GASTRIC BANDING      LEEP      OTHER SURGICAL HISTORY      removal of eye tumor    TONSILLECTOMY           Current Outpatient Medications:     atenolol (TENORMIN) 25 MG tablet, Take 1 tablet by mouth Daily.,  "Disp: 90 tablet, Rfl: 0    escitalopram (LEXAPRO) 10 MG tablet, TAKE 1 TABLET BY MOUTH DAILY, Disp: 90 tablet, Rfl: 0    labetalol (NORMODYNE) 100 MG tablet, Take 1 tablet by mouth 2 (Two) Times a Day., Disp: 60 tablet, Rfl: 6    Omega-3 Fatty Acids (fish oil) 1000 MG capsule capsule, Take  by mouth Daily With Breakfast., Disp: , Rfl:     prenatal vitamin (prenatal, CLASSIC, vitamin) tablet, Take  by mouth Daily., Disp: , Rfl:     vitamin D (ERGOCALCIFEROL) 1.25 MG (79850 UT) capsule capsule, Take 1,000 Units by mouth., Disp: , Rfl:     No Known Allergies    Social History     Socioeconomic History    Marital status:    Tobacco Use    Smoking status: Former     Packs/day: 0.25     Years: 0.50     Pack years: 0.13     Types: Cigarettes     Quit date: 2015     Years since quittin.4    Smokeless tobacco: Never   Vaping Use    Vaping Use: Never used   Substance and Sexual Activity    Alcohol use: Yes     Alcohol/week: 4.0 - 5.0 standard drinks     Types: 4 - 5 Standard drinks or equivalent per week     Comment: Socially./caffeine use     Drug use: No    Sexual activity: Yes     Partners: Male       Family History   Problem Relation Age of Onset    Skin cancer Mother     Depression Mother     Anxiety disorder Mother     Stroke Mother     Aneurysm Mother     Alcohol abuse Mother     Migraines Mother     Hemochromatosis Mother     Stroke Maternal Grandmother        Review of systems     Review of Systems   Gastrointestinal:  Positive for nausea.   Genitourinary:  Positive for menstrual problem. Negative for pelvic pain and vaginal bleeding.     Objective    /84   Ht 172.7 cm (68\")   Wt 125 kg (276 lb 6.4 oz)   LMP 07/15/2023   BMI 42.03 kg/m²     Physical Exam  Vitals reviewed.   Constitutional:       General: She is awake. She is not in acute distress.     Appearance: She is obese. She is not ill-appearing.   Eyes:      Conjunctiva/sclera: Conjunctivae normal.   Pulmonary:      Effort: Pulmonary " effort is normal. No respiratory distress.   Musculoskeletal:      Cervical back: Neck supple. No rigidity.   Skin:     General: Skin is warm and dry.      Capillary Refill: Capillary refill takes less than 2 seconds.   Neurological:      Mental Status: She is alert and oriented to person, place, and time.   Psychiatric:         Mood and Affect: Mood and affect normal.         Behavior: Behavior normal.       Assessment/Plan      Missed menses: + UPT in office. LNMP 7/15/23 = 6 week EGA with an EDC=4/27/2024. US confirms IUP with +FCA: No. Repeat US for viability in 3 weeks. Quant HCG drawn today.    Pregnancy: Disc importance of regular prenatal care. Enc PNV daily. Counseled on providers and on call phone. Disc Tylenol products are ok and encouraged no ibuprofen or ASA in pregnancy.  Disc exercise in pregnancy, diet, expected weight gain, etc. Enc no use of tobacco, vaping, drugs, or alcohol during pregnancy. Rev warn s/s of SAB.     Labs: Pt counseled on genetic screening, Quad screen, AFP, and NIPS.     Body mass index is 42.03 kg/m².  Obese women with a pre-pregnancy BMI of 30+ should strive to gain approx 11-20 pounds for the entire pregnancy.        Smoker- former    6.   COVID19 precautions were reviewed with the patient. Continue to encourage social distancing, wearing a mask, and good hand hygiene.  I wore a mask, protective eye wear, and gloves during this patient encounter.  Patient also wearing a surgical mask and social distancing was observed. Hand hygeine performed before and after seeing the patient. Also encouraged COVID booster vaccine at 6 month interval from last COVID vaccine.     7.  Flu vaccine. Encouraged the flu vaccine during pregnancy. Discuss normal physiological changes during pregnancy increase the susceptibility of the flu virus and increase the risk of severe illness for the pregnant woman. Disc flu can be harmful to the unborn baby as well. Enc the flu vaccine. Disc with patient  that getting the flu vaccine is the first and most important step in protecting against the flu. Flu vaccines given during pregnancy help protect both the mother and the baby. Getting the flu vaccine during pregnancy also helps protect the  from flu illness for several months after their birth, when then are too young to get vaccinated. Also disc the importance of good hand hygiene and avoiding people who are sick.     8. CHTN- started after she had COVID; currently weaning off atenolol by PCP; hasn't seen cardiology in several years- plans to make appt; will switch to Labetolol 100 mg BID; start baby asa daily    9. RPL- recommend to start prometrium daily once viability is confirmed    10. Anxiety/depression- instructed that Wellbutrin and Lexapro are safe to take in pregnancy and recommend to restart; currently in therapy        All questions answered.     Counseling was given to patient and family for the following topics: diagnostic results, instructions for management, risk factor reductions, prognosis, patient and family education, impressions, risks and benefits of treatment options, and importance of treatment compliance. Total time of the encounter was 30 minutes and 25 minutes was spent counseling.    Encounter Diagnoses   Name Primary?    Missed menses Yes    Chronic hypertension during pregnancy, antepartum     Anxiety     Major depressive disorder, recurrent, in full remission        Diagnoses and all orders for this visit:    Missed menses  -     POC Urinalysis Dipstick  -     POC Pregnancy, Urine  -     HCG, B-subunit, Quantitative    Chronic hypertension during pregnancy, antepartum  -     labetalol (NORMODYNE) 100 MG tablet; Take 1 tablet by mouth 2 (Two) Times a Day.    Anxiety    Major depressive disorder, recurrent, in full remission      RTO in 3 weeks to repeat ultrasound for viability.     Veronique Bliss, APRN  2023  17:03 EDT

## 2023-09-05 NOTE — PROGRESS NOTES
Subjective   Jocelyn Merlos is a 33 y.o. female who presents today in follow up of dizziness, moods, headaches, post-Covid syndrome/ long term symptoms, hyperlipidemia, vitamin D deficiency, elevated LFT, hemochromatosis carrier, skin lesions, and specialists.    Dizziness  Associated symptoms include fatigue (improving). Arthralgias: improving. Headaches: improved with treatment. Myalgias: improving. Nausea: no constant nausea. Weakness: improving.  DepressionPatient presents with the following symptoms: confusion (improving), decreased concentration (improving), nervousness/anxiety (worse with concerns of surgery) and palpitations (improving).  Patient is not experiencing: suicidal ideas.  Shortness of breath: improving.      Pregnancy-positive test  Menses- have been a few in the past year that she has severe cramping and feeling like she could faint. Will take anything to feel better at that point. She took 5 Tylenol. No change with taking or not taking Metformin.     NOTE TO [facility]- Fax- [phone]- [place]. Direct # [phone].   Previously-  attn Ms Gregorio   She will eventually get a notice from [facility] and may change to long term disability- still keep insurance.    History of Covid 19 again 7/2022- she felt poorly, started to develop a rash. Has had resolution of symptoms back to baseline from initial Covid 19 infection.     She was working on diet- she is working on what she can control. She got a stationary bike and has used as she could tolerate. She had dizziness all day when she tried to exercise outdoors. Dizziness was more consistent and worse.   Dizziness-Not improving-neurology recommended cyproheptadine.  There was a possibility of changing Lexapro levels.  I discussed with neurologist and we advised could take medication.  No improvement in dizziness with the medication. Has not helped.  Noticed that she has sensory overload. When she has more light or sound or things going on, she has pressure in  her head and everything is amplified. She notices even in her car alone- bright and with movement is overstimulated. Still dizziness with eyes closed and with being up and moving. She had increased nausea.    Seen by Dr Izquierdo- [facility]- specializes in dizziness- he advised not sure but feels like it is similar to other long Covid patients. Tried verapamil  mg once  daily. Follow up 6 weeks 5/11/2022 at 2 pm and no further treatment that they could provide.   She had appt with [facility] 7/2022 but contracted Covid 19. She was told she would have to reschedule her appt- soonest was 10/13/2022.   Verapamil zoned out. She did not have improvement with medication. They advised she had exhausted all treatments and testing.   10/26/2022- Patient was seen at [facility] 10/13/2022 for peripheral vertigo, cervicocranial syndrome, intractable migraine, imbalance, vestibular neuritis.  Impression was complex issues of dizziness, imbalance, and intermittent migraine headache-likely overlap between peripheral vestibular disturbance abnormal cervical spine biomechanics, and tendency towards migraine.  They were concerned for migraine activity without headache.  Possible neuritic irritation of the inner ear as a postviral syndrome.  Advised start B2 200 mg 2 times daily as migraine supplement and referred for cervical and vestibular physical therapy with Carlos Barba.  Patient to contact provider after physical therapy was completed.  The only PT that they recommended to is not covered ([facility]- [facility] Barba) and had to see someone else. The people she was seeing are now going to the office that does not take her insurance and has to change to [facility]. Last seen 12/2022 and had fallen down the steps. She did not think much about it and did not talk with them. Did not think about it until she fell in the tub.   [facility] told when done with PT, reach out on MyChart and they would start Gabapentin.   Has fallen  twice- did not remember what caused the fall. 1st fell down the steps- thinks she missed a step and fell back. Had a bad bruise on her back.   She then fell in the bathtub. Thinks she slipped when getting up from the bathtub.   Has never fallen a lot and has fallen twice in 3 months.   She was on Neurontin- he advised to try 1 month. She did not tolerate and did not help.  She was seen by telehealth 6/5/2023 to determine any additional treatment.   No changes in dizziness. She tries to read about long term Covid- a lot of people with dizziness.    MRI brain w/wo 3/2021- ovoid mass left orbit. Otherwise, normal MRI brain.   MRI brain and IAC w/wo 10/21/2021- evidence of removal of orbital mass, scalp incision. Otherwise normal.   Eye Surgeon- Dr Bowman 10/28/2021- cleared for PRN follow up.  Neurology- Dr Hogan- has advised she see ENT, referred for sleep study- negative, treated with Topamax and Imitrex as needed, now increased Topamax and referred for vestibular therapy and given Meclizine PRN. She reports the Meclizine did not help, did not tolerate increased dose of Topamax, and is awaiting PT. She was advised to follow up in 4 months and is hesitant about this, as her symptoms are debilitating and preventing safe return to work.    ENT- Dr Holt/ [facility]- last seen 12/2021 and was advised she took the wrong Mg (she took Magnesium oxide as listed in the chart), tried mouth guard without relief, and was advised this couldbe more neurological and to follow up with neurology.  She had dizziness prior to eye surgery but has had significant worsening after surgery. She went back after surgery ,told him she was dizzy, and he told her that was normal and that she lost a lot of blood in surgery.   She had 2 accidents on [employer].   The 1st was 8/31/2021- it was her 1st week back after her accident. She was picking up parts that were up high and she gets most dizzy and wobbly. She thinks she may have  wobbled and dropped the parts.   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 When she lifted it, she has to tilt it back. 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 They have boxes hanging down from the line. The She has never hit it before. She was due for physical last night. They asked if she had dizziness or light-headedness. She was failed because she has dizziness. 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 No associated BP, palp.   Most dizzy when she is bending, looking up, doing thing around her house. Work advised she see me with a note that gave restrictions.   She has been cutting atenolol in 1/2- she then felt CP or heart beating fast. She had her BP checked and had elevated BP 16 systolic. After 5-6 days, she started taking whole Atenolol and had improvement in BP. No change in dizziness with elevated or normal BP.   She had palpitations- cleared by cardiology. She thought this could contribute but has improved.   They thought dizziness was positional and treated for vertigo- positional treatment. No improvement.   She went to ENT- they did hearing test and lay down and sit up without concerns. They want to come back and do testing that takes 1 hour. They have seen a lot of people that struggle with migraines after Covid have dizziness.  10/2021 she had a terrible period- dizziness, nauseated, felt like she would not make it from the toilet to the bed, cold sweats, pale, felt like death for a full day then improved. She has always struggled with bad cramping. This was much worse. No contraception. Previously Nexplanon was not good. They could not get IUD in and has felt poorly on birth control. Patient's step daughter had mood issues on patch.   11/2021- She reported she was using mouthguard. Also took Mg and developed a rash under chin/neck. She estimated about 5-20 episodes of dizziness per day- last 20-30 seconds. Long  enough to disrupt what she is doing. It is spinning dizziness- feels like if you were drunk. She has also had the sensation of shakiness- things shaking in her vision- intermittent but not every day. She had a couple episodes with nausea with the dizziness but not bad. Sometimes has wondered if it is associated- is not every time. Told that it was the wrong Mg but ENT thought it should be a neurological issue  Seen by neurology- they gave Meclizine- did not help. She also doubled Topamax as directed- flt poorly but did not help dizziness. She has decreased again to 50 mg.   She kept a tally sheet to ensure she could answer the amount of episodes per day rather than estimating- is having 12-13 episodes per day.   Constantly seeing things out of the corner of both of her eyes. There are times that she feels like it is there and is surprised when it is not there- size ranges between a mouse and a person. Has every day at different points.  1/2022- She noted worsening dizziness and was having dizziness with her eyes closed.  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 She had follow up with Dr Bowman and was advised PRN follow up 10/28/2021. He did not seem to think her dizziness was related to her surgery. She has had lazy eye more- she reports she had mild lazy eye on the right since she was a child. Now the left eye is wider and now it made the right eye more lazy.     Patient was seen by Dr Bowman who recommended removal of mass. She was very nervous about surgery. Patient reports she was so shocked that she could not think of questions then had questions about incisions, recovery, and others.   Surgeon told her not a big deal and 1-2 hour surgery. She was in surgery for 3-4 hours. Had trouble waking up. They advised they could keep her but that she would do better at home. Still numb above her eye and could not raise her left eyebrow. She was told that it  should improve in 6 months.     Depression and anxiety-patient was on Lexapro and Wellbutrin.  She had a positive pregnancy test and was advised to stop Wellbutrin.  She was advised to talk with her OB/GYN about continuing Lexapro or the need to stop it.  She has appointment today for follow-up with them.  Lexapro- for a while, having bad dreams. The dreams improved but she still has them intermittent that she dreams constantly. She has had improvement in anxiety daily but if something happens and she has a stressful situation, she still has some panic and emotional issues. Still having counseling every week. She is working on things to do when she has these episodes. Reminding herself that it will be ok and why it will be ok and why it was a good thing. She mentioned increasing Lexapro.   She previously reported she had more depression- she gets down on herself about her year. Her therapist thought that she may need to increase Lexapro. Patient reported she was not depressed or sad all the time but had days that she was more down. No SI/HI.   She then had increased depression- she has had decreased interest in anything, exhaustion, not wanting to be around people, sleeping a lot, not keeping up with home chores or things she would normally do at home. It was not abnormal to be in her feelings or in a funk for a few days but she had severe symptoms. Not wanting to shower or take care of herself. She has had increased depression. Talked to therapist about her moods and they discussed possible changes in medication. She felt some better after talking but she still has depression. Therapist was out a week but follow up 10/19/2021. She thought that contributes to her depression- she is tired of trying to figure out what is contributing to her symptoms since Covid. She previously knew her body and when something was going on. She does not now and is bothersome.  Patient knows her father struggled with his mental health,  and she worried that she could have issues.   She has had bad anxiety since her grandfather , when she has anxiety, playing her grandfather's death over and over.   She moved a couple years ago and started worrying her house will burn down.   With anxiety in the past (prior to Covid 19 infection), she was seeing a therapist and reported she was doing much better. She would occasionally miss work with anxiety/depression but had FMLA. She usually did well and was able to work, do things socially.  Moods were difficult to  with feeling poorly all the time, inability to return to normal life and work, and uncertainty of prognosis. She felt the Lexapro helped-felt like it does not help with major situations but does helped with daily symptoms. Wellbutrin- not sure she adjusted to it and continues to feel tired in the day. She thought it may help daily when things are normal. Since she had increased stressors and uncertainties, she was not sure it is controlled. She was seeing her therapist weekly which helped- feels better upon leaving every week. If it starts to build up again through the week. It is almost time to see her again.   She felt medication and counseling 1-2 x weekly helped moods. She had episodes of being hopeless and being anxious. However, she was able to feel better if with a counseling session. Her animals also help moods- emotional support.   She got really down for a while. Still seeing therapist weekly. She could not do well with future and taking things day by day.  She was unsure if fatigue but was more grumpy. Still seeing therapist a little more spread out. She has migraines and has to reschedule.  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  Work was laid off for 2 weeks-she attempted to go back to work after layoff. When she started to feel better, something mentally started crashing down. As she started to feel better  "physically, she started \"freaking out\". She was having panic attacks.   Patient related the feeling to being on a plane to [place] years ago, she felt like she was back on the plane and ready to crash.   She worried about going back to work without FMLA and worries about losing her job. She reports her friend at work told her they are firing people \"left and right\". She thinks she will lose her job and does not have FMLA  (She has to acquire so much leave to qualify for FMLA).    She had anxiety doing anything, worst at night with laying down. She thought something could be wrong with her heart. Cardiology cleared her but she feels like something is wrong with her heart when she has episodes. She tries to hold it in.   She was doing well on Lexapro. She increased to 10 mg once daily. She did have significant fatigue upon starting medication, however, this improved. She continued with anxiety, however, she was able to calm herself down many times. She was working with mental health therapist twice weekly and was starting to improve. She felt she should try to go back to work slowly. There may be a layoff but she could go in to work but the line will not be moving, all the people were not there, and she could come and go- did not have to work full 12 hours. She thought this would be more helpful to try to go in and start trying to do her job without the pressure and interaction with others. She reported she would be able to leave if she had a panic attack. Despite still having anxiety, she thought this would improve some if she could get back to work and on a routine. She wanted to try.  She then had increased depression- she has had decreased interest in anything, exhaustion, not wanting to be around people, sleeping a lot, not keeping up with home chores or things she would normally do at home. It was not abnormal to be in her feelings or in a funk for a few days but she had severe symptoms. Not wanting to shower " or take care of herself. She has had increased depression. Talked to therapist about her moods and they discussed possible changes in medication. She felt some better after talking but she still has depression. Therapist was out a week but follow up 10/19/2021. She thought that contributes to her depression- she is tired of trying to figure out what is contributing to her symptoms since Covid. She previously knew her body and when something was going on. She does not now and is bothersome.    Cough- no complaints today.   11/17/2021- she had a tickle cough at night only that started about 1 month ago. No coughing in the day. No SOA or other symptoms, signs of illness.   She was advised can treat GERD and use Mucinex DM if needed. Otherwise consider albuterol as needed.     Covid 19 vaccine (Pfizer)- got initial vaccine 4/12/2021. She was tired that day then back to baseline.   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  Patient had significant symptoms following Covid 19 infection. She developed symptoms of Covid 19 12/24/2020 and tested positive 12/28/2020. She had hypoxia and tachycardia with ambulation, SOA, chest tightness, weakness, fatigue, brain fog, diarrhea, and nausea with resolution of severe rash. She went to ER with oxygen saturation of 88%. CTA chest with mild pneumonia and negative for PE. Inflammatory markers were not performed. They did not evaluate symptoms with ambulation and resting vital signs were ok.  While on video visit, patient had oxygen saturation of 97% and pulse in 90s then with relatively little ambulation/ exertion, she had pulse 127 and oxygen saturation down to 88%. In office, she had a normal resting pulse that increased to 124 with ambulation and oxygen saturation decreased from 95% to 90% with ambulation.     She had persistent tachycardia and hypoxia with walking short  distances (that is slowly improving), elevated BP that improved, cognitive impairment, fatigue, headaches, and feeling overall poorly. She was seen by the long term Covid infectious disease clinic, had additional labs, was referred to physical therapy, speech, and cognitive therapy, and was advised to contact her cardiologist for follow up with persistent tachycardia with ambulation. She underwent PTand speech/cognitive therapy. She called cardiology to [facility] then scheduled follow up evaluation of post-Covid tachycardia/ arrhythmia as directed. He changed her propranolol to atenolol for tachycardia and is awaiting Holter monitor and echocardiogram. She is tolerating medication well.     I referred for MRI brain and to neurology for severe headaches. She had severe, debilitating headaches. She had to go to ER but had no testing performed there. Excedrin Migraine, Fiorcet, Zofran, and narcotic pain medication have not helped headaches. I discussed beta blocker, Elavil, and Topamax at her last visit, however, she was concerned about possible AE with medication, as fatigue, arrhythmia, and cognitive dysfunction are already some of her biggest issues. She was unable to tolerate her headaches and felt the possible AE with medication were worth the risk for improvement in headaches. She tried Inderal LA 60 mg nightly without improvement and was started on Topamax by neurology. She has had significant AE with medication. I had her decrease to 25 mg at bedtime then she was able to increase again to BID and tolerated better.      I contacted infectious disease provider for recommendations for return to work vs work restrictions. She recommended either waiting to return to work until she had some improvement with therapy or may try light duty if available to see if she will tolerate this until she has improvement. 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    She returned to work and noted multiple cognitive issues. She reports they had her work 2 hours then sent her home with no work available. 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 She felt she needed more cognitive therapy to safely return to work. I had her remain off work until her follow up with long term Covid clinic. She has been advised to remain in contact with her employer to ensure her job is secure and leave is approved.     She felt ready to return to work and returned 6/2021. 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 She was seen by medical for CPE and was advised not to work until she was no longer dizzy, as she was a risk on the line, on the floor, and with forklift.     Hypoxia- improved. Not feeling as bad with SOA but still notices some.   lowest has been about 90s% and maybe 88% if pushing it. Improving gradually  She had hypoxia and tachycardia with ambulation, SOA, chest tightness, weakness, fatigue, brain fog, diarrhea, and nausea with resolution of severe rash.   She went to ER with oxygen saturation of 88%. CTA chest with mild pneumonia and negative for PE. Inflammatory markers were not performed. They did not evaluate symptoms with ambulation and resting vital signs were ok.     While on video visit, patient had oxygen saturation of 97% and pulse in 90s then with relatively little ambulation/ exertion, she had pulse 127 and oxygen saturation down to 88%. In office, she had a normal resting pulse that increased to 124 with ambulation and oxygen saturation decreased from 95% to 90% with ambulation.   Tachycardia/ elevated BP- had more controlled pulse on atenolol. Heart rate has improved. She tried to get off and worsens. She does not have to see cardiology any longer. PRN follow up.   Pulse with activity- at PT around 120, she stops her and if pushes self at home she gets up to 150. BP and pulse have improved some but continues with pulse that is elevated but oxygen has stayed above 90.   She has persistent tachycardia and hypoxia with walking short distances (that is slowly improving), elevated BP that is improving  She called cardiology to schedule testing ordered at appt prior to Covid 19 then scheduled follow up evaluation of post-Covid tachycardia/ arrhythmia as directed.   He changed her propranolol to atenolol for tachycardia and is awaiting Holter monitor and echocardiogram. She is tolerating medication well.   Cardiology- changed inderal to atenolol.   Echo- ok.   Fatigue- still struggling. Now having to fight to get up and do things but not as much. She has had improvement but not resolution. Worse with Wegovy.   Cognitive deficit/dysfunction- worse with verapamil. The 1st week she took it, she felt like she should not be driving. Now back to wheere she was prior to the new medication but has not improved. Different from previous brain fog. Things slip away quickly. She will  her phone to do something and will forget   has been one of the worst things too- cannot remember things. She will be getting ready to do something and forgets. Sometimes she will get it back and sometimes she will not. She reports she tries to think of something and totally forgets. Not as severe as  "right after COvid but continues to be a problem.   Still \"brain farts\", short term memory issues, headaches, oxygen levels- not sure if Topamax has amplified brain fog but has been prevalent.   This symptom was her most worrisome issue.  She was struggling with cognitive issues which cause increased anxiety.  Headaches- has had some headaches more recently. Not migraine headache but all over nagging headaches. Started about when starting Wegovy.    has had a few headaches since stopping Topamax. She was able to stop quickly. Did not have to taper as long. Not nearly as bad as prior to Topamax. No improvement in cognition.   She had right sided headache and headache behind her eyes, nausea with vomiting, eye was red and watering, right sided facial and eyelid drooping, eyebrow was not normal. She reported it woke her up from sleep.   She went to ER 3/11/2021 with drooping eyelid, bloodshot eye, watering. She was in tears due to severe pain but they did no testing. By the time they gave her a headache cocktail, her headache was already improving. She was ready to go home because she did not feel she was getting any help. They discharged her with Fiorcet and Zofran that have not helped her headaches.   She was given pain medication from previous foot surgery and never took it. She tried it and got sick but did not help her pain.  I referred for MRI brain and to neurology for severe, debilitating headaches.    I initially discussed beta blocker, Elavil, and Topamax, however, we were concerned about possible AE with medication, as fatigue, arrhythmia, and cognitive dysfunction were already some of her biggest issues. She was unable to tolerate her headaches and felt the possible AE with medication were worth the risk for improvement in headaches.   She tried Inderal LA 60 mg nightly without improvement.   Headaches occurred almost always when she was asleep. She took Tylenol and tried Excedrin Migraine. They were waking " "her up at night, which was abnormal for her.    She was seen by neurology 3/30/2021 started on Topamax 25 mg twice daily and Imitrex as needed for headaches by neurology. She has had significant symptoms with this. I discussed possibly cutting dose in 1/2 initially to 25 mg at bedtime then possible increase in dosage as tolerated.   I referred to sleep medicine as recommended by neurology.   Topamax helped headaches. She had a headache all day once but otherwise had no other bad headaches. She had paresthesias and worsening cognitive funciton, brain fog, ability to think clearly. I asked that she decrease to nightly dosing only for a couple weeks then could increase to twice daily. She then tolerated better.   She had improvement in headaches on Topamax. Decreased as directed then was able to increase to twice daily again and had improved headaches and no worsening neurological/cognitive symptoms. She stopped having severe, debilitating headaches and stopped waking up with headaches. Now following with neurology.   She then had to stop Topamax after developing kidney stones.   Snoring- does not think she is snoring as bad.   Has never been someone who snored but since Covid, she is snoring loud, sometimes waking her . Negative Sleep study- no EVERETT.   Hair loss- slowed down.   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 When she started to feel better, something mentally started crashing down. As she started to feel better physically, she started \"freaking out\". She was having panic attacks.   Patient related the feeling to being on a plane to [place] years ago, she felt like she was back on the plane and ready to crash.   She worried about going back to work without FMLA and worries about losing her job. She reports her friend at work told her they are firing people \"left and right\". She thinks she will lose her job and does not have FMLA  (She has " to acquire so much leave to qualify for [place]).    She had anxiety doing anything, worst at night with laying down. She thought something could be wrong with her heart. Cardiology cleared her but she feels like something is wrong with her heart when she has episodes. She tries to hold it in.   She was doing well on Lexapro. She increased to 10 mg once daily. She did have significant fatigue upon starting medication, however, this improved. She continued with anxiety, however, she was able to calm herself down many times. She was working with mental health therapist twice weekly and was starting to improve. She felt she should try to go back to work slowly. There may be a layoff but she could go in to work but the line will not be moving, all the people were not there, and she could come and go- did not have to work full 12 hours. She thought this would be more helpful to try to go in and start trying to do her job without the pressure and interaction with others. She reported she would be able to leave if she had a panic attack. Despite still having anxiety, she thought this would improve some if she could get back to work and on a routine. She wanted to try.  She then had increased depression- she has had decreased interest in anything, exhaustion, not wanting to be around people, sleeping a lot, not keeping up with home chores or things she would normally do at home. It was not abnormal to be in her feelings or in a funk for a few days but she had severe symptoms. Not wanting to shower or take care of herself. She has had increased depression. Talked to therapist about her moods and they discussed possible changes in medication. She felt some better after talking but she still has depression. Therapist was out a week but follow up 10/19/2021. She thought that contributes to her depression- she is tired of trying to figure out what is contributing to her symptoms since Covid. She previously knew her body and when  something was going on. She does not now and is bothersome.    Joint pain, back and neck pain- bottoms of both feet have been hurting really bad and aching. She has the most pain at the ball of the foot. Propping up helps some at night. Very tender.   she still has joint pain. Not as bad with back and neck pain. Her knees are more sore sometimes. Right foot with previous surgery hurts more.    She had improvement in the pain. She was still not back to baseline but improved.   Foot that had tendon release surgery hurt the worst- some improvement in other pain.   When she was a kid, she had a tubing accident but had hip pain after that. He hip hurt more after Covid. No recommendations from PT. States they did not have any additional treatment.      Therapy- still in mental health counseling.   Physical therapy- doing PT with [facility] as directed by [facility]  Once weekly once she went back to work. When she was off work, she went 2-3 x. Has been walking with her dog per PT but she was scared of triggering a migraine because she was scared that a bump or anything would cause migraine.   Speech and cognitive therapy- There was initially a mistake on their part. Speech therapy- told her none of their other patients went back to full time. Other people had gone back 4 hours per day and gradually increased to 6 then 8 hours. They were concerned they would not be able to go back 12 hours.    Mental health counseling- seeing her therapist and she has her using an joaquim for relaxation and mind sharpness- doing that once daily.    Return to work- 5/11/2021 -she tried to return to work again and had a panic attack.  She started having severe anxiety about doing her job, remembering, and being able to function at work.  She has never had anything this severe in the past.  She went to work but had to leave. She then returned to work again 6/2021 and had 2 accidents with the [employer]- 1 dropping parts and another hitting a cable.  "She was seen for physical by medical and was advised she could not work with dizziness due to the risks.   She previously went back to work and had to leave after 2 hours. She reports they asked her to tell someone something and she couldn't remember who that was. They thought this was someone she should know but she had no idea. Also, the process of going into work- line, temperature, getting mask- couldn't remember how to do it. She states it was like she was a new hire. Things that she thought should have come back as soon as she saw it did not come back. They had her sit at a [employer] table x 2 hours until they decided she should go home on no work available. They still have the heat on and with the heat and her mask, she felt claustrophobic. No other symptoms that she is aware.     Skin lesions- Dermatology removed a lesion right face and left axilla- both  Benign. The spot on her right forearm- it is a scar. They scheduled removal 1/10/2022.   She was concerned about a spot on her face that has been there- looked like a dark, Aging\" spot. However, she noticed it to be larger, more raise, and bothersome. She wanted to consider dermatology for right forearm scar that is raised to see about removal. Dermatology a long time ago but unsure who she saw.     Morbid obesity-Medication was stopped.  She was nervous- Wegovy- made her exhausted all day every day. She has never experienced that for this long. Started 4/20/2023 and has lost 6 lbs. Same time-  and mother have been on it. Mother gained weight and  did not lose a lb. She is not as active because she is exhausted. Even with dizziness, she tries to fight through it but cannot. Will take a 4 hour nap.    Mixed hyperlipidemia- not sure about numbers. Has not lost significantly.   Slipped up through holidays and gained 10 lbs. She has been back on track for 2 weeks. She got excited and thought a few cheats and has now gotten better. Started a " challenge- 5 days a week and doing a workout program to try to help with working out. Enjoying it and working on different parts of her body. Slowly but surely making lifestyle changes.   She is not plant based at this point. She only had fast food once and felt bad and was otherwise been eating healthy and working hard. She has exercised as tolerated. She lost about 15 lbs.   Watched [employer]- she tried no eating meat and changed to almond milk. She has not stopped cheese/dairy completely yet. Feels she can do it. She is not completely plant based but is her goal. 1/2022- worsening cholesterol- elevated T Chol, LDL, and TG.   Prediabetes-still having diarrhea- has never resolved. She has BM 5-10 x daily. Prior to metformin, she was going about 2-3 x daily. It is urgent and is sudden onset.   She has been taking Metformin.   Stopped Metformin and had significant improvement. She had not had solid BM in a long time. Now has improvement in BM. She felt terrible on medication.  Stopped having menses when she stopped Metformin. She was having normal menses while on Metformin. She had menses 3/13/2022.  1/2022- A1C up to 5.8%. She restarted Metformin - fertility specialist refilled. Metformin gives diarrhea and she has had more nausea since restarting. Stopped having menses when she stopped Metformin. She was having normal menses while on Metformin. She had menses 3/13/2022.  She was doing well- she worked on diet and exercise as tolerated. She had no beef, pork from 5/2022 and fried foods 2-3 x 5/2022. French fries 1-2 x since 5/2022. She decreased sweets but still eats.   10/2022- She noted trouble swallowing pills lately. She is not sure but has almost got sick with nightly medications.    Vitamin D deficiency- taking vitamin D 5000IU every other day and prenatal vitamin and Folic acid.   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  Elevated liver function  tests- patient withuot regular NSAIDS, Tylenol, or alcohol. Seen by GI- they ordered CT abd/pelvis. 1/2022- normal LFT.   Hyperuricemia- 1/2022- elevated uric acid. Prior to last labs- she ate a bunch of cocktail shrimp.     Her 1 great toenails removed for ingrown toenails. Cannot wear socks or shoes. Left toenail 1 month ago- still healing. He advised it looked good when he did right toenail.     Patient's specialists:  Bariatrics- Dr Noah Koroma- last appt 5/2012 in follow up of [facility].   Cardiology -Dr. Negrete-last appointment 5/2021 for palpitations, hypertension, and chest pain. Hold off on stress test since she was improving. Continue medication for a couple months then re-evaluate. Consider weaning atenolol but if she needs medication for BP, she may need to continue beta blocker and consider labetolol with desire to conceive. Follow up in 3 months. Appt scheduled 11/29/2021  She had been seen 11/2020 for atypical chest pain. Echo, Holter, and stress test ordered.    She was seen again after Covid 19 with tachycardia, SOA. They held stress test with post Covid symptoms. Started atenolol 25 mg daily for elevated blood pressure and tachycardia.    4/2021- Holter monitor normal.   3/2021- Echo normal.   GI- Dr Jensen- last appt 11/18/2021 for elevated LFT and hemochromatosis carrier state. Referred for CT abd/pelvis. Advised diet and consideration of lipid medication. Consider additional labs if CT is negative and enzymes continue to increase.    Infectious disease-MONICA Sawyer-last appointment 4/2021 in follow-up of COVID-19 infection.  Diagnosed with post viral fatigue syndrome and brain fog.  Advised physical therapy, cognitive therapy, and advised follow-up with cardiology for tachycardia.  Performed additional labs. 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  Follow-up PRN.  Neurology-Dr Hogan-last appointment 12/2021 for migraine  headache-headaches post Covid and dizziness.  Headaches improved on Topamax 25 mg twice daily- continue medication.  Also given Imitrex. Neagative sleep study. Consider vestibular migraines- trial increase Topamax, referred for vestibular therapy, and given Meclizine for dizziness. Follow-up in 4 months.  OB/GYN-Dr. Giles-last appointment 1/2020 for annual exam/well woman exam.  Pap completed.  Follow-up in 1 year.  Sleep medicine-Dr. Pantoja-last appt 5/10/2021 for evaluation. Home sleep study 6/2021- no EVERETT.   Speech therapy-started 4/27/2021 for speech and cognitive therapy. She was discharged the end of 5/2021. She has noticed improvement.   Ophthalmology-Dr. Heydi Vera-last appointment 4/2021 for abnormal MRI orbit.  Referred to Dr. Bowman.  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 Follow up PRN.    ENT- Rita Johnson, MONICA- last appt 10/26/2021 for dizziness. Epley maneuver did not work. Advised she get a mouth guard and take Magnesium oxide 400 mg QHS. Follow up if persistent symptoms.   Allergist- Dr Shelia Mc- went 4/19/2021- and they wanted to change Atenolol to Nadolol and gave Zyrtec, Nasacort, and albuterol as needed. Allergy testing- allergic to outdoor issues and not indoor things. He asked her worst symptoms. He was concerned about Topamax- she did cut back to nightly. She had worsening headaches (not severe but continued with headaches). States when she increased again, she feels back to baseline prior to starting Topamax.     The following portions of the patient's history were reviewed and updated as appropriate: allergies, current medications, past family history, past medical history, past social history, past surgical history and problem list.    Review of Systems   Constitutional:  Positive for fatigue (improving).   Eyes:  Positive for visual disturbance.   Respiratory:  Shortness of breath: improving.     Cardiovascular:  Positive for palpitations (improving).   Gastrointestinal:  Nausea: no constant nausea.   Genitourinary: Negative.    Musculoskeletal:  Positive for gait problem (falls). Arthralgias: improving. Myalgias: improving.  Skin: Negative.    Neurological:  Positive for dizziness. Negative for seizures and syncope. Facial asymmetry: improved. Weakness: improving. Light-headedness: improved. Headaches: improved with treatment.  Psychiatric/Behavioral:  Positive for confusion (improving), decreased concentration (improving), dysphoric mood (improving) and sleep disturbance (new- increased nightly snoring since Covid 19). Negative for self-injury and suicidal ideas. The patient is nervous/anxious (worse with concerns of surgery).         Brain fog improving     Objective   Vitals:    09/05/23 1116   BP: 116/70   Pulse: 86   Resp: 16   Temp: 98.9 °F (37.2 °C)   SpO2: 99%     Body mass index is 41.98 kg/m².    Physical Exam  Vitals and nursing note reviewed.   Constitutional:       General: She is not in acute distress.     Appearance: Normal appearance. She is well-developed.   HENT:      Head: Normocephalic and atraumatic.      Right Ear: External ear normal.      Left Ear: External ear normal.      Nose: Nose normal.   Eyes:      General: Lids are normal.      Conjunctiva/sclera: Conjunctivae normal.   Neck:      Vascular: No carotid bruit.   Cardiovascular:      Rate and Rhythm: Normal rate and regular rhythm.      Pulses: Normal pulses.      Heart sounds: Normal heart sounds. No murmur heard.    No friction rub. No gallop.   Pulmonary:      Effort: Pulmonary effort is normal. No respiratory distress.      Breath sounds: Normal breath sounds. No wheezing, rhonchi or rales.   Musculoskeletal:         General: No deformity.      Cervical back: Neck supple.   Skin:     General: Skin is warm and dry.   Neurological:      Mental Status: She is alert and oriented to person, place, and time.      Gait: Gait  normal.   Psychiatric:         Attention and Perception: Perception normal.         Mood and Affect: Affect normal. Mood is anxious.         Speech: Speech normal.         Behavior: Behavior normal.         Thought Content: Thought content normal.         Cognition and Memory: Cognition normal.         Judgment: Judgment normal.       Assessment & Plan   Diagnoses and all orders for this visit:    1. Pregnancy, unspecified gestational age (Primary)    2. Post-COVID-19 syndrome    3. Dizziness    4. Major depressive disorder, recurrent, in full remission    5. Anxiety    6. Adjustment disorder with anxious mood    7. Persistent fatigue after COVID-19    8. Persistent neurologic symptoms after COVID-19    9. Brain fog    10. Class 3 severe obesity with body mass index (BMI) of 40.0 to 44.9 in adult, unspecified obesity type, unspecified whether serious comorbidity present    11. Mixed hyperlipidemia  -     Comprehensive Metabolic Panel  -     Lipid Panel With LDL / HDL Ratio    12. Prediabetes  -     Comprehensive Metabolic Panel  -     Hemoglobin A1c  -     Vitamin B12 & Folate  -     Vitamin D,25-Hydroxy  -     TSH  -     T4, free  -     T3, Free  -     Uric Acid  -     Urinalysis With Culture If Indicated -    13. Vitamin D deficiency  -     Comprehensive Metabolic Panel  -     Vitamin D,25-Hydroxy    14. Hemochromatosis carrier  -     CBC & Differential  -     Iron and TIBC  -     Ferritin    15. Elevated liver enzymes  -     Comprehensive Metabolic Panel    16. Hyperuricemia  -     Uric Acid    17. Chronic migraine without aura without status migrainosus, not intractable    18. Neck pain    19. Myalgia    20. Multiple joint pain        Assessment and Plan  Patient had significant long term Covid 19 symptoms. She completed physical therapy, speech, and cognitive therapy, and continues follow up with mental health counselor weekly, cardiology, neurology, infectious disease/ long term Covid clinic, ophthalmology,  sleep medicine, and allergist as directed by them. She has restarted PT at the request of [facility]. She is trying to transition to a whole food plant based diet. Information given. She was seen by dizziness specialist, Dr Izquierdo, with intolerance to CCB. She was seen in follow up with no further treatment indicated. She is now following with [facility]. She has been stared on a couple medications without success and restarted PT. She will continue to see them in follow up.      Dizziness- Jocelyn has had dizziness with looking up, bending, and doing things. Unfortunately, she has had worsening and has dizziness with closing her eyes and newly a sensory overload with noises and light.  She had dizziness following Covid 19 infection, however, following an orbital mass removal, she had increasing dizziness symptoms. She has some lightheadedness and some spinning dizziness. She has undergone MRI brain, MRI brain and IAC and been evaluated by ENT and neurology. She was seen by ophthalmology and cleared for PRN follow up. ENT advised mouthguard and Magnesium oxide 400 mg QHS (then reported she should be taking a different Mg supplement). Without improvement, she advised follow up with neurology. Neurology advised trial of increasing Topamax (which she did not tolerate), trial Meclizine (no improvement in dizziness with Meclizine), and referred to PT for vestibular therapy. I agreed with vestibular therapy. She is also having vision issues in the peripheral vision. I advised PT through [facility] long term Covid PT program, as they have developed therapy program for vision and dizziness post-Covid. She was unable to get into this program. She was seen by Dr Izquierdo at [facility], who has specialty in dizziness. They started Verapamil  mg once daily to see if this helped with dizziness. She did not tolerate verapamil, and it did not help the dizziness. There was nothing further they felt they could do to  help. I referred to tertiary center for evaluation and treatment who advised Mg, possible migraine variant, and to complete PT with specific PT and follow up with them after completing PT. She then tried Gabapentin and could not tolerate AE but also had no improvement in dizziness with medication. She had no improvement with cyproheptadine. She completed PT. I also referred for warm water PT for strengthening, pain, and to see if this helps with sensory, balance issues. 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 She did have a couple accidents at work. I will have her off work until she is cleared by specialists. Follow up with me 12/2023 and she will see [facility] as directed by them. If she has resolution, he can clear her for return at that visit.    Depression with anxious mood- Patient has had some underlying anxiety in the past, however, she developed very severe, debilitating anxiety following Covid 19 infection and rehab, causing panic attacks.  She is seeing her mental health counselor.  She was resistant to medication in the past because she was always able to control her moods, calm down herself, and continue with working.  However, following her COVID-19 infection, she had more severe symptoms that were worsened with trying to return to work.  I started Lexapro 5 mg once daily. Once she was tolerating medication better, she increased to Lexapro 10 mg once daily. She had improved anxiety with therapy and medication. However, she had increased depressive symptoms, fatigue, decreased motivation. I had her continue Lexapro 10 mg once daily and added Wellbutrin  mg daily. She has not noted significant difference. However, she is now pregnant. I asked that she stop Wellbutrin. She can continue Lexapro only until she sees GYN to see if they are ok with continuing medication. She is weaning Atenolol and will monitor Bp and pulse. Consideration of  changing to Lobetalol with pregnancy but to be determined by OBGYN and cardiology.  To be seen ASAP if worsening moods or AE with medication.   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   Nocturnal cough- Patient to be seen if persistent symptoms. She can continue Pepcid 20 mg before her evening meal. She can try Mucinex DM twice daily as well. She may also need albuterol inhaler if needed. To be seen if worsening, new or changing symptoms or no resolution of cough.   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    Hypoxia following Covid 19 infection-  She had slow, gradual improvement and completed PT. She did have improvement in oxygenation.   Tachycardia/ elevated BP, post-Covid arrhythmia- Continue follow up with cardiology as directed by them. Echocardiogram and Holter monitor were ok. Patient to continue Atenolol 25 mg once daily and monitor BP, pulse. Blood pressure was a little low and she decreased Atenolol by 1/2 has her BP returns to normal. Allergist gave her Nadolol. Cardiology to determine any changes from atenolol to nadolol. She has had no increased SOA with beta blocker but is having increased fatigue and decreased motivation. We will consider consulting cardiology and decreasing medication.   Fatigue following Covid 19 infection- Patient to continue mental health therapy. She completed physical therapy, and cognitive therapy.   Cognitive deficit/dysfunction- Continue home exercises. Continue follow up with neurology as directed.   Depression and anxiety- Patient previously had some depression and anxiety that were managed with counseling and techniques. However, she had severe anxiety following Covid 19 then had improvement with medication but has had severe depression. Continue Mental health  counseling and medication.   Persistent headaches following Covid 19 infection- Patient to follow up with neurology and treatment as directed by them. Her allergist wanted her to stop Topamax due to fatigue and cognitive impairment, however, headaches are controlled, and she did not want to stop medication. She did try to stop medication, and headaches recurred. She then developed kidney stones and was able to wean off Topamax. To use Imitrex as needed for acute headaches, follow up with neurology if worsening headaches, new, or changing neurological symptoms.   Snoring- Since having Covid 19, she developed significant snoring, headaches that woke her up at night, tachycardia, hypoxia, and significant brain fog and fatigue. Negative sleep study. Snoring has not resolved but has improved gradually.  Generalized body aches, back aches, neck pain, and joint pain- Elevated uric acid but otherwise negative autoimmune testing 4/2021. PT and home exercises as needed. I also referred to allergist, with symptoms similar to MIS-A. No diagnosis of MIS-A with allergist. Patient to decrease purine rich foods. I will refer for warm water PT.     In follow up of chronic, non-Covid related symptoms:  Patient will have fasting labs. Call if no results in 1 week. Stability of conditions, plan, follow up, and further recommendations pending labs. Follow up in 2-3 months or sooner if needed.     Pregnancy- She has positive pregnancy test. She has upcoming appt with OBGYN for recommendations.  Mixed hyperlipidemia- Patient to continue to be compliant with diet/ exercise. She will restart strict diet and exercise. Await labs for further recommendations.    Prediabetes- A1C increased off Metformin and she stopped having menses. She restarted Metformin and menses was more regular again but she had severe GI AE. She did not tolerate GLP1 and is now pregnant.    Vitamin D deficiency- Continue vitamin D 5000IU every other day and Prenatal  vitamin. Dosing recommendations pending labs and OBGYN recommendations.   Hemochromatosis carrier- I will continue to monitor and make further recommendations.  Elevated liver function tests- Most recent LFT normal. Avoid NSAIDS and alcohol and limit Tylenol to < 2 grams daily. Follow up with GI as directed.   Hyperuricemia- Decrease purine rich foods. I will monitor and make recommendations.   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 She will need to see dermatology. I referred.     Patient continues to see specialists as noted above.  I have reviewed available records, including consult notes, labs, and imaging/testing.  Patient to continue follow-up with specialists as directed by them.    I spent 30 minutes caring for Jocelyn Merlos on this date of service. This time includes time spent by me in the following activities as necessary: preparing for the visit, reviewing tests, specialists records and previous visits, obtaining and/or reviewing a separately obtained history, performing a medically appropriate exam and/or evaluation, counseling and educating the patient, family, caregiver, referring and/or communicating with other healthcare professionals, documenting information in the medical record, independently interpreting results and communicating that information with the patient, family, caregiver, and developing a medically appropriate treatment plan with consideration of other conditions, medications, and treatments.     [facility] Claim# [id number]

## 2023-09-08 ENCOUNTER — PATIENT ROUNDING (BHMG ONLY) (OUTPATIENT)
Dept: FAMILY MEDICINE CLINIC | Facility: CLINIC | Age: 33
End: 2023-09-08
Payer: COMMERCIAL

## 2023-09-08 NOTE — PROGRESS NOTES
"My name is Jaun Oleary     I am the Practice Manager with   [facility] [address] [address] [phone]      I am messaging to ask you about our practice and your recent visit.     Tell me about your visit with us. What things went well?         We're always looking for ways to make our patients' experiences even better. Do you have recommendations on ways we may improve?       Overall were you satisfied with your first visit to our practice?        Is there anything else I can do for you?     Also, please note that you may receive a survey from \"[employer]\" please take time to fill that out, as it provides important feedback for our office.       Thank you, and have a great day.   "

## 2023-09-11 ENCOUNTER — OFFICE VISIT (OUTPATIENT)
Dept: CARDIOLOGY | Facility: CLINIC | Age: 33
End: 2023-09-11
Payer: COMMERCIAL

## 2023-09-11 VITALS
HEIGHT: 68 IN | WEIGHT: 276.5 LBS | HEART RATE: 86 BPM | BODY MASS INDEX: 41.91 KG/M2 | DIASTOLIC BLOOD PRESSURE: 80 MMHG | SYSTOLIC BLOOD PRESSURE: 124 MMHG

## 2023-09-11 DIAGNOSIS — O10.919 CHRONIC HYPERTENSION DURING PREGNANCY, ANTEPARTUM: ICD-10-CM

## 2023-09-11 DIAGNOSIS — R00.2 PALPITATIONS: Primary | ICD-10-CM

## 2023-09-11 RX ORDER — BUPROPION HYDROCHLORIDE 150 MG/1
150 TABLET, EXTENDED RELEASE ORAL DAILY
COMMUNITY

## 2023-09-11 RX ORDER — LABETALOL 100 MG/1
100 TABLET, FILM COATED ORAL 2 TIMES DAILY
Qty: 60 TABLET | Refills: 6
Start: 2023-09-11

## 2023-09-11 NOTE — PROGRESS NOTES
[facility] Follow Up Office Note     Encounter Date:23  Patient:Jocelyn Merlos  :1990  MRN:[id number]      Chief Complaint: Chest pain and tachycardia  Chief Complaint   Patient presents with    Follow-up     Palpitations     History of Presenting Illness:      Ms. Merlos is a 33 y.o. with past medical history notable for carrier for essential hypertension, palpitations, and hemochromatosis who presents her office for scheduled follow-up appointment.  Overall patient is doing well.  She was last seen about 2 years ago at that time had no major issues were going to see her back on an as-needed basis.  She recently got pregnant which she was planning on doing she is about 6 weeks into her pregnancy.  She did see her OB/GYN who recommended changing from atenolol to labetalol which I noted in my last note.  This would be a reasonable change.  Clinically patient is doing okay      Review of Systems:  Review of Systems   Constitutional: Positive for malaise/fatigue.   HENT: Negative.     Eyes: Negative.    Cardiovascular:  Positive for palpitations.   Respiratory: Negative.     Endocrine: Negative.    Hematologic/Lymphatic: Negative.    Skin: Negative.    Musculoskeletal: Negative.    Gastrointestinal: Negative.    Genitourinary: Negative.    Neurological: Negative.    Psychiatric/Behavioral: Negative.     Allergic/Immunologic: Negative.      Current Outpatient Medications on File Prior to Visit   Medication Sig Dispense Refill    buPROPion SR (WELLBUTRIN SR) 150 MG 12 hr tablet Take 1 tablet by mouth Daily.      escitalopram (LEXAPRO) 10 MG tablet TAKE 1 TABLET BY MOUTH DAILY 90 tablet 0    Omega-3 Fatty Acids (fish oil) 1000 MG capsule capsule Take  by mouth Daily With Breakfast.      prenatal vitamin (prenatal, CLASSIC, vitamin) tablet Take  by mouth Daily.      vitamin D (ERGOCALCIFEROL) 1.25 MG (94405 UT) capsule capsule Take 1,000 Units by mouth.      [DISCONTINUED] atenolol (TENORMIN) 25 MG  tablet Take 1 tablet by mouth Daily. 90 tablet 0    [DISCONTINUED] labetalol (NORMODYNE) 100 MG tablet Take 1 tablet by mouth 2 (Two) Times a Day. 60 tablet 6     No current facility-administered medications on file prior to visit.        No Known Allergies      Past Medical History:   Diagnosis Date    Abnormal Pap smear of cervix     Adjustment disorder with mixed anxiety and depressed mood 2016    COVID-19 2020    Hypertension     Palpitations        Past Surgical History:   Procedure Laterality Date    BREAST AUGMENTATION      CERVICAL BIOPSY  W/ LOOP ELECTRODE EXCISION      FOOT SURGERY Right     plantar fascitis tendon release    LAPAROSCOPIC GASTRIC BANDING      LEEP      OTHER SURGICAL HISTORY      removal of eye tumor    TONSILLECTOMY         Social History     Socioeconomic History    Marital status:    Tobacco Use    Smoking status: Former     Packs/day: 0.25     Years: 0.50     Pack years: 0.13     Types: Cigarettes     Quit date: 2015     Years since quittin.4    Smokeless tobacco: Never   Vaping Use    Vaping Use: Never used   Substance and Sexual Activity    Alcohol use: Yes     Alcohol/week: 4.0 - 5.0 standard drinks     Types: 4 - 5 Standard drinks or equivalent per week     Comment: Socially./caffeine use     Drug use: No    Sexual activity: Yes     Partners: Male       Family History   Problem Relation Age of Onset    Skin cancer Mother     Depression Mother     Anxiety disorder Mother     Stroke Mother     Aneurysm Mother     Alcohol abuse Mother     Migraines Mother     Hemochromatosis Mother     Stroke Maternal Grandmother        The following portions of the patient's history were reviewed and updated as appropriate: allergies, current medications, past family history, past medical history, past social history, past surgical history and problem list.       Objective:       Vitals:    23 0941   BP: 124/80   BP Location: Left arm   Patient Position: Sitting  "  Pulse: 86   Weight: 125 kg (276 lb 8 oz)   Height: 172.7 cm (68\")     Body mass index is 42.04 kg/m².     Physical Exam:  Constitutional: Well appearing, Well-developed, No acute distress   HENT: Oropharynx clear and membrane moist  Eyes: Normal conjunctiva, no sclera icterus.  Neck: Supple, no carotid bruit bilaterally.  Cardiovascular: Regular rate and rhythm, No Murmur, No bilateral lower extremity edema.  Pulmonary: Normal respiratory effort, normal lung sounds, no wheezing.  Neurological: Alert and orient x 3.   Skin: Warm, dry, no ecchymosis, no rash.  Psych: Appropriate mood and affect. Normal judgment and insight.       Lab Results   Component Value Date    GLUCOSE 77 05/31/2023    BUN 11 05/31/2023    CREATININE 0.76 05/31/2023    EGFRIFNONA 80 01/28/2022    EGFRIFAFRI 92 01/28/2022    BCR 14 05/31/2023    K 4.8 05/31/2023    CO2 21 05/31/2023    CALCIUM 9.7 05/31/2023    PROTENTOTREF 7.2 05/31/2023    ALBUMIN 4.7 05/31/2023    LABIL2 1.9 05/31/2023    AST 18 05/31/2023    ALT 23 05/31/2023       Lab Results   Component Value Date    WBC 9.6 05/31/2023    HGB 15.2 05/31/2023    HCT 45.3 05/31/2023    MCV 92 05/31/2023     05/31/2023       Lab Results   Component Value Date    CKTOTAL 45 05/31/2023    TROPONINI <0.012 01/14/2021       Lab Results   Component Value Date    CHLPL 203 (H) 05/31/2023    CHLPL 207 (H) 01/27/2023    CHLPL 169 10/26/2022     Lab Results   Component Value Date    TRIG 130 05/31/2023    TRIG 201 (H) 01/27/2023    TRIG 146 10/26/2022     Lab Results   Component Value Date    HDL 40 05/31/2023    HDL 41 01/27/2023    HDL 37 (L) 10/26/2022     Lab Results   Component Value Date     (H) 05/31/2023     (H) 01/27/2023     (H) 10/26/2022       Lab Results   Component Value Date    TSH 2.690 01/27/2023         ECG 12 Lead    Date/Time: 9/11/2023 10:17 AM  Performed by: Immanuel Negrete MD  Authorized by: Immanuel Negrete MD   Comparison: compared with " previous ECG from 12/30/2021  Similar to previous ECG  Rhythm: sinus rhythm     Holter monitor 4/7/2021:  A normal monitor study.  Underlying heart rhythm was sinus rhythm with an average heart rate of 85 bpm and a range of 62 bpm up to 137 bpm.  No significant arrhythmias noted during study.  No diary submitted.    Echocardiogram 3/29/2021:  The quality of the study is limited due to breast implants.  Calculated left ventricular EF = 60%  Normal left ventricular cavity size and wall thickness noted.  Normal right ventricular cavity size and systolic function noted.  The left atrial cavity is borderline dilated.  Calculated right ventricular systolic pressure from tricuspid regurgitation is 20 mmHg.  There is no evidence of pericardial effusion.        Assessment:          Diagnosis Plan   1. Palpitations  ECG 12 Lead      2. Chronic hypertension during pregnancy, antepartum  labetalol (NORMODYNE) 100 MG tablet    ECG 12 Lead             Plan:       Ms. Merlos is a 33 y.o.  with past medical history notable for essential hypertension, palpitations, and carrier for hemochromatosis who presents to our office for scheduled follow-up.  Overall patient is doing well clinically.  No changes are needed I agree with her OB/GYN's approach switching her over to labetalol that be perfectly fine from a cardiac perspective.  We can monitor how she does with this.  Further titration as needed.  Plan on seeing her back in 6 months shortly after pregnancy we could always either continue labetalol or switch back to atenolol pending clinical response    Palpitations:  Normal Holter monitor 4/2021  Normal echocardiogram 3/2021  Thyroid function 1/2023 demonstrates normal TSH  BMP 5/2023 demonstrates normal sodium and potassium  CBC 5/2023 demonstrates normal hemoglobin hematocrit  Continue atenolol    Hypertension:  Continue atenolol      Follow-up:  6 months      Thank you for allowing me to participate in the care of Jocelyn Merlos.  Feel free to contact me directly with any further questions or concerns.    Immanuel Negrete MD  [facility]  09/11/23  10:18 EDT

## 2023-09-12 ENCOUNTER — OFFICE VISIT (OUTPATIENT)
Dept: OBSTETRICS AND GYNECOLOGY | Facility: CLINIC | Age: 33
End: 2023-09-12
Payer: COMMERCIAL

## 2023-09-12 VITALS
DIASTOLIC BLOOD PRESSURE: 76 MMHG | HEIGHT: 68 IN | BODY MASS INDEX: 42.04 KG/M2 | SYSTOLIC BLOOD PRESSURE: 124 MMHG | WEIGHT: 277.4 LBS

## 2023-09-12 DIAGNOSIS — N92.6 MISSED MENSES: Primary | ICD-10-CM

## 2023-09-12 DIAGNOSIS — O26.20 RECURRENT PREGNANCY LOSS WITH CURRENT PREGNANCY: ICD-10-CM

## 2023-09-12 RX ORDER — PROGESTERONE 100 MG/1
100 CAPSULE ORAL NIGHTLY
Qty: 30 CAPSULE | Refills: 11 | Status: SHIPPED | OUTPATIENT
Start: 2023-09-12

## 2023-09-12 NOTE — PROGRESS NOTES
"Subjective     Chief Complaint   Patient presents with    FU US REPEAT QUANT       Jocelyn Merlos is a 33 y.o.  whose LMP is Patient's last menstrual period was 07/15/2023.. This patient is new to me - .  She presents with repeat US and quant level    HPI    Seen for confirmation last week. US showed GS w YS but no fetal cardiac activity.  Quant HCG drawn- 11,439.  Repeat quant drawn 2 days later- 16,778. Misread result- instructed pt to follow-up this week for repeat quant and US.  Denies VB or cramping.  Saw cardiology yesterday; hx of palpitations and CHTN.  Switched from Atenolol to Labetalol last week; agrees to continue taking Labetalol 100 mg BID during pregnancy.      The following portions of the patient's history were reviewed and updated as appropriate:vital signs, allergies, current medications, past medical history, past social history, past surgical history, and problem list      Review of Systems     Review of Systems   Genitourinary:  Positive for menstrual problem. Negative for pelvic pain and vaginal bleeding.     Objective      /76   Ht 172.7 cm (68\")   Wt 126 kg (277 lb 6.4 oz)   LMP 07/15/2023   BMI 42.18 kg/m²     Physical Exam    Physical Exam  Vitals reviewed.   Constitutional:       General: She is awake. She is not in acute distress.     Appearance: She is not ill-appearing.   Eyes:      Conjunctiva/sclera: Conjunctivae normal.   Pulmonary:      Effort: Pulmonary effort is normal. No respiratory distress.   Musculoskeletal:      Cervical back: Neck supple. No rigidity.   Skin:     General: Skin is warm and dry.      Capillary Refill: Capillary refill takes less than 2 seconds.   Neurological:      Mental Status: She is alert and oriented to person, place, and time.   Psychiatric:         Mood and Affect: Mood and affect normal.         Behavior: Behavior normal.       Lab Review   Labs: No data reviewed     Imaging   Ultrasound - Pelvic Vaginal    Assessment  Diagnoses and " all orders for this visit:    1. Missed menses (Primary)  -     Cancel: HCG, B-subunit, Quantitative  -     Progesterone (PROMETRIUM) 100 MG capsule; Take 1 capsule by mouth Every Night.  Dispense: 30 capsule; Refill: 11    2. Recurrent pregnancy loss with current pregnancy  -     Progesterone (PROMETRIUM) 100 MG capsule; Take 1 capsule by mouth Every Night.  Dispense: 30 capsule; Refill: 11      US IMP: single, viable 1st trimester IUP is visualized (GS w YS and embryo with cardiac activity).  No FF nor masses are visualized in adnexa nor cul de sac.       Discussed US findings with patient. No need to repeat quant HCG today.  Start Prometrium d/t RPL and continue Labetalol 100 mg BID with baby asa for CHTN.  Will get baseline PIH labs with new OB labs.  Per pt, she has a hx of elevated liver enzymes (seen by GI).      Return in about 4 weeks (around 10/10/2023) for New OB exam/labs, PAP; wants to see Dr. Beck.    I spent 15 minutes caring for Jocelyn on this date of service. This time includes time spent by me in the following activities: preparing for the visit, reviewing tests, obtaining and/or reviewing a separately obtained history, performing a medically appropriate examination and/or evaluation, counseling and educating the patient/family/caregiver, ordering medications, tests, or procedures, and documenting information in the medical record     Veronique Bliss, MONICA  9/17/2023  18:58 EDT

## 2023-09-14 DIAGNOSIS — F43.22 ADJUSTMENT DISORDER WITH ANXIOUS MOOD: ICD-10-CM

## 2023-09-14 DIAGNOSIS — F41.9 ANXIETY: ICD-10-CM

## 2023-09-14 DIAGNOSIS — U09.9 POST-COVID-19 SYNDROME: ICD-10-CM

## 2023-09-14 DIAGNOSIS — F33.42 MAJOR DEPRESSIVE DISORDER, RECURRENT, IN FULL REMISSION: ICD-10-CM

## 2023-09-15 RX ORDER — ESCITALOPRAM OXALATE 10 MG/1
10 TABLET ORAL DAILY
Qty: 90 TABLET | Refills: 0 | Status: SHIPPED | OUTPATIENT
Start: 2023-09-15

## 2023-09-18 ENCOUNTER — HOSPITAL ENCOUNTER (EMERGENCY)
Facility: HOSPITAL | Age: 33
Discharge: LEFT BEFORE DISCHARGE | End: 2023-09-18
Payer: COMMERCIAL

## 2023-09-18 VITALS
HEIGHT: 68 IN | BODY MASS INDEX: 40.92 KG/M2 | HEART RATE: 95 BPM | TEMPERATURE: 98.2 F | RESPIRATION RATE: 18 BRPM | WEIGHT: 270 LBS | OXYGEN SATURATION: 98 %

## 2023-09-18 PROCEDURE — 99211 OFF/OP EST MAY X REQ PHY/QHP: CPT

## 2023-09-25 ENCOUNTER — PATIENT MESSAGE (OUTPATIENT)
Dept: OBSTETRICS AND GYNECOLOGY | Facility: CLINIC | Age: 33
End: 2023-09-25
Payer: COMMERCIAL

## 2023-09-27 ENCOUNTER — LAB (OUTPATIENT)
Dept: OBSTETRICS AND GYNECOLOGY | Facility: CLINIC | Age: 33
End: 2023-09-27
Payer: COMMERCIAL

## 2023-09-27 DIAGNOSIS — O26.20 RECURRENT PREGNANCY LOSS WITH CURRENT PREGNANCY: Primary | ICD-10-CM

## 2023-09-27 DIAGNOSIS — Z36.9 ENCOUNTER FOR ANTENATAL SCREENING, UNSPECIFIED: ICD-10-CM

## 2023-09-27 LAB — HCG INTACT+B SERPL-ACNC: NORMAL MIU/ML

## 2023-09-28 LAB
ABO GROUP BLD: NORMAL
BLD GP AB SCN SERPL QL: NEGATIVE
RH BLD: POSITIVE

## 2023-10-05 ENCOUNTER — ROUTINE PRENATAL (OUTPATIENT)
Dept: OBSTETRICS AND GYNECOLOGY | Facility: CLINIC | Age: 33
End: 2023-10-05
Payer: COMMERCIAL

## 2023-10-05 VITALS — BODY MASS INDEX: 41.69 KG/M2 | WEIGHT: 274.2 LBS | SYSTOLIC BLOOD PRESSURE: 106 MMHG | DIASTOLIC BLOOD PRESSURE: 70 MMHG

## 2023-10-05 DIAGNOSIS — Z36.9 ENCOUNTER FOR ANTENATAL SCREENING, UNSPECIFIED: ICD-10-CM

## 2023-10-05 DIAGNOSIS — O20.0 THREATENED ABORTION: Primary | ICD-10-CM

## 2023-10-05 RX ORDER — LANOLIN ALCOHOL/MO/W.PET/CERES
400 CREAM (GRAM) TOPICAL DAILY
COMMUNITY

## 2023-10-05 NOTE — PROGRESS NOTES
Patient Care Team:  iMrta Bergeron PA as PCP - General (Family Medicine)  eHydi Vera MD as Consulting Physician (Ophthalmology)  Devang Izquierdo MD as Consulting Physician (Otolaryngology)  Rubio Wharton MD (Reproductive Endocrinology and Infertility)  Jayda Luna as Counselor (Psychiatry)  Veronique Bliss APRN as Nurse Practitioner (Obstetrics and Gynecology)    Chief complaint threatened AB       HPI: 33-year-old  4 para 0-0-3-0 at 9 weeks and 3 days here to follow-up threatened AB.  Had bright red vaginal bleeding and thought she miscarried but had ultrasound last week which showed cardiac activity and a subchorionic hemorrhage.  Bleeding has almost stopped and sometimes dark in color and sometimes red.  Some cramping associated with it.  Not as heavy as it was previously.  She is Rh+.  Presents today for ultrasound and labs.    ROS:   Constitutional: Negative for appetite change, fever and unexpected weight change.   Respiratory: Negative for cough and shortness of breath.    Cardiovascular: Negative for chest pain and palpitations.   Gastrointestinal: Negative for abdominal pain, constipation, diarrhea, nausea and vomiting.   Endocrine: Negative.    Genitourinary: Negative for dyspareunia, pelvic pain and vaginal discharge.   Skin: Negative.    Neurological: Negative for dizziness and headaches.   Hematological: Negative.    Psychiatric/Behavioral: Negative for dysphoric mood and sleep disturbance. The patient is not nervous/anxious.        PMH:   Past Medical History:   Diagnosis Date    Abnormal Pap smear of cervix     Adjustment disorder with mixed anxiety and depressed mood 2016    COVID-19 2020    Hypertension     Palpitations          PSH:   Past Surgical History:   Procedure Laterality Date    BREAST AUGMENTATION      CERVICAL BIOPSY  W/ LOOP ELECTRODE EXCISION      FOOT SURGERY Right     plantar fascitis tendon release    LAPAROSCOPIC GASTRIC BANDING      LEEP       OTHER SURGICAL HISTORY      removal of eye tumor    TONSILLECTOMY         SoHx:   Social History     Socioeconomic History    Marital status:    Tobacco Use    Smoking status: Former     Packs/day: 0.25     Years: 0.50     Pack years: 0.13     Types: Cigarettes     Quit date: 2015     Years since quittin.5    Smokeless tobacco: Never   Vaping Use    Vaping Use: Never used   Substance and Sexual Activity    Alcohol use: Yes     Alcohol/week: 4.0 - 5.0 standard drinks     Types: 4 - 5 Standard drinks or equivalent per week     Comment: Socially./caffeine use     Drug use: No    Sexual activity: Yes     Partners: Male       FHx:   Family History   Problem Relation Age of Onset    Skin cancer Mother     Depression Mother     Anxiety disorder Mother     Stroke Mother     Aneurysm Mother     Alcohol abuse Mother     Migraines Mother     Hemochromatosis Mother     Stroke Maternal Grandmother        PGyn Hx: Deferred    POBHx: 3 prior SABs    Allergies: Patient has no known allergies.    Medications:   Current Outpatient Medications on File Prior to Visit   Medication Sig Dispense Refill    buPROPion SR (WELLBUTRIN SR) 150 MG 12 hr tablet Take 1 tablet by mouth Daily.      escitalopram (LEXAPRO) 10 MG tablet TAKE 1 TABLET BY MOUTH DAILY 90 tablet 0    folic acid (FOLVITE) 400 MCG tablet Take 1 tablet by mouth Daily.      labetalol (NORMODYNE) 100 MG tablet Take 1 tablet by mouth 2 (Two) Times a Day. 60 tablet 6    Omega-3 Fatty Acids (fish oil) 1000 MG capsule capsule Take  by mouth Daily With Breakfast.      prenatal vitamin (prenatal, CLASSIC, vitamin) tablet Take  by mouth Daily.      Progesterone (PROMETRIUM) 100 MG capsule Take 1 capsule by mouth Every Night. 30 capsule 11    vitamin D (ERGOCALCIFEROL) 1.25 MG (44199 UT) capsule capsule Take 1,000 Units by mouth.       No current facility-administered medications on file prior to visit.             Vital Signs  BP: (106)/(70) 106/70    Physical  Exam:  Constitutional: She is oriented to person, place, and time. She appears well-developed and well-nourished.   HENT:   Head: Normocephalic and atraumatic.   Cardiovascular: Normal rate and regular rhythm.    Pulmonary/Chest: Effort normal. No respiratory distress.   Abdominal: Soft. Bowel sounds are normal. There is no tenderness. There is no rebound and no guarding.   Musculoskeletal: Normal range of motion.   Neurological: She is alert and oriented to person, place, and time.   Skin: Skin is warm and dry.   Psychiatric: She has a normal mood and affect. Her behavior is normal. Judgment and thought content normal.   Nursing note and vitals reviewed.  Debilities/Disabilities Identified: None  Emotional Behavior: Appropriate    Labs: Ultrasound shows IUP at 9 weeks and 3 days with cardiac activity.  Chorionic hemorrhage is smaller.  No free fluid in the adnexa or cul-de-sac.  Fetal movement is seen.      Assessment/Plan: IUP at 9 weeks and 3 days    New OB labs today.  Desires noninvasive prenatal testing at next visit.  Repeat ultrasound in 2 weeks at 11 weeks for cardiac activity.    Henrique Giles MD  10/05/23  15:34 EDT

## 2023-10-06 LAB
ABO GROUP BLD: NORMAL
BASOPHILS # BLD AUTO: 0 X10E3/UL (ref 0–0.2)
BASOPHILS NFR BLD AUTO: 0 %
BLD GP AB SCN SERPL QL: NEGATIVE
EOSINOPHIL # BLD AUTO: 0.1 X10E3/UL (ref 0–0.4)
EOSINOPHIL NFR BLD AUTO: 1 %
ERYTHROCYTE [DISTWIDTH] IN BLOOD BY AUTOMATED COUNT: 12.3 % (ref 11.7–15.4)
HBA1C MFR BLD: 5.9 % (ref 4.8–5.6)
HBV SURFACE AG SERPL QL IA: NEGATIVE
HCT VFR BLD AUTO: 42.4 % (ref 34–46.6)
HCV IGG SERPL QL IA: NON REACTIVE
HGB A MFR BLD ELPH: 98.1 % (ref 96.4–98.8)
HGB A2 MFR BLD ELPH: 1.9 % (ref 1.8–3.2)
HGB BLD-MCNC: 14.1 G/DL (ref 11.1–15.9)
HGB F MFR BLD ELPH: 0 % (ref 0–2)
HGB FRACT BLD-IMP: NORMAL
HGB S MFR BLD ELPH: 0 %
HIV 1+2 AB+HIV1 P24 AG SERPL QL IA: NON REACTIVE
IMM GRANULOCYTES # BLD AUTO: 0 X10E3/UL (ref 0–0.1)
IMM GRANULOCYTES NFR BLD AUTO: 0 %
LYMPHOCYTES # BLD AUTO: 2.5 X10E3/UL (ref 0.7–3.1)
LYMPHOCYTES NFR BLD AUTO: 25 %
MCH RBC QN AUTO: 30.8 PG (ref 26.6–33)
MCHC RBC AUTO-ENTMCNC: 33.3 G/DL (ref 31.5–35.7)
MCV RBC AUTO: 93 FL (ref 79–97)
MONOCYTES # BLD AUTO: 0.7 X10E3/UL (ref 0.1–0.9)
MONOCYTES NFR BLD AUTO: 7 %
NEUTROPHILS # BLD AUTO: 6.6 X10E3/UL (ref 1.4–7)
NEUTROPHILS NFR BLD AUTO: 67 %
PLATELET # BLD AUTO: 380 X10E3/UL (ref 150–450)
RBC # BLD AUTO: 4.58 X10E6/UL (ref 3.77–5.28)
RH BLD: POSITIVE
RPR SER QL: NON REACTIVE
RUBV IGG SERPL IA-ACNC: 0.97 INDEX
VZV IGG SER IA-ACNC: <135 INDEX
WBC # BLD AUTO: 10 X10E3/UL (ref 3.4–10.8)

## 2023-10-08 LAB
AMPHETAMINES UR QL SCN: NEGATIVE NG/ML
BACTERIA UR CULT: NORMAL
BACTERIA UR CULT: NORMAL
BARBITURATES UR QL SCN: NEGATIVE NG/ML
BENZODIAZ UR QL SCN: NEGATIVE NG/ML
BUPRENORPHINE UR QL: NEGATIVE NG/ML
BZE UR QL SCN: NEGATIVE NG/ML
C TRACH RRNA SPEC QL NAA+PROBE: NEGATIVE
CANNABINOIDS UR QL SCN: NEGATIVE NG/ML
CREAT UR-MCNC: 67.9 MG/DL (ref 20–300)
FENTANYL UR-MCNC: NEGATIVE PG/ML
LABORATORY COMMENT REPORT: NORMAL
MEPERIDINE UR QL: NEGATIVE NG/ML
METHADONE UR QL SCN: NEGATIVE NG/ML
N GONORRHOEA RRNA SPEC QL NAA+PROBE: NEGATIVE
OPIATES UR QL SCN: NEGATIVE NG/ML
OXYCODONE+OXYMORPHONE UR QL SCN: NEGATIVE NG/ML
PCP UR QL: NEGATIVE NG/ML
PH UR: 6.9 [PH] (ref 4.5–8.9)
PROPOXYPH UR QL SCN: NEGATIVE NG/ML
T VAGINALIS RRNA SPEC QL NAA+PROBE: NEGATIVE
TRAMADOL UR QL SCN: NEGATIVE NG/ML

## 2023-10-09 PROBLEM — O09.899 RUBELLA NON-IMMUNE STATUS, ANTEPARTUM: Status: ACTIVE | Noted: 2023-10-09

## 2023-10-09 PROBLEM — Z28.39 MATERNAL VARICELLA, NON-IMMUNE: Status: ACTIVE | Noted: 2023-10-09

## 2023-10-09 PROBLEM — Z28.39 RUBELLA NON-IMMUNE STATUS, ANTEPARTUM: Status: ACTIVE | Noted: 2023-10-09

## 2023-10-09 PROBLEM — O09.899 MATERNAL VARICELLA, NON-IMMUNE: Status: ACTIVE | Noted: 2023-10-09

## 2023-10-24 ENCOUNTER — INITIAL PRENATAL (OUTPATIENT)
Dept: OBSTETRICS AND GYNECOLOGY | Facility: CLINIC | Age: 33
End: 2023-10-24
Payer: COMMERCIAL

## 2023-10-24 VITALS — SYSTOLIC BLOOD PRESSURE: 116 MMHG | DIASTOLIC BLOOD PRESSURE: 78 MMHG | BODY MASS INDEX: 41.84 KG/M2 | WEIGHT: 275.2 LBS

## 2023-10-24 DIAGNOSIS — Z28.39 MATERNAL VARICELLA, NON-IMMUNE: ICD-10-CM

## 2023-10-24 DIAGNOSIS — O09.899 MATERNAL VARICELLA, NON-IMMUNE: ICD-10-CM

## 2023-10-24 DIAGNOSIS — O09.899 RUBELLA NON-IMMUNE STATUS, ANTEPARTUM: ICD-10-CM

## 2023-10-24 DIAGNOSIS — Z34.91 PRENATAL CARE IN FIRST TRIMESTER: Primary | ICD-10-CM

## 2023-10-24 DIAGNOSIS — F41.9 ANXIETY AND DEPRESSION: ICD-10-CM

## 2023-10-24 DIAGNOSIS — R11.0 NAUSEA WITHOUT VOMITING: ICD-10-CM

## 2023-10-24 DIAGNOSIS — Z23 NEED FOR INFLUENZA VACCINATION: ICD-10-CM

## 2023-10-24 DIAGNOSIS — I10 ESSENTIAL HYPERTENSION: ICD-10-CM

## 2023-10-24 DIAGNOSIS — F32.A ANXIETY AND DEPRESSION: ICD-10-CM

## 2023-10-24 DIAGNOSIS — Z28.39 RUBELLA NON-IMMUNE STATUS, ANTEPARTUM: ICD-10-CM

## 2023-10-24 DIAGNOSIS — O99.210 OBESITY AFFECTING PREGNANCY, ANTEPARTUM, UNSPECIFIED OBESITY TYPE: ICD-10-CM

## 2023-10-24 DIAGNOSIS — G47.00 INSOMNIA, UNSPECIFIED TYPE: ICD-10-CM

## 2023-10-24 DIAGNOSIS — O26.20 RECURRENT PREGNANCY LOSS WITH CURRENT PREGNANCY: ICD-10-CM

## 2023-10-24 DIAGNOSIS — B00.9 HERPES SIMPLEX: ICD-10-CM

## 2023-10-24 DIAGNOSIS — E28.2 PCOS (POLYCYSTIC OVARIAN SYNDROME): ICD-10-CM

## 2023-10-24 PROBLEM — R42 DIZZINESS: Status: RESOLVED | Noted: 2021-12-06 | Resolved: 2023-10-24

## 2023-10-24 PROBLEM — Z86.16 HISTORY OF 2019 NOVEL CORONAVIRUS DISEASE (COVID-19): Status: RESOLVED | Noted: 2021-02-22 | Resolved: 2023-10-24

## 2023-10-24 RX ORDER — DIPHENHYDRAMINE HYDROCHLORIDE 25 MG/1
25 CAPSULE ORAL NIGHTLY
Qty: 60 TABLET | Refills: 1 | Status: SHIPPED | OUTPATIENT
Start: 2023-10-24

## 2023-10-24 NOTE — PROGRESS NOTES
OB follow up < 20 weeks    CC:  Here for prenatal follow up    Jocelyn Merlos is a 33 y.o.  12w1d being seen today for her obstetrical visit.  She is new to me - no.  Patient reports fatigue and mild nausea and insomnia . Taking +PNV and Labetalol 100 mg BID.  Here with significant other.     Review of Systems  Genitourinary: Neg for cramping, vaginal bleeding, SROM, or dysuria.       /78   Wt 125 kg (275 lb 3.2 oz)   LMP 07/15/2023   BMI 41.84 kg/m²     FHT: 149 BPM   Uterine Size:    Assessment    1) Pregnancy at 51l6h-LH IMP:  single, viable, IUP remains visible within UT.  FHR 149bpm. Fetal movement visualized.  A YS is no longer present. Previously seen STEPHANIE is no longer seen.     2) genetic testing and MT21 done today; too early for AFP    3) RNI- avoid people with fever/rashes; can receive vaccine once you deliver    4) VNI- avoid people with fever/rashes; can receive vaccine once you deliver    5) PCOS- was on Metformin; Hgb A1c=5.9 on new OB labs; needs early 2 hr GTT    6) chronic HTN- stopped taking asa due to early bleeding; reports bleeding has stopped; rec to restart asa (stop if bleeding occurs again); on Labetalol 100 mg BID; being followed by a cardiologist; will get PIH labs today and 24 hr urine  Needs growth US @ 24wga (), 28wga (), 32wga (), 36wga (); NST/BPP @ 32wga ()    7) anxiety/depression- currently stable on Wellbutrin and Lexapro    8) RPL- SAB x 3; taking Prometrium 100 mg at bedtime; ok to stop taking     9) vaginal bleeding in early pregnancy- resolved; Rh+    10) Encouraged the flu vaccine during pregnancy. Discuss normal physiological changes during pregnancy increase the susceptibility of the flu virus and increase the risk of severe illness for the pregnant woman. Disc flu can be harmful to the unborn baby as well. Disc with patient that getting the flu vaccine is the first and most important step in protecting against the flu. Flu vaccines given during pregnancy help  protect both the mother and the baby. Getting the flu vaccine during pregnancy also helps protect the  from flu illness for several months after their birth, when then are too young to get vaccinated. Also disc the importance of good hand hygiene and avoiding people who are sick. Received flu vaccine today.    11) HSV- gets genital lesions; HSV titers drawn today; will need suppressive therapy starting 34-36wga     12) nausea- ERX Unisom/vit B.6 at bedtime    13) insomnia- ERX Unisom    14) maternal obesity- will need growth scans/NST        Plan    Continue prenatal vitamins   Reviewed this stage of pregnancy  Problem list updated   Follow up in 3 weeks OBT, 2 hr GTT, AFP    MONICA Gupta     10/24/2023  12:47 EDT

## 2023-10-25 PROBLEM — Z23 NEED FOR INFLUENZA VACCINATION: Status: RESOLVED | Noted: 2023-10-24 | Resolved: 2023-10-25

## 2023-10-25 LAB
ALBUMIN SERPL-MCNC: 4.1 G/DL (ref 3.9–4.9)
ALBUMIN/GLOB SERPL: 1.9 {RATIO} (ref 1.2–2.2)
ALP SERPL-CCNC: 61 IU/L (ref 44–121)
ALT SERPL-CCNC: 15 IU/L (ref 0–32)
AST SERPL-CCNC: 15 IU/L (ref 0–40)
BILIRUB SERPL-MCNC: 0.2 MG/DL (ref 0–1.2)
BUN SERPL-MCNC: 9 MG/DL (ref 6–20)
BUN/CREAT SERPL: 12 (ref 9–23)
CALCIUM SERPL-MCNC: 9.5 MG/DL (ref 8.7–10.2)
CHLORIDE SERPL-SCNC: 103 MMOL/L (ref 96–106)
CO2 SERPL-SCNC: 20 MMOL/L (ref 20–29)
CREAT SERPL-MCNC: 0.73 MG/DL (ref 0.57–1)
EGFRCR SERPLBLD CKD-EPI 2021: 111 ML/MIN/1.73
GLOBULIN SER CALC-MCNC: 2.2 G/DL (ref 1.5–4.5)
GLUCOSE SERPL-MCNC: 102 MG/DL (ref 70–99)
HSV1 IGG SER IA-ACNC: <0.91 INDEX (ref 0–0.9)
HSV2 IGG SER IA-ACNC: 19.9 INDEX (ref 0–0.9)
POTASSIUM SERPL-SCNC: 3.8 MMOL/L (ref 3.5–5.2)
PROT SERPL-MCNC: 6.3 G/DL (ref 6–8.5)
SODIUM SERPL-SCNC: 138 MMOL/L (ref 134–144)
URATE SERPL-MCNC: 4.4 MG/DL (ref 2.6–6.2)

## 2023-10-28 LAB
CFDNA.FET/CFDNA.TOTAL SFR FETUS: NORMAL %
CITATION REF LAB TEST: NORMAL
FET 13+18+21+X+Y ANEUP PLAS.CFDNA: NEGATIVE
FET CHR 21 TS PLAS.CFDNA QL: NEGATIVE
FET SEX PLAS.CFDNA DOSAGE CFDNA: NORMAL
FET TS 13 RISK PLAS.CFDNA QL: NEGATIVE
FET TS 18 RISK WBC.DNA+CFDNA QL: NEGATIVE
GA EST FROM CONCEPTION DATE: NORMAL D
GESTATIONAL AGE > 9:: YES
LAB DIRECTOR NAME PROVIDER: NORMAL
LAB DIRECTOR NAME PROVIDER: NORMAL
LABORATORY COMMENT REPORT: NORMAL
LIMITATIONS OF THE TEST: NORMAL
NEGATIVE PREDICTIVE VALUE: NORMAL
NOTE: NORMAL
PERFORMANCE CHARACTERISTICS: NORMAL
POSITIVE PREDICTIVE VALUE: NORMAL
REF LAB TEST METHOD: NORMAL
TEST PERFORMANCE INFO SPEC: NORMAL

## 2023-11-02 LAB
CITATION REF LAB TEST: NORMAL
GENDER IDENTITY: NORMAL
GENE DIS ANL CARRIER INTERP-IMP: NORMAL
GENE STUDIED ID: NORMAL
GENETIC SCN SPEC: NORMAL
LAB DIRECTOR NAME PROVIDER: NORMAL
Lab: NORMAL
REASON FOR REFERRAL (NARRATIVE): NORMAL
RECOMMENDATION PATIENT DOC-IMP: NORMAL
REF LAB TEST METHOD: NORMAL
SERVICE CMNT-IMP: NORMAL
SPECIMEN SOURCE: NORMAL

## 2023-11-16 ENCOUNTER — ROUTINE PRENATAL (OUTPATIENT)
Dept: OBSTETRICS AND GYNECOLOGY | Facility: CLINIC | Age: 33
End: 2023-11-16
Payer: COMMERCIAL

## 2023-11-16 VITALS — BODY MASS INDEX: 41.05 KG/M2 | DIASTOLIC BLOOD PRESSURE: 74 MMHG | WEIGHT: 270 LBS | SYSTOLIC BLOOD PRESSURE: 126 MMHG

## 2023-11-16 DIAGNOSIS — I10 ESSENTIAL HYPERTENSION: ICD-10-CM

## 2023-11-16 DIAGNOSIS — O09.899 RUBELLA NON-IMMUNE STATUS, ANTEPARTUM: ICD-10-CM

## 2023-11-16 DIAGNOSIS — B00.9 HERPES SIMPLEX: ICD-10-CM

## 2023-11-16 DIAGNOSIS — O26.20 RECURRENT PREGNANCY LOSS WITH CURRENT PREGNANCY: ICD-10-CM

## 2023-11-16 DIAGNOSIS — Z28.39 MATERNAL VARICELLA, NON-IMMUNE: ICD-10-CM

## 2023-11-16 DIAGNOSIS — Z28.39 RUBELLA NON-IMMUNE STATUS, ANTEPARTUM: ICD-10-CM

## 2023-11-16 DIAGNOSIS — O99.210 OBESITY AFFECTING PREGNANCY, ANTEPARTUM, UNSPECIFIED OBESITY TYPE: ICD-10-CM

## 2023-11-16 DIAGNOSIS — E28.2 PCOS (POLYCYSTIC OVARIAN SYNDROME): ICD-10-CM

## 2023-11-16 DIAGNOSIS — Z36.9 UNSPECIFIED ANTENATAL SCREENING: Primary | ICD-10-CM

## 2023-11-16 DIAGNOSIS — O09.899 MATERNAL VARICELLA, NON-IMMUNE: ICD-10-CM

## 2023-11-16 LAB
BILIRUB BLD-MCNC: NEGATIVE MG/DL
CLARITY, POC: CLEAR
COLOR UR: YELLOW
GLUCOSE 1H P 75 G GLC PO SERPL-MCNC: 165 MG/DL (ref 65–179)
GLUCOSE 2H P 75 G GLC PO SERPL-MCNC: 147 MG/DL (ref 65–154)
GLUCOSE P FAST SERPL-MCNC: 89 MG/DL (ref 65–94)
GLUCOSE UR STRIP-MCNC: NEGATIVE MG/DL
HCT VFR BLD AUTO: 39 % (ref 34–46.6)
HGB BLD-MCNC: 13.1 G/DL (ref 12–15.9)
KETONES UR QL: NEGATIVE
LEUKOCYTE EST, POC: NEGATIVE
NITRITE UR-MCNC: NEGATIVE MG/ML
PH UR: 5 [PH] (ref 5–8)
PROT UR STRIP-MCNC: NEGATIVE MG/DL
RBC # UR STRIP: NEGATIVE /UL
SP GR UR: 1 (ref 1–1.03)
UROBILINOGEN UR QL: NORMAL

## 2023-11-16 NOTE — PROGRESS NOTES
OB follow up < 20 weeks    CC:  Here for prenatal follow up    Jocelyn Merlos is a 33 y.o.  15+ being seen today for her obstetrical visit.   Patient reports doing well today; 24hr urine did not have a value . Taking +PNV and Labetalol 100 mg BID.  Here with mother    Review of Systems  Genitourinary: Neg for cramping, vaginal bleeding, SROM, or dysuria.       /74   Wt 122 kg (270 lb)   LMP 07/15/2023   BMI 41.05 kg/m²     Vitals: VSS; AF    General Appearance:  Awake. Alert. Well developed. Well nourished. In no acute distress.    Visual Inspection: ° Abdomen was normal on visual inspection.  Palpation: ° Abdomen was soft. ° Abdominal non-tender.    Uterus: ° Fundal height was normal for gestational age. ° Not tender.  Uterine Adnexae: ° Normal without masses or tenderness.  Neurological:  ° Oriented to time, place, and person.  Skin:  ° General appearance was normal. No bruising or ecchymosis.  Obstetrical: +FCA    Presentation: Breech  Placenta: posterior    FCA: 140's  Cervical length: 4.88cm           Ultrasound images and report reviewed personally by me.    Full interpretation of ultrasound can be found in the patient's note on the same date of service.     Rubio Morales, DO     Assessment    1) Pregnancy at 15+  US reassuring today     2) genetic testing and MT21 done today;   AFP today     3) RNI- avoid people with fever/rashes; can receive vaccine once you deliver    4) VNI- avoid people with fever/rashes; can receive vaccine once you deliver    5) PCOS- was on Metformin; Hgb A1c=5.9 on new OB labs;   Early 2hr gtt ( )     6) chronic HTN- stopped taking asa due to early bleeding;   ASA daily   Labetalol 100 mg BID; being followed by a cardiologist; will get PIH labs today and 24 hr urine  Needs growth US  28wga ( ), 32wga ( ), 36wga ( );     NST/BPP @ 32wga as on Rx ( )    7) anxiety/depression- currently stable on Wellbutrin and Lexapro    8) RPL- SAB x 3;   Stopped progesterone     9)   Received flu vaccine     10) HSV- gets genital lesions; HSV titers drawn today; will need suppressive therapy starting 34-36wga     11) nausea- ERX Unisom/vit B.6 at bedtime    12) insomnia- ERX Unisom    13) maternal obesity- will need growth scans/NST        Plan    Continue prenatal vitamins   Reviewed this stage of pregnancy  Problem list updated   Follow up in 5 weeks OB, Anatomy  Pr:Cr today     Rubio Morales DO     11/16/2023  10:33 EST

## 2023-11-17 LAB
CREAT UR-MCNC: 212.6 MG/DL
PROT UR-MCNC: 29.9 MG/DL
PROT/CREAT UR: 141 MG/G CREAT (ref 0–200)

## 2023-11-18 ENCOUNTER — HOSPITAL ENCOUNTER (OUTPATIENT)
Facility: HOSPITAL | Age: 33
Setting detail: OBSERVATION
Discharge: HOME | End: 2023-11-18
Attending: STUDENT IN AN ORGANIZED HEALTH CARE EDUCATION/TRAINING PROGRAM | Admitting: STUDENT IN AN ORGANIZED HEALTH CARE EDUCATION/TRAINING PROGRAM
Payer: COMMERCIAL

## 2023-11-18 ENCOUNTER — HOSPITAL ENCOUNTER (EMERGENCY)
Facility: HOSPITAL | Age: 33
Discharge: HOME | End: 2023-11-18
Attending: OBSTETRICS & GYNECOLOGY | Admitting: OBSTETRICS & GYNECOLOGY
Payer: COMMERCIAL

## 2023-11-18 ENCOUNTER — APPOINTMENT (OUTPATIENT)
Dept: ULTRASOUND IMAGING | Facility: HOSPITAL | Age: 33
End: 2023-11-18
Payer: COMMERCIAL

## 2023-11-18 VITALS
SYSTOLIC BLOOD PRESSURE: 134 MMHG | HEART RATE: 89 BPM | DIASTOLIC BLOOD PRESSURE: 76 MMHG | TEMPERATURE: 98 F | RESPIRATION RATE: 16 BRPM

## 2023-11-18 VITALS
WEIGHT: 270 LBS | HEIGHT: 68 IN | HEART RATE: 100 BPM | OXYGEN SATURATION: 100 % | BODY MASS INDEX: 40.92 KG/M2 | TEMPERATURE: 98.2 F | SYSTOLIC BLOOD PRESSURE: 135 MMHG | RESPIRATION RATE: 16 BRPM | DIASTOLIC BLOOD PRESSURE: 82 MMHG

## 2023-11-18 DIAGNOSIS — O46.92 VAGINAL BLEEDING IN PREGNANCY, SECOND TRIMESTER: Primary | ICD-10-CM

## 2023-11-18 LAB
ABO GROUP BLD: NORMAL
ALBUMIN SERPL-MCNC: 4.4 G/DL (ref 3.5–5.2)
ALBUMIN/GLOB SERPL: 1.9 G/DL
ALP SERPL-CCNC: 53 U/L (ref 39–117)
ALT SERPL W P-5'-P-CCNC: 16 U/L (ref 1–33)
ANION GAP SERPL CALCULATED.3IONS-SCNC: 10 MMOL/L (ref 5–15)
AST SERPL-CCNC: 11 U/L (ref 1–32)
BACTERIA UR QL AUTO: ABNORMAL /HPF
BASOPHILS # BLD AUTO: 0.03 10*3/MM3 (ref 0–0.2)
BASOPHILS NFR BLD AUTO: 0.3 % (ref 0–1.5)
BILIRUB SERPL-MCNC: 0.3 MG/DL (ref 0–1.2)
BILIRUB UR QL STRIP: NEGATIVE
BUN SERPL-MCNC: 6 MG/DL (ref 6–20)
BUN/CREAT SERPL: 8.1 (ref 7–25)
CALCIUM SPEC-SCNC: 10.3 MG/DL (ref 8.6–10.5)
CHLORIDE SERPL-SCNC: 105 MMOL/L (ref 98–107)
CLARITY UR: CLEAR
CO2 SERPL-SCNC: 26 MMOL/L (ref 22–29)
COLOR UR: ABNORMAL
CREAT SERPL-MCNC: 0.74 MG/DL (ref 0.57–1)
DEPRECATED RDW RBC AUTO: 41.3 FL (ref 37–54)
EGFRCR SERPLBLD CKD-EPI 2021: 109.7 ML/MIN/1.73
EOSINOPHIL # BLD AUTO: 0.22 10*3/MM3 (ref 0–0.4)
EOSINOPHIL NFR BLD AUTO: 2.4 % (ref 0.3–6.2)
ERYTHROCYTE [DISTWIDTH] IN BLOOD BY AUTOMATED COUNT: 12.3 % (ref 12.3–15.4)
GLOBULIN UR ELPH-MCNC: 2.3 GM/DL
GLUCOSE SERPL-MCNC: 99 MG/DL (ref 65–99)
GLUCOSE UR STRIP-MCNC: NEGATIVE MG/DL
HCT VFR BLD AUTO: 42 % (ref 34–46.6)
HGB BLD-MCNC: 14 G/DL (ref 12–15.9)
HGB UR QL STRIP.AUTO: ABNORMAL
HYALINE CASTS UR QL AUTO: ABNORMAL /LPF
IMM GRANULOCYTES # BLD AUTO: 0.03 10*3/MM3 (ref 0–0.05)
IMM GRANULOCYTES NFR BLD AUTO: 0.3 % (ref 0–0.5)
KETONES UR QL STRIP: NEGATIVE
LEUKOCYTE ESTERASE UR QL STRIP.AUTO: NEGATIVE
LYMPHOCYTES # BLD AUTO: 1.97 10*3/MM3 (ref 0.7–3.1)
LYMPHOCYTES NFR BLD AUTO: 21.8 % (ref 19.6–45.3)
MCH RBC QN AUTO: 30.8 PG (ref 26.6–33)
MCHC RBC AUTO-ENTMCNC: 33.3 G/DL (ref 31.5–35.7)
MCV RBC AUTO: 92.3 FL (ref 79–97)
MONOCYTES # BLD AUTO: 0.8 10*3/MM3 (ref 0.1–0.9)
MONOCYTES NFR BLD AUTO: 8.8 % (ref 5–12)
NEUTROPHILS NFR BLD AUTO: 6 10*3/MM3 (ref 1.7–7)
NEUTROPHILS NFR BLD AUTO: 66.4 % (ref 42.7–76)
NITRITE UR QL STRIP: NEGATIVE
NRBC BLD AUTO-RTO: 0 /100 WBC (ref 0–0.2)
PH UR STRIP.AUTO: 7.5 [PH] (ref 5–8)
PLATELET # BLD AUTO: 320 10*3/MM3 (ref 140–450)
PMV BLD AUTO: 10.4 FL (ref 6–12)
POTASSIUM SERPL-SCNC: 3.6 MMOL/L (ref 3.5–5.2)
PROT SERPL-MCNC: 6.7 G/DL (ref 6–8.5)
PROT UR QL STRIP: ABNORMAL
RBC # BLD AUTO: 4.55 10*6/MM3 (ref 3.77–5.28)
RBC # UR STRIP: ABNORMAL /HPF
REF LAB TEST METHOD: ABNORMAL
RH BLD: POSITIVE
SODIUM SERPL-SCNC: 141 MMOL/L (ref 136–145)
SP GR UR STRIP: 1.01 (ref 1–1.03)
SQUAMOUS #/AREA URNS HPF: ABNORMAL /HPF
UROBILINOGEN UR QL STRIP: ABNORMAL
WBC # UR STRIP: ABNORMAL /HPF
WBC NRBC COR # BLD AUTO: 9.05 10*3/MM3 (ref 3.4–10.8)

## 2023-11-18 PROCEDURE — 86900 BLOOD TYPING SEROLOGIC ABO: CPT | Performed by: EMERGENCY MEDICINE

## 2023-11-18 PROCEDURE — 99284 EMERGENCY DEPT VISIT MOD MDM: CPT

## 2023-11-18 PROCEDURE — 86901 BLOOD TYPING SEROLOGIC RH(D): CPT | Performed by: EMERGENCY MEDICINE

## 2023-11-18 PROCEDURE — 81001 URINALYSIS AUTO W/SCOPE: CPT | Performed by: OBSTETRICS & GYNECOLOGY

## 2023-11-18 PROCEDURE — 80053 COMPREHEN METABOLIC PANEL: CPT | Performed by: STUDENT IN AN ORGANIZED HEALTH CARE EDUCATION/TRAINING PROGRAM

## 2023-11-18 PROCEDURE — 87086 URINE CULTURE/COLONY COUNT: CPT | Performed by: OBSTETRICS & GYNECOLOGY

## 2023-11-18 PROCEDURE — 85025 COMPLETE CBC W/AUTO DIFF WBC: CPT | Performed by: STUDENT IN AN ORGANIZED HEALTH CARE EDUCATION/TRAINING PROGRAM

## 2023-11-18 PROCEDURE — 99283 EMERGENCY DEPT VISIT LOW MDM: CPT | Performed by: OBSTETRICS & GYNECOLOGY

## 2023-11-18 PROCEDURE — 99282 EMERGENCY DEPT VISIT SF MDM: CPT

## 2023-11-18 NOTE — ED NOTES
Nursing report ED to floor  Jocelyn Merlos  33 y.o.  female    HPI (triage note):   Chief Complaint   Patient presents with    Vaginal Bleeding - Pregnant       Admitting doctor:   No admitting provider for patient encounter.    Admitting diagnosis:   The encounter diagnosis was Vaginal bleeding in pregnancy, second trimester.    Code status:   Current Code Status       Date Active Code Status Order ID Comments User Context       Not on file            Allergies:   Patient has no known allergies.    Past Medical History:  Past Medical History:   Diagnosis Date    Abnormal Pap smear of cervix     Adjustment disorder with mixed anxiety and depressed mood 4/6/2016    COVID-19 12/28/2020    Hypertension     Palpitations         Weight:       11/18/23  1433   Weight: 122 kg (270 lb)       Most recent vitals:   Vitals:    11/18/23 1434 11/18/23 1520 11/18/23 1521 11/18/23 1527   BP: 140/82  135/82    Pulse: 106 97 93 100   Resp:       Temp:       TempSrc:       SpO2: 98% 100% 100% 100%   Weight:       Height:           Active LDAs/IV Access:   Lines, Drains & Airways       Active LDAs       Name Placement date Placement time Site Days    Peripheral IV 11/18/23 1522 Anterior;Right Forearm 11/18/23  1522  Forearm  less than 1                    Labs (abnormal labs have a star):   Labs Reviewed   CBC WITH AUTO DIFFERENTIAL - Normal   COMPREHENSIVE METABOLIC PANEL    Narrative:     GFR Normal >60  Chronic Kidney Disease <60  Kidney Failure <15     ABO/RH   CBC AND DIFFERENTIAL    Narrative:     The following orders were created for panel order CBC & Differential.  Procedure                               Abnormality         Status                     ---------                               -----------         ------                     CBC Auto Differential[[id number]]        Normal              Final result                 Please view results for these tests on the individual orders.       EKG:   No orders to display       Meds  given in ED:   Medications - No data to display    Imaging results:  No radiology results for the last day    Ambulatory status:   - ad jairo    Social issues:   Social History     Socioeconomic History    Marital status:    Tobacco Use    Smoking status: Former     Packs/day: 0.25     Years: 0.50     Additional pack years: 0.00     Total pack years: 0.13     Types: Cigarettes     Quit date: 2015     Years since quittin.6    Smokeless tobacco: Never   Vaping Use    Vaping Use: Never used   Substance and Sexual Activity    Alcohol use: Yes     Alcohol/week: 4.0 - 5.0 standard drinks of alcohol     Types: 4 - 5 Standard drinks or equivalent per week     Comment: Socially./caffeine use     Drug use: No    Sexual activity: Yes     Partners: Male          NIH Stroke Scale:         Gopi Chong RN  23 16:14 EST    Nurse Direct line for any questions: [phone]        Patient/Family

## 2023-11-18 NOTE — OBED NOTES
[facility] Note OB    Patient Name: Jocelyn Merlos  YOB: 1990  MRN: [id number]  Admission Date: 2023  4:33 PM  Date of Service: 2023    Chief Complaint: Vaginal Bleeding (AMRIELENA: Patient presents with VB. States she had a gush of bleeding around 1500 with spotting ever since. )        Subjective     Jocelyn Merlos is a 33 y.o. female  at 15w5d with Estimated Date of Delivery: 24 who presents with the chief complaint listed above. Pt reports blood enough to soak pantie liner. Pt has a history of 3 prior first trimester loses. She had a STEPHANIE diagnosed with USG at 7 weeks this pregnancy which has resolved since then. Pt had an USG done here with the preliminary fetal size estimated over 16 weeks so she was sent up here for further triage. Pt has had pelvic rest during the pregnancy due to vaginal bleeding. She denies dysuria or back pain. Cervical length 2 days ago ws 4.9 cm per OB office USG     She sees Ene Adame MD for her prenatal care. Her pregnancy has been complicated by:  obese, tobacco, CHTN(on meds), PCOS, RNI, VNI, HSV    She describes fetal movement as normal.  She denies rupture of membranes.  She admits to vaginal bleeding. She is not feeling contractions.        Objective   Patient Active Problem List    Diagnosis     Anxiety and depression [F41.9, F32.A]     Nausea without vomiting [R11.0]     Insomnia [G47.00]     Obesity affecting pregnancy, antepartum [O99.210]     Recurrent pregnancy loss with current pregnancy [O26.20]     Rubella non-immune, needs MMR PP [O09.899, Z28.39]     Maternal varicella, non-immune, needs Varivax PP [O09.899, Z28.39]     PCOS (polycystic ovarian syndrome)- was on Metformin to regulate cycles [E28.2]     Palpitations [R00.2]     Essential hypertension [I10]     Major depressive disorder, recurrent, in full remission [F33.42]     Hemochromatosis carrier [Z14.8]     Family history of hemochromatosis [Z83.49]     Vitamin D deficiency [E55.9]      Herpes simplex- HSV2 seropositive; needs suppression 34-36wga [B00.9]     Suppurative hidradenitis [L73.2]     Headache, migraine [G43.909]         OB History    Para Term  AB Living   4 0 0 0 3 0   SAB IAB Ectopic Molar Multiple Live Births   3 0 0 0 0 0      # Outcome Date GA Lbr Nino/2nd Weight Sex Delivery Anes PTL Lv   4 Current            3 SAB 10/2020           2 2018           1 2016                Past Medical History:   Diagnosis Date    Abnormal Pap smear of cervix     Adjustment disorder with mixed anxiety and depressed mood 2016    COVID-19 2020    Hypertension     Palpitations        Past Surgical History:   Procedure Laterality Date    BREAST AUGMENTATION      CERVICAL BIOPSY  W/ LOOP ELECTRODE EXCISION      FOOT SURGERY Right     plantar fascitis tendon release    LAPAROSCOPIC GASTRIC BANDING      LEEP      OTHER SURGICAL HISTORY      removal of eye tumor    TONSILLECTOMY         No current facility-administered medications on file prior to encounter.     Current Outpatient Medications on File Prior to Encounter   Medication Sig Dispense Refill    buPROPion SR (WELLBUTRIN SR) 150 MG 12 hr tablet Take 1 tablet by mouth Daily.      escitalopram (LEXAPRO) 10 MG tablet TAKE 1 TABLET BY MOUTH DAILY 90 tablet 0    folic acid (FOLVITE) 400 MCG tablet Take 1 tablet by mouth Daily.      labetalol (NORMODYNE) 100 MG tablet Take 1 tablet by mouth 2 (Two) Times a Day. 60 tablet 6    Omega-3 Fatty Acids (fish oil) 1000 MG capsule capsule Take  by mouth Daily With Breakfast.      prenatal vitamin (prenatal, CLASSIC, vitamin) tablet Take  by mouth Daily.      vitamin D (ERGOCALCIFEROL) 1.25 MG (06530 UT) capsule capsule Take 1,000 Units by mouth.      doxylamine (UNISOM) 25 MG tablet Take 1 tablet by mouth At Night As Needed for Sleep. 60 tablet 2    vitamin B-6 (PYRIDOXINE) 25 MG tablet Take 1 tablet by mouth Every Night. 60 tablet 1       No Known Allergies    Family  History   Problem Relation Age of Onset    Skin cancer Mother     Depression Mother     Anxiety disorder Mother     Stroke Mother     Aneurysm Mother     Alcohol abuse Mother     Migraines Mother     Hemochromatosis Mother     Stroke Maternal Grandmother        Social History     Socioeconomic History    Marital status:    Tobacco Use    Smoking status: Former     Packs/day: 0.25     Years: 0.50     Additional pack years: 0.00     Total pack years: 0.13     Types: Cigarettes     Quit date: 2015     Years since quittin.6    Smokeless tobacco: Never   Vaping Use    Vaping Use: Never used   Substance and Sexual Activity    Alcohol use: Yes     Alcohol/week: 4.0 - 5.0 standard drinks of alcohol     Types: 4 - 5 Standard drinks or equivalent per week     Comment: Socially./caffeine use     Drug use: No    Sexual activity: Yes     Partners: Male           Review of Systems   Constitutional:  Negative for chills and fever.   HENT: Negative.     Eyes:  Negative for photophobia and visual disturbance.   Respiratory:  Negative for shortness of breath.    Cardiovascular:  Negative for chest pain.   Gastrointestinal:  Negative for nausea.   Genitourinary:  Positive for vaginal bleeding.   Psychiatric/Behavioral:  The patient is not nervous/anxious.           PHYSICAL EXAM:      VITAL SIGNS:  Vitals:    23 1655   BP: 134/76   Pulse: 89   Resp: 16   Temp: 98 °F (36.7 °C)   TempSrc: Oral            FHT'S:                       Baseline:                             Beats/min:       Fetal HR (beats/min): 145                                           PHYSICAL EXAM:      General: well developed; well nourished  no acute distress   Heart: Not performed.   Lungs   breathing is unlabored   Abdomen: Gravid and non tender     Extremities: trace edema, DTRs 1 plus, no clonus       Cervix: Per myself, scant maroon blood with pea sized blood clot  Cervical Dilation (cm): 0  Cervical Effacement: 10%  Fetal Station:  -3  Cervical Consistency: firm     Contractions:   none                    LABS AND TESTING ORDERED:  Uterine and fetal monitoring  Urinalysis      LAB RESULTS:    Recent Results (from the past 24 hour(s))   Comprehensive Metabolic Panel    Collection Time: 11/18/23  3:23 PM    Specimen: Blood   Result Value Ref Range    Glucose 99 65 - 99 mg/dL    BUN 6 6 - 20 mg/dL    Creatinine 0.74 0.57 - 1.00 mg/dL    Sodium 141 136 - 145 mmol/L    Potassium 3.6 3.5 - 5.2 mmol/L    Chloride 105 98 - 107 mmol/L    CO2 26.0 22.0 - 29.0 mmol/L    Calcium 10.3 8.6 - 10.5 mg/dL    Total Protein 6.7 6.0 - 8.5 g/dL    Albumin 4.4 3.5 - 5.2 g/dL    ALT (SGPT) 16 1 - 33 U/L    AST (SGOT) 11 1 - 32 U/L    Alkaline Phosphatase 53 39 - 117 U/L    Total Bilirubin 0.3 0.0 - 1.2 mg/dL    Globulin 2.3 gm/dL    A/G Ratio 1.9 g/dL    BUN/Creatinine Ratio 8.1 7.0 - 25.0    Anion Gap 10.0 5.0 - 15.0 mmol/L    eGFR 109.7 >60.0 mL/min/1.73   CBC Auto Differential    Collection Time: 11/18/23  3:23 PM    Specimen: Blood   Result Value Ref Range    WBC 9.05 3.40 - 10.80 10*3/mm3    RBC 4.55 3.77 - 5.28 10*6/mm3    Hemoglobin 14.0 12.0 - 15.9 g/dL    Hematocrit 42.0 34.0 - 46.6 %    MCV 92.3 79.0 - 97.0 fL    MCH 30.8 26.6 - 33.0 pg    MCHC 33.3 31.5 - 35.7 g/dL    RDW 12.3 12.3 - 15.4 %    RDW-SD 41.3 37.0 - 54.0 fl    MPV 10.4 6.0 - 12.0 fL    Platelets 320 140 - 450 10*3/mm3    Neutrophil % 66.4 42.7 - 76.0 %    Lymphocyte % 21.8 19.6 - 45.3 %    Monocyte % 8.8 5.0 - 12.0 %    Eosinophil % 2.4 0.3 - 6.2 %    Basophil % 0.3 0.0 - 1.5 %    Immature Grans % 0.3 0.0 - 0.5 %    Neutrophils, Absolute 6.00 1.70 - 7.00 10*3/mm3    Lymphocytes, Absolute 1.97 0.70 - 3.10 10*3/mm3    Monocytes, Absolute 0.80 0.10 - 0.90 10*3/mm3    Eosinophils, Absolute 0.22 0.00 - 0.40 10*3/mm3    Basophils, Absolute 0.03 0.00 - 0.20 10*3/mm3    Immature Grans, Absolute 0.03 0.00 - 0.05 10*3/mm3    nRBC 0.0 0.0 - 0.2 /100 WBC   ABO / Rh    Collection Time: 11/18/23  3:23 PM  "   Specimen: Blood   Result Value Ref Range    ABO Type O     RH type Positive        Lab Results   Component Value Date    ABO O 11/18/2023    RH Positive 11/18/2023       No results found for: \"STREPGPB\"              External Prenatal Results       Pregnancy Outside Results - Transcribed From Office Records - See Scanned Records For Details       Test Value Date Time    ABO  O  11/18/23 1523    Rh  Positive  11/18/23 1523    Antibody Screen  Negative  10/05/23 1535       Negative  09/27/23 1626    Varicella IgG  <135 index 10/05/23 1535    Rubella  0.97 index 10/05/23 1535    Hgb  14.0 g/dL 11/18/23 1523       13.1 g/dL 11/16/23 0826       14.1 g/dL 10/05/23 1535    Hct  42.0 % 11/18/23 1523       39.0 % 11/16/23 0826       42.4 % 10/05/23 1535    Glucose Fasting GTT  89 mg/dL 11/16/23 0826    Glucose Tolerance Test 1 hour  165 mg/dL 11/16/23 0826    Glucose Tolerance Test 3 hour       Gonorrhea (discrete)  Negative  10/05/23 1431    Chlamydia (discrete)  Negative  10/05/23 1431    RPR  Non Reactive  10/05/23 1535    VDRL       Syphilis Antibody       HBsAg  Negative  10/05/23 1535    Herpes Simplex Virus PCR       Herpes Simplex VIrus Culture       HIV  Non Reactive  10/05/23 1535    Hep C RNA Quant PCR       Hep C Antibody  Non Reactive  10/05/23 1535    AFP       Group B Strep       GBS Susceptibility to Clindamycin       GBS Susceptibility to Erythromycin       Fetal Fibronectin       Genetic Testing, Maternal Blood                 Drug Screening       Test Value Date Time    Urine Drug Screen       Amphetamine Screen  Negative ng/mL 10/05/23 1431    Barbiturate Screen  Negative ng/mL 10/05/23 1431    Benzodiazepine Screen  Negative ng/mL 10/05/23 1431    Methadone Screen  Negative ng/mL 10/05/23 1431    Phencyclidine Screen  Negative ng/mL 10/05/23 1431    Opiates Screen       THC Screen       Cocaine Screen       Propoxyphene Screen  Negative ng/mL 10/05/23 1431    Buprenorphine Screen       Methamphetamine " Screen       Oxycodone Screen       Tricyclic Antidepressants Screen                 Legend    ^: Historical                              Impression:   @ 15w5d .  Final Diagnosis: vaginal bleeding, hx of STEPHANIE and 3 prior losses, no active bleeding now    Plan:  1. Discharge to home.    2. Plan of care has been reviewed with patient along with risks, benefits of treatment.   All questions have been answered. Call health care provider for any further concerns and keep office appointments.  3. Comfort measures, miscarriage and bleeding precautions      I discussed the patients findings and my recommendations with patient, family, and nursing staff      Ene Adame MD  2023  17:07 EST

## 2023-11-18 NOTE — NURSING NOTE
Discharge paperwork provided to patient, all questions answered. Pt ambulated off unit with steady gait.

## 2023-11-18 NOTE — ED TRIAGE NOTES
"Pt is 15 weeks 5 days pregnant and has started vag bleed 30 minutes ago.  She has \"a little\" cramping.  She has hx subchorionic hemorrhage  "

## 2023-11-18 NOTE — ED NOTES
Ultrasound notified this nurse that they are unable to complete the order as the gestational size exceeds the ER capability. MD aware

## 2023-11-18 NOTE — ED PROVIDER NOTES
EMERGENCY DEPARTMENT ENCOUNTER    Room Number:  NASIR/NASIR  PCP: Mirta Bergeron PA  Historian: Patient      HPI:  Chief Complaint: Vaginal bleeding  A complete HPI/ROS/PMH/PSH/SH/FH are unobtainable due to: None  Context: Jocelyn Merlos is a 33 y.o. female who presents to the ED c/o sudden onset of fairly heavy bright red blood per vagina occurring shortly prior to ED arrival today.  The patient reports that she is between 15 and 16 weeks pregnant and has had bleeding due to subchorionic hemorrhage earlier in the pregnancy.  She is currently a G4, P0 with 3 first trimester miscarriages previously.  She does complain of some very mild lower abdominal discomfort/cramping associated.  She reports that the vaginal bleeding has greatly improved since ED arrival.  She denies fever/chills, dysuria/hematuria, nausea/vomiting, chest pain, or back pain.            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 04/06/2016    Suppurative hidradenitis 04/06/2016    Headache, migraine 04/06/2016    Family history of hemochromatosis 08/24/2016    Vitamin D deficiency 08/24/2016    Hemochromatosis carrier 08/30/2016    Major depressive disorder, recurrent, in full remission 10/05/2020    Essential hypertension 03/25/2021    Palpitations 08/16/2021    PCOS (polycystic ovarian syndrome)- was on Metformin to regulate cycles 09/05/2023    Rubella non-immune, needs MMR PP 10/09/2023    Maternal varicella, non-immune, needs Varivax PP 10/09/2023    Anxiety and depression 10/24/2023    Nausea without vomiting 10/24/2023    Insomnia 10/24/2023    Obesity affecting pregnancy, antepartum 10/24/2023    Recurrent pregnancy loss with current pregnancy 10/24/2023     Resolved Ambulatory Problems     Diagnosis Date Noted    Abnormal menses 04/06/2016    Adjustment disorder with mixed anxiety and depressed mood 04/06/2016    Allergic reaction 04/06/2016     Anxiety 2016    Bacterial vaginosis 2016    DUB (dysfunctional uterine bleeding) 2016    Elevated liver enzymes 2016    Labial swelling 2016    Conjunctival pterygium 2016    Hair loss 2016    Excessive sweating 2016    Ingrown toenail without infection 2016    Brain fog 2021    History of 2019 novel coronavirus disease (COVID-19) 2021    Post viral syndrome 2021    Postviral fatigue syndrome 2021    Dizziness 2021    Need for influenza vaccination 10/24/2023     Past Medical History:   Diagnosis Date    Abnormal Pap smear of cervix     COVID-19 2020    Hypertension          PAST SURGICAL HISTORY  Past Surgical History:   Procedure Laterality Date    BREAST AUGMENTATION      CERVICAL BIOPSY  W/ LOOP ELECTRODE EXCISION      FOOT SURGERY Right     plantar fascitis tendon release    LAPAROSCOPIC GASTRIC BANDING      LEEP      OTHER SURGICAL HISTORY      removal of eye tumor    TONSILLECTOMY           FAMILY HISTORY  Family History   Problem Relation Age of Onset    Skin cancer Mother     Depression Mother     Anxiety disorder Mother     Stroke Mother     Aneurysm Mother     Alcohol abuse Mother     Migraines Mother     Hemochromatosis Mother     Stroke Maternal Grandmother          SOCIAL HISTORY  Social History     Socioeconomic History    Marital status:    Tobacco Use    Smoking status: Former     Packs/day: 0.25     Years: 0.50     Additional pack years: 0.00     Total pack years: 0.13     Types: Cigarettes     Quit date: 2015     Years since quittin.6    Smokeless tobacco: Never   Vaping Use    Vaping Use: Never used   Substance and Sexual Activity    Alcohol use: Yes     Alcohol/week: 4.0 - 5.0 standard drinks of alcohol     Types: 4 - 5 Standard drinks or equivalent per week     Comment: Socially./caffeine use     Drug use: No    Sexual activity: Yes     Partners: Male         ALLERGIES  Patient has no known  allergies.        REVIEW OF SYSTEMS  Review of Systems   Constitutional:  Negative for fever.   HENT:  Negative for sore throat.    Eyes: Negative.    Respiratory:  Negative for cough and shortness of breath.    Cardiovascular:  Negative for chest pain.   Gastrointestinal:  Positive for abdominal pain. Negative for diarrhea and vomiting.   Genitourinary:  Positive for vaginal bleeding. Negative for dysuria.   Musculoskeletal:  Negative for neck pain.   Skin:  Negative for rash.   Allergic/Immunologic: Negative.    Neurological:  Negative for weakness, numbness and headaches.   Hematological: Negative.    Psychiatric/Behavioral: Negative.     All other systems reviewed and are negative.         PHYSICAL EXAM  ED Triage Vitals [11/18/23 1433]   Temp Heart Rate Resp BP SpO2   98.2 °F (36.8 °C) 101 16 -- 99 %      Temp src Heart Rate Source Patient Position BP Location FiO2 (%)   Tympanic Monitor -- -- --       Physical Exam  Constitutional:       General: She is not in acute distress.     Appearance: Normal appearance. She is not ill-appearing or toxic-appearing.   HENT:      Head: Normocephalic and atraumatic.   Eyes:      Extraocular Movements: Extraocular movements intact.      Pupils: Pupils are equal, round, and reactive to light.   Cardiovascular:      Rate and Rhythm: Normal rate and regular rhythm.      Heart sounds: No murmur heard.     No friction rub. No gallop.   Pulmonary:      Effort: Pulmonary effort is normal.      Breath sounds: Normal breath sounds.   Abdominal:      General: Abdomen is flat. There is no distension.      Palpations: Abdomen is soft.      Tenderness: There is no abdominal tenderness.   Musculoskeletal:         General: No swelling or tenderness. Normal range of motion.      Cervical back: Normal range of motion and neck supple.   Skin:     General: Skin is warm and dry.   Neurological:      General: No focal deficit present.      Mental Status: She is alert and oriented to person,  place, and time.      Sensory: No sensory deficit.      Motor: No weakness.   Psychiatric:         Mood and Affect: Mood normal.         Behavior: Behavior normal.           Vital signs and nursing notes reviewed.          LAB RESULTS  Recent Results (from the past 24 hour(s))   Comprehensive Metabolic Panel    Collection Time: 11/18/23  3:23 PM    Specimen: Blood   Result Value Ref Range    Glucose 99 65 - 99 mg/dL    BUN 6 6 - 20 mg/dL    Creatinine 0.74 0.57 - 1.00 mg/dL    Sodium 141 136 - 145 mmol/L    Potassium 3.6 3.5 - 5.2 mmol/L    Chloride 105 98 - 107 mmol/L    CO2 26.0 22.0 - 29.0 mmol/L    Calcium 10.3 8.6 - 10.5 mg/dL    Total Protein 6.7 6.0 - 8.5 g/dL    Albumin 4.4 3.5 - 5.2 g/dL    ALT (SGPT) 16 1 - 33 U/L    AST (SGOT) 11 1 - 32 U/L    Alkaline Phosphatase 53 39 - 117 U/L    Total Bilirubin 0.3 0.0 - 1.2 mg/dL    Globulin 2.3 gm/dL    A/G Ratio 1.9 g/dL    BUN/Creatinine Ratio 8.1 7.0 - 25.0    Anion Gap 10.0 5.0 - 15.0 mmol/L    eGFR 109.7 >60.0 mL/min/1.73   CBC Auto Differential    Collection Time: 11/18/23  3:23 PM    Specimen: Blood   Result Value Ref Range    WBC 9.05 3.40 - 10.80 10*3/mm3    RBC 4.55 3.77 - 5.28 10*6/mm3    Hemoglobin 14.0 12.0 - 15.9 g/dL    Hematocrit 42.0 34.0 - 46.6 %    MCV 92.3 79.0 - 97.0 fL    MCH 30.8 26.6 - 33.0 pg    MCHC 33.3 31.5 - 35.7 g/dL    RDW 12.3 12.3 - 15.4 %    RDW-SD 41.3 37.0 - 54.0 fl    MPV 10.4 6.0 - 12.0 fL    Platelets 320 140 - 450 10*3/mm3    Neutrophil % 66.4 42.7 - 76.0 %    Lymphocyte % 21.8 19.6 - 45.3 %    Monocyte % 8.8 5.0 - 12.0 %    Eosinophil % 2.4 0.3 - 6.2 %    Basophil % 0.3 0.0 - 1.5 %    Immature Grans % 0.3 0.0 - 0.5 %    Neutrophils, Absolute 6.00 1.70 - 7.00 10*3/mm3    Lymphocytes, Absolute 1.97 0.70 - 3.10 10*3/mm3    Monocytes, Absolute 0.80 0.10 - 0.90 10*3/mm3    Eosinophils, Absolute 0.22 0.00 - 0.40 10*3/mm3    Basophils, Absolute 0.03 0.00 - 0.20 10*3/mm3    Immature Grans, Absolute 0.03 0.00 - 0.05 10*3/mm3    nRBC  0.0 0.0 - 0.2 /100 WBC   ABO / Rh    Collection Time: 11/18/23  3:23 PM    Specimen: Blood   Result Value Ref Range    ABO Type O     RH type Positive    Urinalysis With Culture If Indicated - Urine, Clean Catch    Collection Time: 11/18/23  4:49 PM    Specimen: Urine, Clean Catch   Result Value Ref Range    Color, UA Other (A) Yellow, Straw    Appearance, UA Clear Clear    pH, UA 7.5 5.0 - 8.0    Specific Gravity, UA 1.008 1.005 - 1.030    Glucose, UA Negative Negative    Ketones, UA Negative Negative    Bilirubin, UA Negative Negative    Blood, UA Large (3+) (A) Negative    Protein, UA 30 mg/dL (1+) (A) Negative    Leuk Esterase, UA Negative Negative    Nitrite, UA Negative Negative    Urobilinogen, UA 0.2 E.U./dL 0.2 - 1.0 E.U./dL   Urinalysis, Microscopic Only - Urine, Clean Catch    Collection Time: 11/18/23  4:49 PM    Specimen: Urine, Clean Catch   Result Value Ref Range    RBC, UA 0-2 None Seen, 0-2 /HPF    WBC, UA 3-5 (A) None Seen, 0-2 /HPF    Bacteria, UA None Seen None Seen /HPF    Squamous Epithelial Cells, UA 3-6 (A) None Seen, 0-2 /HPF    Hyaline Casts, UA None Seen None Seen /LPF    Methodology Automated Microscopy        Ordered the above labs and reviewed the results.        RADIOLOGY  No Radiology Exams Resulted Within Past 24 Hours    Ordered the above noted radiological studies. Reviewed by me in PACS.            PROCEDURES  Procedures            MEDICATIONS GIVEN IN ER  Medications - No data to display                MEDICAL DECISION MAKING, PROGRESS, and CONSULTS    All labs have been independently reviewed by me.  All radiology studies have been reviewed by me and I have also reviewed the radiology report.   EKG's independently viewed and interpreted by me.  Discussion below represents my analysis of pertinent findings related to patient's condition, differential diagnosis, treatment plan and final disposition.      Additional sources:  - Discussed/ obtained information from independent  historians: History obtained from the patient as well as the patient's family at bedside.    - External (non-ED) record review: Upon medical records review, the patient was last seen and evaluated in the outpatient office of her OB/GYN on 11/16/2023 in routine follow-up for a 15-week 3-day pregnancy.    - Chronic or social conditions impacting care: Current second trimester pregnancy    - Shared decision making: The decision made to transfer the patient to labor and delivery for formal evaluation was made based on shared conversations have between myself, the patient and family at bedside, nursing staff, as well as Dr. Romo with OB/GYN.      Orders placed during this visit:  Orders Placed This Encounter   Procedures    Comprehensive Metabolic Panel    CBC Auto Differential    ABO / Rh    ED Transfer To L&D    ED Bed Request    CBC & Differential             Differential diagnosis includes but is not limited to:    Differential diagnosis includes but is not limited to threatened miscarriage, incomplete miscarriage, or subchorionic hemorrhage.      Independent interpretation of labs, radiology studies, and discussions with consultants:    The patient's laboratory values were independently interpreted by myself with my interpretation showing a normal CBC/CMP as well as a blood typing of O+.      ED Course as of 11/18/23 2316   Sat Nov 18, 2023   1610 I did discuss the patient's presentation as well and is initial ultrasound findings of a greater than 16-week gestation pregnancy with Dr. Romo, OB/GYN.  We will transfer her to labor and delivery for further OB assessment and formal ultrasound.  I shared this with the patient and she and her family are in agreement with that plan.  All questions have been answered. [BM]      ED Course User Index  [BM] Ty Rubio MD           DIAGNOSIS  Final diagnoses:   Vaginal bleeding in pregnancy, second trimester         DISPOSITION  Sent to labor and  delivery            Latest Documented Vital Signs:  As of 23:16 EST  BP- 135/82 HR- 100 Temp- 98.2 °F (36.8 °C) (Tympanic) O2 sat- 100%              --    Please note that portions of this were completed with a voice recognition program.       Note Disclaimer: At [facility], we believe that sharing information builds trust and better relationships. You are receiving this note because you are receiving care at [facility] or recently visited. It is possible you will see health information before a provider has talked with you about it. This kind of information can be easy to misunderstand. To help you fully understand what it means for your health, we urge you to discuss this note with your provider.             Ty Rubio MD  11/18/23 1980

## 2023-11-19 LAB — BACTERIA SPEC AEROBE CULT: NORMAL

## 2023-11-20 ENCOUNTER — LAB (OUTPATIENT)
Dept: OBSTETRICS AND GYNECOLOGY | Facility: CLINIC | Age: 33
End: 2023-11-20
Payer: COMMERCIAL

## 2023-11-20 DIAGNOSIS — Z3A.16 16 WEEKS GESTATION OF PREGNANCY: Primary | ICD-10-CM

## 2023-11-21 ENCOUNTER — ROUTINE PRENATAL (OUTPATIENT)
Dept: OBSTETRICS AND GYNECOLOGY | Facility: CLINIC | Age: 33
End: 2023-11-21
Payer: COMMERCIAL

## 2023-11-21 VITALS — SYSTOLIC BLOOD PRESSURE: 118 MMHG | WEIGHT: 268.6 LBS | DIASTOLIC BLOOD PRESSURE: 76 MMHG | BODY MASS INDEX: 40.84 KG/M2

## 2023-11-21 DIAGNOSIS — O26.20 RECURRENT PREGNANCY LOSS WITH CURRENT PREGNANCY: ICD-10-CM

## 2023-11-21 DIAGNOSIS — Z36.9 ENCOUNTER FOR ANTENATAL SCREENING, UNSPECIFIED: ICD-10-CM

## 2023-11-21 DIAGNOSIS — O46.92 VAGINAL BLEEDING IN PREGNANCY, SECOND TRIMESTER: Primary | ICD-10-CM

## 2023-11-21 DIAGNOSIS — O43.109 PLACENTAL ABNORMALITY, ANTEPARTUM: ICD-10-CM

## 2023-11-21 LAB
BILIRUB BLD-MCNC: NEGATIVE MG/DL
CLARITY, POC: CLEAR
COLOR UR: YELLOW
GLUCOSE UR STRIP-MCNC: NEGATIVE MG/DL
KETONES UR QL: NEGATIVE
LEUKOCYTE EST, POC: NEGATIVE
NITRITE UR-MCNC: NEGATIVE MG/ML
PH UR: 5 [PH] (ref 5–8)
PROT UR STRIP-MCNC: NEGATIVE MG/DL
RBC # UR STRIP: ABNORMAL /UL
SP GR UR: 1 (ref 1–1.03)
UROBILINOGEN UR QL: NORMAL

## 2023-11-21 NOTE — PROGRESS NOTES
OB follow up < 20 weeks    CC:  Here for vaginal bleeding    Jocelyn Merlos is a 33 y.o.  16w1d being seen today for f/u from ER.  Developed heavy vaginal bleeding/clots on 10/18/23 with mild lower abd cramping.  Hx of STEPHANIE early in pregnancy.  ED unable to perform vaginal US; seen in L&D with good fetal heart tones and negative cervical exam.  Bleeding had diminished by the time she left the ER.  Hx of SAB x 3.  Seen for routine PNC on 23- no bleeding at the time.  Denies vaginal discharge or dysuria.  Taking +PNV and Labetalol 100 mg BID.      Review of Systems  Genitourinary: Neg for cramping, SROM, or dysuria.       /76   Wt 122 kg (268 lb 9.6 oz)   LMP 07/15/2023   BMI 40.84 kg/m²     FHT: 129 BPM   Uterine Size:        Assessment    1) Pregnancy at 16w1d- US IMP:  visualization limited by GA, habitus, fetal position and fetal movement.  Fetus is very active.   bpm.  Breech. Amniotic fluid is normal. A 3.3x2.4x.9cm sonolucent area is seen within the placenta- can not rule out subchorionic hemorrhage. Can not rule out placental lake.     US on 23- posterior placenta; appears to be marginal previa.  bpm. CL 4.88cm    2) CF/SMA/FX neg, MT21 neg; AFP pending    3) RNI- avoid people with fever/rashes; can receive vaccine once you deliver    4) VNI- avoid people with fever/rashes; can receive vaccine once you deliver    5) PCOS- was on Metformin; Hgb A1c=5.9 on new OB labs; Early 2hr gtt (passed)- repeat 2 hr GTT 24-28wga    6) chronic HTN- stopped taking asa due to early bleeding;   ASA daily   Labetalol 100 mg BID; being followed by a cardiologist;   Baseline PIH labs WNL; did not complete 24 hr urine; P/C fizud=246  Needs growth US  28wga ( ), 32wga ( ), 36wga ( );     NST/BPP @ 32wga as on Rx ( )    7) anxiety/depression- currently stable on Wellbutrin and Lexapro    8) RPL- SAB x 3; no longer on progesterone     9)  s/p flu vaccine    10) HSV- gets genital lesions; HSV2  seropositive; will need suppressive therapy starting 34-36wga     11) nausea- has Unisom/vit B.6 if needed    12) insomnia- has Unisom if needed    13) maternal obesity- will need growth scans/NST    14) vaginal bleeding in 2nd trimester- Rh+; hx of STEPHANIE in early pregnancy; placental abnormality on today's US; discussed pelvic rest; referred to [facility] for opinion      Plan    Continue prenatal vitamins   Reviewed this stage of pregnancy  Problem list updated   Keep follow-up appt scheduled on 12/21/23    Veronique Bliss, MONICA     11/21/2023  13:15 EST

## 2023-11-22 ENCOUNTER — TRANSCRIBE ORDERS (OUTPATIENT)
Dept: ULTRASOUND IMAGING | Facility: HOSPITAL | Age: 33
End: 2023-11-22
Payer: COMMERCIAL

## 2023-11-22 DIAGNOSIS — O46.92 VAGINAL BLEEDING IN PREGNANCY, SECOND TRIMESTER: Primary | ICD-10-CM

## 2023-11-22 DIAGNOSIS — O26.20 RECURRENT PREGNANCY LOSS WITH CURRENT PREGNANCY: ICD-10-CM

## 2023-11-22 DIAGNOSIS — O43.102 PLACENTAL ABNORMALITY IN SECOND TRIMESTER: ICD-10-CM

## 2023-11-22 LAB
AFP INTERP SERPL-IMP: NORMAL
AFP INTERP SERPL-IMP: NORMAL
AFP MOM SERPL: 0.94
AFP SERPL-MCNC: 22.5 NG/ML
AGE AT DELIVERY: 33.6 YR
GA METHOD: NORMAL
GA: 16 WEEKS
IDDM PATIENT QL: NORMAL
LABORATORY COMMENT REPORT: NORMAL
MULTIPLE PREGNANCY: NO
NEURAL TUBE DEFECT RISK FETUS: NORMAL %
RESULT: NORMAL

## 2023-12-14 ENCOUNTER — OFFICE VISIT (OUTPATIENT)
Dept: OBSTETRICS AND GYNECOLOGY | Facility: CLINIC | Age: 33
End: 2023-12-14
Payer: COMMERCIAL

## 2023-12-14 ENCOUNTER — HOSPITAL ENCOUNTER (OUTPATIENT)
Dept: ULTRASOUND IMAGING | Facility: HOSPITAL | Age: 33
Discharge: HOME | End: 2023-12-14
Admitting: OBSTETRICS & GYNECOLOGY
Payer: COMMERCIAL

## 2023-12-14 ENCOUNTER — TRANSCRIBE ORDERS (OUTPATIENT)
Dept: ULTRASOUND IMAGING | Facility: HOSPITAL | Age: 33
End: 2023-12-14
Payer: COMMERCIAL

## 2023-12-14 VITALS
HEART RATE: 91 BPM | BODY MASS INDEX: 40.74 KG/M2 | SYSTOLIC BLOOD PRESSURE: 117 MMHG | TEMPERATURE: 98.4 F | DIASTOLIC BLOOD PRESSURE: 72 MMHG | WEIGHT: 268.8 LBS | HEIGHT: 68 IN

## 2023-12-14 DIAGNOSIS — O46.92 VAGINAL BLEEDING IN PREGNANCY, SECOND TRIMESTER: Primary | ICD-10-CM

## 2023-12-14 DIAGNOSIS — O26.20 RECURRENT PREGNANCY LOSS WITH CURRENT PREGNANCY: ICD-10-CM

## 2023-12-14 DIAGNOSIS — I10 ESSENTIAL HYPERTENSION: ICD-10-CM

## 2023-12-14 DIAGNOSIS — O46.92 VAGINAL BLEEDING IN PREGNANCY, SECOND TRIMESTER: ICD-10-CM

## 2023-12-14 DIAGNOSIS — I10 CHRONIC HYPERTENSION: ICD-10-CM

## 2023-12-14 DIAGNOSIS — O43.102 PLACENTAL ABNORMALITY IN SECOND TRIMESTER: Primary | ICD-10-CM

## 2023-12-14 DIAGNOSIS — O43.102 PLACENTAL ABNORMALITY IN SECOND TRIMESTER: ICD-10-CM

## 2023-12-14 PROCEDURE — 76811 OB US DETAILED SNGL FETUS: CPT

## 2023-12-14 NOTE — PROGRESS NOTES
MATERNAL FETAL MEDICINE Consult Note    Dear Dr Henrique Tam*:    Thank you for your kind referral of Jocelyn Merlos.  As you know, she is a 33 y.o.   at  19 3/7 weeks gestation (Estimated Date of Delivery: 24). This is a consult.      Her antepartum course is complicated by:  CHTN  Placental lakes  Low lying placenta, resolved    Aneuploidy Screening: low risk    HPI: Today, she denies headache, blurry vision, RUQ pain. No vaginal bleeding, no contractions.     Review of History:  Past Medical History:   Diagnosis Date    Abnormal Pap smear of cervix     Adjustment disorder with mixed anxiety and depressed mood 2016    COVID-19 2020    Hypertension     Since - currently on medication    Palpitations     occasionally with pregnancy    Polycystic ovary syndrome      Past Surgical History:   Procedure Laterality Date    BREAST AUGMENTATION      CERVICAL BIOPSY  W/ LOOP ELECTRODE EXCISION      FOOT SURGERY Right     plantar fascitis tendon release    LAPAROSCOPIC GASTRIC BANDING      LEEP      OTHER SURGICAL HISTORY      removal of eye tumor    OTHER SURGICAL HISTORY      tummy tuck    TONSILLECTOMY           Social History     Socioeconomic History    Marital status:    Tobacco Use    Smoking status: Former     Packs/day: 0.25     Years: 0.50     Additional pack years: 0.00     Total pack years: 0.13     Types: Cigarettes     Quit date: 2015     Years since quittin.6    Smokeless tobacco: Never   Vaping Use    Vaping Use: Never used   Substance and Sexual Activity    Alcohol use: Not Currently     Alcohol/week: 4.0 - 5.0 standard drinks of alcohol     Types: 4 - 5 Standard drinks or equivalent per week     Comment: Socially./caffeine use     Drug use: No    Sexual activity: Yes     Partners: Male     Family History   Problem Relation Age of Onset    Skin cancer Mother     Depression Mother     Anxiety disorder Mother     Stroke Mother     Aneurysm Mother      "Alcohol abuse Mother     Migraines Mother     Hemochromatosis Mother     Stroke Maternal Grandmother       No Known Allergies   Current Outpatient Medications on File Prior to Visit   Medication Sig Dispense Refill    Ascorbic Acid (VITAMIN C PO) Take  by mouth.      buPROPion SR (WELLBUTRIN SR) 150 MG 12 hr tablet Take 1 tablet by mouth Daily.      doxylamine (UNISOM) 25 MG tablet Take 1 tablet by mouth At Night As Needed for Sleep. 60 tablet 2    escitalopram (LEXAPRO) 10 MG tablet TAKE 1 TABLET BY MOUTH DAILY 90 tablet 0    folic acid (FOLVITE) 400 MCG tablet Take 1 tablet by mouth Daily.      labetalol (NORMODYNE) 100 MG tablet Take 1 tablet by mouth 2 (Two) Times a Day. 60 tablet 6    Omega-3 Fatty Acids (fish oil) 1000 MG capsule capsule Take  by mouth Daily With Breakfast.      prenatal vitamin (prenatal, CLASSIC, vitamin) tablet Take  by mouth Daily.      vitamin B-6 (PYRIDOXINE) 25 MG tablet Take 1 tablet by mouth Every Night. 60 tablet 1    vitamin D (ERGOCALCIFEROL) 1.25 MG (35746 UT) capsule capsule Take 1,000 Units by mouth.       No current facility-administered medications on file prior to visit.        Past obstetric, gynecological, medical, surgical, family and social history reviewed.  Relevant lab work and imaging reviewed.    Review of systems  Constitutional:  denies fever, chills, malaise.   ENT/Mouth:  denies sore throat, tinnitus  Eyes: denies vision changes/pain  CV:  denies chest pain  Respiratory:  denies cough/SOB  GI:  denies N/V, diarrhea, abdominal pain.    :   denies dysuria  Skin:  denies lesions or pruritus   Neuro:  denies weakness, focal neurologic symptoms    Vitals:    12/14/23 1115   BP: 117/72   BP Location: Right arm   Patient Position: Sitting   Pulse: 91   Temp: 98.4 °F (36.9 °C)   TempSrc: Temporal   Weight: 122 kg (268 lb 12.8 oz)   Height: 172.7 cm (68\")       PHYSICAL EXAM   GENERAL: Not in acute distress, AAOx3, pleasant  CARDIO: regular rate and rhythm  PULM: " "symmetric chest rise, speaking in complete sentences without difficulty  NEURO: awake, alert and oriented to person, place, and time  ABDOMINAL: No fundal tenderness, no rebound or guarding, gravid  EXTREMITIES: no bilateral lower extremity edema/tenderness  SKIN: Warm, well-perfused      ULTRASOUND   Please view full ultrasound note on Imaging tab in ViewPoint.  Cephalic presentation.  Posterior placenta, no longer low lying (2.1 cm from internal os)  MVP 4.2 cm, which is normal.    g (AC 52%)  Anatomy appears normal with a few limited heart and face views.   Cervical length 4.4 cm, which is normal.     ASSESSMENT/COUNSELIN y.o.   at  19 3/7 weeks gestation (Estimated Date of Delivery: 24).    -Pregnancy  [ X ] stable  [   ] improving [  ] worsening    Diagnoses and all orders for this visit:    1. Vaginal bleeding in pregnancy, second trimester (Primary)    2. Essential hypertension         Placental lakes, small and normal appearing  The 2nd-trimester placenta is homogeneous and uniformly echogenic.Occasional sonolucencies are considered normal. These placenta lakes can be transient and mostly represent intervillous dilated spaces filled with maternal blood--usually color flow is not able to demonstrate flow.  Early, numerous, and large sonolucencies may be significant. If multiple lucencies are seen before 20-25 weeks, or > 3 sonolucencies measuring > 3 cm are seen, then it is reasonable to be concerned about placental insufficiency.   The placenta accreta spectrum can have multiple, large, irregular sonolucencies with a \"moth eaten\" appearance.  We are not seeing this today and the placental interface looks very normal.  Recommend serial growths out of abundance of caution at primary OB.     Low lying placenta:  Resolved.  Pt last bled 8 days ago.  Recommended lifting restrictions and at least 1 more week before off pelvic rest.  Optimistic bleeding resolving.      CHTN  On labetalol " 100 mg BID  We discussed the risks of chronic hypertension including intrauterine growth restriction, increased risk of preeclampsia, increased risk of premature delivery, and increased risk for placental abruption.  I reassured her that with mild hypertension, no underlying cardiac disease, and normal renal function, most women do well in pregnancy.    Acceptable blood pressures in pregnancies with chronic hypertension and without renal damage are 120-140/70-90s. Newer data from the [facility] (2022, CHAP TRIAL), supports more aggressive bp treatment to below 140/90 (previously ,160 in CHTN) and that this does not impair fetal growth or well-being but reduces risks of pre-eclampsia,  birth, and other pregnancy morbidity.      I recommended serial growth ultrasounds every 4 weeks  to ensure appropriate fetal growth. In addition,  fetal testing 1-2x weekly should be initiated around 32 weeks gestation.  I would recommend a 38 week delivery given CHTN that has previously required medication.  She may require closer to 37 depending on her control closer to delivery.      Summary of Plan  -Serial growth ultrasounds every 4 - 6 weeks (by primary OB)   -Starting at 28 - 32 weeks: Weekly fetal  surveillance until delivery   -Starting at 28 weeks: Fetal movement instructions given continue daily until delivery; instructed to report to labor and delivery if cannot achieve more than 10 kicks in one hour or if she perceives a decrease in fetal movement    Follow-up: No follow up with MFM scheduled, but I am happy to see for follow up at request of primary obstetrician    Thank you for the consult and opportunity to care for this patient.  Please feel free to reach out with any questions or concerns.      I spent 30 minutes caring for this patient on this date of service. This time includes time spent by me in the following activities: preparing for the visit, reviewing tests, obtaining and/or reviewing  a separately obtained history, performing a medically appropriate examination and/or evaluation, counseling and educating the patient/family/caregiver and independently interpreting results and communicating that information with the patient/family/caregiver with greater than 50% spent in counseling and coordination of care.       I spent 3 minutes on the separately reported service of US imaging not included in the time used to support the E/M service also reported today.      Megan Guajardo MD [facility] [place]  Maternal Fetal Medicine-[facility]  Office: [phone]  [email or web address]

## 2023-12-14 NOTE — LETTER
2023       No Recipients    Patient: Jocelyn Merlos   YOB: 1990   Date of Visit: 2023       Dear Henrique Giles MD:    Thank you for referring Jocelyn Merlos to me for evaluation. Below are the relevant portions of my assessment and plan of care.    Encounter Diagnosis and Orders:  Diagnoses and all orders for this visit:    1. Vaginal bleeding in pregnancy, second trimester (Primary)    2. Essential hypertension        If you have questions, please do not hesitate to call me. I look forward to following Jocelyn along with you.         Sincerely,        Megan Guajardo MD    MATERNAL FETAL MEDICINE Consult Note    Dear Dr Henrique Tam*:    Thank you for your kind referral of Jocelyn Merlos.  As you know, she is a 33 y.o.   at  19 3/7 weeks gestation (Estimated Date of Delivery: 24). This is a consult.      Her antepartum course is complicated by:  CHTN  Placental lakes  Low lying placenta, resolved    Aneuploidy Screening: low risk    HPI: Today, she denies headache, blurry vision, RUQ pain. No vaginal bleeding, no contractions.     Review of History:  Past Medical History:   Diagnosis Date   • Abnormal Pap smear of cervix    • Adjustment disorder with mixed anxiety and depressed mood 2016   • COVID-19 2020   • Hypertension     Since - currently on medication   • Palpitations     occasionally with pregnancy   • Polycystic ovary syndrome      Past Surgical History:   Procedure Laterality Date   • BREAST AUGMENTATION     • CERVICAL BIOPSY  W/ LOOP ELECTRODE EXCISION     • FOOT SURGERY Right     plantar fascitis tendon release   • LAPAROSCOPIC GASTRIC BANDING     • LEEP     • OTHER SURGICAL HISTORY      removal of eye tumor   • OTHER SURGICAL HISTORY      tummy tuck   • TONSILLECTOMY           Social History     Socioeconomic History   • Marital status:    Tobacco Use   • Smoking status: Former     Packs/day: 0.25     Years: 0.50      Additional pack years: 0.00     Total pack years: 0.13     Types: Cigarettes     Quit date: 2015     Years since quittin.6   • Smokeless tobacco: Never   Vaping Use   • Vaping Use: Never used   Substance and Sexual Activity   • Alcohol use: Not Currently     Alcohol/week: 4.0 - 5.0 standard drinks of alcohol     Types: 4 - 5 Standard drinks or equivalent per week     Comment: Socially./caffeine use    • Drug use: No   • Sexual activity: Yes     Partners: Male     Family History   Problem Relation Age of Onset   • Skin cancer Mother    • Depression Mother    • Anxiety disorder Mother    • Stroke Mother    • Aneurysm Mother    • Alcohol abuse Mother    • Migraines Mother    • Hemochromatosis Mother    • Stroke Maternal Grandmother       No Known Allergies   Current Outpatient Medications on File Prior to Visit   Medication Sig Dispense Refill   • Ascorbic Acid (VITAMIN C PO) Take  by mouth.     • buPROPion SR (WELLBUTRIN SR) 150 MG 12 hr tablet Take 1 tablet by mouth Daily.     • doxylamine (UNISOM) 25 MG tablet Take 1 tablet by mouth At Night As Needed for Sleep. 60 tablet 2   • escitalopram (LEXAPRO) 10 MG tablet TAKE 1 TABLET BY MOUTH DAILY 90 tablet 0   • folic acid (FOLVITE) 400 MCG tablet Take 1 tablet by mouth Daily.     • labetalol (NORMODYNE) 100 MG tablet Take 1 tablet by mouth 2 (Two) Times a Day. 60 tablet 6   • Omega-3 Fatty Acids (fish oil) 1000 MG capsule capsule Take  by mouth Daily With Breakfast.     • prenatal vitamin (prenatal, CLASSIC, vitamin) tablet Take  by mouth Daily.     • vitamin B-6 (PYRIDOXINE) 25 MG tablet Take 1 tablet by mouth Every Night. 60 tablet 1   • vitamin D (ERGOCALCIFEROL) 1.25 MG (16737 UT) capsule capsule Take 1,000 Units by mouth.       No current facility-administered medications on file prior to visit.        Past obstetric, gynecological, medical, surgical, family and social history reviewed.  Relevant lab work and imaging reviewed.    Review of  "systems  Constitutional:  denies fever, chills, malaise.   ENT/Mouth:  denies sore throat, tinnitus  Eyes: denies vision changes/pain  CV:  denies chest pain  Respiratory:  denies cough/SOB  GI:  denies N/V, diarrhea, abdominal pain.    :   denies dysuria  Skin:  denies lesions or pruritus   Neuro:  denies weakness, focal neurologic symptoms    Vitals:    23 1115   BP: 117/72   BP Location: Right arm   Patient Position: Sitting   Pulse: 91   Temp: 98.4 °F (36.9 °C)   TempSrc: Temporal   Weight: 122 kg (268 lb 12.8 oz)   Height: 172.7 cm (68\")       PHYSICAL EXAM   GENERAL: Not in acute distress, AAOx3, pleasant  CARDIO: regular rate and rhythm  PULM: symmetric chest rise, speaking in complete sentences without difficulty  NEURO: awake, alert and oriented to person, place, and time  ABDOMINAL: No fundal tenderness, no rebound or guarding, gravid  EXTREMITIES: no bilateral lower extremity edema/tenderness  SKIN: Warm, well-perfused      ULTRASOUND   Please view full ultrasound note on Imaging tab in ViewPoint.  Cephalic presentation.  Posterior placenta, no longer low lying (2.1 cm from internal os)  MVP 4.2 cm, which is normal.    g (AC 52%)  Anatomy appears normal with a few limited heart and face views.   Cervical length 4.4 cm, which is normal.     ASSESSMENT/COUNSELIN y.o.   at  19 3/7 weeks gestation (Estimated Date of Delivery: 24).    -Pregnancy  [ X ] stable  [   ] improving [  ] worsening    Diagnoses and all orders for this visit:    1. Vaginal bleeding in pregnancy, second trimester (Primary)    2. Essential hypertension         Placental lakes, small and normal appearing  The 2nd-trimester placenta is homogeneous and uniformly echogenic.Occasional sonolucencies are considered normal. These placenta lakes can be transient and mostly represent intervillous dilated spaces filled with maternal blood--usually color flow is not able to demonstrate flow.  Early, numerous, and " "large sonolucencies may be significant. If multiple lucencies are seen before 20-25 weeks, or > 3 sonolucencies measuring > 3 cm are seen, then it is reasonable to be concerned about placental insufficiency.   The placenta accreta spectrum can have multiple, large, irregular sonolucencies with a \"moth eaten\" appearance.  We are not seeing this today and the placental interface looks very normal.  Recommend serial growths out of abundance of caution at primary OB.     Low lying placenta:  Resolved.  Pt last bled 8 days ago.  Recommended lifting restrictions and at least 1 more week before off pelvic rest.  Optimistic bleeding resolving.      CHTN  On labetalol 100 mg BID  We discussed the risks of chronic hypertension including intrauterine growth restriction, increased risk of preeclampsia, increased risk of premature delivery, and increased risk for placental abruption.  I reassured her that with mild hypertension, no underlying cardiac disease, and normal renal function, most women do well in pregnancy.    Acceptable blood pressures in pregnancies with chronic hypertension and without renal damage are 120-140/70-90s. Newer data from the NIH (2022, CHAP TRIAL), supports more aggressive bp treatment to below 140/90 (previously ,160 in CHTN) and that this does not impair fetal growth or well-being but reduces risks of pre-eclampsia,  birth, and other pregnancy morbidity.      I recommended serial growth ultrasounds every 4 weeks  to ensure appropriate fetal growth. In addition,  fetal testing 1-2x weekly should be initiated around 32 weeks gestation.  I would recommend a 38 week delivery given CHTN that has previously required medication.  She may require closer to 37 depending on her control closer to delivery.      Summary of Plan  -Serial growth ultrasounds every 4 - 6 weeks (by primary OB)   -Starting at 28 - 32 weeks: Weekly fetal  surveillance until delivery   -Starting at 28 " weeks: Fetal movement instructions given continue daily until delivery; instructed to report to labor and delivery if cannot achieve more than 10 kicks in one hour or if she perceives a decrease in fetal movement    Follow-up: No follow up with MFM scheduled, but I am happy to see for follow up at request of primary obstetrician    Thank you for the consult and opportunity to care for this patient.  Please feel free to reach out with any questions or concerns.      I spent 30 minutes caring for this patient on this date of service. This time includes time spent by me in the following activities: preparing for the visit, reviewing tests, obtaining and/or reviewing a separately obtained history, performing a medically appropriate examination and/or evaluation, counseling and educating the patient/family/caregiver and independently interpreting results and communicating that information with the patient/family/caregiver with greater than 50% spent in counseling and coordination of care.       I spent 3 minutes on the separately reported service of US imaging not included in the time used to support the E/M service also reported today.      Megan Guajardo MD [facility]  Maternal Fetal Medicine-[facility]  Office: [phone]  [email or web address]

## 2023-12-14 NOTE — PROGRESS NOTES
Pt reports that she is doing well and denies vaginal bleeding, cramping, contractions or LOF at this time. Notes the last vaginal bleeding/spotting she had was 2 days ago. Reports feeling slight fetal movements at this point in pregnancy. Reviewed when to call OB office or present to L&D for evaluation with symptoms such as decreased fetal movement, vaginal bleeding, LOF or ctxs. Pt verbalized understanding. Denies HA, visual changes or epigastric pain. Denies any additional complaints at time of appointment. Needs to schedule next OB appointment.     Vitals:    12/14/23 1115   BP: 117/72   Pulse: 91   Temp: 98.4 °F (36.9 °C)

## 2023-12-15 ENCOUNTER — TELEMEDICINE (OUTPATIENT)
Dept: FAMILY MEDICINE CLINIC | Facility: CLINIC | Age: 33
End: 2023-12-15
Payer: COMMERCIAL

## 2023-12-15 DIAGNOSIS — M79.10 MYALGIA: ICD-10-CM

## 2023-12-15 DIAGNOSIS — U09.9 PERSISTENT FATIGUE AFTER COVID-19: ICD-10-CM

## 2023-12-15 DIAGNOSIS — R74.8 ELEVATED LIVER ENZYMES: ICD-10-CM

## 2023-12-15 DIAGNOSIS — U09.9 PERSISTENT NEUROLOGIC SYMPTOMS AFTER COVID-19: ICD-10-CM

## 2023-12-15 DIAGNOSIS — F41.9 ANXIETY: ICD-10-CM

## 2023-12-15 DIAGNOSIS — U09.9 POST-COVID-19 SYNDROME: ICD-10-CM

## 2023-12-15 DIAGNOSIS — R53.83 PERSISTENT FATIGUE AFTER COVID-19: ICD-10-CM

## 2023-12-15 DIAGNOSIS — M54.2 NECK PAIN: ICD-10-CM

## 2023-12-15 DIAGNOSIS — R73.03 PREDIABETES: ICD-10-CM

## 2023-12-15 DIAGNOSIS — R41.89 BRAIN FOG: ICD-10-CM

## 2023-12-15 DIAGNOSIS — E66.01 CLASS 3 SEVERE OBESITY WITH BODY MASS INDEX (BMI) OF 40.0 TO 44.9 IN ADULT, UNSPECIFIED OBESITY TYPE, UNSPECIFIED WHETHER SERIOUS COMORBIDITY PRESENT: ICD-10-CM

## 2023-12-15 DIAGNOSIS — G43.709 CHRONIC MIGRAINE WITHOUT AURA WITHOUT STATUS MIGRAINOSUS, NOT INTRACTABLE: ICD-10-CM

## 2023-12-15 DIAGNOSIS — Z14.8 HEMOCHROMATOSIS CARRIER: ICD-10-CM

## 2023-12-15 DIAGNOSIS — M25.50 MULTIPLE JOINT PAIN: ICD-10-CM

## 2023-12-15 DIAGNOSIS — R29.90 PERSISTENT NEUROLOGIC SYMPTOMS AFTER COVID-19: ICD-10-CM

## 2023-12-15 DIAGNOSIS — E78.2 MIXED HYPERLIPIDEMIA: ICD-10-CM

## 2023-12-15 DIAGNOSIS — Z3A.21 21 WEEKS GESTATION OF PREGNANCY: Primary | ICD-10-CM

## 2023-12-15 DIAGNOSIS — F33.42 MAJOR DEPRESSIVE DISORDER, RECURRENT, IN FULL REMISSION: ICD-10-CM

## 2023-12-15 DIAGNOSIS — E79.0 HYPERURICEMIA: ICD-10-CM

## 2023-12-15 DIAGNOSIS — O26.20 RECURRENT PREGNANCY LOSS WITH CURRENT PREGNANCY: ICD-10-CM

## 2023-12-15 DIAGNOSIS — O43.102 PLACENTAL ABNORMALITY IN SECOND TRIMESTER: ICD-10-CM

## 2023-12-15 DIAGNOSIS — E55.9 VITAMIN D DEFICIENCY: ICD-10-CM

## 2023-12-15 DIAGNOSIS — R42 DIZZINESS: ICD-10-CM

## 2023-12-15 DIAGNOSIS — O46.92 VAGINAL BLEEDING IN PREGNANCY, SECOND TRIMESTER: ICD-10-CM

## 2023-12-15 NOTE — PROGRESS NOTES
Subjective   Jocelyn Merlos is a 33 y.o. female who is being evaluated by video visit today in follow up of dizziness, moods, headaches, post-Covid syndrome/ long term symptoms, hyperlipidemia, vitamin D deficiency, elevated LFT, hemochromatosis carrier, skin lesions, and specialists. She is also pregnant with bleeding and increased lightheadedness.     Dizziness  Associated symptoms include fatigue (improving). Arthralgias: improving. Headaches: improved with treatment. Myalgias: improving. Nausea: no constant nausea. Weakness: improving.  DepressionPatient presents with the following symptoms: confusion (improving), decreased concentration (improving), nervousness/anxiety (worse with concerns of surgery) and palpitations (improving).  Patient is not experiencing: suicidal ideas.  Shortness of breath: improving.    You have chosen to receive care through a telehealth visit.  Do you consent to use a video/audio connection for your medical care today? Yes    NOTE TO [employer]- Fax- [phone]- [place]. Direct # [phone].   Previously-  attn Ms Gregorio   She will eventually get a notice from [employer] and may change to long term disability- still keep insurance.    Pregnancy with bleeding in pregnancy- 4 big bleeds with golf ball size clots. Last bleed was 9 days ago. Heavy bleeding for 3-5 hours- fills a couple pads then spotting. She had spotting for a few weeks after initial bleed at 7 weeks. She has bled for 7 weeks throughout her pregnancy. Has worn a pad for more days than not.   When on her feet extended period of time, she bleeds. Cramping and tugging- not extreme.  Nausea- has lost weight because feeling full. Has lost about 8-9 lbs. Gained no weight and has lost. As soon as she starts eating, she is very full. Eating small meals frequently. Small bowl of shredded wheat. Big snacks. Protein- not the best choices- meat has been disgusting. Certain things sound discussed. Main things- bagels, shredded wheat, and  yogurt- activia with constipation. Watermelon and oranges.   On prenatal and extra folic acid.   Seen by MFM- Will go back in 1 month- she will be 23 weeks and 2 days at her next follow up.   Because of blood pressure issues, they will not allow longer than 38 weeks.   After high risk again in 1 month, they will release back to her GYN   Insurance has a program and they will give her a nurse that she can talk to through her insurance  She has had increased light-headedness. Not more dizziness- having no more dizzy spells but having light-headedness between dizzy spells.     History of Covid 19 again 7/2022- she felt poorly, started to develop a rash. Has had resolution of symptoms back to baseline from initial Covid 19 infection.     She was working on diet- she is working on what she can control. She got a stationary bike and has used as she could tolerate. She had dizziness all day when she tried to exercise outdoors. Dizziness was more consistent and worse.   Dizziness-Not improving-neurology recommended cyproheptadine.  There was a possibility of changing Lexapro levels.  I discussed with neurologist and we advised could take medication.  No improvement in dizziness with the medication. Has not helped.  Noticed that she has sensory overload. When she has more light or sound or things going on, she has pressure in her head and everything is amplified. She notices even in her car alone- bright and with movement is overstimulated. Still dizziness with eyes closed and with being up and moving. She had increased nausea.    Seen by Dr Izquierdo- ENT [facility]- specializes in dizziness- he advised not sure but feels like it is similar to other long Covid patients. Tried verapamil  mg once  daily. Follow up 6 weeks 5/11/2022 at 2 pm and no further treatment that they could provide.   She had appt with [facility] 7/2022 but contracted Covid 19. She was told she would have to reschedule her appt- soonest was  10/13/2022.   Verapamil zoned out. She did not have improvement with medication. They advised she had exhausted all treatments and testing.   10/26/2022- Patient was seen at [facility] 10/13/2022 for peripheral vertigo, cervicocranial syndrome, intractable migraine, imbalance, vestibular neuritis.  Impression was complex issues of dizziness, imbalance, and intermittent migraine headache-likely overlap between peripheral vestibular disturbance abnormal cervical spine biomechanics, and tendency towards migraine.  They were concerned for migraine activity without headache.  Possible neuritic irritation of the inner ear as a postviral syndrome.  Advised start B2 200 mg 2 times daily as migraine supplement and referred for cervical and vestibular physical therapy with Carlos Barba.  Patient to contact provider after physical therapy was completed.  The only PT that they recommended to is not covered ([facility]- [facility]ba) and had to see someone else. The people she was seeing are now going to the office that does not take her insurance and has to change to [facility]. Last seen 12/2022 and had fallen down the steps. She did not think much about it and did not talk with them. Did not think about it until she fell in the tub.   [facility] told when done with PT, reach out on MyChart and they would start Gabapentin.   Has fallen twice- did not remember what caused the fall. 1st fell down the steps- thinks she missed a step and fell back. Had a bad bruise on her back.   She then fell in the bathtub. Thinks she slipped when getting up from the bathtub.   Has never fallen a lot and has fallen twice in 3 months.   She was on Neurontin- he advised to try 1 month. She did not tolerate and did not help.  She was seen by telehealth 6/5/2023 to determine any additional treatment.   No changes in dizziness. She tries to read about long term Covid- a lot of people with dizziness.    MRI brain w/wo 3/2021- ovoid mass left orbit.  Otherwise, normal MRI brain.   MRI brain and IAC w/wo 10/21/2021- evidence of removal of orbital mass, scalp incision. Otherwise normal.   Eye Surgeon- Dr Bowman 10/28/2021- cleared for PRN follow up.  Neurology- Dr Hogan- has advised she see ENT, referred for sleep study- negative, treated with Topamax and Imitrex as needed, now increased Topamax and referred for vestibular therapy and given Meclizine PRN. She reports the Meclizine did not help, did not tolerate increased dose of Topamax, and is awaiting PT. She was advised to follow up in 4 months and is hesitant about this, as her symptoms are debilitating and preventing safe return to work.    ENT- Dr Holt/ [facility]- last seen 12/2021 and was advised she took the wrong Mg (she took Magnesium oxide as listed in the chart), tried mouth guard without relief, and was advised this couldbe more neurological and to follow up with neurology.  She had dizziness prior to eye surgery but has had significant worsening after surgery. She went back after surgery ,told him she was dizzy, and he told her that was normal and that she lost a lot of blood in surgery.   She had 2 accidents on [employer].   The 1st was 8/31/2021- it was her 1st week back after her accident. She was picking up parts that were up high and she gets most dizzy and wobbly. She thinks she may have wobbled and dropped the parts.   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 When she lifted it, she has to tilt it back. 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 They have boxes hanging down from the line. The She has never hit it before. She was due for physical last night. They asked if she had dizziness or light-headedness. She was failed because she has dizziness. 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 No associated BP, palp.   Most dizzy when she is bending, looking up, doing thing around her house. Work  advised she see me with a note that gave restrictions.   She has been cutting atenolol in 1/2- she then felt CP or heart beating fast. She had her BP checked and had elevated BP 16 systolic. After 5-6 days, she started taking whole Atenolol and had improvement in BP. No change in dizziness with elevated or normal BP.   She had palpitations- cleared by cardiology. She thought this could contribute but has improved.   They thought dizziness was positional and treated for vertigo- positional treatment. No improvement.   She went to ENT- they did hearing test and lay down and sit up without concerns. They want to come back and do testing that takes 1 hour. They have seen a lot of people that struggle with migraines after Covid have dizziness.  10/2021 she had a terrible period- dizziness, nauseated, felt like she would not make it from the toilet to the bed, cold sweats, pale, felt like death for a full day then improved. She has always struggled with bad cramping. This was much worse. No contraception. Previously Nexplanon was not good. They could not get IUD in and has felt poorly on birth control. Patient's step daughter had mood issues on patch.   11/2021- She reported she was using mouthguard. Also took Mg and developed a rash under chin/neck. She estimated about 5-20 episodes of dizziness per day- last 20-30 seconds. Long enough to disrupt what she is doing. It is spinning dizziness- feels like if you were drunk. She has also had the sensation of shakiness- things shaking in her vision- intermittent but not every day. She had a couple episodes with nausea with the dizziness but not bad. Sometimes has wondered if it is associated- is not every time. Told that it was the wrong Mg but ENT thought it should be a neurological issue  Seen by neurology- they gave Meclizine- did not help. She also doubled Topamax as directed- flt poorly but did not help dizziness. She has decreased again to 50 mg.   She kept a tally sheet  to ensure she could answer the amount of episodes per day rather than estimating- is having 12-13 episodes per day.   Constantly seeing things out of the corner of both of her eyes. There are times that she feels like it is there and is surprised when it is not there- size ranges between a mouse and a person. Has every day at different points.  1/2022- She noted worsening dizziness and was having dizziness with her eyes closed.  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 She had follow up with Dr Bowman and was advised PRN follow up 10/28/2021. He did not seem to think her dizziness was related to her surgery. She has had lazy eye more- she reports she had mild lazy eye on the right since she was a child. Now the left eye is wider and now it made the right eye more lazy.     Patient was seen by Dr Bowman who recommended removal of mass. She was very nervous about surgery. Patient reports she was so shocked that she could not think of questions then had questions about incisions, recovery, and others.   Surgeon told her not a big deal and 1-2 hour surgery. She was in surgery for 3-4 hours. Had trouble waking up. They advised they could keep her but that she would do better at home. Still numb above her eye and could not raise her left eyebrow. She was told that it should improve in 6 months.     Depression and anxiety-patient was on Lexapro and Wellbutrin.  She had a positive pregnancy test and was advised to stop Wellbutrin.  She was advised to talk with her OB/GYN about continuing Lexapro or the need to stop it.  She has appointment today for follow-up with them.  Lexapro- for a while, having bad dreams. The dreams improved but she still has them intermittent that she dreams constantly. She has had improvement in anxiety daily but if something happens and she has a stressful situation, she still has some panic and emotional issues. Still having counseling  every week. She is working on things to do when she has these episodes. Reminding herself that it will be ok and why it will be ok and why it was a good thing. She mentioned increasing Lexapro.   She previously reported she had more depression- she gets down on herself about her year. Her therapist thought that she may need to increase Lexapro. Patient reported she was not depressed or sad all the time but had days that she was more down. No SI/HI.   She then had increased depression- she has had decreased interest in anything, exhaustion, not wanting to be around people, sleeping a lot, not keeping up with home chores or things she would normally do at home. It was not abnormal to be in her feelings or in a funk for a few days but she had severe symptoms. Not wanting to shower or take care of herself. She has had increased depression. Talked to therapist about her moods and they discussed possible changes in medication. She felt some better after talking but she still has depression. Therapist was out a week but follow up 10/19/2021. She thought that contributes to her depression- she is tired of trying to figure out what is contributing to her symptoms since Covid. She previously knew her body and when something was going on. She does not now and is bothersome.  Patient knows her father struggled with his mental health, and she worried that she could have issues.   She has had bad anxiety since her grandfather , when she has anxiety, playing her grandfather's death over and over.   She moved a couple years ago and started worrying her house will burn down.   With anxiety in the past (prior to Covid 19 infection), she was seeing a therapist and reported she was doing much better. She would occasionally miss work with anxiety/depression but had FMLA. She usually did well and was able to work, do things socially.  Moods were difficult to  with feeling poorly all the time, inability to return to normal life  "and work, and uncertainty of prognosis. She felt the Lexapro helped-felt like it does not help with major situations but does helped with daily symptoms. Wellbutrin- not sure she adjusted to it and continues to feel tired in the day. She thought it may help daily when things are normal. Since she had increased stressors and uncertainties, she was not sure it is controlled. She was seeing her therapist weekly which helped- feels better upon leaving every week. If it starts to build up again through the week. It is almost time to see her again.   She felt medication and counseling 1-2 x weekly helped moods. She had episodes of being hopeless and being anxious. However, she was able to feel better if with a counseling session. Her animals also help moods- emotional support.   She got really down for a while. Still seeing therapist weekly. She could not do well with future and taking things day by day.  She was unsure if fatigue but was more grumpy. Still seeing therapist a little more spread out. She has migraines and has to reschedule.  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  Work was laid off for 2 weeks-she attempted to go back to work after layoff. When she started to feel better, something mentally started crashing down. As she started to feel better physically, she started \"freaking out\". She was having panic attacks.   Patient related the feeling to being on a plane to [place] years ago, she felt like she was back on the plane and ready to crash.   She worried about going back to work without FMLA and worries about losing her job. She reports her friend at work told her they are firing people \"left and right\". She thinks she will lose her job and does not have FMLA  (She has to acquire so much leave to qualify for FMLA).    She had anxiety doing anything, worst at night with laying down. She thought something could be wrong with her heart. " Cardiology cleared her but she feels like something is wrong with her heart when she has episodes. She tries to hold it in.   She was doing well on Lexapro. She increased to 10 mg once daily. She did have significant fatigue upon starting medication, however, this improved. She continued with anxiety, however, she was able to calm herself down many times. She was working with mental health therapist twice weekly and was starting to improve. She felt she should try to go back to work slowly. There may be a layoff but she could go in to work but the line will not be moving, all the people were not there, and she could come and go- did not have to work full 12 hours. She thought this would be more helpful to try to go in and start trying to do her job without the pressure and interaction with others. She reported she would be able to leave if she had a panic attack. Despite still having anxiety, she thought this would improve some if she could get back to work and on a routine. She wanted to try.  She then had increased depression- she has had decreased interest in anything, exhaustion, not wanting to be around people, sleeping a lot, not keeping up with home chores or things she would normally do at home. It was not abnormal to be in her feelings or in a funk for a few days but she had severe symptoms. Not wanting to shower or take care of herself. She has had increased depression. Talked to therapist about her moods and they discussed possible changes in medication. She felt some better after talking but she still has depression. Therapist was out a week but follow up 10/19/2021. She thought that contributes to her depression- she is tired of trying to figure out what is contributing to her symptoms since Covid. She previously knew her body and when something was going on. She does not now and is bothersome.    Cough- no complaints today.   11/17/2021- she had a tickle cough at night only that started about 1 month  ago. No coughing in the day. No SOA or other symptoms, signs of illness.   She was advised can treat GERD and use Mucinex DM if needed. Otherwise consider albuterol as needed.     Covid 19 vaccine (Pfizer)- got initial vaccine 4/12/2021. She was tired that day then back to baseline.   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  Patient had significant symptoms following Covid 19 infection. She developed symptoms of Covid 19 12/24/2020 and tested positive 12/28/2020. She had hypoxia and tachycardia with ambulation, SOA, chest tightness, weakness, fatigue, brain fog, diarrhea, and nausea with resolution of severe rash. She went to ER with oxygen saturation of 88%. CTA chest with mild pneumonia and negative for PE. Inflammatory markers were not performed. They did not evaluate symptoms with ambulation and resting vital signs were ok.  While on video visit, patient had oxygen saturation of 97% and pulse in 90s then with relatively little ambulation/ exertion, she had pulse 127 and oxygen saturation down to 88%. In office, she had a normal resting pulse that increased to 124 with ambulation and oxygen saturation decreased from 95% to 90% with ambulation.     She had persistent tachycardia and hypoxia with walking short distances (that is slowly improving), elevated BP that improved, cognitive impairment, fatigue, headaches, and feeling overall poorly. She was seen by the long term Covid infectious disease clinic, had additional labs, was referred to physical therapy, speech, and cognitive therapy, and was advised to contact her cardiologist for follow up with persistent tachycardia with ambulation. She underwent PTand speech/cognitive therapy. She called cardiology to schedule testing ordered at [facility] prior to Covid 19 then scheduled follow up evaluation of post-Covid tachycardia/ arrhythmia as directed. He changed her  propranolol to atenolol for tachycardia and is awaiting Holter monitor and echocardiogram. She is tolerating medication well.     I referred for MRI brain and to neurology for severe headaches. She had severe, debilitating headaches. She had to go to ER but had no testing performed there. Excedrin Migraine, Fiorcet, Zofran, and narcotic pain medication have not helped headaches. I discussed beta blocker, Elavil, and Topamax at her last visit, however, she was concerned about possible AE with medication, as fatigue, arrhythmia, and cognitive dysfunction are already some of her biggest issues. She was unable to tolerate her headaches and felt the possible AE with medication were worth the risk for improvement in headaches. She tried Inderal LA 60 mg nightly without improvement and was started on Topamax by neurology. She has had significant AE with medication. I had her decrease to 25 mg at bedtime then she was able to increase again to BID and tolerated better.      I contacted infectious disease provider for recommendations for return to work vs work restrictions. She recommended either waiting to return to work until she had some improvement with therapy or may try light duty if available to see if she will tolerate this until she has improvement. 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    She returned to work and noted multiple cognitive issues. She reports they had her work 2 hours then  sent her home with no work available. 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 She felt she needed more cognitive therapy to safely return to work. I had her remain off work until her follow up with long term Covid clinic. She has been advised to remain in contact with her employer to ensure her job is secure and leave is approved.     She felt ready to return to work and returned 6/2021. 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 She was seen by medical for CPE and was advised not to work until she was no longer dizzy, as she was a risk on the line, on the floor, and with forklift.     Hypoxia- improved. Not feeling as bad with SOA but still notices some.   lowest has been about 90s% and maybe 88% if pushing it. Improving gradually  She had hypoxia and tachycardia with ambulation, SOA, chest tightness, weakness, fatigue, brain fog, diarrhea, and nausea with resolution of severe rash.   She went to ER with oxygen saturation of 88%. CTA chest with mild pneumonia and negative for PE. Inflammatory markers were not performed. They did not evaluate symptoms with ambulation and resting vital signs were ok.    While on video visit, patient had oxygen saturation of 97% and pulse in 90s then with relatively little ambulation/ exertion, she had pulse 127 and oxygen saturation down to 88%. In office, she had a normal resting pulse that increased to 124 with ambulation and oxygen saturation decreased from 95% to 90% with ambulation.   Tachycardia/ elevated BP- had more controlled pulse on atenolol. Heart rate has improved. She tried to get off and worsens. She does not have to see cardiology any longer. PRN follow up.   Pulse with activity- at PT around 120, she stops her and if pushes self at home she gets up to 150.  "BP and pulse have improved some but continues with pulse that is elevated but oxygen has stayed above 90.   She has persistent tachycardia and hypoxia with walking short distances (that is slowly improving), elevated BP that is improving  She called cardiology to [facility] then scheduled follow up evaluation of post-Covid tachycardia/ arrhythmia as directed.   He changed her propranolol to atenolol for tachycardia and is awaiting Holter monitor and echocardiogram. She is tolerating medication well.   Cardiology- changed inderal to atenolol.   Echo- ok.   Fatigue- still struggling. Now having to fight to get up and do things but not as much. She has had improvement but not resolution. Worse with Wegovy.   Cognitive deficit/dysfunction- worse with verapamil. The 1st week she took it, she felt like she should not be driving. Now back to wheere she was prior to the new medication but has not improved. Different from previous brain fog. Things slip away quickly. She will  her phone to do something and will forget   has been one of the worst things too- cannot remember things. She will be getting ready to do something and forgets. Sometimes she will get it back and sometimes she will not. She reports she tries to think of something and totally forgets. Not as severe as right after COvid but continues to be a problem.   Still \"brain farts\", short term memory issues, headaches, oxygen levels- not sure if Topamax has amplified brain fog but has been prevalent.   This symptom was her most worrisome issue.  She was struggling with cognitive issues which cause increased anxiety.  Headaches- has had some headaches more recently. Not migraine headache but all over nagging headaches. Started about when starting Wegovy.    has had a few headaches since stopping Topamax. She was able to stop quickly. Did not have to taper as long. Not nearly as bad as prior to Topamax. No improvement " in cognition.   She had right sided headache and headache behind her eyes, nausea with vomiting, eye was red and watering, right sided facial and eyelid drooping, eyebrow was not normal. She reported it woke her up from sleep.   She went to ER 3/11/2021 with drooping eyelid, bloodshot eye, watering. She was in tears due to severe pain but they did no testing. By the time they gave her a headache cocktail, her headache was already improving. She was ready to go home because she did not feel she was getting any help. They discharged her with Fiorcet and Zofran that have not helped her headaches.   She was given pain medication from previous foot surgery and never took it. She tried it and got sick but did not help her pain.  I referred for MRI brain and to neurology for severe, debilitating headaches.    I initially discussed beta blocker, Elavil, and Topamax, however, we were concerned about possible AE with medication, as fatigue, arrhythmia, and cognitive dysfunction were already some of her biggest issues. She was unable to tolerate her headaches and felt the possible AE with medication were worth the risk for improvement in headaches.   She tried Inderal LA 60 mg nightly without improvement.   Headaches occurred almost always when she was asleep. She took Tylenol and tried Excedrin Migraine. They were waking her up at night, which was abnormal for her.    She was seen by neurology 3/30/2021 started on Topamax 25 mg twice daily and Imitrex as needed for headaches by neurology. She has had significant symptoms with this. I discussed possibly cutting dose in 1/2 initially to 25 mg at bedtime then possible increase in dosage as tolerated.   I referred to sleep medicine as recommended by neurology.   Topamax helped headaches. She had a headache all day once but otherwise had no other bad headaches. She had paresthesias and worsening cognitive funciton, brain fog, ability to think clearly. I asked that she decrease to  "nightly dosing only for a couple weeks then could increase to twice daily. She then tolerated better.   She had improvement in headaches on Topamax. Decreased as directed then was able to increase to twice daily again and had improved headaches and no worsening neurological/cognitive symptoms. She stopped having severe, debilitating headaches and stopped waking up with headaches. Now following with neurology.   She then had to stop Topamax after developing kidney stones.   Snoring- does not think she is snoring as bad.   Has never been someone who snored but since Covid, she is snoring loud, sometimes waking her . Negative Sleep study- no EVERETT.   Hair loss- slowed down.   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 When she started to feel better, something mentally started crashing down. As she started to feel better physically, she started \"freaking out\". She was having panic attacks.   Patient related the feeling to being on a plane to [place] years ago, she felt like she was back on the plane and ready to crash.   She worried about going back to work without FMLA and worries about losing her job. She reports her friend at work told her they are firing people \"left and right\". She thinks she will lose her job and does not have FMLA  (She has to acquire so much leave to qualify for FMLA).    She had anxiety doing anything, worst at night with laying down. She thought something could be wrong with her heart. Cardiology cleared her but she feels like something is wrong with her heart when she has episodes. She tries to hold it in.   She was doing well on Lexapro. She increased to 10 mg once daily. She did have significant fatigue upon starting medication, however, this improved. She continued with anxiety, however, she was able to calm herself down many times. She was working with mental health therapist twice weekly and was starting to improve. She " felt she should try to go back to work slowly. There may be a layoff but she could go in to work but the line will not be moving, all the people were not there, and she could come and go- did not have to work full 12 hours. She thought this would be more helpful to try to go in and start trying to do her job without the pressure and interaction with others. She reported she would be able to leave if she had a panic attack. Despite still having anxiety, she thought this would improve some if she could get back to work and on a routine. She wanted to try.  She then had increased depression- she has had decreased interest in anything, exhaustion, not wanting to be around people, sleeping a lot, not keeping up with home chores or things she would normally do at home. It was not abnormal to be in her feelings or in a funk for a few days but she had severe symptoms. Not wanting to shower or take care of herself. She has had increased depression. Talked to therapist about her moods and they discussed possible changes in medication. She felt some better after talking but she still has depression. Therapist was out a week but follow up 10/19/2021. She thought that contributes to her depression- she is tired of trying to figure out what is contributing to her symptoms since Covid. She previously knew her body and when something was going on. She does not now and is bothersome.    Joint pain, back and neck pain- bottoms of both feet have been hurting really bad and aching. She has the most pain at the ball of the foot. Propping up helps some at night. Very tender.   she still has joint pain. Not as bad with back and neck pain. Her knees are more sore sometimes. Right foot with previous surgery hurts more.    She had improvement in the pain. She was still not back to baseline but improved.   Foot that had tendon release surgery hurt the worst- some improvement in other pain.   When she was a kid, she had a tubing accident but  had hip pain after that. He hip hurt more after Covid. No recommendations from PT. States they did not have any additional treatment.      Therapy- still in mental health counseling.   Physical therapy- doing PT with [facility] as directed by [facility]  Once weekly once she went back to work. When she was off work, she went 2-3 x. Has been walking with her dog per PT but she was scared of triggering a migraine because she was scared that a bump or anything would cause migraine.   Speech and cognitive therapy- There was initially a mistake on their part. Speech therapy- told her none of their other patients went back to full time. Other people had gone back 4 hours per day and gradually increased to 6 then 8 hours. They were concerned they would not be able to go back 12 hours.    Mental health counseling- seeing her therapist and she has her using an joaquim for relaxation and mind sharpness- doing that once daily.    Return to work- 5/11/2021 -she tried to return to work again and had a panic attack.  She started having severe anxiety about doing her job, remembering, and being able to function at work.  She has never had anything this severe in the past.  She went to work but had to leave. 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 She was seen for physical by medical and was advised she could not work with dizziness due to the risks.   She previously went back to work and had to leave after 2 hours. She reports they asked her to tell someone something and she couldn't remember who that was. They thought this was someone she should know but she had no idea. Also, the process of going into work- line, temperature, getting mask- couldn't remember how to do it. She states it was like she was a new hire. Things that she thought should have come back as soon as she saw it did not come back. They had her sit at a picnic table x 2 hours until  they decided she should go home on no work available. They still have the heat on and with the heat and her mask, she felt claustrophobic. No other symptoms that she is aware.    Morbid obesity-Medication was stopped.  She was nervous- Wegovy- made her exhausted all day every day. She has never experienced that for this long. Started 4/20/2023 and has lost 6 lbs. Same time-  and mother have been on it. Mother gained weight and  did not lose a lb. She is not as active because she is exhausted. Even with dizziness, she tries to fight through it but cannot. Will take a 4 hour nap.    Mixed hyperlipidemia- not sure about numbers. Has not lost significantly.   Slipped up through holidays and gained 10 lbs. She has been back on track for 2 weeks. She got excited and thought a few cheats and has now gotten better. Started a challenge- 5 days a week and doing a workout program to try to help with working out. Enjoying it and working on different parts of her body. Slowly but surely making lifestyle changes.   She is not plant based at this point. She only had fast food once and felt bad and was otherwise been eating healthy and working hard. She has exercised as tolerated. She lost about 15 lbs.   Watched [employer]- she tried no eating meat and changed to almond milk. She has not stopped cheese/dairy completely yet. Feels she can do it. She is not completely plant based but is her goal. 1/2022- worsening cholesterol- elevated T Chol, LDL, and TG.   Prediabetes-still having diarrhea- has never resolved. She has BM 5-10 x daily. Prior to metformin, she was going about 2-3 x daily. It is urgent and is sudden onset.   She has been taking Metformin.   Stopped Metformin and had significant improvement. She had not had solid BM in a long time. Now has improvement in BM. She felt terrible on medication.  Stopped having menses when she stopped Metformin. She was having normal menses while on Metformin. She had  "menses 3/13/2022.  1/2022- A1C up to 5.8%. She restarted Metformin - fertility specialist refilled. Metformin gives diarrhea and she has had more nausea since restarting. Stopped having menses when she stopped Metformin. She was having normal menses while on Metformin. She had menses 3/13/2022.  She was doing well- she worked on diet and exercise as tolerated. She had no beef, pork from 5/2022 and fried foods 2-3 x 5/2022. French fries 1-2 x since 5/2022. She decreased sweets but still eats.   10/2022- She noted trouble swallowing pills lately. She is not sure but has almost got sick with nightly medications.    Vitamin D deficiency- taking vitamin D 5000IU every other day and prenatal vitamin and Folic acid.   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  Elevated liver function tests- patient withuot regular NSAIDS, Tylenol, or alcohol. Seen by GI- they ordered CT abd/pelvis. 1/2022- normal LFT.   Hyperuricemia- 1/2022- elevated uric acid. Prior to last labs- she ate a bunch of cocktail shrimp.   Taking folic acid 400 mcg daily in addition to prenatal vitamin.     Menses- currently pregnant with bleeding.   She had a few menses in the last year with severe cramping and feeling like she could faint. Will take anything to feel better at that point. She took 5 Tylenol. No change with taking or not taking Metformin.    Skin lesions- Dermatology removed a lesion right face and left axilla- both  Benign. The spot on her right forearm- it is a scar. They scheduled removal 1/10/2022.   She was concerned about a spot on her face that has been there- looked like a dark, Aging\" spot. However, she noticed it to be larger, more raise, and bothersome. She wanted to consider dermatology for right forearm scar that is raised to see about removal. Dermatology a long time ago but unsure who she saw.     Ingorwn toenails- Her 1 great toenails removed for ingrown toenails. Cannot " wear socks or shoes. Left toenail 1 month ago- still healing. He advised it looked good when he did right toenail.     Patient's specialists:  Bariatrics- Dr Noah Koroma- last appt 5/2012 in follow up of [facility].   Cardiology -Dr. Negrete-last appointment 5/2021 for palpitations, hypertension, and chest pain. Hold off on stress test since she was improving. Continue medication for a couple months then re-evaluate. Consider weaning atenolol but if she needs medication for BP, she may need to continue beta blocker and consider labetolol with desire to conceive. Follow up in 3 months. Appt scheduled 11/29/2021  She had been seen 11/2020 for atypical chest pain. Echo, Holter, and stress test ordered.    She was seen again after Covid 19 with tachycardia, SOA. They held stress test with post Covid symptoms. Started atenolol 25 mg daily for elevated blood pressure and tachycardia.    4/2021- Holter monitor normal.   3/2021- Echo normal.   GI- Dr Jensen- last appt 11/18/2021 for elevated LFT and hemochromatosis carrier state. Referred for CT abd/pelvis. Advised diet and consideration of lipid medication. Consider additional labs if CT is negative and enzymes continue to increase.    Infectious disease-MONICA Sawyer-last appointment 4/2021 in follow-up of COVID-19 infection.  Diagnosed with post viral fatigue syndrome and brain fog.  Advised physical therapy, cognitive therapy, and advised follow-up with cardiology for tachycardia.  Performed additional labs. 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  Follow-up PRN.  Neurology-Dr Hogan-last appointment 12/2021 for migraine headache-headaches post Covid and dizziness.  Headaches improved on Topamax 25 mg twice daily- continue medication.  Also given Imitrex. Neagative sleep study. Consider vestibular migraines- trial increase Topamax, referred for vestibular therapy, and given Meclizine for dizziness. Follow-up in  4 months.  OB/GYN-Dr. Giles-last appointment 1/2020 for annual exam/well woman exam.  Pap completed.  Follow-up in 1 year.  Sleep medicine-Dr. Pnatoja-last appt 5/10/2021 for evaluation. Home sleep study 6/2021- no EVERETT.   Speech therapy-started 4/27/2021 for speech and cognitive therapy. She was discharged the end of 5/2021. She has noticed improvement.   Ophthalmology-Dr. Heydi Vera-last appointment 4/2021 for abnormal MRI orbit.  Referred to Dr. Bowman.  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 Follow up PRN.    ENT- Rita Johnson APRN- last appt 10/26/2021 for dizziness. Epley maneuver did not work. Advised she get a mouth guard and take Magnesium oxide 400 mg QHS. Follow up if persistent symptoms.   Allergist- Dr Shelia Mc- went 4/19/2021- and they wanted to change Atenolol to Nadolol and gave Zyrtec, Nasacort, and albuterol as needed. Allergy testing- allergic to outdoor issues and not indoor things. He asked her worst symptoms. He was concerned about Topamax- she did cut back to nightly. She had worsening headaches (not severe but continued with headaches). States when she increased again, she feels back to baseline prior to starting Topamax.     The following portions of the patient's history were reviewed and updated as appropriate: allergies, current medications, past family history, past medical history, past social history, past surgical history and problem list.    Review of Systems   Constitutional:  Positive for fatigue (improving).   Eyes:  Positive for visual disturbance.   Respiratory:  Shortness of breath: improving.    Cardiovascular:  Positive for palpitations (improving).   Gastrointestinal:  Nausea: no constant nausea.   Genitourinary: Negative.    Musculoskeletal:  Positive for gait problem (falls). Arthralgias: improving. Myalgias: improving.  Skin: Negative.    Neurological:  Positive for dizziness.  Negative for seizures and syncope. Facial asymmetry: improved. Weakness: improving. Light-headedness: improved. Headaches: improved with treatment.  Psychiatric/Behavioral:  Positive for confusion (improving), decreased concentration (improving), dysphoric mood (improving) and sleep disturbance (new- increased nightly snoring since Covid 19). Negative for self-injury and suicidal ideas. The patient is nervous/anxious (worse with concerns of surgery).         Brain fog improving       Objective   There were no vitals filed for this visit.    There is no height or weight on file to calculate BMI.    Physical Exam  Constitutional:       General: She is not in acute distress.     Appearance: She is well-developed.   HENT:      Head: Normocephalic and atraumatic.   Eyes:      General:         Right eye: No discharge.         Left eye: No discharge.   Pulmonary:      Effort: Pulmonary effort is normal. No respiratory distress.   Skin:     General: Skin is dry.   Psychiatric:         Attention and Perception: She is attentive.         Speech: Speech normal.         Behavior: Behavior normal.         Assessment & Plan   Diagnoses and all orders for this visit:    1. 21 weeks gestation of pregnancy (Primary)  -     Ambulatory Referral to Obstetrics / Gynecology    2. Vaginal bleeding in pregnancy, second trimester- referred to [facility]  -     Ambulatory Referral to Obstetrics / Gynecology    3. Placental abnormality in second trimester  -     Ambulatory Referral to Obstetrics / Gynecology    4. Recurrent pregnancy loss with current pregnancy  -     Ambulatory Referral to Obstetrics / Gynecology    5. Post-COVID-19 syndrome    6. Dizziness    7. Persistent fatigue after COVID-19    8. Persistent neurologic symptoms after COVID-19    9. Brain fog    10. Major depressive disorder, recurrent, in full remission    11. Anxiety    12. Class 3 severe obesity with body mass index (BMI) of 40.0 to 44.9 in adult, unspecified obesity type,  unspecified whether serious comorbidity present    13. Mixed hyperlipidemia    14. Prediabetes    15. Vitamin D deficiency    16. Hemochromatosis carrier    17. Elevated liver enzymes    18. Hyperuricemia    19. Chronic migraine without aura without status migrainosus, not intractable    20. Neck pain    21. Myalgia    22. Multiple joint pain          Assessment and Plan  Patient had significant long term Covid 19 symptoms. She completed physical therapy, speech, and cognitive therapy, and continues follow up with mental health counselor weekly, cardiology, neurology, infectious disease/ long term Covid clinic, ophthalmology, sleep medicine, and allergist as directed by them. She has restarted PT at the request of [facility]. She is trying to transition to a whole food plant based diet. Information given. She was seen by dizziness specialist, Dr Izquierdo, with intolerance to CCB. She was seen in follow up with no further treatment indicated. She has been following with [facility]. She has been started on a couple medications without success, restarted PT, and has had no improvement. She is now limited on medications with pregnancy. She will continue to see them in follow up.      Dizziness- Jocelyn has had dizziness with looking up, bending, and doing things. Unfortunately, she has had worsening and has dizziness with closing her eyes and newly a sensory overload with noises and light.  She had dizziness following Covid 19 infection, however, following an orbital mass removal, she had increasing dizziness symptoms. She has some lightheadedness and some spinning dizziness. She has undergone MRI brain, MRI brain and IAC and been evaluated by ENT and neurology. She was seen by ophthalmology and cleared for PRN follow up. ENT advised mouthguard and Magnesium oxide 400 mg QHS (then reported she should be taking a different Mg supplement). Without improvement, she advised follow up with neurology. Neurology advised  trial of increasing Topamax (which she did not tolerate), trial Meclizine (no improvement in dizziness with Meclizine), and referred to PT for vestibular therapy. I agreed with vestibular therapy. She is also having vision issues in the peripheral vision. I advised PT through [facility] long term Covid PT program, as they have developed therapy program for vision and dizziness post-Covid. She was unable to get into this program. She was seen by Dr Izquierdo at [facility], who has specialty in dizziness. They started Verapamil  mg once daily to see if this helped with dizziness. She did not tolerate verapamil, and it did not help the dizziness. There was nothing further they felt they could do to help. I referred to tertiary center for evaluation and treatment who advised Mg, possible migraine variant, and to complete PT with specific PT and follow up with them after completing PT. She then tried Gabapentin and could not tolerate AE but also had no improvement in dizziness with medication. She had no improvement with cyproheptadine. She completed PT. I also referred for warm water PT for strengthening, pain, and to see if this helps with sensory, balance issues. 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 She did have a couple accidents at work. I will have her off work until she is cleared by specialists. Follow up with me in 3 months but will need to continue off work until July 2024 after delivery. She will see [facility] as directed by them. If she has resolution, she can be cleared by specialists.    Depression with anxious mood- Patient has had some underlying anxiety in the past, however, she developed very severe, debilitating anxiety following Covid 19 infection and rehab, causing panic attacks.  She is seeing her mental health counselor.  She was resistant to medication in the past because she was always able to control her moods, calm down  herself, and continue with working.  However, following her COVID-19 infection, she had more severe symptoms that were worsened with trying to return to work.  I started Lexapro 5 mg once daily. Once she was tolerating medication better, she increased to Lexapro 10 mg once daily. She had improved anxiety with therapy and medication. However, she had increased depressive symptoms, fatigue, decreased motivation. I had her continue Lexapro 10 mg once daily and added Wellbutrin  mg daily. She has not noted significant difference. However, she is now pregnant. I asked that she stop Wellbutrin and continue Lexapro only until she was seen by GYN. GYN ok continuing Lexapro and Wellbutrin.  She weaned Atenolol and is now on Lobetalol with pregnancy but to be determined by OBGYN and cardiology.  To be seen ASAP if worsening moods or AE with medication.   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   Nocturnal cough- Patient to be seen if persistent symptoms. She can continue Pepcid 20 mg before her evening meal. She can try Mucinex DM twice daily as well. She may also need albuterol inhaler if needed. To be seen if worsening, new or changing symptoms or no resolution of cough.   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    Hypoxia following Covid 19 infection-  She had slow, gradual improvement and completed PT. She did have improvement in oxygenation.   Tachycardia/ elevated BP, post-Covid arrhythmia- Continue follow up with cardiology as directed by them. Echocardiogram and Holter monitor were ok. Patient to continue Atenolol 25 mg once daily and monitor BP, pulse. Blood pressure was a little low and she decreased Atenolol by 1/2 has her BP returns to normal. Allergist gave her Nadolol. Cardiology to determine any  changes from atenolol to nadolol. She has had no increased SOA with beta blocker but is having increased fatigue and decreased motivation. We will consider consulting cardiology and decreasing medication.   Fatigue following Covid 19 infection- Patient to continue mental health therapy. She completed physical therapy, and cognitive therapy.   Cognitive deficit/dysfunction- Continue home exercises. Continue follow up with neurology as directed.   Depression and anxiety- Patient previously had some depression and anxiety that were managed with counseling and techniques. However, she had severe anxiety following Covid 19 then had improvement with medication but has had severe depression. Continue Mental health counseling and medication as approved by OBGYN.   Persistent headaches following Covid 19 infection- Patient to follow up with neurology and treatment as directed by them. Her allergist wanted her to stop Topamax due to fatigue and cognitive impairment, however, headaches are controlled, and she did not want to stop medication. She did try to stop medication, and headaches recurred. She then developed kidney stones and was able to wean off Topamax. To use Imitrex as needed for acute headaches, follow up with neurology if worsening headaches, new, or changing neurological symptoms.   Snoring- Since having Covid 19, she developed significant snoring, headaches that woke her up at night, tachycardia, hypoxia, and significant brain fog and fatigue. Negative sleep study. Snoring has not resolved but has improved gradually.  Generalized body aches, back aches, neck pain, and joint pain- Elevated uric acid but otherwise negative autoimmune testing 4/2021. PT and home exercises as needed. I also referred to allergist, with symptoms similar to MIS-A. No diagnosis of MIS-A with allergist. Patient to decrease purine rich foods. I will refer for warm water PT.     In follow up of chronic, non-Covid related symptoms:  Patient  will have fasting labs. Call if no results in 1 week. Stability of conditions, plan, follow up, and further recommendations pending labs. Follow up in 3 months or sooner if needed.     Pregnancy with history of recurrent loss, vaginal bleeding in pregnancy, placental lakes, history of low lying placenta resolved, HTN- She is seeing MFM and will see them again in follow up in 1 month. If cleared, she will return to general OBGYN. However, patient lives in [place] and GYN in in [place] and will have to deliver in [place]. With complications/ bleeding/ hypertension, she would like to deliver in [place], closer to home and closer for her to get to the hospital if any concerns or in labor. I think this is most appropriate as well. She has had difficulty finding OBGYN to accept transfer of care while pregnant. She has had prenatal care and has been compliant with treatment and follow up as directed by OBGYN. I will place order to OBGYN to try to help facilitate this transfer after talking with MFM.   Mixed hyperlipidemia- Patient to continue to be compliant with diet/ exercise. She will restart strict diet and exercise. Await labs for further recommendations.    Prediabetes- A1C increased off Metformin and she stopped having menses. She restarted Metformin and menses was more regular again but she had severe GI AE. She did not tolerate GLP1 and is now pregnant.  We will monitor labs and will have OBGYN make any further recommendations for treatment if needed.   Vitamin D deficiency- Continue vitamin D 5000IU every other day and Prenatal vitamin. Dosing recommendations pending labs and OBGYN recommendations.   Hemochromatosis carrier- I will continue to monitor and make further recommendations.  Elevated liver function tests- Most recent LFT normal. Avoid NSAIDS and alcohol and limit Tylenol to < 2 grams daily. Follow up with GI as directed.   Hyperuricemia- Decrease purine rich foods. I will monitor and make  recommendations.   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 Follow up with dermatology as directed by them.     Patient continues to see specialists as noted above.  I have reviewed available records, including consult notes, labs, and imaging/testing.  Patient to continue follow-up with specialists as directed by them.    I spent 35 minutes for video visit for Jocelyn Merlos on this date of service. This time includes time spent by me in the following activities as necessary: preparing for the visit, reviewing tests, specialists records and previous visits, obtaining and/or reviewing a separately obtained history, counseling and educating the patient, referring and/or communicating with other healthcare professionals, documenting information in the medical record, independently interpreting results and communicating that information with the patient, family, caregiver, and developing a medically appropriate treatment plan with consideration of other conditions, medications, and treatments.       [facility] Claim# [id number]

## 2023-12-20 LAB
25(OH)D3+25(OH)D2 SERPL-MCNC: 43 NG/ML (ref 30–100)
ALBUMIN SERPL-MCNC: 3.8 G/DL (ref 3.9–4.9)
ALBUMIN/GLOB SERPL: 1.8 {RATIO} (ref 1.2–2.2)
ALP SERPL-CCNC: 63 IU/L (ref 44–121)
ALT SERPL-CCNC: 12 IU/L (ref 0–32)
APPEARANCE UR: CLEAR
AST SERPL-CCNC: 11 IU/L (ref 0–40)
BACTERIA #/AREA URNS HPF: NORMAL /[HPF]
BASOPHILS # BLD AUTO: 0 X10E3/UL (ref 0–0.2)
BASOPHILS NFR BLD AUTO: 0 %
BILIRUB SERPL-MCNC: 0.2 MG/DL (ref 0–1.2)
BILIRUB UR QL STRIP: NEGATIVE
BUN SERPL-MCNC: 8 MG/DL (ref 6–20)
BUN/CREAT SERPL: 10 (ref 9–23)
CALCIUM SERPL-MCNC: 9.4 MG/DL (ref 8.7–10.2)
CASTS URNS QL MICRO: NORMAL /LPF
CHLORIDE SERPL-SCNC: 104 MMOL/L (ref 96–106)
CHOLEST SERPL-MCNC: 201 MG/DL (ref 100–199)
CO2 SERPL-SCNC: 21 MMOL/L (ref 20–29)
COLOR UR: YELLOW
CREAT SERPL-MCNC: 0.78 MG/DL (ref 0.57–1)
EGFRCR SERPLBLD CKD-EPI 2021: 103 ML/MIN/1.73
EOSINOPHIL # BLD AUTO: 0.3 X10E3/UL (ref 0–0.4)
EOSINOPHIL NFR BLD AUTO: 3 %
EPI CELLS #/AREA URNS HPF: NORMAL /HPF (ref 0–10)
ERYTHROCYTE [DISTWIDTH] IN BLOOD BY AUTOMATED COUNT: 12.4 % (ref 11.7–15.4)
FERRITIN SERPL-MCNC: 77 NG/ML (ref 15–150)
FOLATE SERPL-MCNC: >20 NG/ML
GLOBULIN SER CALC-MCNC: 2.1 G/DL (ref 1.5–4.5)
GLUCOSE SERPL-MCNC: 95 MG/DL (ref 70–99)
GLUCOSE UR QL STRIP: NEGATIVE
HBA1C MFR BLD: 5.5 % (ref 4.8–5.6)
HCT VFR BLD AUTO: 35.8 % (ref 34–46.6)
HDLC SERPL-MCNC: 47 MG/DL
HGB BLD-MCNC: 11.8 G/DL (ref 11.1–15.9)
HGB UR QL STRIP: NEGATIVE
IMM GRANULOCYTES # BLD AUTO: 0 X10E3/UL (ref 0–0.1)
IMM GRANULOCYTES NFR BLD AUTO: 0 %
IRON SATN MFR SERPL: 21 % (ref 15–55)
IRON SERPL-MCNC: 69 UG/DL (ref 27–159)
KETONES UR QL STRIP: NEGATIVE
LDLC SERPL CALC-MCNC: 124 MG/DL (ref 0–99)
LDLC/HDLC SERPL: 2.6 RATIO (ref 0–3.2)
LEUKOCYTE ESTERASE UR QL STRIP: NEGATIVE
LYMPHOCYTES # BLD AUTO: 2.2 X10E3/UL (ref 0.7–3.1)
LYMPHOCYTES NFR BLD AUTO: 22 %
MCH RBC QN AUTO: 30.3 PG (ref 26.6–33)
MCHC RBC AUTO-ENTMCNC: 33 G/DL (ref 31.5–35.7)
MCV RBC AUTO: 92 FL (ref 79–97)
MICRO URNS: NORMAL
MICRO URNS: NORMAL
MONOCYTES # BLD AUTO: 0.8 X10E3/UL (ref 0.1–0.9)
MONOCYTES NFR BLD AUTO: 8 %
NEUTROPHILS # BLD AUTO: 6.7 X10E3/UL (ref 1.4–7)
NEUTROPHILS NFR BLD AUTO: 67 %
NITRITE UR QL STRIP: NEGATIVE
PH UR STRIP: 7 [PH] (ref 5–7.5)
PLATELET # BLD AUTO: 316 X10E3/UL (ref 150–450)
POTASSIUM SERPL-SCNC: 4.1 MMOL/L (ref 3.5–5.2)
PROT SERPL-MCNC: 5.9 G/DL (ref 6–8.5)
PROT UR QL STRIP: NEGATIVE
RBC # BLD AUTO: 3.9 X10E6/UL (ref 3.77–5.28)
RBC #/AREA URNS HPF: NORMAL /HPF (ref 0–2)
SODIUM SERPL-SCNC: 141 MMOL/L (ref 134–144)
SP GR UR STRIP: 1.01 (ref 1–1.03)
T3FREE SERPL-MCNC: 3.3 PG/ML (ref 2–4.4)
T4 FREE SERPL-MCNC: 0.94 NG/DL (ref 0.82–1.77)
TIBC SERPL-MCNC: 329 UG/DL (ref 250–450)
TRIGL SERPL-MCNC: 171 MG/DL (ref 0–149)
TSH SERPL DL<=0.005 MIU/L-ACNC: 1.88 UIU/ML (ref 0.45–4.5)
UIBC SERPL-MCNC: 260 UG/DL (ref 131–425)
URATE SERPL-MCNC: 3.7 MG/DL (ref 2.6–6.2)
URINALYSIS REFLEX: NORMAL
UROBILINOGEN UR STRIP-MCNC: 0.2 MG/DL (ref 0.2–1)
VIT B12 SERPL-MCNC: 844 PG/ML (ref 232–1245)
VLDLC SERPL CALC-MCNC: 30 MG/DL (ref 5–40)
WBC # BLD AUTO: 10 X10E3/UL (ref 3.4–10.8)
WBC #/AREA URNS HPF: NORMAL /HPF (ref 0–5)

## 2023-12-21 ENCOUNTER — TELEPHONE (OUTPATIENT)
Dept: FAMILY MEDICINE CLINIC | Facility: CLINIC | Age: 33
End: 2023-12-21

## 2023-12-21 NOTE — TELEPHONE ENCOUNTER
Caller: Jocelyn Merlos    Relationship: Self    Best call back number: [phone]     What is the best time to reach you: ANYTIME    Who are you requesting to speak with (clinical staff, provider,  specific staff member): CLINICAL    What was the call regarding: PATIENT CALLING TO FOLLOW UP ON THE [employer] MESSAGE SHE LEFT TODAY     Is it okay if the provider responds through Tagentt: NO

## 2023-12-26 ENCOUNTER — TELEPHONE (OUTPATIENT)
Dept: FAMILY MEDICINE CLINIC | Facility: CLINIC | Age: 33
End: 2023-12-26

## 2023-12-26 ENCOUNTER — TELEPHONE (OUTPATIENT)
Dept: FAMILY MEDICINE CLINIC | Facility: CLINIC | Age: 33
End: 2023-12-26
Payer: COMMERCIAL

## 2023-12-26 NOTE — TELEPHONE ENCOUNTER
Name: Jocelyn Merlos    Relationship: Self    Best Callback Number: [phone]     HUB PROVIDED THE RELAY MESSAGE FROM THE OFFICE   PATIENT VOICED UNDERSTANDING AND HAS NO FURTHER QUESTIONS AT THIS TIME    ADDITIONAL INFORMATION:

## 2023-12-26 NOTE — TELEPHONE ENCOUNTER
Caller: JENNIFER    Relationship: [facility] call back number: [phone]     Who are you requesting to speak with (clinical staff, provider,  specific staff member): CLINICAL STAFF    What was the call regarding: REQUESTS A CALL BACK TO FOLLOW UP ON STATUS OF FORM FOR PATIENT'S DISABILITY EXTENSION

## 2023-12-26 NOTE — TELEPHONE ENCOUNTER
left message to call OK FOR HUB TO RELAY    Cholesterol remains elevated but stable. Otherwise, labs look ok.

## 2024-01-10 ENCOUNTER — HOSPITAL ENCOUNTER (OUTPATIENT)
Dept: ULTRASOUND IMAGING | Facility: HOSPITAL | Age: 34
Discharge: HOME | End: 2024-01-10
Admitting: OBSTETRICS & GYNECOLOGY
Payer: COMMERCIAL

## 2024-01-10 ENCOUNTER — OFFICE VISIT (OUTPATIENT)
Dept: OBSTETRICS AND GYNECOLOGY | Facility: CLINIC | Age: 34
End: 2024-01-10
Payer: COMMERCIAL

## 2024-01-10 VITALS
HEIGHT: 68 IN | BODY MASS INDEX: 41.49 KG/M2 | WEIGHT: 273.8 LBS | DIASTOLIC BLOOD PRESSURE: 71 MMHG | TEMPERATURE: 98 F | HEART RATE: 94 BPM | SYSTOLIC BLOOD PRESSURE: 119 MMHG

## 2024-01-10 DIAGNOSIS — I10 ESSENTIAL HYPERTENSION: Primary | ICD-10-CM

## 2024-01-10 DIAGNOSIS — O43.102 PLACENTAL ABNORMALITY IN SECOND TRIMESTER: ICD-10-CM

## 2024-01-10 DIAGNOSIS — O26.20 RECURRENT PREGNANCY LOSS WITH CURRENT PREGNANCY: ICD-10-CM

## 2024-01-10 DIAGNOSIS — I10 CHRONIC HYPERTENSION: ICD-10-CM

## 2024-01-10 PROCEDURE — 76816 OB US FOLLOW-UP PER FETUS: CPT

## 2024-01-10 NOTE — PROGRESS NOTES
MATERNAL FETAL MEDICINE Consult Note    Dear Dr Henrique Tam*:    Thank you for your kind referral of Jocelyn Merlos.  As you know, she is a 33 y.o.   at  23 2/7 weeks gestation (Estimated Date of Delivery: 24). This is a consult.      Her antepartum course is complicated by:  CHTN  Placental lakes  Low lying placenta, resolved    Aneuploidy Screening: low risk    HPI: Today, she denies headache, blurry vision, RUQ pain. No vaginal bleeding, no contractions.     Review of History:  Past Medical History:   Diagnosis Date    Abnormal Pap smear of cervix     Adjustment disorder with mixed anxiety and depressed mood 2016    COVID-19 2020    Hypertension     Since - currently on medication    Palpitations     occasionally with pregnancy    Polycystic ovary syndrome      Past Surgical History:   Procedure Laterality Date    BREAST AUGMENTATION      CERVICAL BIOPSY  W/ LOOP ELECTRODE EXCISION      FOOT SURGERY Right     plantar fascitis tendon release    LAPAROSCOPIC GASTRIC BANDING      LEEP      OTHER SURGICAL HISTORY      removal of eye tumor    OTHER SURGICAL HISTORY      tummy tuck    TONSILLECTOMY       Social History     Socioeconomic History    Marital status:    Tobacco Use    Smoking status: Former     Packs/day: 0.25     Years: 0.50     Additional pack years: 0.00     Total pack years: 0.13     Types: Cigarettes     Quit date: 2015     Years since quittin.7    Smokeless tobacco: Never   Vaping Use    Vaping Use: Never used   Substance and Sexual Activity    Alcohol use: Not Currently     Alcohol/week: 4.0 - 5.0 standard drinks of alcohol     Types: 4 - 5 Standard drinks or equivalent per week     Comment: Socially./caffeine use     Drug use: No    Sexual activity: Yes     Partners: Male     Family History   Problem Relation Age of Onset    Skin cancer Mother     Depression Mother     Anxiety disorder Mother     Stroke Mother     Aneurysm Mother     Alcohol  "abuse Mother     Migraines Mother     Hemochromatosis Mother     Stroke Maternal Grandmother       No Known Allergies   Current Outpatient Medications on File Prior to Visit   Medication Sig Dispense Refill    Ascorbic Acid (VITAMIN C PO) Take  by mouth.      buPROPion SR (WELLBUTRIN SR) 150 MG 12 hr tablet Take 1 tablet by mouth Daily.      doxylamine (UNISOM) 25 MG tablet Take 1 tablet by mouth At Night As Needed for Sleep. 60 tablet 2    escitalopram (LEXAPRO) 10 MG tablet TAKE 1 TABLET BY MOUTH DAILY 90 tablet 0    folic acid (FOLVITE) 400 MCG tablet Take 1 tablet by mouth Daily.      labetalol (NORMODYNE) 100 MG tablet Take 1 tablet by mouth 2 (Two) Times a Day. 60 tablet 6    Omega-3 Fatty Acids (fish oil) 1000 MG capsule capsule Take  by mouth Daily With Breakfast.      prenatal vitamin (prenatal, CLASSIC, vitamin) tablet Take  by mouth Daily.      vitamin B-6 (PYRIDOXINE) 25 MG tablet Take 1 tablet by mouth Every Night. 60 tablet 1    vitamin D (ERGOCALCIFEROL) 1.25 MG (32316 UT) capsule capsule Take 1,000 Units by mouth.       No current facility-administered medications on file prior to visit.      Past obstetric, gynecological, medical, surgical, family and social history reviewed.  Relevant lab work and imaging reviewed.    Review of systems  Constitutional:  denies fever, chills, malaise.   ENT/Mouth:  denies sore throat, tinnitus  Eyes: denies vision changes/pain  CV:  denies chest pain  Respiratory:  denies cough/SOB  GI:  denies N/V, diarrhea, abdominal pain.    :   denies dysuria  Skin:  denies lesions or pruritus   Neuro:  denies weakness, focal neurologic symptoms    Vitals:    01/10/24 1036   BP: 119/71   BP Location: Right arm   Patient Position: Sitting   Pulse: 94   Temp: 98 °F (36.7 °C)   TempSrc: Temporal   Weight: 124 kg (273 lb 12.8 oz)   Height: 172.7 cm (68\")       PHYSICAL EXAM   GENERAL: Not in acute distress, AAOx3, pleasant  CARDIO: regular rate and rhythm  PULM: symmetric chest " "rise, speaking in complete sentences without difficulty  NEURO: awake, alert and oriented to person, place, and time  ABDOMINAL: No fundal tenderness, no rebound or guarding, gravid  EXTREMITIES: no bilateral lower extremity edema/tenderness  SKIN: Warm, well-perfused      ULTRASOUND   Please view full ultrasound note on Imaging tab in ViewPoint.  Cephalic presentation.  Posterior placenta  MVP 4.7 cm, which is normal.    g (48%, AC 68%)  Anatomy complete, appears normal  Cervical length > 3.5 cm, which is normal.     ASSESSMENT/COUNSELIN y.o.   at  23 2/7 weeks gestation (Estimated Date of Delivery: 24).    -Pregnancy  [ X ] stable  [   ] improving [  ] worsening    Diagnoses and all orders for this visit:    1. Essential hypertension (Primary)      Placental lakes, small and normal appearing  Previous counseling The 2nd-trimester placenta is homogeneous and uniformly echogenic.Occasional sonolucencies are considered normal. These placenta lakes can be transient and mostly represent intervillous dilated spaces filled with maternal blood--usually color flow is not able to demonstrate flow.  Early, numerous, and large sonolucencies may be significant. If multiple lucencies are seen before 20-25 weeks, or > 3 sonolucencies measuring > 3 cm are seen, then it is reasonable to be concerned about placental insufficiency.   The placenta accreta spectrum can have multiple, large, irregular sonolucencies with a \"moth eaten\" appearance.  We are not seeing this today and the placental interface looks very normal.  Recommend serial growths out of abundance of caution at primary OB.     Low lying placenta:  Resolved.  Pt last bled 8 days ago.  Recommended lifting restrictions and at least 1 more week before off pelvic rest.  Optimistic bleeding resolving.      CHTN  On labetalol 100 mg BID  Previous counseling: We discussed the risks of chronic hypertension including intrauterine growth restriction, " increased risk of preeclampsia, increased risk of premature delivery, and increased risk for placental abruption.  I reassured her that with mild hypertension, no underlying cardiac disease, and normal renal function, most women do well in pregnancy.    Acceptable blood pressures in pregnancies with chronic hypertension and without renal damage are 120-140/70-90s. Newer data from the [facility] (2022, CHAP TRIAL), supports more aggressive bp treatment to below 140/90 (previously ,160 in CHTN) and that this does not impair fetal growth or well-being but reduces risks of pre-eclampsia,  birth, and other pregnancy morbidity.      I recommended serial growth ultrasounds every 4 weeks  to ensure appropriate fetal growth. In addition,  fetal testing 1-2x weekly should be initiated around 32 weeks gestation.  I would recommend a 38 week delivery given CHTN that has previously required medication.  She may require closer to 37 depending on her control closer to delivery.      Update 01/10/24  Pt aware of today's ultrasound findings, anatomy completed and appears WNL. Pt requesting referral to a different OB. States her current OB is to far of a drive. List of [facility] providers given to patient so she can decide what location is best for her and make some phone calls to schedule appointment.       Summary of Plan  -Serial growth ultrasounds every 4 weeks (by primary OB)   -Starting at 28 - 32 weeks: Weekly fetal  surveillance until delivery   -Starting at 28 weeks: Fetal movement instructions given continue daily until delivery; instructed to report to labor and delivery if cannot achieve more than 10 kicks in one hour or if she perceives a decrease in fetal movement    Follow-up: No follow up with MFM scheduled, but I am happy to see for follow up at request of primary obstetrician    Thank you for the consult and opportunity to care for this patient.  Please feel free to reach out with  any questions or concerns.      I spent 30 minutes caring for this patient on this date of service. This time includes time spent by me in the following activities: preparing for the visit, reviewing tests, obtaining and/or reviewing a separately obtained history, performing a medically appropriate examination and/or evaluation, counseling and educating the patient/family/caregiver and independently interpreting results and communicating that information with the patient/family/caregiver with greater than 50% spent in counseling and coordination of care.     MONICA Salcido  Maternal Fetal Medicine-[facility]  Office: [phone]  [email or web address]

## 2024-01-10 NOTE — PROGRESS NOTES
Pt reports that she is doing well and denies vaginal bleeding, cramping, contractions or LOF at this time. Reports active fetal movement. Reviewed when to call OB office or present to L&D for evaluation with symptoms such as decreased fetal movement, vaginal bleeding, LOF or ctxs. Pt verbalized understanding. Denies HA, visual changes or epigastric pain. Reports round ligament pain and pain under her left shoulder blade. Denies any additional complaints at time of appointment. Next OB appointment scheduled for: needs to be scheduled.     Vitals:    01/10/24 1036   BP: 119/71   Pulse: 94   Temp: 98 °F (36.7 °C)

## 2024-01-10 NOTE — LETTER
January 10, 2024     Henrique Giles MD  [address]  [address]  [place] [address]    Patient: Jocelyn Merlos   YOB: 1990   Date of Visit: 1/10/2024       Dear Henrique Giles MD,    Thank you for referring Jocelyn Merlos to me for evaluation. Below is a copy of my consult note.    If you have questions, please do not hesitate to call me. I look forward to following Jocelyn along with you.         Sincerely,        MONICA Parish        CC: No Recipients                        MATERNAL FETAL MEDICINE Consult Note    Dear Dr Henrique Tam*:    Thank you for your kind referral of Jocelyn Merlos.  As you know, she is a 33 y.o.   at  23 2/7 weeks gestation (Estimated Date of Delivery: 24). This is a consult.      Her antepartum course is complicated by:  CHTN  Placental lakes  Low lying placenta, resolved    Aneuploidy Screening: low risk    HPI: Today, she denies headache, blurry vision, RUQ pain. No vaginal bleeding, no contractions.     Review of History:  Past Medical History:   Diagnosis Date   • Abnormal Pap smear of cervix    • Adjustment disorder with mixed anxiety and depressed mood 2016   • COVID-19 2020   • Hypertension     Since - currently on medication   • Palpitations     occasionally with pregnancy   • Polycystic ovary syndrome      Past Surgical History:   Procedure Laterality Date   • BREAST AUGMENTATION     • CERVICAL BIOPSY  W/ LOOP ELECTRODE EXCISION     • FOOT SURGERY Right     plantar fascitis tendon release   • LAPAROSCOPIC GASTRIC BANDING     • LEEP     • OTHER SURGICAL HISTORY      removal of eye tumor   • OTHER SURGICAL HISTORY      tummy tuck   • TONSILLECTOMY       Social History     Socioeconomic History   • Marital status:    Tobacco Use   • Smoking status: Former     Packs/day: 0.25     Years: 0.50     Additional pack years: 0.00     Total pack years: 0.13     Types: Cigarettes     Quit date: 2015      Years since quittin.7   • Smokeless tobacco: Never   Vaping Use   • Vaping Use: Never used   Substance and Sexual Activity   • Alcohol use: Not Currently     Alcohol/week: 4.0 - 5.0 standard drinks of alcohol     Types: 4 - 5 Standard drinks or equivalent per week     Comment: Socially./caffeine use    • Drug use: No   • Sexual activity: Yes     Partners: Male     Family History   Problem Relation Age of Onset   • Skin cancer Mother    • Depression Mother    • Anxiety disorder Mother    • Stroke Mother    • Aneurysm Mother    • Alcohol abuse Mother    • Migraines Mother    • Hemochromatosis Mother    • Stroke Maternal Grandmother       No Known Allergies   Current Outpatient Medications on File Prior to Visit   Medication Sig Dispense Refill   • Ascorbic Acid (VITAMIN C PO) Take  by mouth.     • buPROPion SR (WELLBUTRIN SR) 150 MG 12 hr tablet Take 1 tablet by mouth Daily.     • doxylamine (UNISOM) 25 MG tablet Take 1 tablet by mouth At Night As Needed for Sleep. 60 tablet 2   • escitalopram (LEXAPRO) 10 MG tablet TAKE 1 TABLET BY MOUTH DAILY 90 tablet 0   • folic acid (FOLVITE) 400 MCG tablet Take 1 tablet by mouth Daily.     • labetalol (NORMODYNE) 100 MG tablet Take 1 tablet by mouth 2 (Two) Times a Day. 60 tablet 6   • Omega-3 Fatty Acids (fish oil) 1000 MG capsule capsule Take  by mouth Daily With Breakfast.     • prenatal vitamin (prenatal, CLASSIC, vitamin) tablet Take  by mouth Daily.     • vitamin B-6 (PYRIDOXINE) 25 MG tablet Take 1 tablet by mouth Every Night. 60 tablet 1   • vitamin D (ERGOCALCIFEROL) 1.25 MG (46360 UT) capsule capsule Take 1,000 Units by mouth.       No current facility-administered medications on file prior to visit.      Past obstetric, gynecological, medical, surgical, family and social history reviewed.  Relevant lab work and imaging reviewed.    Review of systems  Constitutional:  denies fever, chills, malaise.   ENT/Mouth:  denies sore throat, tinnitus  Eyes: denies vision  "changes/pain  CV:  denies chest pain  Respiratory:  denies cough/SOB  GI:  denies N/V, diarrhea, abdominal pain.    :   denies dysuria  Skin:  denies lesions or pruritus   Neuro:  denies weakness, focal neurologic symptoms    Vitals:    01/10/24 1036   BP: 119/71   BP Location: Right arm   Patient Position: Sitting   Pulse: 94   Temp: 98 °F (36.7 °C)   TempSrc: Temporal   Weight: 124 kg (273 lb 12.8 oz)   Height: 172.7 cm (68\")       PHYSICAL EXAM   GENERAL: Not in acute distress, AAOx3, pleasant  CARDIO: regular rate and rhythm  PULM: symmetric chest rise, speaking in complete sentences without difficulty  NEURO: awake, alert and oriented to person, place, and time  ABDOMINAL: No fundal tenderness, no rebound or guarding, gravid  EXTREMITIES: no bilateral lower extremity edema/tenderness  SKIN: Warm, well-perfused      ULTRASOUND   Please view full ultrasound note on Imaging tab in ViewPoint.  Cephalic presentation.  Posterior placenta  MVP 4.7 cm, which is normal.    g (48%, AC 68%)  Anatomy complete, appears normal  Cervical length > 3.5 cm, which is normal.     ASSESSMENT/COUNSELIN y.o.   at  23 2/7 weeks gestation (Estimated Date of Delivery: 24).    -Pregnancy  [ X ] stable  [   ] improving [  ] worsening    Diagnoses and all orders for this visit:    1. Essential hypertension (Primary)      Placental lakes, small and normal appearing  Previous counseling The 2nd-trimester placenta is homogeneous and uniformly echogenic.Occasional sonolucencies are considered normal. These placenta lakes can be transient and mostly represent intervillous dilated spaces filled with maternal blood--usually color flow is not able to demonstrate flow.  Early, numerous, and large sonolucencies may be significant. If multiple lucencies are seen before 20-25 weeks, or > 3 sonolucencies measuring > 3 cm are seen, then it is reasonable to be concerned about placental insufficiency.   The placenta accreta " "spectrum can have multiple, large, irregular sonolucencies with a \"moth eaten\" appearance.  We are not seeing this today and the placental interface looks very normal.  Recommend serial growths out of abundance of caution at primary OB.     Low lying placenta:  Resolved.  Pt last bled 8 days ago.  Recommended lifting restrictions and at least 1 more week before off pelvic rest.  Optimistic bleeding resolving.      CHTN  On labetalol 100 mg BID  Previous counseling: We discussed the risks of chronic hypertension including intrauterine growth restriction, increased risk of preeclampsia, increased risk of premature delivery, and increased risk for placental abruption.  I reassured her that with mild hypertension, no underlying cardiac disease, and normal renal function, most women do well in pregnancy.    Acceptable blood pressures in pregnancies with chronic hypertension and without renal damage are 120-140/70-90s. Newer data from the [facility] (2022, CHAP TRIAL), supports more aggressive bp treatment to below 140/90 (previously ,160 in CHTN) and that this does not impair fetal growth or well-being but reduces risks of pre-eclampsia,  birth, and other pregnancy morbidity.      I recommended serial growth ultrasounds every 4 weeks  to ensure appropriate fetal growth. In addition,  fetal testing 1-2x weekly should be initiated around 32 weeks gestation.  I would recommend a 38 week delivery given CHTN that has previously required medication.  She may require closer to 37 depending on her control closer to delivery.      Update 01/10/24  Pt aware of today's ultrasound findings, anatomy completed and appears WNL. Pt requesting referral to a different OB. States her current OB is to far of a drive. List of [facility] providers given to patient so she can decide what location is best for her and make some phone calls to schedule appointment.       Summary of Plan  -Serial growth ultrasounds every 4 " weeks (by primary OB)   -Starting at 28 - 32 weeks: Weekly fetal  surveillance until delivery   -Starting at 28 weeks: Fetal movement instructions given continue daily until delivery; instructed to report to labor and delivery if cannot achieve more than 10 kicks in one hour or if she perceives a decrease in fetal movement    Follow-up: No follow up with MFM scheduled, but I am happy to see for follow up at request of primary obstetrician    Thank you for the consult and opportunity to care for this patient.  Please feel free to reach out with any questions or concerns.      I spent 30 minutes caring for this patient on this date of service. This time includes time spent by me in the following activities: preparing for the visit, reviewing tests, obtaining and/or reviewing a separately obtained history, performing a medically appropriate examination and/or evaluation, counseling and educating the patient/family/caregiver and independently interpreting results and communicating that information with the patient/family/caregiver with greater than 50% spent in counseling and coordination of care.     MONICA Salcido  Maternal Fetal Medicine-[facility]  Office: [phone]  [email or web address]      Pt reports that she is doing well and denies vaginal bleeding, cramping, contractions or LOF at this time. Reports active fetal movement. Reviewed when to call OB office or present to L&D for evaluation with symptoms such as decreased fetal movement, vaginal bleeding, LOF or ctxs. Pt verbalized understanding. Denies HA, visual changes or epigastric pain. Reports round ligament pain and pain under her left shoulder blade. Denies any additional complaints at time of appointment. Next OB appointment scheduled for: needs to be scheduled.     Vitals:    01/10/24 1036   BP: 119/71   Pulse: 94   Temp: 98 °F (36.7 °C)

## 2024-01-11 ENCOUNTER — TELEPHONE (OUTPATIENT)
Dept: OBSTETRICS AND GYNECOLOGY | Age: 34
End: 2024-01-11
Payer: COMMERCIAL

## 2024-02-01 ENCOUNTER — INITIAL PRENATAL (OUTPATIENT)
Dept: OBSTETRICS AND GYNECOLOGY | Age: 34
End: 2024-02-01
Payer: COMMERCIAL

## 2024-02-01 VITALS — SYSTOLIC BLOOD PRESSURE: 126 MMHG | WEIGHT: 273.4 LBS | BODY MASS INDEX: 41.57 KG/M2 | DIASTOLIC BLOOD PRESSURE: 62 MMHG

## 2024-02-01 DIAGNOSIS — Z28.39 MATERNAL VARICELLA, NON-IMMUNE: ICD-10-CM

## 2024-02-01 DIAGNOSIS — Z11.3 ROUTINE SCREENING FOR STI (SEXUALLY TRANSMITTED INFECTION): ICD-10-CM

## 2024-02-01 DIAGNOSIS — F32.A ANXIETY AND DEPRESSION: ICD-10-CM

## 2024-02-01 DIAGNOSIS — B00.9 HERPES SIMPLEX: ICD-10-CM

## 2024-02-01 DIAGNOSIS — Z28.39 RUBELLA NON-IMMUNE STATUS, ANTEPARTUM: ICD-10-CM

## 2024-02-01 DIAGNOSIS — Z13.89 SCREENING FOR BLOOD OR PROTEIN IN URINE: Primary | ICD-10-CM

## 2024-02-01 DIAGNOSIS — O09.899 MATERNAL VARICELLA, NON-IMMUNE: ICD-10-CM

## 2024-02-01 DIAGNOSIS — O09.899 RUBELLA NON-IMMUNE STATUS, ANTEPARTUM: ICD-10-CM

## 2024-02-01 DIAGNOSIS — O99.210 SEVERE OBESITY DUE TO EXCESS CALORIES AFFECTING PREGNANCY, ANTEPARTUM: ICD-10-CM

## 2024-02-01 DIAGNOSIS — E66.01 SEVERE OBESITY DUE TO EXCESS CALORIES AFFECTING PREGNANCY, ANTEPARTUM: ICD-10-CM

## 2024-02-01 DIAGNOSIS — O43.102 PLACENTAL ABNORMALITY IN SECOND TRIMESTER: ICD-10-CM

## 2024-02-01 DIAGNOSIS — Z3A.26 26 WEEKS GESTATION OF PREGNANCY: ICD-10-CM

## 2024-02-01 DIAGNOSIS — F41.9 ANXIETY AND DEPRESSION: ICD-10-CM

## 2024-02-01 LAB
BILIRUB BLD-MCNC: NEGATIVE MG/DL
GLUCOSE UR STRIP-MCNC: ABNORMAL MG/DL
KETONES UR QL: NEGATIVE
LEUKOCYTE EST, POC: NEGATIVE
NITRITE UR-MCNC: NEGATIVE MG/ML
PH UR: 7 [PH] (ref 5–8)
PROT UR STRIP-MCNC: NEGATIVE MG/DL
RBC # UR STRIP: NEGATIVE /UL
SP GR UR: 1.02 (ref 1–1.03)
UROBILINOGEN UR QL: ABNORMAL

## 2024-02-01 NOTE — PROGRESS NOTES
Jocelyn Merlos, a 33 y.o.  at 26w3d, presents for OB follow-up.  She is doing well today.  Denies loss of fluid, vaginal bleeding, and contractions.  Endorses fetal movements.  Patient interested in primary CS. She is not interested in having any more children. We discussed this at length including risks of CS and recovery time. Will continue to discuss.     Objective  BP Readings from Last 3 Encounters:   24 126/62   01/10/24 119/71   23 117/72      Wt Readings from Last 3 Encounters:   24 124 kg (273 lb 6.4 oz)   01/10/24 124 kg (273 lb 12.8 oz)   23 122 kg (268 lb 12.8 oz)      Total weight gain this pregnancy: Not found.    General: Awake, alert, no apparent distress  Respiratory: No increased work of breathing  Abdomen: Fundus soft and nontender  Extremity: Nontender, no edema    A/P  Diagnoses and all orders for this visit:    1. Screening for blood or protein in urine (Primary)  -     POC Urinalysis Dipstick    2. Rubella non-immune, needs MMR PP    3. Placental abnormality in second trimester  Overview:  Appears to be resolved on most recent imaging, no f/u with MFM for this problem recommended.       4. Severe obesity due to excess calories affecting pregnancy, antepartum  Overview:  - Early A1c 5.9 in October > 5.5 in December with normal early 2 hour GTT test.   - BASA > see chronic hypertension  - Will plan weekly BPP beginning 32 weeks per cHTN on medication.   - Normal TSH, T4      5. 26 weeks gestation of pregnancy  Overview:  -PNL: IOB labs reviewed, plan for GBS at 36wga  -Pap: NIL, HPV neg 2/10/22  -Genetics: cfDNA/msAFP/carrier neg, anatomy Us normal  -Imm: RNI, VNI, s/p tdap and flu  -Contraception: Will discuss at later visit   -Birthplan: will discuss at later visit  -Care coordination: accepts blood transfusions, no latex allergy, call schedule reviewed           6. Maternal varicella, non-immune, needs Varivax PP    7. Herpes simplex- HSV2 seropositive; needs  suppression 34-36wga        Labor precautions reviewed  No follow-ups on file.    Neda Penn MD

## 2024-02-02 LAB
GLUCOSE 1H P 50 G GLC PO SERPL-MCNC: 229 MG/DL (ref 65–139)
T PALLIDUM AB SER QL IF: NON REACTIVE

## 2024-02-06 ENCOUNTER — LAB (OUTPATIENT)
Dept: OBSTETRICS AND GYNECOLOGY | Age: 34
End: 2024-02-06
Payer: COMMERCIAL

## 2024-02-06 DIAGNOSIS — Z13.1 SCREENING FOR DIABETES MELLITUS: Primary | ICD-10-CM

## 2024-02-06 LAB — GLUCOSE 1H P 50 G GLC PO SERPL-MCNC: 195 MG/DL (ref 65–139)

## 2024-02-14 ENCOUNTER — ROUTINE PRENATAL (OUTPATIENT)
Dept: OBSTETRICS AND GYNECOLOGY | Age: 34
End: 2024-02-14
Payer: COMMERCIAL

## 2024-02-14 VITALS — SYSTOLIC BLOOD PRESSURE: 110 MMHG | DIASTOLIC BLOOD PRESSURE: 60 MMHG | BODY MASS INDEX: 41.72 KG/M2 | WEIGHT: 274.4 LBS

## 2024-02-14 DIAGNOSIS — O99.210 SEVERE OBESITY DUE TO EXCESS CALORIES AFFECTING PREGNANCY, ANTEPARTUM: ICD-10-CM

## 2024-02-14 DIAGNOSIS — Z13.89 SCREENING FOR BLOOD OR PROTEIN IN URINE: Primary | ICD-10-CM

## 2024-02-14 DIAGNOSIS — O43.103 PLACENTAL ABNORMALITY IN THIRD TRIMESTER: ICD-10-CM

## 2024-02-14 DIAGNOSIS — E66.01 SEVERE OBESITY DUE TO EXCESS CALORIES AFFECTING PREGNANCY, ANTEPARTUM: ICD-10-CM

## 2024-02-14 DIAGNOSIS — O09.899 RUBELLA NON-IMMUNE STATUS, ANTEPARTUM: ICD-10-CM

## 2024-02-14 DIAGNOSIS — O09.899 MATERNAL VARICELLA, NON-IMMUNE: ICD-10-CM

## 2024-02-14 DIAGNOSIS — Z28.39 MATERNAL VARICELLA, NON-IMMUNE: ICD-10-CM

## 2024-02-14 DIAGNOSIS — Z28.39 RUBELLA NON-IMMUNE STATUS, ANTEPARTUM: ICD-10-CM

## 2024-02-14 DIAGNOSIS — O46.92 VAGINAL BLEEDING IN PREGNANCY, SECOND TRIMESTER: ICD-10-CM

## 2024-02-14 DIAGNOSIS — F32.A ANXIETY AND DEPRESSION: ICD-10-CM

## 2024-02-14 DIAGNOSIS — F41.9 ANXIETY AND DEPRESSION: ICD-10-CM

## 2024-02-14 DIAGNOSIS — O10.919 CHRONIC HYPERTENSION IN PREGNANCY: ICD-10-CM

## 2024-02-14 DIAGNOSIS — Z3A.28 28 WEEKS GESTATION OF PREGNANCY: ICD-10-CM

## 2024-02-14 DIAGNOSIS — O99.810 ABNORMAL GLUCOSE IN PREGNANCY, ANTEPARTUM: ICD-10-CM

## 2024-02-14 DIAGNOSIS — B00.9 HERPES SIMPLEX: ICD-10-CM

## 2024-02-14 PROBLEM — I10 ESSENTIAL HYPERTENSION: Status: RESOLVED | Noted: 2021-03-25 | Resolved: 2024-02-14

## 2024-02-14 LAB
BILIRUB BLD-MCNC: NEGATIVE MG/DL
GLUCOSE 1H P 100 G GLC PO SERPL-MCNC: 218 MG/DL (ref 65–179)
GLUCOSE 2H P 100 G GLC PO SERPL-MCNC: 230 MG/DL (ref 65–154)
GLUCOSE 3H P 100 G GLC PO SERPL-MCNC: 165 MG/DL (ref 65–139)
GLUCOSE P FAST SERPL-MCNC: 92 MG/DL (ref 65–94)
GLUCOSE UR STRIP-MCNC: NEGATIVE MG/DL
KETONES UR QL: NEGATIVE
LEUKOCYTE EST, POC: NEGATIVE
NITRITE UR-MCNC: NEGATIVE MG/ML
PH UR: 7 [PH] (ref 5–8)
PROT UR STRIP-MCNC: ABNORMAL MG/DL
RBC # UR STRIP: NEGATIVE /UL
SP GR UR: 1.02 (ref 1–1.03)
UROBILINOGEN UR QL: ABNORMAL

## 2024-02-15 DIAGNOSIS — O24.410 DIET CONTROLLED GESTATIONAL DIABETES MELLITUS (GDM) IN THIRD TRIMESTER: Primary | ICD-10-CM

## 2024-02-16 ENCOUNTER — TELEPHONE (OUTPATIENT)
Dept: OBSTETRICS AND GYNECOLOGY | Age: 34
End: 2024-02-16
Payer: COMMERCIAL

## 2024-02-16 DIAGNOSIS — O24.410 DIET CONTROLLED GESTATIONAL DIABETES MELLITUS (GDM) IN THIRD TRIMESTER: Primary | ICD-10-CM

## 2024-02-16 RX ORDER — GLUCOSAMINE HCL/CHONDROITIN SU 500-400 MG
1 CAPSULE ORAL 4 TIMES DAILY
Qty: 120 EACH | Refills: 5 | Status: SHIPPED | OUTPATIENT
Start: 2024-02-16

## 2024-02-16 RX ORDER — BLOOD-GLUCOSE METER
KIT MISCELLANEOUS
Qty: 1 EACH | Refills: 0 | Status: SHIPPED | OUTPATIENT
Start: 2024-02-16

## 2024-02-16 RX ORDER — LANCETS 23 GAUGE
EACH MISCELLANEOUS
Qty: 120 EACH | Refills: 12 | Status: SHIPPED | OUTPATIENT
Start: 2024-02-16

## 2024-02-26 ENCOUNTER — TELEPHONE (OUTPATIENT)
Dept: DIABETES SERVICES | Facility: HOSPITAL | Age: 34
End: 2024-02-26
Payer: COMMERCIAL

## 2024-02-26 NOTE — TELEPHONE ENCOUNTER
Noted pt had instructions in OB office for meter, diet. Call pt to assess if she needs further education. Pt states she has been testing BGs for the last 12 days -with most BGs within target. Pt denies new educational needs. Will defer ed appt today. Encourage pt to reach out to us for further ed if needed. Pt bryce.

## 2024-02-28 ENCOUNTER — ROUTINE PRENATAL (OUTPATIENT)
Dept: OBSTETRICS AND GYNECOLOGY | Age: 34
End: 2024-02-28
Payer: COMMERCIAL

## 2024-02-28 VITALS — BODY MASS INDEX: 41.48 KG/M2 | WEIGHT: 272.8 LBS | DIASTOLIC BLOOD PRESSURE: 74 MMHG | SYSTOLIC BLOOD PRESSURE: 120 MMHG

## 2024-02-28 DIAGNOSIS — F41.9 ANXIETY AND DEPRESSION: ICD-10-CM

## 2024-02-28 DIAGNOSIS — Z28.39 RUBELLA NON-IMMUNE STATUS, ANTEPARTUM: ICD-10-CM

## 2024-02-28 DIAGNOSIS — Z13.89 SCREENING FOR BLOOD OR PROTEIN IN URINE: Primary | ICD-10-CM

## 2024-02-28 DIAGNOSIS — O26.20 RECURRENT PREGNANCY LOSS WITH CURRENT PREGNANCY: ICD-10-CM

## 2024-02-28 DIAGNOSIS — O10.919 CHRONIC HYPERTENSION IN PREGNANCY: ICD-10-CM

## 2024-02-28 DIAGNOSIS — E66.01 SEVERE OBESITY DUE TO EXCESS CALORIES AFFECTING PREGNANCY, ANTEPARTUM: ICD-10-CM

## 2024-02-28 DIAGNOSIS — O99.210 SEVERE OBESITY DUE TO EXCESS CALORIES AFFECTING PREGNANCY, ANTEPARTUM: ICD-10-CM

## 2024-02-28 DIAGNOSIS — F32.A ANXIETY AND DEPRESSION: ICD-10-CM

## 2024-02-28 DIAGNOSIS — O43.103 PLACENTAL ABNORMALITY IN THIRD TRIMESTER: ICD-10-CM

## 2024-02-28 DIAGNOSIS — O09.899 MATERNAL VARICELLA, NON-IMMUNE: ICD-10-CM

## 2024-02-28 DIAGNOSIS — Z3A.30 30 WEEKS GESTATION OF PREGNANCY: ICD-10-CM

## 2024-02-28 DIAGNOSIS — Z28.39 MATERNAL VARICELLA, NON-IMMUNE: ICD-10-CM

## 2024-02-28 DIAGNOSIS — O09.899 RUBELLA NON-IMMUNE STATUS, ANTEPARTUM: ICD-10-CM

## 2024-02-28 DIAGNOSIS — O24.410 DIET CONTROLLED GESTATIONAL DIABETES MELLITUS (GDM) IN THIRD TRIMESTER: ICD-10-CM

## 2024-02-28 DIAGNOSIS — B00.9 HERPES SIMPLEX: ICD-10-CM

## 2024-02-28 LAB
BILIRUB BLD-MCNC: NEGATIVE MG/DL
GLUCOSE UR STRIP-MCNC: NEGATIVE MG/DL
KETONES UR QL: NEGATIVE
LEUKOCYTE EST, POC: ABNORMAL
NITRITE UR-MCNC: NEGATIVE MG/ML
PH UR: 7 [PH] (ref 5–8)
PROT UR STRIP-MCNC: ABNORMAL MG/DL
RBC # UR STRIP: ABNORMAL /UL
SP GR UR: 1.02 (ref 1–1.03)
UROBILINOGEN UR QL: ABNORMAL

## 2024-02-28 PROCEDURE — 0502F SUBSEQUENT PRENATAL CARE: CPT | Performed by: STUDENT IN AN ORGANIZED HEALTH CARE EDUCATION/TRAINING PROGRAM

## 2024-02-28 NOTE — PROGRESS NOTES
Jocelyn Merlos, a 33 y.o.  at 30w2d, presents for OB follow-up.  She is doing well today.  Denies loss of fluid, vaginal bleeding, and contractions.  Endorses fetal movements.    Objective  BP Readings from Last 3 Encounters:   24 120/74   24 110/60   24 126/62      Wt Readings from Last 3 Encounters:   24 124 kg (272 lb 12.8 oz)   24 124 kg (274 lb 6.4 oz)   24 124 kg (273 lb 6.4 oz)      Total weight gain this pregnancy: Not found.    General: Awake, alert, no apparent distress  Respiratory: No increased work of breathing  Abdomen: Fundus soft and nontender  Extremity: Nontender, no edema    A/P  Diagnoses and all orders for this visit:    1. Screening for blood or protein in urine (Primary)  -     POC Urinalysis Dipstick    2. Rubella non-immune, needs MMR PP    3. Recurrent pregnancy loss with current pregnancy    4. Placental abnormality in third trimester  Overview:  Appears to be resolved on most recent imaging, no f/u with MFM for this problem recommended.       5. Severe obesity due to excess calories affecting pregnancy, antepartum  Overview:  - Early A1c 5.9 in October > 5.5 in December with normal early 2 hour GTT test.   - BASA > see chronic hypertension  - Will plan weekly BPP beginning 32 weeks per cHTN on medication.   - Normal TSH, T4      6. Maternal varicella, non-immune, needs Varivax PP    7. Chronic hypertension in pregnancy  Overview:  - On labetalol 100 BID  - Begin BPPs at 32 weeks   - On BASA  - Serial growth US.       8. 30 weeks gestation of pregnancy  Overview:  -PNL: IOB labs reviewed, plan for GBS at 36wga  -Pap: NIL, HPV neg 2/10/22  -Genetics: cfDNA/msAFP/carrier neg, anatomy Us normal  -Imm: RNI, VNI, s/p tdap and flu  -Contraception:  getting a vasectomy  -Birthplan: will discuss at later visit  -Care coordination: accepts blood transfusions, no latex allergy, call schedule reviewed           9. Anxiety and depression  Overview:  -  Lexapro 10mg/day      10. Herpes simplex- HSV2 seropositive; needs suppression 34-36wga    11. Diet controlled gestational diabetes mellitus (GDM) in third trimester  Overview:  - 1 hour above 200, but patient had caramel frappe just before test > repeat 195 > 3 hour with three abnormal values  -Status post diabetic teachin,g  -BPP's weekly starting at 32 weeks, serial growth scans.  -Lungs reviewed on 2/28/2024, most values within range, no insulin required at this time.          Labor precautions reviewed  Return in about 2 weeks (around 3/13/2024).    Neda Penn MD

## 2024-03-13 ENCOUNTER — ROUTINE PRENATAL (OUTPATIENT)
Dept: OBSTETRICS AND GYNECOLOGY | Age: 34
End: 2024-03-13
Payer: COMMERCIAL

## 2024-03-13 VITALS — SYSTOLIC BLOOD PRESSURE: 122 MMHG | BODY MASS INDEX: 41.36 KG/M2 | WEIGHT: 272 LBS | DIASTOLIC BLOOD PRESSURE: 78 MMHG

## 2024-03-13 DIAGNOSIS — F41.9 ANXIETY AND DEPRESSION: ICD-10-CM

## 2024-03-13 DIAGNOSIS — Z28.39 MATERNAL VARICELLA, NON-IMMUNE: ICD-10-CM

## 2024-03-13 DIAGNOSIS — O09.899 MATERNAL VARICELLA, NON-IMMUNE: ICD-10-CM

## 2024-03-13 DIAGNOSIS — F32.A ANXIETY AND DEPRESSION: ICD-10-CM

## 2024-03-13 DIAGNOSIS — O24.410 DIET CONTROLLED GESTATIONAL DIABETES MELLITUS (GDM) IN THIRD TRIMESTER: ICD-10-CM

## 2024-03-13 DIAGNOSIS — B00.9 HERPES SIMPLEX: ICD-10-CM

## 2024-03-13 DIAGNOSIS — Z3A.32 32 WEEKS GESTATION OF PREGNANCY: Primary | ICD-10-CM

## 2024-03-13 DIAGNOSIS — O09.899 RUBELLA NON-IMMUNE STATUS, ANTEPARTUM: ICD-10-CM

## 2024-03-13 DIAGNOSIS — Z13.89 SCREENING FOR BLOOD OR PROTEIN IN URINE: ICD-10-CM

## 2024-03-13 DIAGNOSIS — O10.919 CHRONIC HYPERTENSION IN PREGNANCY: ICD-10-CM

## 2024-03-13 DIAGNOSIS — Z28.39 RUBELLA NON-IMMUNE STATUS, ANTEPARTUM: ICD-10-CM

## 2024-03-13 LAB
CLARITY, POC: CLEAR
COLOR UR: YELLOW
GLUCOSE UR STRIP-MCNC: NEGATIVE MG/DL
PROT UR STRIP-MCNC: NEGATIVE MG/DL

## 2024-03-13 NOTE — PROGRESS NOTES
Jocelyn Merlso, a 33 y.o.  at 32w2d, presents for OB follow-up.  She is doing well today.  Denies loss of fluid, vaginal bleeding, and contractions.  Good FM  Mild round ligament pain  Overall good BS control she is aware of elevated readings and why  States she has trouble with dinner and eating with     Objective  BP Readings from Last 3 Encounters:   24 122/78   24 120/74   24 110/60      Wt Readings from Last 3 Encounters:   24 123 kg (272 lb)   24 124 kg (272 lb 12.8 oz)   24 124 kg (274 lb 6.4 oz)      Total weight gain this pregnancy: Not found.    General: Awake, alert, no apparent distress  Respiratory: No increased work of breathing  Abdomen: Fundus soft and nontender  Extremity: Nontender, no edema    A/P  Diagnoses and all orders for this visit:    1. 32 weeks gestation of pregnancy (Primary)  Overview:  -PNL: IOB labs reviewed, plan for GBS at 36wga  -Pap: NIL, HPV neg 2/10/22  -Genetics: cfDNA/msAFP/carrier neg, anatomy Us normal  -Imm: RNI, VNI, s/p tdap and flu  -Contraception:  getting a vasectomy  -Birthplan: will discuss at later visit  -Care coordination: accepts blood transfusions, no latex allergy, call schedule reviewed           2. Screening for blood or protein in urine  -     POC Urinalysis Dipstick    3. Chronic hypertension in pregnancy  Overview:  - On labetalol 100 BID  - Begin BPPs at 32 weeks   - On BASA  - Serial growth US.       4. Diet controlled gestational diabetes mellitus (GDM) in third trimester  Overview:  - 1 hour above 200, but patient had caramel frappe just before test > repeat 195 > 3 hour with three abnormal values  -Status post diabetic teachin,g  -BPP's weekly starting at 32 weeks, serial growth scans.  -Lungs reviewed on 2024, most values within range, no insulin required at this time.      5. Anxiety and depression  Overview:  - Lexapro 10mg/day      6. Herpes simplex- HSV2 seropositive; needs suppression  34-36wga    7. Maternal varicella, non-immune, needs Varivax PP    8. Rubella non-immune, needs MMR PP        Impression:  Intrauterine pregnancy at 32w2d  Estimated fetal weight:  1992 g  Biophysical profile: Reassuring  8/8  OBI: 11.68 cm  EFW 47%      Belly band   PTB precautions  Encouraged walking nightly after dinner  Discussed in depth diet., complex carbs and lean protein    Return for Next scheduled follow up.    Yaa Rogel, APRN

## 2024-03-20 ENCOUNTER — ROUTINE PRENATAL (OUTPATIENT)
Dept: OBSTETRICS AND GYNECOLOGY | Age: 34
End: 2024-03-20
Payer: COMMERCIAL

## 2024-03-20 VITALS — DIASTOLIC BLOOD PRESSURE: 70 MMHG | WEIGHT: 272.4 LBS | BODY MASS INDEX: 41.42 KG/M2 | SYSTOLIC BLOOD PRESSURE: 114 MMHG

## 2024-03-20 DIAGNOSIS — B00.9 HERPES SIMPLEX: ICD-10-CM

## 2024-03-20 DIAGNOSIS — Z13.89 SCREENING FOR BLOOD OR PROTEIN IN URINE: ICD-10-CM

## 2024-03-20 DIAGNOSIS — O10.919 CHRONIC HYPERTENSION IN PREGNANCY: ICD-10-CM

## 2024-03-20 DIAGNOSIS — F32.A ANXIETY AND DEPRESSION: ICD-10-CM

## 2024-03-20 DIAGNOSIS — O09.899 RUBELLA NON-IMMUNE STATUS, ANTEPARTUM: ICD-10-CM

## 2024-03-20 DIAGNOSIS — O46.92 VAGINAL BLEEDING IN PREGNANCY, SECOND TRIMESTER: ICD-10-CM

## 2024-03-20 DIAGNOSIS — O09.899 MATERNAL VARICELLA, NON-IMMUNE: ICD-10-CM

## 2024-03-20 DIAGNOSIS — F41.9 ANXIETY AND DEPRESSION: ICD-10-CM

## 2024-03-20 DIAGNOSIS — Z3A.33 33 WEEKS GESTATION OF PREGNANCY: Primary | ICD-10-CM

## 2024-03-20 DIAGNOSIS — Z14.8 HEMOCHROMATOSIS CARRIER: ICD-10-CM

## 2024-03-20 DIAGNOSIS — E66.01 SEVERE OBESITY DUE TO EXCESS CALORIES AFFECTING PREGNANCY, ANTEPARTUM: ICD-10-CM

## 2024-03-20 DIAGNOSIS — O43.103 PLACENTAL ABNORMALITY IN THIRD TRIMESTER: ICD-10-CM

## 2024-03-20 DIAGNOSIS — Z28.39 MATERNAL VARICELLA, NON-IMMUNE: ICD-10-CM

## 2024-03-20 DIAGNOSIS — O24.410 DIET CONTROLLED GESTATIONAL DIABETES MELLITUS (GDM) IN THIRD TRIMESTER: ICD-10-CM

## 2024-03-20 DIAGNOSIS — O26.20 RECURRENT PREGNANCY LOSS WITH CURRENT PREGNANCY: ICD-10-CM

## 2024-03-20 DIAGNOSIS — O99.210 SEVERE OBESITY DUE TO EXCESS CALORIES AFFECTING PREGNANCY, ANTEPARTUM: ICD-10-CM

## 2024-03-20 DIAGNOSIS — Z28.39 RUBELLA NON-IMMUNE STATUS, ANTEPARTUM: ICD-10-CM

## 2024-03-20 DIAGNOSIS — R11.0 NAUSEA WITHOUT VOMITING: ICD-10-CM

## 2024-03-20 LAB
BILIRUB BLD-MCNC: NEGATIVE MG/DL
GLUCOSE UR STRIP-MCNC: NEGATIVE MG/DL
KETONES UR QL: NEGATIVE
LEUKOCYTE EST, POC: ABNORMAL
NITRITE UR-MCNC: NEGATIVE MG/ML
PH UR: 7 [PH] (ref 5–8)
PROT UR STRIP-MCNC: NEGATIVE MG/DL
RBC # UR STRIP: ABNORMAL /UL
SP GR UR: 1.02 (ref 1–1.03)
UROBILINOGEN UR QL: ABNORMAL

## 2024-03-20 NOTE — PROGRESS NOTES
Jocelyn Merlos, a 33 y.o.  at 33w2d, presents for OB follow-up.  She is doing well today.  Denies loss of fluid, vaginal bleeding, and contractions.  Endorses fetal movements.    Objective  BP Readings from Last 3 Encounters:   24 114/70   24 122/78   24 120/74      Wt Readings from Last 3 Encounters:   24 124 kg (272 lb 6.4 oz)   24 123 kg (272 lb)   24 124 kg (272 lb 12.8 oz)      Total weight gain this pregnancy: Not found.    General: Awake, alert, no apparent distress  Respiratory: No increased work of breathing  Abdomen: Fundus soft and nontender  Extremity: Nontender, no edema    A/P  Diagnoses and all orders for this visit:    1. 33 weeks gestation of pregnancy (Primary)  Overview:  -PNL: IOB labs reviewed, plan for GBS at 36wga  -Pap: NIL, HPV neg 2/10/22  -Genetics: cfDNA/msAFP/carrier neg, anatomy Us normal  -Imm: RNI, VNI, s/p tdap and flu  -Contraception:  getting a vasectomy  -Birthplan: will discuss at later visit  -Care coordination: accepts blood transfusions, no latex allergy, call schedule reviewed           2. Screening for blood or protein in urine  -     POC Urinalysis Dipstick    3. Hemochromatosis carrier    4. Diet controlled gestational diabetes mellitus (GDM) in third trimester  Overview:  - 1 hour above 200, but patient had caramel frappe just before test > repeat 195 > 3 hour with three abnormal values  -Status post diabetic teachin,g  -BPP's weekly starting at 32 weeks, serial growth scans.  -Logs reviewed on 2024, most values within range, no insulin required at this time.  -Logs reviewed 3/20/2024, again most values within normal range, no insulin required at this time.      5. Nausea without vomiting    6. Rubella non-immune, needs MMR PP    7. Vaginal bleeding in pregnancy, second trimester- referred to MFM    8. Recurrent pregnancy loss with current pregnancy    9. Placental abnormality in third trimester  Overview:  Appears to be  resolved on most recent imaging, no f/u with MFM for this problem recommended.       10. Severe obesity due to excess calories affecting pregnancy, antepartum  Overview:  - Early A1c 5.9 in October > 5.5 in December with normal early 2 hour GTT test.   - BASA > see chronic hypertension  - Will plan weekly BPP beginning 32 weeks per cHTN on medication.   - Normal TSH, T4      11. Maternal varicella, non-immune, needs Varivax PP    12. Chronic hypertension in pregnancy  Overview:  - On labetalol 100 BID  - Begin BPPs at 32 weeks   - On BASA  - Serial growth US.       13. Anxiety and depression  Overview:  - Lexapro 10mg/day      14. Herpes simplex- HSV2 seropositive; needs suppression 34-36wga        Labor precautions reviewed  No follow-ups on file.    Neda Penn MD

## 2024-03-28 ENCOUNTER — ROUTINE PRENATAL (OUTPATIENT)
Dept: OBSTETRICS AND GYNECOLOGY | Age: 34
End: 2024-03-28
Payer: COMMERCIAL

## 2024-03-28 VITALS — DIASTOLIC BLOOD PRESSURE: 76 MMHG | WEIGHT: 272 LBS | SYSTOLIC BLOOD PRESSURE: 122 MMHG | BODY MASS INDEX: 41.36 KG/M2

## 2024-03-28 DIAGNOSIS — Z3A.34 34 WEEKS GESTATION OF PREGNANCY: Primary | ICD-10-CM

## 2024-03-28 DIAGNOSIS — O99.210 SEVERE OBESITY DUE TO EXCESS CALORIES AFFECTING PREGNANCY, ANTEPARTUM: ICD-10-CM

## 2024-03-28 DIAGNOSIS — E66.01 SEVERE OBESITY DUE TO EXCESS CALORIES AFFECTING PREGNANCY, ANTEPARTUM: ICD-10-CM

## 2024-03-28 DIAGNOSIS — B00.9 HERPES SIMPLEX: ICD-10-CM

## 2024-03-28 DIAGNOSIS — O10.919 CHRONIC HYPERTENSION IN PREGNANCY: ICD-10-CM

## 2024-03-28 DIAGNOSIS — O09.899 MATERNAL VARICELLA, NON-IMMUNE: ICD-10-CM

## 2024-03-28 DIAGNOSIS — O46.92 VAGINAL BLEEDING IN PREGNANCY, SECOND TRIMESTER: ICD-10-CM

## 2024-03-28 DIAGNOSIS — O43.101 PLACENTAL ABNORMALITY IN FIRST TRIMESTER: ICD-10-CM

## 2024-03-28 DIAGNOSIS — Z28.39 MATERNAL VARICELLA, NON-IMMUNE: ICD-10-CM

## 2024-03-28 DIAGNOSIS — O24.410 DIET CONTROLLED GESTATIONAL DIABETES MELLITUS (GDM) IN THIRD TRIMESTER: ICD-10-CM

## 2024-03-28 DIAGNOSIS — O09.899 RUBELLA NON-IMMUNE STATUS, ANTEPARTUM: ICD-10-CM

## 2024-03-28 DIAGNOSIS — Z28.39 RUBELLA NON-IMMUNE STATUS, ANTEPARTUM: ICD-10-CM

## 2024-03-28 LAB
BILIRUB BLD-MCNC: NEGATIVE MG/DL
CLARITY, POC: CLEAR
COLOR UR: YELLOW
GLUCOSE UR STRIP-MCNC: NEGATIVE MG/DL
KETONES UR QL: NEGATIVE
LEUKOCYTE EST, POC: ABNORMAL
NITRITE UR-MCNC: NEGATIVE MG/ML
PH UR: 7 [PH] (ref 5–8)
PROT UR STRIP-MCNC: NEGATIVE MG/DL
RBC # UR STRIP: NEGATIVE /UL
SP GR UR: 1.01 (ref 1–1.03)
UROBILINOGEN UR QL: ABNORMAL

## 2024-03-28 PROCEDURE — 0502F SUBSEQUENT PRENATAL CARE: CPT | Performed by: STUDENT IN AN ORGANIZED HEALTH CARE EDUCATION/TRAINING PROGRAM

## 2024-03-28 NOTE — PROGRESS NOTES
Jocelyn Merlos, a 33 y.o.  at 34w3d, presents for OB follow-up.  She is doing well today.  Denies loss of fluid, vaginal bleeding, and contractions.  Endorses fetal movements.    Objective  BP Readings from Last 3 Encounters:   24 122/76   24 114/70   24 122/78      Wt Readings from Last 3 Encounters:   24 123 kg (272 lb)   24 124 kg (272 lb 6.4 oz)   24 123 kg (272 lb)      Total weight gain this pregnancy: Not found.    General: Awake, alert, no apparent distress  Respiratory: No increased work of breathing  Abdomen: Fundus soft and nontender  Extremity: Nontender, no edema    A/P  Diagnoses and all orders for this visit:    1. 34 weeks gestation of pregnancy (Primary)  Overview:  -PNL: IOB labs reviewed, plan for GBS at 36wga  -Pap: NIL, HPV neg 2/10/22  -Genetics: cfDNA/msAFP/carrier neg, anatomy Us normal  -Imm: RNI, VNI, s/p tdap and flu  -Contraception:  getting a vasectomy  -Birthplan: will discuss at later visit  -Care coordination: accepts blood transfusions, no latex allergy, call schedule reviewed         Orders:  -     POC Urinalysis Dipstick    2. Diet controlled gestational diabetes mellitus (GDM) in third trimester  Overview:  - 1 hour above 200, but patient had caramel frappe just before test > repeat 195 > 3 hour with three abnormal values  -Status post diabetic teachin,g  -BPP's weekly starting at 32 weeks, serial growth scans.  -Logs reviewed on 2024, most values within range, no insulin required at this time.  -Logs reviewed 3/20/2024, again most values within normal range, no insulin required at this time.  -Logs reviewed 3/28/24, continues to be stable, no additional tx.       3. Vaginal bleeding in pregnancy, second trimester- referred to MFM    4. Rubella non-immune, needs MMR PP    5. Placental abnormality in first trimester  Overview:  Appears to be resolved on most recent imaging, no f/u with MFM for this problem recommended.       6.  Severe obesity due to excess calories affecting pregnancy, antepartum  Overview:  - Early A1c 5.9 in October > 5.5 in December with normal early 2 hour GTT test.   - BASA > see chronic hypertension  - Will plan weekly BPP beginning 32 weeks per cHTN on medication.   - Normal TSH, T4      7. Maternal varicella, non-immune, needs Varivax PP    8. Chronic hypertension in pregnancy  Overview:  - On labetalol 100 BID  - Begin BPPs at 32 weeks   - On BASA  - Serial growth US.       9. Herpes simplex- HSV2 seropositive; needs suppression 34-36wga        Labor precautions reviewed  No follow-ups on file.    Neda Penn MD

## 2024-04-04 ENCOUNTER — ROUTINE PRENATAL (OUTPATIENT)
Dept: OBSTETRICS AND GYNECOLOGY | Age: 34
End: 2024-04-04
Payer: COMMERCIAL

## 2024-04-04 VITALS — WEIGHT: 273.6 LBS | DIASTOLIC BLOOD PRESSURE: 90 MMHG | SYSTOLIC BLOOD PRESSURE: 150 MMHG | BODY MASS INDEX: 41.6 KG/M2

## 2024-04-04 DIAGNOSIS — Z13.89 SCREENING FOR BLOOD OR PROTEIN IN URINE: ICD-10-CM

## 2024-04-04 DIAGNOSIS — Z3A.35 35 WEEKS GESTATION OF PREGNANCY: Primary | ICD-10-CM

## 2024-04-04 DIAGNOSIS — B00.9 HERPES SIMPLEX: ICD-10-CM

## 2024-04-04 DIAGNOSIS — O09.899 RUBELLA NON-IMMUNE STATUS, ANTEPARTUM: ICD-10-CM

## 2024-04-04 DIAGNOSIS — O10.919 CHRONIC HYPERTENSION DURING PREGNANCY, ANTEPARTUM: ICD-10-CM

## 2024-04-04 DIAGNOSIS — Z28.39 RUBELLA NON-IMMUNE STATUS, ANTEPARTUM: ICD-10-CM

## 2024-04-04 DIAGNOSIS — F33.42 MAJOR DEPRESSIVE DISORDER, RECURRENT, IN FULL REMISSION: ICD-10-CM

## 2024-04-04 DIAGNOSIS — O43.101 PLACENTAL ABNORMALITY IN FIRST TRIMESTER: ICD-10-CM

## 2024-04-04 DIAGNOSIS — Z28.39 MATERNAL VARICELLA, NON-IMMUNE: ICD-10-CM

## 2024-04-04 DIAGNOSIS — O10.919 CHRONIC HYPERTENSION IN PREGNANCY: ICD-10-CM

## 2024-04-04 DIAGNOSIS — O24.410 DIET CONTROLLED GESTATIONAL DIABETES MELLITUS (GDM) IN THIRD TRIMESTER: ICD-10-CM

## 2024-04-04 DIAGNOSIS — O99.210 SEVERE OBESITY DUE TO EXCESS CALORIES AFFECTING PREGNANCY, ANTEPARTUM: ICD-10-CM

## 2024-04-04 DIAGNOSIS — E66.01 SEVERE OBESITY DUE TO EXCESS CALORIES AFFECTING PREGNANCY, ANTEPARTUM: ICD-10-CM

## 2024-04-04 DIAGNOSIS — O09.899 MATERNAL VARICELLA, NON-IMMUNE: ICD-10-CM

## 2024-04-04 LAB
BILIRUB BLD-MCNC: NEGATIVE MG/DL
GLUCOSE UR STRIP-MCNC: NEGATIVE MG/DL
KETONES UR QL: NEGATIVE
LEUKOCYTE EST, POC: ABNORMAL
NITRITE UR-MCNC: NEGATIVE MG/ML
PH UR: 7 [PH] (ref 5–8)
PROT UR STRIP-MCNC: NEGATIVE MG/DL
RBC # UR STRIP: NEGATIVE /UL
SP GR UR: 1.01 (ref 1–1.03)
UROBILINOGEN UR QL: ABNORMAL

## 2024-04-04 RX ORDER — MORPHINE SULFATE 10 MG/ML
2 INJECTION INTRAMUSCULAR; INTRAVENOUS; SUBCUTANEOUS
OUTPATIENT
Start: 2024-04-04 | End: 2024-04-14

## 2024-04-04 RX ORDER — SODIUM CHLORIDE 9 MG/ML
40 INJECTION, SOLUTION INTRAVENOUS AS NEEDED
OUTPATIENT
Start: 2024-04-04

## 2024-04-04 RX ORDER — ACETAMINOPHEN 500 MG
1000 TABLET ORAL ONCE
OUTPATIENT
Start: 2024-04-04 | End: 2024-04-04

## 2024-04-04 RX ORDER — OXYTOCIN/0.9 % SODIUM CHLORIDE 30/500 ML
250 PLASTIC BAG, INJECTION (ML) INTRAVENOUS CONTINUOUS
OUTPATIENT
Start: 2024-04-04 | End: 2024-04-04

## 2024-04-04 RX ORDER — CARBOPROST TROMETHAMINE 250 UG/ML
250 INJECTION, SOLUTION INTRAMUSCULAR AS NEEDED
OUTPATIENT
Start: 2024-04-04

## 2024-04-04 RX ORDER — LABETALOL 100 MG/1
200 TABLET, FILM COATED ORAL 3 TIMES DAILY
Status: ON HOLD
Start: 2024-04-04

## 2024-04-04 RX ORDER — SODIUM CHLORIDE 0.9 % (FLUSH) 0.9 %
10 SYRINGE (ML) INJECTION AS NEEDED
OUTPATIENT
Start: 2024-04-04

## 2024-04-04 RX ORDER — ONDANSETRON 4 MG/1
4 TABLET, ORALLY DISINTEGRATING ORAL EVERY 6 HOURS PRN
OUTPATIENT
Start: 2024-04-04

## 2024-04-04 RX ORDER — MISOPROSTOL 100 UG/1
800 TABLET ORAL AS NEEDED
OUTPATIENT
Start: 2024-04-04

## 2024-04-04 RX ORDER — SODIUM CHLORIDE 0.9 % (FLUSH) 0.9 %
10 SYRINGE (ML) INJECTION EVERY 12 HOURS SCHEDULED
OUTPATIENT
Start: 2024-04-04

## 2024-04-04 RX ORDER — KETOROLAC TROMETHAMINE 15 MG/ML
30 INJECTION, SOLUTION INTRAMUSCULAR; INTRAVENOUS ONCE
OUTPATIENT
Start: 2024-04-04 | End: 2024-04-04

## 2024-04-04 RX ORDER — VALACYCLOVIR HYDROCHLORIDE 500 MG/1
500 TABLET, FILM COATED ORAL DAILY
Qty: 30 TABLET | Refills: 0 | Status: ON HOLD | OUTPATIENT
Start: 2024-04-04 | End: 2024-05-04

## 2024-04-04 RX ORDER — ONDANSETRON 2 MG/ML
4 INJECTION INTRAMUSCULAR; INTRAVENOUS EVERY 6 HOURS PRN
OUTPATIENT
Start: 2024-04-04

## 2024-04-04 RX ORDER — OXYTOCIN/0.9 % SODIUM CHLORIDE 30/500 ML
999 PLASTIC BAG, INJECTION (ML) INTRAVENOUS ONCE
OUTPATIENT
Start: 2024-04-04

## 2024-04-04 RX ORDER — SODIUM CHLORIDE, SODIUM LACTATE, POTASSIUM CHLORIDE, CALCIUM CHLORIDE 600; 310; 30; 20 MG/100ML; MG/100ML; MG/100ML; MG/100ML
125 INJECTION, SOLUTION INTRAVENOUS CONTINUOUS
OUTPATIENT
Start: 2024-04-04

## 2024-04-04 RX ORDER — METHYLERGONOVINE MALEATE 0.2 MG/ML
200 INJECTION INTRAVENOUS ONCE AS NEEDED
OUTPATIENT
Start: 2024-04-04

## 2024-04-04 RX ORDER — LIDOCAINE HYDROCHLORIDE 10 MG/ML
0.5 INJECTION, SOLUTION INFILTRATION; PERINEURAL ONCE AS NEEDED
OUTPATIENT
Start: 2024-04-04

## 2024-04-04 NOTE — PROGRESS NOTES
Jocelyn Merlos, a 33 y.o.  at 35w3d, presents for OB follow-up.  She is doing well today.  Denies loss of fluid, vaginal bleeding, and contractions.  Endorses fetal movements.    Objective  BP Readings from Last 3 Encounters:   24 150/90   24 122/76   24 114/70      Wt Readings from Last 3 Encounters:   24 124 kg (273 lb 9.6 oz)   24 123 kg (272 lb)   24 124 kg (272 lb 6.4 oz)      Total weight gain this pregnancy: Not found.    General: Awake, alert, no apparent distress  Respiratory: No increased work of breathing  Abdomen: Fundus soft and nontender  Extremity: Nontender, no edema    A/P  Diagnoses and all orders for this visit:    1. 35 weeks gestation of pregnancy (Primary)  Overview:  -PNL: IOB labs reviewed, plan for GBS at 36wga  -Pap: NIL, HPV neg 2/10/22  -Genetics: cfDNA/msAFP/carrier neg, anatomy Us normal  -Imm: RNI, VNI, s/p tdap and flu  -Contraception:  getting a vasectomy  -Birthplan: will discuss at later visit  -Care coordination: accepts blood transfusions, no latex allergy, call schedule reviewed           2. Chronic hypertension in pregnancy  Overview:  - On labetalol 100 BID  - Begin BPPs at 32 weeks   - On BASA  - Serial growth US.     Orders:  -     CBC & Differential  -     Comprehensive Metabolic Panel  -     Lactate Dehydrogenase  -     Protein / Creatinine Ratio, Urine - Urine, Clean Catch    3. Diet controlled gestational diabetes mellitus (GDM) in third trimester  Overview:  - 1 hour above 200, but patient had caramel frappe just before test > repeat 195 > 3 hour with three abnormal values  -Status post diabetic teachin,g  -BPP's weekly starting at 32 weeks, serial growth scans.  -Logs reviewed on 2024, most values within range, no insulin required at this time.  -Logs reviewed 3/20/2024, again most values within normal range, no insulin required at this time.  -Logs reviewed 3/28/24, continues to be stable, no additional tx.       4.  Rubella non-immune, needs MMR PP    5. Placental abnormality in first trimester  Overview:  Appears to be resolved on most recent imaging, no f/u with MFM for this problem recommended.       6. Severe obesity due to excess calories affecting pregnancy, antepartum  Overview:  - Early A1c 5.9 in October > 5.5 in December with normal early 2 hour GTT test.   - BASA > see chronic hypertension  - Will plan weekly BPP beginning 32 weeks per cHTN on medication.   - Normal TSH, T4      7. Maternal varicella, non-immune, needs Varivax PP    8. Major depressive disorder, recurrent, in full remission    9. Herpes simplex- HSV2 seropositive; needs suppression 34-36wga        Labor precautions reviewed  No follow-ups on file.    Neda Penn MD

## 2024-04-04 NOTE — PROGRESS NOTES
Jocelyn Merlos, a 33 y.o.  at 35w3d, presents for OB follow-up.  She is doing well today.  Denies loss of fluid, vaginal bleeding, and contractions.  Endorses fetal movements.    Objective  BP Readings from Last 3 Encounters:   24 150/90   24 122/76   24 114/70      Wt Readings from Last 3 Encounters:   24 124 kg (273 lb 9.6 oz)   24 123 kg (272 lb)   24 124 kg (272 lb 6.4 oz)      Total weight gain this pregnancy: Not found.    General: Awake, alert, no apparent distress  Respiratory: No increased work of breathing  Abdomen: Fundus soft and nontender  Extremity: Nontender, no edema    A/P  Diagnoses and all orders for this visit:    1. 35 weeks gestation of pregnancy (Primary)  Overview:  -PNL: IOB labs reviewed, plan for GBS at 36wga  -Pap: NIL, HPV neg 2/10/22  -Genetics: cfDNA/msAFP/carrier neg, anatomy Us normal  -Imm: RNI, VNI, s/p tdap and flu  -Contraception:  getting a vasectomy  -Birthplan: will discuss at later visit  -Care coordination: accepts blood transfusions, no latex allergy, call schedule reviewed         Orders:  -     Case Request; Standing  -     sodium chloride 0.9 % flush 10 mL  -     sodium chloride 0.9 % flush 10 mL  -     sodium chloride 0.9 % infusion 40 mL  -     lidocaine (XYLOCAINE) 1 % injection 0.5 mL  -     lactated ringers bolus 1,000 mL  -     lactated ringers infusion  -     acetaminophen (TYLENOL) tablet 1,000 mg  -     ceFAZolin (ANCEF) 2 g in sodium chloride 0.9 % 100 mL IVPB  -     oxytocin (PITOCIN) 30 units in 0.9% sodium chloride 500 mL (premix)  -     oxytocin (PITOCIN) 30 units in 0.9% sodium chloride 500 mL (premix)  -     ketorolac (TORADOL) injection 30 mg  -     Morphine injection 2 mg  -     methylergonovine (METHERGINE) injection 200 mcg  -     carboprost (HEMABATE) injection 250 mcg  -     miSOPROStol (CYTOTEC) tablet 800 mcg  -     ondansetron ODT (ZOFRAN-ODT) disintegrating tablet 4 mg  -     ondansetron (ZOFRAN)  injection 4 mg  -     metoclopramide (REGLAN) 10 mg in sodium chloride 0.9 % 50 mL IVPB  -     Case Request    2. Chronic hypertension in pregnancy  Overview:  - On labetalol 100 BID> 200mgTID BP elevated on 4/4/24  - Begin BPPs at 32 weeks   - On BASA  - Serial growth US.   - warning signs for preeclampsia reviewed with patient, hypertension labs today 4/4/24    Orders:  -     CBC & Differential  -     Comprehensive Metabolic Panel  -     Lactate Dehydrogenase  -     Protein / Creatinine Ratio, Urine - Urine, Clean Catch  -     Case Request; Standing  -     sodium chloride 0.9 % flush 10 mL  -     sodium chloride 0.9 % flush 10 mL  -     sodium chloride 0.9 % infusion 40 mL  -     lidocaine (XYLOCAINE) 1 % injection 0.5 mL  -     lactated ringers bolus 1,000 mL  -     lactated ringers infusion  -     acetaminophen (TYLENOL) tablet 1,000 mg  -     ceFAZolin (ANCEF) 2 g in sodium chloride 0.9 % 100 mL IVPB  -     oxytocin (PITOCIN) 30 units in 0.9% sodium chloride 500 mL (premix)  -     oxytocin (PITOCIN) 30 units in 0.9% sodium chloride 500 mL (premix)  -     ketorolac (TORADOL) injection 30 mg  -     Morphine injection 2 mg  -     methylergonovine (METHERGINE) injection 200 mcg  -     carboprost (HEMABATE) injection 250 mcg  -     miSOPROStol (CYTOTEC) tablet 800 mcg  -     ondansetron ODT (ZOFRAN-ODT) disintegrating tablet 4 mg  -     ondansetron (ZOFRAN) injection 4 mg  -     metoclopramide (REGLAN) 10 mg in sodium chloride 0.9 % 50 mL IVPB  -     Case Request    3. Diet controlled gestational diabetes mellitus (GDM) in third trimester  Overview:  - 1 hour above 200, but patient had caramel frappe just before test > repeat 195 > 3 hour with three abnormal values  -Status post diabetic teachin,g  -BPP's weekly starting at 32 weeks, serial growth scans.  -Logs reviewed on 2/28/2024, most values within range, no insulin required at this time.  -Logs reviewed 3/20/2024, again most values within normal range, no  insulin required at this time.  -Logs reviewed 3/28/24, continues to be stable, no additional tx.   -Logs reviewed 4/4/24, no changes      4. Rubella non-immune, needs MMR PP    5. Placental abnormality in first trimester  Overview:  Appears to be resolved on most recent imaging, no f/u with MFM for this problem recommended.       6. Severe obesity due to excess calories affecting pregnancy, antepartum  Overview:  - Early A1c 5.9 in October > 5.5 in December with normal early 2 hour GTT test.   - BASA > see chronic hypertension  - Will plan weekly BPP beginning 32 weeks per cHTN on medication.   - Normal TSH, T4      7. Maternal varicella, non-immune, needs Varivax PP    8. Major depressive disorder, recurrent, in full remission    9. Herpes simplex- HSV2 seropositive; needs suppression 34-36wga  -     valACYclovir (Valtrex) 500 MG tablet; Take 1 tablet by mouth Daily for 30 days.  Dispense: 30 tablet; Refill: 0    10. Chronic hypertension during pregnancy, antepartum  -     labetalol (NORMODYNE) 100 MG tablet; Take 2 tablets by mouth 3 times a day.    Other orders  -     Admit To Obstetrics Inpatient; Standing  -     Code Status and Medical Interventions:; Standing  -     Obtain informed consent; Standing  -     Vital Signs q 4 while awake; Standing  -     Vital Signs Per Hospital Policy; Standing  -     Continuous Fetal Monitoring With NST on Admission and Prior to Initiation of Oxytocin.; Standing  -     External Uterine Contraction Monitoring; Standing  -     Notify Physician (specified); Standing  -     Notify physician for tachysystole (per hospital algorithm); Standing  -     Notify physician if membranes ruptured, bleeding greater than 1 pad an hour, fetal heart tone abnormality, and severe pain; Standing  -     Initiate Group Beta Strep (GBS) Prophylaxis Protocol, If Criteria Met; Standing  -     Insert Indwelling Urinary Catheter; Standing  -     Assess Need for Indwelling Urinary Catheter - Follow Removal  Protocol; Standing  -     Urinary Catheter Care; Standing  -     Abdominal Prep with Clippers; Standing  -     Chlorhexadine Skin Prep Unless Otherwise Indicated; Standing  -     SCD (sequential compression devices); Standing  -     POC Glucose Once; Standing  -     Document Gatorade Consumption Prior to Admission (Yes or No); Standing  -     NPO Diet NPO Type: Ice Chips; Standing  -     Inpatient Anesthesiology Consult; Standing  -     Type & Screen; Standing  -     CBC (No Diff); Standing  -     T Pallidum Antibody w/ reflex RPR (Syphilis); Standing  -     Insert Peripheral IV; Standing  -     Saline Lock & Maintain IV Access; Standing  -     Notify Physician (specified); Standing  -     Vital Signs Per Hospital Policy; Standing  -     Strict Bed Rest; Standing  -     Strict Intake and Output; Standing  -     Fundal & Lochia Check; Standing  -     Fundal & Lochia Check; Standing  -     Bladder Assessment; Standing  -     Indwelling Urinary Catheter; Standing  -     Diet: Regular/House; Fluid Consistency: Thin (IDDSI 0); Standing        Labor precautions reviewed  No follow-ups on file.    Neda Penn MD

## 2024-04-05 LAB
ALBUMIN SERPL-MCNC: 3.9 G/DL (ref 3.5–5.2)
ALBUMIN/GLOB SERPL: 1.6 G/DL
ALP SERPL-CCNC: 128 U/L (ref 39–117)
ALT SERPL-CCNC: 12 U/L (ref 1–33)
AST SERPL-CCNC: 12 U/L (ref 1–32)
BASOPHILS # BLD AUTO: 0.02 10*3/MM3 (ref 0–0.2)
BASOPHILS NFR BLD AUTO: 0.2 % (ref 0–1.5)
BILIRUB SERPL-MCNC: 0.3 MG/DL (ref 0–1.2)
BUN SERPL-MCNC: 10 MG/DL (ref 6–20)
BUN/CREAT SERPL: 10.9 (ref 7–25)
CALCIUM SERPL-MCNC: 9.8 MG/DL (ref 8.6–10.5)
CHLORIDE SERPL-SCNC: 105 MMOL/L (ref 98–107)
CO2 SERPL-SCNC: 22.9 MMOL/L (ref 22–29)
CREAT SERPL-MCNC: 0.92 MG/DL (ref 0.57–1)
CREAT UR-MCNC: 67.5 MG/DL
EGFRCR SERPLBLD CKD-EPI 2021: 84.5 ML/MIN/1.73
EOSINOPHIL # BLD AUTO: 0.2 10*3/MM3 (ref 0–0.4)
EOSINOPHIL NFR BLD AUTO: 2.2 % (ref 0.3–6.2)
ERYTHROCYTE [DISTWIDTH] IN BLOOD BY AUTOMATED COUNT: 11.9 % (ref 12.3–15.4)
GLOBULIN SER CALC-MCNC: 2.4 GM/DL
GLUCOSE SERPL-MCNC: 93 MG/DL (ref 65–99)
HCT VFR BLD AUTO: 38.3 % (ref 34–46.6)
HGB BLD-MCNC: 12.8 G/DL (ref 12–15.9)
IMM GRANULOCYTES # BLD AUTO: 0.04 10*3/MM3 (ref 0–0.05)
IMM GRANULOCYTES NFR BLD AUTO: 0.4 % (ref 0–0.5)
LDH SERPL L TO P-CCNC: 157 U/L (ref 135–214)
LYMPHOCYTES # BLD AUTO: 1.74 10*3/MM3 (ref 0.7–3.1)
LYMPHOCYTES NFR BLD AUTO: 18.9 % (ref 19.6–45.3)
MCH RBC QN AUTO: 30.3 PG (ref 26.6–33)
MCHC RBC AUTO-ENTMCNC: 33.4 G/DL (ref 31.5–35.7)
MCV RBC AUTO: 90.8 FL (ref 79–97)
MONOCYTES # BLD AUTO: 0.87 10*3/MM3 (ref 0.1–0.9)
MONOCYTES NFR BLD AUTO: 9.5 % (ref 5–12)
NEUTROPHILS # BLD AUTO: 6.33 10*3/MM3 (ref 1.7–7)
NEUTROPHILS NFR BLD AUTO: 68.8 % (ref 42.7–76)
NRBC BLD AUTO-RTO: 0 /100 WBC (ref 0–0.2)
PLATELET # BLD AUTO: 346 10*3/MM3 (ref 140–450)
POTASSIUM SERPL-SCNC: 4.6 MMOL/L (ref 3.5–5.2)
PROT SERPL-MCNC: 6.3 G/DL (ref 6–8.5)
PROT UR-MCNC: 9.4 MG/DL
PROT/CREAT UR: 139.3 MG/G CREA (ref 0–200)
RBC # BLD AUTO: 4.22 10*6/MM3 (ref 3.77–5.28)
SODIUM SERPL-SCNC: 139 MMOL/L (ref 136–145)
WBC # BLD AUTO: 9.2 10*3/MM3 (ref 3.4–10.8)

## 2024-04-06 LAB — GP B STREP DNA SPEC QL NAA+PROBE: NEGATIVE

## 2024-04-08 ENCOUNTER — HOSPITAL ENCOUNTER (EMERGENCY)
Facility: HOSPITAL | Age: 34
Discharge: HOME | End: 2024-04-09
Attending: STUDENT IN AN ORGANIZED HEALTH CARE EDUCATION/TRAINING PROGRAM | Admitting: OBSTETRICS & GYNECOLOGY
Payer: COMMERCIAL

## 2024-04-08 PROCEDURE — 99284 EMERGENCY DEPT VISIT MOD MDM: CPT | Performed by: OBSTETRICS & GYNECOLOGY

## 2024-04-09 VITALS
BODY MASS INDEX: 41.59 KG/M2 | WEIGHT: 274.4 LBS | SYSTOLIC BLOOD PRESSURE: 121 MMHG | RESPIRATION RATE: 18 BRPM | DIASTOLIC BLOOD PRESSURE: 65 MMHG | TEMPERATURE: 98.1 F | HEIGHT: 68 IN | HEART RATE: 87 BPM | OXYGEN SATURATION: 97 %

## 2024-04-09 LAB
ALBUMIN SERPL-MCNC: 3.4 G/DL (ref 3.5–5.2)
ALBUMIN/GLOB SERPL: 1.3 G/DL
ALP SERPL-CCNC: 123 U/L (ref 39–117)
ALT SERPL W P-5'-P-CCNC: 11 U/L (ref 1–33)
ANION GAP SERPL CALCULATED.3IONS-SCNC: 12.4 MMOL/L (ref 5–15)
AST SERPL-CCNC: 13 U/L (ref 1–32)
BASOPHILS # BLD AUTO: 0.02 10*3/MM3 (ref 0–0.2)
BASOPHILS NFR BLD AUTO: 0.2 % (ref 0–1.5)
BILIRUB SERPL-MCNC: <0.2 MG/DL (ref 0–1.2)
BILIRUB UR QL STRIP: NEGATIVE
BUN SERPL-MCNC: 13 MG/DL (ref 6–20)
BUN/CREAT SERPL: 16.9 (ref 7–25)
CALCIUM SPEC-SCNC: 9.6 MG/DL (ref 8.6–10.5)
CHLORIDE SERPL-SCNC: 107 MMOL/L (ref 98–107)
CLARITY UR: CLEAR
CO2 SERPL-SCNC: 20.6 MMOL/L (ref 22–29)
COLOR UR: YELLOW
CREAT SERPL-MCNC: 0.77 MG/DL (ref 0.57–1)
CREAT UR-MCNC: 35.6 MG/DL
DEPRECATED RDW RBC AUTO: 41.1 FL (ref 37–54)
EGFRCR SERPLBLD CKD-EPI 2021: 104.6 ML/MIN/1.73
EOSINOPHIL # BLD AUTO: 0.17 10*3/MM3 (ref 0–0.4)
EOSINOPHIL NFR BLD AUTO: 1.9 % (ref 0.3–6.2)
ERYTHROCYTE [DISTWIDTH] IN BLOOD BY AUTOMATED COUNT: 12.2 % (ref 12.3–15.4)
GLOBULIN UR ELPH-MCNC: 2.7 GM/DL
GLUCOSE SERPL-MCNC: 142 MG/DL (ref 65–99)
GLUCOSE UR STRIP-MCNC: NEGATIVE MG/DL
HCT VFR BLD AUTO: 36.6 % (ref 34–46.6)
HGB BLD-MCNC: 12.3 G/DL (ref 12–15.9)
HGB UR QL STRIP.AUTO: NEGATIVE
IMM GRANULOCYTES # BLD AUTO: 0.05 10*3/MM3 (ref 0–0.05)
IMM GRANULOCYTES NFR BLD AUTO: 0.6 % (ref 0–0.5)
KETONES UR QL STRIP: NEGATIVE
LEUKOCYTE ESTERASE UR QL STRIP.AUTO: NEGATIVE
LYMPHOCYTES # BLD AUTO: 2.18 10*3/MM3 (ref 0.7–3.1)
LYMPHOCYTES NFR BLD AUTO: 25 % (ref 19.6–45.3)
MCH RBC QN AUTO: 30.9 PG (ref 26.6–33)
MCHC RBC AUTO-ENTMCNC: 33.6 G/DL (ref 31.5–35.7)
MCV RBC AUTO: 92 FL (ref 79–97)
MONOCYTES # BLD AUTO: 0.96 10*3/MM3 (ref 0.1–0.9)
MONOCYTES NFR BLD AUTO: 11 % (ref 5–12)
NEUTROPHILS NFR BLD AUTO: 5.35 10*3/MM3 (ref 1.7–7)
NEUTROPHILS NFR BLD AUTO: 61.3 % (ref 42.7–76)
NITRITE UR QL STRIP: NEGATIVE
NRBC BLD AUTO-RTO: 0 /100 WBC (ref 0–0.2)
PH UR STRIP.AUTO: 7 [PH] (ref 5–8)
PLATELET # BLD AUTO: 357 10*3/MM3 (ref 140–450)
PMV BLD AUTO: 10.3 FL (ref 6–12)
POTASSIUM SERPL-SCNC: 4.2 MMOL/L (ref 3.5–5.2)
PROT ?TM UR-MCNC: <4 MG/DL
PROT SERPL-MCNC: 6.1 G/DL (ref 6–8.5)
PROT UR QL STRIP: NEGATIVE
PROT/CREAT UR: NORMAL MG/G{CREAT}
RBC # BLD AUTO: 3.98 10*6/MM3 (ref 3.77–5.28)
SODIUM SERPL-SCNC: 140 MMOL/L (ref 136–145)
SP GR UR STRIP: 1.01 (ref 1–1.03)
UROBILINOGEN UR QL STRIP: NORMAL
WBC NRBC COR # BLD AUTO: 8.73 10*3/MM3 (ref 3.4–10.8)

## 2024-04-09 PROCEDURE — 81003 URINALYSIS AUTO W/O SCOPE: CPT | Performed by: OBSTETRICS & GYNECOLOGY

## 2024-04-09 PROCEDURE — 59025 FETAL NON-STRESS TEST: CPT

## 2024-04-09 PROCEDURE — 84156 ASSAY OF PROTEIN URINE: CPT | Performed by: OBSTETRICS & GYNECOLOGY

## 2024-04-09 PROCEDURE — 85025 COMPLETE CBC W/AUTO DIFF WBC: CPT | Performed by: OBSTETRICS & GYNECOLOGY

## 2024-04-09 PROCEDURE — 82570 ASSAY OF URINE CREATININE: CPT | Performed by: OBSTETRICS & GYNECOLOGY

## 2024-04-09 PROCEDURE — 80053 COMPREHEN METABOLIC PANEL: CPT | Performed by: OBSTETRICS & GYNECOLOGY

## 2024-04-09 RX ORDER — SODIUM CHLORIDE 9 MG/ML
40 INJECTION, SOLUTION INTRAVENOUS AS NEEDED
Status: DISCONTINUED | OUTPATIENT
Start: 2024-04-09 | End: 2024-04-09 | Stop reason: HOSPADM

## 2024-04-09 RX ORDER — SODIUM CHLORIDE 0.9 % (FLUSH) 0.9 %
10 SYRINGE (ML) INJECTION EVERY 12 HOURS SCHEDULED
Status: DISCONTINUED | OUTPATIENT
Start: 2024-04-09 | End: 2024-04-09 | Stop reason: HOSPADM

## 2024-04-09 RX ORDER — SODIUM CHLORIDE 0.9 % (FLUSH) 0.9 %
10 SYRINGE (ML) INJECTION AS NEEDED
Status: DISCONTINUED | OUTPATIENT
Start: 2024-04-09 | End: 2024-04-09 | Stop reason: HOSPADM

## 2024-04-09 RX ORDER — LABETALOL 200 MG/1
200 TABLET, FILM COATED ORAL 3 TIMES DAILY
Qty: 90 TABLET | Refills: 2 | Status: SHIPPED | OUTPATIENT
Start: 2024-04-09

## 2024-04-09 NOTE — OBED NOTES
[facility] Note [facility]        Patient Name: Jocelyn Merlos  YOB: 1990  MRN: [id number]  Admission Date: 2024 11:58 PM  Date of Service: 2024    Chief Complaint: Hypertension (Arrived to [facility] with complaints of elevated blood pressure of 162/82 at 1140pm.  Is currently on Labetalol 200mg bid, but was ordered on Thursday Labetalol 200mg TID.  Pharmacy was unable to fill due to needing to get in touch with physician.  Denies headaches, visual problems, or epigastric pain.  Baby has decreased fetal movement, last movement felt around 3pm.  Denies vaginal bleeding, no rupture of membranes.)        Subjective     Jocelyn Merlos is a 33 y.o. female  at 36w1d with Estimated Date of Delivery: 24 who presents with the chief complaint listed above.  She sees Neda Penn, * for her prenatal care. Her pregnancy has been complicated by:   CHTN on labetalol, GDMA1, history of HSV on valtrex, obesity .    Patient with elevated BP as noted above at home.  Blood pressure normal now here.  Patient is asymptomatic and denies HA, RUQ/epigastric pain, shortness of breath, or visual disturbance.  She has run out of medication and needs prescription for labetalol.    She describes fetal movement as normal.  She denies rupture of membranes.  She denies vaginal bleeding. She is not feeling contractions.          Objective   Patient Active Problem List    Diagnosis     Diet controlled gestational diabetes mellitus (GDM) in third trimester [O24.410]     Chronic hypertension in pregnancy [O10.919]     35 weeks gestation of pregnancy [Z3A.35]     Placental abnormality- ? lakes- referred to [facility] [O43.109]     Vaginal bleeding in pregnancy, second trimester- referred to [facility] [O46.92]     Anxiety and depression [F41.9, F32.A]     Nausea without vomiting [R11.0]     Insomnia [G47.00]     Obesity affecting pregnancy, antepartum [O99.210]     Recurrent pregnancy loss with current pregnancy [O26.20]     Rubella  non-immune, needs MMR PP [O09.899, Z28.39]     Maternal varicella, non-immune, needs Varivax PP [O09.899, Z28.39]     PCOS (polycystic ovarian syndrome)- was on Metformin to regulate cycles [E28.2]     Palpitations [R00.2]     Major depressive disorder, recurrent, in full remission [F33.42]     Hemochromatosis carrier [Z14.8]     Family history of hemochromatosis [Z83.49]     Vitamin D deficiency [E55.9]     Herpes simplex- HSV2 seropositive; needs suppression 34-36wga [B00.9]     Suppurative hidradenitis [L73.2]     Headache, migraine [G43.909]         OB History    Para Term  AB Living   4 0 0 0 3 0   SAB IAB Ectopic Molar Multiple Live Births   3 0 0 0 0 0      # Outcome Date GA Lbr Nino/2nd Weight Sex Type Anes PTL Lv   4 Current            3 SAB 10/2020           2 2018           1 2016                Past Medical History:   Diagnosis Date    Abnormal Pap smear of cervix     Adjustment disorder with mixed anxiety and depressed mood 2016    COVID-19 2020    Hypertension     Since - currently on medication    Palpitations     occasionally with pregnancy    Polycystic ovary syndrome        Past Surgical History:   Procedure Laterality Date    BREAST AUGMENTATION      CERVICAL BIOPSY  W/ LOOP ELECTRODE EXCISION      FOOT SURGERY Right     plantar fascitis tendon release    LAPAROSCOPIC GASTRIC BANDING      LEEP      OTHER SURGICAL HISTORY      removal of eye tumor    OTHER SURGICAL HISTORY      tummy tuck    TONSILLECTOMY         No current facility-administered medications on file prior to encounter.     Current Outpatient Medications on File Prior to Encounter   Medication Sig Dispense Refill    buPROPion SR (WELLBUTRIN SR) 150 MG 12 hr tablet Take 1 tablet by mouth Daily.      doxylamine (UNISOM) 25 MG tablet Take 1 tablet by mouth At Night As Needed for Sleep. 60 tablet 2    escitalopram (LEXAPRO) 10 MG tablet TAKE 1 TABLET BY MOUTH DAILY 90 tablet 0    folic acid  (FOLVITE) 400 MCG tablet Take 1 tablet by mouth Daily.      labetalol (NORMODYNE) 100 MG tablet Take 2 tablets by mouth 3 times a day.      Omega-3 Fatty Acids (fish oil) 1000 MG capsule capsule Take  by mouth Daily With Breakfast.      prenatal vitamin (prenatal, CLASSIC, vitamin) tablet Take  by mouth Daily.      valACYclovir (Valtrex) 500 MG tablet Take 1 tablet by mouth Daily for 30 days. 30 tablet 0    vitamin B-6 (PYRIDOXINE) 25 MG tablet Take 1 tablet by mouth Every Night. 60 tablet 1    vitamin D (ERGOCALCIFEROL) 1.25 MG (34800 UT) capsule capsule Take 1,000 Units by mouth.      Alcohol Swabs 70 % pads Use 1 Pad 4 (Four) Times a Day. 120 each 5    Ascorbic Acid (VITAMIN C PO) Take  by mouth.      Blood Glucose Monitoring Suppl (True Metrix Meter) w/Device kit USE FOUR TIMES DAILY TO CHECK BLOOD GLUCOSE      glucose blood test strip Use as instructed 120 each 12    glucose monitor monitoring kit Use four times daily to check blood glucose. 1 each 0    Lancets (accu-chek safe-t pro) lancets Fasting and 2 hour post prandial 120 each 12       No Known Allergies    Family History   Problem Relation Age of Onset    Skin cancer Mother     Depression Mother     Anxiety disorder Mother     Stroke Mother     Aneurysm Mother     Alcohol abuse Mother     Migraines Mother     Hemochromatosis Mother     Stroke Maternal Grandmother        Social History     Socioeconomic History    Marital status:    Tobacco Use    Smoking status: Former     Current packs/day: 0.00     Average packs/day: 0.3 packs/day for 0.5 years (0.1 ttl pk-yrs)     Types: Cigarettes     Start date: 10/5/2014     Quit date: 2015     Years since quittin.0    Smokeless tobacco: Never   Vaping Use    Vaping status: Never Used   Substance and Sexual Activity    Alcohol use: Not Currently     Alcohol/week: 4.0 - 5.0 standard drinks of alcohol     Types: 4 - 5 Standard drinks or equivalent per week     Comment: Socially./caffeine use     Drug  "use: No    Sexual activity: Yes     Partners: Male           Review of Systems   Constitutional:  Negative for chills, fatigue and fever.   HENT:  Negative for congestion, rhinorrhea and sore throat.    Eyes:  Negative for visual disturbance.   Respiratory: Negative.     Cardiovascular: Negative.    Gastrointestinal:  Negative for abdominal pain, constipation, diarrhea, nausea and vomiting.   Genitourinary:  Negative for difficulty urinating, dyspareunia, dysuria, flank pain, frequency, genital sores, hematuria, pelvic pain, urgency, vaginal bleeding, vaginal discharge and vaginal pain.   Neurological:  Negative for dizziness, seizures, light-headedness and headaches.   Psychiatric/Behavioral:  Negative for sleep disturbance. The patient is not nervous/anxious.           PHYSICAL EXAM:      VITAL SIGNS:  Vitals:    04/09/24 0024 04/09/24 0030 04/09/24 0031 04/09/24 0035   BP:  144/80 137/72    BP Location:  Right arm     Patient Position:  Lying     Pulse:  93 90 92   Resp:  18     Temp:  98.1 °F (36.7 °C)     TempSrc:  Oral     SpO2:  98%  97%   Weight: 124 kg (274 lb 6.4 oz)      Height:  172.7 cm (68\")          FHT'S:                   Baseline:  150 BPM  Variability:  Moderate = 6 - 25 BPM  Accelerations:  15 x 15 accelerations present     Decelerations:  absent  Contractions:   absent     Interpretation:    Reactive NST, CAT 1 tracing        PHYSICAL EXAM:    General: well developed; well nourished  no acute distress   Heart: Not performed.   Lungs  : breathing is unlabored     Abdomen: soft, non-tender; no masses  no umbilical or inguinal hernias are present  no hepato-splenomegaly       Cervix: was not checked.      Contractions: none        Extremities: peripheral pulses normal, no pedal edema, no clubbing or cyanosis      LABS AND TESTING ORDERED:  Uterine and fetal monitoring  Urinalysis  CBC, CMP, urine p/c    LAB RESULTS:    No results found for this or any previous visit (from the past 24 " "hour(s)).    Lab Results   Component Value Date    ABO O 2023    RH Positive 2023       Lab Results   Component Value Date    STREPGPB Negative 2024                 Assessment & Plan     ASSESSMENT/PLAN:  Jocelyn Merlos is a 33 y.o. female  at 36w1d who presented with: elevated BP at home.  Blood pressure normal here.  Patient feels well and has no severe features.  Pre-eclampsia labs sent and normal.  Patient has follow up in clinic in a couple of days.  A prescription for labetalol was sent to pharmacy for patient.  She was given return precautions and discharged home.          Final Impression:  Pregnancy at 36w1d  Reactive NST.  CAT 1 tracing  Maternal vital signs were reviewed and were unremarkable              Vitals:    24 0024 24 0030 24 0031 24 0035   BP:  144/80 137/72    BP Location:  Right arm     Patient Position:  Lying     Pulse:  93 90 92   Resp:  18     Temp:  98.1 °F (36.7 °C)     TempSrc:  Oral     SpO2:  98%  97%   Weight: 124 kg (274 lb 6.4 oz)      Height:  172.7 cm (68\")         Lab Results   Component Value Date    STREPGPB Negative 2024     Lab Results   Component Value Date    ABO O 2023    RH Positive 2023     COVID - 19 status unknown      PLAN:       I have spent 30 minutes including face to face time with the patient, greater than 50% in discussion of the diagnosis (counseling) and/or coordination of care.     Belinda Anton MD  2024  01:03 EDT  OB Hospitalist  Phone:  [phone]  "

## 2024-04-09 NOTE — NURSING NOTE
Jocelyn Merlos, aftab  at 36w1d with an EMERITA of 2024, by Ultrasound, was seen at [facility] for a nonstress test.    Chief Complaint   Patient presents with    Hypertension     Arrived to [facility] with complaints of elevated blood pressure of 162/82 at 1140pm.  Is currently on Labetalol 200mg bid, but was ordered on Thursday Labetalol 200mg TID.  Pharmacy was unable to fill due to needing to get in touch with physician.  Denies headaches, visual problems, or epigastric pain.  Baby has decreased fetal movement, last movement felt around 3pm.  Denies vaginal bleeding, no rupture of membranes.       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 lakes- referred to [facility]    Vaginal bleeding in pregnancy, second trimester- referred to [facility]    35 weeks gestation of pregnancy    Chronic hypertension in pregnancy    Diet controlled gestational diabetes mellitus (GDM) in third trimester       Start Time: 15  Stop Time: 130    Interpretation A  Nonstress Test Interpretation A: Reactive         BRIDGER Styles RNC

## 2024-04-09 NOTE — NURSING NOTE
Arrived to [facility] with complaints of elevated blood pressure of 162/82 at 1140pm.  Is currently on Labetalol 200mg bid, but was ordered on Thursday Labetalol 200mg TID.  Pharmacy was unable to fill due to needing to get in touch with physician.  Denies headaches, visual problems, or epigastric pain.  2+ edema and DTR noted in lower extremities.  Baby has decreased fetal movement, last movement felt around 3pm.  Denies vaginal bleeding, no rupture of membranes.  IV saline lock started, CBC and CMP obtained and sent to lab.  Urine sent for PCR.

## 2024-04-09 NOTE — NURSING NOTE
Discharged to home with instructions to keep next MD appointment and to return to Labor and Delivery with complains of contractions that are 5 minutes apart for one hour and are getting stronger, rupture of membranes (water breaking), vaginal bleeding (informed that there might be some vaginal spotting after tonight's cervical exam), increased pain, decreased fetal movement, or with any concerns of self or baby.  Also instructed to return to Labor and Delivery with headaches, blurred vision or epigastric pain. Verbalizes understanding of discharge instructions.

## 2024-04-10 DIAGNOSIS — F43.22 ADJUSTMENT DISORDER WITH ANXIOUS MOOD: ICD-10-CM

## 2024-04-10 DIAGNOSIS — F33.42 MAJOR DEPRESSIVE DISORDER, RECURRENT, IN FULL REMISSION: ICD-10-CM

## 2024-04-10 DIAGNOSIS — R41.89 BRAIN FOG: ICD-10-CM

## 2024-04-10 DIAGNOSIS — F41.9 ANXIETY: ICD-10-CM

## 2024-04-10 DIAGNOSIS — U09.9 POST-COVID-19 SYNDROME: ICD-10-CM

## 2024-04-11 ENCOUNTER — ROUTINE PRENATAL (OUTPATIENT)
Dept: OBSTETRICS AND GYNECOLOGY | Age: 34
End: 2024-04-11
Payer: COMMERCIAL

## 2024-04-11 VITALS — BODY MASS INDEX: 41.39 KG/M2 | SYSTOLIC BLOOD PRESSURE: 116 MMHG | WEIGHT: 272.2 LBS | DIASTOLIC BLOOD PRESSURE: 80 MMHG

## 2024-04-11 DIAGNOSIS — Z28.39 MATERNAL VARICELLA, NON-IMMUNE: ICD-10-CM

## 2024-04-11 DIAGNOSIS — O09.899 MATERNAL VARICELLA, NON-IMMUNE: ICD-10-CM

## 2024-04-11 DIAGNOSIS — O24.410 DIET CONTROLLED GESTATIONAL DIABETES MELLITUS (GDM) IN THIRD TRIMESTER: ICD-10-CM

## 2024-04-11 DIAGNOSIS — E66.01 SEVERE OBESITY DUE TO EXCESS CALORIES AFFECTING PREGNANCY, ANTEPARTUM: ICD-10-CM

## 2024-04-11 DIAGNOSIS — O43.101 PLACENTAL ABNORMALITY IN FIRST TRIMESTER: ICD-10-CM

## 2024-04-11 DIAGNOSIS — O10.919 CHRONIC HYPERTENSION IN PREGNANCY: ICD-10-CM

## 2024-04-11 DIAGNOSIS — O99.210 SEVERE OBESITY DUE TO EXCESS CALORIES AFFECTING PREGNANCY, ANTEPARTUM: ICD-10-CM

## 2024-04-11 DIAGNOSIS — F32.A ANXIETY AND DEPRESSION: ICD-10-CM

## 2024-04-11 DIAGNOSIS — B00.9 HERPES SIMPLEX: ICD-10-CM

## 2024-04-11 DIAGNOSIS — F41.9 ANXIETY AND DEPRESSION: ICD-10-CM

## 2024-04-11 DIAGNOSIS — Z13.89 SCREENING FOR BLOOD OR PROTEIN IN URINE: ICD-10-CM

## 2024-04-11 DIAGNOSIS — Z3A.36 36 WEEKS GESTATION OF PREGNANCY: Primary | ICD-10-CM

## 2024-04-11 DIAGNOSIS — Z28.39 RUBELLA NON-IMMUNE STATUS, ANTEPARTUM: ICD-10-CM

## 2024-04-11 DIAGNOSIS — O09.899 RUBELLA NON-IMMUNE STATUS, ANTEPARTUM: ICD-10-CM

## 2024-04-11 LAB
BILIRUB BLD-MCNC: NEGATIVE MG/DL
GLUCOSE UR STRIP-MCNC: NEGATIVE MG/DL
KETONES UR QL: NEGATIVE
LEUKOCYTE EST, POC: NEGATIVE
NITRITE UR-MCNC: NEGATIVE MG/ML
PH UR: 7 [PH] (ref 5–8)
PROT UR STRIP-MCNC: NEGATIVE MG/DL
RBC # UR STRIP: NEGATIVE /UL
SP GR UR: 1.01 (ref 1–1.03)
UROBILINOGEN UR QL: NORMAL

## 2024-04-11 NOTE — PROGRESS NOTES
Jocelyn Merlos, a 33 y.o.  at 36w3d, presents for OB follow-up.  She is doing well today.  Denies loss of fluid, vaginal bleeding, and contractions.  Endorses fetal movements.    Objective  BP Readings from Last 3 Encounters:   24 116/80   24 121/65   24 150/90      Wt Readings from Last 3 Encounters:   24 123 kg (272 lb 3.2 oz)   24 124 kg (274 lb 6.4 oz)   24 124 kg (273 lb 9.6 oz)      Total weight gain this pregnancy: Not found.    General: Awake, alert, no apparent distress  Respiratory: No increased work of breathing  Abdomen: Fundus soft and nontender  Extremity: Nontender, no edema    A/P  Diagnoses and all orders for this visit:    1. 36 weeks gestation of pregnancy (Primary)  Overview:  -PNL: IOB labs reviewed, plan for GBS at 36wga  -Pap: NIL, HPV neg 2/10/22  -Genetics: cfDNA/msAFP/carrier neg, anatomy Us normal  -Imm: RNI, VNI, s/p tdap and flu  -Contraception:  getting a vasectomy  -Birthplan: will discuss at later visit  -Care coordination: accepts blood transfusions, no latex allergy, call schedule reviewed           2. Diet controlled gestational diabetes mellitus (GDM) in third trimester  Overview:  - 1 hour above 200, but patient had caramel frappe just before test > repeat 195 > 3 hour with three abnormal values  -Status post diabetic teachin,g  -BPP's weekly starting at 32 weeks, serial growth scans.  -Logs reviewed on 2024, most values within range, no insulin required at this time.  -Logs reviewed 3/20/2024, again most values within normal range, no insulin required at this time.  -Logs reviewed 3/28/24, continues to be stable, no additional tx.   -Logs reviewed 24, no changes  -Logs reviewed 24, no changes.       3. Rubella non-immune, needs MMR PP    4. Placental abnormality in first trimester  Overview:  Appears to be resolved on most recent imaging, no f/u with MFM for this problem recommended.       5. Severe obesity due to  excess calories affecting pregnancy, antepartum  Overview:  - Early A1c 5.9 in October > 5.5 in December with normal early 2 hour GTT test.   - BASA > see chronic hypertension  - Will plan weekly BPP beginning 32 weeks per cHTN on medication.   - Normal TSH, T4      6. Maternal varicella, non-immune, needs Varivax PP    7. Chronic hypertension in pregnancy  Overview:  - On labetalol 100 BID> 200mgTID BP elevated on 4/4/24  - Begin BPPs at 32 weeks   - On BASA  - Serial growth US.   - warning signs for preeclampsia reviewed with patient, hypertension labs today 4/4/24      8. Anxiety and depression  Overview:  - Lexapro 10mg/day      9. Herpes simplex- HSV2 seropositive; needs suppression 34-36wga        Labor precautions reviewed  No follow-ups on file.    Neda Penn MD

## 2024-04-17 ENCOUNTER — ROUTINE PRENATAL (OUTPATIENT)
Dept: OBSTETRICS AND GYNECOLOGY | Age: 34
End: 2024-04-17
Payer: COMMERCIAL

## 2024-04-17 VITALS — SYSTOLIC BLOOD PRESSURE: 100 MMHG | WEIGHT: 272 LBS | BODY MASS INDEX: 41.36 KG/M2 | DIASTOLIC BLOOD PRESSURE: 72 MMHG

## 2024-04-17 DIAGNOSIS — O24.410 DIET CONTROLLED GESTATIONAL DIABETES MELLITUS (GDM) IN THIRD TRIMESTER: Primary | ICD-10-CM

## 2024-04-17 DIAGNOSIS — O10.919 CHRONIC HYPERTENSION IN PREGNANCY: ICD-10-CM

## 2024-04-17 DIAGNOSIS — F41.9 ANXIETY AND DEPRESSION: ICD-10-CM

## 2024-04-17 DIAGNOSIS — Z28.39 RUBELLA NON-IMMUNE STATUS, ANTEPARTUM: ICD-10-CM

## 2024-04-17 DIAGNOSIS — Z28.39 MATERNAL VARICELLA, NON-IMMUNE: ICD-10-CM

## 2024-04-17 DIAGNOSIS — E66.01 SEVERE OBESITY DUE TO EXCESS CALORIES AFFECTING PREGNANCY, ANTEPARTUM: ICD-10-CM

## 2024-04-17 DIAGNOSIS — B00.9 HERPES SIMPLEX: ICD-10-CM

## 2024-04-17 DIAGNOSIS — O09.899 RUBELLA NON-IMMUNE STATUS, ANTEPARTUM: ICD-10-CM

## 2024-04-17 DIAGNOSIS — F33.42 MAJOR DEPRESSIVE DISORDER, RECURRENT, IN FULL REMISSION: ICD-10-CM

## 2024-04-17 DIAGNOSIS — O09.899 MATERNAL VARICELLA, NON-IMMUNE: ICD-10-CM

## 2024-04-17 DIAGNOSIS — F32.A ANXIETY AND DEPRESSION: ICD-10-CM

## 2024-04-17 DIAGNOSIS — O99.210 SEVERE OBESITY DUE TO EXCESS CALORIES AFFECTING PREGNANCY, ANTEPARTUM: ICD-10-CM

## 2024-04-17 DIAGNOSIS — Z3A.37 37 WEEKS GESTATION OF PREGNANCY: ICD-10-CM

## 2024-04-17 PROCEDURE — 0502F SUBSEQUENT PRENATAL CARE: CPT | Performed by: STUDENT IN AN ORGANIZED HEALTH CARE EDUCATION/TRAINING PROGRAM

## 2024-04-17 RX ORDER — FLURBIPROFEN SODIUM 0.3 MG/ML
1 SOLUTION/ DROPS OPHTHALMIC NIGHTLY
Qty: 30 EACH | Refills: 0 | Status: ON HOLD | OUTPATIENT
Start: 2024-04-17

## 2024-04-17 NOTE — PROGRESS NOTES
Jocelyn Merlos, a 33 y.o.  at 37w2d, presents for OB follow-up.  She is doing well today.  Denies loss of fluid, vaginal bleeding, and contractions.  Endorses fetal movements. Having some consistent mild elevations in BG.     Objective  BP Readings from Last 3 Encounters:   24 100/72   24 116/80   24 121/65      Wt Readings from Last 3 Encounters:   24 123 kg (272 lb)   24 123 kg (272 lb 3.2 oz)   24 124 kg (274 lb 6.4 oz)      Total weight gain this pregnancy: Not found.    General: Awake, alert, no apparent distress  Respiratory: No increased work of breathing  Abdomen: Fundus soft and nontender  Extremity: Nontender, no edema    A/P  Diagnoses and all orders for this visit:    1. Diet controlled gestational diabetes mellitus (GDM) in third trimester (Primary)  Overview:  - 1 hour above 200, but patient had caramel frappe just before test > repeat 195 > 3 hour with three abnormal values  -Status post diabetic teachin,g  -BPP's weekly starting at 32 weeks, serial growth scans.  -Logs reviewed on 2024, most values within range, no insulin required at this time.  -Logs reviewed 3/20/2024, again most values within normal range, no insulin required at this time.  -Logs reviewed 3/28/24, continues to be stable, no additional tx.   -Logs reviewed 24, no changes  -Logs reviewed 24, no changes.   - Logs reviewed 24: Having more consistent elevations in BG > add 10U lantus at night.     Orders:  -     Insulin Glargine (LANTUS SOLOSTAR) 100 UNIT/ML injection pen; Inject 10 Units under the skin into the appropriate area as directed Every Night.  Dispense: 15 mL; Refill: 0  -     Insulin Pen Needle (B-D UF III MINI PEN NEEDLES) 31G X 5 MM misc; Use 1 Needle Every Night.  Dispense: 30 each; Refill: 0    2. Rubella non-immune, needs MMR PP    3. Severe obesity due to excess calories affecting pregnancy, antepartum  Overview:  - Early A1c 5.9 in October > 5.5 in December  with normal early 2 hour GTT test.   - BASA > see chronic hypertension  - Will plan weekly BPP beginning 32 weeks per cHTN on medication.   - Normal TSH, T4      4. Maternal varicella, non-immune, needs Varivax PP    5. Chronic hypertension in pregnancy  Overview:  - On labetalol 100 BID> 200mgTID BP elevated on 4/4/24  - Begin BPPs at 32 weeks   - On BASA  - Serial growth US.   - warning signs for preeclampsia reviewed with patient, hypertension labs today 4/4/24      6. 37 weeks gestation of pregnancy  Overview:  -PNL: IOB labs reviewed, plan for GBS at 36wga  -Pap: NIL, HPV neg 2/10/22  -Genetics: cfDNA/msAFP/carrier neg, anatomy Us normal  -Imm: RNI, VNI, s/p tdap and flu  -Contraception:  getting a vasectomy  -Birthplan: will discuss at later visit  -Care coordination: accepts blood transfusions, no latex allergy, call schedule reviewed           7. Major depressive disorder, recurrent, in full remission    8. Anxiety and depression  Overview:  - Lexapro 10mg/day      9. Herpes simplex- HSV2 seropositive; needs suppression 34-36wga        Labor precautions reviewed  No follow-ups on file.    Neda Penn MD

## 2024-04-22 ENCOUNTER — HOSPITAL ENCOUNTER (INPATIENT)
Facility: HOSPITAL | Age: 34
LOS: 2 days | Discharge: HOME | End: 2024-04-24
Attending: STUDENT IN AN ORGANIZED HEALTH CARE EDUCATION/TRAINING PROGRAM | Admitting: STUDENT IN AN ORGANIZED HEALTH CARE EDUCATION/TRAINING PROGRAM
Payer: COMMERCIAL

## 2024-04-22 ENCOUNTER — ANESTHESIA (OUTPATIENT)
Dept: LABOR AND DELIVERY | Facility: HOSPITAL | Age: 34
End: 2024-04-22
Payer: COMMERCIAL

## 2024-04-22 ENCOUNTER — ANESTHESIA EVENT (OUTPATIENT)
Dept: LABOR AND DELIVERY | Facility: HOSPITAL | Age: 34
End: 2024-04-22
Payer: COMMERCIAL

## 2024-04-22 DIAGNOSIS — Z3A.35 35 WEEKS GESTATION OF PREGNANCY: ICD-10-CM

## 2024-04-22 DIAGNOSIS — O10.919 CHRONIC HYPERTENSION IN PREGNANCY: ICD-10-CM

## 2024-04-22 LAB
ABO GROUP BLD: NORMAL
ALBUMIN SERPL-MCNC: 3.6 G/DL (ref 3.5–5.2)
ALBUMIN/GLOB SERPL: 1.5 G/DL
ALP SERPL-CCNC: 135 U/L (ref 39–117)
ALT SERPL W P-5'-P-CCNC: 16 U/L (ref 1–33)
ANION GAP SERPL CALCULATED.3IONS-SCNC: 12 MMOL/L (ref 5–15)
AST SERPL-CCNC: 10 U/L (ref 1–32)
BILIRUB SERPL-MCNC: 0.3 MG/DL (ref 0–1.2)
BLD GP AB SCN SERPL QL: NEGATIVE
BUN SERPL-MCNC: 9 MG/DL (ref 6–20)
BUN/CREAT SERPL: 12.7 (ref 7–25)
CALCIUM SPEC-SCNC: 9 MG/DL (ref 8.6–10.5)
CHLORIDE SERPL-SCNC: 110 MMOL/L (ref 98–107)
CO2 SERPL-SCNC: 20 MMOL/L (ref 22–29)
CREAT SERPL-MCNC: 0.71 MG/DL (ref 0.57–1)
DEPRECATED RDW RBC AUTO: 42.4 FL (ref 37–54)
EGFRCR SERPLBLD CKD-EPI 2021: 115.3 ML/MIN/1.73
ERYTHROCYTE [DISTWIDTH] IN BLOOD BY AUTOMATED COUNT: 12.5 % (ref 12.3–15.4)
GLOBULIN UR ELPH-MCNC: 2.4 GM/DL
GLUCOSE SERPL-MCNC: 94 MG/DL (ref 65–99)
HCT VFR BLD AUTO: 38.1 % (ref 34–46.6)
HGB BLD-MCNC: 12.2 G/DL (ref 12–15.9)
MCH RBC QN AUTO: 29.7 PG (ref 26.6–33)
MCHC RBC AUTO-ENTMCNC: 32 G/DL (ref 31.5–35.7)
MCV RBC AUTO: 92.7 FL (ref 79–97)
PLATELET # BLD AUTO: 282 10*3/MM3 (ref 140–450)
PMV BLD AUTO: 9.9 FL (ref 6–12)
POTASSIUM SERPL-SCNC: 3.9 MMOL/L (ref 3.5–5.2)
PROT SERPL-MCNC: 6 G/DL (ref 6–8.5)
RBC # BLD AUTO: 4.11 10*6/MM3 (ref 3.77–5.28)
RH BLD: POSITIVE
SODIUM SERPL-SCNC: 142 MMOL/L (ref 136–145)
T PALLIDUM IGG SER QL: NORMAL
T&S EXPIRATION DATE: NORMAL
WBC NRBC COR # BLD AUTO: 8.49 10*3/MM3 (ref 3.4–10.8)

## 2024-04-22 PROCEDURE — 25810000003 SODIUM CHLORIDE 0.9 % SOLUTION: Performed by: STUDENT IN AN ORGANIZED HEALTH CARE EDUCATION/TRAINING PROGRAM

## 2024-04-22 PROCEDURE — 25010000002 CEFAZOLIN PER 500 MG: Performed by: STUDENT IN AN ORGANIZED HEALTH CARE EDUCATION/TRAINING PROGRAM

## 2024-04-22 PROCEDURE — 25010000002 MORPHINE PER 10 MG: Performed by: ANESTHESIOLOGY

## 2024-04-22 PROCEDURE — S0260 H&P FOR SURGERY: HCPCS | Performed by: STUDENT IN AN ORGANIZED HEALTH CARE EDUCATION/TRAINING PROGRAM

## 2024-04-22 PROCEDURE — 25010000002 FENTANYL CITRATE (PF) 50 MCG/ML SOLUTION: Performed by: ANESTHESIOLOGY

## 2024-04-22 PROCEDURE — 86901 BLOOD TYPING SEROLOGIC RH(D): CPT | Performed by: STUDENT IN AN ORGANIZED HEALTH CARE EDUCATION/TRAINING PROGRAM

## 2024-04-22 PROCEDURE — 25810000003 LACTATED RINGERS PER 1000 ML: Performed by: STUDENT IN AN ORGANIZED HEALTH CARE EDUCATION/TRAINING PROGRAM

## 2024-04-22 PROCEDURE — 25010000002 ONDANSETRON PER 1 MG: Performed by: ANESTHESIOLOGY

## 2024-04-22 PROCEDURE — 59510 CESAREAN DELIVERY: CPT | Performed by: STUDENT IN AN ORGANIZED HEALTH CARE EDUCATION/TRAINING PROGRAM

## 2024-04-22 PROCEDURE — 86850 RBC ANTIBODY SCREEN: CPT | Performed by: STUDENT IN AN ORGANIZED HEALTH CARE EDUCATION/TRAINING PROGRAM

## 2024-04-22 PROCEDURE — 85027 COMPLETE CBC AUTOMATED: CPT | Performed by: STUDENT IN AN ORGANIZED HEALTH CARE EDUCATION/TRAINING PROGRAM

## 2024-04-22 PROCEDURE — 86780 TREPONEMA PALLIDUM: CPT | Performed by: STUDENT IN AN ORGANIZED HEALTH CARE EDUCATION/TRAINING PROGRAM

## 2024-04-22 PROCEDURE — 25010000002 KETOROLAC TROMETHAMINE PER 15 MG

## 2024-04-22 PROCEDURE — 25010000002 OXYTOCIN PER 10 UNITS: Performed by: STUDENT IN AN ORGANIZED HEALTH CARE EDUCATION/TRAINING PROGRAM

## 2024-04-22 PROCEDURE — 88307 TISSUE EXAM BY PATHOLOGIST: CPT

## 2024-04-22 PROCEDURE — 25010000002 KETOROLAC TROMETHAMINE PER 15 MG: Performed by: STUDENT IN AN ORGANIZED HEALTH CARE EDUCATION/TRAINING PROGRAM

## 2024-04-22 PROCEDURE — 86900 BLOOD TYPING SEROLOGIC ABO: CPT | Performed by: STUDENT IN AN ORGANIZED HEALTH CARE EDUCATION/TRAINING PROGRAM

## 2024-04-22 PROCEDURE — 25010000002 BUPIVACAINE PF 0.75 % SOLUTION: Performed by: ANESTHESIOLOGY

## 2024-04-22 PROCEDURE — 80053 COMPREHEN METABOLIC PANEL: CPT | Performed by: STUDENT IN AN ORGANIZED HEALTH CARE EDUCATION/TRAINING PROGRAM

## 2024-04-22 RX ORDER — KETOROLAC TROMETHAMINE 30 MG/ML
INJECTION, SOLUTION INTRAMUSCULAR; INTRAVENOUS AS NEEDED
Status: DISCONTINUED | OUTPATIENT
Start: 2024-04-22 | End: 2024-04-22 | Stop reason: SURG

## 2024-04-22 RX ORDER — DROPERIDOL 2.5 MG/ML
0.62 INJECTION, SOLUTION INTRAMUSCULAR; INTRAVENOUS
Status: DISCONTINUED | OUTPATIENT
Start: 2024-04-22 | End: 2024-04-24 | Stop reason: HOSPADM

## 2024-04-22 RX ORDER — IBUPROFEN 600 MG/1
600 TABLET ORAL EVERY 6 HOURS
Status: DISCONTINUED | OUTPATIENT
Start: 2024-04-23 | End: 2024-04-24 | Stop reason: HOSPADM

## 2024-04-22 RX ORDER — ESCITALOPRAM OXALATE 10 MG/1
10 TABLET ORAL DAILY
Status: DISCONTINUED | OUTPATIENT
Start: 2024-04-22 | End: 2024-04-24 | Stop reason: HOSPADM

## 2024-04-22 RX ORDER — OXYTOCIN/0.9 % SODIUM CHLORIDE 30/500 ML
999 PLASTIC BAG, INJECTION (ML) INTRAVENOUS ONCE
Status: COMPLETED | OUTPATIENT
Start: 2024-04-22 | End: 2024-04-22

## 2024-04-22 RX ORDER — BUPIVACAINE HYDROCHLORIDE 7.5 MG/ML
INJECTION, SOLUTION EPIDURAL; RETROBULBAR
Status: COMPLETED | OUTPATIENT
Start: 2024-04-22 | End: 2024-04-22

## 2024-04-22 RX ORDER — LIDOCAINE HYDROCHLORIDE 10 MG/ML
0.5 INJECTION, SOLUTION INFILTRATION; PERINEURAL ONCE AS NEEDED
Status: DISCONTINUED | OUTPATIENT
Start: 2024-04-22 | End: 2024-04-22 | Stop reason: HOSPADM

## 2024-04-22 RX ORDER — SODIUM CHLORIDE 0.9 % (FLUSH) 0.9 %
10 SYRINGE (ML) INJECTION EVERY 12 HOURS SCHEDULED
Status: DISCONTINUED | OUTPATIENT
Start: 2024-04-22 | End: 2024-04-22 | Stop reason: HOSPADM

## 2024-04-22 RX ORDER — AMOXICILLIN 250 MG
1 CAPSULE ORAL 2 TIMES DAILY
Status: DISCONTINUED | OUTPATIENT
Start: 2024-04-22 | End: 2024-04-24 | Stop reason: HOSPADM

## 2024-04-22 RX ORDER — ACETAMINOPHEN 500 MG
1000 TABLET ORAL ONCE
Status: COMPLETED | OUTPATIENT
Start: 2024-04-22 | End: 2024-04-22

## 2024-04-22 RX ORDER — ENOXAPARIN SODIUM 100 MG/ML
40 INJECTION SUBCUTANEOUS EVERY 12 HOURS
Status: DISCONTINUED | OUTPATIENT
Start: 2024-04-23 | End: 2024-04-24 | Stop reason: HOSPADM

## 2024-04-22 RX ORDER — FENTANYL CITRATE 50 UG/ML
INJECTION, SOLUTION INTRAMUSCULAR; INTRAVENOUS
Status: COMPLETED | OUTPATIENT
Start: 2024-04-22 | End: 2024-04-22

## 2024-04-22 RX ORDER — ONDANSETRON 2 MG/ML
4 INJECTION INTRAMUSCULAR; INTRAVENOUS ONCE AS NEEDED
Status: COMPLETED | OUTPATIENT
Start: 2024-04-22 | End: 2024-04-22

## 2024-04-22 RX ORDER — OXYTOCIN/0.9 % SODIUM CHLORIDE 30/500 ML
250 PLASTIC BAG, INJECTION (ML) INTRAVENOUS CONTINUOUS
OUTPATIENT
Start: 2024-04-22 | End: 2024-04-22

## 2024-04-22 RX ORDER — MORPHINE SULFATE 2 MG/ML
2 INJECTION, SOLUTION INTRAMUSCULAR; INTRAVENOUS
Status: ACTIVE | OUTPATIENT
Start: 2024-04-22 | End: 2024-04-22

## 2024-04-22 RX ORDER — OXYTOCIN/0.9 % SODIUM CHLORIDE 30/500 ML
250 PLASTIC BAG, INJECTION (ML) INTRAVENOUS CONTINUOUS
Status: DISPENSED | OUTPATIENT
Start: 2024-04-22 | End: 2024-04-22

## 2024-04-22 RX ORDER — MORPHINE SULFATE 2 MG/ML
2 INJECTION, SOLUTION INTRAMUSCULAR; INTRAVENOUS
Status: DISCONTINUED | OUTPATIENT
Start: 2024-04-22 | End: 2024-04-22 | Stop reason: HOSPADM

## 2024-04-22 RX ORDER — MORPHINE SULFATE 4 MG/ML
INJECTION, SOLUTION INTRAMUSCULAR; INTRAVENOUS
Status: COMPLETED | OUTPATIENT
Start: 2024-04-22 | End: 2024-04-22

## 2024-04-22 RX ORDER — ONDANSETRON 4 MG/1
4 TABLET, ORALLY DISINTEGRATING ORAL EVERY 4 HOURS PRN
Status: DISCONTINUED | OUTPATIENT
Start: 2024-04-22 | End: 2024-04-24 | Stop reason: HOSPADM

## 2024-04-22 RX ORDER — SODIUM CHLORIDE 0.9 % (FLUSH) 0.9 %
10 SYRINGE (ML) INJECTION AS NEEDED
Status: DISCONTINUED | OUTPATIENT
Start: 2024-04-22 | End: 2024-04-22 | Stop reason: HOSPADM

## 2024-04-22 RX ORDER — METHYLERGONOVINE MALEATE 0.2 MG/ML
200 INJECTION INTRAVENOUS ONCE AS NEEDED
Status: DISCONTINUED | OUTPATIENT
Start: 2024-04-22 | End: 2024-04-22 | Stop reason: HOSPADM

## 2024-04-22 RX ORDER — ONDANSETRON 4 MG/1
4 TABLET, ORALLY DISINTEGRATING ORAL EVERY 6 HOURS PRN
Status: DISCONTINUED | OUTPATIENT
Start: 2024-04-22 | End: 2024-04-22 | Stop reason: HOSPADM

## 2024-04-22 RX ORDER — OXYTOCIN/0.9 % SODIUM CHLORIDE 30/500 ML
125 PLASTIC BAG, INJECTION (ML) INTRAVENOUS CONTINUOUS PRN
Status: DISCONTINUED | OUTPATIENT
Start: 2024-04-22 | End: 2024-04-24 | Stop reason: HOSPADM

## 2024-04-22 RX ORDER — DIPHENHYDRAMINE HCL 25 MG
25 CAPSULE ORAL EVERY 4 HOURS PRN
Status: DISCONTINUED | OUTPATIENT
Start: 2024-04-22 | End: 2024-04-24 | Stop reason: HOSPADM

## 2024-04-22 RX ORDER — ONDANSETRON 2 MG/ML
4 INJECTION INTRAMUSCULAR; INTRAVENOUS ONCE AS NEEDED
Status: DISCONTINUED | OUTPATIENT
Start: 2024-04-22 | End: 2024-04-24 | Stop reason: HOSPADM

## 2024-04-22 RX ORDER — SODIUM CHLORIDE, SODIUM LACTATE, POTASSIUM CHLORIDE, CALCIUM CHLORIDE 600; 310; 30; 20 MG/100ML; MG/100ML; MG/100ML; MG/100ML
125 INJECTION, SOLUTION INTRAVENOUS CONTINUOUS
Status: DISCONTINUED | OUTPATIENT
Start: 2024-04-22 | End: 2024-04-23

## 2024-04-22 RX ORDER — HYDROXYZINE 50 MG/1
50 TABLET, FILM COATED ORAL EVERY 6 HOURS PRN
Status: DISCONTINUED | OUTPATIENT
Start: 2024-04-22 | End: 2024-04-24 | Stop reason: HOSPADM

## 2024-04-22 RX ORDER — ONDANSETRON 2 MG/ML
4 INJECTION INTRAMUSCULAR; INTRAVENOUS EVERY 6 HOURS PRN
Status: DISCONTINUED | OUTPATIENT
Start: 2024-04-22 | End: 2024-04-22 | Stop reason: HOSPADM

## 2024-04-22 RX ORDER — EPHEDRINE SULFATE 50 MG/ML
INJECTION INTRAVENOUS AS NEEDED
Status: DISCONTINUED | OUTPATIENT
Start: 2024-04-22 | End: 2024-04-22

## 2024-04-22 RX ORDER — BUPROPION HYDROCHLORIDE 150 MG/1
150 TABLET, EXTENDED RELEASE ORAL DAILY
Status: DISCONTINUED | OUTPATIENT
Start: 2024-04-22 | End: 2024-04-24 | Stop reason: HOSPADM

## 2024-04-22 RX ORDER — OXYCODONE HYDROCHLORIDE 5 MG/1
5 TABLET ORAL EVERY 4 HOURS PRN
Status: DISCONTINUED | OUTPATIENT
Start: 2024-04-22 | End: 2024-04-24 | Stop reason: HOSPADM

## 2024-04-22 RX ORDER — CARBOPROST TROMETHAMINE 250 UG/ML
250 INJECTION, SOLUTION INTRAMUSCULAR AS NEEDED
Status: DISCONTINUED | OUTPATIENT
Start: 2024-04-22 | End: 2024-04-22 | Stop reason: HOSPADM

## 2024-04-22 RX ORDER — ACETAMINOPHEN 325 MG/1
650 TABLET ORAL EVERY 6 HOURS
Status: DISCONTINUED | OUTPATIENT
Start: 2024-04-23 | End: 2024-04-24 | Stop reason: HOSPADM

## 2024-04-22 RX ORDER — KETOROLAC TROMETHAMINE 30 MG/ML
30 INJECTION, SOLUTION INTRAMUSCULAR; INTRAVENOUS ONCE
Status: DISCONTINUED | OUTPATIENT
Start: 2024-04-22 | End: 2024-04-22 | Stop reason: HOSPADM

## 2024-04-22 RX ORDER — FAMOTIDINE 10 MG/ML
20 INJECTION, SOLUTION INTRAVENOUS ONCE AS NEEDED
Status: COMPLETED | OUTPATIENT
Start: 2024-04-22 | End: 2024-04-22

## 2024-04-22 RX ORDER — OXYCODONE HYDROCHLORIDE 10 MG/1
10 TABLET ORAL EVERY 4 HOURS PRN
Status: DISCONTINUED | OUTPATIENT
Start: 2024-04-22 | End: 2024-04-24 | Stop reason: HOSPADM

## 2024-04-22 RX ORDER — PHENYLEPHRINE HCL IN 0.9% NACL 1 MG/10 ML
SYRINGE (ML) INTRAVENOUS AS NEEDED
Status: DISCONTINUED | OUTPATIENT
Start: 2024-04-22 | End: 2024-04-22 | Stop reason: SURG

## 2024-04-22 RX ORDER — SODIUM CHLORIDE 9 MG/ML
40 INJECTION, SOLUTION INTRAVENOUS AS NEEDED
Status: DISCONTINUED | OUTPATIENT
Start: 2024-04-22 | End: 2024-04-22 | Stop reason: HOSPADM

## 2024-04-22 RX ORDER — ACETAMINOPHEN 500 MG
1000 TABLET ORAL EVERY 6 HOURS
Status: COMPLETED | OUTPATIENT
Start: 2024-04-22 | End: 2024-04-23

## 2024-04-22 RX ORDER — METOCLOPRAMIDE HYDROCHLORIDE 5 MG/ML
10 INJECTION INTRAMUSCULAR; INTRAVENOUS EVERY 6 HOURS PRN
Status: DISCONTINUED | OUTPATIENT
Start: 2024-04-22 | End: 2024-04-22 | Stop reason: HOSPADM

## 2024-04-22 RX ORDER — MISOPROSTOL 200 UG/1
800 TABLET ORAL AS NEEDED
Status: DISCONTINUED | OUTPATIENT
Start: 2024-04-22 | End: 2024-04-22 | Stop reason: HOSPADM

## 2024-04-22 RX ORDER — KETOROLAC TROMETHAMINE 15 MG/ML
15 INJECTION, SOLUTION INTRAMUSCULAR; INTRAVENOUS EVERY 6 HOURS
Status: COMPLETED | OUTPATIENT
Start: 2024-04-22 | End: 2024-04-23

## 2024-04-22 RX ORDER — LABETALOL 200 MG/1
200 TABLET, FILM COATED ORAL 3 TIMES DAILY
Status: DISCONTINUED | OUTPATIENT
Start: 2024-04-22 | End: 2024-04-24 | Stop reason: HOSPADM

## 2024-04-22 RX ORDER — CITRIC ACID/SODIUM CITRATE 334-500MG
30 SOLUTION, ORAL ORAL ONCE
Status: COMPLETED | OUTPATIENT
Start: 2024-04-22 | End: 2024-04-22

## 2024-04-22 RX ORDER — OXYTOCIN/0.9 % SODIUM CHLORIDE 30/500 ML
999 PLASTIC BAG, INJECTION (ML) INTRAVENOUS ONCE
OUTPATIENT
Start: 2024-04-22 | End: 2024-04-22

## 2024-04-22 RX ORDER — DIPHENHYDRAMINE HYDROCHLORIDE 50 MG/ML
25 INJECTION INTRAMUSCULAR; INTRAVENOUS EVERY 4 HOURS PRN
Status: DISCONTINUED | OUTPATIENT
Start: 2024-04-22 | End: 2024-04-24 | Stop reason: HOSPADM

## 2024-04-22 RX ADMIN — LABETALOL HYDROCHLORIDE 200 MG: 200 TABLET, FILM COATED ORAL at 21:46

## 2024-04-22 RX ADMIN — Medication 125 ML/HR: at 14:43

## 2024-04-22 RX ADMIN — ACETAMINOPHEN 1000 MG: 500 TABLET ORAL at 12:02

## 2024-04-22 RX ADMIN — SODIUM CHLORIDE, POTASSIUM CHLORIDE, SODIUM LACTATE AND CALCIUM CHLORIDE 125 ML/HR: 600; 310; 30; 20 INJECTION, SOLUTION INTRAVENOUS at 12:21

## 2024-04-22 RX ADMIN — ONDANSETRON 4 MG: 2 INJECTION INTRAMUSCULAR; INTRAVENOUS at 12:02

## 2024-04-22 RX ADMIN — FENTANYL CITRATE 10 MCG: 50 INJECTION, SOLUTION INTRAMUSCULAR; INTRAVENOUS at 12:44

## 2024-04-22 RX ADMIN — ACETAMINOPHEN 1000 MG: 500 TABLET ORAL at 19:40

## 2024-04-22 RX ADMIN — BUPIVACAINE HYDROCHLORIDE 1.8 ML: 7.5 INJECTION, SOLUTION EPIDURAL; RETROBULBAR at 12:44

## 2024-04-22 RX ADMIN — SODIUM CHLORIDE, POTASSIUM CHLORIDE, SODIUM LACTATE AND CALCIUM CHLORIDE 125 ML/HR: 600; 310; 30; 20 INJECTION, SOLUTION INTRAVENOUS at 10:37

## 2024-04-22 RX ADMIN — KETOROLAC TROMETHAMINE 30 MG: 30 INJECTION, SOLUTION INTRAMUSCULAR at 13:37

## 2024-04-22 RX ADMIN — FAMOTIDINE 20 MG: 10 INJECTION INTRAVENOUS at 12:02

## 2024-04-22 RX ADMIN — Medication 999 ML/HR: at 13:09

## 2024-04-22 RX ADMIN — KETOROLAC TROMETHAMINE 15 MG: 15 INJECTION, SOLUTION INTRAMUSCULAR; INTRAVENOUS at 21:47

## 2024-04-22 RX ADMIN — Medication 100 MCG: at 12:55

## 2024-04-22 RX ADMIN — SODIUM CITRATE AND CITRIC ACID MONOHYDRATE 30 ML: 334; 500 SOLUTION ORAL at 12:23

## 2024-04-22 RX ADMIN — SODIUM CHLORIDE 2 G: 900 INJECTION INTRAVENOUS at 12:26

## 2024-04-22 RX ADMIN — MORPHINE SULFATE 150 MCG: 4 INJECTION, SOLUTION INTRAMUSCULAR; INTRAVENOUS at 12:44

## 2024-04-22 NOTE — H&P
[facility]  Obstetric History and Physical     Chief Complaint: encounter for elective primary  delivery    Subjective     Patient is a 33 y.o. female  currently at 38+0 , who presents with chronic hypertension on medication and gestational diabetes for elective primary  delivery.    Her prenatal care is complicated by GDMA2, Chronic htn on labatalol 200mg TID, HSV.  Her previous obstetric/gynecological history is noted for is non-contributory.      Prenatal Information:  See office H&P for full details and labs.      Past OB History:       OB History    Para Term  AB Living   4 0 0 0 3 0   SAB IAB Ectopic Molar Multiple Live Births   3 0 0 0 0 0               Past Medical History: Past Medical History:   Diagnosis Date    Abnormal Pap smear of cervix     Adjustment disorder with mixed anxiety and depressed mood 2016    COVID-19 2020    Female infertility     Gestational diabetes     Hypertension     Since - currently on medication    Palpitations     occasionally with pregnancy    Polycystic ovary syndrome         Past Surgical History Past Surgical History:   Procedure Laterality Date    BREAST AUGMENTATION      CERVICAL BIOPSY  W/ LOOP ELECTRODE EXCISION      FOOT SURGERY Right     plantar fascitis tendon release    LAPAROSCOPIC GASTRIC BANDING      LEEP      OTHER SURGICAL HISTORY      removal of eye tumor    OTHER SURGICAL HISTORY      tummy tuck    TONSILLECTOMY      WISDOM TOOTH EXTRACTION          Family History: Family History   Problem Relation Age of Onset    Skin cancer Mother     Depression Mother     Anxiety disorder Mother     Stroke Mother     Aneurysm Mother     Alcohol abuse Mother     Migraines Mother     Hemochromatosis Mother     Stroke Maternal Grandmother       Social History:  reports that she quit smoking about 9 years ago. Her smoking use included cigarettes. She started smoking about 9 years ago. She has a 0.1 pack-year smoking  "history. She has never used smokeless tobacco.   reports that she does not currently use alcohol after a past usage of about 4.0 - 5.0 standard drinks of alcohol per week.   reports no history of drug use.        General ROS: Pertinent items are noted in HPI    Objective      Vitals:     Vitals:    04/22/24 0900 04/22/24 1019 04/22/24 1049   BP:  120/79    BP Location:  Right arm    Patient Position:  Lying    Pulse:  101    Resp:  16    Temp:  97.9 °F (36.6 °C)    TempSrc:  Oral    SpO2:  98%    Weight: 125 kg (274 lb 9.6 oz)  124 kg (274 lb)   Height:  172.7 cm (68\") 172.7 cm (68\")       Fetal Heart Rate Assessment:   Category 1    [place]:   irregular     Physical Exam:     General Appearance:    Alert, cooperative, in no acute distress   Abdomen:     Soft, non-tender, no guarding, no rebound tenderness,       EFW 7#9oz - 8#0oz   Pelvic Exam:    Pelvis adequate.    Presentation: Vertex    Cervix: was not checked.   Extremities:   Moves all extremities well, no edema, no cyanosis, no              redness   Skin:   No bleeding, bruising or rash   Neurologic:   No focal neurologic defect          Laboratory Results:   Lab Results (last 48 hours)       Procedure Component Value Units Date/Time    T Pallidum Antibody w/ reflex RPR (Syphilis) [[id number]]  (Normal) Collected: 04/22/24 1037    Specimen: Blood Updated: 04/22/24 1115     Treponemal AB Total Non-Reactive    Comprehensive Metabolic Panel [[id number]]  (Abnormal) Collected: 04/22/24 1037    Specimen: Blood Updated: 04/22/24 1109     Glucose 94 mg/dL      BUN 9 mg/dL      Creatinine 0.71 mg/dL      Sodium 142 mmol/L      Potassium 3.9 mmol/L      Chloride 110 mmol/L      CO2 20.0 mmol/L      Calcium 9.0 mg/dL      Total Protein 6.0 g/dL      Albumin 3.6 g/dL      ALT (SGPT) 16 U/L      AST (SGOT) 10 U/L      Alkaline Phosphatase 135 U/L      Total Bilirubin 0.3 mg/dL      Globulin 2.4 gm/dL      A/G Ratio 1.5 g/dL      BUN/Creatinine Ratio 12.7     Anion Gap 12.0 " mmol/L      eGFR 115.3 mL/min/1.73     Narrative:      GFR Normal >60  Chronic Kidney Disease <60  Kidney Failure <15      CBC (No Diff) [[id number]]  (Normal) Collected: 24 1037    Specimen: Blood Updated: 24 1051     WBC 8.49 10*3/mm3      RBC 4.11 10*6/mm3      Hemoglobin 12.2 g/dL      Hematocrit 38.1 %      MCV 92.7 fL      MCH 29.7 pg      MCHC 32.0 g/dL      RDW 12.5 %      RDW-SD 42.4 fl      MPV 9.9 fL      Platelets 282 10*3/mm3                Assessment & Plan     Principal Problem:    Chronic hypertension affecting pregnancy  Active Problems:    37 weeks gestation of pregnancy    Chronic hypertension in pregnancy         Assessment:  1.  Intrauterine pregnancy at 38+0 wks gestation.    2.   Elective primary   3.  GBS status:Negative    Plan:  1. Primary LTCS  2. Plan of care has been reviewed with patient.  3.  Risks, benefits of treatment plan have been discussed.  4.  All questions have been answered.         Neda Penn MD   2024   12:17 EDT

## 2024-04-22 NOTE — L&D DELIVERY NOTE
[facility]   Section Operative Note    Pre-Operative Dx:   1.  Patient is a 33 y.o. female  currently at 38w0d, who presents for elective primary  section.    2.  Elective  3. Chronic Hypertension  4. Gestational Diabetes A2      Postoperative dx:    1.  Same     Procedure: Primary LTCS     Surgeon:    Assistant:                       MD Bon Cardoso MD        Anesthesia:  Anesthesiologist:  Michelle Zavala MD     EBL:  800cc     Antibiotics: cefazolin (Ancef)     Infant    Findings: VFI     Apgars: 9 & 9 at 1 and 5 minutes.        Procedure Details:     Pt was taken to the OR where she was prepped and draped in the usual sterile fashion with a catheter and a left tilt.  Anesthesia was found to be adequate.    A Pfannenstiel skin incision was made with the scalpel and carried through to the underlying layer of fascia with the scalpel.  The fascial incision was extended laterally with the Navarro scissors and  from the underlying rectus muscles superiorly.  The rectus muscles were  in the midline and the peritoneum was entered sharply with the Navarro scissors.  The peritoneal incision was extended laterally.  A low transverse uterine incision was made with the scalpel and extended in a cephalocaudad manner manually.    The infant's head, shoulders, and body were delivered without difficulty.  Nose and mouth were bulb suctioned and infant handed to awaiting nurse with good cry, color, tone, and movement of all extremities.    Placenta was delivered spontaneously intact with a three vessel cord.    The uterus was exteriorized and cleared of all clots and debris.    The uterine incision was repaired with 0 Monocryl in a running, locked fashion and excellent hemostasis was achieved.    The uterus was returned to the abdomen, the gutters were cleared of all clots and debris.  The uterine incision was examined and hemostatic in situ.     The fascia was  closed with 0 Vicryl in a running fashion.    The subcutaneous fat was irrigated and closed with 3.0 Monocryl.    The skin was closed with  4-0 Monocryl .  Sponge, lap, and needle counts were correct x 2.      Surgical Assistant was responsible for performing the following activities: Retraction, Suction, Irrigation, Suturing, Closing, Placing Dressing and their skilled assistance was necessary for the success of this case.      Complications:   None      Disposition:   Mother to Mother Baby/Postpartum  in stable condition currently.   Baby to NBN  in stable condition currently.       Neda Penn MD  4/22/2024  14:11 EDT

## 2024-04-22 NOTE — LACTATION NOTE
"P1 term baby. Hx of PCOS, INF, gest diabetes, HTN, casper breast augmentation. Pt wanting assistance with latching baby. Baby sleepy and having difficulty grasping nipple. 24 mm nipple shield used and baby is maintaining latch. Baby has strong suck. Pt is able to hand express some colostrum from right breast. Encouraged to BF at least every 2-3 hours for 10-15 min on each breast. FOB will bring up personal pump for pt to insurance pump 3-4 times a day after bf. Encouraged pt to review provided booklet on BF and call LC as needed.    Lactation Consult Note    Evaluation Completed: 2024 17:26 EDT  Patient Name: Jocelyn Merlos  :  1990  MRN:  [id number]     REFERRAL  INFORMATION:                          Date of Referral: 24   Person Making Referral: nurse          DELIVERY HISTORY:        Skin to skin initiation date/time: 2024  1:40 PM   Skin to skin end date/time: 2024  1:42 PM        MATERNAL ASSESSMENT:     Breast Shape: round (has PCOS, got breast implants about 10 yrs ago) (24)  Breast Density: soft (24)  Areola: elastic (24)  Nipples: everted (24)                INFANT ASSESSMENT:  Information for the patient's :  Angely Merlos [[id number]]   No past medical history on file.                                                                                                   MATERNAL INFANT FEEDING:     Maternal Emotional State: relaxed, receptive (24)  Infant Positioning: clutch/football, cross-cradle (24)   Signs of Milk Transfer: deep jaw excursions noted (24)  Pain with Feeding: other (see comments) (24)  Pain Location:  (mom reports sensitive nipples, latch \"a little uncomfortable\") (24)                    Latch Assistance: minimal assistance (24)                               EQUIPMENT TYPE:                                 BREAST PUMPING:  Breast Pumping " Interventions: post-feed pumping encouraged (3-4 times a day) (04/22/24 1700)       LACTATION REFERRALS:

## 2024-04-22 NOTE — LACTATION NOTE
"Reviewed personal pump use and cleaning with pt.    Lactation Consult Note    Evaluation Completed: 2024 18:36 EDT  Patient Name: Jocelyn Merlos  :  1990  MRN:  [id number]     REFERRAL  INFORMATION:                          Date of Referral: 24   Person Making Referral: nurse          DELIVERY HISTORY:        Skin to skin initiation date/time: 2024  1:40 PM   Skin to skin end date/time: 2024  1:42 PM        MATERNAL ASSESSMENT:     Breast Shape: round (has PCOS, got breast implants about 10 yrs ago) (24)  Breast Density: soft (24)  Areola: elastic (24)  Nipples: everted (24)                INFANT ASSESSMENT:  Information for the patient's :  Zenon MerlosAddieosei [[id number]]   No past medical history on file.                                                                                                   MATERNAL INFANT FEEDING:     Maternal Emotional State: relaxed, receptive (24)  Infant Positioning: clutch/football, cross-cradle (24)   Signs of Milk Transfer: deep jaw excursions noted (24)  Pain with Feeding: other (see comments) (24)  Pain Location:  (mom reports sensitive nipples, latch \"a little uncomfortable\") (24)                    Latch Assistance: minimal assistance (24)                               EQUIPMENT TYPE:                                 BREAST PUMPING:  Breast Pumping Interventions: post-feed pumping encouraged (3-4 times a day) (24)       LACTATION REFERRALS:                                           "

## 2024-04-22 NOTE — PLAN OF CARE
Problem: Adult Inpatient Plan of Care  Goal: Plan of Care Review  Outcome: Ongoing, Progressing  Flowsheets (Taken 2024 0528)  Progress: improving  Plan of Care Reviewed With:   patient   spouse  Outcome Evaluation: s/p c/s, pain and bleeding controlled. BP WNL at this time. Assisting with BF and bonding PRN.  Goal: Patient-Specific Goal (Individualized)  Outcome: Ongoing, Progressing  Goal: Absence of Hospital-Acquired Illness or Injury  Outcome: Ongoing, Progressing  Goal: Optimal Comfort and Wellbeing  Outcome: Ongoing, Progressing  Goal: Readiness for Transition of Care  Outcome: Ongoing, Progressing     Problem: Device-Related Complication Risk (Anesthesia/Analgesia, Neuraxial)  Goal: Safe Infusion Delivery Completion  Outcome: Ongoing, Progressing     Problem: Infection (Anesthesia/Analgesia, Neuraxial)  Goal: Absence of Infection Signs and Symptoms  Outcome: Ongoing, Progressing     Problem: Nausea and Vomiting (Anesthesia/Analgesia, Neuraxial)  Goal: Nausea and Vomiting Relief  Outcome: Ongoing, Progressing     Problem: Pain (Anesthesia/Analgesia, Neuraxial)  Goal: Effective Pain Control  Outcome: Ongoing, Progressing     Problem: Respiratory Compromise (Anesthesia/Analgesia, Neuraxial)  Goal: Effective Oxygenation and Ventilation  Outcome: Ongoing, Progressing     Problem: Sensorimotor Impairment (Anesthesia/Analgesia, Neuraxial)  Goal: Baseline Motor Function  Outcome: Ongoing, Progressing     Problem: Urinary Retention (Anesthesia/Analgesia, Neuraxial)  Goal: Effective Urinary Elimination  Outcome: Ongoing, Progressing     Problem:  Fall Injury Risk  Goal: Absence of Fall, Infant Drop and Related Injury  Outcome: Ongoing, Progressing     Problem: Skin Injury Risk Increased  Goal: Skin Health and Integrity  Outcome: Ongoing, Progressing   Goal Outcome Evaluation:  Plan of Care Reviewed With: patient, spouse        Progress: improving  Outcome Evaluation: s/p c/s, pain and bleeding  controlled. BP WNL at this time. Assisting with BF and bonding PRN.

## 2024-04-22 NOTE — ANESTHESIA PROCEDURE NOTES
Spinal Block      Patient reassessed immediately prior to procedure    Performed By  Anesthesiologist: Gurdeep Zavala MD  Preanesthetic Checklist  Completed: patient identified, IV checked, site marked, risks and benefits discussed, surgical consent, monitors and equipment checked, pre-op evaluation and timeout performed  Spinal Block Prep:  Patient Position:sitting  Sterile Tech:cap, gloves, sterile barriers and mask  Prep:Chloraprep  Patient Monitoring:EKG, continuous pulse oximetry and blood pressure monitoring    Spinal Block Procedure  Approach:midline  Location:L4-L5  Needle Gauge:24 G (4 inch)  Paresthesia: no  Fluid Appearance:clear  Medications: fentaNYL citrate (PF) (SUBLIMAZE) injection - Intrathecal   10 mcg - 4/22/2024 12:44:00 PM  Morphine sulfate (PF) injection - Spinal   150 mcg - 4/22/2024 12:44:00 PM  bupivacaine PF (MARCAINE) 0.75 % injection - Spinal   1.8 mL - 4/22/2024 12:44:00 PM   Post Assessment  Patient Tolerance:patient tolerated the procedure well with no apparent complications  Complications no

## 2024-04-23 LAB
BASOPHILS # BLD AUTO: 0.06 10*3/MM3 (ref 0–0.2)
BASOPHILS NFR BLD AUTO: 0.7 % (ref 0–1.5)
DEPRECATED RDW RBC AUTO: 42.4 FL (ref 37–54)
EOSINOPHIL # BLD AUTO: 0.28 10*3/MM3 (ref 0–0.4)
EOSINOPHIL NFR BLD AUTO: 3.1 % (ref 0.3–6.2)
ERYTHROCYTE [DISTWIDTH] IN BLOOD BY AUTOMATED COUNT: 12.4 % (ref 12.3–15.4)
HCT VFR BLD AUTO: 33.7 % (ref 34–46.6)
HGB BLD-MCNC: 10.9 G/DL (ref 12–15.9)
IMM GRANULOCYTES # BLD AUTO: 0.02 10*3/MM3 (ref 0–0.05)
IMM GRANULOCYTES NFR BLD AUTO: 0.2 % (ref 0–0.5)
LYMPHOCYTES # BLD AUTO: 1.91 10*3/MM3 (ref 0.7–3.1)
LYMPHOCYTES NFR BLD AUTO: 21.3 % (ref 19.6–45.3)
MCH RBC QN AUTO: 30.2 PG (ref 26.6–33)
MCHC RBC AUTO-ENTMCNC: 32.3 G/DL (ref 31.5–35.7)
MCV RBC AUTO: 93.4 FL (ref 79–97)
MONOCYTES # BLD AUTO: 0.95 10*3/MM3 (ref 0.1–0.9)
MONOCYTES NFR BLD AUTO: 10.6 % (ref 5–12)
NEUTROPHILS NFR BLD AUTO: 5.73 10*3/MM3 (ref 1.7–7)
NEUTROPHILS NFR BLD AUTO: 64.1 % (ref 42.7–76)
NRBC BLD AUTO-RTO: 0 /100 WBC (ref 0–0.2)
PLATELET # BLD AUTO: 252 10*3/MM3 (ref 140–450)
PMV BLD AUTO: 10.5 FL (ref 6–12)
RBC # BLD AUTO: 3.61 10*6/MM3 (ref 3.77–5.28)
WBC NRBC COR # BLD AUTO: 8.95 10*3/MM3 (ref 3.4–10.8)

## 2024-04-23 PROCEDURE — 25010000002 KETOROLAC TROMETHAMINE PER 15 MG: Performed by: STUDENT IN AN ORGANIZED HEALTH CARE EDUCATION/TRAINING PROGRAM

## 2024-04-23 PROCEDURE — 25010000002 ENOXAPARIN PER 10 MG: Performed by: STUDENT IN AN ORGANIZED HEALTH CARE EDUCATION/TRAINING PROGRAM

## 2024-04-23 PROCEDURE — 85025 COMPLETE CBC W/AUTO DIFF WBC: CPT | Performed by: STUDENT IN AN ORGANIZED HEALTH CARE EDUCATION/TRAINING PROGRAM

## 2024-04-23 PROCEDURE — 0503F POSTPARTUM CARE VISIT: CPT | Performed by: OBSTETRICS & GYNECOLOGY

## 2024-04-23 RX ORDER — OXYTOCIN/0.9 % SODIUM CHLORIDE 30/500 ML
125 PLASTIC BAG, INJECTION (ML) INTRAVENOUS ONCE AS NEEDED
Status: DISCONTINUED | OUTPATIENT
Start: 2024-04-23 | End: 2024-04-24 | Stop reason: HOSPADM

## 2024-04-23 RX ORDER — NALOXONE HCL 0.4 MG/ML
0.2 VIAL (ML) INJECTION
Status: DISCONTINUED | OUTPATIENT
Start: 2024-04-23 | End: 2024-04-24 | Stop reason: HOSPADM

## 2024-04-23 RX ORDER — HYDROMORPHONE HYDROCHLORIDE 1 MG/ML
0.5 INJECTION, SOLUTION INTRAMUSCULAR; INTRAVENOUS; SUBCUTANEOUS
Status: ACTIVE | OUTPATIENT
Start: 2024-04-23 | End: 2024-04-23

## 2024-04-23 RX ADMIN — SENNOSIDES AND DOCUSATE SODIUM 1 TABLET: 50; 8.6 TABLET ORAL at 01:09

## 2024-04-23 RX ADMIN — LABETALOL HYDROCHLORIDE 200 MG: 200 TABLET, FILM COATED ORAL at 20:51

## 2024-04-23 RX ADMIN — ESCITALOPRAM OXALATE 10 MG: 10 TABLET, FILM COATED ORAL at 20:51

## 2024-04-23 RX ADMIN — SENNOSIDES AND DOCUSATE SODIUM 1 TABLET: 50; 8.6 TABLET ORAL at 20:51

## 2024-04-23 RX ADMIN — KETOROLAC TROMETHAMINE 15 MG: 15 INJECTION, SOLUTION INTRAMUSCULAR; INTRAVENOUS at 16:12

## 2024-04-23 RX ADMIN — ENOXAPARIN SODIUM 40 MG: 100 INJECTION SUBCUTANEOUS at 14:22

## 2024-04-23 RX ADMIN — ACETAMINOPHEN 1000 MG: 500 TABLET ORAL at 14:22

## 2024-04-23 RX ADMIN — KETOROLAC TROMETHAMINE 15 MG: 15 INJECTION, SOLUTION INTRAMUSCULAR; INTRAVENOUS at 10:11

## 2024-04-23 RX ADMIN — ACETAMINOPHEN 650 MG: 325 TABLET, FILM COATED ORAL at 20:51

## 2024-04-23 RX ADMIN — LABETALOL HYDROCHLORIDE 200 MG: 200 TABLET, FILM COATED ORAL at 16:12

## 2024-04-23 RX ADMIN — SENNOSIDES AND DOCUSATE SODIUM 1 TABLET: 50; 8.6 TABLET ORAL at 08:00

## 2024-04-23 RX ADMIN — BUPROPION HYDROCHLORIDE 150 MG: 150 TABLET, EXTENDED RELEASE ORAL at 07:58

## 2024-04-23 RX ADMIN — KETOROLAC TROMETHAMINE 15 MG: 15 INJECTION, SOLUTION INTRAMUSCULAR; INTRAVENOUS at 02:49

## 2024-04-23 RX ADMIN — ACETAMINOPHEN 1000 MG: 500 TABLET ORAL at 07:59

## 2024-04-23 RX ADMIN — ACETAMINOPHEN 1000 MG: 500 TABLET ORAL at 01:09

## 2024-04-23 RX ADMIN — LABETALOL HYDROCHLORIDE 200 MG: 200 TABLET, FILM COATED ORAL at 08:00

## 2024-04-23 NOTE — PROGRESS NOTES
[facility]   PROGRESS NOTE    Post-Op Day 1 S/P     Subjective   CC: post  section    Patient reports:  Patient reports she is doing well.  She is tolerating regular diet.  She is voiding and passing gas.  She has been ambulating.  She has no complaints.  She plans to take a shower and check her dressing off today.  Her pain is controlled with Motrin and Tylenol.    Review of systems- no shortness of breath, nausea or vomiting or leg pain.    Objective      Vitals: Vital Signs Range for the last 24 hours  Temperature: Temp:  [96.9 °F (36.1 °C)-99 °F (37.2 °C)] 97.5 °F (36.4 °C)       BP: BP: ()/(51-87) 138/85   Pulse: Heart Rate:  [] 84   Respirations: Resp:  [14-18] 16                            Physical exam   General-  no acute distress, conversant    Lungs- respirations unlabored   CV- trace LE edema   Abdomen- soft, nontender, nondistended.  Fundus is firm.   Incision -dressing is dry   Lower extremities- nontender bilaterally    Results from last 7 days   Lab Units 24  0629   WBC 10*3/mm3 8.95   HEMOGLOBIN g/dL 10.9*   HEMATOCRIT % 33.7*   PLATELETS 10*3/mm3 252         Assessment & Plan        Chronic hypertension affecting pregnancy    37 weeks gestation of pregnancy    Chronic hypertension in pregnancy      Assessment:    Jocelyn Merlos is Day 1  post-op from    Chronic hypertension on labetalol 200 mg 3 times daily with good control.     Plan:  continue post op care, pain medication prn and encouraged ambulation.        Emile Chu MD  2024  08:58 EDT

## 2024-04-23 NOTE — PLAN OF CARE
Goal Outcome Evaluation:  Plan of Care Reviewed With: patient           Outcome Evaluation: VSS, adequate output, ambulating independently, pain controlled via ERAS, breastfeeding and supplementing with formula

## 2024-04-23 NOTE — LACTATION NOTE
"Pt reports baby wouldn't latch during the night. She reports she may exclusively pump and bottle feed. Pt reports pumping 3 times already and got \" a little bit out\". Baby got formula supplement x2 so far. Educated on supply and demand, milk coming in. Encouraged to pump every 3 hours. Pt denies questions. Encouraged to call LC as needed.  Lactation Consult Note    Evaluation Completed: 2024 08:41 EDT  Patient Name: Jocelyn Merlos  :  1990  MRN:  [id number]     REFERRAL  INFORMATION:                          Date of Referral: 24   Person Making Referral: nurse          DELIVERY HISTORY:        Skin to skin initiation date/time: 2024  1:40 PM   Skin to skin end date/time: 2024  1:42 PM        MATERNAL ASSESSMENT:     Breast Shape: round (has PCOS, got breast implants about 10 yrs ago) (24)  Breast Density: soft (24)  Areola: elastic (24)  Nipples: everted (24)                INFANT ASSESSMENT:  Information for the patient's :  Angely Merlos [[id number]]   No past medical history on file.                                                                                                   MATERNAL INFANT FEEDING:     Maternal Emotional State: relaxed, receptive (24)  Infant Positioning: clutch/football, cross-cradle (24)   Signs of Milk Transfer: deep jaw excursions noted (24)  Pain with Feeding: other (see comments) (24)  Pain Location:  (mom reports sensitive nipples, latch \"a little uncomfortable\") (24)                    Latch Assistance: minimal assistance (24)                               EQUIPMENT TYPE:                                 BREAST PUMPING:  Breast Pumping Interventions: post-feed pumping encouraged (3-4 times a day) (24)       LACTATION REFERRALS:                                           "

## 2024-04-23 NOTE — PLAN OF CARE
Goal Outcome Evaluation:  Plan of Care Reviewed With: patient        Progress: improving  Outcome Evaluation: VSS, patient stated she has been passing large clots when out of bed but has been flushing them. pt will call RN to bedside if this continues. minimal bleeding, fundus firm.

## 2024-04-23 NOTE — ANESTHESIA POSTPROCEDURE EVALUATION
"Patient: Jocelyn Merlos    Procedure Summary       Date: 24 Room / Location:  [facility] /  [facility]    Anesthesia Start: 1231 Anesthesia Stop:     Procedure:  SECTION PRIMARY (Abdomen) Diagnosis:       35 weeks gestation of pregnancy      Chronic hypertension in pregnancy      (35 weeks gestation of pregnancy [Z3A.35])      (Chronic hypertension in pregnancy [O10.919])    Surgeons: Neda Penn MD Provider: Gurdeep Zavala MD    Anesthesia Type: spinal ASA Status: 3            Anesthesia Type: spinal    Vitals  Vitals Value Taken Time   /76 24 1103   Temp 36.3 °C (97.4 °F) 24 1103   Pulse 93 24 1103   Resp 16 24 1103   SpO2 100 % 24           Post Anesthesia Care and Evaluation    Level of consciousness: awake and alert  Pain management: adequate    Airway patency: patent  Anesthetic complications: No anesthetic complications  PONV Status: none  Cardiovascular status: acceptable  Respiratory status: acceptable  Hydration status: acceptable    Comments: /76 (BP Location: Left arm, Patient Position: Standing)   Pulse 93   Temp 36.3 °C (97.4 °F) (Oral)   Resp 16   Ht 172.7 cm (68\")   Wt 124 kg (274 lb)   LMP 07/15/2023   SpO2 100%   Breastfeeding Yes   BMI 41.66 kg/m²           "

## 2024-04-24 VITALS
OXYGEN SATURATION: 100 % | SYSTOLIC BLOOD PRESSURE: 133 MMHG | HEART RATE: 81 BPM | RESPIRATION RATE: 16 BRPM | WEIGHT: 274 LBS | HEIGHT: 68 IN | BODY MASS INDEX: 41.52 KG/M2 | TEMPERATURE: 98.2 F | DIASTOLIC BLOOD PRESSURE: 83 MMHG

## 2024-04-24 PROCEDURE — 25010000002 ENOXAPARIN PER 10 MG: Performed by: STUDENT IN AN ORGANIZED HEALTH CARE EDUCATION/TRAINING PROGRAM

## 2024-04-24 PROCEDURE — 0503F POSTPARTUM CARE VISIT: CPT | Performed by: OBSTETRICS & GYNECOLOGY

## 2024-04-24 RX ORDER — IBUPROFEN 600 MG/1
600 TABLET ORAL EVERY 6 HOURS PRN
Qty: 20 TABLET | Refills: 0 | Status: SHIPPED | OUTPATIENT
Start: 2024-04-24

## 2024-04-24 RX ADMIN — ACETAMINOPHEN 650 MG: 325 TABLET, FILM COATED ORAL at 04:09

## 2024-04-24 RX ADMIN — IBUPROFEN 600 MG: 600 TABLET, FILM COATED ORAL at 00:11

## 2024-04-24 RX ADMIN — ACETAMINOPHEN 650 MG: 325 TABLET, FILM COATED ORAL at 10:33

## 2024-04-24 RX ADMIN — LABETALOL HYDROCHLORIDE 200 MG: 200 TABLET, FILM COATED ORAL at 08:39

## 2024-04-24 RX ADMIN — BUPROPION HYDROCHLORIDE 150 MG: 150 TABLET, EXTENDED RELEASE ORAL at 08:39

## 2024-04-24 RX ADMIN — ENOXAPARIN SODIUM 40 MG: 100 INJECTION SUBCUTANEOUS at 06:12

## 2024-04-24 RX ADMIN — IBUPROFEN 600 MG: 600 TABLET, FILM COATED ORAL at 06:12

## 2024-04-24 RX ADMIN — SENNOSIDES AND DOCUSATE SODIUM 1 TABLET: 50; 8.6 TABLET ORAL at 08:39

## 2024-04-24 NOTE — PROGRESS NOTES
[facility]   Obstetrics and Gynecology     2024    Name:Jocelyn Merlos    MR#:[id number]     Progress Note:  Post-Op    HD:2    Subjective   33 y.o. yo Female  s/p CS at 38w0d doing well. Pain well controlled. Tolerating regular diet and having flatus. Lochia normal.     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 [place]- referred to [facility]    Vaginal bleeding in pregnancy, second trimester- referred to [facility]    37 weeks gestation of pregnancy    Chronic hypertension in pregnancy    Diet controlled gestational diabetes mellitus (GDM) in third trimester    Chronic hypertension affecting pregnancy        Objective    Vitals  Temp:  Temp:  [97.4 °F (36.3 °C)-98.2 °F (36.8 °C)] 98.2 °F (36.8 °C)  Temp src: Oral  BP:  BP: (114-133)/(70-84) 133/83  Pulse:  Heart Rate:  [81-94] 81  RR:   Resp:  [16] 16  Weight: 124 kg (274 lb)  BMI:  Body mass index is 41.66 kg/m².    Physical Exam  Vitals and nursing note reviewed.   Constitutional:       Appearance: Normal appearance. She is obese.   Pulmonary:      Effort: Pulmonary effort is normal.   Neurological:      Mental Status: She is alert and oriented to person, place, and time.   Psychiatric:         Mood and Affect: Mood normal.         Behavior: Behavior normal.         I/O last 3 completed shifts:  In: 2300 [P.O.:2300]  Out:  1800 [Urine:1800]    LABS:  Results from last 7 days   Lab Units 04/23/24  0629 04/22/24  1037   WBC 10*3/mm3 8.95 8.49   HEMOGLOBIN g/dL 10.9* 12.2   HEMATOCRIT % 33.7* 38.1   PLATELETS 10*3/mm3 252 282     Results from last 7 days   Lab Units 04/22/24  1037   SODIUM mmol/L 142   POTASSIUM mmol/L 3.9   CHLORIDE mmol/L 110*   CO2 mmol/L 20.0*   BUN mg/dL 9   CREATININE mg/dL 0.71   CALCIUM mg/dL 9.0   BILIRUBIN mg/dL 0.3   ALK PHOS U/L 135*   ALT (SGPT) U/L 16   AST (SGOT) U/L 10   GLUCOSE mg/dL 94       Infant: female       Assessment    1.  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

## 2024-04-24 NOTE — DISCHARGE SUMMARY
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 female  at 38w0d status post  section with uneventful postoperative recovery.  Patient was advanced to regular diet on postoperative day#1.  On discharge, ambulating, tolerating a regular diet without any difficulties and her incision is dry, clean and intact.     Infant:   female  fetus 2920 g (6 lb 7 oz)  with Apgar scores of 9 , 9  at five minutes.    Condition on Discharge:  Stable    Vital Signs  Temp:  [97.4 °F (36.3 °C)-98.2 °F (36.8 °C)] 98.2 °F (36.8 °C)  Heart Rate:  [81-94] 81  Resp:  [16] 16  BP: (116-133)/(70-84) 133/83    Results from last 7 days   Lab Units 24  0629 24  1037   WBC 10*3/mm3 8.95 8.49   HEMOGLOBIN g/dL 10.9* 12.2   HEMATOCRIT % 33.7* 38.1   PLATELETS 10*3/mm3 252 282     Results from last 7 days   Lab Units 24  1037   SODIUM mmol/L 142   POTASSIUM mmol/L 3.9   CHLORIDE mmol/L 110*   CO2 mmol/L 20.0*   BUN mg/dL 9   CREATININE mg/dL 0.71   CALCIUM mg/dL 9.0   BILIRUBIN mg/dL 0.3   ALK PHOS U/L 135*   ALT (SGPT) U/L 16   AST (SGOT) U/L 10   GLUCOSE mg/dL 94         Discharge Disposition  Home or Self Care    Discharge Medications     Your medication list        START taking these medications         Instructions Last Dose Given Next Dose Due   ibuprofen 600 MG tablet  Commonly known as: ADVIL,MOTRIN      Take 1 tablet by mouth Every 6 (Six) Hours As Needed for Mild Pain.              CONTINUE taking these medications        Instructions Last Dose Given Next Dose Due   accu-chek safe-t pro lancets      Fasting and 2 hour post prandial       Alcohol Swabs 70 % pads      Use 1 Pad 4 (Four) Times a Day.       B-D UF III MINI PEN NEEDLES 31G X 5 MM misc  Generic drug: Insulin Pen Needle      Use 1 Needle Every Night.       buPROPion  MG 12 hr tablet  Commonly known as: WELLBUTRIN SR      Take 1 tablet by mouth Daily.       doxylamine 25 MG tablet  Commonly known as: UNISOM      Take 1 tablet by mouth At Night As Needed for Sleep.       escitalopram 10 MG tablet  Commonly known as: LEXAPRO      TAKE 1 TABLET BY MOUTH DAILY       fish oil 1000 MG capsule capsule      Take  by mouth Daily With Breakfast.       folic acid 400 MCG tablet  Commonly known as: FOLVITE      Take 1 tablet by mouth Daily.       glucose blood test strip      Use as instructed       glucose monitor monitoring kit      Use four times daily to check blood glucose.       Insulin Glargine 100 UNIT/ML injection pen  Commonly known as: LANTUS SOLOSTAR      Inject 10 Units under the skin into the appropriate area as directed Every Night.       labetalol 200 MG tablet  Commonly known as: NORMODYNE      Take 1 tablet by mouth 3 times a day.       prenatal (CLASSIC) vitamin 28-0.8 MG tablet tablet  Generic drug: prenatal vitamin      Take  by mouth Daily.       True Metrix Meter w/Device kit      USE FOUR TIMES DAILY TO CHECK BLOOD GLUCOSE       valACYclovir 500 MG tablet  Commonly known as: Valtrex      Take 1 tablet by mouth Daily for 30 days.       vitamin B-6 25 MG tablet  Commonly known as: PYRIDOXINE      Take 1 tablet by mouth Every Night.       VITAMIN C PO      Take  by mouth.       vitamin D 1.25 MG (59097 UT) capsule capsule  Commonly  known as: ERGOCALCIFEROL      Take 1,000 Units by mouth.                 Where to Get Your Medications        These medications were sent to [facility] [place] [address] [place] [address]      Hours: Monday to Friday 7 AM to 6 PM, Saturday & Sunday 8 AM to 4:30 PM (Closed 12 PM to 12:30 PM) Phone: [phone]   ibuprofen 600 MG tablet           Discharge Diet: Regular    Follow-up Appointments  Future Appointments   Date Time Provider Department Center   5/2/2024  1:45 PM Neda Penn MD [facility]  [facility]     Additional Instructions for the Follow-ups that You Need to Schedule       Call MD for problems / concerns.   As directed      Instructions: Call for temp>101, vaginal bleeding greater than one pad/hour, severe pain or other concerns.    Order Comments: Instructions: Call for temp>101, vaginal bleeding greater than one pad/hour, severe pain or other concerns.         Discharge Follow-up with Specialty: May 2nd   at 1:45 pm with Dr. Penn   As directed      Specialty: May 2nd   at 1:45 pm with Dr. Penn                Prenatal labs/vax:   Immunization History   Administered Date(s) Administered    COVID-19 (PFIZER) Purple Cap Monovalent 04/12/2021, 05/07/2021, 01/08/2022    Fluzone (or Fluarix & Flulaval for VFC) >6mos 12/17/2021, 10/24/2023    Hepatitis A 06/19/2018    Tdap 06/10/2023, 02/01/2024       External Prenatal Results       Pregnancy Outside Results - Transcribed From Office Records - See Scanned Records For Details       Test Value Date Time    ABO  O  04/22/24 1037    Rh  Positive  04/22/24 1037    Antibody Screen  Negative  04/22/24 1037       Negative  10/05/23 1535       Negative  09/27/23 1626    Varicella IgG  <135 index 10/05/23 1535    Rubella  0.97 index 10/05/23 1535    Hgb  10.9 g/dL 04/23/24 0629       12.2 g/dL 04/22/24 1037       12.3 g/dL 04/09/24 0046       12.8 g/dL 04/04/24 1509       11.8 g/dL 12/19/23 1413       14.0 g/dL 11/18/23 1523        13.1 g/dL 11/16/23 0826       14.1 g/dL 10/05/23 1535    Hct  33.7 % 04/23/24 0629       38.1 % 04/22/24 1037       36.6 % 04/09/24 0046       38.3 % 04/04/24 1509       35.8 % 12/19/23 1413       42.0 % 11/18/23 1523       39.0 % 11/16/23 0826       42.4 % 10/05/23 1535    Glucose Fasting GTT  92 mg/dL 02/14/24 0911       89 mg/dL 11/16/23 0826    Glucose Tolerance Test 1 hour  218 mg/dL 02/14/24 0911       165 mg/dL 11/16/23 0826    Glucose Tolerance Test 3 hour  165 mg/dL 02/14/24 0911    Gonorrhea (discrete)  Negative  10/05/23 1431    Chlamydia (discrete)  Negative  10/05/23 1431    RPR  Non Reactive  10/05/23 1535    VDRL       Syphilis Antibody  Non Reactive  02/01/24 1427    HBsAg  Negative  10/05/23 1535    Herpes Simplex Virus PCR       Herpes Simplex VIrus Culture       HIV  Non Reactive  10/05/23 1535    Hep C RNA Quant PCR       Hep C Antibody  Non Reactive  10/05/23 1535    AFP  22.5 ng/mL 11/20/23 1016    Group B Strep  Negative  04/04/24 1635    GBS Susceptibility to Clindamycin       GBS Susceptibility to Erythromycin       Fetal Fibronectin       Genetic Testing, Maternal Blood                 Drug Screening       Test Value Date Time    Urine Drug Screen       Amphetamine Screen  Negative ng/mL 10/05/23 1431    Barbiturate Screen  Negative ng/mL 10/05/23 1431    Benzodiazepine Screen  Negative ng/mL 10/05/23 1431    Methadone Screen  Negative ng/mL 10/05/23 1431    Phencyclidine Screen  Negative ng/mL 10/05/23 1431    Opiates Screen       THC Screen       Cocaine Screen       Propoxyphene Screen  Negative ng/mL 10/05/23 1431    Buprenorphine Screen       Methamphetamine Screen       Oxycodone Screen       Tricyclic Antidepressants Screen                 Legend    ^: Historical                              Yaa Nath MD  4/24/2024  11:03 EDT

## 2024-04-24 NOTE — LACTATION NOTE
"Pt reports she has switched to formula feeding. Discussed milk coming in in a few days. Pt denies questions.     Lactation Consult Note    Evaluation Completed: 2024 08:23 EDT  Patient Name: Jocelyn Merlos  :  1990  MRN:  [id number]     REFERRAL  INFORMATION:                          Date of Referral: 24   Person Making Referral: nurse          DELIVERY HISTORY:        Skin to skin initiation date/time: 2024  1:40 PM   Skin to skin end date/time: 2024  1:42 PM        MATERNAL ASSESSMENT:     Breast Shape: round (has PCOS, got breast implants about 10 yrs ago) (24)  Breast Density: soft (24)  Areola: elastic (24)  Nipples: everted (24)                INFANT ASSESSMENT:  Information for the patient's :  Chelita Angely [[id number]]   No past medical history on file.                                                                                                   MATERNAL INFANT FEEDING:     Maternal Emotional State: relaxed, receptive (24)  Infant Positioning: clutch/football, cross-cradle (24)   Signs of Milk Transfer: deep jaw excursions noted (24)  Pain with Feeding: other (see comments) (24)  Pain Location:  (mom reports sensitive nipples, latch \"a little uncomfortable\") (24)                    Latch Assistance: minimal assistance (24)                               EQUIPMENT TYPE:                                 BREAST PUMPING:  Breast Pumping Interventions: post-feed pumping encouraged (3-4 times a day) (24)       LACTATION REFERRALS:                                           "

## 2024-04-24 NOTE — PLAN OF CARE
Goal Outcome Evaluation:  Plan of Care Reviewed With: patient        Progress: improving  Outcome Evaluation: vss. ambulating independently. pain controlled with eras meds. incision open to air. desires discharge today.

## 2024-04-26 ENCOUNTER — APPOINTMENT (OUTPATIENT)
Dept: GENERAL RADIOLOGY | Facility: HOSPITAL | Age: 34
DRG: 776 | End: 2024-04-26
Payer: COMMERCIAL

## 2024-04-26 ENCOUNTER — APPOINTMENT (OUTPATIENT)
Dept: CT IMAGING | Facility: HOSPITAL | Age: 34
DRG: 776 | End: 2024-04-26
Payer: COMMERCIAL

## 2024-04-26 ENCOUNTER — PATIENT MESSAGE (OUTPATIENT)
Dept: OBSTETRICS AND GYNECOLOGY | Age: 34
End: 2024-04-26
Payer: COMMERCIAL

## 2024-04-26 ENCOUNTER — HOSPITAL ENCOUNTER (INPATIENT)
Facility: HOSPITAL | Age: 34
LOS: 3 days | Discharge: HOME | DRG: 776 | End: 2024-04-29
Attending: EMERGENCY MEDICINE | Admitting: INTERNAL MEDICINE
Payer: COMMERCIAL

## 2024-04-26 ENCOUNTER — TELEPHONE (OUTPATIENT)
Dept: OBSTETRICS AND GYNECOLOGY | Age: 34
End: 2024-04-26
Payer: COMMERCIAL

## 2024-04-26 DIAGNOSIS — I10 HYPERTENSION NOT AT GOAL: ICD-10-CM

## 2024-04-26 DIAGNOSIS — Q25.40 ABNORMALITY OF THORACIC AORTA: Primary | ICD-10-CM

## 2024-04-26 PROBLEM — Z98.891 S/P CESAREAN SECTION: Status: ACTIVE | Noted: 2024-04-26

## 2024-04-26 PROBLEM — O46.92 VAGINAL BLEEDING IN PREGNANCY, SECOND TRIMESTER: Status: RESOLVED | Noted: 2023-11-21 | Resolved: 2024-04-26

## 2024-04-26 PROBLEM — O43.109 PLACENTAL ABNORMALITY: Status: RESOLVED | Noted: 2023-11-21 | Resolved: 2024-04-26

## 2024-04-26 PROBLEM — R91.1 LUNG NODULE: Status: ACTIVE | Noted: 2024-04-26

## 2024-04-26 LAB
ALBUMIN SERPL-MCNC: 3.5 G/DL (ref 3.5–5.2)
ALBUMIN/GLOB SERPL: 1.5 G/DL
ALP SERPL-CCNC: 105 U/L (ref 39–117)
ALT SERPL W P-5'-P-CCNC: 38 U/L (ref 1–33)
ANION GAP SERPL CALCULATED.3IONS-SCNC: 10 MMOL/L (ref 5–15)
AST SERPL-CCNC: 23 U/L (ref 1–32)
BACTERIA UR QL AUTO: ABNORMAL /HPF
BASOPHILS # BLD AUTO: 0.04 10*3/MM3 (ref 0–0.2)
BASOPHILS NFR BLD AUTO: 0.5 % (ref 0–1.5)
BILIRUB SERPL-MCNC: 0.3 MG/DL (ref 0–1.2)
BILIRUB UR QL STRIP: NEGATIVE
BUN SERPL-MCNC: 12 MG/DL (ref 6–20)
BUN/CREAT SERPL: 12.4 (ref 7–25)
CALCIUM SPEC-SCNC: 8.9 MG/DL (ref 8.6–10.5)
CHLORIDE SERPL-SCNC: 107 MMOL/L (ref 98–107)
CLARITY UR: ABNORMAL
CO2 SERPL-SCNC: 23 MMOL/L (ref 22–29)
COLOR UR: ABNORMAL
CREAT SERPL-MCNC: 0.97 MG/DL (ref 0.57–1)
CREAT UR-MCNC: 27.1 MG/DL
D DIMER PPP FEU-MCNC: 0.88 MCGFEU/ML (ref 0–0.5)
DEPRECATED RDW RBC AUTO: 42.6 FL (ref 37–54)
EGFRCR SERPLBLD CKD-EPI 2021: 79.3 ML/MIN/1.73
EOSINOPHIL # BLD AUTO: 0.45 10*3/MM3 (ref 0–0.4)
EOSINOPHIL NFR BLD AUTO: 5.5 % (ref 0.3–6.2)
ERYTHROCYTE [DISTWIDTH] IN BLOOD BY AUTOMATED COUNT: 12.6 % (ref 12.3–15.4)
GLOBULIN UR ELPH-MCNC: 2.4 GM/DL
GLUCOSE BLDC GLUCOMTR-MCNC: 79 MG/DL (ref 70–130)
GLUCOSE BLDC GLUCOMTR-MCNC: 87 MG/DL (ref 70–130)
GLUCOSE SERPL-MCNC: 89 MG/DL (ref 65–99)
GLUCOSE UR STRIP-MCNC: NEGATIVE MG/DL
HCT VFR BLD AUTO: 35.3 % (ref 34–46.6)
HGB BLD-MCNC: 11.3 G/DL (ref 12–15.9)
HGB UR QL STRIP.AUTO: ABNORMAL
HYALINE CASTS UR QL AUTO: ABNORMAL /LPF
IMM GRANULOCYTES # BLD AUTO: 0.02 10*3/MM3 (ref 0–0.05)
IMM GRANULOCYTES NFR BLD AUTO: 0.2 % (ref 0–0.5)
KETONES UR QL STRIP: NEGATIVE
LEUKOCYTE ESTERASE UR QL STRIP.AUTO: ABNORMAL
LYMPHOCYTES # BLD AUTO: 1.79 10*3/MM3 (ref 0.7–3.1)
LYMPHOCYTES NFR BLD AUTO: 21.8 % (ref 19.6–45.3)
MCH RBC QN AUTO: 29.7 PG (ref 26.6–33)
MCHC RBC AUTO-ENTMCNC: 32 G/DL (ref 31.5–35.7)
MCV RBC AUTO: 92.7 FL (ref 79–97)
MONOCYTES # BLD AUTO: 0.61 10*3/MM3 (ref 0.1–0.9)
MONOCYTES NFR BLD AUTO: 7.4 % (ref 5–12)
NEUTROPHILS NFR BLD AUTO: 5.3 10*3/MM3 (ref 1.7–7)
NEUTROPHILS NFR BLD AUTO: 64.6 % (ref 42.7–76)
NITRITE UR QL STRIP: NEGATIVE
NRBC BLD AUTO-RTO: 0 /100 WBC (ref 0–0.2)
NT-PROBNP SERPL-MCNC: 177 PG/ML (ref 0–450)
PH UR STRIP.AUTO: 7.5 [PH] (ref 5–8)
PLATELET # BLD AUTO: 326 10*3/MM3 (ref 140–450)
PMV BLD AUTO: 9.9 FL (ref 6–12)
POTASSIUM SERPL-SCNC: 4.2 MMOL/L (ref 3.5–5.2)
PROT ?TM UR-MCNC: 31.6 MG/DL
PROT SERPL-MCNC: 5.9 G/DL (ref 6–8.5)
PROT UR QL STRIP: ABNORMAL
PROT/CREAT UR: 1166.1 MG/G CREA (ref 0–200)
RBC # BLD AUTO: 3.81 10*6/MM3 (ref 3.77–5.28)
RBC # UR STRIP: ABNORMAL /HPF
REF LAB TEST METHOD: ABNORMAL
SODIUM SERPL-SCNC: 140 MMOL/L (ref 136–145)
SP GR UR STRIP: 1.01 (ref 1–1.03)
SQUAMOUS #/AREA URNS HPF: ABNORMAL /HPF
TROPONIN T SERPL HS-MCNC: 8 NG/L
URATE SERPL-MCNC: 5.2 MG/DL (ref 2.4–5.7)
UROBILINOGEN UR QL STRIP: ABNORMAL
WBC # UR STRIP: ABNORMAL /HPF
WBC NRBC COR # BLD AUTO: 8.21 10*3/MM3 (ref 3.4–10.8)

## 2024-04-26 PROCEDURE — 25010000002 ESMOLOL 2500 MG/250ML SOLUTION: Performed by: INTERNAL MEDICINE

## 2024-04-26 PROCEDURE — 25810000003 SODIUM CHLORIDE 0.9 % SOLUTION

## 2024-04-26 PROCEDURE — 81001 URINALYSIS AUTO W/SCOPE: CPT | Performed by: EMERGENCY MEDICINE

## 2024-04-26 PROCEDURE — 93005 ELECTROCARDIOGRAM TRACING: CPT | Performed by: EMERGENCY MEDICINE

## 2024-04-26 PROCEDURE — 25010000002 NICARDIPINE 2.5 MG/ML SOLUTION

## 2024-04-26 PROCEDURE — 25010000002 LABETALOL 5 MG/ML SOLUTION

## 2024-04-26 PROCEDURE — 82570 ASSAY OF URINE CREATININE: CPT | Performed by: OBSTETRICS & GYNECOLOGY

## 2024-04-26 PROCEDURE — 25010000002 ESMOLOL 2500 MG/250ML SOLUTION: Performed by: EMERGENCY MEDICINE

## 2024-04-26 PROCEDURE — 99231 SBSQ HOSP IP/OBS SF/LOW 25: CPT | Performed by: OBSTETRICS & GYNECOLOGY

## 2024-04-26 PROCEDURE — 85379 FIBRIN DEGRADATION QUANT: CPT | Performed by: EMERGENCY MEDICINE

## 2024-04-26 PROCEDURE — 84484 ASSAY OF TROPONIN QUANT: CPT | Performed by: EMERGENCY MEDICINE

## 2024-04-26 PROCEDURE — 84156 ASSAY OF PROTEIN URINE: CPT | Performed by: OBSTETRICS & GYNECOLOGY

## 2024-04-26 PROCEDURE — 82948 REAGENT STRIP/BLOOD GLUCOSE: CPT

## 2024-04-26 PROCEDURE — 71045 X-RAY EXAM CHEST 1 VIEW: CPT

## 2024-04-26 PROCEDURE — 71275 CT ANGIOGRAPHY CHEST: CPT

## 2024-04-26 PROCEDURE — 83880 ASSAY OF NATRIURETIC PEPTIDE: CPT | Performed by: EMERGENCY MEDICINE

## 2024-04-26 PROCEDURE — 80053 COMPREHEN METABOLIC PANEL: CPT | Performed by: EMERGENCY MEDICINE

## 2024-04-26 PROCEDURE — 85025 COMPLETE CBC W/AUTO DIFF WBC: CPT | Performed by: EMERGENCY MEDICINE

## 2024-04-26 PROCEDURE — 99291 CRITICAL CARE FIRST HOUR: CPT

## 2024-04-26 PROCEDURE — 25510000001 IOPAMIDOL PER 1 ML: Performed by: EMERGENCY MEDICINE

## 2024-04-26 PROCEDURE — 84550 ASSAY OF BLOOD/URIC ACID: CPT | Performed by: EMERGENCY MEDICINE

## 2024-04-26 RX ORDER — LABETALOL HYDROCHLORIDE 5 MG/ML
10 INJECTION, SOLUTION INTRAVENOUS
Status: DISCONTINUED | OUTPATIENT
Start: 2024-04-26 | End: 2024-04-29 | Stop reason: HOSPADM

## 2024-04-26 RX ORDER — SODIUM CHLORIDE 0.9 % (FLUSH) 0.9 %
10 SYRINGE (ML) INJECTION AS NEEDED
Status: DISCONTINUED | OUTPATIENT
Start: 2024-04-26 | End: 2024-04-29 | Stop reason: HOSPADM

## 2024-04-26 RX ORDER — IBUPROFEN 600 MG/1
600 TABLET ORAL EVERY 6 HOURS PRN
Status: DISCONTINUED | OUTPATIENT
Start: 2024-04-26 | End: 2024-04-29 | Stop reason: HOSPADM

## 2024-04-26 RX ORDER — DEXTROSE MONOHYDRATE 25 G/50ML
25 INJECTION, SOLUTION INTRAVENOUS
Status: DISCONTINUED | OUTPATIENT
Start: 2024-04-26 | End: 2024-04-29 | Stop reason: HOSPADM

## 2024-04-26 RX ORDER — AMOXICILLIN 250 MG
2 CAPSULE ORAL 2 TIMES DAILY
Status: DISCONTINUED | OUTPATIENT
Start: 2024-04-26 | End: 2024-04-29 | Stop reason: HOSPADM

## 2024-04-26 RX ORDER — NICOTINE POLACRILEX 4 MG
15 LOZENGE BUCCAL
Status: DISCONTINUED | OUTPATIENT
Start: 2024-04-26 | End: 2024-04-29 | Stop reason: HOSPADM

## 2024-04-26 RX ORDER — ESCITALOPRAM OXALATE 10 MG/1
10 TABLET ORAL DAILY
Status: DISCONTINUED | OUTPATIENT
Start: 2024-04-26 | End: 2024-04-29 | Stop reason: HOSPADM

## 2024-04-26 RX ORDER — LABETALOL 200 MG/1
200 TABLET, FILM COATED ORAL ONCE
Status: DISCONTINUED | OUTPATIENT
Start: 2024-04-26 | End: 2024-04-26

## 2024-04-26 RX ORDER — HYDROXYZINE 50 MG/1
25 TABLET, FILM COATED ORAL 3 TIMES DAILY PRN
Status: DISCONTINUED | OUTPATIENT
Start: 2024-04-26 | End: 2024-04-29 | Stop reason: HOSPADM

## 2024-04-26 RX ORDER — BISACODYL 5 MG/1
5 TABLET, DELAYED RELEASE ORAL DAILY PRN
Status: DISCONTINUED | OUTPATIENT
Start: 2024-04-26 | End: 2024-04-29 | Stop reason: HOSPADM

## 2024-04-26 RX ORDER — SODIUM CHLORIDE 0.9 % (FLUSH) 0.9 %
10 SYRINGE (ML) INJECTION EVERY 12 HOURS SCHEDULED
Status: DISCONTINUED | OUTPATIENT
Start: 2024-04-26 | End: 2024-04-29 | Stop reason: HOSPADM

## 2024-04-26 RX ORDER — BISACODYL 10 MG
10 SUPPOSITORY, RECTAL RECTAL DAILY PRN
Status: DISCONTINUED | OUTPATIENT
Start: 2024-04-26 | End: 2024-04-29 | Stop reason: HOSPADM

## 2024-04-26 RX ORDER — ASPIRIN 81 MG/1
81 TABLET ORAL DAILY
COMMUNITY
End: 2024-04-29 | Stop reason: HOSPADM

## 2024-04-26 RX ORDER — IBUPROFEN 600 MG/1
1 TABLET ORAL
Status: DISCONTINUED | OUTPATIENT
Start: 2024-04-26 | End: 2024-04-29 | Stop reason: HOSPADM

## 2024-04-26 RX ORDER — ESMOLOL HYDROCHLORIDE 10 MG/ML
50-300 INJECTION, SOLUTION INTRAVENOUS
Status: DISCONTINUED | OUTPATIENT
Start: 2024-04-26 | End: 2024-04-27

## 2024-04-26 RX ORDER — NITROGLYCERIN 0.4 MG/1
0.4 TABLET SUBLINGUAL
Status: DISCONTINUED | OUTPATIENT
Start: 2024-04-26 | End: 2024-04-29 | Stop reason: HOSPADM

## 2024-04-26 RX ORDER — POLYETHYLENE GLYCOL 3350 17 G/17G
17 POWDER, FOR SOLUTION ORAL DAILY PRN
Status: DISCONTINUED | OUTPATIENT
Start: 2024-04-26 | End: 2024-04-29 | Stop reason: HOSPADM

## 2024-04-26 RX ORDER — SODIUM CHLORIDE 9 MG/ML
40 INJECTION, SOLUTION INTRAVENOUS AS NEEDED
Status: DISCONTINUED | OUTPATIENT
Start: 2024-04-26 | End: 2024-04-29 | Stop reason: HOSPADM

## 2024-04-26 RX ORDER — INSULIN LISPRO 100 [IU]/ML
3-14 INJECTION, SOLUTION INTRAVENOUS; SUBCUTANEOUS
Status: DISCONTINUED | OUTPATIENT
Start: 2024-04-26 | End: 2024-04-27

## 2024-04-26 RX ADMIN — Medication 10 ML: at 21:20

## 2024-04-26 RX ADMIN — ESMOLOL HYDROCHLORIDE 25 MCG/KG/MIN: 10 INJECTION INTRAVENOUS at 18:59

## 2024-04-26 RX ADMIN — ESMOLOL HYDROCHLORIDE 300 MCG/KG/MIN: 10 INJECTION INTRAVENOUS at 22:34

## 2024-04-26 RX ADMIN — ESCITALOPRAM OXALATE 10 MG: 10 TABLET, FILM COATED ORAL at 22:34

## 2024-04-26 RX ADMIN — SODIUM CHLORIDE 5 MG/HR: 9 INJECTION, SOLUTION INTRAVENOUS at 23:05

## 2024-04-26 RX ADMIN — HYDROXYZINE HYDROCHLORIDE 25 MG: 25 TABLET ORAL at 22:27

## 2024-04-26 RX ADMIN — ESMOLOL HYDROCHLORIDE 300 MCG/KG/MIN: 10 INJECTION INTRAVENOUS at 23:39

## 2024-04-26 RX ADMIN — IOPAMIDOL 95 ML: 755 INJECTION, SOLUTION INTRAVENOUS at 17:43

## 2024-04-26 RX ADMIN — ESMOLOL HYDROCHLORIDE 300 MCG/KG/MIN: 10 INJECTION INTRAVENOUS at 21:19

## 2024-04-26 RX ADMIN — LABETALOL HYDROCHLORIDE 10 MG: 5 INJECTION, SOLUTION INTRAVENOUS at 22:27

## 2024-04-26 NOTE — ED PROVIDER NOTES
EMERGENCY DEPARTMENT ENCOUNTER  Room Number:    PCP: Mirta Bergeron PA  Independent Historians: Patient and Family      HPI:  Chief Complaint: had concerns including Shortness of Breath.  As well as leg swelling and elevated blood pressure readings    A complete HPI/ROS/PMH/PSH/SH/FH are unobtainable due to: None    Chronic or social conditions impacting patient care (Social Determinants of Health): None      Context: Jocelyn Merlos is a 33 y.o. female with a medical history of hypertension, gestational diabetes and PCOS who presents to the ED c/o acute shortness of breath with pain in the upper back as well as high blood pressure readings at home and swelling in her bilateral feet and ankles.  Patient is 4 days postpartum from a recent  at this hospital.  She gave birth to an infant daughter who is at home and in good health.  Patient denies any fevers herself.  She denies any coughing.  She says that her chest does feel somewhat tight and makes it hard for her to take a deep breath.  She is concerned because her blood pressures have been reading high in the 180s range systolic despite taking her usual dose of labetalol 3 times a day as previously directed by her GYN provider.      Review of prior external notes (non-ED) -and- Review of prior external test results outside of this encounter: I independently reviewed the hospital discharge summary from 2024 when she was released after giving birth via  at 38 weeks and 0 days.        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    Suppurative hidradenitis 2016    Headache, migraine 2016    Family history of hemochromatosis 2016    Vitamin D deficiency 2016    Hemochromatosis carrier 2016    Major depressive disorder, recurrent, in full remission 10/05/2020    Palpitations 2021    PCOS  (polycystic ovarian syndrome)- was on Metformin to regulate cycles 2023    Rubella non-immune, needs MMR PP 10/09/2023    Maternal varicella, non-immune, needs Varivax PP 10/09/2023    Anxiety and depression 10/24/2023    Nausea without vomiting 10/24/2023    Insomnia 10/24/2023    Obesity affecting pregnancy, antepartum 10/24/2023    Recurrent pregnancy loss with current pregnancy 10/24/2023    Placental abnormality- [place]- referred to [facility] 2023    Vaginal bleeding in pregnancy, second trimester- referred to [facility] 2023    37 weeks gestation of pregnancy 2024    Chronic hypertension in pregnancy 2024    Diet controlled gestational diabetes mellitus (GDM) in third trimester 2024    Chronic hypertension affecting pregnancy 2024     Resolved Ambulatory Problems     Diagnosis Date Noted    Abnormal menses 2016    Adjustment disorder with mixed anxiety and depressed mood 2016    Allergic reaction 2016    Anxiety 2016    Bacterial vaginosis 2016    DUB (dysfunctional uterine bleeding) 2016    Elevated liver enzymes 2016    Labial swelling 2016    Conjunctival pterygium 2016    Hair loss 2016    Excessive sweating 2016    Ingrown toenail without infection 2016    Brain fog 2021    History of 2019 novel coronavirus disease (COVID-19) 2021    Post viral syndrome 2021    Postviral fatigue syndrome 2021    Essential hypertension 2021    Dizziness 2021    Need for influenza vaccination 10/24/2023     Past Medical History:   Diagnosis Date    Abnormal Pap smear of cervix     COVID-19 2020    Female infertility     Gestational diabetes     HSV-2 infection     Hypertension     Polycystic ovary syndrome          PAST SURGICAL HISTORY  Past Surgical History:   Procedure Laterality Date    BREAST AUGMENTATION      CERVICAL BIOPSY  W/ LOOP ELECTRODE EXCISION       SECTION  N/A 2024    Procedure:  SECTION PRIMARY;  Surgeon: Neda Penn MD;  Location: [facility] LABOR DELIVERY;  Service: Obstetrics;  Laterality: N/A;    FOOT SURGERY Right     plantar fascitis tendon release    LAPAROSCOPIC GASTRIC BANDING      LEEP      OTHER SURGICAL HISTORY      removal of eye tumor    OTHER SURGICAL HISTORY      tummy tuck    TONSILLECTOMY      WISDOM TOOTH EXTRACTION           FAMILY HISTORY  Family History   Problem Relation Age of Onset    Skin cancer Mother     Depression Mother     Anxiety disorder Mother     Stroke Mother     Aneurysm Mother     Alcohol abuse Mother     Migraines Mother     Hemochromatosis Mother     Stroke Maternal Grandmother          SOCIAL HISTORY  Social History     Socioeconomic History    Marital status:    Tobacco Use    Smoking status: Former     Current packs/day: 0.00     Average packs/day: 0.3 packs/day for 0.5 years (0.1 ttl pk-yrs)     Types: Cigarettes     Start date: 10/5/2014     Quit date: 2015     Years since quittin.0    Smokeless tobacco: Never   Vaping Use    Vaping status: Never Used   Substance and Sexual Activity    Alcohol use: Not Currently     Alcohol/week: 4.0 - 5.0 standard drinks of alcohol     Types: 4 - 5 Standard drinks or equivalent per week     Comment: Socially./caffeine use     Drug use: No    Sexual activity: Yes     Partners: Male         ALLERGIES  Patient has no known allergies.      REVIEW OF SYSTEMS  Review of Systems  Included in HPI  All systems reviewed and negative except for those discussed in HPI.      PHYSICAL EXAM    I have reviewed the triage vital signs and nursing notes.    ED Triage Vitals [24 1531]   Temp Heart Rate Resp BP SpO2   97.6 °F (36.4 °C) 82 16 145/79 98 %      Temp src Heart Rate Source Patient Position BP Location FiO2 (%)   -- -- -- -- --       Physical Exam  GENERAL: alert, no acute distress, nontoxic-appearing  SKIN: Warm, dry, no rashes  HENT: Normocephalic,  atraumatic  EYES: no scleral icterus, normal conjunctiva  CV: regular rhythm, regular rate, no murmurs  RESPIRATORY: normal effort, lungs clear bilaterally, no wheezes or rales or rhonchi  ABDOMEN: soft, nondistended, nontender  MUSCULOSKELETAL: no deformity, bilateral pedal edema noted.  No erythema or induration  NEURO: alert, moves all extremities, follows commands, no focal neurologic deficits      LAB RESULTS  Recent Results (from the past 24 hour(s))   ECG 12 Lead Dyspnea    Collection Time: 04/26/24  3:47 PM   Result Value Ref Range    QT Interval 393 ms    QTC Interval 445 ms   Comprehensive Metabolic Panel    Collection Time: 04/26/24  4:18 PM    Specimen: Blood   Result Value Ref Range    Glucose 89 65 - 99 mg/dL    BUN 12 6 - 20 mg/dL    Creatinine 0.97 0.57 - 1.00 mg/dL    Sodium 140 136 - 145 mmol/L    Potassium 4.2 3.5 - 5.2 mmol/L    Chloride 107 98 - 107 mmol/L    CO2 23.0 22.0 - 29.0 mmol/L    Calcium 8.9 8.6 - 10.5 mg/dL    Total Protein 5.9 (L) 6.0 - 8.5 g/dL    Albumin 3.5 3.5 - 5.2 g/dL    ALT (SGPT) 38 (H) 1 - 33 U/L    AST (SGOT) 23 1 - 32 U/L    Alkaline Phosphatase 105 39 - 117 U/L    Total Bilirubin 0.3 0.0 - 1.2 mg/dL    Globulin 2.4 gm/dL    A/G Ratio 1.5 g/dL    BUN/Creatinine Ratio 12.4 7.0 - 25.0    Anion Gap 10.0 5.0 - 15.0 mmol/L    eGFR 79.3 >60.0 mL/min/1.73   Uric Acid    Collection Time: 04/26/24  4:18 PM    Specimen: Blood   Result Value Ref Range    Uric Acid 5.2 2.4 - 5.7 mg/dL   CBC Auto Differential    Collection Time: 04/26/24  4:18 PM    Specimen: Blood   Result Value Ref Range    WBC 8.21 3.40 - 10.80 10*3/mm3    RBC 3.81 3.77 - 5.28 10*6/mm3    Hemoglobin 11.3 (L) 12.0 - 15.9 g/dL    Hematocrit 35.3 34.0 - 46.6 %    MCV 92.7 79.0 - 97.0 fL    MCH 29.7 26.6 - 33.0 pg    MCHC 32.0 31.5 - 35.7 g/dL    RDW 12.6 12.3 - 15.4 %    RDW-SD 42.6 37.0 - 54.0 fl    MPV 9.9 6.0 - 12.0 fL    Platelets 326 140 - 450 10*3/mm3    Neutrophil % 64.6 42.7 - 76.0 %    Lymphocyte % 21.8 19.6  - 45.3 %    Monocyte % 7.4 5.0 - 12.0 %    Eosinophil % 5.5 0.3 - 6.2 %    Basophil % 0.5 0.0 - 1.5 %    Immature Grans % 0.2 0.0 - 0.5 %    Neutrophils, Absolute 5.30 1.70 - 7.00 10*3/mm3    Lymphocytes, Absolute 1.79 0.70 - 3.10 10*3/mm3    Monocytes, Absolute 0.61 0.10 - 0.90 10*3/mm3    Eosinophils, Absolute 0.45 (H) 0.00 - 0.40 10*3/mm3    Basophils, Absolute 0.04 0.00 - 0.20 10*3/mm3    Immature Grans, Absolute 0.02 0.00 - 0.05 10*3/mm3    nRBC 0.0 0.0 - 0.2 /100 WBC   BNP    Collection Time: 04/26/24  4:18 PM    Specimen: Blood   Result Value Ref Range    proBNP 177.0 0.0 - 450.0 pg/mL   Single High Sensitivity Troponin T    Collection Time: 04/26/24  4:18 PM    Specimen: Blood   Result Value Ref Range    HS Troponin T 8 <14 ng/L   D-dimer, Quantitative    Collection Time: 04/26/24  4:18 PM    Specimen: Blood   Result Value Ref Range    D-Dimer, Quantitative 0.88 (H) 0.00 - 0.50 MCGFEU/mL   Urinalysis With Microscopic If Indicated (No Culture) - Urine, Clean Catch    Collection Time: 04/26/24  4:31 PM    Specimen: Urine, Clean Catch   Result Value Ref Range    Color, UA Red (A) Yellow, Straw    Appearance, UA Cloudy (A) Clear    pH, UA 7.5 5.0 - 8.0    Specific Gravity, UA 1.009 1.005 - 1.030    Glucose, UA Negative Negative    Ketones, UA Negative Negative    Bilirubin, UA Negative Negative    Blood, UA Large (3+) (A) Negative    Protein,  mg/dL (2+) (A) Negative    Leuk Esterase, UA Moderate (2+) (A) Negative    Nitrite, UA Negative Negative    Urobilinogen, UA 0.2 E.U./dL 0.2 - 1.0 E.U./dL   Urinalysis, Microscopic Only - Urine, Clean Catch    Collection Time: 04/26/24  4:31 PM    Specimen: Urine, Clean Catch   Result Value Ref Range    RBC, UA Too Numerous to Count (A) None Seen, 0-2 /HPF    WBC, UA Too Numerous to Count (A) None Seen, 0-2 /HPF    Bacteria, UA None Seen None Seen /HPF    Squamous Epithelial Cells, UA 3-6 (A) None Seen, 0-2 /HPF    Hyaline Casts, UA 0-2 None Seen /LPF    Methodology  Automated Microscopy          RADIOLOGY  CT Angiogram Chest    Result Date: 4/26/2024  CT ANGIOGRAM CHEST-  Radiation dose reduction techniques were utilized, including automated exposure control and exposure modulation based on body size.  Clinical: 4 days postpartum, hypertension with back pain  CT scan of the chest performed, angiogram protocol to include coronal, sagittal and three-dimensional reconstruction  FINDINGS: 1. There is an extremely subtle contour abnormality involving the mid descending colon. There is flattening of the contrast column anteriorly extending for 20 mm cranial caudal. See images 71 through 76. This process is seen on the axial as well as the sagittal images and likely does not represent artifact. This could potentially represent an intimal flap/dissection. Alternatively this could represent an area of mural thrombus formation. Caliber of the ascending, arch and descending aorta within normal limits.  2. No pulmonary embolus is demonstrated. Cardiac size within normal limits. The esophagus is satisfactory in course and caliber. No mediastinal or hilar mass/lymphadenopathy. Vague induration of the anterior mediastinal fat suggest residual thymic tissue. Bilateral breast implants in position, symmetric and satisfactory in appearance. At the base of the right upper lobe on image #70, there is a noncalcified 6 mm nodule, see image 70. 6-month follow-up CT chest is recommended. The left lung is clear.  3. Limited images through the upper abdomen demonstrate a lap band in position. Likely 13 mm left renal cyst. Mild multilevel T-spine disc degeneration seen. The remainder is unremarkable.  FINDINGS of this report called to Dr. Walters at 6:10 p.m.  This report was finalized on 4/26/2024 6:14 PM by Dr. Doyle Perez M.D on Workstation: [id number]      XR Chest 1 View    Result Date: 4/26/2024  PORTABLE CHEST  HISTORY: Dyspnea, back pain.  COMPARISON: None.  FINDINGS: A single portable view of  the chest demonstrates the heart to be within normal limits in size. There is no evidence of infiltrate, effusion or of congestive failure.  This report was finalized on 4/26/2024 5:15 PM by Dr. Jean Paul Schwarz M.D on Workstation: [id number]         MEDICATIONS GIVEN IN ER  Medications   sodium chloride 0.9 % flush 10 mL (has no administration in time range)   esmolol (BREVIBLOC) 2500 mg in 250 mL NS infusion (50 mcg/kg/min × 118 kg Intravenous Rate/Dose Change 4/26/24 2048)   nitroglycerin (NITROSTAT) SL tablet 0.4 mg (has no administration in time range)   sodium chloride 0.9 % flush 10 mL (has no administration in time range)   sodium chloride 0.9 % flush 10 mL (has no administration in time range)   sodium chloride 0.9 % infusion 40 mL (has no administration in time range)   sennosides-docusate (PERICOLACE) 8.6-50 MG per tablet 2 tablet (has no administration in time range)     And   polyethylene glycol (MIRALAX) packet 17 g (has no administration in time range)     And   bisacodyl (DULCOLAX) EC tablet 5 mg (has no administration in time range)     And   bisacodyl (DULCOLAX) suppository 10 mg (has no administration in time range)   iopamidol (ISOVUE-370) 76 % injection 100 mL (95 mL Intravenous Given by Other 4/26/24 0459)         ORDERS PLACED DURING THIS VISIT:  Orders Placed This Encounter   Procedures    Critical Care    XR Chest 1 View    CT Angiogram Chest    Comprehensive Metabolic Panel    Uric Acid    Urinalysis With Microscopic If Indicated (No Culture) - Urine, Clean Catch    CBC Auto Differential    BNP    Single High Sensitivity Troponin T    D-dimer, Quantitative    Urinalysis, Microscopic Only - Urine, Clean Catch    Diet: Regular/House; Fluid Consistency: Thin (IDDSI 0)    Monitor Blood Pressure    Vital Signs Every Hour and Per Hospital Policy Based on Patient Condition    Telemetry - Place Orders & Notify Provider of Results When Patient Experiences Acute Chest Pain, Dysrhythmia or Respiratory  Distress    Continuous Pulse Oximetry    Height & Weight    Daily Weights    Intake & Output    Oral Care - Patient Not on NPPV & Not Intubated    Target Arousal Level RASS 0 to -1    Use Mobility Guidelines for Advancement of Activity    Saline Lock & Maintain IV Access    Place Sequential Compression Device    Maintain Sequential Compression Device    Please check with cardiothoracic surgery patient needs to be n.p.o. for any procedure  Misc Nursing Order (Specify)    Blood pressure management keep systolic less than 120 on esmolol drip  Misc Nursing Order (Specify)    Code Status and Medical Interventions:    IP General Consult (Use specialty-specific consult if known)    Pulmonology (on-call MD unless specified)    OB/GYN (on-call MD unless specified)    IP General Consult (Use specialty-specific consult if known)    Inpatient Cardiothoracic Surgery Consult    ECG 12 Lead Dyspnea    Insert Peripheral IV    Insert Peripheral IV    Inpatient Admission    CBC & Differential         OUTPATIENT MEDICATION MANAGEMENT:  Current Facility-Administered Medications Ordered in Epic   Medication Dose Route Frequency Provider Last Rate Last Admin    sennosides-docusate (PERICOLACE) 8.6-50 MG per tablet 2 tablet  2 tablet Oral BID Tea Pantoja MD        And    polyethylene glycol (MIRALAX) packet 17 g  17 g Oral Daily PRN Tea Pantoja MD        And    bisacodyl (DULCOLAX) EC tablet 5 mg  5 mg Oral Daily PRN Tea Pantoja MD        And    bisacodyl (DULCOLAX) suppository 10 mg  10 mg Rectal Daily PRN Tea Pantoja MD        esmolol (BREVIBLOC) 2500 mg in 250 mL NS infusion   mcg/kg/min Intravenous Titrated Bogdan Walters MD 35.4 mL/hr at 04/26/24 1902 50 mcg/kg/min at 04/26/24 1902    nitroglycerin (NITROSTAT) SL tablet 0.4 mg  0.4 mg Sublingual Q5 Min PRN Tea Pantoja MD        sodium chloride 0.9 % flush 10 mL  10 mL Intravenous PRN Bogdan Walters MD        sodium chloride 0.9 % flush 10 mL  10 mL  Intravenous Q12H Tea Pantoja MD        sodium chloride 0.9 % flush 10 mL  10 mL Intravenous PRN Tea Pantoja MD        sodium chloride 0.9 % infusion 40 mL  40 mL Intravenous PRN Tea Pantoja MD         Current Outpatient Medications Ordered in Epic   Medication Sig Dispense Refill    buPROPion SR (WELLBUTRIN SR) 150 MG 12 hr tablet Take 1 tablet by mouth Daily.      escitalopram (LEXAPRO) 10 MG tablet TAKE 1 TABLET BY MOUTH DAILY 90 tablet 0    folic acid (FOLVITE) 400 MCG tablet Take 1 tablet by mouth Daily.      ibuprofen (ADVIL,MOTRIN) 600 MG tablet Take 1 tablet by mouth Every 6 (Six) Hours As Needed for Mild Pain. 20 tablet 0    labetalol (NORMODYNE) 200 MG tablet Take 1 tablet by mouth 3 times a day. 90 tablet 2    Omega-3 Fatty Acids (fish oil) 1000 MG capsule capsule Take  by mouth Daily With Breakfast.      prenatal vitamin (prenatal, CLASSIC, vitamin) tablet Take  by mouth Daily.      valACYclovir (Valtrex) 500 MG tablet Take 1 tablet by mouth Daily for 30 days. 30 tablet 0    vitamin B-6 (PYRIDOXINE) 25 MG tablet Take 1 tablet by mouth Every Night. 60 tablet 1    vitamin D (ERGOCALCIFEROL) 1.25 MG (84647 UT) capsule capsule Take 1,000 Units by mouth.      Alcohol Swabs 70 % pads Use 1 Pad 4 (Four) Times a Day. 120 each 5    Ascorbic Acid (VITAMIN C PO) Take  by mouth.      Blood Glucose Monitoring Suppl (True Metrix Meter) w/Device kit USE FOUR TIMES DAILY TO CHECK BLOOD GLUCOSE      doxylamine (UNISOM) 25 MG tablet Take 1 tablet by mouth At Night As Needed for Sleep. 60 tablet 2    glucose blood test strip Use as instructed 120 each 12    glucose monitor monitoring kit Use four times daily to check blood glucose. 1 each 0    Insulin Glargine (LANTUS SOLOSTAR) 100 UNIT/ML injection pen Inject 10 Units under the skin into the appropriate area as directed Every Night. 15 mL 0    Insulin Pen Needle (B-D UF III MINI PEN NEEDLES) 31G X 5 MM misc Use 1 Needle Every Night. 30 each 0    Lancets  (accu-chek safe-t pro) lancets Fasting and 2 hour post prandial 120 each 12         PROCEDURES  Critical Care    Performed by: Bogdan Walters MD  Authorized by: Tea Patnoja MD    Critical care provider statement:     Critical care time (minutes):  40    Critical care time was exclusive of:  Separately billable procedures and treating other patients and teaching time    Critical care was necessary to treat or prevent imminent or life-threatening deterioration of the following conditions:  Circulatory failure    Critical care was time spent personally by me on the following activities:  Development of treatment plan with patient or surrogate, discussions with consultants, evaluation of patient's response to treatment, examination of patient, interpretation of cardiac output measurements, obtaining history from patient or surrogate, ordering and performing treatments and interventions, ordering and review of laboratory studies, ordering and review of radiographic studies, pulse oximetry, re-evaluation of patient's condition and review of old charts    I assumed direction of critical care for this patient from another provider in my specialty: no      Care discussed with: admitting provider    Comments:      Thoracic aorta abnormality is concerning for possible aortic dissection injury.  Esmolol drip initiated for control of hypertension and heart rate.  Patient admitted to ICU with cardiothoracic surgery consult          PROGRESS, DATA ANALYSIS, CONSULTS, AND MEDICAL DECISION MAKING  All labs have been independently interpreted by me.  All radiology studies have been reviewed by me. All EKG's have been independently viewed and interpreted by me.  Discussion below represents my analysis of pertinent findings related to patient's condition, differential diagnosis, treatment plan and final disposition.    Differential diagnosis includes but is not limited to postpartum cardiomyopathy, pulmonary embolism,  pneumothorax, preeclampsia, uncontrolled hypertension.    Clinical Scores:                   ED Course as of 04/26/24 1922 Fri Apr 26, 2024 1716 EKG         EKG time/Interp time: 1547/1551  Rhythm/Rate: Sinus rhythm, 77 bpm  P waves and MS: Present, 156 ms  QRS, axis: 87 ms, normal axis  ST and T waves: No ST segment elevations are present.  Independently interpreted by me contemporaneously with treatment   [JAMISON]   1716 D-Dimer, Quant(!): 0.88 [JAMISON]   1716 Leukocytes, UA(!): Moderate (2+) [JAMISON]   1716 RBC, UA(!): Too Numerous to Count [JAMISON]   1716 WBC, UA(!): Too Numerous to Count [JAMISON]   1716 HS Troponin T: 8 [JAMISON]   1717 proBNP: 177.0 [JAMISON]   1717 I independently interpreted the Chest X-ray and my findings are: No Pneumothorax, No Effusion, No Infiltrate   [JAMISON]   1722 D-dimer is somewhat elevated and this may simply be due to the recent postpartum state.  However I am ordering CT angiogram of the chest to rule out pulmonary embolism. [JAMISON]   1722 Protein, UA(!): 100 mg/dL (2+) [JAMISON]   1723 Blood pressure has been satisfactory here in recent readings.  Most recent is 130/78.  She took her usual dose of labetalol at 5:00 PM.  Will continue to monitor blood pressure carefully. [JAMISON]   1723 Troponin and BNP are both normal range which is reassuring and would suggest there is no evidence of acute MI or CHF [JAMISON]   1838 I discussed with Dr. Perez from radiology about the patient's CT angiogram chest study.  Indicates there is finding of abnormality in the descending aorta which could possibly represent an intimal flap dissection or possibly a mural thrombus. [JAMISON]   1844 I discussed with Dr. Saenz from cardiothoracic surgery.  He request that we place the patient in the ICU for further hemodynamic monitoring and blood pressure management tonight.  He will consult. [JAMISON]   1903 I discussed with Dr. Rosenthal from pulmonary ICU about this patient.  He agrees to accept her for further medical management.  He request that we start  her on esmolol drip which we are doing now. [JAMISON]   1905 I also discussed with Dr. Gonzalez from OB/GYN.  She agrees to consult as well. [JAMISON]      ED Course User Index  [JAMISON] Bogdan Walters MD             AS OF 19:22 EDT VITALS:    BP - 148/84  HR - 75  TEMP - 97.6 °F (36.4 °C)  O2 SATS - 98%    COMPLEXITY OF CARE  The patient requires admission.      DIAGNOSIS  Final diagnoses:   Abnormality of thoracic aorta   Hypertension not at goal         DISPOSITION  ED Disposition       ED Disposition   Decision to Admit    Condition   --    Comment   Level of Care: Critical Care [6]   Diagnosis: Abnormality of thoracic aorta [7641563]   Admitting Physician: GRETCHEN HYADEN [[id number]]   Attending Physician: GRETCHEN HAYDEN [[id number]]   Certification: I Certify That Inpatient Hospital Services Are Medically Necessary For Greater Than 2 Midnights                  Please note that portions of this document were completed with a voice recognition program.    Note Disclaimer: At [facility], we believe that sharing information builds trust and better relationships. You are receiving this note because you recently visited [facility]. It is possible you will see health information before a provider has talked with you about it. This kind of information can be easy to misunderstand. To help you fully understand what it means for your health, we urge you to discuss this note with your provider.         Bogdan Walters MD  04/26/24 1922

## 2024-04-26 NOTE — TELEPHONE ENCOUNTER
----- Message from Deidre COBOS sent at 4/26/2024  1:33 PM EDT -----  Regarding: Postpartum concerns  Contact: [phone]  Hi Dr. Penn. I wanted to check with you on a few things I’ve been dealing with since coming home from the hospital. I’ve had quite a few high BP readings   (150’s-180’s/90’s-100’s), my feet are pretty swollen, I’ve had some heart palpitations & a sore middle back under my shoulder blades. I’ve been taking my labatelol still as directed. If this is abnormal please just let me know what I need to do. Thank you!

## 2024-04-26 NOTE — CONSULTS
[facility]  Obstetric consult    Chief Complaint   Patient presents with    Shortness of Breath       Subjective     Patient is a 33 y.o. female  POD 4 s/p  presents to the ER with complaints of shortness of air and mid back pain for 2 days. She notes worse swelling in her feet today and BP elevation to 180's systolic. She denies headache or vision changes. She denies fever. She denies heavy vaginal bleeding.     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    Suppurative hidradenitis    Headache, migraine    Family history of hemochromatosis    Vitamin D deficiency    Hemochromatosis carrier    Major depressive disorder, recurrent, in full remission    Palpitations    PCOS (polycystic ovarian syndrome)- was on Metformin to regulate cycles    Rubella non-immune, needs MMR PP    Maternal varicella, non-immune, needs Varivax PP    Anxiety and depression    Nausea without vomiting    Insomnia    Obesity affecting pregnancy, antepartum    Recurrent pregnancy loss with current pregnancy    37 weeks gestation of pregnancy    Chronic hypertension in pregnancy    Diet controlled gestational diabetes mellitus (GDM) in third trimester    Chronic hypertension affecting pregnancy    Abnormality of thoracic aorta    S/P  section    Lung nodule      Her previous obstetric/gynecological history is noted for recent term c/s ( elective at 38 weeks ) and SAB X 3    The following portions of the patients history were reviewed and updated as appropriate: current medications, allergies, past medical history, past surgical history, past family history, past social history, and problem list .                Past OB History:     OB History    Para Term  AB Living   4 1 1 0 3 1   SAB IAB Ectopic Molar Multiple Live Births   3 0 0 0 0 1      # Outcome Date GA Lbr Nino/2nd Weight Sex Type Anes PTL Lv   4  Term 24 38w0d  2920 g (6 lb 7 oz) F CS-LTranv Spinal N MARTY      Birth Comments: panda or 3      Name: Jelly Merlos      Apgar1: 9  Apgar5: 9   3 SAB 10/2020           2 2018           1 2016               Past Medical History: Past Medical History:   Diagnosis Date    Abnormal Pap smear of cervix     Adjustment disorder with mixed anxiety and depressed mood 2016    COVID-19 2020    Female infertility     Gestational diabetes     HSV-2 infection     valtrex for supression during pregnancy    Hypertension     CHRONIC - Since - currently on medication    Palpitations     occasionally with pregnancy    Polycystic ovary syndrome       Past Surgical History Past Surgical History:   Procedure Laterality Date    BREAST AUGMENTATION      CERVICAL BIOPSY  W/ LOOP ELECTRODE EXCISION       SECTION N/A 2024    Procedure:  SECTION PRIMARY;  Surgeon: Neda Penn MD;  Location: [facility] LABOR DELIVERY;  Service: Obstetrics;  Laterality: N/A;    FOOT SURGERY Right     plantar fascitis tendon release    LAPAROSCOPIC GASTRIC BANDING      LEEP      OTHER SURGICAL HISTORY      removal of eye tumor    OTHER SURGICAL HISTORY      tummy tuck    TONSILLECTOMY      WISDOM TOOTH EXTRACTION        Family History: Family History   Problem Relation Age of Onset    Skin cancer Mother     Depression Mother     Anxiety disorder Mother     Stroke Mother     Aneurysm Mother     Alcohol abuse Mother     Migraines Mother     Hemochromatosis Mother     Stroke Maternal Grandmother       Social History:  reports that she quit smoking about 9 years ago. Her smoking use included cigarettes. She started smoking about 9 years ago. She has a 0.1 pack-year smoking history. She has never used smokeless tobacco.   reports that she does not currently use alcohol after a past usage of about 4.0 - 5.0 standard drinks of alcohol per week.   reports no history of drug use.        General ROS:  Review of Systems - General ROS: negative for - fever  Respiratory ROS: positive for - shortness of breath  Cardiovascular ROS: positive for - edema  Gastrointestinal ROS: negative for - abdominal pain or nausea/vomiting  Genito-Urinary ROS: negative for - heavy vaginal bleeding  Musculoskeletal ROS: positive for - mid back pain  Neurological ROS: negative for - headaches or visual changes     Objective       Vital Signs Range for the last 24 hours  Temperature: Temp:  [97.6 °F (36.4 °C)] 97.6 °F (36.4 °C)   Temp Source:     BP: BP: (130-149)/(77-94) 134/81   Pulse: Heart Rate:  [73-82] 77   Respirations: Resp:  [16] 16   SPO2: SpO2:  [98 %-99 %] 98 %   O2 Amount (l/min):     O2 Devices Device (Oxygen Therapy): room air   Weight: Weight:  [118 kg (260 lb)] 118 kg (260 lb)     Physical Examination: General appearance - alert, well appearing, and in no distress  Mental status - alert, oriented to person, place, and time  Chest - clear to auscultation, no wheezes, rales or rhonchi, symmetric air entry  Heart - normal rate and regular rhythm  Abdomen - soft, no guarding/rebound. Fundus is firm at U-1. Incision is clean/dry/intact and without surrounding erythema or swelling.  Neurological - alert, oriented, normal speech, no focal findings or movement disorder noted  Extremities - bilateral lower ext with 1+ edema; no cords or tenderness, 2+ DTR's  Skin - normal coloration and turgor            Laboratory Results:   Lab Results   Component Value Date    WBC 8.21 04/26/2024    HGB 11.3 (L) 04/26/2024    HCT 35.3 04/26/2024    MCV 92.7 04/26/2024     04/26/2024     Lab Results   Component Value Date    GLUCOSE 89 04/26/2024    BUN 12 04/26/2024    CREATININE 0.97 04/26/2024    EGFRRESULT 84.5 04/04/2024    EGFR 79.3 04/26/2024    BCR 12.4 04/26/2024    K 4.2 04/26/2024    CO2 23.0 04/26/2024    CALCIUM 8.9 04/26/2024    PROTENTOTREF 6.3 04/04/2024    ALBUMIN 3.5 04/26/2024    BILITOT 0.3 04/26/2024    AST 23  2024    ALT 38 (H) 2024     Uric Acid   Date Value Ref Range Status   2024 5.2 2.4 - 5.7 mg/dL Final     Radiology Review:  CT angiogram chest : possible intimal flap/dissection of aorta vs mural thrombus,no pulmonary embolus, 6 mm  nodule right upper lobe      Assessment & Plan       Abnormality of thoracic aorta    Chronic hypertension affecting pregnancy    S/P  section    Lung nodule        Assessment/ Plan:  1. Abnormality of thoracic aorta: pt is being admitted to ICU with cardiothoracic consult. Esmolol drip planned for BP control. Discussed case verbally with Dr. Doty ( Maternal Fetal Medicine MD on call ). Pt has CHTN and exacerbation of blood pressure noted at home may represent postpartum preeclampsia. Considering risks and benefits, she does not recommend magnesium sulfate for seizure prevention at this time. If patient develops severe headache, vision changes or extremely labile BP, she then recommends consideration of magnesium sulfate with low dose and careful titration. Will continue to follow patient and MFM MD notes she is available for ongoing management questions.     2. POD 4 : incision is healing appropriately, pt denies heavy bleeding. She is bottle feeding.     3. Lung nodule: pt will need f/u CT in 6 months               Viktoria Gonzalez MD  2024  19:38 EDT

## 2024-04-26 NOTE — TELEPHONE ENCOUNTER
From: Jocelyn Merlos  To: Neda Penn  Sent: 4/26/2024 1:33 PM EDT  Subject: Postpartum concerns    Hi Dr. Penn. I wanted to check with you on a few things I’ve been dealing with since coming home from the hospital. I’ve had quite a few high BP readings   (150’s-180’s/90’s-100’s), my feet are pretty swollen, I’ve had some heart palpitations & a sore middle back under my shoulder blades. I’ve been taking my labatelol still as directed. If this is abnormal please just let me know what I need to do. Thank you!

## 2024-04-26 NOTE — TELEPHONE ENCOUNTER
Pt went to [facility] and had pharmacist check manual BP and it was 118/98. Pt states she is taking the labetalol 3xs a day like she is supposed to, but her feet are really swollen and she felt like she was having chest pain and palpitations yesterday. Pt states this has not happened again since yesterday, but has been getting BP readings of 150s/90s and 180s/100s at home with her cuff. Please advise on next steps for pt

## 2024-04-26 NOTE — PROGRESS NOTES
Asked to review CTA. Back pain and hypertension 4 days post C section. Area in mid descending thoracic aorta is subtle; needs admit for BP control/anti pulsation. Repeat scan tomorrow. I reviewed with Dr. Amos.

## 2024-04-26 NOTE — ED NOTES
Nursing report ED to floor  Jocelyn Merlos  33 y.o.  female    HPI :  HPI (Adult)  Stated Reason for Visit: 4 days post partum with soa, palpitations, back pain.  states bp at home 180/90.  ob hospitalist states pt to be seen in ER  History Obtained From: patient    Chief Complaint  Chief Complaint   Patient presents with    Shortness of Breath       Admitting doctor:   Tea Pantoja MD    Admitting diagnosis:   There were no encounter diagnoses.    Code status:   Current Code Status       Date Active Code Status Order ID Comments User Context       Prior            Allergies:   Patient has no known allergies.    Isolation:   No active isolations    Intake and Output  No intake or output data in the 24 hours ending 04/26/24 1900    Weight:       04/26/24  1620   Weight: 118 kg (260 lb)       Most recent vitals:   Vitals:    04/26/24 1620 04/26/24 1632 04/26/24 1831 04/26/24 1859   BP: 146/79 130/78 138/94 138/94   Pulse: 75 80 79 73   Resp: 16 16 16    Temp:       SpO2: 98% 98% 98%    Weight:       Height:           Active LDAs/IV Access:   Lines, Drains & Airways       Active LDAs       Name Placement date Placement time Site Days    Peripheral IV 04/26/24 1619 Left Forearm 04/26/24  1619  Forearm  less than 1                    Labs (abnormal labs have a star):   Labs Reviewed   COMPREHENSIVE METABOLIC PANEL - Abnormal; Notable for the following components:       Result Value    Total Protein 5.9 (*)     ALT (SGPT) 38 (*)     All other components within normal limits    Narrative:     GFR Normal >60  Chronic Kidney Disease <60  Kidney Failure <15     URINALYSIS W/ MICROSCOPIC IF INDICATED (NO CULTURE) - Abnormal; Notable for the following components:    Color, UA Red (*)     Appearance, UA Cloudy (*)     Blood, UA Large (3+) (*)     Protein,  mg/dL (2+) (*)     Leuk Esterase, UA Moderate (2+) (*)     All other components within normal limits   CBC WITH AUTO DIFFERENTIAL - Abnormal; Notable for the following  "components:    Hemoglobin 11.3 (*)     Eosinophils, Absolute 0.45 (*)     All other components within normal limits   D-DIMER, QUANTITATIVE - Abnormal; Notable for the following components:    D-Dimer, Quantitative 0.88 (*)     All other components within normal limits    Narrative:     According to the assay 's published package insert, a normal (<0.50 MCGFEU/mL) D-dimer result in conjunction with a non-high clinical probability assessment, excludes deep vein thrombosis (DVT) and pulmonary embolism (PE) with high sensitivity.    D-dimer values increase with age and this can make VTE exclusion of an older population difficult. To address this, the [employer], based on best available evidence and recent guidelines, recommends that clinicians use age-adjusted D-dimer thresholds in patients greater than 50 years of age with: a) a low probability of PE who do not meet all Pulmonary Embolism Rule Out Criteria, or b) in those with intermediate probability of PE.   The formula for an age-adjusted D-dimer cut-off is \"age/100\".  For example, a 60 year old patient would have an age-adjusted cut-off of 0.60 MCGFEU/mL and an 80 year old 0.80 MCGFEU/mL.   URINALYSIS, MICROSCOPIC ONLY - Abnormal; Notable for the following components:    RBC, UA Too Numerous to Count (*)     WBC, UA Too Numerous to Count (*)     Squamous Epithelial Cells, UA 3-6 (*)     All other components within normal limits   URIC ACID - Normal   BNP (IN-HOUSE) - Normal    Narrative:     This assay is used as an aid in the diagnosis of individuals suspected of having heart failure. It can be used as an aid in the diagnosis of acute decompensated heart failure (ADHF) in patients presenting with signs and symptoms of ADHF to the emergency department (ED). In addition, NT-proBNP of <300 pg/mL indicates ADHF is not likely.    Age Range Result Interpretation  NT-proBNP Concentration (pg/mL:      <50             Positive            " >450                   Gray                 300-450                    Negative             <300    50-75           Positive            >900                  Gray                300-900                  Negative            <300      >75             Positive            >1800                  Gray                300-1800                  Negative            <300   SINGLE HS TROPONIN T - Normal    Narrative:     High Sensitive Troponin T Reference Range:  <14.0 ng/L- Negative Female for AMI  <22.0 ng/L- Negative Male for AMI  >=14 - Abnormal Female indicating possible myocardial injury.  >=22 - Abnormal Male indicating possible myocardial injury.   Clinicians would have to utilize clinical acumen, EKG, Troponin, and serial changes to determine if it is an Acute Myocardial Infarction or myocardial injury due to an underlying chronic condition.        CBC AND DIFFERENTIAL    Narrative:     The following orders were created for panel order CBC & Differential.  Procedure                               Abnormality         Status                     ---------                               -----------         ------                     CBC Auto Differential[[id number]]        Abnormal            Final result                 Please view results for these tests on the individual orders.       EKG:   ECG 12 Lead Dyspnea   Preliminary Result   HEART RATE= 77  bpm   RR Interval= 779  ms   MN Interval= 156  ms   P Horizontal Axis= -7  deg   P Front Axis= 38  deg   QRSD Interval= 87  ms   QT Interval= 393  ms   QTcB= 445  ms   QRS Axis= 45  deg   T Wave Axis= 72  deg   - NORMAL ECG -   Sinus rhythm   Electronically Signed By:    Date and Time of Study: 2024-04-26 15:47:44          Meds given in ED:   Medications   sodium chloride 0.9 % flush 10 mL (has no administration in time range)   esmolol (BREVIBLOC) 2500 mg in 250 mL NS infusion (25 mcg/kg/min × 118 kg Intravenous New Bag 4/26/24 9186)   iopamidol (ISOVUE-370) 76 % injection  "100 mL (95 mL Intravenous Given by Other 24 5232)       Imaging results:  No radiology results for the last day    Ambulatory status:   - independent     Social issues:   Social History     Socioeconomic History    Marital status:    Tobacco Use    Smoking status: Former     Current packs/day: 0.00     Average packs/day: 0.3 packs/day for 0.5 years (0.1 ttl pk-yrs)     Types: Cigarettes     Start date: 10/5/2014     Quit date: 2015     Years since quittin.0    Smokeless tobacco: Never   Vaping Use    Vaping status: Never Used   Substance and Sexual Activity    Alcohol use: Not Currently     Alcohol/week: 4.0 - 5.0 standard drinks of alcohol     Types: 4 - 5 Standard drinks or equivalent per week     Comment: Socially./caffeine use     Drug use: No    Sexual activity: Yes     Partners: Male       Peripheral Neurovascular  Peripheral Neurovascular (Adult)  Peripheral Neurovascular WDL: .WDL except  Additional Documentation: Edema (Group)  Edema  Edema: foot, left, foot, right  Foot, Left Edema: 2+ (Mild)  Foot, Right Edema: 2+ (Mild)    Neuro Cognitive  Neuro Cognitive (Adult)  Cognitive/Neuro/Behavioral WDL: WDL    Learning  Learning Assessment (Adult)  Learning Readiness and Ability: no barriers identified    Respiratory  Respiratory (Adult)  Airway WDL: WDL  Respiratory WDL  Respiratory WDL: WDL (feels like it is hard to take a full deep breath\")    Abdominal Pain       Pain Assessments  Pain (Adult)  (0-10) Pain Rating: Rest: 4  Pain Location: flank  Pain Side/Orientation: bilateral    NIH Stroke Scale       Yaa Millard RN  24 19:00 EDT    "

## 2024-04-26 NOTE — ED NOTES
Nursing report ED to floor  Jocelyn Merlos  33 y.o.  female    HPI :  HPI (Adult)  Stated Reason for Visit: 4 days post partum with soa, palpitations, back pain.  states bp at home 180/90.  ob hospitalist states pt to be seen in ER  History Obtained From: patient    Chief Complaint  Chief Complaint   Patient presents with    Shortness of Breath       Admitting doctor:   Tea Pantoja MD    Admitting diagnosis:   There were no encounter diagnoses.    Code status:   Current Code Status       Date Active Code Status Order ID Comments User Context       4/26/2024 1906 CPR (Attempt to Resuscitate) [id number]  Tea Pantoja MD ED        Question Answer    Code Status (Patient has no pulse and is not breathing) CPR (Attempt to Resuscitate)    Medical Interventions (Patient has pulse or is breathing) Full Support                    Allergies:   Patient has no known allergies.    Isolation:   No active isolations    Intake and Output  No intake or output data in the 24 hours ending 04/26/24 1910    Weight:       04/26/24  1620   Weight: 118 kg (260 lb)       Most recent vitals:   Vitals:    04/26/24 1632 04/26/24 1831 04/26/24 1859 04/26/24 1902   BP: 130/78 138/94 138/94 148/84   Pulse: 80 79 73 75   Resp: 16 16     Temp:       SpO2: 98% 98%     Weight:       Height:           Active LDAs/IV Access:   Lines, Drains & Airways       Active LDAs       Name Placement date Placement time Site Days    Peripheral IV 04/26/24 1619 Left Forearm 04/26/24  1619  Forearm  less than 1                    Labs (abnormal labs have a star):   Labs Reviewed   COMPREHENSIVE METABOLIC PANEL - Abnormal; Notable for the following components:       Result Value    Total Protein 5.9 (*)     ALT (SGPT) 38 (*)     All other components within normal limits    Narrative:     GFR Normal >60  Chronic Kidney Disease <60  Kidney Failure <15     URINALYSIS W/ MICROSCOPIC IF INDICATED (NO CULTURE) - Abnormal; Notable for the following components:    Color,  "UA Red (*)     Appearance, UA Cloudy (*)     Blood, UA Large (3+) (*)     Protein,  mg/dL (2+) (*)     Leuk Esterase, UA Moderate (2+) (*)     All other components within normal limits   CBC WITH AUTO DIFFERENTIAL - Abnormal; Notable for the following components:    Hemoglobin 11.3 (*)     Eosinophils, Absolute 0.45 (*)     All other components within normal limits   D-DIMER, QUANTITATIVE - Abnormal; Notable for the following components:    D-Dimer, Quantitative 0.88 (*)     All other components within normal limits    Narrative:     According to the assay 's published package insert, a normal (<0.50 MCGFEU/mL) D-dimer result in conjunction with a non-high clinical probability assessment, excludes deep vein thrombosis (DVT) and pulmonary embolism (PE) with high sensitivity.    D-dimer values increase with age and this can make VTE exclusion of an older population difficult. To address this, the [employer], based on best available evidence and recent guidelines, recommends that clinicians use age-adjusted D-dimer thresholds in patients greater than 50 years of age with: a) a low probability of PE who do not meet all Pulmonary Embolism Rule Out Criteria, or b) in those with intermediate probability of PE.   The formula for an age-adjusted D-dimer cut-off is \"age/100\".  For example, a 60 year old patient would have an age-adjusted cut-off of 0.60 MCGFEU/mL and an 80 year old 0.80 MCGFEU/mL.   URINALYSIS, MICROSCOPIC ONLY - Abnormal; Notable for the following components:    RBC, UA Too Numerous to Count (*)     WBC, UA Too Numerous to Count (*)     Squamous Epithelial Cells, UA 3-6 (*)     All other components within normal limits   URIC ACID - Normal   BNP (IN-HOUSE) - Normal    Narrative:     This assay is used as an aid in the diagnosis of individuals suspected of having heart failure. It can be used as an aid in the diagnosis of acute decompensated heart failure (ADHF) in " patients presenting with signs and symptoms of ADHF to the emergency department (ED). In addition, NT-proBNP of <300 pg/mL indicates ADHF is not likely.    Age Range Result Interpretation  NT-proBNP Concentration (pg/mL:      <50             Positive            >450                   Gray                 300-450                    Negative             <300    50-75           Positive            >900                  Gray                300-900                  Negative            <300      >75             Positive            >1800                  Gray                300-1800                  Negative            <300   SINGLE HS TROPONIN T - Normal    Narrative:     High Sensitive Troponin T Reference Range:  <14.0 ng/L- Negative Female for AMI  <22.0 ng/L- Negative Male for AMI  >=14 - Abnormal Female indicating possible myocardial injury.  >=22 - Abnormal Male indicating possible myocardial injury.   Clinicians would have to utilize clinical acumen, EKG, Troponin, and serial changes to determine if it is an Acute Myocardial Infarction or myocardial injury due to an underlying chronic condition.        CBC AND DIFFERENTIAL    Narrative:     The following orders were created for panel order CBC & Differential.  Procedure                               Abnormality         Status                     ---------                               -----------         ------                     CBC Auto Differential[[id number]]        Abnormal            Final result                 Please view results for these tests on the individual orders.       EKG:   ECG 12 Lead Dyspnea   Preliminary Result   HEART RATE= 77  bpm   RR Interval= 779  ms   TX Interval= 156  ms   P Horizontal Axis= -7  deg   P Front Axis= 38  deg   QRSD Interval= 87  ms   QT Interval= 393  ms   QTcB= 445  ms   QRS Axis= 45  deg   T Wave Axis= 72  deg   - NORMAL ECG -   Sinus rhythm   Electronically Signed By:    Date and Time of Study: 2024-04-26 15:47:44           Meds given in ED:   Medications   sodium chloride 0.9 % flush 10 mL (has no administration in time range)   esmolol (BREVIBLOC) 2500 mg in 250 mL NS infusion (50 mcg/kg/min × 118 kg Intravenous Rate/Dose Change 24 190)   nitroglycerin (NITROSTAT) SL tablet 0.4 mg (has no administration in time range)   sodium chloride 0.9 % flush 10 mL (has no administration in time range)   sodium chloride 0.9 % flush 10 mL (has no administration in time range)   sodium chloride 0.9 % infusion 40 mL (has no administration in time range)   sennosides-docusate (PERICOLACE) 8.6-50 MG per tablet 2 tablet (has no administration in time range)     And   polyethylene glycol (MIRALAX) packet 17 g (has no administration in time range)     And   bisacodyl (DULCOLAX) EC tablet 5 mg (has no administration in time range)     And   bisacodyl (DULCOLAX) suppository 10 mg (has no administration in time range)   iopamidol (ISOVUE-370) 76 % injection 100 mL (95 mL Intravenous Given by Other 24 9990)       Imaging results:  No radiology results for the last day    Ambulatory status:   - independent     Social issues:   Social History     Socioeconomic History    Marital status:    Tobacco Use    Smoking status: Former     Current packs/day: 0.00     Average packs/day: 0.3 packs/day for 0.5 years (0.1 ttl pk-yrs)     Types: Cigarettes     Start date: 10/5/2014     Quit date: 2015     Years since quittin.0    Smokeless tobacco: Never   Vaping Use    Vaping status: Never Used   Substance and Sexual Activity    Alcohol use: Not Currently     Alcohol/week: 4.0 - 5.0 standard drinks of alcohol     Types: 4 - 5 Standard drinks or equivalent per week     Comment: Socially./caffeine use     Drug use: No    Sexual activity: Yes     Partners: Male       Peripheral Neurovascular  Peripheral Neurovascular (Adult)  Peripheral Neurovascular WDL: .WDL except  Additional Documentation: Edema (Group)  Edema  Edema: foot, left, foot,  "right  Foot, Left Edema: 2+ (Mild)  Foot, Right Edema: 2+ (Mild)    Neuro Cognitive  Neuro Cognitive (Adult)  Cognitive/Neuro/Behavioral WDL: WDL    Learning  Learning Assessment (Adult)  Learning Readiness and Ability: no barriers identified    Respiratory  Respiratory (Adult)  Airway WDL: WDL  Respiratory WDL  Respiratory WDL: WDL (feels like it is hard to take a full deep breath\")    Abdominal Pain       Pain Assessments  Pain (Adult)  (0-10) Pain Rating: Rest: 4  Pain Location: flank  Pain Side/Orientation: bilateral    NIH Stroke Scale       Yaa Millard RN  04/26/24 19:10 EDT    "

## 2024-04-26 NOTE — H&P
H&P NOTE    Patient Identification:  Jocelyn Merlos  33 y.o.  female  1990  [id number]              CC: Back pain with hypertension    History of Present Illness:  Very pleasant 33-year-old female postpartum 4 days ago with underlying history of hypertension.  She apparently had a  4 days ago and delivered a healthy child.  She presented to the emergency room with ongoing back pain and acute shortness of breath for the last couple of days.  She feels that she may be bending a lot and sometimes the pain is worse when she bends forward.  Further workup in the emergency room revealed a possible dissection/flap on the mid descending thoracic aorta per ER physician with discussion with radiologist and Dr. De La Cruz.  At this time thoracic surgery wants better blood pressure management.  At the time of my evaluation she denies any chest pain or shortness of breath.  Blood pressure slightly elevated 138/94.  Denies any nausea vomiting or diaphoresis.      Review of Systems  As above rest of the 12 point review of system negative.  Past Medical History:  Past Medical History:   Diagnosis Date    Abnormal Pap smear of cervix     Adjustment disorder with mixed anxiety and depressed mood 2016    COVID-19 2020    Female infertility     Gestational diabetes     HSV-2 infection     valtrex for supression during pregnancy    Hypertension     CHRONIC - Since - currently on medication    Palpitations     occasionally with pregnancy    Polycystic ovary syndrome        Past Surgical History:  Past Surgical History:   Procedure Laterality Date    BREAST AUGMENTATION      CERVICAL BIOPSY  W/ LOOP ELECTRODE EXCISION       SECTION N/A 2024    Procedure:  SECTION PRIMARY;  Surgeon: Neda Penn MD;  Location: [facility] LABOR DELIVERY;  Service: Obstetrics;  Laterality: N/A;    FOOT SURGERY Right     plantar fascitis tendon release    LAPAROSCOPIC GASTRIC BANDING      LEEP      OTHER  "SURGICAL HISTORY      removal of eye tumor    OTHER SURGICAL HISTORY      tummy tuck    TONSILLECTOMY      WISDOM TOOTH EXTRACTION          Home Meds:  (Not in a hospital admission)      Allergies:  No Known Allergies    Social History:   Social History     Socioeconomic History    Marital status:    Tobacco Use    Smoking status: Former     Current packs/day: 0.00     Average packs/day: 0.3 packs/day for 0.5 years (0.1 ttl pk-yrs)     Types: Cigarettes     Start date: 10/5/2014     Quit date: 2015     Years since quittin.0    Smokeless tobacco: Never   Vaping Use    Vaping status: Never Used   Substance and Sexual Activity    Alcohol use: Not Currently     Alcohol/week: 4.0 - 5.0 standard drinks of alcohol     Types: 4 - 5 Standard drinks or equivalent per week     Comment: Socially./caffeine use     Drug use: No    Sexual activity: Yes     Partners: Male       Family History:  Family History   Problem Relation Age of Onset    Skin cancer Mother     Depression Mother     Anxiety disorder Mother     Stroke Mother     Aneurysm Mother     Alcohol abuse Mother     Migraines Mother     Hemochromatosis Mother     Stroke Maternal Grandmother        Physical Exam:  /94   Pulse 79   Temp 97.6 °F (36.4 °C)   Resp 16   Ht 172.7 cm (68\")   Wt 118 kg (260 lb)   LMP 07/15/2023   SpO2 98%   Breastfeeding Unknown   BMI 39.53 kg/m²  Body mass index is 39.53 kg/m². 98% 118 kg (260 lb)  Physical Exam  Patient is examined using the personal protective equipment as per guidelines from infection control for this particular patient as enacted.  Hand hygiene was performed before and after patient interaction.  Well-developed normal body habitus  Eyes normal conjunctivae and pupils reactive to light  ENT Mallampati between 3 and 4 normal nasal exam  Neck midline trachea no thyromegaly  Chest no labored breathing clear  CVS regular rate and rhythm no lower extremity edema  Abdomen soft nontender no " hepatosplenomegaly  CNS intact normal sensory exam  Skin no rashes no nodules  Psych oriented to time place and person normal memory  Musculoskeletal no cyanosis no clubbing normal range of motion        LABS:  Lab Results   Component Value Date    CALCIUM 8.9 2024     Results from last 7 days   Lab Units 24  1618 24  0629 24  1037   SODIUM mmol/L 140  --  142   POTASSIUM mmol/L 4.2  --  3.9   CHLORIDE mmol/L 107  --  110*   CO2 mmol/L 23.0  --  20.0*   BUN mg/dL 12  --  9   CREATININE mg/dL 0.97  --  0.71   GLUCOSE mg/dL 89  --  94   CALCIUM mg/dL 8.9  --  9.0   WBC 10*3/mm3 8.21 8.95 8.49   HEMOGLOBIN g/dL 11.3* 10.9* 12.2   PLATELETS 10*3/mm3 326 252 282   ALT (SGPT) U/L 38*  --  16   AST (SGOT) U/L 23  --  10   PROBNP pg/mL 177.0  --   --      Lab Results   Component Value Date    CKTOTAL 45 2023    TROPONINI <0.012 2021    TROPONINT 8 2024     Results from last 7 days   Lab Units 24  1618   HSTROP T ng/L 8                             Lab Results   Component Value Date    TSH 1.880 2023     Estimated Creatinine Clearance: 111.3 mL/min (by C-G formula based on SCr of 0.97 mg/dL).  Results from last 7 days   Lab Units 24  1631   NITRITE UA  Negative   WBC UA /HPF Too Numerous to Count*   BACTERIA UA /HPF None Seen   SQUAM EPITHEL UA /HPF 3-6*        Imaging: I personally visualized the images of scans/x-rays performed within last 3 days.      Assessment:  Possible descending thoracic dissection  Hypertensive emergency  Postpartum post   Mild anemia  Abnormal UA  Pulmonary nodule      Recommendations:  Admit patient to the ICU as per recommendation by cardiothoracic surgeon.  Initiate esmolol drip in the emergency room maintain blood pressure systolic less than 120  Plans per cardio thoracic surgery for repeat CT chest tomorrow morning.  Cardio thoracic surgery has been consulted in the emergency room.  Emergency room physician will also consult  patient's OB/GYN to notify them.  UA abnormal but no signs of UTI as such.  Hold off antibiotics for now  Will require follow-up CT chest in 3 months for pulmonary nodule noted on CT chest incidental finding.  ICU core measures  Discussed with patient and  in detail    Critical care time 35 minutes          Tea Pantoja MD  4/26/2024  18:56 EDT      Much of this encounter note is an electronic transcription/translation of spoken language to printed text using Dragon Software.

## 2024-04-26 NOTE — ED NOTES
Pt is 4d postpartum . States still having mild vaginal bleeding, pain in upper back, hard to take a deep breath. Not on blood thinners. HTN at home, reading in 180's sbp. BLE edema x 2 days. Took dose of labetalol at 1030 this am

## 2024-04-27 ENCOUNTER — APPOINTMENT (OUTPATIENT)
Dept: CT IMAGING | Facility: HOSPITAL | Age: 34
DRG: 776 | End: 2024-04-27
Payer: COMMERCIAL

## 2024-04-27 LAB
ALBUMIN SERPL-MCNC: 3.2 G/DL (ref 3.5–5.2)
ALBUMIN/GLOB SERPL: 1.1 G/DL
ALP SERPL-CCNC: 106 U/L (ref 39–117)
ALT SERPL W P-5'-P-CCNC: 35 U/L (ref 1–33)
ANION GAP SERPL CALCULATED.3IONS-SCNC: 13.5 MMOL/L (ref 5–15)
AST SERPL-CCNC: 25 U/L (ref 1–32)
BASOPHILS # BLD AUTO: 0.05 10*3/MM3 (ref 0–0.2)
BASOPHILS NFR BLD AUTO: 0.5 % (ref 0–1.5)
BILIRUB SERPL-MCNC: 0.4 MG/DL (ref 0–1.2)
BUN SERPL-MCNC: 9 MG/DL (ref 6–20)
BUN/CREAT SERPL: 12.2 (ref 7–25)
CALCIUM SPEC-SCNC: 8.7 MG/DL (ref 8.6–10.5)
CHLORIDE SERPL-SCNC: 110 MMOL/L (ref 98–107)
CO2 SERPL-SCNC: 19.5 MMOL/L (ref 22–29)
CREAT SERPL-MCNC: 0.74 MG/DL (ref 0.57–1)
DEPRECATED RDW RBC AUTO: 43.9 FL (ref 37–54)
EGFRCR SERPLBLD CKD-EPI 2021: 109.7 ML/MIN/1.73
EOSINOPHIL # BLD AUTO: 0.47 10*3/MM3 (ref 0–0.4)
EOSINOPHIL NFR BLD AUTO: 4.8 % (ref 0.3–6.2)
ERYTHROCYTE [DISTWIDTH] IN BLOOD BY AUTOMATED COUNT: 12.7 % (ref 12.3–15.4)
EXPIRATION DATE: ABNORMAL
EXPIRATION DATE: NORMAL
EXPIRATION DATE: NORMAL
GLOBULIN UR ELPH-MCNC: 2.9 GM/DL
GLUCOSE BLDC GLUCOMTR-MCNC: 79 MG/DL (ref 70–130)
GLUCOSE BLDC GLUCOMTR-MCNC: 80 MG/DL (ref 70–130)
GLUCOSE BLDC GLUCOMTR-MCNC: 90 MG/DL (ref 70–130)
GLUCOSE SERPL-MCNC: 74 MG/DL (ref 65–99)
HCT VFR BLD AUTO: 38.3 % (ref 34–46.6)
HGB BLD-MCNC: 12.3 G/DL (ref 12–15.9)
IMM GRANULOCYTES # BLD AUTO: 0.04 10*3/MM3 (ref 0–0.05)
IMM GRANULOCYTES NFR BLD AUTO: 0.4 % (ref 0–0.5)
LYMPHOCYTES # BLD AUTO: 1.97 10*3/MM3 (ref 0.7–3.1)
LYMPHOCYTES NFR BLD AUTO: 20.2 % (ref 19.6–45.3)
Lab: ABNORMAL
Lab: NORMAL
Lab: NORMAL
MCH RBC QN AUTO: 30.3 PG (ref 26.6–33)
MCHC RBC AUTO-ENTMCNC: 32.1 G/DL (ref 31.5–35.7)
MCV RBC AUTO: 94.3 FL (ref 79–97)
MONOCYTES # BLD AUTO: 0.81 10*3/MM3 (ref 0.1–0.9)
MONOCYTES NFR BLD AUTO: 8.3 % (ref 5–12)
NEUTROPHILS NFR BLD AUTO: 6.43 10*3/MM3 (ref 1.7–7)
NEUTROPHILS NFR BLD AUTO: 65.8 % (ref 42.7–76)
NRBC BLD AUTO-RTO: 0 /100 WBC (ref 0–0.2)
PLATELET # BLD AUTO: 342 10*3/MM3 (ref 140–450)
PMV BLD AUTO: 9.9 FL (ref 6–12)
POTASSIUM SERPL-SCNC: 3.8 MMOL/L (ref 3.5–5.2)
PROT SERPL-MCNC: 6.1 G/DL (ref 6–8.5)
PROT UR STRIP-MCNC: ABNORMAL MG/DL
PROT UR STRIP-MCNC: NEGATIVE MG/DL
PROT UR STRIP-MCNC: NEGATIVE MG/DL
RBC # BLD AUTO: 4.06 10*6/MM3 (ref 3.77–5.28)
SODIUM SERPL-SCNC: 143 MMOL/L (ref 136–145)
WBC NRBC COR # BLD AUTO: 9.77 10*3/MM3 (ref 3.4–10.8)

## 2024-04-27 PROCEDURE — 25010000002 ENOXAPARIN PER 10 MG: Performed by: OBSTETRICS & GYNECOLOGY

## 2024-04-27 PROCEDURE — 25010000002 MAGNESIUM SULFATE 20 GM/500ML SOLUTION: Performed by: INTERNAL MEDICINE

## 2024-04-27 PROCEDURE — 25810000003 LACTATED RINGERS PER 1000 ML: Performed by: OBSTETRICS & GYNECOLOGY

## 2024-04-27 PROCEDURE — 25510000001 IOPAMIDOL PER 1 ML: Performed by: INTERNAL MEDICINE

## 2024-04-27 PROCEDURE — 99231 SBSQ HOSP IP/OBS SF/LOW 25: CPT | Performed by: OBSTETRICS & GYNECOLOGY

## 2024-04-27 PROCEDURE — 82948 REAGENT STRIP/BLOOD GLUCOSE: CPT

## 2024-04-27 PROCEDURE — 25810000003 SODIUM CHLORIDE 0.9 % SOLUTION

## 2024-04-27 PROCEDURE — 81002 URINALYSIS NONAUTO W/O SCOPE: CPT | Performed by: OBSTETRICS & GYNECOLOGY

## 2024-04-27 PROCEDURE — 85025 COMPLETE CBC W/AUTO DIFF WBC: CPT | Performed by: OBSTETRICS & GYNECOLOGY

## 2024-04-27 PROCEDURE — 25010000002 ESMOLOL 2500 MG/250ML SOLUTION: Performed by: INTERNAL MEDICINE

## 2024-04-27 PROCEDURE — 80053 COMPREHEN METABOLIC PANEL: CPT | Performed by: OBSTETRICS & GYNECOLOGY

## 2024-04-27 PROCEDURE — 25010000002 NICARDIPINE 2.5 MG/ML SOLUTION

## 2024-04-27 PROCEDURE — 25010000002 ESMOLOL 2500 MG/250ML SOLUTION: Performed by: THORACIC SURGERY (CARDIOTHORACIC VASCULAR SURGERY)

## 2024-04-27 PROCEDURE — 71275 CT ANGIOGRAPHY CHEST: CPT

## 2024-04-27 RX ORDER — ENOXAPARIN SODIUM 100 MG/ML
40 INJECTION SUBCUTANEOUS EVERY 12 HOURS
Status: DISCONTINUED | OUTPATIENT
Start: 2024-04-27 | End: 2024-04-29 | Stop reason: HOSPADM

## 2024-04-27 RX ORDER — LABETALOL 300 MG/1
300 TABLET, FILM COATED ORAL EVERY 8 HOURS SCHEDULED
Status: DISCONTINUED | OUTPATIENT
Start: 2024-04-27 | End: 2024-04-27

## 2024-04-27 RX ORDER — SODIUM CHLORIDE, SODIUM LACTATE, POTASSIUM CHLORIDE, CALCIUM CHLORIDE 600; 310; 30; 20 MG/100ML; MG/100ML; MG/100ML; MG/100ML
75 INJECTION, SOLUTION INTRAVENOUS CONTINUOUS
Status: DISCONTINUED | OUTPATIENT
Start: 2024-04-27 | End: 2024-04-29 | Stop reason: HOSPADM

## 2024-04-27 RX ORDER — MAGNESIUM SULFATE HEPTAHYDRATE 40 MG/ML
2 INJECTION, SOLUTION INTRAVENOUS CONTINUOUS
Status: DISCONTINUED | OUTPATIENT
Start: 2024-04-27 | End: 2024-04-29 | Stop reason: HOSPADM

## 2024-04-27 RX ADMIN — LABETALOL HYDROCHLORIDE 300 MG: 100 TABLET, FILM COATED ORAL at 10:01

## 2024-04-27 RX ADMIN — ESMOLOL HYDROCHLORIDE 300 MCG/KG/MIN: 10 INJECTION INTRAVENOUS at 06:41

## 2024-04-27 RX ADMIN — ESMOLOL HYDROCHLORIDE 300 MCG/KG/MIN: 10 INJECTION INTRAVENOUS at 07:46

## 2024-04-27 RX ADMIN — SENNOSIDES AND DOCUSATE SODIUM 2 TABLET: 50; 8.6 TABLET ORAL at 21:50

## 2024-04-27 RX ADMIN — ESMOLOL HYDROCHLORIDE 275 MCG/KG/MIN: 10 INJECTION INTRAVENOUS at 09:22

## 2024-04-27 RX ADMIN — ENOXAPARIN SODIUM 40 MG: 100 INJECTION SUBCUTANEOUS at 13:43

## 2024-04-27 RX ADMIN — MAGNESIUM SULFATE HEPTAHYDRATE 2 G/HR: 40 INJECTION, SOLUTION INTRAVENOUS at 20:47

## 2024-04-27 RX ADMIN — ESMOLOL HYDROCHLORIDE 300 MCG/KG/MIN: 10 INJECTION INTRAVENOUS at 04:27

## 2024-04-27 RX ADMIN — ESMOLOL HYDROCHLORIDE 300 MCG/KG/MIN: 10 INJECTION INTRAVENOUS at 00:51

## 2024-04-27 RX ADMIN — SODIUM CHLORIDE 10 MG/HR: 9 INJECTION, SOLUTION INTRAVENOUS at 07:45

## 2024-04-27 RX ADMIN — SODIUM CHLORIDE, POTASSIUM CHLORIDE, SODIUM LACTATE AND CALCIUM CHLORIDE 75 ML/HR: 600; 310; 30; 20 INJECTION, SOLUTION INTRAVENOUS at 11:41

## 2024-04-27 RX ADMIN — IOPAMIDOL 95 ML: 755 INJECTION, SOLUTION INTRAVENOUS at 08:15

## 2024-04-27 RX ADMIN — SODIUM CHLORIDE 10 MG/HR: 9 INJECTION, SOLUTION INTRAVENOUS at 01:55

## 2024-04-27 RX ADMIN — SODIUM CHLORIDE 10 MG/HR: 9 INJECTION, SOLUTION INTRAVENOUS at 04:27

## 2024-04-27 RX ADMIN — Medication 10 ML: at 08:32

## 2024-04-27 RX ADMIN — ESMOLOL HYDROCHLORIDE 300 MCG/KG/MIN: 10 INJECTION INTRAVENOUS at 01:55

## 2024-04-27 RX ADMIN — DOXYLAMINE SUCCINATE: 25 TABLET ORAL at 21:50

## 2024-04-27 RX ADMIN — ESMOLOL HYDROCHLORIDE 300 MCG/KG/MIN: 10 INJECTION INTRAVENOUS at 05:32

## 2024-04-27 RX ADMIN — ESMOLOL HYDROCHLORIDE 300 MCG/KG/MIN: 10 INJECTION INTRAVENOUS at 03:08

## 2024-04-27 NOTE — NURSING NOTE
Esmolol drip stopped per Dr. Reyes. Pt vitals stable. Magnesium drip has not been delivered from pharmacy. Report called to L&D. All questions answered.

## 2024-04-27 NOTE — PLAN OF CARE
Problem: Adult Inpatient Plan of Care  Goal: Plan of Care Review  Outcome: Ongoing, Progressing  Flowsheets (Taken 4/27/2024 1850)  Plan of Care Reviewed With: patient  Outcome Evaluation: Patient arrived to antepartum unit on 2 grams/hr of magnesium sulfate and lr at 75 cc/hr. Patient denies any headache or pain at this time. VSS. Magnesium assessment performed- WNL. Will continue to monitor   Goal Outcome Evaluation:  Plan of Care Reviewed With: patient           Outcome Evaluation: Patient arrived to antepartum unit on 2 grams/hr of magnesium sulfate and lr at 75 cc/hr. Patient denies any headache or pain at this time. VSS. Magnesium assessment performed- WNL. Will continue to monitor

## 2024-04-27 NOTE — PROGRESS NOTES
[facility]   Obstetrics and Gynecology     2024    Name:Jocelyn Merlos    MR#:[id number]     Progress Note:  Post-Op    HD:1    Subjective   33 y.o. yo Female  POD # 5 s/p CS at 38w0d readmitted yesterday with severe range pressures and suspicion on imaging for aortic dissection.  Aortic dissection has been ruled out this morning.  Patient reports doing well. Pain well controlled.  Denies headache, blurry vision.  He is currently on an esmolol drip keeping her systolic blood pressure lower than 120 due to the suspected aortic dissection.  Discussed with  and patient that this is most likely consistent with postpartum post preeclampsia with exacerbation of her chronic hypertension.      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    Suppurative hidradenitis    Headache, migraine    Family history of hemochromatosis    Vitamin D deficiency    Hemochromatosis carrier    Major depressive disorder, recurrent, in full remission    Palpitations    PCOS (polycystic ovarian syndrome)- was on Metformin to regulate cycles    Rubella non-immune, needs MMR PP    Maternal varicella, non-immune, needs Varivax PP    Anxiety and depression    Nausea without vomiting    Insomnia    Obesity affecting pregnancy, antepartum    Recurrent pregnancy loss with current pregnancy    37 weeks gestation of pregnancy    Chronic hypertension in pregnancy    Diet controlled gestational diabetes mellitus (GDM) in third trimester    Chronic hypertension affecting pregnancy    Abnormality of thoracic aorta    S/P  section    Lung nodule        Objective    Vitals  Temp:  Temp:  [97.6 °F (36.4 °C)-99.1 °F (37.3 °C)] 98.2 °F (36.8 °C)  Temp src: Oral  BP:  BP: (102-149)/(63-94) 129/80  Pulse:  Heart Rate:  [73-92] 92  RR:   Resp:  [16] 16  Weight: 123 kg (270 lb 8.1 oz)  BMI:  Body mass index is 41.13 kg/m².    Physical Exam  Vitals  and nursing note reviewed.   Constitutional:       Appearance: Normal appearance. She is obese.   Pulmonary:      Effort: Pulmonary effort is normal.   Abdominal:      General: There is no distension.      Palpations: Abdomen is soft.      Tenderness: There is no abdominal tenderness.   Neurological:      Mental Status: She is alert and oriented to person, place, and time.   Psychiatric:         Mood and Affect: Mood normal.         Behavior: Behavior normal.         I/O last 3 completed shifts:  In: 2685.6 [I.V.:2685.6]  Out: 4900 [Urine:4900]    LABS:  Results from last 7 days   Lab Units 24  0312 24  1618 24  0629   WBC 10*3/mm3 9.77 8.21 8.95   HEMOGLOBIN g/dL 12.3 11.3* 10.9*   HEMATOCRIT % 38.3 35.3 33.7*   PLATELETS 10*3/mm3 342 326 252     Results from last 7 days   Lab Units 24  0312 24  1618 24  1037   SODIUM mmol/L 143 140 142   POTASSIUM mmol/L 3.8 4.2 3.9   CHLORIDE mmol/L 110* 107 110*   CO2 mmol/L 19.5* 23.0 20.0*   BUN mg/dL 9 12 9   CREATININE mg/dL 0.74 0.97 0.71   CALCIUM mg/dL 8.7 8.9 9.0   BILIRUBIN mg/dL 0.4 0.3 0.3   ALK PHOS U/L 106 105 135*   ALT (SGPT) U/L 35* 38* 16   AST (SGOT) U/L 25 23 10   GLUCOSE mg/dL 74 89 94       Infant: female       Assessment    1.  POD#5     Abnormality of thoracic aorta    Chronic hypertension affecting pregnancy    S/P  section    Lung nodule      Plan:  Transfer to L&D  Continue routine postoperative care   Labs ordered for tomorrow:  cbc, cmp  Magnesium x 24 hours   Strict I/O's   Continue PO labetalol   Yaa Nath MD  2024 10:43 EDT

## 2024-04-27 NOTE — PROGRESS NOTES
The CTA today is normal.  I discussed with her obstetrician and she is going to go to the postpartum unit for mag sulfate.  This is eclampsia postpartum.  I do not think she will need follow-up with us.  She will need follow-up for some area on her CT scan that radiology noticed.

## 2024-04-27 NOTE — PLAN OF CARE
Goal Outcome Evaluation:   Pt AOX4. Pt remains on esmolol and now on cardene to keep SBP <120.  See am labs

## 2024-04-27 NOTE — PROGRESS NOTES
Dr. NARCISA Reyes    [facility] INTENSIVE CARE        Patient ID:  Name:  Jocelyn Merlos  MRN:  [id number]  1990  33 y.o.  female            Chief complaint:/HPI: Abdominal pain and back pain    History of present illness/interval history: She says her back pain is improving.  What exacerbates it is getting up and moving  Denies cough, hemoptysis or blood in stool  Blood pressure is well-controlled now but on intravenous Cardene drip to keep systolic below 120 mmHg.  Reviewed notes by Dr. Pantoja and Dr. Saenz.  Patient delivered a live baby 5 days ago.  Did have hypertension during pregnancy.      ROS: Positive for back pain.  No chest pain, no vomiting, no abdominal pain    I reviewed old medical records.  Past medical history, social history and family history: Unchanged from admission H&P.      Vitals:  Vitals:    04/27/24 0543 04/27/24 0545 04/27/24 0600 04/27/24 0615   BP:  114/63 114/70 117/70   Pulse:  85 86 85   Resp:       Temp:       TempSrc:       SpO2:  96% 96% 94%   Weight: 123 kg (270 lb 8.1 oz)      Height:               Body mass index is 41.13 kg/m².    Intake/Output Summary (Last 24 hours) at 4/27/2024 0806  Last data filed at 4/27/2024 0641  Gross per 24 hour   Intake 2685.61 ml   Output 4900 ml   Net -2214.39 ml       Exam:  GEN:  No distress  Alert, oriented x 4, moves all 4 extremities without focal deficits throughout.   LUNGS: Clear breath sounds bilat, no use of accessory muscles  CV:  Normal S1S2, without murmur, no edema  ABD:  Mild tenderness lower abdomen, no rebound, large body habitus hampers rest of the exam      Scheduled meds:  escitalopram, 10 mg, Oral, Daily  insulin lispro, 3-14 Units, Subcutaneous, 4x Daily AC & at Bedtime  senna-docusate sodium, 2 tablet, Oral, BID  sodium chloride, 10 mL, Intravenous, Q12H      IV meds:                      esmolol,  mcg/kg/min, Last Rate: 300 mcg/kg/min (04/27/24 0746)  niCARdipine, 5-15 mg/hr, Last Rate: 10 mg/hr  "(24 0745)        Data Review:   I reviewed the patient's medications and new clinical results.    No results found for: \"COVID19\"      Lab Results   Component Value Date    CALCIUM 8.7 2024    MG 2.2 2021     Results from last 7 days   Lab Units 24  0312 24  1618 24  0629 24  1037   SODIUM mmol/L 143 140  --  142   POTASSIUM mmol/L 3.8 4.2  --  3.9   CHLORIDE mmol/L 110* 107  --  110*   CO2 mmol/L 19.5* 23.0  --  20.0*   BUN mg/dL 9 12  --  9   CREATININE mg/dL 0.74 0.97  --  0.71   CALCIUM mg/dL 8.7 8.9  --  9.0   BILIRUBIN mg/dL 0.4 0.3  --  0.3   ALK PHOS U/L 106 105  --  135*   ALT (SGPT) U/L 35* 38*  --  16   AST (SGOT) U/L 25 23  --  10   GLUCOSE mg/dL 74 89  --  94   WBC 10*3/mm3 9.77 8.21 8.95 8.49   HEMOGLOBIN g/dL 12.3 11.3* 10.9* 12.2   PLATELETS 10*3/mm3 342 326 252 282   PROBNP pg/mL  --  177.0  --   --              ASSESSMENT:     Possible descending thoracic dissection  Hypertensive emergency, post partum eclampsia  Postpartum post   Mild anemia  Abnormal UA  Abnormality of thoracic aorta    Chronic hypertension affecting pregnancy    S/P  section    Lung nodule  Diabetes mellitus type 2        PLAN:  Patient and all problems new to me during this admission.  Monitor blood pressure, goal systolic below 120 mmHg. Having eclampsia post partum?  Start oral antihypertensives, wean Cardene drip.  Follow recommendations by cardiothoracic surgery team.  CT scan images reviewed.  Patient has possible small flap of the descending thoracic aorta.  Status post delivery 5 days ago, healthy living baby.  Start Lantus 10 units at night.  Continue measuring blood glucose after every meal and at night.      I reviewed the chart and other providers notes and reviewed labs.  Copied text in this note has been reviewed and is accurate as of today      Mark Reyes MD  2024  "

## 2024-04-28 LAB
ALBUMIN SERPL-MCNC: 3.5 G/DL (ref 3.5–5.2)
ALBUMIN/GLOB SERPL: 1.3 G/DL
ALP SERPL-CCNC: 109 U/L (ref 39–117)
ALT SERPL W P-5'-P-CCNC: 29 U/L (ref 1–33)
ANION GAP SERPL CALCULATED.3IONS-SCNC: 12.4 MMOL/L (ref 5–15)
AST SERPL-CCNC: 17 U/L (ref 1–32)
BASOPHILS # BLD AUTO: 0.04 10*3/MM3 (ref 0–0.2)
BASOPHILS NFR BLD AUTO: 0.4 % (ref 0–1.5)
BILIRUB SERPL-MCNC: 0.3 MG/DL (ref 0–1.2)
BUN SERPL-MCNC: 8 MG/DL (ref 6–20)
BUN/CREAT SERPL: 9.5 (ref 7–25)
CALCIUM SPEC-SCNC: 7.4 MG/DL (ref 8.6–10.5)
CHLORIDE SERPL-SCNC: 105 MMOL/L (ref 98–107)
CO2 SERPL-SCNC: 22.6 MMOL/L (ref 22–29)
CREAT SERPL-MCNC: 0.84 MG/DL (ref 0.57–1)
DEPRECATED RDW RBC AUTO: 41.9 FL (ref 37–54)
EGFRCR SERPLBLD CKD-EPI 2021: 94.2 ML/MIN/1.73
EOSINOPHIL # BLD AUTO: 0.27 10*3/MM3 (ref 0–0.4)
EOSINOPHIL NFR BLD AUTO: 2.9 % (ref 0.3–6.2)
ERYTHROCYTE [DISTWIDTH] IN BLOOD BY AUTOMATED COUNT: 12.6 % (ref 12.3–15.4)
EXPIRATION DATE: NORMAL
GLOBULIN UR ELPH-MCNC: 2.7 GM/DL
GLUCOSE SERPL-MCNC: 109 MG/DL (ref 65–99)
HCT VFR BLD AUTO: 38.2 % (ref 34–46.6)
HGB BLD-MCNC: 12.4 G/DL (ref 12–15.9)
IMM GRANULOCYTES # BLD AUTO: 0.04 10*3/MM3 (ref 0–0.05)
IMM GRANULOCYTES NFR BLD AUTO: 0.4 % (ref 0–0.5)
LYMPHOCYTES # BLD AUTO: 1.5 10*3/MM3 (ref 0.7–3.1)
LYMPHOCYTES NFR BLD AUTO: 16 % (ref 19.6–45.3)
Lab: NORMAL
MAGNESIUM SERPL-MCNC: 5.9 MG/DL (ref 1.6–2.6)
MCH RBC QN AUTO: 30 PG (ref 26.6–33)
MCHC RBC AUTO-ENTMCNC: 32.5 G/DL (ref 31.5–35.7)
MCV RBC AUTO: 92.3 FL (ref 79–97)
MONOCYTES # BLD AUTO: 0.87 10*3/MM3 (ref 0.1–0.9)
MONOCYTES NFR BLD AUTO: 9.3 % (ref 5–12)
NEUTROPHILS NFR BLD AUTO: 6.64 10*3/MM3 (ref 1.7–7)
NEUTROPHILS NFR BLD AUTO: 71 % (ref 42.7–76)
NRBC BLD AUTO-RTO: 0 /100 WBC (ref 0–0.2)
PHOSPHATE SERPL-MCNC: 3.9 MG/DL (ref 2.5–4.5)
PLATELET # BLD AUTO: 382 10*3/MM3 (ref 140–450)
PMV BLD AUTO: 9.5 FL (ref 6–12)
POTASSIUM SERPL-SCNC: 3.3 MMOL/L (ref 3.5–5.2)
PROT SERPL-MCNC: 6.2 G/DL (ref 6–8.5)
PROT UR STRIP-MCNC: NEGATIVE MG/DL
RBC # BLD AUTO: 4.14 10*6/MM3 (ref 3.77–5.28)
SODIUM SERPL-SCNC: 140 MMOL/L (ref 136–145)
WBC NRBC COR # BLD AUTO: 9.36 10*3/MM3 (ref 3.4–10.8)

## 2024-04-28 PROCEDURE — 25010000002 MAGNESIUM SULFATE 20 GM/500ML SOLUTION: Performed by: INTERNAL MEDICINE

## 2024-04-28 PROCEDURE — 85025 COMPLETE CBC W/AUTO DIFF WBC: CPT | Performed by: INTERNAL MEDICINE

## 2024-04-28 PROCEDURE — 80053 COMPREHEN METABOLIC PANEL: CPT | Performed by: INTERNAL MEDICINE

## 2024-04-28 PROCEDURE — 84100 ASSAY OF PHOSPHORUS: CPT | Performed by: INTERNAL MEDICINE

## 2024-04-28 PROCEDURE — 83735 ASSAY OF MAGNESIUM: CPT | Performed by: INTERNAL MEDICINE

## 2024-04-28 PROCEDURE — 25010000002 ENOXAPARIN PER 10 MG: Performed by: OBSTETRICS & GYNECOLOGY

## 2024-04-28 PROCEDURE — 81002 URINALYSIS NONAUTO W/O SCOPE: CPT | Performed by: OBSTETRICS & GYNECOLOGY

## 2024-04-28 PROCEDURE — 25810000003 LACTATED RINGERS PER 1000 ML: Performed by: OBSTETRICS & GYNECOLOGY

## 2024-04-28 PROCEDURE — 99232 SBSQ HOSP IP/OBS MODERATE 35: CPT | Performed by: OBSTETRICS & GYNECOLOGY

## 2024-04-28 RX ADMIN — SODIUM CHLORIDE, POTASSIUM CHLORIDE, SODIUM LACTATE AND CALCIUM CHLORIDE 75 ML/HR: 600; 310; 30; 20 INJECTION, SOLUTION INTRAVENOUS at 02:07

## 2024-04-28 RX ADMIN — ENOXAPARIN SODIUM 40 MG: 100 INJECTION SUBCUTANEOUS at 03:00

## 2024-04-28 RX ADMIN — ENOXAPARIN SODIUM 40 MG: 100 INJECTION SUBCUTANEOUS at 16:57

## 2024-04-28 RX ADMIN — ESCITALOPRAM OXALATE 10 MG: 10 TABLET, FILM COATED ORAL at 08:33

## 2024-04-28 RX ADMIN — LABETALOL HYDROCHLORIDE 300 MG: 100 TABLET, FILM COATED ORAL at 08:32

## 2024-04-28 RX ADMIN — LABETALOL HYDROCHLORIDE 300 MG: 100 TABLET, FILM COATED ORAL at 17:02

## 2024-04-28 RX ADMIN — MAGNESIUM SULFATE HEPTAHYDRATE 2 G/HR: 40 INJECTION, SOLUTION INTRAVENOUS at 08:33

## 2024-04-28 RX ADMIN — SENNOSIDES AND DOCUSATE SODIUM 2 TABLET: 50; 8.6 TABLET ORAL at 20:11

## 2024-04-28 RX ADMIN — IBUPROFEN 600 MG: 600 TABLET, FILM COATED ORAL at 08:32

## 2024-04-28 RX ADMIN — IBUPROFEN 600 MG: 600 TABLET, FILM COATED ORAL at 20:11

## 2024-04-28 NOTE — PROGRESS NOTES
Name:Jocelyn Merlos                                       MR#:[id number]      Progress Note:  Post-Op    HD:2     Subjective   33 y.o. yo Female  POD # 5 s/p CS at 38w0d readmitted yesterday with severe range pressures and suspicion on imaging for aortic dissection.  Aortic dissection has been ruled out this morning.  Patient reports doing well. Pain well controlled.  Denies headache, blurry vision.  Since admission pressures have improved, she is coming off magnesium sulfate later this morning.  Will restart her on her labetalol.  Liver enzymes platelets are normal  Problem List       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    Suppurative hidradenitis    Headache, migraine    Family history of hemochromatosis    Vitamin D deficiency    Hemochromatosis carrier    Major depressive disorder, recurrent, in full remission    Palpitations    PCOS (polycystic ovarian syndrome)- was on Metformin to regulate cycles    Rubella non-immune, needs MMR PP    Maternal varicella, non-immune, needs Varivax PP    Anxiety and depression    Nausea without vomiting    Insomnia    Obesity affecting pregnancy, antepartum    Recurrent pregnancy loss with current pregnancy    37 weeks gestation of pregnancy    Chronic hypertension in pregnancy    Diet controlled gestational diabetes mellitus (GDM) in third trimester    Chronic hypertension affecting pregnancy    Abnormality of thoracic aorta    S/P  section    Lung nodule            Objective    Vitals:    24 0840   BP: 141/72   Pulse: 88   Resp: 16   Temp:    SpO2: 99%       Vitals  Temp:              Temp:  [97.6 °F (36.4 °C)-99.1 °F (37.3 °C)] 98.2 °F (36.8 °C)  Temp src: Oral  BP:                  BP: (102-149)/(63-94) 129/80  Pulse:              Heart Rate:  [73-92] 92  RR:                  Resp:  [16] 16  Weight:            123 kg (270 lb 8.1 oz)  BMI:                 Body mass index  is 41.13 kg/m².     Physical Exam  Vitals and nursing note reviewed.   Constitutional:       Appearance: Normal appearance. She is obese.   Pulmonary:      Effort: Pulmonary effort is normal.   Abdominal:      General: There is no distension.      Palpations: Abdomen is soft.      Tenderness: There is no abdominal tenderness.   Neurological:      Mental Status: She is alert and oriented to person, place, and time.   Psychiatric:         Mood and Affect: Mood normal.         Behavior: Behavior normal.            I/O last 3 completed shifts:  In: 2685.6 [I.V.:2685.6]  Out: 4900 [Urine:4900]     LABS:         Results from last 7 days   Lab Units 24  03124  1618 24  0629   WBC 10*3/mm3 9.77 8.21 8.95   HEMOGLOBIN g/dL 12.3 11.3* 10.9*   HEMATOCRIT % 38.3 35.3 33.7*   PLATELETS 10*3/mm3 342 326 252             Results from last 7 days   Lab Units 24  1618 24  1037   SODIUM mmol/L 143 140 142   POTASSIUM mmol/L 3.8 4.2 3.9   CHLORIDE mmol/L 110* 107 110*   CO2 mmol/L 19.5* 23.0 20.0*   BUN mg/dL 9 12 9   CREATININE mg/dL 0.74 0.97 0.71   CALCIUM mg/dL 8.7 8.9 9.0   BILIRUBIN mg/dL 0.4 0.3 0.3   ALK PHOS U/L 106 105 135*   ALT (SGPT) U/L 35* 38* 16   AST (SGOT) U/L 25 23 10   GLUCOSE mg/dL 74 89 94         Infant: female                             Assessment     1.  POD#6     Abnormality of thoracic aorta    Chronic hypertension affecting pregnancy    S/P  section    Lung nodule  As outlined above, superimposed preeclampsia in the setting of chronic hypertension has responded nicely thus far to magnesium sulfate.  Will discontinue that later this morning transition to labetalol.  Recheck labs in the morning.    Justin Glover MD  2024

## 2024-04-28 NOTE — PLAN OF CARE
Goal Outcome Evaluation:  Plan of Care Reviewed With: patient        Progress: no change  Outcome Evaluation: VSS, mild HA reported at the beginning of the shift that resolved spontaneously. No other needs or complaints identified throughout the night. Pt able to sleep inbetween mag checks. Magnesium infusion anticipated to be discontinued around 11:45 today.

## 2024-04-29 VITALS
SYSTOLIC BLOOD PRESSURE: 105 MMHG | HEIGHT: 68 IN | WEIGHT: 270.5 LBS | HEART RATE: 76 BPM | TEMPERATURE: 98 F | OXYGEN SATURATION: 96 % | BODY MASS INDEX: 41 KG/M2 | RESPIRATION RATE: 16 BRPM | DIASTOLIC BLOOD PRESSURE: 71 MMHG

## 2024-04-29 LAB
QT INTERVAL: 393 MS
QTC INTERVAL: 445 MS

## 2024-04-29 RX ORDER — BUPROPION HYDROCHLORIDE 150 MG/1
150 TABLET ORAL EVERY MORNING
Qty: 90 TABLET | Refills: 0 | Status: SHIPPED | OUTPATIENT
Start: 2024-04-29

## 2024-04-29 RX ORDER — ESCITALOPRAM OXALATE 10 MG/1
10 TABLET ORAL DAILY
Qty: 90 TABLET | Refills: 0 | Status: SHIPPED | OUTPATIENT
Start: 2024-04-29

## 2024-04-29 RX ADMIN — IBUPROFEN 600 MG: 600 TABLET, FILM COATED ORAL at 02:05

## 2024-04-29 RX ADMIN — IBUPROFEN 600 MG: 600 TABLET, FILM COATED ORAL at 08:58

## 2024-04-29 RX ADMIN — ESCITALOPRAM OXALATE 10 MG: 10 TABLET, FILM COATED ORAL at 08:58

## 2024-04-29 RX ADMIN — LABETALOL HYDROCHLORIDE 300 MG: 100 TABLET, FILM COATED ORAL at 09:00

## 2024-04-29 RX ADMIN — LABETALOL HYDROCHLORIDE 300 MG: 100 TABLET, FILM COATED ORAL at 02:05

## 2024-04-29 NOTE — TELEPHONE ENCOUNTER
Medication refilled. Patient was recently hospitalized post-partum. She will make appt as soon as she is able.

## 2024-04-29 NOTE — DISCHARGE SUMMARY
[facility]   Obstetrics and Gynecology   Antepartum Discharge Summary      Date of Admission: 2024    Date of Discharge:        Patient: Jocelyn Merlos      MR#:[id number]      Discharge Diagnosis:   Intrauterine pregnancy at 38w0d    Abnormality of thoracic aorta    Chronic hypertension affecting pregnancy    S/P  section    Lung nodule      Hospital Course  Patient is a 33 y.o. female  at 38w0d admitted for elevated blood pressures and chest pain.  At the time of admission she had imaging that was concerning for possible thoracic aortic dissection, however on CTA her imaging was normal.  Due to concern about dissection she was initially admitted to the ICU and cardiothoracic surgery was consulted they have reviewed imaging and are not concern for ongoing problems.  Due to her blood pressures however she was diagnosed with postpartum preeclampsia and she was given 24 hours of magnesium.  Following the magnesium her symptoms of chest pain have improved and her blood pressures have been normotensive.  She will continue 200 mg 3 times daily of labetalol upon discharge.  Chief Complaint   Patient presents with    Shortness of Breath         Condition on Discharge:  Stable    Vital Signs  Temp:  [97.1 °F (36.2 °C)-98.7 °F (37.1 °C)] 98 °F (36.7 °C)  Heart Rate:  [69-83] 76  Resp:  [16-17] 16  BP: (103-127)/(62-84) 105/71    Results from last 7 days   Lab Units 24  1618   WBC 10*3/mm3 9.36 9.77 8.21   HEMOGLOBIN g/dL 12.4 12.3 11.3*   HEMATOCRIT % 38.2 38.3 35.3   PLATELETS 10*3/mm3 382 342 326     Results from last 7 days   Lab Units 24  04224  0312 24  1618   SODIUM mmol/L 140 143 140   POTASSIUM mmol/L 3.3* 3.8 4.2   CHLORIDE mmol/L 105 110* 107   CO2 mmol/L 22.6 19.5* 23.0   BUN mg/dL 8 9 12   CREATININE mg/dL 0.84 0.74 0.97   CALCIUM mg/dL 7.4* 8.7 8.9   BILIRUBIN mg/dL 0.3 0.4 0.3   ALK PHOS U/L 109 106 105   ALT (SGPT) U/L 29  35* 38*   AST (SGOT) U/L 17 25 23   GLUCOSE mg/dL 109* 74 89         Discharge Disposition  Home or Self Care    Discharge Medications     Discharge Medications        New Medications        Instructions Start Date   buPROPion  MG 24 hr tablet  Commonly known as: WELLBUTRIN XL   150 mg, Oral, Every Morning             Continue These Medications        Instructions Start Date   accu-chek safe-t pro lancets   Fasting and 2 hour post prandial      Alcohol Swabs 70 % pads   1 Pad, Does not apply, 4 Times Daily      B-D UF III MINI PEN NEEDLES 31G X 5 MM misc  Generic drug: Insulin Pen Needle   1 Needle, Does not apply, Nightly      doxylamine 25 MG tablet  Commonly known as: UNISOM   25 mg, Oral, Nightly PRN      escitalopram 10 MG tablet  Commonly known as: LEXAPRO   10 mg, Oral, Daily      fish oil 1000 MG capsule capsule   Oral, Daily With Breakfast      folic acid 400 MCG tablet  Commonly known as: FOLVITE   400 mcg, Oral, Daily      glucose monitor monitoring kit   Use four times daily to check blood glucose.      ibuprofen 600 MG tablet  Commonly known as: ADVIL,MOTRIN   600 mg, Oral, Every 6 Hours PRN      labetalol 200 MG tablet  Commonly known as: NORMODYNE   200 mg, Oral, 3 times daily      prenatal (CLASSIC) vitamin 28-0.8 MG tablet tablet  Generic drug: prenatal vitamin   Oral, Daily      True Metrix Meter w/Device kit   USE FOUR TIMES DAILY TO CHECK BLOOD GLUCOSE      vitamin B-6 25 MG tablet  Commonly known as: PYRIDOXINE   25 mg, Oral, Nightly      VITAMIN C PO   Oral      vitamin D 1.25 MG (89256 UT) capsule capsule  Commonly known as: ERGOCALCIFEROL   1,000 Units, Oral             Stop These Medications      aspirin 81 MG EC tablet     glucose blood test strip     Insulin Glargine 100 UNIT/ML injection pen  Commonly known as: LANTUS SOLOSTAR     valACYclovir 500 MG tablet  Commonly known as: Valtrex              Discharge Diet: Regular    Activity at Discharge: Regular      Follow-up  Appointments  Future Appointments   Date Time Provider Department Center   5/2/2024  1:45 PM Neda Penn MD [facility]           Prenatal labs/vax:   Immunization History   Administered Date(s) Administered    COVID-19 (PFIZER) Purple Cap Monovalent 04/12/2021, 05/07/2021, 01/08/2022    Fluzone (or Fluarix & Flulaval for VFC) >6mos 12/17/2021, 10/24/2023    Hepatitis A 06/19/2018    Tdap 06/10/2023, 02/01/2024         External Prenatal Results       Pregnancy Outside Results - Transcribed From Office Records - See Scanned Records For Details       Test Value Date Time    ABO  O  04/22/24 1037    Rh  Positive  04/22/24 1037    Antibody Screen  Negative  04/22/24 1037       Negative  10/05/23 1535       Negative  09/27/23 1626    Varicella IgG  <135 index 10/05/23 1535    Rubella  0.97 index 10/05/23 1535    Hgb  12.4 g/dL 04/28/24 0429       12.3 g/dL 04/27/24 0312       11.3 g/dL 04/26/24 1618       10.9 g/dL 04/23/24 0629       12.2 g/dL 04/22/24 1037       12.3 g/dL 04/09/24 0046       12.8 g/dL 04/04/24 1509       11.8 g/dL 12/19/23 1413       14.0 g/dL 11/18/23 1523       13.1 g/dL 11/16/23 0826       14.1 g/dL 10/05/23 1535    Hct  38.2 % 04/28/24 0429       38.3 % 04/27/24 0312       35.3 % 04/26/24 1618       33.7 % 04/23/24 0629       38.1 % 04/22/24 1037       36.6 % 04/09/24 0046       38.3 % 04/04/24 1509       35.8 % 12/19/23 1413       42.0 % 11/18/23 1523       39.0 % 11/16/23 0826       42.4 % 10/05/23 1535    Glucose Fasting GTT  92 mg/dL 02/14/24 0911       89 mg/dL 11/16/23 0826    Glucose Tolerance Test 1 hour  218 mg/dL 02/14/24 0911       165 mg/dL 11/16/23 0826    Glucose Tolerance Test 3 hour  165 mg/dL 02/14/24 0911    Gonorrhea (discrete)  Negative  10/05/23 1431    Chlamydia (discrete)  Negative  10/05/23 1431    RPR  Non Reactive  10/05/23 1535    VDRL       Syphilis Antibody  Non Reactive  02/01/24 1427    HBsAg  Negative  10/05/23 1535    Herpes Simplex Virus PCR        Herpes Simplex VIrus Culture       HIV  Non Reactive  10/05/23 1535    Hep C RNA Quant PCR       Hep C Antibody  Non Reactive  10/05/23 1535    AFP  22.5 ng/mL 11/20/23 1016    Group B Strep  Negative  04/04/24 1635    GBS Susceptibility to Clindamycin       GBS Susceptibility to Erythromycin       Fetal Fibronectin       Genetic Testing, Maternal Blood                 Drug Screening       Test Value Date Time    Urine Drug Screen       Amphetamine Screen  Negative ng/mL 10/05/23 1431    Barbiturate Screen  Negative ng/mL 10/05/23 1431    Benzodiazepine Screen  Negative ng/mL 10/05/23 1431    Methadone Screen  Negative ng/mL 10/05/23 1431    Phencyclidine Screen  Negative ng/mL 10/05/23 1431    Opiates Screen       THC Screen       Cocaine Screen       Propoxyphene Screen  Negative ng/mL 10/05/23 1431    Buprenorphine Screen       Methamphetamine Screen       Oxycodone Screen       Tricyclic Antidepressants Screen                 Legend    ^: Historical                              Neda Alicia Penn MD  04/29/24  12:13 EDT

## 2024-05-02 ENCOUNTER — POSTPARTUM VISIT (OUTPATIENT)
Dept: OBSTETRICS AND GYNECOLOGY | Age: 34
End: 2024-05-02
Payer: COMMERCIAL

## 2024-05-02 VITALS
WEIGHT: 255.6 LBS | HEIGHT: 68 IN | DIASTOLIC BLOOD PRESSURE: 70 MMHG | SYSTOLIC BLOOD PRESSURE: 130 MMHG | BODY MASS INDEX: 38.74 KG/M2

## 2024-05-02 NOTE — PROGRESS NOTES
"[facility]   Obstetrics and Gynecology   Postpartum Visit    2024    Patient: Jocelyn Merlos          MR#:[id number]    History of Present Illness    Chief Complaint   Patient presents with    Postpartum Care     C/o : patient coming in for postpartum visit   Delivery date : 2024    Patient stated \" overall wellness is fine \".        33 y.o. female  status post primary low transverse  section presents for postpartum visit without complaints.  Mood stable.  bottle-feeding without issue.  No issues with bleeding.  No intercourse since delivery.    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    Suppurative hidradenitis    Headache, migraine    Family history of hemochromatosis    Vitamin D deficiency    Hemochromatosis carrier    Major depressive disorder, recurrent, in full remission    Palpitations    PCOS (polycystic ovarian syndrome)- was on Metformin to regulate cycles    Rubella non-immune, needs MMR PP    Maternal varicella, non-immune, needs Varivax PP    Anxiety and depression    Nausea without vomiting    Insomnia    Obesity affecting pregnancy, antepartum    Recurrent pregnancy loss with current pregnancy    37 weeks gestation of pregnancy    Chronic hypertension in pregnancy    Diet controlled gestational diabetes mellitus (GDM) in third trimester    Chronic hypertension affecting pregnancy    Abnormality of thoracic aorta    S/P  section    Lung nodule       Past Medical History:   Diagnosis Date    Abnormal Pap smear of cervix     Adjustment disorder with mixed anxiety and depressed mood 2016    COVID-19 2020    Female infertility     Gestational diabetes     HSV-2 infection     valtrex for supression " "during pregnancy    Hypertension     CHRONIC - Since - currently on medication    Palpitations     occasionally with pregnancy    Polycystic ovary syndrome        Past Surgical History:   Procedure Laterality Date    BREAST AUGMENTATION      CERVICAL BIOPSY  W/ LOOP ELECTRODE EXCISION       SECTION N/A 2024    Procedure:  SECTION PRIMARY;  Surgeon: Neda Penn MD;  Location: [facility] LABOR DELIVERY;  Service: Obstetrics;  Laterality: N/A;    FOOT SURGERY Right     plantar fascitis tendon release    LAPAROSCOPIC GASTRIC BANDING      LEEP      OTHER SURGICAL HISTORY      removal of eye tumor    OTHER SURGICAL HISTORY      tummy tuck    TONSILLECTOMY      WISDOM TOOTH EXTRACTION         Social History     Tobacco Use   Smoking Status Former    Current packs/day: 0.00    Average packs/day: 0.3 packs/day for 0.5 years (0.1 ttl pk-yrs)    Types: Cigarettes    Start date: 10/5/2014    Quit date: 2015    Years since quittin.0   Smokeless Tobacco Never       has a current medication list which includes the following prescription(s): alcohol swabs, ascorbic acid, bupropion xl, doxylamine, escitalopram, folic acid, ibuprofen, labetalol, fish oil, prenatal (classic) vitamin, vitamin b-6, vitamin d, true metrix meter, glucose monitor, b-d uf iii mini pen needles, and accu-chek safe-t pro.  ________________________________________         Review of Systems   All other systems reviewed and are negative.      Objective     /70 (BP Location: Left arm, Patient Position: Sitting, Cuff Size: Large Adult)   Ht 172.7 cm (68\")   Wt 116 kg (255 lb 9.6 oz)   LMP 07/15/2023   Breastfeeding No   BMI 38.86 kg/m²    BP Readings from Last 3 Encounters:   24 130/70   24 105/71   24 133/83      Wt Readings from Last 3 Encounters:   24 116 kg (255 lb 9.6 oz)   24 123 kg (270 lb 8.1 oz)   24 124 kg (274 lb)      BMI: Estimated body mass index is 38.86 kg/m² " "as calculated from the following:    Height as of this encounter: 172.7 cm (68\").    Weight as of this encounter: 116 kg (255 lb 9.6 oz).    EXAM     General:     Patient appears well in NAD  Respiratory: Normal effort, no distress  Breast:  declined  Abdomen: Soft, NT, no acute findings, incision c/d/i  Ext:  No cyanosis or edema    Assessment:    33 y.o. female  status post primary low transverse  section presents for postpartum visit without complaints.    Diagnoses and all orders for this visit:    1. Postpartum follow-up (Primary)  Comments:  Doing well, incision healing well.  Blood pressure good today continue labetalol.  Will need 2-hour glucose test with 6-week appt.  Pap UTD        Plan:  No follow-ups on file.      Neda Penn MD  2024 14:48 EDT   "

## 2024-05-03 ENCOUNTER — MATERNAL SCREENING (OUTPATIENT)
Dept: CALL CENTER | Facility: HOSPITAL | Age: 34
End: 2024-05-03
Payer: COMMERCIAL

## 2024-05-03 NOTE — OUTREACH NOTE
Maternal Screening Survey      Flowsheet Row Responses   Facility patient discharged from? [facility]   Attempt successful? No   Unsuccessful attempts Attempt 2              Piero PIRES - Registered Nurse

## 2024-05-03 NOTE — OUTREACH NOTE
Maternal Screening Survey      Flowsheet Row Responses   Facility patient discharged from? [facility]   Attempt successful? No   Unsuccessful attempts Attempt 1              Piero PIRES - Registered Nurse

## 2024-05-04 ENCOUNTER — MATERNAL SCREENING (OUTPATIENT)
Dept: CALL CENTER | Facility: HOSPITAL | Age: 34
End: 2024-05-04
Payer: COMMERCIAL

## 2024-05-04 NOTE — OUTREACH NOTE
Maternal Screening Survey      Flowsheet Row Responses   Facility patient discharged from? [place] [facility]   Attempt successful? No   Unsuccessful attempts Attempt 3   Revoke Decline to participate              EDWARD CHILD - Registered Nurse

## 2024-05-22 ENCOUNTER — TELEMEDICINE (OUTPATIENT)
Dept: FAMILY MEDICINE CLINIC | Facility: CLINIC | Age: 34
End: 2024-05-22
Payer: COMMERCIAL

## 2024-05-22 DIAGNOSIS — E78.2 MIXED HYPERLIPIDEMIA: ICD-10-CM

## 2024-05-22 DIAGNOSIS — G43.709 CHRONIC MIGRAINE WITHOUT AURA WITHOUT STATUS MIGRAINOSUS, NOT INTRACTABLE: ICD-10-CM

## 2024-05-22 DIAGNOSIS — Z14.8 HEMOCHROMATOSIS CARRIER: ICD-10-CM

## 2024-05-22 DIAGNOSIS — R73.03 PREDIABETES: ICD-10-CM

## 2024-05-22 DIAGNOSIS — E53.8 B12 DEFICIENCY: ICD-10-CM

## 2024-05-22 DIAGNOSIS — U09.9 POST-COVID-19 SYNDROME: Primary | ICD-10-CM

## 2024-05-22 DIAGNOSIS — U09.9 PERSISTENT FATIGUE AFTER COVID-19: ICD-10-CM

## 2024-05-22 DIAGNOSIS — F41.9 ANXIETY: ICD-10-CM

## 2024-05-22 DIAGNOSIS — R41.89 BRAIN FOG: ICD-10-CM

## 2024-05-22 DIAGNOSIS — O43.102 PLACENTAL ABNORMALITY IN SECOND TRIMESTER: ICD-10-CM

## 2024-05-22 DIAGNOSIS — U09.9 PERSISTENT NEUROLOGIC SYMPTOMS AFTER COVID-19: ICD-10-CM

## 2024-05-22 DIAGNOSIS — O46.92 VAGINAL BLEEDING IN PREGNANCY, SECOND TRIMESTER: ICD-10-CM

## 2024-05-22 DIAGNOSIS — E66.01 CLASS 3 SEVERE OBESITY WITH BODY MASS INDEX (BMI) OF 40.0 TO 44.9 IN ADULT, UNSPECIFIED OBESITY TYPE, UNSPECIFIED WHETHER SERIOUS COMORBIDITY PRESENT: ICD-10-CM

## 2024-05-22 DIAGNOSIS — F43.22 ADJUSTMENT DISORDER WITH ANXIOUS MOOD: ICD-10-CM

## 2024-05-22 DIAGNOSIS — E28.2 PCOS (POLYCYSTIC OVARIAN SYNDROME): ICD-10-CM

## 2024-05-22 DIAGNOSIS — Z98.891 S/P CESAREAN SECTION: ICD-10-CM

## 2024-05-22 DIAGNOSIS — R29.90 PERSISTENT NEUROLOGIC SYMPTOMS AFTER COVID-19: ICD-10-CM

## 2024-05-22 DIAGNOSIS — F33.42 MAJOR DEPRESSIVE DISORDER, RECURRENT, IN FULL REMISSION: ICD-10-CM

## 2024-05-22 DIAGNOSIS — R42 DIZZINESS: ICD-10-CM

## 2024-05-22 DIAGNOSIS — O24.410 DIET CONTROLLED GESTATIONAL DIABETES MELLITUS (GDM) IN THIRD TRIMESTER: ICD-10-CM

## 2024-05-22 DIAGNOSIS — E79.0 HYPERURICEMIA: ICD-10-CM

## 2024-05-22 DIAGNOSIS — E55.9 VITAMIN D DEFICIENCY: ICD-10-CM

## 2024-05-22 DIAGNOSIS — O26.20 RECURRENT PREGNANCY LOSS WITH CURRENT PREGNANCY: ICD-10-CM

## 2024-05-22 DIAGNOSIS — R53.83 PERSISTENT FATIGUE AFTER COVID-19: ICD-10-CM

## 2024-05-22 DIAGNOSIS — R74.8 ELEVATED LIVER ENZYMES: ICD-10-CM

## 2024-05-22 PROCEDURE — 99214 OFFICE O/P EST MOD 30 MIN: CPT | Performed by: PHYSICIAN ASSISTANT

## 2024-05-22 NOTE — PROGRESS NOTES
Subjective   Jocelyn Merlos is a 33 y.o. female who is being evaluated by video visit today in follow up of dizziness, moods, headaches, post-Covid syndrome/ long term symptoms, hyperlipidemia, vitamin D deficiency, elevated LFT, hemochromatosis carrier, skin lesions, and specialists. She is also pregnant with bleeding and increased lightheadedness.     Dizziness  Associated symptoms include fatigue (improving). Arthralgias: improving. Headaches: improved with treatment. Myalgias: improving. Nausea: no constant nausea. Weakness: improving.  DepressionPatient presents with the following symptoms: confusion (improving), decreased concentration (improving), nervousness/anxiety (worse with concerns of surgery) and palpitations (improving).  Patient is not experiencing: suicidal ideas.  Shortness of breath: improving.    You have chosen to receive care through a telehealth visit.  Do you consent to use a video/audio connection for your medical care today? Yes  Visit is completed in clinic and privacy and at patient's home privately.  No other participants of the call.    NOTE TO [facility]- Fax- [phone]- [place]. Direct # [phone].   Previously-  attn Ms Gregorio   She will eventually get a notice from [facility] and may change to long term disability- still keep insurance.    Postpartum preeclampsia-  Feet swelling and Bp to 190 systolic. She almost did not go to ER and was initially admit to ICU.   Mg drip for 24 hours- felt like someone was trying to kill her and was scary.     CP- she is not sure if anxiety- she has been out of Lexapro- reports her pharmacy told her they did not receive. She has episodes that remind her of anxiety- feeling pressure on chest and tightness. Will take deep breaths to relieve but feels like a weight on her chest. Was having CP and SOA when she went to the hospital. This is the same CP she had when going to the hospital but no associated SOA.     Multiple UTI- she had catheter and was having  "urinary symptoms. Antibiotic- she was on something that started with C and took for 7 days. Finished 4 days ago. Got some yeast from it and they had already called in yeast medication.   Still bleeding some from giving birth.   She is improving some.     OBGYN wanted her to talk with us about labetalol for long term.     Feeling dizzy daily- different than the dizziness you had prior. Now a constant dizzy- more like how you feel when you stand up too fast. Mixed with the feeling with car sickness as well.   The other dizzy spells are intense and hard and these are less intense.   Myasthenia gravis.     She has had some low /60 only once. Most of the time, has been about 120/70- no elevated levels.     Tired-  is off as well and is able to help. She is eating every 2 hours. When she is asleep, she is worried about baby. Not able to relax. She is happy and no postpartum depression but more anxiety. Moods are \"pretty good for the most part\". Emotional- can cry easily.  She is emotional.    While pregnant, she had episode of excruciating pain in back. Someone mentioned could be GB.      Pregnancy with bleeding in pregnancy-delivered healthy baby.  Has had several complications since delivery.  4 big bleeds with golf ball size clots. Last bleed was 9 days ago. Heavy bleeding for 3-5 hours- fills a couple pads then spotting. She had spotting for a few weeks after initial bleed at 7 weeks. She has bled for 7 weeks throughout her pregnancy. Has worn a pad for more days than not.   When on her feet extended period of time, she bleeds. Cramping and tugging- not extreme.  Nausea- has lost weight because feeling full. Has lost about 8-9 lbs. Gained no weight and has lost. As soon as she starts eating, she is very full. Eating small meals frequently. Small bowl of shredded wheat. Big snacks. Protein- not the best choices- meat has been disgusting. Certain things sound discussed. Main things- bagels, shredded wheat, " and yogurt- activia with constipation. Watermelon and oranges.   On prenatal and extra folic acid.   Seen by MFM- Will go back in 1 month- she will be 23 weeks and 2 days at her next follow up.   Because of blood pressure issues, they will not allow longer than 38 weeks.   After high risk again in 1 month, they will release back to her GYN   Insurance has a program and they will give her a nurse that she can talk to through her insurance  She has had increased light-headedness. Not more dizziness- having no more dizzy spells but having light-headedness between dizzy spells.     History of Covid 19 again 7/2022- she felt poorly, started to develop a rash. Has had resolution of symptoms back to baseline from initial Covid 19 infection.     She was working on diet- she is working on what she can control. She got a stationary bike and has used as she could tolerate. She had dizziness all day when she tried to exercise outdoors. Dizziness was more consistent and worse.   Dizziness-still having symptoms.  Not improving-previously, neurology recommended cyproheptadine.  There was a possibility of changing Lexapro levels.  I discussed with neurologist and we advised could take medication.  No improvement in dizziness with the medication. Has not helped.  Noticed that she has sensory overload. When she has more light or sound or things going on, she has pressure in her head and everything is amplified. She notices even in her car alone- bright and with movement is overstimulated. Still dizziness with eyes closed and with being up and moving. She had increased nausea.    Seen by Dr Izquierdo- ENT [facility]- specializes in dizziness- he advised not sure but feels like it is similar to other long Covid patients. Tried verapamil  mg once  daily. Follow up 6 weeks 5/11/2022 at 2 pm and no further treatment that they could provide.   She had appt with [facility] 7/2022 but contracted Covid 19. She was told she would have to  reschedule her appt- soonest was 10/13/2022.   Verapamil zoned out. She did not have improvement with medication. They advised she had exhausted all treatments and testing.   10/26/2022- Patient was seen at [facility] 10/13/2022 for peripheral vertigo, cervicocranial syndrome, intractable migraine, imbalance, vestibular neuritis.  Impression was complex issues of dizziness, imbalance, and intermittent migraine headache-likely overlap between peripheral vestibular disturbance abnormal cervical spine biomechanics, and tendency towards migraine.  They were concerned for migraine activity without headache.  Possible neuritic irritation of the inner ear as a postviral syndrome.  Advised start B2 200 mg 2 times daily as migraine supplement and referred for cervical and vestibular physical therapy with Carlos Barba.  Patient to contact provider after physical therapy was completed.  The only PT that they recommended to is not covered ([facility]- Carlos Barba) and had to see someone else. The people she was seeing are now going to the office that does not take her insurance and has to change to [facility]. Last seen 12/2022 and had fallen down the steps. She did not think much about it and did not talk with them. Did not think about it until she fell in the tub.   [facility] told when done with PT, reach out on MyChart and they would start Gabapentin.   Has fallen twice- did not remember what caused the fall. 1st fell down the steps- thinks she missed a step and fell back. Had a bad bruise on her back.   She then fell in the bathtub. Thinks she slipped when getting up from the bathtub.   Has never fallen a lot and has fallen twice in 3 months.   She was on Neurontin- he advised to try 1 month. She did not tolerate and did not help.  She was seen by telehealth 6/5/2023 to determine any additional treatment.   No changes in dizziness. She tries to read about long term Covid- a lot of people with dizziness.    MRI brain w/wo  3/2021- ovoid mass left orbit. Otherwise, normal MRI brain.   MRI brain and IAC w/wo 10/21/2021- evidence of removal of orbital mass, scalp incision. Otherwise normal.   Eye Surgeon- Dr Bowman 10/28/2021- cleared for PRN follow up.  Neurology- Dr Hogan- has advised she see ENT, referred for sleep study- negative, treated with Topamax and Imitrex as needed, now increased Topamax and referred for vestibular therapy and given Meclizine PRN. She reports the Meclizine did not help, did not tolerate increased dose of Topamax, and is awaiting PT. She was advised to follow up in 4 months and is hesitant about this, as her symptoms are debilitating and preventing safe return to work.    ENT- Dr Holt/ [facility]- last seen 12/2021 and was advised she took the wrong Mg (she took Magnesium oxide as listed in the chart), tried mouth guard without relief, and was advised this couldbe more neurological and to follow up with neurology.  She had dizziness prior to eye surgery but has had significant worsening after surgery. She went back after surgery ,told him she was dizzy, and he told her that was normal and that she lost a lot of blood in surgery.   She had 2 accidents on [employer].   The 1st was 8/31/2021- it was her 1st week back after her accident. She was picking up parts that were up high and she gets most dizzy and wobbly. She thinks she may have wobbled and dropped the parts.   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 When she lifted it, she has to tilt it back. 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 They have boxes hanging down from the line. The She has never hit it before. She was due for physical last night. They asked if she had dizziness or light-headedness. She was failed because she has dizziness. 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 No associated BP, palp.   Most dizzy when she is bending, looking up, doing  thing around her house. Work advised she see me with a note that gave restrictions.   She has been cutting atenolol in 1/2- she then felt CP or heart beating fast. She had her BP checked and had elevated BP 16 systolic. After 5-6 days, she started taking whole Atenolol and had improvement in BP. No change in dizziness with elevated or normal BP.   She had palpitations- cleared by cardiology. She thought this could contribute but has improved.   They thought dizziness was positional and treated for vertigo- positional treatment. No improvement.   She went to ENT- they did hearing test and lay down and sit up without concerns. They want to come back and do testing that takes 1 hour. They have seen a lot of people that struggle with migraines after Covid have dizziness.  10/2021 she had a terrible period- dizziness, nauseated, felt like she would not make it from the toilet to the bed, cold sweats, pale, felt like death for a full day then improved. She has always struggled with bad cramping. This was much worse. No contraception. Previously Nexplanon was not good. They could not get IUD in and has felt poorly on birth control. Patient's step daughter had mood issues on patch.   11/2021- She reported she was using mouthguard. Also took Mg and developed a rash under chin/neck. She estimated about 5-20 episodes of dizziness per day- last 20-30 seconds. Long enough to disrupt what she is doing. It is spinning dizziness- feels like if you were drunk. She has also had the sensation of shakiness- things shaking in her vision- intermittent but not every day. She had a couple episodes with nausea with the dizziness but not bad. Sometimes has wondered if it is associated- is not every time. Told that it was the wrong Mg but ENT thought it should be a neurological issue  Seen by neurology- they gave Meclizine- did not help. She also doubled Topamax as directed- flt poorly but did not help dizziness. She has decreased again to 50  "mg.   She kept a tally sheet to ensure she could answer the amount of episodes per day rather than estimating- is having 12-13 episodes per day.   Constantly seeing things out of the corner of both of her eyes. There are times that she feels like it is there and is surprised when it is not there- size ranges between a mouse and a person. Has every day at different points.  1/2022- She noted worsening dizziness and was having dizziness with her eyes closed.  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 She had follow up with Dr Bowman and was advised PRN follow up 10/28/2021. He did not seem to think her dizziness was related to her surgery. She has had lazy eye more- she reports she had mild lazy eye on the right since she was a child. Now the left eye is wider and now it made the right eye more lazy.     Patient was seen by Dr Bowman who recommended removal of mass. She was very nervous about surgery. Patient reports she was so shocked that she could not think of questions then had questions about incisions, recovery, and others.   Surgeon told her not a big deal and 1-2 hour surgery. She was in surgery for 3-4 hours. Had trouble waking up. They advised they could keep her but that she would do better at home. Still numb above her eye and could not raise her left eyebrow. She was told that it should improve in 6 months.     Depression and anxiety-Tired-  is off as well and is able to help. She is eating every 2 hours. When she is asleep, she is worried about baby. Not able to relax. She is happy and no postpartum depression but more anxiety. Moods are \"pretty good for the most part\". Emotional- can cry easily.  She is emotional.  Patient has been on Lexapro and Wellbutrin.  She had a positive pregnancy test and was advised to stop Wellbutrin.  She was advised to talk with her OB/GYN about continuing Lexapro or the need to stop it.  They were okay with " her continuing Lexapro and Wellbutrin throughout her pregnancy.  Lexapro- for a while, having bad dreams. The dreams improved but she still has them intermittent that she dreams constantly. She has had improvement in anxiety daily but if something happens and she has a stressful situation, she still has some panic and emotional issues. Still having counseling every week. She is working on things to do when she has these episodes. Reminding herself that it will be ok and why it will be ok and why it was a good thing. She mentioned increasing Lexapro.   She previously reported she had more depression- she gets down on herself about her year. Her therapist thought that she may need to increase Lexapro. Patient reported she was not depressed or sad all the time but had days that she was more down. No SI/HI.   She then had increased depression- she has had decreased interest in anything, exhaustion, not wanting to be around people, sleeping a lot, not keeping up with home chores or things she would normally do at home. It was not abnormal to be in her feelings or in a funk for a few days but she had severe symptoms. Not wanting to shower or take care of herself. She has had increased depression. Talked to therapist about her moods and they discussed possible changes in medication. She felt some better after talking but she still has depression. Therapist was out a week but follow up 10/19/2021. She thought that contributes to her depression- she is tired of trying to figure out what is contributing to her symptoms since Covid. She previously knew her body and when something was going on. She does not now and is bothersome.  Patient knows her father struggled with his mental health, and she worried that she could have issues.   She has had bad anxiety since her grandfather , when she has anxiety, playing her grandfather's death over and over.   She moved a couple years ago and started worrying her house will burn  "down.   With anxiety in the past (prior to Covid 19 infection), she was seeing a therapist and reported she was doing much better. She would occasionally miss work with anxiety/depression but had FMLA. She usually did well and was able to work, do things socially.  Moods were difficult to  with feeling poorly all the time, inability to return to normal life and work, and uncertainty of prognosis. She felt the Lexapro helped-felt like it does not help with major situations but does helped with daily symptoms. Wellbutrin- not sure she adjusted to it and continues to feel tired in the day. She thought it may help daily when things are normal. Since she had increased stressors and uncertainties, she was not sure it is controlled. She was seeing her therapist weekly which helped- feels better upon leaving every week. If it starts to build up again through the week. It is almost time to see her again.   She felt medication and counseling 1-2 x weekly helped moods. She had episodes of being hopeless and being anxious. However, she was able to feel better if with a counseling session. Her animals also help moods- emotional support.   She got really down for a while. Still seeing therapist weekly. She could not do well with future and taking things day by day.  She was unsure if fatigue but was more grumpy. Still seeing therapist a little more spread out. She has migraines and has to reschedule.  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  Work was laid off for 2 weeks-she attempted to go back to work after layoff. When she started to feel better, something mentally started crashing down. As she started to feel better physically, she started \"freaking out\". She was having panic attacks.   Patient related the feeling to being on a plane to [place] years ago, she felt like she was back on the plane and ready to crash.   She worried about going back to work " "without FMLA and worries about losing her job. She reports her friend at work told her they are firing people \"left and right\". She thinks she will lose her job and does not have FMLA  (She has to acquire so much leave to qualify for FMLA).    She had anxiety doing anything, worst at night with laying down. She thought something could be wrong with her heart. Cardiology cleared her but she feels like something is wrong with her heart when she has episodes. She tries to hold it in.   She was doing well on Lexapro. She increased to 10 mg once daily. She did have significant fatigue upon starting medication, however, this improved. She continued with anxiety, however, she was able to calm herself down many times. She was working with mental health therapist twice weekly and was starting to improve. She felt she should try to go back to work slowly. There may be a layoff but she could go in to work but the line will not be moving, all the people were not there, and she could come and go- did not have to work full 12 hours. She thought this would be more helpful to try to go in and start trying to do her job without the pressure and interaction with others. She reported she would be able to leave if she had a panic attack. Despite still having anxiety, she thought this would improve some if she could get back to work and on a routine. She wanted to try.  She then had increased depression- she has had decreased interest in anything, exhaustion, not wanting to be around people, sleeping a lot, not keeping up with home chores or things she would normally do at home. It was not abnormal to be in her feelings or in a funk for a few days but she had severe symptoms. Not wanting to shower or take care of herself. She has had increased depression. Talked to therapist about her moods and they discussed possible changes in medication. She felt some better after talking but she still has depression. Therapist was out a week but " follow up 10/19/2021. She thought that contributes to her depression- she is tired of trying to figure out what is contributing to her symptoms since Covid. She previously knew her body and when something was going on. She does not now and is bothersome.    Cough- no complaints today.   11/17/2021- she had a tickle cough at night only that started about 1 month ago. No coughing in the day. No SOA or other symptoms, signs of illness.   She was advised can treat GERD and use Mucinex DM if needed. Otherwise consider albuterol as needed.     Covid 19 vaccine (Pfizer)- got initial vaccine 4/12/2021. She was tired that day then back to baseline.   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  Patient had significant symptoms following Covid 19 infection. She developed symptoms of Covid 19 12/24/2020 and tested positive 12/28/2020. She had hypoxia and tachycardia with ambulation, SOA, chest tightness, weakness, fatigue, brain fog, diarrhea, and nausea with resolution of severe rash. She went to ER with oxygen saturation of 88%. CTA chest with mild pneumonia and negative for PE. Inflammatory markers were not performed. They did not evaluate symptoms with ambulation and resting vital signs were ok.  While on video visit, patient had oxygen saturation of 97% and pulse in 90s then with relatively little ambulation/ exertion, she had pulse 127 and oxygen saturation down to 88%. In office, she had a normal resting pulse that increased to 124 with ambulation and oxygen saturation decreased from 95% to 90% with ambulation.     She had persistent tachycardia and hypoxia with walking short distances (that is slowly improving), elevated BP that improved, cognitive impairment, fatigue, headaches, and feeling overall poorly. She was seen by the long term Covid infectious disease clinic, had additional labs, was referred to physical  therapy, speech, and cognitive therapy, and was advised to contact her cardiologist for follow up with persistent tachycardia with ambulation. She underwent PTand speech/cognitive therapy. She called cardiology to schedule testing ordered at [facility] prior to Covid 19 then scheduled follow up evaluation of post-Covid tachycardia/ arrhythmia as directed. He changed her propranolol to atenolol for tachycardia and is awaiting Holter monitor and echocardiogram. She is tolerating medication well.     I referred for MRI brain and to neurology for severe headaches. She had severe, debilitating headaches. She had to go to ER but had no testing performed there. Excedrin Migraine, Fiorcet, Zofran, and narcotic pain medication have not helped headaches. I discussed beta blocker, Elavil, and Topamax at her last visit, however, she was concerned about possible AE with medication, as fatigue, arrhythmia, and cognitive dysfunction are already some of her biggest issues. She was unable to tolerate her headaches and felt the possible AE with medication were worth the risk for improvement in headaches. She tried Inderal LA 60 mg nightly without improvement and was started on Topamax by neurology. She has had significant AE with medication. I had her decrease to 25 mg at bedtime then she was able to increase again to BID and tolerated better.      I contacted infectious disease provider for recommendations for return to work vs work restrictions. She recommended either waiting to return to work until she had some improvement with therapy or may try light duty if available to see if she will tolerate this until she has improvement. 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    She returned to work and noted multiple cognitive issues. She reports they had her work 2 hours then sent her home with no work available. 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 She felt she needed more cognitive therapy to safely return to work. I had her remain off work until her follow up with long term Covid clinic. She has been advised to remain in contact with her employer to ensure her job is secure and leave is approved.     She felt ready to return to work and returned 6/2021. 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 She was seen by medical for CPE and was advised not to work until she was no longer dizzy, as she was a risk on the line, on the floor, and with forklift.     Hypoxia- improved. Not feeling as bad with SOA but still notices some.   lowest has been about 90s% and maybe 88% if pushing it. Improving gradually  She had hypoxia and tachycardia with ambulation, SOA, chest tightness, weakness, fatigue, brain fog, diarrhea, and nausea with resolution of severe rash.   She went to ER with oxygen saturation of 88%. CTA chest with mild pneumonia and negative for PE. Inflammatory markers were not performed. They did not evaluate symptoms with ambulation and resting vital signs were ok.    While on video visit, patient had oxygen saturation of 97% and pulse in 90s then with relatively little ambulation/ exertion, she had pulse 127 and oxygen saturation down to 88%. In office, she had a normal resting pulse that increased to 124 with  "ambulation and oxygen saturation decreased from 95% to 90% with ambulation.   Tachycardia/ elevated BP- had more controlled pulse on atenolol. Heart rate has improved. She tried to get off and worsens. She does not have to see cardiology any longer. PRN follow up.  She was started on labetalol with pregnancy.  Pulse with activity- at PT around 120, she stops her and if pushes self at home she gets up to 150. BP and pulse have improved some but continues with pulse that is elevated but oxygen has stayed above 90.   She has persistent tachycardia and hypoxia with walking short distances (that is slowly improving), elevated BP that is improving  She called cardiology to schedule testing ordered at appt prior to Covid 19 then scheduled follow up evaluation of post-Covid tachycardia/ arrhythmia as directed.   He changed her propranolol to atenolol for tachycardia and is awaiting Holter monitor and echocardiogram. She is tolerating medication well.   Cardiology- changed inderal to atenolol.   Echo- ok.   Fatigue- still struggling. Now having to fight to get up and do things but not as much. She has had improvement but not resolution. Worse with Wegovy.   Cognitive deficit/dysfunction- worse with verapamil. The 1st week she took it, she felt like she should not be driving. Now back to wheere she was prior to the new medication but has not improved. Different from previous brain fog. Things slip away quickly. She will  her phone to do something and will forget   has been one of the worst things too- cannot remember things. She will be getting ready to do something and forgets. Sometimes she will get it back and sometimes she will not. She reports she tries to think of something and totally forgets. Not as severe as right after COvid but continues to be a problem.   Still \"brain farts\", short term memory issues, headaches, oxygen levels- not sure if Topamax has amplified brain fog but has been prevalent.   This " symptom was her most worrisome issue.  She was struggling with cognitive issues which cause increased anxiety.  Headaches-difficult to determine with decreased sleep and recent hospitalization with blood pressure.  She had headaches that were not migraine headache but all over nagging headaches. Started about when starting Wegovy.    has had a few headaches since stopping Topamax. She was able to stop quickly. Did not have to taper as long. Not nearly as bad as prior to Topamax. No improvement in cognition.   She had right sided headache and headache behind her eyes, nausea with vomiting, eye was red and watering, right sided facial and eyelid drooping, eyebrow was not normal. She reported it woke her up from sleep.   She went to ER 3/11/2021 with drooping eyelid, bloodshot eye, watering. She was in tears due to severe pain but they did no testing. By the time they gave her a headache cocktail, her headache was already improving. She was ready to go home because she did not feel she was getting any help. They discharged her with Fiorcet and Zofran that have not helped her headaches.   She was given pain medication from previous foot surgery and never took it. She tried it and got sick but did not help her pain.  I referred for MRI brain and to neurology for severe, debilitating headaches.    I initially discussed beta blocker, Elavil, and Topamax, however, we were concerned about possible AE with medication, as fatigue, arrhythmia, and cognitive dysfunction were already some of her biggest issues. She was unable to tolerate her headaches and felt the possible AE with medication were worth the risk for improvement in headaches.   She tried Inderal LA 60 mg nightly without improvement.   Headaches occurred almost always when she was asleep. She took Tylenol and tried Excedrin Migraine. They were waking her up at night, which was abnormal for her.    She was seen by neurology 3/30/2021 started on Topamax 25 mg twice  "daily and Imitrex as needed for headaches by neurology. She has had significant symptoms with this. I discussed possibly cutting dose in 1/2 initially to 25 mg at bedtime then possible increase in dosage as tolerated.   I referred to sleep medicine as recommended by neurology.   Topamax helped headaches. She had a headache all day once but otherwise had no other bad headaches. She had paresthesias and worsening cognitive funciton, brain fog, ability to think clearly. I asked that she decrease to nightly dosing only for a couple weeks then could increase to twice daily. She then tolerated better.   She had improvement in headaches on Topamax. Decreased as directed then was able to increase to twice daily again and had improved headaches and no worsening neurological/cognitive symptoms. She stopped having severe, debilitating headaches and stopped waking up with headaches. Now following with neurology.   She then had to stop Topamax after developing kidney stones.   Snoring- does not think she is snoring as bad.   Has never been someone who snored but since Covid, she is snoring loud, sometimes waking her . Negative Sleep study- no EVERETT.   Hair loss- slowed down.   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 When she started to feel better, something mentally started crashing down. As she started to feel better physically, she started \"freaking out\". She was having panic attacks.   Patient related the feeling to being on a plane to [place] years ago, she felt like she was back on the plane and ready to crash.   She worried about going back to work without FMLA and worries about losing her job. She reports her friend at work told her they are firing people \"left and right\". She thinks she will lose her job and does not have FMLA  (She has to acquire so much leave to qualify for FMLA).    She had anxiety doing anything, worst at night with laying down. " She thought something could be wrong with her heart. Cardiology cleared her but she feels like something is wrong with her heart when she has episodes. She tries to hold it in.   She was doing well on Lexapro. She increased to 10 mg once daily. She did have significant fatigue upon starting medication, however, this improved. She continued with anxiety, however, she was able to calm herself down many times. She was working with mental health therapist twice weekly and was starting to improve. She felt she should try to go back to work slowly. There may be a layoff but she could go in to work but the line will not be moving, all the people were not there, and she could come and go- did not have to work full 12 hours. She thought this would be more helpful to try to go in and start trying to do her job without the pressure and interaction with others. She reported she would be able to leave if she had a panic attack. Despite still having anxiety, she thought this would improve some if she could get back to work and on a routine. She wanted to try.  She then had increased depression- she has had decreased interest in anything, exhaustion, not wanting to be around people, sleeping a lot, not keeping up with home chores or things she would normally do at home. It was not abnormal to be in her feelings or in a funk for a few days but she had severe symptoms. Not wanting to shower or take care of herself. She has had increased depression. Talked to therapist about her moods and they discussed possible changes in medication. She felt some better after talking but she still has depression. Therapist was out a week but follow up 10/19/2021. She thought that contributes to her depression- she is tired of trying to figure out what is contributing to her symptoms since Covid. She previously knew her body and when something was going on. She does not now and is bothersome.    Joint pain, back and neck pain- bottoms of both feet  have been hurting really bad and aching. She has the most pain at the ball of the foot. Propping up helps some at night. Very tender.   she still has joint pain. Not as bad with back and neck pain. Her knees are more sore sometimes. Right foot with previous surgery hurts more.    She had improvement in the pain. She was still not back to baseline but improved.   Foot that had tendon release surgery hurt the worst- some improvement in other pain.   When she was a kid, she had a tubing accident but had hip pain after that. He hip hurt more after Covid. No recommendations from PT. States they did not have any additional treatment.      Therapy- still in mental health counseling.   Physical therapy- doing PT with [facility] as directed by [facility]  Once weekly once she went back to work. When she was off work, she went 2-3 x. Has been walking with her dog per PT but she was scared of triggering a migraine because she was scared that a bump or anything would cause migraine.   Speech and cognitive therapy- There was initially a mistake on their part. Speech therapy- told her none of their other patients went back to full time. Other people had gone back 4 hours per day and gradually increased to 6 then 8 hours. They were concerned they would not be able to go back 12 hours.    Mental health counseling- seeing her therapist and she has her using an joaquim for relaxation and mind sharpness- doing that once daily.    Return to work- 5/11/2021 -she tried to return to work again and had a panic attack.  She started having severe anxiety about doing her job, remembering, and being able to function at work.  She has never had anything this severe in the past.  She went to work but had to leave. She then returned to work again 6/2021 and had 2 accidents with the [employer]- 1 dropping parts and another hitting a cable. She was seen for physical by medical and was advised she could not work with dizziness due to the risks.   She  previously went back to work and had to leave after 2 hours. She reports they asked her to tell someone something and she couldn't remember who that was. They thought this was someone she should know but she had no idea. Also, the process of going into work- line, temperature, getting mask- couldn't remember how to do it. She states it was like she was a new hire. Things that she thought should have come back as soon as she saw it did not come back. They had her sit at a picnic table x 2 hours until they decided she should go home on no work available. They still have the heat on and with the heat and her mask, she felt claustrophobic. No other symptoms that she is aware.    Morbid obesity-Medication was stopped.  She was nervous- Wegovy- made her exhausted all day every day. She has never experienced that for this long. Started 4/20/2023 and has lost 6 lbs. Same time-  and mother have been on it. Mother gained weight and  did not lose a lb. She is not as active because she is exhausted. Even with dizziness, she tries to fight through it but cannot. Will take a 4 hour nap.    Mixed hyperlipidemia- not sure about numbers.  Recently postpartum.   She is not plant based at this point. She only had fast food once and felt bad and was otherwise been eating healthy and working hard. She has exercised as tolerated. She lost about 15 lbs.   Watched [place] [employer]- she tried no eating meat and changed to almond milk. She has not stopped cheese/dairy completely yet. Feels she can do it. She is not completely plant based but is her goal. 1/2022- worsening cholesterol- elevated T Chol, LDL, and TG.   Slipped up through holidays and gained 10 lbs. She has been back on track for 2 weeks. She got excited and thought a few cheats and has now gotten better. Started a challenge- 5 days a week and doing a workout program to try to help with working out. Enjoying it and working on different parts of her body. Slowly  but surely making lifestyle changes.  Prediabetes-  She continues with diarrhea with metformin-BM 5-10 x daily. Prior to metformin, she was going about 2-3 x daily. It is urgent and is sudden onset.   Stopped Metformin and had significant improvement. She had not had solid BM in a long time but had improvement in BM. She felt terrible on medication.  Stopped having menses when she stopped Metformin. She was having normal menses while on Metformin. She had menses 3/13/2022.  1/2022- A1C up to 5.8%. She restarted Metformin - fertility specialist refilled. Metformin gives diarrhea and she has had more nausea since restarting. Stopped having menses when she stopped Metformin. She was having normal menses while on Metformin. She had menses 3/13/2022.  She was doing well- she worked on diet and exercise as tolerated. She had no beef, pork from 5/2022 and fried foods 2-3 x 5/2022. French fries 1-2 x since 5/2022. She decreased sweets but still eats.   10/2022- She noted trouble swallowing pills lately. She is not sure but has almost got sick with nightly medications.    Vitamin D deficiency- taking 5000 IU 3 x weekly and a prenatal vitamin. Also fish oil.   She was taking vitamin D 5000IU every other day and prenatal vitamin and Folic acid.   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  She is taking prenatal vitamin and just delivered a baby.  Elevated liver function tests- patient without regular NSAIDS, Tylenol, or alcohol. Seen by GI- they ordered CT abd/pelvis. 1/2022- normal LFT.   Hyperuricemia- 1/2022- elevated uric acid. Prior to last labs- she ate a bunch of cocktail shrimp.   Taking folic acid 400 mcg daily in addition to prenatal vitamin.   B12- continuing to take after neurology advised to take it.     Menses-currently bleeding postpartum.   She had a few menses in the last year with severe cramping and feeling like she could faint. Will take anything to feel  "better at that point. She took 5 Tylenol. No change with taking or not taking Metformin.  She did have bleeding with pregnancy.    Skin lesions- Dermatology removed a lesion right face and left axilla- both  Benign. The spot on her right forearm- it is a scar. They scheduled removal 1/10/2022.   She was concerned about a spot on her face that has been there- looked like a dark, Aging\" spot. However, she noticed it to be larger, more raise, and bothersome. She wanted to consider dermatology for right forearm scar that is raised to see about removal. Dermatology a long time ago but unsure who she saw.     Ingorwn toenails- Her 1 great toenails removed for ingrown toenails. Cannot wear socks or shoes. Left toenail 1 month ago- still healing. He advised it looked good when he did right toenail.     Patient's specialists:  Bariatrics- Dr Noah Koroma- last appt 5/2012 in follow up of [facility].   Cardiology -Dr. Negrete-last appointment 9/2023 for chest pain and tachycardia, palpitations, chronic hypertension during pregnancy.  Agrees OB/GYN's approach to switching her over to labetalol.  Follow-up 6 months shortly after pregnancy and could always either continue labetalol or switch back to atenolol.  5/2021 for palpitations, hypertension, and chest pain. Hold off on stress test since she was improving. Continue medication for a couple months then re-evaluate. Consider weaning atenolol but if she needs medication for BP, she may need to continue beta blocker and consider labetolol with desire to conceive. Follow up in 3 months. Appt scheduled 11/29/2021  She had been seen 11/2020 for atypical chest pain. Echo, Holter, and stress test ordered.    She was seen again after Covid 19 with tachycardia, SOA. They held stress test with post Covid symptoms. Started atenolol 25 mg daily for elevated blood pressure and tachycardia.    4/2021- Holter monitor normal.   3/2021- Echo normal.   GI- Dr Jensen- last appt 11/18/2021 " for elevated LFT and hemochromatosis carrier state. Referred for CT abd/pelvis. Advised diet and consideration of lipid medication. Consider additional labs if CT is negative and enzymes continue to increase.    Infectious disease-MONICA Sawyer-last appointment 2021 in follow-up of COVID-19 infection.  Diagnosed with post viral fatigue syndrome and brain fog.  Advised physical therapy, cognitive therapy, and advised follow-up with cardiology for tachycardia.  Performed additional labs. 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  Follow-up PRN.  Neurology-Dr. Jose Patel-last appointment 2023 for dizziness, headaches, possible migraine activity.  Start indomethacin 50 mg 2 times daily with food to help with dizziness.  Contact after her trial has been completed to see if she had improvement.  She had no improvement and they were considering cyproheptadine for migraine activity without headache-thought to be related to dizziness.  Advised could influence Lexapro levels and would need to monitor.  Prior Dr Hogan-last appointment 2021 for migraine headache-headaches post Covid and dizziness.  Headaches improved on Topamax 25 mg twice daily- continue medication.  Also given Imitrex. Neagative sleep study. Consider vestibular migraines- trial increase Topamax, referred for vestibular therapy, and given Meclizine for dizziness. Follow-up in 4 months.  OB/GYN-Dr. Neda Penn-last appointment 2024 for post partum visit.  S/p primary low-transverse  section.  She was doing well.  Blood pressure was good-continue labetalol.  Will need 2-hour glucose tolerance test 6-week appointment.  Pap up-to-date.  Follow-up 2024.  Prior Dr. Giles-last appointment 2020 for annual exam/well woman exam.  Pap completed.  Follow-up in 1 year.  Sleep medicine-Dr. Pantoja-last appt 5/10/2021 for evaluation. Home sleep study 2021- no EVERETT.   Speech  therapy-started 4/27/2021 for speech and cognitive therapy. She was discharged the end of 5/2021. She has noticed improvement.   Ophthalmology-Dr. Heydi Vera-last appointment 4/2021 for abnormal MRI orbit.  Referred to Dr. Bowman.  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 Follow up PRN.    ENT-Dr. Devang Izquierdo-last appointment 5/2022 for dizziness, migraine headache.  He was previously treated with Topamax that was stopped.  Formal vestibular testing including rotary chair showing only slight asymmetry on velocity step potation but no concern for real peripheral abnormality.  Minimal relief from verapamil but experienced brain fog from the medication.  To see [facility] for dizziness.  Follow-up 6 months.  Prior Rita Johnson, MONICA- last appt 10/26/2021 for dizziness. Epley maneuver did not work. Advised she get a mouth guard and take Magnesium oxide 400 mg QHS. Follow up if persistent symptoms.   Allergist- Dr Shelia Mc- went 4/19/2021- and they wanted to change Atenolol to Nadolol and gave Zyrtec, Nasacort, and albuterol as needed. Allergy testing- allergic to outdoor issues and not indoor things. He asked her worst symptoms. He was concerned about Topamax- she did cut back to nightly. She had worsening headaches (not severe but continued with headaches). States when she increased again, she feels back to baseline prior to starting Topamax.     The following portions of the patient's history were reviewed and updated as appropriate: allergies, current medications, past family history, past medical history, past social history, past surgical history and problem list.    Review of Systems   Constitutional:  Positive for fatigue (improving).   Eyes:  Positive for visual disturbance.   Respiratory:  Shortness of breath: improving.    Cardiovascular:  Positive for palpitations (improving).   Gastrointestinal:  Nausea: no  constant nausea.   Genitourinary: Negative.    Musculoskeletal:  Positive for gait problem (falls). Arthralgias: improving. Myalgias: improving.  Skin: Negative.    Neurological:  Positive for dizziness. Negative for seizures and syncope. Facial asymmetry: improved. Weakness: improving. Light-headedness: improved. Headaches: improved with treatment.  Psychiatric/Behavioral:  Positive for confusion (improving), decreased concentration (improving), dysphoric mood (improving) and sleep disturbance (new- increased nightly snoring since Covid 19). Negative for self-injury and suicidal ideas. The patient is nervous/anxious (worse with concerns of surgery).         Brain fog improving       Objective   There were no vitals filed for this visit.    There is no height or weight on file to calculate BMI.    Physical Exam  Constitutional:       General: She is not in acute distress.     Appearance: She is well-developed.   HENT:      Head: Normocephalic and atraumatic.   Eyes:      General:         Right eye: No discharge.         Left eye: No discharge.   Pulmonary:      Effort: Pulmonary effort is normal. No respiratory distress.   Skin:     General: Skin is dry.   Psychiatric:         Attention and Perception: She is attentive.         Speech: Speech normal.         Behavior: Behavior normal.         Assessment & Plan   Diagnoses and all orders for this visit:    1. 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 Dizziness    3. Persistent fatigue after COVID-19    4. Persistent neurologic symptoms after COVID-19    5. Adjustment disorder with anxious mood    6. Brain fog    7. Major depressive disorder, recurrent, in full remission    8. Anxiety    9. Class 3 severe obesity with body mass index (BMI) of 40.0 to 44.9 in adult, unspecified obesity type, unspecified whether serious comorbidity present  -     CBC & Differential  -     Comprehensive Metabolic Panel  -     Hemoglobin A1c  -     Lipid Panel With LDL / HDL Ratio  -      TSH  -     T4, free  -     T3, Free    10. Mixed hyperlipidemia  -     Comprehensive Metabolic Panel  -     CK  -     Lipid Panel With LDL / HDL Ratio    11. Prediabetes  -     Comprehensive Metabolic Panel  -     Hemoglobin A1c  -     Urinalysis With Culture If Indicated -    12. Vitamin D deficiency  -     Comprehensive Metabolic Panel  -     Vitamin D,25-Hydroxy    13. B12 deficiency  -     CBC & Differential  -     Vitamin B12 & Folate    14. Hemochromatosis carrier  -     CBC & Differential  -     Iron and TIBC  -     Ferritin    15. Elevated liver enzymes  -     Comprehensive Metabolic Panel    16. Hyperuricemia  -     Uric Acid    17. Chronic migraine without aura without status migrainosus, not intractable    18. Recurrent pregnancy loss with current pregnancy    19. Placental abnormality in second trimester    20. Vaginal bleeding in pregnancy, second trimester- referred to [facility]    21. Diet controlled gestational diabetes mellitus (GDM) in third trimester    22. PCOS (polycystic ovarian syndrome)- was on Metformin to regulate cycles    23. S/P  section    24. Preeclampsia in postpartum period  -     Ambulatory Referral to Cardiology            Assessment and Plan  Patient had significant long term Covid 19 symptoms. She completed physical therapy, speech, and cognitive therapy, and continues follow up with mental health counselor weekly, cardiology, neurology, infectious disease/ long term Covid clinic, ophthalmology, sleep medicine, and allergist as directed by them. She has restarted PT at the request of [facility]. She is trying to transition to a whole food plant based diet. Information given. She was seen by dizziness specialist, Dr Izquierdo, with intolerance to CCB. She was seen in follow up with no further treatment indicated. She has been following with [facility]. She has been started on a couple medications without success, restarted PT, and has had no improvement. She was limited on  medications with pregnancy and now with postpartum. She will continue to specialists them in follow up.      Dizziness- Jocelyn has had dizziness with looking up, bending, and doing things. Unfortunately, she has had worsening and has dizziness with closing her eyes and newly a sensory overload with noises and light.  She had dizziness following Covid 19 infection, however, following an orbital mass removal, she had increasing dizziness symptoms. She has some lightheadedness and some spinning dizziness. She has undergone MRI brain, MRI brain and IAC and been evaluated by ENT and neurology. She was seen by ophthalmology and cleared for PRN follow up. ENT advised mouthguard and Magnesium oxide 400 mg QHS (then reported she should be taking a different Mg supplement). Without improvement, she advised follow up with neurology. Neurology advised trial of increasing Topamax (which she did not tolerate), trial Meclizine (no improvement in dizziness with Meclizine), and referred to PT for vestibular therapy. I agreed with vestibular therapy. She is also having vision issues in the peripheral vision. I advised PT through [place] [facility] long term Covid PT program, as they have developed therapy program for vision and dizziness post-Covid. She was unable to get into this program. She was seen by Dr Izquierdo at [facility], who has specialty in dizziness. They started Verapamil  mg once daily to see if this helped with dizziness. She did not tolerate verapamil, and it did not help the dizziness. There was nothing further they felt they could do to help. I referred to tertiary center for evaluation and treatment who advised Mg, possible migraine variant, and to complete PT with specific PT and follow up with them after completing PT. She then tried Gabapentin and could not tolerate AE but also had no improvement in dizziness with medication. She had no improvement with cyproheptadine. She completed PT. I also referred for warm  water PT for strengthening, pain, and to see if this helps with sensory, balance issues. 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 She did have a couple accidents at work. I will have her off work until she is cleared by specialists. Follow up with me in 3 months. She will see [facility] as directed by them. If she has resolution, she can be cleared by specialists.    Depression with anxious mood- Patient has had some underlying anxiety in the past, however, she developed very severe, debilitating anxiety following Covid 19 infection and rehab, causing panic attacks.  She is seeing her mental health counselor.  She was resistant to medication in the past because she was always able to control her moods, calm down herself, and continue with working.  However, following her COVID-19 infection, she had more severe symptoms that were worsened with trying to return to work.  I started Lexapro 5 mg once daily. Once she was tolerating medication better, she increased to Lexapro 10 mg once daily. She had improved anxiety with therapy and medication. However, she had increased depressive symptoms, fatigue, decreased motivation. I had her continue Lexapro 10 mg once daily and added Wellbutrin  mg daily. She has not noted significant difference.  She thinks moods are pretty well-controlled but she has Beauerle emotional and postpartum state.  She will need to monitor for postpartum depression and anxiety.  Patient to let me know if uncontrolled moods or any concerns.  To be seen ASAP if worsening moods or AE with medication.  Otherwise, continue medication until follow-up.  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   Nocturnal cough- Patient to be seen if persistent symptoms. She can continue Pepcid 20 mg before her evening meal. She can try Mucinex DM twice daily as well. She may also need albuterol  inhaler if needed. To be seen if worsening, new or changing symptoms or no resolution of cough.   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    Hypoxia following Covid 19 infection-  She had slow, gradual improvement and completed PT. She did have improvement in oxygenation.   Tachycardia/ elevated BP, post-Covid arrhythmia- Continue follow up with cardiology as directed by them. Echocardiogram and Holter monitor were ok. Patient to continue Atenolol 25 mg once daily and monitor BP, pulse. Blood pressure was a little low and she decreased Atenolol by 1/2 has her BP returns to normal. Allergist gave her Nadolol. Cardiology to determine any changes from atenolol to nadolol. She has had no increased SOA with beta blocker but is having increased fatigue and decreased motivation. We will consider consulting cardiology and decreasing medication.   Fatigue following Covid 19 infection- Patient to continue mental health therapy. She completed physical therapy, and cognitive therapy.   Cognitive deficit/dysfunction- Continue home exercises. Continue follow up with neurology as directed.   Depression and anxiety- Patient previously had some depression and anxiety that were managed with counseling and techniques. However, she had severe anxiety following Covid 19 then had improvement with medication but has had severe depression. Continue Mental health counseling, Lexapro, and Wellbutrin.  She must be seen ASAP if any concerning symptoms, worsening depression or anxiety.  Persistent headaches following Covid 19 infection- Patient to follow up with neurology and treatment as directed by them. Her allergist wanted her to stop Topamax due to fatigue and cognitive impairment, however, headaches are controlled, and she did not want to stop medication. She did try to stop medication, and  headaches recurred. She then developed kidney stones and was able to wean off Topamax.  I would not recommend Imitrex any further for acute headaches and would recommend Ubrelvy or Nurtec if cleared by pediatrician and OB/GYN, follow up with neurology if worsening headaches, new, or changing neurological symptoms.   Snoring- Since having Covid 19, she developed significant snoring, headaches that woke her up at night, tachycardia, hypoxia, and significant brain fog and fatigue. Negative sleep study. Snoring has not resolved but has improved gradually.  Generalized body aches, back aches, neck pain, and joint pain- Elevated uric acid but otherwise negative autoimmune testing 4/2021. PT and home exercises as needed. I also referred to allergist, with symptoms similar to MIS-A. No diagnosis of MIS-A with allergist. Patient to decrease purine rich foods. I will refer for warm water PT.   History of back pain, chest pain-She did have episode of pain that was a little concerning for gallbladder pathology.  If she has any recurrence of symptoms or no etiology of symptoms with cardiology, we will need to consider workup for gallbladder.  To be seen immediately here or ER if she has any recurrence of symptoms.    In follow up of chronic, non-Covid related symptoms:  Patient will have fasting labs. Call if no results in 1 week. Stability of conditions, plan, follow up, and further recommendations pending labs. Follow up in 3 months or sooner if needed.     Pregnancy with history of recurrent loss, vaginal bleeding in pregnancy, placental lakes, history of low lying placenta resolved, HTN, gestational diabetes, postpartum preeclampsia-  She should continue follow-up and treatment by OB/GYN as directed.  She will need to see cardiology for reevaluation of blood pressure and transitioning from labetalol back to atenolol.   Chest pain, palpitations, dizziness, recent postpartum preeclampsia-I will refer back to cardiology for  evaluation and treatment recommendations as well as consideration of further workup.  Mixed hyperlipidemia- Patient to continue to be compliant with diet/ exercise as approved by OB/GYN and cardiology. She will restart strict diet and exercise. Await labs for further recommendations.    Prediabetes- A1C increased off Metformin and she stopped having menses. She restarted Metformin and menses was more regular again but she had severe GI AE. She did not tolerate GLP1's now postpartum.  We will monitor labs and make further recommendations.   Vitamin D deficiency- Continue vitamin D 5000IU every other day and Prenatal vitamin. Dosing recommendations pending labs.   B12 deficiency-further recommendations pending lab results.  Hemochromatosis carrier- I will continue to monitor and make further recommendations.  Elevated liver function tests- Most recent LFT normal. Avoid NSAIDS and alcohol and limit Tylenol to < 2 grams daily. Follow up with GI as directed.   Hyperuricemia- Decrease purine rich foods. I will monitor and make recommendations.   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 Follow up with dermatology as directed by them.   History of recurrent UTI postpartum-I will recheck labs and make further recommendations.    Patient continues to see specialists as noted above.  I have reviewed available records, including consult notes, labs, and imaging/testing.  Patient to continue follow-up with specialists as directed by them.    I spent 35 minutes for video visit for Jocelyn Merlos on this date of service. This time includes time spent by me in the following activities as necessary: preparing for the visit, reviewing tests, specialists records and previous visits, obtaining and/or reviewing a separately obtained history, counseling and educating the patient, referring and/or communicating with other healthcare professionals, documenting information in the  medical record, independently interpreting results and communicating that information with the patient, family, caregiver, and developing a medically appropriate treatment plan with consideration of other conditions, medications, and treatments.       [facility] Claim# [id number]

## 2024-06-07 ENCOUNTER — OFFICE VISIT (OUTPATIENT)
Dept: CARDIOLOGY | Facility: CLINIC | Age: 34
End: 2024-06-07
Payer: COMMERCIAL

## 2024-06-07 VITALS
OXYGEN SATURATION: 96 % | BODY MASS INDEX: 38.95 KG/M2 | DIASTOLIC BLOOD PRESSURE: 84 MMHG | HEART RATE: 72 BPM | HEIGHT: 68 IN | SYSTOLIC BLOOD PRESSURE: 126 MMHG | WEIGHT: 257 LBS

## 2024-06-07 DIAGNOSIS — I10 ESSENTIAL HYPERTENSION: Primary | ICD-10-CM

## 2024-06-07 PROCEDURE — 99214 OFFICE O/P EST MOD 30 MIN: CPT | Performed by: NURSE PRACTITIONER

## 2024-06-07 PROCEDURE — 93000 ELECTROCARDIOGRAM COMPLETE: CPT | Performed by: NURSE PRACTITIONER

## 2024-06-07 RX ORDER — LABETALOL 200 MG/1
200 TABLET, FILM COATED ORAL 2 TIMES DAILY
Qty: 180 TABLET | Refills: 2 | Status: SHIPPED | OUTPATIENT
Start: 2024-06-07

## 2024-06-07 NOTE — PROGRESS NOTES
[facility] Follow Up Office Note     Encounter Date:24  Patient:Jocelyn Merlos  :1990  MRN:[id number]      Chief Complaint: No chief complaint on file.        History of Presenting Illness:        Jocelyn Merlos is a 33 y.o. female who is here for follow-up.  She is a patient of Dr Negrete.    Patient follows with us for essential hypertension and palpitations.  She also has hemochromatosis.    Patient saw Dr. Negrete a little less than a year ago at which time she was newly pregnant and OB had just switched her from atenolol to labetalol.  She was not having any symptoms.    Patient delivered about 6 weeks ago and subsequently had postpartum preeclampsia.  She received 24 hours of magnesium.  She was continued on 200 mg 3 times daily of labetalol upon discharge.    Patient has been sent back to see us by OB for blood pressure management and due to concern of possible damage to heart from pregnancy.    Patient is tired.  Her baby is up about 2 hours to eat so she is not sleeping well.  She is not having current chest pain or increased dyspnea.  Lower extremity edema is improving.  She does not have PND orthopnea.  She did occasionally have chest pains several weeks ago that is since resolved.    Review of Systems:  Review of Systems   Cardiovascular:  Positive for chest pain. Negative for dyspnea on exertion, leg swelling, orthopnea and palpitations.   Respiratory:  Negative for shortness of breath.        Current Outpatient Medications on File Prior to Visit   Medication Sig Dispense Refill    Alcohol Swabs 70 % pads Use 1 Pad 4 (Four) Times a Day. 120 each 5    Ascorbic Acid (VITAMIN C PO) Take  by mouth.      buPROPion XL (WELLBUTRIN XL) 150 MG 24 hr tablet TAKE 1 TABLET BY MOUTH EVERY MORNING 90 tablet 0    doxylamine (UNISOM) 25 MG tablet Take 1 tablet by mouth At Night As Needed for Sleep. 60 tablet 2    escitalopram (LEXAPRO) 10 MG tablet TAKE 1 TABLET BY MOUTH EVERY DAY 90 tablet 0     folic acid (FOLVITE) 400 MCG tablet Take 1 tablet by mouth Daily.      glucose monitor monitoring kit Use four times daily to check blood glucose. 1 each 0    ibuprofen (ADVIL,MOTRIN) 600 MG tablet Take 1 tablet by mouth Every 6 (Six) Hours As Needed for Mild Pain. 20 tablet 0    Insulin Pen Needle (B-D UF III MINI PEN NEEDLES) 31G X 5 MM misc Use 1 Needle Every Night. 30 each 0    Lancets (accu-chek safe-t pro) lancets Fasting and 2 hour post prandial 120 each 12    Omega-3 Fatty Acids (fish oil) 1000 MG capsule capsule Take  by mouth Daily With Breakfast.      prenatal vitamin (prenatal, CLASSIC, vitamin) tablet Take  by mouth Daily.      vitamin B-6 (PYRIDOXINE) 25 MG tablet Take 1 tablet by mouth Every Night. 60 tablet 1    vitamin D (ERGOCALCIFEROL) 1.25 MG (32661 UT) capsule capsule Take 1,000 Units by mouth.      [DISCONTINUED] labetalol (NORMODYNE) 200 MG tablet Take 1 tablet by mouth 3 times a day. 90 tablet 2    Blood Glucose Monitoring Suppl (True Metrix Meter) w/Device kit USE FOUR TIMES DAILY TO CHECK BLOOD GLUCOSE (Patient not taking: Reported on 2024)       No current facility-administered medications on file prior to visit.       No Known Allergies    Past Medical History:   Diagnosis Date    Abnormal Pap smear of cervix     Adjustment disorder with mixed anxiety and depressed mood 2016    COVID-19 2020    Female infertility     Gestational diabetes     HSV-2 infection     valtrex for supression during pregnancy    Hypertension     CHRONIC - Since - currently on medication    Palpitations     occasionally with pregnancy    Polycystic ovary syndrome        Past Surgical History:   Procedure Laterality Date    BREAST AUGMENTATION      CERVICAL BIOPSY  W/ LOOP ELECTRODE EXCISION       SECTION N/A 2024    Procedure:  SECTION PRIMARY;  Surgeon: Neda Penn MD;  Location: [facility] LABOR DELIVERY;  Service: Obstetrics;  Laterality: N/A;    FOOT SURGERY  "Right     plantar fascitis tendon release    LAPAROSCOPIC GASTRIC BANDING      LEEP      OTHER SURGICAL HISTORY      removal of eye tumor    OTHER SURGICAL HISTORY      tummy tuck    TONSILLECTOMY      WISDOM TOOTH EXTRACTION         Social History     Socioeconomic History    Marital status:    Tobacco Use    Smoking status: Former     Current packs/day: 0.00     Average packs/day: 0.3 packs/day for 0.5 years (0.1 ttl pk-yrs)     Types: Cigarettes     Start date: 10/5/2014     Quit date: 2015     Years since quittin.1    Smokeless tobacco: Never   Vaping Use    Vaping status: Never Used   Substance and Sexual Activity    Alcohol use: Not Currently     Alcohol/week: 4.0 - 5.0 standard drinks of alcohol     Types: 4 - 5 Standard drinks or equivalent per week     Comment: Socially./caffeine use     Drug use: No    Sexual activity: Yes     Partners: Male       Family History   Problem Relation Age of Onset    Skin cancer Mother     Depression Mother     Anxiety disorder Mother     Stroke Mother     Aneurysm Mother     Alcohol abuse Mother     Migraines Mother     Hemochromatosis Mother     Stroke Maternal Grandmother        The following portions of the patient's history were reviewed and updated as appropriate: allergies, current medications, past family history, past medical history, past social history, past surgical history and problem list.       Objective:       Vitals:    24 1206   BP: 126/84   Pulse: 72   SpO2: 96%   Weight: 117 kg (257 lb)   Height: 172.7 cm (68\")         Physical Exam:  Constitutional: Well appearing, well developed, no acute distress   HENT: Oropharynx clear and membrane moist  Eyes: Normal conjunctiva, no sclera icterus  Neck: Supple, no carotid bruit bilaterally  Cardiovascular: Regular rate and rhythm, No Murmur, No bilateral lower extremity edema  Pulmonary: Normal respiratory effort, normal lung sounds, no wheezing  Neurological: Alert and orient x 3  Skin: " Warm, dry, no ecchymosis, no rash  Psych: Appropriate mood and affect. Normal judgment and insight         Lab Results   Component Value Date     04/28/2024     04/27/2024    K 3.3 (L) 04/28/2024    K 3.8 04/27/2024     04/28/2024     (H) 04/27/2024    CO2 22.6 04/28/2024    CO2 19.5 (L) 04/27/2024    BUN 8 04/28/2024    BUN 9 04/27/2024    CREATININE 0.84 04/28/2024    CREATININE 0.74 04/27/2024    EGFRIFNONA 80 01/28/2022    EGFRIFNONA 102 10/08/2021    EGFRIFAFRI 92 01/28/2022    EGFRIFAFRI 117 10/08/2021    GLUCOSE 109 (H) 04/28/2024    GLUCOSE 74 04/27/2024    CALCIUM 7.4 (L) 04/28/2024    CALCIUM 8.7 04/27/2024    PROTENTOTREF 6.3 04/04/2024    PROTENTOTREF 5.9 (L) 12/19/2023    ALBUMIN 3.5 04/28/2024    ALBUMIN 3.2 (L) 04/27/2024    BILITOT 0.3 04/28/2024    BILITOT 0.4 04/27/2024    AST 17 04/28/2024    AST 25 04/27/2024    ALT 29 04/28/2024    ALT 35 (H) 04/27/2024     Lab Results   Component Value Date    WBC 9.36 04/28/2024    WBC 9.77 04/27/2024    HGB 12.4 04/28/2024    HGB 12.3 04/27/2024    HCT 38.2 04/28/2024    HCT 38.3 04/27/2024    MCV 92.3 04/28/2024    MCV 94.3 04/27/2024     04/28/2024     04/27/2024     Lab Results   Component Value Date    TRIG 171 (H) 12/19/2023    TRIG 130 05/31/2023    HDL 47 12/19/2023    HDL 40 05/31/2023     (H) 12/19/2023     (H) 05/31/2023     Lab Results   Component Value Date    PROBNP 177.0 04/26/2024    BNP 33.9 01/14/2021     Lab Results   Component Value Date    CKTOTAL 45 05/31/2023    TROPONINI <0.012 01/14/2021    TROPONINT 8 04/26/2024     Lab Results   Component Value Date    TSH 1.880 12/19/2023    TSH 2.690 01/27/2023           ECG 12 Lead    Date/Time: 6/7/2024 12:39 PM  Performed by: Arlet Porter APRN    Authorized by: Arlet Porter APRN  Comparison: compared with previous ECG from 4/26/2024  Similar to previous ECG  Rhythm: sinus rhythm  Rate: normal  BPM: 68             Assessment:           Diagnosis Plan   1. Essential hypertension  Adult Transthoracic Echo Complete w/ Color, Spectral and Contrast if Necessary Per Protocol    ECG 12 Lead             Plan:       Essential hypertension -blood pressure is controlled on labetalol 200 mg twice daily.  Since she is no longer taking 3 times daily medication dosing and is well-controlled on her current regimen I am not recommending any changes.  She could have fluctuations in blood pressure postpartum and I am recommending she continue to monitor intermittently at home or with symptoms call us with any changes    Palpitations -no prior testing.  Well-controlled on current labetalol dosing.  Continue    Seen today for follow-up and blood pressure evaluation.  Continue twice daily labetalol    With postpartum preeclampsia and various symptoms that seem to be now improving I am going to check an echocardiogram    Follow-up in 6 months with Dr. Negrete.  Call with changes in blood pressure or symptoms    Orders Placed This Encounter   Procedures    ECG 12 Lead     This order was created via procedure documentation     Order Specific Question:   Release to patient     Answer:   Routine Release [[id number]]    Adult Transthoracic Echo Complete w/ Color, Spectral and Contrast if Necessary Per Protocol     Standing Status:   Future     Standing Expiration Date:   6/7/2025     Order Specific Question:   Reason for exam?     Answer:   Chest Pain     Order Specific Question:   Release to patient     Answer:   Routine Release [[id number]]            MONICA Portillo  [facility]  06/07/24  12:42 EDT

## 2024-06-08 LAB
25(OH)D3+25(OH)D2 SERPL-MCNC: 49.4 NG/ML (ref 30–100)
ALBUMIN SERPL-MCNC: 4.4 G/DL (ref 3.9–4.9)
ALBUMIN/GLOB SERPL: 2.3 {RATIO} (ref 1.2–2.2)
ALP SERPL-CCNC: 89 IU/L (ref 44–121)
ALT SERPL-CCNC: 33 IU/L (ref 0–32)
APPEARANCE UR: CLEAR
AST SERPL-CCNC: 23 IU/L (ref 0–40)
BACTERIA #/AREA URNS HPF: NORMAL /[HPF]
BASOPHILS # BLD AUTO: 0 X10E3/UL (ref 0–0.2)
BASOPHILS NFR BLD AUTO: 0 %
BILIRUB SERPL-MCNC: 0.3 MG/DL (ref 0–1.2)
BILIRUB UR QL STRIP: NEGATIVE
BUN SERPL-MCNC: 14 MG/DL (ref 6–20)
BUN/CREAT SERPL: 14 (ref 9–23)
CALCIUM SERPL-MCNC: 9.6 MG/DL (ref 8.7–10.2)
CASTS URNS QL MICRO: NORMAL /LPF
CHLORIDE SERPL-SCNC: 103 MMOL/L (ref 96–106)
CHOLEST SERPL-MCNC: 210 MG/DL (ref 100–199)
CK SERPL-CCNC: 33 U/L (ref 32–182)
CO2 SERPL-SCNC: 23 MMOL/L (ref 20–29)
COLOR UR: YELLOW
CREAT SERPL-MCNC: 1.03 MG/DL (ref 0.57–1)
EGFRCR SERPLBLD CKD-EPI 2021: 74 ML/MIN/1.73
EOSINOPHIL # BLD AUTO: 0.3 X10E3/UL (ref 0–0.4)
EOSINOPHIL NFR BLD AUTO: 4 %
EPI CELLS #/AREA URNS HPF: NORMAL /HPF (ref 0–10)
ERYTHROCYTE [DISTWIDTH] IN BLOOD BY AUTOMATED COUNT: 12.3 % (ref 11.7–15.4)
FERRITIN SERPL-MCNC: 89 NG/ML (ref 15–150)
FOLATE SERPL-MCNC: >20 NG/ML
GLOBULIN SER CALC-MCNC: 1.9 G/DL (ref 1.5–4.5)
GLUCOSE SERPL-MCNC: 89 MG/DL (ref 70–99)
GLUCOSE UR QL STRIP: NEGATIVE
HBA1C MFR BLD: 5.9 % (ref 4.8–5.6)
HCT VFR BLD AUTO: 41.3 % (ref 34–46.6)
HDLC SERPL-MCNC: 46 MG/DL
HGB BLD-MCNC: 13.5 G/DL (ref 11.1–15.9)
HGB UR QL STRIP: NEGATIVE
IMM GRANULOCYTES # BLD AUTO: 0 X10E3/UL (ref 0–0.1)
IMM GRANULOCYTES NFR BLD AUTO: 0 %
IRON SATN MFR SERPL: 25 % (ref 15–55)
IRON SERPL-MCNC: 74 UG/DL (ref 27–159)
KETONES UR QL STRIP: NEGATIVE
LDLC SERPL CALC-MCNC: 143 MG/DL (ref 0–99)
LDLC/HDLC SERPL: 3.1 RATIO (ref 0–3.2)
LEUKOCYTE ESTERASE UR QL STRIP: NEGATIVE
LYMPHOCYTES # BLD AUTO: 1.8 X10E3/UL (ref 0.7–3.1)
LYMPHOCYTES NFR BLD AUTO: 26 %
MCH RBC QN AUTO: 29.5 PG (ref 26.6–33)
MCHC RBC AUTO-ENTMCNC: 32.7 G/DL (ref 31.5–35.7)
MCV RBC AUTO: 90 FL (ref 79–97)
MICRO URNS: NORMAL
MICRO URNS: NORMAL
MONOCYTES # BLD AUTO: 0.5 X10E3/UL (ref 0.1–0.9)
MONOCYTES NFR BLD AUTO: 7 %
NEUTROPHILS # BLD AUTO: 4.3 X10E3/UL (ref 1.4–7)
NEUTROPHILS NFR BLD AUTO: 63 %
NITRITE UR QL STRIP: NEGATIVE
PH UR STRIP: 6.5 [PH] (ref 5–7.5)
PLATELET # BLD AUTO: 293 X10E3/UL (ref 150–450)
POTASSIUM SERPL-SCNC: 4.8 MMOL/L (ref 3.5–5.2)
PROT SERPL-MCNC: 6.3 G/DL (ref 6–8.5)
PROT UR QL STRIP: NEGATIVE
RBC # BLD AUTO: 4.58 X10E6/UL (ref 3.77–5.28)
RBC #/AREA URNS HPF: NORMAL /HPF (ref 0–2)
SODIUM SERPL-SCNC: 140 MMOL/L (ref 134–144)
SP GR UR STRIP: 1.02 (ref 1–1.03)
T3FREE SERPL-MCNC: 2.9 PG/ML (ref 2–4.4)
T4 FREE SERPL-MCNC: 0.97 NG/DL (ref 0.82–1.77)
TIBC SERPL-MCNC: 301 UG/DL (ref 250–450)
TRIGL SERPL-MCNC: 118 MG/DL (ref 0–149)
TSH SERPL DL<=0.005 MIU/L-ACNC: 1.64 UIU/ML (ref 0.45–4.5)
UIBC SERPL-MCNC: 227 UG/DL (ref 131–425)
URATE SERPL-MCNC: 5.7 MG/DL (ref 2.6–6.2)
URINALYSIS REFLEX: NORMAL
UROBILINOGEN UR STRIP-MCNC: 0.2 MG/DL (ref 0.2–1)
VIT B12 SERPL-MCNC: 1619 PG/ML (ref 232–1245)
VLDLC SERPL CALC-MCNC: 21 MG/DL (ref 5–40)
WBC # BLD AUTO: 6.9 X10E3/UL (ref 3.4–10.8)
WBC #/AREA URNS HPF: NORMAL /HPF (ref 0–5)

## 2024-06-18 ENCOUNTER — OFFICE VISIT (OUTPATIENT)
Dept: FAMILY MEDICINE CLINIC | Facility: CLINIC | Age: 34
End: 2024-06-18
Payer: COMMERCIAL

## 2024-06-18 VITALS
HEIGHT: 68 IN | HEART RATE: 82 BPM | BODY MASS INDEX: 39.71 KG/M2 | WEIGHT: 262 LBS | SYSTOLIC BLOOD PRESSURE: 112 MMHG | RESPIRATION RATE: 18 BRPM | DIASTOLIC BLOOD PRESSURE: 72 MMHG | TEMPERATURE: 97 F | OXYGEN SATURATION: 97 %

## 2024-06-18 DIAGNOSIS — G43.709 CHRONIC MIGRAINE WITHOUT AURA WITHOUT STATUS MIGRAINOSUS, NOT INTRACTABLE: ICD-10-CM

## 2024-06-18 DIAGNOSIS — R73.03 PREDIABETES: ICD-10-CM

## 2024-06-18 DIAGNOSIS — E78.2 MIXED HYPERLIPIDEMIA: ICD-10-CM

## 2024-06-18 DIAGNOSIS — U09.9 PERSISTENT FATIGUE AFTER COVID-19: ICD-10-CM

## 2024-06-18 DIAGNOSIS — R91.1 PULMONARY NODULE: ICD-10-CM

## 2024-06-18 DIAGNOSIS — U09.9 POST-COVID-19 SYNDROME: Primary | ICD-10-CM

## 2024-06-18 DIAGNOSIS — R42 DIZZINESS: ICD-10-CM

## 2024-06-18 DIAGNOSIS — E55.9 VITAMIN D DEFICIENCY: ICD-10-CM

## 2024-06-18 DIAGNOSIS — R74.8 ELEVATED LIVER ENZYMES: ICD-10-CM

## 2024-06-18 DIAGNOSIS — E32.9: ICD-10-CM

## 2024-06-18 DIAGNOSIS — Z14.8 HEMOCHROMATOSIS CARRIER: ICD-10-CM

## 2024-06-18 DIAGNOSIS — U09.9 PERSISTENT NEUROLOGIC SYMPTOMS AFTER COVID-19: ICD-10-CM

## 2024-06-18 DIAGNOSIS — J90 PLEURAL EFFUSION: ICD-10-CM

## 2024-06-18 DIAGNOSIS — R41.89 BRAIN FOG: ICD-10-CM

## 2024-06-18 DIAGNOSIS — F41.9 ANXIETY: ICD-10-CM

## 2024-06-18 DIAGNOSIS — R53.83 PERSISTENT FATIGUE AFTER COVID-19: ICD-10-CM

## 2024-06-18 DIAGNOSIS — E66.01 CLASS 3 SEVERE OBESITY WITH BODY MASS INDEX (BMI) OF 40.0 TO 44.9 IN ADULT, UNSPECIFIED OBESITY TYPE, UNSPECIFIED WHETHER SERIOUS COMORBIDITY PRESENT: ICD-10-CM

## 2024-06-18 DIAGNOSIS — E53.1 VITAMIN B6 DEFICIENCY: ICD-10-CM

## 2024-06-18 DIAGNOSIS — R93.2 ABNORMAL CT SCAN, GALLBLADDER: ICD-10-CM

## 2024-06-18 DIAGNOSIS — F43.22 ADJUSTMENT DISORDER WITH ANXIOUS MOOD: ICD-10-CM

## 2024-06-18 DIAGNOSIS — R29.90 PERSISTENT NEUROLOGIC SYMPTOMS AFTER COVID-19: ICD-10-CM

## 2024-06-18 DIAGNOSIS — E79.0 HYPERURICEMIA: ICD-10-CM

## 2024-06-18 DIAGNOSIS — F33.42 MAJOR DEPRESSIVE DISORDER, RECURRENT, IN FULL REMISSION: ICD-10-CM

## 2024-06-18 PROBLEM — E66.813 CLASS 3 SEVERE OBESITY WITH BODY MASS INDEX (BMI) OF 40.0 TO 44.9 IN ADULT: Status: ACTIVE | Noted: 2024-06-18

## 2024-06-18 PROCEDURE — 99214 OFFICE O/P EST MOD 30 MIN: CPT | Performed by: PHYSICIAN ASSISTANT

## 2024-06-18 RX ORDER — FLUCONAZOLE 150 MG/1
TABLET ORAL
COMMUNITY
Start: 2024-05-26 | End: 2024-06-18

## 2024-06-18 RX ORDER — MULTIPLE VITAMINS W/ MINERALS TAB 9MG-400MCG
1 TAB ORAL DAILY
COMMUNITY

## 2024-06-18 RX ORDER — CIPROFLOXACIN 500 MG/1
1 TABLET, FILM COATED ORAL EVERY 12 HOURS SCHEDULED
COMMUNITY
Start: 2024-05-12 | End: 2024-06-18

## 2024-06-18 NOTE — PROGRESS NOTES
Subjective   Jocelyn Merlos is a 33 y.o. female who presents today in follow up of dizziness, moods, headaches, post-Covid syndrome/ long term symptoms, hyperlipidemia, vitamin D deficiency, elevated LFT, hemochromatosis carrier, skin lesions, and specialists. She is also pregnant with bleeding and increased lightheadedness.     Dizziness  Associated symptoms include fatigue (improving). Arthralgias: improving. Headaches: improved with treatment. Myalgias: improving. Nausea: no constant nausea. Weakness: improving.  DepressionPatient presents with the following symptoms: confusion (improving), decreased concentration (improving), nervousness/anxiety (worse with concerns of surgery) and palpitations (improving).  Patient is not experiencing: suicidal ideas.  Shortness of breath: improving.    Answers submitted by the patient for this visit:  Other (Submitted on 6/16/2024)  Please describe your symptoms.: Covid follow up  Have you had these symptoms before?: Yes  How long have you been having these symptoms?: Greater than 2 weeks  Primary Reason for Visit (Submitted on 6/16/2024)  What is the primary reason for your visit?: Other    NOTE TO [facility]- Fax- [phone]- [place]. Direct # [phone].   Previously-  attn Ms Gregorio   She will eventually get a notice from [facility] and may change to long term disability- still keep insurance.    Postpartum preeclampsia-  Feet swelling and Bp to 190 systolic. She almost did not go to ER and was initially admit to ICU.   Mg drip for 24 hours- felt like someone was trying to kill her and was scary.     CP- she is not sure if anxiety- she has been out of Lexapro- reports her pharmacy told her they did not receive. She has episodes that remind her of anxiety- feeling pressure on chest and tightness. Will take deep breaths to relieve but feels like a weight on her chest. Was having CP and SOA when she went to the hospital. This is the same CP she had when going to the hospital but no  "associated SOA.     Multiple UTI- she had catheter and was having urinary symptoms. Antibiotic- she was on something that started with C and took for 7 days. Finished 4 days ago. Got some yeast from it and they had already called in yeast medication.   Still bleeding some from giving birth.   She is improving some.     OBGYN wanted her to talk with us about labetalol for long term.     Feeling dizzy daily- different than the dizziness you had prior. Now a constant dizzy- more like how you feel when you stand up too fast. Mixed with the feeling with car sickness as well.   The other dizzy spells are intense and hard and these are less intense.   Myasthenia gravis.     She has had some low /60 only once. Most of the time, has been about 120/70- no elevated levels.     Tired-  is off as well and is able to help. She is eating every 2 hours. When she is asleep, she is worried about baby. Not able to relax. She is happy and no postpartum depression but more anxiety. Moods are \"pretty good for the most part\". Emotional- can cry easily.  She is emotional.    While pregnant, she had episode of excruciating pain in back. Someone mentioned could be GB.      Pregnancy with bleeding in pregnancy-delivered healthy baby.  Has had several complications since delivery.  4 big bleeds with golf ball size clots. Last bleed was 9 days ago. Heavy bleeding for 3-5 hours- fills a couple pads then spotting. She had spotting for a few weeks after initial bleed at 7 weeks. She has bled for 7 weeks throughout her pregnancy. Has worn a pad for more days than not.   When on her feet extended period of time, she bleeds. Cramping and tugging- not extreme.  Nausea- has lost weight because feeling full. Has lost about 8-9 lbs. Gained no weight and has lost. As soon as she starts eating, she is very full. Eating small meals frequently. Small bowl of shredded wheat. Big snacks. Protein- not the best choices- meat has been disgusting. " Certain things sound discussed. Main things- bagels, shredded wheat, and yogurt- activia with constipation. Watermelon and oranges.   On prenatal and extra folic acid.   Seen by MFM- Will go back in 1 month- she will be 23 weeks and 2 days at her next follow up.   Because of blood pressure issues, they will not allow longer than 38 weeks.   After high risk again in 1 month, they will release back to her GYN   Insurance has a program and they will give her a nurse that she can talk to through her insurance  She has had increased light-headedness. Not more dizziness- having no more dizzy spells but having light-headedness between dizzy spells.     History of Covid 19 again 7/2022- she felt poorly, started to develop a rash. Has had resolution of symptoms back to baseline from initial Covid 19 infection.     She was working on diet- she is working on what she can control. She got a stationary bike and has used as she could tolerate. She had dizziness all day when she tried to exercise outdoors. Dizziness was more consistent and worse.   Dizziness-still having symptoms.  Not improving-previously, neurology recommended cyproheptadine.  There was a possibility of changing Lexapro levels.  I discussed with neurologist and we advised could take medication.  No improvement in dizziness with the medication. Has not helped.  Noticed that she has sensory overload. When she has more light or sound or things going on, she has pressure in her head and everything is amplified. She notices even in her car alone- bright and with movement is overstimulated. Still dizziness with eyes closed and with being up and moving. She had increased nausea.    Seen by Dr Izquierdo- ENT [facility]- specializes in dizziness- he advised not sure but feels like it is similar to other long Covid patients. Tried verapamil  mg once  daily. Follow up 6 weeks 5/11/2022 at 2 pm and no further treatment that they could provide.   She had appt with [facility]  clinical 7/2022 but contracted Covid 19. She was told she would have to reschedule her appt- soonest was 10/13/2022.   Verapamil zoned out. She did not have improvement with medication. They advised she had exhausted all treatments and testing.   10/26/2022- Patient was seen at [facility] 10/13/2022 for peripheral vertigo, cervicocranial syndrome, intractable migraine, imbalance, vestibular neuritis.  Impression was complex issues of dizziness, imbalance, and intermittent migraine headache-likely overlap between peripheral vestibular disturbance abnormal cervical spine biomechanics, and tendency towards migraine.  They were concerned for migraine activity without headache.  Possible neuritic irritation of the inner ear as a postviral syndrome.  Advised start B2 200 mg 2 times daily as migraine supplement and referred for cervical and vestibular physical therapy with Carlos Barba.  Patient to contact provider after physical therapy was completed.  The only PT that they recommended to is not covered (Neeraj Barba) and had to see someone else. The people she was seeing are now going to the office that does not take her insurance and has to change to [facility]. Last seen 12/2022 and had fallen down the steps. She did not think much about it and did not talk with them. Did not think about it until she fell in the tub.   [facility] told when done with PT, reach out on MyChart and they would start Gabapentin.   Has fallen twice- did not remember what caused the fall. 1st fell down the steps- thinks she missed a step and fell back. Had a bad bruise on her back.   She then fell in the bathtub. Thinks she slipped when getting up from the bathtub.   Has never fallen a lot and has fallen twice in 3 months.   She was on Neurontin- he advised to try 1 month. She did not tolerate and did not help.  She was seen by telehealth 6/5/2023 to determine any additional treatment.   No changes in dizziness. She tries to read about  long term Covid- a lot of people with dizziness.    MRI brain w/wo 3/2021- ovoid mass left orbit. Otherwise, normal MRI brain.   MRI brain and IAC w/wo 10/21/2021- evidence of removal of orbital mass, scalp incision. Otherwise normal.   Eye Surgeon- Dr Bowman 10/28/2021- cleared for PRN follow up.  Neurology- Dr Hogan- has advised she see ENT, referred for sleep study- negative, treated with Topamax and Imitrex as needed, now increased Topamax and referred for vestibular therapy and given Meclizine PRN. She reports the Meclizine did not help, did not tolerate increased dose of Topamax, and is awaiting PT. She was advised to follow up in 4 months and is hesitant about this, as her symptoms are debilitating and preventing safe return to work.    ENT- Dr Holt/ [facility]- last seen 12/2021 and was advised she took the wrong Mg (she took Magnesium oxide as listed in the chart), tried mouth guard without relief, and was advised this couldbe more neurological and to follow up with neurology.  She had dizziness prior to eye surgery but has had significant worsening after surgery. She went back after surgery ,told him she was dizzy, and he told her that was normal and that she lost a lot of blood in surgery.   She had 2 accidents on [employer].   The 1st was 8/31/2021- it was her 1st week back after her accident. She was picking up parts that were up high and she gets most dizzy and wobbly. She thinks she may have wobbled and dropped the parts.   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 When she lifted it, she has to tilt it back. 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 They have boxes hanging down from the line. The She has never hit it before. She was due for physical last night. They asked if she had dizziness or light-headedness. She was failed because she has dizziness. 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 No  associated BP, palp.   Most dizzy when she is bending, looking up, doing thing around her house. Work advised she see me with a note that gave restrictions.   She has been cutting atenolol in 1/2- she then felt CP or heart beating fast. She had her BP checked and had elevated BP 16 systolic. After 5-6 days, she started taking whole Atenolol and had improvement in BP. No change in dizziness with elevated or normal BP.   She had palpitations- cleared by cardiology. She thought this could contribute but has improved.   They thought dizziness was positional and treated for vertigo- positional treatment. No improvement.   She went to ENT- they did hearing test and lay down and sit up without concerns. They want to come back and do testing that takes 1 hour. They have seen a lot of people that struggle with migraines after Covid have dizziness.  10/2021 she had a terrible period- dizziness, nauseated, felt like she would not make it from the toilet to the bed, cold sweats, pale, felt like death for a full day then improved. She has always struggled with bad cramping. This was much worse. No contraception. Previously Nexplanon was not good. They could not get IUD in and has felt poorly on birth control. Patient's step daughter had mood issues on patch.   11/2021- She reported she was using mouthguard. Also took Mg and developed a rash under chin/neck. She estimated about 5-20 episodes of dizziness per day- last 20-30 seconds. Long enough to disrupt what she is doing. It is spinning dizziness- feels like if you were drunk. She has also had the sensation of shakiness- things shaking in her vision- intermittent but not every day. She had a couple episodes with nausea with the dizziness but not bad. Sometimes has wondered if it is associated- is not every time. Told that it was the wrong Mg but ENT thought it should be a neurological issue  Seen by neurology- they gave Meclizine- did not help. She also doubled Topamax as  "directed- flt poorly but did not help dizziness. She has decreased again to 50 mg.   She kept a tally sheet to ensure she could answer the amount of episodes per day rather than estimating- is having 12-13 episodes per day.   Constantly seeing things out of the corner of both of her eyes. There are times that she feels like it is there and is surprised when it is not there- size ranges between a mouse and a person. Has every day at different points.  1/2022- She noted worsening dizziness and was having dizziness with her eyes closed.  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 She had follow up with Dr Bowman and was advised PRN follow up 10/28/2021. He did not seem to think her dizziness was related to her surgery. She has had lazy eye more- she reports she had mild lazy eye on the right since she was a child. Now the left eye is wider and now it made the right eye more lazy.     Patient was seen by Dr Bowman who recommended removal of mass. She was very nervous about surgery. Patient reports she was so shocked that she could not think of questions then had questions about incisions, recovery, and others.   Surgeon told her not a big deal and 1-2 hour surgery. She was in surgery for 3-4 hours. Had trouble waking up. They advised they could keep her but that she would do better at home. Still numb above her eye and could not raise her left eyebrow. She was told that it should improve in 6 months.     Depression and anxiety-Tired-  is off as well and is able to help. She is eating every 2 hours. When she is asleep, she is worried about baby. Not able to relax. She is happy and no postpartum depression but more anxiety. Moods are \"pretty good for the most part\". Emotional- can cry easily.  She is emotional.  Patient has been on Lexapro and Wellbutrin.  She had a positive pregnancy test and was advised to stop Wellbutrin.  She was advised to talk with her " OB/GYN about continuing Lexapro or the need to stop it.  They were okay with her continuing Lexapro and Wellbutrin throughout her pregnancy.  Lexapro- for a while, having bad dreams. The dreams improved but she still has them intermittent that she dreams constantly. She has had improvement in anxiety daily but if something happens and she has a stressful situation, she still has some panic and emotional issues. Still having counseling every week. She is working on things to do when she has these episodes. Reminding herself that it will be ok and why it will be ok and why it was a good thing. She mentioned increasing Lexapro.   She previously reported she had more depression- she gets down on herself about her year. Her therapist thought that she may need to increase Lexapro. Patient reported she was not depressed or sad all the time but had days that she was more down. No SI/HI.   She then had increased depression- she has had decreased interest in anything, exhaustion, not wanting to be around people, sleeping a lot, not keeping up with home chores or things she would normally do at home. It was not abnormal to be in her feelings or in a funk for a few days but she had severe symptoms. Not wanting to shower or take care of herself. She has had increased depression. Talked to therapist about her moods and they discussed possible changes in medication. She felt some better after talking but she still has depression. Therapist was out a week but follow up 10/19/2021. She thought that contributes to her depression- she is tired of trying to figure out what is contributing to her symptoms since Covid. She previously knew her body and when something was going on. She does not now and is bothersome.  Patient knows her father struggled with his mental health, and she worried that she could have issues.   She has had bad anxiety since her grandfather , when she has anxiety, playing her grandfather's death over and over.  "  She moved a couple years ago and started worrying her house will burn down.   With anxiety in the past (prior to Covid 19 infection), she was seeing a therapist and reported she was doing much better. She would occasionally miss work with anxiety/depression but had FMLA. She usually did well and was able to work, do things socially.  Moods were difficult to  with feeling poorly all the time, inability to return to normal life and work, and uncertainty of prognosis. She felt the Lexapro helped-felt like it does not help with major situations but does helped with daily symptoms. Wellbutrin- not sure she adjusted to it and continues to feel tired in the day. She thought it may help daily when things are normal. Since she had increased stressors and uncertainties, she was not sure it is controlled. She was seeing her therapist weekly which helped- feels better upon leaving every week. If it starts to build up again through the week. It is almost time to see her again.   She felt medication and counseling 1-2 x weekly helped moods. She had episodes of being hopeless and being anxious. However, she was able to feel better if with a counseling session. Her animals also help moods- emotional support.   She got really down for a while. Still seeing therapist weekly. She could not do well with future and taking things day by day.  She was unsure if fatigue but was more grumpy. Still seeing therapist a little more spread out. She has migraines and has to reschedule.  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  Work was laid off for 2 weeks-she attempted to go back to work after layoff. When she started to feel better, something mentally started crashing down. As she started to feel better physically, she started \"freaking out\". She was having panic attacks.   Patient related the feeling to being on a plane to [place] years ago, she felt like she was back on " "the plane and ready to crash.   She worried about going back to work without FMLA and worries about losing her job. She reports her friend at work told her they are firing people \"left and right\". She thinks she will lose her job and does not have FMLA  (She has to acquire so much leave to qualify for FMLA).    She had anxiety doing anything, worst at night with laying down. She thought something could be wrong with her heart. Cardiology cleared her but she feels like something is wrong with her heart when she has episodes. She tries to hold it in.   She was doing well on Lexapro. She increased to 10 mg once daily. She did have significant fatigue upon starting medication, however, this improved. She continued with anxiety, however, she was able to calm herself down many times. She was working with mental health therapist twice weekly and was starting to improve. She felt she should try to go back to work slowly. There may be a layoff but she could go in to work but the line will not be moving, all the people were not there, and she could come and go- did not have to work full 12 hours. She thought this would be more helpful to try to go in and start trying to do her job without the pressure and interaction with others. She reported she would be able to leave if she had a panic attack. Despite still having anxiety, she thought this would improve some if she could get back to work and on a routine. She wanted to try.  She then had increased depression- she has had decreased interest in anything, exhaustion, not wanting to be around people, sleeping a lot, not keeping up with home chores or things she would normally do at home. It was not abnormal to be in her feelings or in a funk for a few days but she had severe symptoms. Not wanting to shower or take care of herself. She has had increased depression. Talked to therapist about her moods and they discussed possible changes in medication. She felt some better after " talking but she still has depression. Therapist was out a week but follow up 10/19/2021. She thought that contributes to her depression- she is tired of trying to figure out what is contributing to her symptoms since Covid. She previously knew her body and when something was going on. She does not now and is bothersome.    Cough- no complaints today.   11/17/2021- she had a tickle cough at night only that started about 1 month ago. No coughing in the day. No SOA or other symptoms, signs of illness.   She was advised can treat GERD and use Mucinex DM if needed. Otherwise consider albuterol as needed.     Covid 19 vaccine (Pfizer)- got initial vaccine 4/12/2021. She was tired that day then back to baseline.   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  Patient had significant symptoms following Covid 19 infection. She developed symptoms of Covid 19 12/24/2020 and tested positive 12/28/2020. She had hypoxia and tachycardia with ambulation, SOA, chest tightness, weakness, fatigue, brain fog, diarrhea, and nausea with resolution of severe rash. She went to ER with oxygen saturation of 88%. CTA chest with mild pneumonia and negative for PE. Inflammatory markers were not performed. They did not evaluate symptoms with ambulation and resting vital signs were ok.  While on video visit, patient had oxygen saturation of 97% and pulse in 90s then with relatively little ambulation/ exertion, she had pulse 127 and oxygen saturation down to 88%. In office, she had a normal resting pulse that increased to 124 with ambulation and oxygen saturation decreased from 95% to 90% with ambulation.     She had persistent tachycardia and hypoxia with walking short distances (that is slowly improving), elevated BP that improved, cognitive impairment, fatigue, headaches, and feeling overall poorly. She was seen by the long term Covid  infectious disease clinic, had additional labs, was referred to physical therapy, speech, and cognitive therapy, and was advised to contact her cardiologist for follow up with persistent tachycardia with ambulation. She underwent PTand speech/cognitive therapy. She called cardiology to schedule testing ordered at [facility] prior to Covid 19 then scheduled follow up evaluation of post-Covid tachycardia/ arrhythmia as directed. He changed her propranolol to atenolol for tachycardia and is awaiting Holter monitor and echocardiogram. She is tolerating medication well.     I referred for MRI brain and to neurology for severe headaches. She had severe, debilitating headaches. She had to go to ER but had no testing performed there. Excedrin Migraine, Fiorcet, Zofran, and narcotic pain medication have not helped headaches. I discussed beta blocker, Elavil, and Topamax at her last visit, however, she was concerned about possible AE with medication, as fatigue, arrhythmia, and cognitive dysfunction are already some of her biggest issues. She was unable to tolerate her headaches and felt the possible AE with medication were worth the risk for improvement in headaches. She tried Inderal LA 60 mg nightly without improvement and was started on Topamax by neurology. She has had significant AE with medication. I had her decrease to 25 mg at bedtime then she was able to increase again to BID and tolerated better.      I contacted infectious disease provider for recommendations for return to work vs work restrictions. She recommended either waiting to return to work until she had some improvement with therapy or may try light duty if available to see if she will tolerate this until she has improvement. 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    She returned to work and noted multiple cognitive issues. She reports they had her work 2 hours then sent her home with no work available. 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 She felt she needed more cognitive therapy to safely return to work. I had her remain off work until her follow up with long term Covid clinic. She has been advised to remain in contact with her employer to ensure her job is secure and leave is approved.     She felt ready to return to work and returned 6/2021. 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 She was seen by medical for CPE and was advised not to work until she was no longer dizzy, as she was a risk on the line, on the floor, and with forklift.     Hypoxia- improved. Not feeling as bad with SOA but still notices some.   lowest has been about 90s% and maybe 88% if pushing it. Improving gradually  She had hypoxia and tachycardia with ambulation, SOA, chest tightness, weakness, fatigue, brain fog, diarrhea, and nausea with resolution of severe rash.   She went to ER with oxygen saturation of 88%. CTA chest with mild pneumonia and negative for PE. Inflammatory markers were not performed. They did not evaluate symptoms with ambulation and resting vital signs were ok.    While on video visit, patient had oxygen saturation of 97% and pulse in 90s then with relatively little ambulation/ exertion, she had pulse 127 and oxygen saturation down to  "88%. In office, she had a normal resting pulse that increased to 124 with ambulation and oxygen saturation decreased from 95% to 90% with ambulation.   Tachycardia/ elevated BP- had more controlled pulse on atenolol. Heart rate has improved. She tried to get off and worsens. She does not have to see cardiology any longer. PRN follow up.  She was started on labetalol with pregnancy.  Pulse with activity- at PT around 120, she stops her and if pushes self at home she gets up to 150. BP and pulse have improved some but continues with pulse that is elevated but oxygen has stayed above 90.   She has persistent tachycardia and hypoxia with walking short distances (that is slowly improving), elevated BP that is improving  She called cardiology to schedule testing ordered at appt prior to Covid 19 then scheduled follow up evaluation of post-Covid tachycardia/ arrhythmia as directed.   He changed her propranolol to atenolol for tachycardia and is awaiting Holter monitor and echocardiogram. She is tolerating medication well.   Cardiology- changed inderal to atenolol.   Echo- ok.   Fatigue- still struggling. Now having to fight to get up and do things but not as much. She has had improvement but not resolution. Worse with Wegovy.   Cognitive deficit/dysfunction- worse with verapamil. The 1st week she took it, she felt like she should not be driving. Now back to wheere she was prior to the new medication but has not improved. Different from previous brain fog. Things slip away quickly. She will  her phone to do something and will forget   has been one of the worst things too- cannot remember things. She will be getting ready to do something and forgets. Sometimes she will get it back and sometimes she will not. She reports she tries to think of something and totally forgets. Not as severe as right after COvid but continues to be a problem.   Still \"brain farts\", short term memory issues, headaches, oxygen levels- not " sure if Topamax has amplified brain fog but has been prevalent.   This symptom was her most worrisome issue.  She was struggling with cognitive issues which cause increased anxiety.  Headaches-difficult to determine with decreased sleep and recent hospitalization with blood pressure.  She had headaches that were not migraine headache but all over nagging headaches. Started about when starting Wegovy.    has had a few headaches since stopping Topamax. She was able to stop quickly. Did not have to taper as long. Not nearly as bad as prior to Topamax. No improvement in cognition.   She had right sided headache and headache behind her eyes, nausea with vomiting, eye was red and watering, right sided facial and eyelid drooping, eyebrow was not normal. She reported it woke her up from sleep.   She went to ER 3/11/2021 with drooping eyelid, bloodshot eye, watering. She was in tears due to severe pain but they did no testing. By the time they gave her a headache cocktail, her headache was already improving. She was ready to go home because she did not feel she was getting any help. They discharged her with Fiorcet and Zofran that have not helped her headaches.   She was given pain medication from previous foot surgery and never took it. She tried it and got sick but did not help her pain.  I referred for MRI brain and to neurology for severe, debilitating headaches.    I initially discussed beta blocker, Elavil, and Topamax, however, we were concerned about possible AE with medication, as fatigue, arrhythmia, and cognitive dysfunction were already some of her biggest issues. She was unable to tolerate her headaches and felt the possible AE with medication were worth the risk for improvement in headaches.   She tried Inderal LA 60 mg nightly without improvement.   Headaches occurred almost always when she was asleep. She took Tylenol and tried Excedrin Migraine. They were waking her up at night, which was abnormal for her.   "  She was seen by neurology 3/30/2021 started on Topamax 25 mg twice daily and Imitrex as needed for headaches by neurology. She has had significant symptoms with this. I discussed possibly cutting dose in 1/2 initially to 25 mg at bedtime then possible increase in dosage as tolerated.   I referred to sleep medicine as recommended by neurology.   Topamax helped headaches. She had a headache all day once but otherwise had no other bad headaches. She had paresthesias and worsening cognitive funciton, brain fog, ability to think clearly. I asked that she decrease to nightly dosing only for a couple weeks then could increase to twice daily. She then tolerated better.   She had improvement in headaches on Topamax. Decreased as directed then was able to increase to twice daily again and had improved headaches and no worsening neurological/cognitive symptoms. She stopped having severe, debilitating headaches and stopped waking up with headaches. Now following with neurology.   She then had to stop Topamax after developing kidney stones.   Snoring- does not think she is snoring as bad.   Has never been someone who snored but since Covid, she is snoring loud, sometimes waking her . Negative Sleep study- no EVERETT.   Hair loss- slowed down.   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 When she started to feel better, something mentally started crashing down. As she started to feel better physically, she started \"freaking out\". She was having panic attacks.   Patient related the feeling to being on a plane to [place] years ago, she felt like she was back on the plane and ready to crash.   She worried about going back to work without FMLA and worries about losing her job. She reports her friend at work told her they are firing people \"left and right\". She thinks she will lose her job and does not have FMLA  (She has to acquire so much leave to qualify for FMLA).  "   She had anxiety doing anything, worst at night with laying down. She thought something could be wrong with her heart. Cardiology cleared her but she feels like something is wrong with her heart when she has episodes. She tries to hold it in.   She was doing well on Lexapro. She increased to 10 mg once daily. She did have significant fatigue upon starting medication, however, this improved. She continued with anxiety, however, she was able to calm herself down many times. She was working with mental health therapist twice weekly and was starting to improve. She felt she should try to go back to work slowly. There may be a layoff but she could go in to work but the line will not be moving, all the people were not there, and she could come and go- did not have to work full 12 hours. She thought this would be more helpful to try to go in and start trying to do her job without the pressure and interaction with others. She reported she would be able to leave if she had a panic attack. Despite still having anxiety, she thought this would improve some if she could get back to work and on a routine. She wanted to try.  She then had increased depression- she has had decreased interest in anything, exhaustion, not wanting to be around people, sleeping a lot, not keeping up with home chores or things she would normally do at home. It was not abnormal to be in her feelings or in a funk for a few days but she had severe symptoms. Not wanting to shower or take care of herself. She has had increased depression. Talked to therapist about her moods and they discussed possible changes in medication. She felt some better after talking but she still has depression. Therapist was out a week but follow up 10/19/2021. She thought that contributes to her depression- she is tired of trying to figure out what is contributing to her symptoms since Covid. She previously knew her body and when something was going on. She does not now and is  bothersome.    Joint pain, back and neck pain- bottoms of both feet have been hurting really bad and aching. She has the most pain at the ball of the foot. Propping up helps some at night. Very tender.   she still has joint pain. Not as bad with back and neck pain. Her knees are more sore sometimes. Right foot with previous surgery hurts more.    She had improvement in the pain. She was still not back to baseline but improved.   Foot that had tendon release surgery hurt the worst- some improvement in other pain.   When she was a kid, she had a tubing accident but had hip pain after that. He hip hurt more after Covid. No recommendations from PT. States they did not have any additional treatment.      Therapy- still in mental health counseling.   Physical therapy- doing PT with [facility] as directed by [facility]  Once weekly once she went back to work. When she was off work, she went 2-3 x. Has been walking with her dog per PT but she was scared of triggering a migraine because she was scared that a bump or anything would cause migraine.   Speech and cognitive therapy- There was initially a mistake on their part. Speech therapy- told her none of their other patients went back to full time. Other people had gone back 4 hours per day and gradually increased to 6 then 8 hours. They were concerned they would not be able to go back 12 hours.    Mental health counseling- seeing her therapist and she has her using an joaquim for relaxation and mind sharpness- doing that once daily.    Return to work- 5/11/2021 -she tried to return to work again and had a panic attack.  She started having severe anxiety about doing her job, remembering, and being able to function at work.  She has never had anything this severe in the past.  She went to work but had to leave. She then returned to work again 6/2021 and had 2 accidents with the [employer]- 1 dropping parts and another hitting a cable. She was seen for physical by medical and was  advised she could not work with dizziness due to the risks.   She previously went back to work and had to leave after 2 hours. She reports they asked her to tell someone something and she couldn't remember who that was. They thought this was someone she should know but she had no idea. Also, the process of going into work- line, temperature, getting mask- couldn't remember how to do it. She states it was like she was a new hire. Things that she thought should have come back as soon as she saw it did not come back. They had her sit at a picnic table x 2 hours until they decided she should go home on no work available. They still have the heat on and with the heat and her mask, she felt claustrophobic. No other symptoms that she is aware.    Morbid obesity-Medication was stopped.  She was nervous- Wegovy- made her exhausted all day every day. She has never experienced that for this long. Started 4/20/2023 and has lost 6 lbs. Same time-  and mother have been on it. Mother gained weight and  did not lose a lb. She is not as active because she is exhausted. Even with dizziness, she tries to fight through it but cannot. Will take a 4 hour nap.    Mixed hyperlipidemia- not sure about numbers.  Recently postpartum.   She is not plant based at this point. She only had fast food once and felt bad and was otherwise been eating healthy and working hard. She has exercised as tolerated. She lost about 15 lbs.   Watched [place] [employer]- she tried no eating meat and changed to almond milk. She has not stopped cheese/dairy completely yet. Feels she can do it. She is not completely plant based but is her goal. 1/2022- worsening cholesterol- elevated T Chol, LDL, and TG.   Slipped up through holidays and gained 10 lbs. She has been back on track for 2 weeks. She got excited and thought a few cheats and has now gotten better. Started a challenge- 5 days a week and doing a workout program to try to help with working out.  Enjoying it and working on different parts of her body. Slowly but surely making lifestyle changes.  Prediabetes-    She continues with diarrhea with metformin-BM 5-10 x daily. Prior to metformin, she was going about 2-3 x daily. It is urgent and is sudden onset.   Stopped Metformin and had significant improvement. She had not had solid BM in a long time but had improvement in BM. She felt terrible on medication.  Stopped having menses when she stopped Metformin. She was having normal menses while on Metformin. She had menses 3/13/2022.  1/2022- A1C up to 5.8%. She restarted Metformin - fertility specialist refilled. Metformin gives diarrhea and she has had more nausea since restarting. Stopped having menses when she stopped Metformin. She was having normal menses while on Metformin. She had menses 3/13/2022.  She was doing well- she worked on diet and exercise as tolerated. She had no beef, pork from 5/2022 and fried foods 2-3 x 5/2022. French fries 1-2 x since 5/2022. She decreased sweets but still eats.   10/2022- She noted trouble swallowing pills lately. She is not sure but has almost got sick with nightly medications.    Vitamin D deficiency- taking 5000 IU 3 x weekly and a prenatal vitamin. Also fish oil.   She was taking vitamin D 5000IU every other day and prenatal vitamin and Folic acid.   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  She is taking prenatal vitamin and just delivered a baby.  Elevated liver function tests- patient without regular NSAIDS, Tylenol, or alcohol. Seen by GI- they ordered CT abd/pelvis. 1/2022- normal LFT.   Hyperuricemia- 1/2022- elevated uric acid. Prior to last labs- she ate a bunch of cocktail shrimp.   Taking folic acid 400 mcg daily in addition to prenatal vitamin.   B6- continuing to take after neurology advised to take it.   Not taking B12.     Menses-currently bleeding postpartum.   She had a few menses in the last year  "with severe cramping and feeling like she could faint. Will take anything to feel better at that point. She took 5 Tylenol. No change with taking or not taking Metformin.  She did have bleeding with pregnancy.    Skin lesions- Dermatology removed a lesion right face and left axilla- both  Benign. The spot on her right forearm- it is a scar. They scheduled removal 1/10/2022.   She was concerned about a spot on her face that has been there- looked like a dark, Aging\" spot. However, she noticed it to be larger, more raise, and bothersome. She wanted to consider dermatology for right forearm scar that is raised to see about removal. Dermatology a long time ago but unsure who she saw.     Ingorwn toenails- Her 1 great toenails removed for ingrown toenails. Cannot wear socks or shoes. Left toenail 1 month ago- still healing. He advised it looked good when he did right toenail.     Patient's specialists:  Bariatrics- Dr Noah Koroma- last appt 5/2012 in follow up of [facility].   Cardiology -Dr. Negrete-last appointment 9/2023 for chest pain and tachycardia, palpitations, chronic hypertension during pregnancy.  Agrees OB/GYN's approach to switching her over to labetalol.  Follow-up 6 months shortly after pregnancy and could always either continue labetalol or switch back to atenolol.  5/2021 for palpitations, hypertension, and chest pain. Hold off on stress test since she was improving. Continue medication for a couple months then re-evaluate. Consider weaning atenolol but if she needs medication for BP, she may need to continue beta blocker and consider labetolol with desire to conceive. Follow up in 3 months. Appt scheduled 11/29/2021  She had been seen 11/2020 for atypical chest pain. Echo, Holter, and stress test ordered.    She was seen again after Covid 19 with tachycardia, SOA. They held stress test with post Covid symptoms. Started atenolol 25 mg daily for elevated blood pressure and tachycardia.    4/2021- Holter " monitor normal.   3/2021- Echo normal.   GI- Dr Jensen- last appt 2021 for elevated LFT and hemochromatosis carrier state. Referred for CT abd/pelvis. Advised diet and consideration of lipid medication. Consider additional labs if CT is negative and enzymes continue to increase.    Infectious disease-April BeckMONICA-last appointment 2021 in follow-up of COVID-19 infection.  Diagnosed with post viral fatigue syndrome and brain fog.  Advised physical therapy, cognitive therapy, and advised follow-up with cardiology for tachycardia.  Performed additional labs. 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  Follow-up PRN.  Neurology-Dr. Jose Patel-last appointment 2023 for dizziness, headaches, possible migraine activity.  Start indomethacin 50 mg 2 times daily with food to help with dizziness.  Contact after her trial has been completed to see if she had improvement.  She had no improvement and they were considering cyproheptadine for migraine activity without headache-thought to be related to dizziness.  Advised could influence Lexapro levels and would need to monitor.  Prior Dr Hogan-last appointment 2021 for migraine headache-headaches post Covid and dizziness.  Headaches improved on Topamax 25 mg twice daily- continue medication.  Also given Imitrex. Neagative sleep study. Consider vestibular migraines- trial increase Topamax, referred for vestibular therapy, and given Meclizine for dizziness. Follow-up in 4 months.  OB/GYN-Dr. Neda Penn-last appointment 2024 for post partum visit.  S/p primary low-transverse  section.  She was doing well.  Blood pressure was good-continue labetalol.  Will need 2-hour glucose tolerance test 6-week appointment.  Pap up-to-date.  Follow-up 2024.  Prior Dr. Giles-last appointment 2020 for annual exam/well woman exam.  Pap completed.  Follow-up in 1 year.  Sleep medicine-Dr. Pantoja-last  appt 5/10/2021 for evaluation. Home sleep study 6/2021- no EVERETT.   Speech therapy-started 4/27/2021 for speech and cognitive therapy. She was discharged the end of 5/2021. She has noticed improvement.   Ophthalmology-Dr. Heydi Vera-last appointment 4/2021 for abnormal MRI orbit.  Referred to Dr. Bowman.  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 Follow up PRN.    ENT-Dr. Devang Izquierdo-last appointment 5/2022 for dizziness, migraine headache.  He was previously treated with Topamax that was stopped.  Formal vestibular testing including rotary chair showing only slight asymmetry on velocity step potation but no concern for real peripheral abnormality.  Minimal relief from verapamil but experienced brain fog from the medication.  To see [facility] for dizziness.  Follow-up 6 months.  Prior Rita Johnson APRN- last appt 10/26/2021 for dizziness. Epley maneuver did not work. Advised she get a mouth guard and take Magnesium oxide 400 mg QHS. Follow up if persistent symptoms.   Allergist- Dr Shelia Mc- went 4/19/2021- and they wanted to change Atenolol to Nadolol and gave Zyrtec, Nasacort, and albuterol as needed. Allergy testing- allergic to outdoor issues and not indoor things. He asked her worst symptoms. He was concerned about Topamax- she did cut back to nightly. She had worsening headaches (not severe but continued with headaches). States when she increased again, she feels back to baseline prior to starting Topamax.     The following portions of the patient's history were reviewed and updated as appropriate: allergies, current medications, past family history, past medical history, past social history, past surgical history and problem list.    Review of Systems   Constitutional:  Positive for fatigue (improving).   Eyes:  Positive for visual disturbance.   Respiratory:  Shortness of breath: improving.    Cardiovascular:   Positive for palpitations (improving).   Gastrointestinal:  Nausea: no constant nausea.   Genitourinary: Negative.    Musculoskeletal:  Positive for gait problem (falls). Arthralgias: improving. Myalgias: improving.  Skin: Negative.    Neurological:  Positive for dizziness. Negative for seizures and syncope. Facial asymmetry: improved. Weakness: improving. Light-headedness: improved. Headaches: improved with treatment.  Psychiatric/Behavioral:  Positive for confusion (improving), decreased concentration (improving), dysphoric mood (improving) and sleep disturbance (new- increased nightly snoring since Covid 19). Negative for self-injury and suicidal ideas. The patient is nervous/anxious (worse with concerns of surgery).         Brain fog improving       Objective   There were no vitals filed for this visit.    There is no height or weight on file to calculate BMI.    Physical Exam  Constitutional:       General: She is not in acute distress.     Appearance: She is well-developed.   HENT:      Head: Normocephalic and atraumatic.   Eyes:      General:         Right eye: No discharge.         Left eye: No discharge.   Pulmonary:      Effort: Pulmonary effort is normal. No respiratory distress.   Skin:     General: Skin is dry.   Psychiatric:         Attention and Perception: She is attentive.         Speech: Speech normal.         Behavior: Behavior normal.       Assessment & Plan   There are no diagnoses linked to this encounter.          Assessment and Plan  Patient had significant long term Covid 19 symptoms. She completed physical therapy, speech, and cognitive therapy, and continues follow up with mental health counselor weekly, cardiology, neurology, infectious disease/ long term Covid clinic, ophthalmology, sleep medicine, and allergist as directed by them. She has restarted PT at the request of [facility]. She is trying to transition to a whole food plant based diet. Information given. She was seen by  dizziness specialist, Dr Izquierdo, with intolerance to CCB. She was seen in follow up with no further treatment indicated. She has been following with [facility]. She has been started on a couple medications without success, restarted PT, and has had no improvement. She was limited on medications with pregnancy and now with postpartum. She will continue to specialists them in follow up.      Dizziness- Jocelyn has had dizziness with looking up, bending, and doing things. Unfortunately, she has had worsening and has dizziness with closing her eyes and newly a sensory overload with noises and light.  She had dizziness following Covid 19 infection, however, following an orbital mass removal, she had increasing dizziness symptoms. She has some lightheadedness and some spinning dizziness. She has undergone MRI brain, MRI brain and IAC and been evaluated by ENT and neurology. She was seen by ophthalmology and cleared for PRN follow up. ENT advised mouthguard and Magnesium oxide 400 mg QHS (then reported she should be taking a different Mg supplement). Without improvement, she advised follow up with neurology. Neurology advised trial of increasing Topamax (which she did not tolerate), trial Meclizine (no improvement in dizziness with Meclizine), and referred to PT for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

## 2024-06-18 NOTE — PROGRESS NOTES
Subjective   Jocelyn Merlos is a 33 y.o. female who presents today in follow up of dizziness, moods, headaches, post-Covid syndrome/ long term symptoms, hyperlipidemia, vitamin D deficiency, elevated LFT, hemochromatosis carrier, skin lesions, and specialists. She is also pregnant with bleeding and increased lightheadedness.     Dizziness  Associated symptoms include fatigue (improving). Arthralgias: improving. Headaches: improved with treatment. Myalgias: improving. Nausea: no constant nausea. Weakness: improving.  DepressionPatient presents with the following symptoms: confusion (improving), decreased concentration (improving), nervousness/anxiety (worse with concerns of surgery) and palpitations (improving).  Patient is not experiencing: suicidal ideas.  Shortness of breath: improving.    Answers submitted by the patient for this visit:  Other (Submitted on 6/16/2024)  Please describe your symptoms.: Covid follow up  Have you had these symptoms before?: Yes  How long have you been having these symptoms?: Greater than 2 weeks  Primary Reason for Visit (Submitted on 6/16/2024)  What is the primary reason for your visit?: Other    [facility] changed to [place]  Prior-NOTE TO [facility]- Fax- [phone]- [place]. Direct # [phone].   Previously-  attn Ms Gregorio   She will eventually get a notice from [employer] and may change to long term disability- still keep insurance.    Patient was hospitalized 4/8/2024 through 4/9/2024 for hypertension-blood pressure 162/82.      I have reviewed and discussed with patient hospital notes, including H&P, labs, imaging, consult notes, and discharge summary. Per discharge summary, she was on labetalol 200 mg twice daily and was ordered to increase to 3 times daily prior.  Pharmacy was unable to fill due to needing to get in touch with physician.  She denied headache, vision problems, or epigastric pain.  Baby decreased fetal movement.  Blood pressure improved at the hospital.   Preeclampsia labs were sent.  Return to ER warnings given.    Patient was hospitalized 2024 through 2024 for  section with uncomplicated recovery, chronic hypertension affecting pregnancy, HSV-2, MDD, 37-week gestation pregnancy, chronic hypertension, diet-controlled gestational diabetes.  She was doing well without complications.  Patient was discharged home.    I have reviewed and discussed with patient hospital notes, including H&P, labs, imaging, consult notes, and discharge summary. Per discharge summary,        Postpartum preeclampsia-blood pressure has been stable.  Seen by cardiology without medication regimen change.  She did decrease from 3 times daily to 2 times daily and feels better.  Feet swelling and Bp to 190 systolic. She almost did not go to ER and was initially admit to ICU.   Mg drip for 24 hours- felt like someone was trying to kill her and was scary.   Patient was hospitalized 2024 through 2024 for abnormality of thoracic aorta, chronic hypertension affecting pregnancy, intrauterine pregnancy at 38 weeks s/p  section, lung nodule. Per discharge summary, she was admitted for elevated blood pressure and chest pain.  At the time of admission, she had imaging that was concerning for possible thoracic aortic dissection, however, on CTA, imaging was normal.  Due to concern for dissection, she was initially admitted to ICU and cardiothoracic surgery was consulted.  They have reviewed imaging and are not concerned for ongoing problems.  Blood pressure was diagnosed with postpartum preeclampsia and was given 24 hours of magnesium.  Following the magnesium, her symptoms of chest pain improved and blood pressure was normotensive.  She will continue to labetalol 200 mg 3 times daily on discharge.  Labs-hemoglobin 11.3 and increased to 12.4 2024, potassium decreased to 3.3.  CTA chest-2024-extremely subtle contour involving the mid descending colon-flattening of  the contrast column anteriorly extending 20 mm craniocaudal-could represent intimal flap dissection or mural thrombus formation.  Caliber of the ascending, arch, and descending aorta was normal.  No PE.  Vague induration of the anterior mediastinal fat suggest residual thymic tissue.  Bilateral breast implants in position, right upper lobe 6 mm nodule.  Advised 6-month follow-up.  Lap-Band in position.  Likely left renal cyst-13 mm.  Mild multilevel thoracic disc degeneration.  CTA chest 4/27/2024-bilateral breast implants in place, no thoracic aortic aneurysm or dissection.  Residual thymus.  Trace bilateral pleural fluid, solid pulmonary nodule inferior right upper lobe-7 mm, small amount of posterior dependent and bibasilar subsegmental atelectasis, possible subsegmental airway impaction inferior right upper lobe, minimal air trapping left lung base.  Possible gallbladder sludge or small gallstones, small cyst in the left mid kidney, gastric band.    Chest pain-patient was seen by cardiology exam 2024 and will have echocardiogram 6/20/2024.  Some better.  5/2024-she was not sure if anxiety- she was out of Lexapro-her pharmacy reported they did not receive it. She had episodes that remind her of anxiety- feeling pressure on chest and tightness.  She would take deep breaths to relieve but it felt like a weight on her chest.  She was having CP and SOA when she went to the hospital. This is the same CP she had when going to the hospital but no associated SOA.     Multiple UTI- she had catheter and was having urinary symptoms. Antibiotic- she was on something that started with C and took for 7 days. Finished 4 days ago. Got some yeast from it and they had already called in yeast medication.   Still bleeding some from giving birth.   She is improving some.     .   Patient is back to her routine dizziness episodes.  Decreasing labetalol from 3 times daily to twice daily seem to help return to her baseline but continues  "with episodes of dizziness that are unexplained.  5/2024-feeling dizzy daily- different than the dizziness you had prior.  It was a constant dizzy- more like how you feel when you stand up too fast. Mixed with the feeling with car sickness as well.   The other dizzy spells are intense and hard and these are less intense.   Myasthenia gravis.     She has had some low /60 only once. Most of the time, has been about 120/70- no elevated levels.     Tired-  is off as well and is able to help. She is eating every 2 hours. When she is asleep, she is worried about baby. Not able to relax. She is happy and no postpartum depression but more anxiety. Moods are \"pretty good for the most part\". Emotional- can cry easily.  She is emotional.    Abnormal imaging of gallbladder-While pregnant, she had episode of excruciating pain in back. Someone mentioned could be GB.      Pregnancy with bleeding in pregnancy-delivered healthy baby.  Has had several complications since delivery.  4 big bleeds with golf ball size clots. Last bleed was 9 days ago. Heavy bleeding for 3-5 hours- fills a couple pads then spotting. She had spotting for a few weeks after initial bleed at 7 weeks. She has bled for 7 weeks throughout her pregnancy. Has worn a pad for more days than not.   When on her feet extended period of time, she bleeds. Cramping and tugging- not extreme.  Nausea- has lost weight because feeling full. Has lost about 8-9 lbs. Gained no weight and has lost. As soon as she starts eating, she is very full. Eating small meals frequently. Small bowl of shredded wheat. Big snacks. Protein- not the best choices- meat has been disgusting. Certain things sound discussed. Main things- bagels, shredded wheat, and yogurt- activia with constipation. Watermelon and oranges.   On prenatal and extra folic acid.   Seen by MFM- Will go back in 1 month- she will be 23 weeks and 2 days at her next follow up.   Because of blood pressure issues, " they will not allow longer than 38 weeks.   After high risk again in 1 month, they will release back to her GYN   Insurance has a program and they will give her a nurse that she can talk to through her insurance  She has had increased light-headedness. Not more dizziness- having no more dizzy spells but having light-headedness between dizzy spells.     History of Covid 19 again 7/2022- she felt poorly, started to develop a rash. Has had resolution of symptoms back to baseline from initial Covid 19 infection.     She was working on diet- she is working on what she can control. She got a stationary bike and has used as she could tolerate. She had dizziness all day when she tried to exercise outdoors. Dizziness was more consistent and worse.   Dizziness-still having symptoms.  Not improving-previously, neurology recommended cyproheptadine.  There was a possibility of changing Lexapro levels.  I discussed with neurologist and we advised could take medication.  No improvement in dizziness with the medication. Has not helped.  Noticed that she has sensory overload. When she has more light or sound or things going on, she has pressure in her head and everything is amplified. She notices even in her car alone- bright and with movement is overstimulated. Still dizziness with eyes closed and with being up and moving. She had increased nausea.    Seen by Dr Izquierdo- ENT [facility]- specializes in dizziness- he advised not sure but feels like it is similar to other long Covid patients. Tried verapamil  mg once  daily. Follow up 6 weeks 5/11/2022 at 2 pm and no further treatment that they could provide.   She had appt with [facility] 7/2022 but contracted Covid 19. She was told she would have to reschedule her appt- soonest was 10/13/2022.   Verapamil zoned out. She did not have improvement with medication. They advised she had exhausted all treatments and testing.   10/26/2022- Patient was seen at [facility]  10/13/2022 for peripheral vertigo, cervicocranial syndrome, intractable migraine, imbalance, vestibular neuritis.  Impression was complex issues of dizziness, imbalance, and intermittent migraine headache-likely overlap between peripheral vestibular disturbance abnormal cervical spine biomechanics, and tendency towards migraine.  They were concerned for migraine activity without headache.  Possible neuritic irritation of the inner ear as a postviral syndrome.  Advised start B2 200 mg 2 times daily as migraine supplement and referred for cervical and vestibular physical therapy with Carlos Barba.  Patient to contact provider after physical therapy was completed.  The only PT that they recommended to is not covered ([facility]- [facility]ba) and had to see someone else. The people she was seeing are now going to the office that does not take her insurance and has to change to [facility]. Last seen 12/2022 and had fallen down the steps. She did not think much about it and did not talk with them. Did not think about it until she fell in the tub.   [facility] told when done with PT, reach out on MyChart and they would start Gabapentin.   Has fallen twice- did not remember what caused the fall. 1st fell down the steps- thinks she missed a step and fell back. Had a bad bruise on her back.   She then fell in the bathtub. Thinks she slipped when getting up from the bathtub.   Has never fallen a lot and has fallen twice in 3 months.   She was on Neurontin- he advised to try 1 month. She did not tolerate and did not help.  She was seen by telehealth 6/5/2023 to determine any additional treatment.   No changes in dizziness. She tries to read about long term Covid- a lot of people with dizziness.    MRI brain w/wo 3/2021- ovoid mass left orbit. Otherwise, normal MRI brain.   MRI brain and IAC w/wo 10/21/2021- evidence of removal of orbital mass, scalp incision. Otherwise normal.   Eye Surgeon- Dr Bowman 10/28/2021- cleared for PRN follow  up.  Neurology- Dr Hogan- has advised she see ENT, referred for sleep study- negative, treated with Topamax and Imitrex as needed, now increased Topamax and referred for vestibular therapy and given Meclizine PRN. She reports the Meclizine did not help, did not tolerate increased dose of Topamax, and is awaiting PT. She was advised to follow up in 4 months and is hesitant about this, as her symptoms are debilitating and preventing safe return to work.    ENT- Dr Holt/ [facility]- last seen 12/2021 and was advised she took the wrong Mg (she took Magnesium oxide as listed in the chart), tried mouth guard without relief, and was advised this couldbe more neurological and to follow up with neurology.  She had dizziness prior to eye surgery but has had significant worsening after surgery. She went back after surgery ,told him she was dizzy, and he told her that was normal and that she lost a lot of blood in surgery.   She had 2 accidents on [employer].   The 1st was 8/31/2021- it was her 1st week back after her accident. She was picking up parts that were up high and she gets most dizzy and wobbly. She thinks she may have wobbled and dropped the parts.   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 When she lifted it, she has to tilt it back. 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 They have boxes hanging down from the line. The She has never hit it before. She was due for physical last night. They asked if she had dizziness or light-headedness. She was failed because she has dizziness. 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 No associated BP, palp.   Most dizzy when she is bending, looking up, doing thing around her house. Work advised she see me with a note that gave restrictions.   She has been cutting atenolol in 1/2- she then felt CP or heart beating fast. She had her BP checked and had elevated BP 16 systolic. After 5-6  days, she started taking whole Atenolol and had improvement in BP. No change in dizziness with elevated or normal BP.   She had palpitations- cleared by cardiology. She thought this could contribute but has improved.   They thought dizziness was positional and treated for vertigo- positional treatment. No improvement.   She went to ENT- they did hearing test and lay down and sit up without concerns. They want to come back and do testing that takes 1 hour. They have seen a lot of people that struggle with migraines after Covid have dizziness.  10/2021 she had a terrible period- dizziness, nauseated, felt like she would not make it from the toilet to the bed, cold sweats, pale, felt like death for a full day then improved. She has always struggled with bad cramping. This was much worse. No contraception. Previously Nexplanon was not good. They could not get IUD in and has felt poorly on birth control. Patient's step daughter had mood issues on patch.   11/2021- She reported she was using mouthguard. Also took Mg and developed a rash under chin/neck. She estimated about 5-20 episodes of dizziness per day- last 20-30 seconds. Long enough to disrupt what she is doing. It is spinning dizziness- feels like if you were drunk. She has also had the sensation of shakiness- things shaking in her vision- intermittent but not every day. She had a couple episodes with nausea with the dizziness but not bad. Sometimes has wondered if it is associated- is not every time. Told that it was the wrong Mg but ENT thought it should be a neurological issue  Seen by neurology- they gave Meclizine- did not help. She also doubled Topamax as directed- flt poorly but did not help dizziness. She has decreased again to 50 mg.   She kept a tally sheet to ensure she could answer the amount of episodes per day rather than estimating- is having 12-13 episodes per day.   Constantly seeing things out of the corner of both of her eyes. There are times  "that she feels like it is there and is surprised when it is not there- size ranges between a mouse and a person. Has every day at different points.  1/2022- She noted worsening dizziness and was having dizziness with her eyes closed.  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 She had follow up with Dr Bowman and was advised PRN follow up 10/28/2021. He did not seem to think her dizziness was related to her surgery. She has had lazy eye more- she reports she had mild lazy eye on the right since she was a child. Now the left eye is wider and now it made the right eye more lazy.     Patient was seen by Dr Bowman who recommended removal of mass. She was very nervous about surgery. Patient reports she was so shocked that she could not think of questions then had questions about incisions, recovery, and others.   Surgeon told her not a big deal and 1-2 hour surgery. She was in surgery for 3-4 hours. Had trouble waking up. They advised they could keep her but that she would do better at home. Still numb above her eye and could not raise her left eyebrow. She was told that it should improve in 6 months.     Depression and anxiety-Tired-  is off as well and is able to help. She is eating every 2 hours. When she is asleep, she is worried about baby. Not able to relax. She is happy and no postpartum depression but more anxiety. Moods are \"pretty good for the most part\". Emotional- can cry easily.  She is emotional.  Patient has been on Lexapro and Wellbutrin.  She had a positive pregnancy test and was advised to stop Wellbutrin.  She was advised to talk with her OB/GYN about continuing Lexapro or the need to stop it.  They were okay with her continuing Lexapro and Wellbutrin throughout her pregnancy.  Lexapro- for a while, having bad dreams. The dreams improved but she still has them intermittent that she dreams constantly. She has had improvement in anxiety " daily but if something happens and she has a stressful situation, she still has some panic and emotional issues. Still having counseling every week. She is working on things to do when she has these episodes. Reminding herself that it will be ok and why it will be ok and why it was a good thing. She mentioned increasing Lexapro.   She previously reported she had more depression- she gets down on herself about her year. Her therapist thought that she may need to increase Lexapro. Patient reported she was not depressed or sad all the time but had days that she was more down. No SI/HI.   She then had increased depression- she has had decreased interest in anything, exhaustion, not wanting to be around people, sleeping a lot, not keeping up with home chores or things she would normally do at home. It was not abnormal to be in her feelings or in a funk for a few days but she had severe symptoms. Not wanting to shower or take care of herself. She has had increased depression. Talked to therapist about her moods and they discussed possible changes in medication. She felt some better after talking but she still has depression. Therapist was out a week but follow up 10/19/2021. She thought that contributes to her depression- she is tired of trying to figure out what is contributing to her symptoms since Covid. She previously knew her body and when something was going on. She does not now and is bothersome.  Patient knows her father struggled with his mental health, and she worried that she could have issues.   She has had bad anxiety since her grandfather , when she has anxiety, playing her grandfather's death over and over.   She moved a couple years ago and started worrying her house will burn down.   With anxiety in the past (prior to Covid 19 infection), she was seeing a therapist and reported she was doing much better. She would occasionally miss work with anxiety/depression but had FMLA. She usually did well and  "was able to work, do things socially.  Moods were difficult to  with feeling poorly all the time, inability to return to normal life and work, and uncertainty of prognosis. She felt the Lexapro helped-felt like it does not help with major situations but does helped with daily symptoms. Wellbutrin- not sure she adjusted to it and continues to feel tired in the day. She thought it may help daily when things are normal. Since she had increased stressors and uncertainties, she was not sure it is controlled. She was seeing her therapist weekly which helped- feels better upon leaving every week. If it starts to build up again through the week. It is almost time to see her again.   She felt medication and counseling 1-2 x weekly helped moods. She had episodes of being hopeless and being anxious. However, she was able to feel better if with a counseling session. Her animals also help moods- emotional support.   She got really down for a while. Still seeing therapist weekly. She could not do well with future and taking things day by day.  She was unsure if fatigue but was more grumpy. Still seeing therapist a little more spread out. She has migraines and has to reschedule.  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  Work was laid off for 2 weeks-she attempted to go back to work after layoff. When she started to feel better, something mentally started crashing down. As she started to feel better physically, she started \"freaking out\". She was having panic attacks.   Patient related the feeling to being on a plane to [place] years ago, she felt like she was back on the plane and ready to crash.   She worried about going back to work without FMLA and worries about losing her job. She reports her friend at work told her they are firing people \"left and right\". She thinks she will lose her job and does not have FMLA  (She has to acquire so much leave to qualify " for FMLA).    She had anxiety doing anything, worst at night with laying down. She thought something could be wrong with her heart. Cardiology cleared her but she feels like something is wrong with her heart when she has episodes. She tries to hold it in.   She was doing well on Lexapro. She increased to 10 mg once daily. She did have significant fatigue upon starting medication, however, this improved. She continued with anxiety, however, she was able to calm herself down many times. She was working with mental health therapist twice weekly and was starting to improve. She felt she should try to go back to work slowly. There may be a layoff but she could go in to work but the line will not be moving, all the people were not there, and she could come and go- did not have to work full 12 hours. She thought this would be more helpful to try to go in and start trying to do her job without the pressure and interaction with others. She reported she would be able to leave if she had a panic attack. Despite still having anxiety, she thought this would improve some if she could get back to work and on a routine. She wanted to try.  She then had increased depression- she has had decreased interest in anything, exhaustion, not wanting to be around people, sleeping a lot, not keeping up with home chores or things she would normally do at home. It was not abnormal to be in her feelings or in a funk for a few days but she had severe symptoms. Not wanting to shower or take care of herself. She has had increased depression. Talked to therapist about her moods and they discussed possible changes in medication. She felt some better after talking but she still has depression. Therapist was out a week but follow up 10/19/2021. She thought that contributes to her depression- she is tired of trying to figure out what is contributing to her symptoms since Covid. She previously knew her body and when something was going on. She does not  now and is bothersome.    Cough- no complaints today.   11/17/2021- she had a tickle cough at night only that started about 1 month ago. No coughing in the day. No SOA or other symptoms, signs of illness.   She was advised can treat GERD and use Mucinex DM if needed. Otherwise consider albuterol as needed.     Covid 19 vaccine (Pfizer)- got initial vaccine 4/12/2021. She was tired that day then back to baseline.   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  Patient had significant symptoms following Covid 19 infection. She developed symptoms of Covid 19 12/24/2020 and tested positive 12/28/2020. She had hypoxia and tachycardia with ambulation, SOA, chest tightness, weakness, fatigue, brain fog, diarrhea, and nausea with resolution of severe rash. She went to ER with oxygen saturation of 88%. CTA chest with mild pneumonia and negative for PE. Inflammatory markers were not performed. They did not evaluate symptoms with ambulation and resting vital signs were ok.  While on video visit, patient had oxygen saturation of 97% and pulse in 90s then with relatively little ambulation/ exertion, she had pulse 127 and oxygen saturation down to 88%. In office, she had a normal resting pulse that increased to 124 with ambulation and oxygen saturation decreased from 95% to 90% with ambulation.     She had persistent tachycardia and hypoxia with walking short distances (that is slowly improving), elevated BP that improved, cognitive impairment, fatigue, headaches, and feeling overall poorly. She was seen by the long term Covid infectious disease clinic, had additional labs, was referred to physical therapy, speech, and cognitive therapy, and was advised to contact her cardiologist for follow up with persistent tachycardia with ambulation. She underwent PTand speech/cognitive therapy. She called cardiology to schedule testing  [facility] then scheduled follow up evaluation of post-Covid tachycardia/ arrhythmia as directed. He changed her propranolol to atenolol for tachycardia and is awaiting Holter monitor and echocardiogram. She is tolerating medication well.     I referred for MRI brain and to neurology for severe headaches. She had severe, debilitating headaches. She had to go to ER but had no testing performed there. Excedrin Migraine, Fiorcet, Zofran, and narcotic pain medication have not helped headaches. I discussed beta blocker, Elavil, and Topamax at her last visit, however, she was concerned about possible AE with medication, as fatigue, arrhythmia, and cognitive dysfunction are already some of her biggest issues. She was unable to tolerate her headaches and felt the possible AE with medication were worth the risk for improvement in headaches. She tried Inderal LA 60 mg nightly without improvement and was started on Topamax by neurology. She has had significant AE with medication. I had her decrease to 25 mg at bedtime then she was able to increase again to BID and tolerated better.      I contacted infectious disease provider for recommendations for return to work vs work restrictions. She recommended either waiting to return to work until she had some improvement with therapy or may try light duty if available to see if she will tolerate this until she has improvement. 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    She returned to work and noted multiple cognitive issues. She reports they had her work 2 hours then sent her home with no work available. 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 She felt she needed more cognitive therapy to safely return to work. I had her remain off work until her follow up with long term Covid clinic. She has been advised to remain in contact with her employer to ensure her job is secure and leave is approved.     She felt ready to return to work and returned 6/2021. 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 She was seen by medical for CPE and was advised not to work until she was no longer dizzy, as she was a risk on the line, on the floor, and with forklift.     Hypoxia- improved. Not feeling as bad with SOA but still notices some.   lowest has been about 90s% and maybe 88% if pushing it. Improving gradually  She had hypoxia and tachycardia with ambulation, SOA, chest tightness, weakness, fatigue, brain fog, diarrhea, and nausea with resolution of severe rash.   She went to ER with oxygen saturation of 88%. CTA chest with mild pneumonia and negative for PE. Inflammatory markers were not performed. They did not evaluate symptoms with ambulation and resting vital signs were ok.    While on video visit, patient had oxygen saturation of 97% and pulse in 90s then with relatively little ambulation/ exertion, she had pulse 127 and oxygen saturation down to 88%. In office, she had a normal resting pulse that increased to 124 with ambulation and oxygen saturation decreased from 95% to 90% with ambulation.   Tachycardia/ elevated BP- had more controlled pulse on atenolol. Heart rate has improved. She tried to get off and worsens. She does not have to see  "cardiology any longer. PRN follow up.  She was started on labetalol with pregnancy.  Pulse with activity- at PT around 120, she stops her and if pushes self at home she gets up to 150. BP and pulse have improved some but continues with pulse that is elevated but oxygen has stayed above 90.   She has persistent tachycardia and hypoxia with walking short distances (that is slowly improving), elevated BP that is improving  She called cardiology to schedule [facility] then scheduled follow up evaluation of post-Covid tachycardia/ arrhythmia as directed.   He changed her propranolol to atenolol for tachycardia and is awaiting Holter monitor and echocardiogram. She is tolerating medication well.   Cardiology- changed inderal to atenolol.   Echo- ok.   Fatigue- still struggling. Now having to fight to get up and do things but not as much. She has had improvement but not resolution. Worse with Wegovy.   Cognitive deficit/dysfunction- worse with verapamil. The 1st week she took it, she felt like she should not be driving. Now back to wheere she was prior to the new medication but has not improved. Different from previous brain fog. Things slip away quickly. She will  her phone to do something and will forget   has been one of the worst things too- cannot remember things. She will be getting ready to do something and forgets. Sometimes she will get it back and sometimes she will not. She reports she tries to think of something and totally forgets. Not as severe as right after COvid but continues to be a problem.   Still \"brain farts\", short term memory issues, headaches, oxygen levels- not sure if Topamax has amplified brain fog but has been prevalent.   This symptom was her most worrisome issue.  She was struggling with cognitive issues which cause increased anxiety.  Headaches-difficult to determine with decreased sleep and recent hospitalization with blood pressure.  She had headaches " that were not migraine headache but all over nagging headaches. Started about when starting Wegovy.    has had a few headaches since stopping Topamax. She was able to stop quickly. Did not have to taper as long. Not nearly as bad as prior to Topamax. No improvement in cognition.   She had right sided headache and headache behind her eyes, nausea with vomiting, eye was red and watering, right sided facial and eyelid drooping, eyebrow was not normal. She reported it woke her up from sleep.   She went to ER 3/11/2021 with drooping eyelid, bloodshot eye, watering. She was in tears due to severe pain but they did no testing. By the time they gave her a headache cocktail, her headache was already improving. She was ready to go home because she did not feel she was getting any help. They discharged her with Fiorcet and Zofran that have not helped her headaches.   She was given pain medication from previous foot surgery and never took it. She tried it and got sick but did not help her pain.  I referred for MRI brain and to neurology for severe, debilitating headaches.    I initially discussed beta blocker, Elavil, and Topamax, however, we were concerned about possible AE with medication, as fatigue, arrhythmia, and cognitive dysfunction were already some of her biggest issues. She was unable to tolerate her headaches and felt the possible AE with medication were worth the risk for improvement in headaches.   She tried Inderal LA 60 mg nightly without improvement.   Headaches occurred almost always when she was asleep. She took Tylenol and tried Excedrin Migraine. They were waking her up at night, which was abnormal for her.    She was seen by neurology 3/30/2021 started on Topamax 25 mg twice daily and Imitrex as needed for headaches by neurology. She has had significant symptoms with this. I discussed possibly cutting dose in 1/2 initially to 25 mg at bedtime then possible increase in dosage as tolerated.   I referred to  "sleep medicine as recommended by neurology.   Topamax helped headaches. She had a headache all day once but otherwise had no other bad headaches. She had paresthesias and worsening cognitive funciton, brain fog, ability to think clearly. I asked that she decrease to nightly dosing only for a couple weeks then could increase to twice daily. She then tolerated better.   She had improvement in headaches on Topamax. Decreased as directed then was able to increase to twice daily again and had improved headaches and no worsening neurological/cognitive symptoms. She stopped having severe, debilitating headaches and stopped waking up with headaches. Now following with neurology.   She then had to stop Topamax after developing kidney stones.   Snoring- does not think she is snoring as bad.   Has never been someone who snored but since Covid, she is snoring loud, sometimes waking her . Negative Sleep study- no EVERETT.   Hair loss- slowed down.   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 When she started to feel better, something mentally started crashing down. As she started to feel better physically, she started \"freaking out\". She was having panic attacks.   Patient related the feeling to being on a plane to [place] years ago, she felt like she was back on the plane and ready to crash.   She worried about going back to work without FMLA and worries about losing her job. She reports her friend at work told her they are firing people \"left and right\". She thinks she will lose her job and does not have FMLA  (She has to acquire so much leave to qualify for FMLA).    She had anxiety doing anything, worst at night with laying down. She thought something could be wrong with her heart. Cardiology cleared her but she feels like something is wrong with her heart when she has episodes. She tries to hold it in.   She was doing well on Lexapro. She increased to 10 mg " once daily. She did have significant fatigue upon starting medication, however, this improved. She continued with anxiety, however, she was able to calm herself down many times. She was working with mental health therapist twice weekly and was starting to improve. She felt she should try to go back to work slowly. There may be a layoff but she could go in to work but the line will not be moving, all the people were not there, and she could come and go- did not have to work full 12 hours. She thought this would be more helpful to try to go in and start trying to do her job without the pressure and interaction with others. She reported she would be able to leave if she had a panic attack. Despite still having anxiety, she thought this would improve some if she could get back to work and on a routine. She wanted to try.  She then had increased depression- she has had decreased interest in anything, exhaustion, not wanting to be around people, sleeping a lot, not keeping up with home chores or things she would normally do at home. It was not abnormal to be in her feelings or in a funk for a few days but she had severe symptoms. Not wanting to shower or take care of herself. She has had increased depression. Talked to therapist about her moods and they discussed possible changes in medication. She felt some better after talking but she still has depression. Therapist was out a week but follow up 10/19/2021. She thought that contributes to her depression- she is tired of trying to figure out what is contributing to her symptoms since Covid. She previously knew her body and when something was going on. She does not now and is bothersome.    Joint pain, back and neck pain- bottoms of both feet have been hurting really bad and aching. She has the most pain at the ball of the foot. Propping up helps some at night. Very tender.   she still has joint pain. Not as bad with back and neck pain. Her knees are more sore sometimes.  Right foot with previous surgery hurts more.    She had improvement in the pain. She was still not back to baseline but improved.   Foot that had tendon release surgery hurt the worst- some improvement in other pain.   When she was a kid, she had a tubing accident but had hip pain after that. He hip hurt more after Covid. No recommendations from PT. States they did not have any additional treatment.      Therapy- still in mental health counseling.   Physical therapy- doing PT with [facility] as [facility] by [facility]  Once weekly once she went back to work. When she was off work, she went 2-3 x. Has been walking with her dog per PT but she was scared of triggering a migraine because she was scared that a bump or anything would cause migraine.   Speech and cognitive therapy- There was initially a mistake on their part. Speech therapy- told her none of their other patients went back to full time. Other people had gone back 4 hours per day and gradually increased to 6 then 8 hours. They were concerned they would not be able to go back 12 hours.    Mental health counseling- seeing her therapist and she has her using an joaquim for relaxation and mind sharpness- doing that once daily.    Return to work- 5/11/2021 -she tried to return to work again and had a panic attack.  She started having severe anxiety about doing her job, remembering, and being able to function at work.  She has never had anything this severe in the past.  She went to work but had to leave. She then returned to work again 6/2021 and had 2 accidents with the [employer]- 1 dropping parts and another hitting a cable. She was seen for physical by medical and was advised she could not work with dizziness due to the risks.   She previously went back to work and had to leave after 2 hours. She reports they asked her to tell someone something and she couldn't remember who that was. They thought this was someone she should know but she had no idea. Also, the  process of going into work- line, temperature, getting mask- couldn't remember how to do it. She states it was like she was a new hire. Things that she thought should have come back as soon as she saw it did not come back. They had her sit at a picnic table x 2 hours until they decided she should go home on no work available. They still have the heat on and with the heat and her mask, she felt claustrophobic. No other symptoms that she is aware.    Morbid obesity-Medication was stopped.  She was nervous- Wegovy- made her exhausted all day every day. She has never experienced that for this long. Started 4/20/2023 and has lost 6 lbs. Same time-  and mother have been on it. Mother gained weight and  did not lose a lb. She is not as active because she is exhausted. Even with dizziness, she tries to fight through it but cannot. Will take a 4 hour nap.    Hypertension-OBGYN wanted her to talk with us about labetalol for long term.  She was seen by cardiology who did not recommend changing her medication.  She continues labetalol 200 mg twice daily.  Mixed hyperlipidemia-on no medications.  She is not plant based at this point. She only had fast food once and felt bad and was otherwise been eating healthy and working hard. She has exercised as tolerated. She lost about 15 lbs.   Watched [employer]- she tried no eating meat and changed to almond milk. She has not stopped cheese/dairy completely yet. Feels she can do it. She is not completely plant based but is her goal. 1/2022- worsening cholesterol- elevated T Chol, LDL, and TG.   Slipped up through holidays and gained 10 lbs. She has been back on track for 2 weeks. She got excited and thought a few cheats and has now gotten better. Started a challenge- 5 days a week and doing a workout program to try to help with working out. Enjoying it and working on different parts of her body. Slowly but surely making lifestyle changes.  Prediabetes, gestational  diabetes-she required insulin for the last few days of her pregnancy but otherwise was able to diet-controlled gestational diabetes.  On no medications.  She continues with diarrhea with metformin-BM 5-10 x daily. Prior to metformin, she was going about 2-3 x daily. It is urgent and is sudden onset.   Stopped Metformin and had significant improvement. She had not had solid BM in a long time but had improvement in BM. She felt terrible on medication.  Stopped having menses when she stopped Metformin. She was having normal menses while on Metformin. She had menses 3/13/2022.  1/2022- A1C up to 5.8%. She restarted Metformin - fertility specialist refilled. Metformin gives diarrhea and she has had more nausea since restarting. Stopped having menses when she stopped Metformin. She was having normal menses while on Metformin. She had menses 3/13/2022.  She was doing well- she worked on diet and exercise as tolerated. She had no beef, pork from 5/2022 and fried foods 2-3 x 5/2022. French fries 1-2 x since 5/2022. She decreased sweets but still eats.   10/2022- She noted trouble swallowing pills lately. She is not sure but has almost got sick with nightly medications.    Vitamin D deficiency- taking 5000 IU 3 x weekly and a prenatal vitamin. Also fish oil.   She was taking vitamin D 5000IU every other day and prenatal vitamin and Folic acid.   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  She is taking prenatal vitamin and just delivered a baby.  Elevated liver function tests- patient without regular NSAIDS, Tylenol, or alcohol. Seen by GI- they ordered CT abd/pelvis. 1/2022- normal LFT.   Hyperuricemia- 1/2022- elevated uric acid. Prior to last labs- she ate a bunch of cocktail shrimp.   Taking folic acid 400 mcg daily in addition to prenatal vitamin.   B 6- continuing to take after neurology advised to take it.   All reports of B12 supplement was actually taking  "B6.    Menses-currently bleeding postpartum.   She had a few menses in the last year with severe cramping and feeling like she could faint. Will take anything to feel better at that point. She took 5 Tylenol. No change with taking or not taking Metformin.  She did have bleeding with pregnancy.    Skin lesions- Dermatology removed a lesion right face and left axilla- both  Benign. The spot on her right forearm- it is a scar. They scheduled removal 1/10/2022.   She was concerned about a spot on her face that has been there- looked like a dark, Aging\" spot. However, she noticed it to be larger, more raise, and bothersome. She wanted to consider dermatology for right forearm scar that is raised to see about removal. Dermatology a long time ago but unsure who she saw.     Ingrown toenails- Her 1 great toenails removed for ingrown toenails. Cannot wear socks or shoes. Left toenail 1 month ago- still healing. He advised it looked good when he did right toenail.     Patient's specialists:  Bariatrics- Dr Noah Koroma- last appt 5/2012 in follow up of Lab Band.   Cardiology -Dr. Negrete, MONICA Florence-last appointment 6/2024 for hypertension, palpitations, and chest pain.  Blood pressure is controlled on labetalol 200 mg twice daily.  Continue regimen.  Check echocardiogram with postpartum preeclampsia.  Follow-up 6 months.  9/2023 for chest pain and tachycardia, palpitations, chronic hypertension during pregnancy.  Agrees OB/GYN's approach to switching her over to labetalol.  Follow-up 6 months shortly after pregnancy and could always either continue labetalol or switch back to atenolol.  5/2021 for palpitations, hypertension, and chest pain. Hold off on stress test since she was improving. Continue medication for a couple months then re-evaluate. Consider weaning atenolol but if she needs medication for BP, she may need to continue beta blocker and consider labetolol with desire to conceive. Follow up in 3 " months. Appt scheduled 11/29/2021  She had been seen 11/2020 for atypical chest pain. Echo, Holter, and stress test ordered.    She was seen again after Covid 19 with tachycardia, SOA. They held stress test with post Covid symptoms. Started atenolol 25 mg daily for elevated blood pressure and tachycardia.    4/2021- Holter monitor normal.   3/2021- Echo normal.   GI- Dr Jensen- last appt 11/18/2021 for elevated LFT and hemochromatosis carrier state. Referred for CT abd/pelvis. Advised diet and consideration of lipid medication. Consider additional labs if CT is negative and enzymes continue to increase.    Infectious disease-MONICA Sawyer-last appointment 4/2021 in follow-up of COVID-19 infection.  Diagnosed with post viral fatigue syndrome and brain fog.  Advised physical therapy, cognitive therapy, and advised follow-up with cardiology for tachycardia.  Performed additional labs. 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  Follow-up PRN.  Neurology-Dr. Jose Patel-last appointment 6/2023 for dizziness, headaches, possible migraine activity.  Start indomethacin 50 mg 2 times daily with food to help with dizziness.  Contact after her trial has been completed to see if she had improvement.  She had no improvement and they were considering cyproheptadine for migraine activity without headache-thought to be related to dizziness.  Advised could influence Lexapro levels and would need to monitor.  Prior Dr Hogan-last appointment 12/2021 for migraine headache-headaches post Covid and dizziness.  Headaches improved on Topamax 25 mg twice daily- continue medication.  Also given Imitrex. Neagative sleep study. Consider vestibular migraines- trial increase Topamax, referred for vestibular therapy, and given Meclizine for dizziness. Follow-up in 4 months.  OB/GYN-Dr. Neda Penn-last appointment 5/2/2024 for post partum visit.  S/p primary low-transverse   section.  She was doing well.  Blood pressure was good-continue labetalol.  Will need 2-hour glucose tolerance test 6-week appointment.  Pap up-to-date.  Follow-up 2024.  Patient had to cancel appointment and will reschedule.  Prior Dr. Giles-last appointment 2020 for annual exam/well woman exam.  Pap completed.  Follow-up in 1 year.  Sleep medicine-Dr. Pantoja-last appt 5/10/2021 for evaluation. Home sleep study 2021- no EVERETT.   Speech therapy-started 2021 for speech and cognitive therapy. She was discharged the end of 2021. She has noticed improvement.   Ophthalmology-Dr. Heydi Vera-last appointment 2021 for abnormal MRI orbit.  Referred to Dr. Bowman.  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 Follow up PRN.    ENT-Dr. Devang Izquierdo-last appointment 2022 for dizziness, migraine headache.  He was previously treated with Topamax that was stopped.  Formal vestibular testing including rotary chair showing only slight asymmetry on velocity step potation but no concern for real peripheral abnormality.  Minimal relief from verapamil but experienced brain fog from the medication.  To see [facility] for dizziness.  Follow-up 6 months.  Prior Rita Johnson APRN- last appt 10/26/2021 for dizziness. Epley maneuver did not work. Advised she get a mouth guard and take Magnesium oxide 400 mg QHS. Follow up if persistent symptoms.   Allergist- Dr Shelia Mc- went 2021- and they wanted to change Atenolol to Nadolol and gave Zyrtec, Nasacort, and albuterol as needed. Allergy testing- allergic to outdoor issues and not indoor things. He asked her worst symptoms. He was concerned about Topamax- she did cut back to nightly. She had worsening headaches (not severe but continued with headaches). States when she increased again, she feels back to baseline prior to starting Topamax.     The following portions of  the patient's history were reviewed and updated as appropriate: allergies, current medications, past family history, past medical history, past social history, past surgical history and problem list.    Review of Systems   Constitutional:  Positive for fatigue (improving).   Eyes:  Positive for visual disturbance.   Respiratory:  Shortness of breath: improving.    Cardiovascular:  Positive for palpitations (improving).   Gastrointestinal:  Nausea: no constant nausea.   Genitourinary: Negative.    Musculoskeletal:  Positive for gait problem (falls). Arthralgias: improving. Myalgias: improving.  Skin: Negative.    Neurological:  Positive for dizziness. Negative for seizures and syncope. Facial asymmetry: improved. Weakness: improving. Light-headedness: improved. Headaches: improved with treatment.  Psychiatric/Behavioral:  Positive for confusion (improving), decreased concentration (improving), dysphoric mood (improving) and sleep disturbance (new- increased nightly snoring since Covid 19). Negative for self-injury and suicidal ideas. The patient is nervous/anxious (worse with concerns of surgery).         Brain fog improving       Objective   Vitals:    06/18/24 1240   BP: 112/72   Pulse: 82   Resp: 18   Temp: 97 °F (36.1 °C)   SpO2: 97%     Body mass index is 39.84 kg/m².  Class 2 Severe Obesity (BMI >=35 and <=39.9). Obesity-related health conditions include the following: hypertension, dyslipidemias, and GERD. Obesity is unchanged. BMI is is above average; BMI management plan is completed. We discussed portion control and increasing exercise.    Physical Exam  Constitutional:       General: She is not in acute distress.     Appearance: She is well-developed.   HENT:      Head: Normocephalic and atraumatic.   Eyes:      General:         Right eye: No discharge.         Left eye: No discharge.   Pulmonary:      Effort: Pulmonary effort is normal. No respiratory distress.   Skin:     General: Skin is dry.    Psychiatric:         Attention and Perception: She is attentive.         Speech: Speech normal.         Behavior: Behavior normal.         Assessment & Plan   Diagnoses and all orders for this visit:    1. 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 Dizziness  -     Ambulatory Referral to Neurology    3. Persistent fatigue after COVID-19    4. Persistent neurologic symptoms after COVID-19  -     Ambulatory Referral to Neurology    5. Adjustment disorder with anxious mood    6. Brain fog  -     Ambulatory Referral to Neurology    7. Major depressive disorder, recurrent, in full remission    8. Anxiety    9. Class 3 severe obesity with body mass index (BMI) of 40.0 to 44.9 in adult, unspecified obesity type, unspecified whether serious comorbidity present    10. Mixed hyperlipidemia    11. Prediabetes    12. Vitamin D deficiency    13. Vitamin B6 deficiency  -     Vitamin B6    14. Hemochromatosis carrier    15. Elevated liver enzymes  -     Comprehensive Metabolic Panel    16. Hyperuricemia    17. Chronic migraine without aura without status migrainosus, not intractable  -     Ambulatory Referral to Neurology    18. Disorder of thymus  -     Ambulatory Referral to Neurology    19. Pulmonary nodule  -     Ambulatory Referral to Pulmonology    20. Pleural effusion  -     Ambulatory Referral to Pulmonology    21. Abnormal CT scan, gallbladder  -     Ambulatory Referral to General Surgery            Assessment and Plan  Patient had significant long term Covid 19 symptoms. She completed physical therapy, speech, and cognitive therapy, and continues follow up with mental health counselor weekly, cardiology, neurology, infectious disease/ long term Covid clinic, ophthalmology, sleep medicine, and allergist as directed by them. She has restarted PT at the request of [facility]. She is trying to transition to a whole food plant based diet. Information given. She was seen by dizziness specialist, Dr Izquierdo, with intolerance to  CCB. She was seen in follow up with no further treatment indicated. She has been following with [facility]. She has been started on a couple medications without success, restarted PT, and has had no improvement. She was limited on medications with pregnancy and now with postpartum. She will continue to specialists them in follow up.      Dizziness- Jocelyn has had dizziness with looking up, bending, and doing things. Unfortunately, she has had worsening and has dizziness with closing her eyes and newly a sensory overload with noises and light.  She had dizziness following Covid 19 infection, however, following an orbital mass removal, she had increasing dizziness symptoms. She has some lightheadedness and some spinning dizziness. She has undergone MRI brain, MRI brain and IAC and been evaluated by ENT and neurology. She was seen by ophthalmology and cleared for PRN follow up. ENT advised mouthguard and Magnesium oxide 400 mg QHS (then reported she should be taking a different Mg supplement). Without improvement, she advised follow up with neurology. Neurology advised trial of increasing Topamax (which she did not tolerate), trial Meclizine (no improvement in dizziness with Meclizine), and referred to PT for vestibular therapy. I agreed with vestibular therapy. She is also having vision issues in the peripheral vision. I advised PT through [facility] long term Covid PT program, as they have developed therapy program for vision and dizziness post-Covid. She was unable to get into this program. She was seen by Dr Izquierdo at [facility], who has specialty in dizziness. They started Verapamil  mg once daily to see if this helped with dizziness. She did not tolerate verapamil, and it did not help the dizziness. There was nothing further they felt they could do to help. I referred to tertiary center for evaluation and treatment who advised Mg, possible migraine variant, and to complete PT with specific PT and  follow up with them after completing PT. She then tried Gabapentin and could not tolerate AE but also had no improvement in dizziness with medication. She had no improvement with cyproheptadine. She completed PT. I also referred for warm water PT for strengthening, pain, and to see if this helped with sensory, balance issues.  Consideration of endocrinology if we are able to find any endocrine commonalities with long COVID patients.  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 She did have a couple accidents at work. I will have her off work until she is cleared by specialists for for 6 months.  Follow up with me in 3 months. She will see [facility] as directed by them. If she has resolution, she can be cleared by specialists.    Depression with anxious mood- Patient has had some underlying anxiety in the past, however, she developed very severe, debilitating anxiety following Covid 19 infection and rehab, causing panic attacks.  She is seeing her mental health counselor.  She was resistant to medication in the past because she was always able to control her moods, calm down herself, and continue with working.  However, following her COVID-19 infection, she had more severe symptoms that were worsened with trying to return to work.  I started Lexapro 5 mg once daily. Once she was tolerating medication better, she increased to Lexapro 10 mg once daily. She had improved anxiety with therapy and medication. However, she had increased depressive symptoms, fatigue, decreased motivation. I had her continue Lexapro 10 mg once daily and added Wellbutrin  mg daily. She has not noted significant difference.  She thinks moods are pretty well-controlled.  She will need to monitor for postpartum depression and anxiety.  Patient to let me know if uncontrolled moods or any concerns.  To be seen ASAP if worsening moods or AE with medication.  Otherwise, continue  medication until follow-up.  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   Nocturnal cough- Patient to be seen if persistent symptoms. She can continue Pepcid 20 mg before her evening meal. She can try Mucinex DM twice daily as well. She may also need albuterol inhaler if needed. To be seen if worsening, new or changing symptoms or no resolution of cough.   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    Hypoxia following Covid 19 infection-  She had slow, gradual improvement and completed PT. She did have improvement in oxygenation.   Tachycardia/ elevated BP, post-Covid arrhythmia- Continue follow up with cardiology as directed by them. Echocardiogram and Holter monitor were ok. Patient to continue Atenolol 25 mg once daily and monitor BP, pulse. Blood pressure was a little low and she decreased Atenolol by 1/2 has her BP returns to normal. Allergist gave her Nadolol. Cardiology to determine any changes from atenolol to nadolol. She has had no increased SOA with beta blocker but is having increased fatigue and decreased motivation. We will consider consulting cardiology and decreasing medication.   Fatigue following Covid 19 infection- Patient to continue mental health therapy. She completed physical therapy, and cognitive therapy.   Cognitive deficit/dysfunction- Continue home exercises. Continue follow up with neurology as directed.   Depression and anxiety- Patient previously had some depression and anxiety that were managed with counseling and techniques. However, she had severe anxiety following Covid 19 then had improvement with medication but has had severe depression. Continue Mental health counseling, Lexapro, and Wellbutrin.  She must be seen ASAP if any concerning symptoms, worsening depression or  anxiety.  Persistent headaches following Covid 19 infection- Patient to follow up with neurology and treatment as directed by them. Her allergist wanted her to stop Topamax due to fatigue and cognitive impairment, however, headaches are controlled, and she did not want to stop medication. She did try to stop medication, and headaches recurred. She then developed kidney stones and was able to wean off Topamax.  I would not recommend Imitrex any further for acute headaches and would recommend Ubrelvy or Nurtec if cleared by pediatrician and OB/GYN, follow up with neurology if worsening headaches, new, or changing neurological symptoms.   Snoring- Since having Covid 19, she developed significant snoring, headaches that woke her up at night, tachycardia, hypoxia, and significant brain fog and fatigue. Negative sleep study. Snoring has not resolved but has improved gradually.  Generalized body aches, back aches, neck pain, and joint pain- Elevated uric acid but otherwise negative autoimmune testing 4/2021. PT and home exercises as needed. I also referred to allergist, with symptoms similar to MIS-A. No diagnosis of MIS-A with allergist. Patient to decrease purine rich foods. I will refer for warm water PT.   History of back pain, chest pain-She did have episode of pain that was a little concerning for gallbladder pathology.  She was seen by cardiology and will undergo echocardiogram.  I discussed we will need to consider workup for gallbladder.  I will place referral to general surgery for evaluation and for them to review images and discuss symptoms.  To be seen immediately here or ER if she has any recurrence of symptoms.    In follow up of chronic, non-Covid related symptoms:  I reviewed labs with patient today.  Follow up in 3 months or sooner if needed.     Pregnancy with history of recurrent loss, vaginal bleeding in pregnancy, placental lakes, history of low lying placenta resolved, HTN, gestational diabetes,  postpartum preeclampsia-  She should continue follow-up and treatment by OB/GYN as directed-patient needs to reschedule appointment.  Continue follow-up with cardiology as directed by them.   Chest pain, palpitations, dizziness, recent postpartum preeclampsia- She will have echocardiogram and will continue follow-up with cardiology for evaluation and treatment recommendations.  Mixed hyperlipidemia- Patient to continue to be compliant with diet/ exercise as approved by OB/GYN and cardiology. She will restart strict diet and exercise. Await labs for further recommendations.    Prediabetes- A1C increased off Metformin and she stopped having menses. She restarted Metformin and menses was more regular again but she had severe GI AE. She did not tolerate GLP1's now postpartum.  We will monitor labs and make further recommendations.   Vitamin D deficiency-   Level is slightly higher than needed.  Decrease vitamin D 5000 IU from every other day to 2 times weekly through the summer and continue prenatal vitamin then increase again to every other day in November.  I will recheck at follow-up.  B6 deficiency- B6 was not checked with her last labs.  I will check today for further recommendations.  B12 excess- Patient with elevated B12 levels with last labs.  We will continue to monitor and make recommendations.  Hemochromatosis carrier- I will continue to monitor and make further recommendations.  Elevated liver function tests- Most recent LFT very slightly elevated. Avoid NSAIDS and alcohol and limit Tylenol to < 2 grams daily.  I will recheck today.  Follow up with GI as directed.   Abnormal creatinine-I will recheck today and make further recommendations.  Hyperuricemia- Decrease purine rich foods. I will monitor and make recommendations.   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 Follow up with dermatology as directed by them.   History of recurrent UTI postpartum-I  will recheck labs and make further recommendations.  Pulmonary nodule-I will refer again to pulmonology for evaluation and follow-up and to continue follow-up with them for any repeat CT recommendations.  Residual thymus, weakness, dizziness- Patient with significant weakness, dizziness, and intermittent brain fog.  I recommended seeing neurology at [facility] for evaluation of myasthenia gravis or similar neurological condition.  I will refer again.  Abnormal gallbladder CT- Patient did have an episode of chest pain that could have been related to gallbladder and had an episode of back pain that was thought to have possibly been related to gallbladder.  CTA chest with gallbladder sludge versus stones.  I will refer to general surgery for evaluation and to determine any treatment needed.    Patient continues to see specialists as noted above.  I have reviewed available records, including consult notes, labs, and imaging/testing.  Patient to continue follow-up with specialists as directed by them.    I spent 35 minutes for video visit for Jocelyn Merlos on this date of service. This time includes time spent by me in the following activities as necessary: preparing for the visit, reviewing tests, specialists records and previous visits, obtaining and/or reviewing a separately obtained history, counseling and educating the patient, referring and/or communicating with other healthcare professionals, documenting information in the medical record, independently interpreting results and communicating that information with the patient, family, caregiver, and developing a medically appropriate treatment plan with consideration of other conditions, medications, and treatments.       [facility] Claim# [id number]

## 2024-06-20 ENCOUNTER — HOSPITAL ENCOUNTER (OUTPATIENT)
Dept: CARDIOLOGY | Facility: HOSPITAL | Age: 34
Discharge: HOME | End: 2024-06-20
Admitting: NURSE PRACTITIONER
Payer: COMMERCIAL

## 2024-06-20 ENCOUNTER — PATIENT ROUNDING (BHMG ONLY) (OUTPATIENT)
Dept: FAMILY MEDICINE CLINIC | Facility: CLINIC | Age: 34
End: 2024-06-20
Payer: COMMERCIAL

## 2024-06-20 VITALS
DIASTOLIC BLOOD PRESSURE: 60 MMHG | WEIGHT: 262 LBS | BODY MASS INDEX: 39.71 KG/M2 | SYSTOLIC BLOOD PRESSURE: 110 MMHG | HEIGHT: 68 IN

## 2024-06-20 DIAGNOSIS — I10 ESSENTIAL HYPERTENSION: ICD-10-CM

## 2024-06-20 LAB
ASCENDING AORTA: 2.6 CM
BH CV ECHO MEAS - AO MAX PG: 7.5 MMHG
BH CV ECHO MEAS - AO MEAN PG: 4.1 MMHG
BH CV ECHO MEAS - AO ROOT DIAM: 2.9 CM
BH CV ECHO MEAS - AO V2 MAX: 137.3 CM/SEC
BH CV ECHO MEAS - AO V2 VTI: 29.9 CM
BH CV ECHO MEAS - AVA(I,D): 1.84 CM2
BH CV ECHO MEAS - EDV(CUBED): 158.3 ML
BH CV ECHO MEAS - EDV(MOD-SP2): 146 ML
BH CV ECHO MEAS - EDV(MOD-SP4): 174 ML
BH CV ECHO MEAS - EF(MOD-BP): 55.7 %
BH CV ECHO MEAS - EF(MOD-SP2): 56.2 %
BH CV ECHO MEAS - EF(MOD-SP4): 52.9 %
BH CV ECHO MEAS - ESV(CUBED): 74.3 ML
BH CV ECHO MEAS - ESV(MOD-SP2): 64 ML
BH CV ECHO MEAS - ESV(MOD-SP4): 82 ML
BH CV ECHO MEAS - FS: 22.3 %
BH CV ECHO MEAS - IVS/LVPW: 1.02 CM
BH CV ECHO MEAS - IVSD: 0.82 CM
BH CV ECHO MEAS - LAT PEAK E' VEL: 9 CM/SEC
BH CV ECHO MEAS - LV DIASTOLIC VOL/BSA (35-75): 75.9 CM2
BH CV ECHO MEAS - LV MASS(C)D: 157.6 GRAMS
BH CV ECHO MEAS - LV MAX PG: 3.5 MMHG
BH CV ECHO MEAS - LV MEAN PG: 1.86 MMHG
BH CV ECHO MEAS - LV SYSTOLIC VOL/BSA (12-30): 35.8 CM2
BH CV ECHO MEAS - LV V1 MAX: 93.5 CM/SEC
BH CV ECHO MEAS - LV V1 VTI: 20.3 CM
BH CV ECHO MEAS - LVIDD: 5.4 CM
BH CV ECHO MEAS - LVIDS: 4.2 CM
BH CV ECHO MEAS - LVOT AREA: 2.7 CM2
BH CV ECHO MEAS - LVOT DIAM: 1.86 CM
BH CV ECHO MEAS - LVPWD: 0.8 CM
BH CV ECHO MEAS - MED PEAK E' VEL: 7.1 CM/SEC
BH CV ECHO MEAS - MV A DUR: 0.13 SEC
BH CV ECHO MEAS - MV A MAX VEL: 65.6 CM/SEC
BH CV ECHO MEAS - MV DEC SLOPE: 393.9 CM/SEC2
BH CV ECHO MEAS - MV DEC TIME: 0.2 SEC
BH CV ECHO MEAS - MV E MAX VEL: 85.7 CM/SEC
BH CV ECHO MEAS - MV E/A: 1.31
BH CV ECHO MEAS - MV MAX PG: 3.3 MMHG
BH CV ECHO MEAS - MV MEAN PG: 1.66 MMHG
BH CV ECHO MEAS - MV P1/2T: 63.3 MSEC
BH CV ECHO MEAS - MV V2 VTI: 23.4 CM
BH CV ECHO MEAS - MVA(P1/2T): 3.5 CM2
BH CV ECHO MEAS - MVA(VTI): 2.35 CM2
BH CV ECHO MEAS - PA ACC TIME: 0.14 SEC
BH CV ECHO MEAS - PA V2 MAX: 92.3 CM/SEC
BH CV ECHO MEAS - PULM A REVS DUR: 0.12 SEC
BH CV ECHO MEAS - PULM A REVS VEL: 21.4 CM/SEC
BH CV ECHO MEAS - PULM DIAS VEL: 28.7 CM/SEC
BH CV ECHO MEAS - PULM S/D: 1.49
BH CV ECHO MEAS - PULM SYS VEL: 42.8 CM/SEC
BH CV ECHO MEAS - RAP SYSTOLE: 3 MMHG
BH CV ECHO MEAS - RV MAX PG: 1.17 MMHG
BH CV ECHO MEAS - RV V1 MAX: 54 CM/SEC
BH CV ECHO MEAS - RV V1 VTI: 12.9 CM
BH CV ECHO MEAS - RVSP: 20 MMHG
BH CV ECHO MEAS - SV(LVOT): 55 ML
BH CV ECHO MEAS - SV(MOD-SP2): 82 ML
BH CV ECHO MEAS - SV(MOD-SP4): 92 ML
BH CV ECHO MEAS - SVI(LVOT): 24 ML/M2
BH CV ECHO MEAS - SVI(MOD-SP2): 35.8 ML/M2
BH CV ECHO MEAS - SVI(MOD-SP4): 40.1 ML/M2
BH CV ECHO MEAS - TAPSE (>1.6): 2.09 CM
BH CV ECHO MEAS - TR MAX PG: 17.9 MMHG
BH CV ECHO MEAS - TR MAX VEL: 211.7 CM/SEC
BH CV ECHO MEASUREMENTS AVERAGE E/E' RATIO: 10.65
BH CV XLRA - RV BASE: 3.4 CM
BH CV XLRA - RV LENGTH: 7.3 CM
BH CV XLRA - RV MID: 3.3 CM
BH CV XLRA - TDI S': 12.2 CM/SEC
LEFT ATRIUM VOLUME INDEX: 19.5 ML/M2
SINUS: 2.6 CM
STJ: 2.5 CM

## 2024-06-20 PROCEDURE — 25510000001 PERFLUTREN (DEFINITY) 8.476 MG IN SODIUM CHLORIDE (PF) 0.9 % 10 ML INJECTION: Performed by: NURSE PRACTITIONER

## 2024-06-20 PROCEDURE — 93306 TTE W/DOPPLER COMPLETE: CPT

## 2024-06-20 RX ADMIN — PERFLUTREN 2 ML: 6.52 INJECTION, SUSPENSION INTRAVENOUS at 14:36

## 2024-06-20 NOTE — PROGRESS NOTES
"My name is Jaun Oleary     I am the Practice Manager with   [facility] [address] [address] [phone]      I am messaging to ask you about our practice and your recent visit.     Tell me about your visit with us. What things went well?         We're always looking for ways to make our patients' experiences even better. Do you have recommendations on ways we may improve?       Overall were you satisfied with your first visit to our practice?        Is there anything else I can do for you?     Also, please note that you may receive a survey from \"[employer]\" please take time to fill that out, as it provides important feedback for our office.       Thank you, and have a great day.   "

## 2024-06-21 ENCOUNTER — TELEPHONE (OUTPATIENT)
Dept: CARDIOLOGY | Facility: CLINIC | Age: 34
End: 2024-06-21
Payer: COMMERCIAL

## 2024-06-21 NOTE — TELEPHONE ENCOUNTER
----- Message from Arlet Porter sent at 6/21/2024  9:24 AM EDT -----  Please let her know her echo looks great.  Normal ejection fraction, no significant valvular disease

## 2024-06-21 NOTE — TELEPHONE ENCOUNTER
Called and left VM, will continue to try to reach pt.    HUB- please put patient straight through to triage    Marilu Henderson, RN  Triage RN  06/21/24 09:36 EDT

## 2024-06-21 NOTE — PROGRESS NOTES
Please let her know her echo looks great.  Normal ejection fraction, no significant valvular disease

## 2024-06-21 NOTE — TELEPHONE ENCOUNTER
Jocelyn Merlos returned call.    Reviewed results and recommendations with her and she verbalized understanding of results and recommendations.    Thank you,  Ritu LOCKETT RN  Triage Nurse BELLA   15:31 EDT

## 2024-06-22 LAB
ALBUMIN SERPL-MCNC: 4.6 G/DL (ref 3.9–4.9)
ALP SERPL-CCNC: 90 IU/L (ref 44–121)
ALT SERPL-CCNC: 30 IU/L (ref 0–32)
AST SERPL-CCNC: 20 IU/L (ref 0–40)
BILIRUB SERPL-MCNC: 0.3 MG/DL (ref 0–1.2)
BUN SERPL-MCNC: 18 MG/DL (ref 6–20)
BUN/CREAT SERPL: 17 (ref 9–23)
CALCIUM SERPL-MCNC: 9.8 MG/DL (ref 8.7–10.2)
CHLORIDE SERPL-SCNC: 103 MMOL/L (ref 96–106)
CO2 SERPL-SCNC: 24 MMOL/L (ref 20–29)
CREAT SERPL-MCNC: 1.08 MG/DL (ref 0.57–1)
EGFRCR SERPLBLD CKD-EPI 2021: 70 ML/MIN/1.73
GLOBULIN SER CALC-MCNC: 2.3 G/DL (ref 1.5–4.5)
GLUCOSE SERPL-MCNC: 90 MG/DL (ref 70–99)
POTASSIUM SERPL-SCNC: 4.8 MMOL/L (ref 3.5–5.2)
PROT SERPL-MCNC: 6.9 G/DL (ref 6–8.5)
PYRIDOXAL PHOS SERPL-MCNC: 20.6 UG/L (ref 3.4–65.2)
SODIUM SERPL-SCNC: 142 MMOL/L (ref 134–144)

## 2024-08-05 ENCOUNTER — OFFICE VISIT (OUTPATIENT)
Dept: SURGERY | Facility: CLINIC | Age: 34
End: 2024-08-05
Payer: COMMERCIAL

## 2024-08-05 VITALS
BODY MASS INDEX: 40.32 KG/M2 | WEIGHT: 266 LBS | SYSTOLIC BLOOD PRESSURE: 122 MMHG | DIASTOLIC BLOOD PRESSURE: 70 MMHG | HEIGHT: 68 IN

## 2024-08-05 DIAGNOSIS — R10.11 RUQ PAIN: Primary | ICD-10-CM

## 2024-08-05 PROCEDURE — 99203 OFFICE O/P NEW LOW 30 MIN: CPT | Performed by: SURGERY

## 2024-08-05 NOTE — PROGRESS NOTES
Cc: Abnormal CT of the chest, concern for chronic cholecystitis    History of presenting illness:   This is a nice 33-year-old female, recently postpartum who was recently evaluated in the emergency department with some chest pain.  She had a CT scan which was read as potentially having small stones versus sludge in the gallbladder.  During the midportion of her pregnancy she does admit to having an episode of epigastric pain which radiated through into her back, but since that time she is really not had any further similar episodes.  She denies any significant bloating or abdominal pain and is able to eat fairly normally.    Past Medical History: Gestational diabetes, obesity, depression, anxiety    Past Surgical History: The patient reports undergoing laparoscopic gastric banding in , abdominoplasty I believe around ,  section 2024.    Medications: Bupropion, Lexapro, labetalol, vitamin B6, vitamin D    Allergies: None known    Social History: She is a former smoker who quit in     Family History: Negative for colon or rectal cancer, no significant family history of gallbladder disease    Review of Systems:  Constitutional: Denies fever, chills, change in weight  Neck: no swollen glands or dysphagia or odynophagia  Respiratory: negative for SOB, cough, hemoptysis or wheezing  Cardiovascular: negative for chest pain, palpitations or peripheral edema  Gastrointestinal: No ongoing abdominal pain, nausea, change in bowel habits or bloating      Physical Exam:  BMI: 40.5, weight 266 pounds  General: alert and oriented, appropriate, no acute distress  Eyes: No scleral icterus, extraocular movements are intact  Neck: Supple without lymphadenopathy or thyromegaly, trachea is in the midline  Respiratory: There is good bilateral chest expansion, no use of accessory muscles is noted  Cardiovascular: No jugular venous distention or peripheral edema is seen  Gastrointestinal: Obese but soft, no  obvious tenderness, no guarding, no hernia felt    Laboratory data: Most recent liver function studies from June 2024 unremarkable with normal LFTs and total bilirubin of 0.3    Imaging data: Lower cuts of a CT of the chest done 4/27/2024 suggest possible sludge or small stones.  Gastric band in place.      Assessment and plan:   -Possible sludge or gallstones, minimally symptomatic at this point although the patient did have an episode which sounds typical for that of biliary disease earlier this year  -Based on current findings I think that ultrasound to further evaluate the gallbladder would be most appropriate.  If there is finding of stones or sludge I think proceeding with cholecystectomy would be reasonable, if the ultrasound is largely unremarkable then I think observation alone would be appropriate.  If the patient experiences further symptoms and the ultrasound is normal then I think that consideration of HIDA scan would be appropriate.  -We will contact patient with ultrasound results once available.    Risks associated with the procedure are noted to include, but not be limited to, bleeding, infection, injury to intra-abdominal structures including the liver, small and large intestine, stomach, duodenum, common bile duct and major vascular structures.  Possible need for conversion to open surgery, further revisional surgery, postoperative ERCP discussed.      Avel Worrell MD, FACS  General, Minimally Invasive and Endoscopic Surgery  [facility]    [address], [address]  [place], [place], [address]  P: [phone]  F: [phone]

## 2024-08-12 ENCOUNTER — HOSPITAL ENCOUNTER (OUTPATIENT)
Dept: ULTRASOUND IMAGING | Facility: HOSPITAL | Age: 34
Discharge: HOME | End: 2024-08-12
Admitting: SURGERY
Payer: COMMERCIAL

## 2024-08-12 DIAGNOSIS — R10.11 RUQ PAIN: ICD-10-CM

## 2024-08-12 PROCEDURE — 76705 ECHO EXAM OF ABDOMEN: CPT

## 2024-08-16 ENCOUNTER — DOCUMENTATION (OUTPATIENT)
Dept: SURGERY | Facility: CLINIC | Age: 34
End: 2024-08-16
Payer: COMMERCIAL

## 2024-08-16 NOTE — PROGRESS NOTES
Called left voicemail for patient regarding ultrasound findings.  Ultrasound does demonstrate stones and as such I recommend cholecystectomy, although I do not think the timing is particularly important.  She may call back if she wishes to schedule or can follow-up in the office if she wishes to have further discussion.

## 2024-08-21 DIAGNOSIS — F41.9 ANXIETY: ICD-10-CM

## 2024-08-21 DIAGNOSIS — F43.22 ADJUSTMENT DISORDER WITH ANXIOUS MOOD: ICD-10-CM

## 2024-08-21 DIAGNOSIS — F33.42 MAJOR DEPRESSIVE DISORDER, RECURRENT, IN FULL REMISSION: ICD-10-CM

## 2024-08-21 DIAGNOSIS — U09.9 POST-COVID-19 SYNDROME: ICD-10-CM

## 2024-08-21 RX ORDER — ESCITALOPRAM OXALATE 10 MG/1
10 TABLET ORAL DAILY
Qty: 90 TABLET | Refills: 0 | Status: SHIPPED | OUTPATIENT
Start: 2024-08-21

## 2024-08-24 DIAGNOSIS — F33.42 MAJOR DEPRESSIVE DISORDER, RECURRENT, IN FULL REMISSION: ICD-10-CM

## 2024-08-24 DIAGNOSIS — R41.89 BRAIN FOG: ICD-10-CM

## 2024-08-26 RX ORDER — BUPROPION HYDROCHLORIDE 150 MG/1
150 TABLET ORAL DAILY
Qty: 90 TABLET | Refills: 0 | Status: SHIPPED | OUTPATIENT
Start: 2024-08-26

## 2024-08-27 ENCOUNTER — TRANSCRIBE ORDERS (OUTPATIENT)
Dept: ADMINISTRATIVE | Facility: HOSPITAL | Age: 34
End: 2024-08-27
Payer: COMMERCIAL

## 2024-08-27 DIAGNOSIS — R91.1 LUNG NODULE: Primary | ICD-10-CM

## 2024-09-10 ENCOUNTER — TELEPHONE (OUTPATIENT)
Dept: SURGERY | Facility: CLINIC | Age: 34
End: 2024-09-10
Payer: COMMERCIAL

## 2024-09-10 ENCOUNTER — PREP FOR SURGERY (OUTPATIENT)
Dept: OTHER | Facility: HOSPITAL | Age: 34
End: 2024-09-10
Payer: COMMERCIAL

## 2024-09-10 DIAGNOSIS — R10.11 RUQ PAIN: Primary | ICD-10-CM

## 2024-09-10 RX ORDER — INDOCYANINE GREEN AND WATER 25 MG
2.5 KIT INJECTION ONCE
OUTPATIENT
Start: 2024-09-10 | End: 2024-09-10

## 2024-09-10 RX ORDER — SODIUM CHLORIDE 9 MG/ML
40 INJECTION, SOLUTION INTRAVENOUS AS NEEDED
OUTPATIENT
Start: 2024-09-10

## 2024-09-10 RX ORDER — SODIUM CHLORIDE 0.9 % (FLUSH) 0.9 %
10 SYRINGE (ML) INJECTION EVERY 12 HOURS SCHEDULED
OUTPATIENT
Start: 2024-09-10

## 2024-09-10 RX ORDER — SODIUM CHLORIDE 0.9 % (FLUSH) 0.9 %
10 SYRINGE (ML) INJECTION AS NEEDED
OUTPATIENT
Start: 2024-09-10

## 2024-09-11 PROBLEM — R10.11 RUQ PAIN: Status: ACTIVE | Noted: 2024-09-10

## 2024-09-18 ENCOUNTER — OFFICE VISIT (OUTPATIENT)
Dept: FAMILY MEDICINE CLINIC | Facility: CLINIC | Age: 34
End: 2024-09-18
Payer: COMMERCIAL

## 2024-09-18 VITALS
DIASTOLIC BLOOD PRESSURE: 70 MMHG | SYSTOLIC BLOOD PRESSURE: 118 MMHG | HEIGHT: 68 IN | TEMPERATURE: 98.6 F | RESPIRATION RATE: 18 BRPM | BODY MASS INDEX: 41.07 KG/M2 | HEART RATE: 75 BPM | WEIGHT: 271 LBS | OXYGEN SATURATION: 99 %

## 2024-09-18 DIAGNOSIS — Z98.891 S/P CESAREAN SECTION: ICD-10-CM

## 2024-09-18 DIAGNOSIS — F43.22 ADJUSTMENT DISORDER WITH ANXIOUS MOOD: ICD-10-CM

## 2024-09-18 DIAGNOSIS — E78.2 MIXED HYPERLIPIDEMIA: ICD-10-CM

## 2024-09-18 DIAGNOSIS — R41.89 COGNITIVE CHANGE: ICD-10-CM

## 2024-09-18 DIAGNOSIS — E66.01 CLASS 3 SEVERE OBESITY WITH BODY MASS INDEX (BMI) OF 40.0 TO 44.9 IN ADULT, UNSPECIFIED OBESITY TYPE, UNSPECIFIED WHETHER SERIOUS COMORBIDITY PRESENT: ICD-10-CM

## 2024-09-18 DIAGNOSIS — R91.1 PULMONARY NODULE: ICD-10-CM

## 2024-09-18 DIAGNOSIS — Z14.8 HEMOCHROMATOSIS CARRIER: ICD-10-CM

## 2024-09-18 DIAGNOSIS — R42 DIZZINESS: ICD-10-CM

## 2024-09-18 DIAGNOSIS — R73.03 PREDIABETES: ICD-10-CM

## 2024-09-18 DIAGNOSIS — R41.89 BRAIN FOG: ICD-10-CM

## 2024-09-18 DIAGNOSIS — J90 PLEURAL EFFUSION: ICD-10-CM

## 2024-09-18 DIAGNOSIS — U09.9 PERSISTENT NEUROLOGIC SYMPTOMS AFTER COVID-19: ICD-10-CM

## 2024-09-18 DIAGNOSIS — E28.2 PCOS (POLYCYSTIC OVARIAN SYNDROME): ICD-10-CM

## 2024-09-18 DIAGNOSIS — R29.90 PERSISTENT NEUROLOGIC SYMPTOMS AFTER COVID-19: ICD-10-CM

## 2024-09-18 DIAGNOSIS — R53.83 PERSISTENT FATIGUE AFTER COVID-19: ICD-10-CM

## 2024-09-18 DIAGNOSIS — E32.9: ICD-10-CM

## 2024-09-18 DIAGNOSIS — R07.9 CHEST PAIN, UNSPECIFIED TYPE: ICD-10-CM

## 2024-09-18 DIAGNOSIS — G43.709 CHRONIC MIGRAINE WITHOUT AURA WITHOUT STATUS MIGRAINOSUS, NOT INTRACTABLE: ICD-10-CM

## 2024-09-18 DIAGNOSIS — U09.9 POST-COVID-19 SYNDROME: Primary | ICD-10-CM

## 2024-09-18 DIAGNOSIS — E53.1 VITAMIN B6 DEFICIENCY: ICD-10-CM

## 2024-09-18 DIAGNOSIS — E66.813 CLASS 3 SEVERE OBESITY WITH BODY MASS INDEX (BMI) OF 40.0 TO 44.9 IN ADULT, UNSPECIFIED OBESITY TYPE, UNSPECIFIED WHETHER SERIOUS COMORBIDITY PRESENT: ICD-10-CM

## 2024-09-18 DIAGNOSIS — F41.9 ANXIETY: ICD-10-CM

## 2024-09-18 DIAGNOSIS — E79.0 HYPERURICEMIA: ICD-10-CM

## 2024-09-18 DIAGNOSIS — Z82.3 FAMILY HISTORY OF STROKE: ICD-10-CM

## 2024-09-18 DIAGNOSIS — F81.0 READING DIFFICULTY: ICD-10-CM

## 2024-09-18 DIAGNOSIS — I10 HYPERTENSION, UNSPECIFIED TYPE: ICD-10-CM

## 2024-09-18 DIAGNOSIS — F33.42 MAJOR DEPRESSIVE DISORDER, RECURRENT, IN FULL REMISSION: ICD-10-CM

## 2024-09-18 DIAGNOSIS — U09.9 PERSISTENT FATIGUE AFTER COVID-19: ICD-10-CM

## 2024-09-18 DIAGNOSIS — Z87.440 HISTORY OF UTI: ICD-10-CM

## 2024-09-18 DIAGNOSIS — E55.9 VITAMIN D DEFICIENCY: ICD-10-CM

## 2024-09-18 DIAGNOSIS — K80.20 CALCULUS OF GALLBLADDER WITHOUT CHOLECYSTITIS WITHOUT OBSTRUCTION: ICD-10-CM

## 2024-09-18 DIAGNOSIS — E53.8 B12 DEFICIENCY: ICD-10-CM

## 2024-09-18 DIAGNOSIS — R74.8 ELEVATED LIVER ENZYMES: ICD-10-CM

## 2024-09-18 PROCEDURE — 99214 OFFICE O/P EST MOD 30 MIN: CPT | Performed by: PHYSICIAN ASSISTANT

## 2024-09-18 RX ORDER — LORATADINE 10 MG/1
10 TABLET ORAL DAILY
COMMUNITY

## 2024-09-18 NOTE — PROGRESS NOTES
Subjective   Jocelyn Merlos is a 34 y.o. female who presents today in follow up of dizziness, moods, headaches, post-Covid syndrome/ long term symptoms, hyperlipidemia, vitamin D deficiency, elevated LFT, hemochromatosis carrier, skin lesions, and specialists. She is also pregnant with bleeding and increased lightheadedness.     HPI    Answers submitted by the patient for this visit:  Other (Submitted on 9/17/2024)  Please describe your symptoms.: Check up post covid syndrome  Have you had these symptoms before?: Yes  How long have you been having these symptoms?: Greater than 2 weeks  Primary Reason for Visit (Submitted on 9/17/2024)  What is the primary reason for your visit?: Problem Not Listed    Neurology- has not heard about appt.     Feeling like a bit of dyslexia- not every day but if she experienced, she does not remember. Reads a lot of books and does not happen if reading. If a recipe and short term memory things, she will read the word ahead before the other word. She has with number transposition as well.   MGGM- CVA at age 32. MGM- CVA in 50s-60s, mother with CVA 40s.     Has had a sore in her nose since June. Not resolving. Blood in nose.   Also soreness and irritation on outside of right > left ear canal    [facility] changed to [place]  Prior to [facility]- Fax- [phone]- [place]. Direct # [phone].   Previously-  attn Ms Gregorio   She will eventually get a notice from [facility] and may change to long term disability- still keep insurance.    History of Covid 19 again 7/2022- she felt poorly, started to develop a rash. Has had resolution of symptoms back to baseline from initial Covid 19 infection.     She was working on diet- she is working on what she can control. She got a stationary bike and has used as she could tolerate. She had dizziness all day when she tried to exercise outdoors. Dizziness was more consistent and worse.   Dizziness-still having symptoms.  Not improving-previously, neurology  recommended cyproheptadine.  There was a possibility of changing Lexapro levels.  I discussed with neurologist and we advised could take medication.  No improvement in dizziness with the medication. Has not helped.  Noticed that she has sensory overload. When she has more light or sound or things going on, she has pressure in her head and everything is amplified. She notices even in her car alone- bright and with movement is overstimulated. Still dizziness with eyes closed and with being up and moving. She had increased nausea.    Seen by Dr Izquierdo- [facility]NT [facility]- specializes in dizziness- he advised not sure but feels like it is similar to other long Covid patients. Tried verapamil  mg once  daily. Follow up 6 weeks 5/11/2022 at 2 pm and no further treatment that they could provide.   She had appt with [facility] 7/2022 but contracted Covid 19. She was told she would have to reschedule her appt- soonest was 10/13/2022.   Verapamil zoned out. She did not have improvement with medication. They advised she had exhausted all treatments and testing.   10/26/2022- Patient was seen at [facility] 10/13/2022 for peripheral vertigo, cervicocranial syndrome, intractable migraine, imbalance, vestibular neuritis.  Impression was complex issues of dizziness, imbalance, and intermittent migraine headache-likely overlap between peripheral vestibular disturbance abnormal cervical spine biomechanics, and tendency towards migraine.  They were concerned for migraine activity without headache.  Possible neuritic irritation of the inner ear as a postviral syndrome.  Advised start B2 200 mg 2 times daily as migraine supplement and referred for cervical and vestibular physical therapy with Carlos Barba.  Patient to contact provider after physical therapy was completed.  The only PT that they recommended to is not covered ([facility]- [facility] Barba) and had to see someone else. The people she was seeing are now going to the office that  does not take her insurance and has to change to [facility]. Last seen 12/2022 and had fallen down the steps. She did not think much about it and did not talk with them. Did not think about it until she fell in the tub.   [facility] told when done with PT, reach out on MyChart and they would start Gabapentin.   Has fallen twice- did not remember what caused the fall. 1st fell down the steps- thinks she missed a step and fell back. Had a bad bruise on her back.   She then fell in the bathtub. Thinks she slipped when getting up from the bathtub.   Has never fallen a lot and has fallen twice in 3 months.   She was on Neurontin- he advised to try 1 month. She did not tolerate and did not help.  She was seen by telehealth 6/5/2023 to determine any additional treatment.   No changes in dizziness. She tries to read about long term Covid- a lot of people with dizziness.    MRI brain w/wo 3/2021- ovoid mass left orbit. Otherwise, normal MRI brain.   MRI brain and IAC w/wo 10/21/2021- evidence of removal of orbital mass, scalp incision. Otherwise normal.   Eye Surgeon- Dr Bowman 10/28/2021- cleared for PRN follow up.  Neurology- Dr Hogan- has advised she see ENT, referred for sleep study- negative, treated with Topamax and Imitrex as needed, now increased Topamax and referred for vestibular therapy and given Meclizine PRN. She reports the Meclizine did not help, did not tolerate increased dose of Topamax, and is awaiting PT. She was advised to follow up in 4 months and is hesitant about this, as her symptoms are debilitating and preventing safe return to work.    ENT- Dr Holt/ [facility], [facility]- last seen 12/2021 and was advised she took the wrong Mg (she took Magnesium oxide as listed in the chart), tried mouth guard without relief, and was advised this couldbe more neurological and to follow up with neurology.  She had dizziness prior to eye surgery but has had significant worsening after surgery. She went back  after surgery ,told him she was dizzy, and he told her that was normal and that she lost a lot of blood in surgery.   She had 2 accidents on [employer].   The 1st was 8/31/2021- it was her 1st week back after her accident. She was picking up parts that were up high and she gets most dizzy and wobbly. She thinks she may have wobbled and dropped the parts.   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 When she lifted it, she has to tilt it back. 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 They have boxes hanging down from the line. The She has never hit it before. She was due for physical last night. They asked if she had dizziness or light-headedness. She was failed because she has dizziness. 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 No associated BP, palp.   Most dizzy when she is bending, looking up, doing thing around her house. Work advised she see me with a note that gave restrictions.   She has been cutting atenolol in 1/2- she then felt CP or heart beating fast. She had her BP checked and had elevated BP 16 systolic. After 5-6 days, she started taking whole Atenolol and had improvement in BP. No change in dizziness with elevated or normal BP.   She had palpitations- cleared by cardiology. She thought this could contribute but has improved.   They thought dizziness was positional and treated for vertigo- positional treatment. No improvement.   She went to ENT- they did hearing test and lay down and sit up without concerns. They want to come back and do testing that takes 1 hour. They have seen a lot of people that struggle with migraines after Covid have dizziness.  10/2021 she had a terrible period- dizziness, nauseated, felt like she would not make it from the toilet to the bed, cold sweats, pale, felt like death for a full day then improved. She has always struggled with bad cramping. This was much worse. No contraception.  Previously Nexplanon was not good. They could not get IUD in and has felt poorly on birth control. Patient's step daughter had mood issues on patch.   11/2021- She reported she was using mouthguard. Also took Mg and developed a rash under chin/neck. She estimated about 5-20 episodes of dizziness per day- last 20-30 seconds. Long enough to disrupt what she is doing. It is spinning dizziness- feels like if you were drunk. She has also had the sensation of shakiness- things shaking in her vision- intermittent but not every day. She had a couple episodes with nausea with the dizziness but not bad. Sometimes has wondered if it is associated- is not every time. Told that it was the wrong Mg but ENT thought it should be a neurological issue  Seen by neurology- they gave Meclizine- did not help. She also doubled Topamax as directed- flt poorly but did not help dizziness. She has decreased again to 50 mg.   She kept a tally sheet to ensure she could answer the amount of episodes per day rather than estimating- is having 12-13 episodes per day.   Constantly seeing things out of the corner of both of her eyes. There are times that she feels like it is there and is surprised when it is not there- size ranges between a mouse and a person. Has every day at different points.  1/2022- She noted worsening dizziness and was having dizziness with her eyes closed.  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 She had follow up with Dr Bowman and was advised PRN follow up 10/28/2021. He did not seem to think her dizziness was related to her surgery. She has had lazy eye more- she reports she had mild lazy eye on the right since she was a child. Now the left eye is wider and now it made the right eye more lazy.     Patient was seen by Dr Bowman who recommended removal of mass. She was very nervous about surgery. Patient reports she was so shocked that she could not think of  "questions then had questions about incisions, recovery, and others.   Surgeon told her not a big deal and 1-2 hour surgery. She was in surgery for 3-4 hours. Had trouble waking up. They advised they could keep her but that she would do better at home. Still numb above her eye and could not raise her left eyebrow. She was told that it should improve in 6 months.     Depression and anxiety-  5/2024-she was tired-  is off as well and is able to help. She is eating every 2 hours. When she is asleep, she is worried about baby. Not able to relax. She is happy and no postpartum depression but more anxiety. Moods are \"pretty good for the most part\". Emotional- can cry easily.  She is emotional.  Patient has been on Lexapro and Wellbutrin.  She had a positive pregnancy test and was advised to stop Wellbutrin.  She was advised to talk with her OB/GYN about continuing Lexapro or the need to stop it.  They were okay with her continuing Lexapro and Wellbutrin throughout her pregnancy.  Lexapro- for a while, having bad dreams. The dreams improved but she still has them intermittent that she dreams constantly. She has had improvement in anxiety daily but if something happens and she has a stressful situation, she still has some panic and emotional issues. Still having counseling every week. She is working on things to do when she has these episodes. Reminding herself that it will be ok and why it will be ok and why it was a good thing. She mentioned increasing Lexapro.   She previously reported she had more depression- she gets down on herself about her year. Her therapist thought that she may need to increase Lexapro. Patient reported she was not depressed or sad all the time but had days that she was more down. No SI/HI.   She then had increased depression- she has had decreased interest in anything, exhaustion, not wanting to be around people, sleeping a lot, not keeping up with home chores or things she would normally " do at home. It was not abnormal to be in her feelings or in a funk for a few days but she had severe symptoms. Not wanting to shower or take care of herself. She has had increased depression. Talked to therapist about her moods and they discussed possible changes in medication. She felt some better after talking but she still has depression. Therapist was out a week but follow up 10/19/2021. She thought that contributes to her depression- she is tired of trying to figure out what is contributing to her symptoms since Covid. She previously knew her body and when something was going on. She does not now and is bothersome.  Patient knows her father struggled with his mental health, and she worried that she could have issues.   She has had bad anxiety since her grandfather , when she has anxiety, playing her grandfather's death over and over.   She moved a couple years ago and started worrying her house will burn down.   With anxiety in the past (prior to Covid 19 infection), she was seeing a therapist and reported she was doing much better. She would occasionally miss work with anxiety/depression but had FMLA. She usually did well and was able to work, do things socially.  Moods were difficult to  with feeling poorly all the time, inability to return to normal life and work, and uncertainty of prognosis. She felt the Lexapro helped-felt like it does not help with major situations but does helped with daily symptoms. Wellbutrin- not sure she adjusted to it and continues to feel tired in the day. She thought it may help daily when things are normal. Since she had increased stressors and uncertainties, she was not sure it is controlled. She was seeing her therapist weekly which helped- feels better upon leaving every week. If it starts to build up again through the week. It is almost time to see her again.   She felt medication and counseling 1-2 x weekly helped moods. She had episodes of being hopeless and being  "anxious. However, she was able to feel better if with a counseling session. Her animals also help moods- emotional support.   She got really down for a while. Still seeing therapist weekly. She could not do well with future and taking things day by day.  She was unsure if fatigue but was more grumpy. Still seeing therapist a little more spread out. She has migraines and has to reschedule.  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  Work was laid off for 2 weeks-she attempted to go back to work after layoff. When she started to feel better, something mentally started crashing down. As she started to feel better physically, she started \"freaking out\". She was having panic attacks.   Patient related the feeling to being on a plane to [place] years ago, she felt like she was back on the plane and ready to crash.   She worried about going back to work without FMLA and worries about losing her job. She reports her friend at work told her they are firing people \"left and right\". She thinks she will lose her job and does not have FMLA  (She has to acquire so much leave to qualify for FMLA).    She had anxiety doing anything, worst at night with laying down. She thought something could be wrong with her heart. Cardiology cleared her but she feels like something is wrong with her heart when she has episodes. She tries to hold it in.   She was doing well on Lexapro. She increased to 10 mg once daily. She did have significant fatigue upon starting medication, however, this improved. She continued with anxiety, however, she was able to calm herself down many times. She was working with mental health therapist twice weekly and was starting to improve. She felt she should try to go back to work slowly. There may be a layoff but she could go in to work but the line will not be moving, all the people were not there, and she could come and go- did not have to work full 12 " hours. She thought this would be more helpful to try to go in and start trying to do her job without the pressure and interaction with others. She reported she would be able to leave if she had a panic attack. Despite still having anxiety, she thought this would improve some if she could get back to work and on a routine. She wanted to try.  She then had increased depression- she has had decreased interest in anything, exhaustion, not wanting to be around people, sleeping a lot, not keeping up with home chores or things she would normally do at home. It was not abnormal to be in her feelings or in a funk for a few days but she had severe symptoms. Not wanting to shower or take care of herself. She has had increased depression. Talked to therapist about her moods and they discussed possible changes in medication. She felt some better after talking but she still has depression. Therapist was out a week but follow up 10/19/2021. She thought that contributes to her depression- she is tired of trying to figure out what is contributing to her symptoms since Covid. She previously knew her body and when something was going on. She does not now and is bothersome.    Cough- no complaints today.   11/17/2021- she had a tickle cough at night only that started about 1 month ago. No coughing in the day. No SOA or other symptoms, signs of illness.   She was advised can treat GERD and use Mucinex DM if needed. Otherwise consider albuterol as needed.     Covid 19 vaccine (Pfizer)- got initial vaccine 4/12/2021. She was tired that day then back to baseline.   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  Patient had significant symptoms following Covid 19 infection. She developed symptoms of Covid 19 12/24/2020 and tested positive 12/28/2020. She had hypoxia and tachycardia with ambulation, SOA, chest tightness, weakness, fatigue,  brain fog, diarrhea, and nausea with resolution of severe rash. She went to ER with oxygen saturation of 88%. CTA chest with mild pneumonia and negative for PE. Inflammatory markers were not performed. They did not evaluate symptoms with ambulation and resting vital signs were ok.  While on video visit, patient had oxygen saturation of 97% and pulse in 90s then with relatively little ambulation/ exertion, she had pulse 127 and oxygen saturation down to 88%. In office, she had a normal resting pulse that increased to 124 with ambulation and oxygen saturation decreased from 95% to 90% with ambulation.     She had persistent tachycardia and hypoxia with walking short distances (that is slowly improving), elevated BP that improved, cognitive impairment, fatigue, headaches, and feeling overall poorly. She was seen by the long term Covid infectious disease clinic, had additional labs, was referred to physical therapy, speech, and cognitive therapy, and was advised to contact her cardiologist for follow up with persistent tachycardia with ambulation. She underwent PTand speech/cognitive therapy. She called [facility]ology to [facility] then scheduled follow up evaluation of post-Covid tachycardia/ arrhythmia as directed. He changed her propranolol to atenolol for tachycardia and is awaiting Holter monitor and echocardiogram. She is tolerating medication well.     I referred for MRI brain and to neurology for severe headaches. She had severe, debilitating headaches. She had to go to ER but had no testing performed there. Excedrin Migraine, Fiorcet, Zofran, and narcotic pain medication have not helped headaches. I discussed beta blocker, Elavil, and Topamax at her last visit, however, she was concerned about possible AE with medication, as fatigue, arrhythmia, and cognitive dysfunction are already some of her biggest issues. She was unable to tolerate her headaches and felt the possible AE  with medication were worth the risk for improvement in headaches. She tried Inderal LA 60 mg nightly without improvement and was started on Topamax by neurology. She has had significant AE with medication. I had her decrease to 25 mg at bedtime then she was able to increase again to BID and tolerated better.      I contacted infectious disease provider for recommendations for return to work vs work restrictions. She recommended either waiting to return to work until she had some improvement with therapy or may try light duty if available to see if she will tolerate this until she has improvement. 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    She returned to work and noted multiple cognitive issues. She reports they had her work 2 hours then sent her home with no work available. 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 She felt she needed more cognitive therapy to safely return to work. I had her remain off work until her follow up with long term Covid clinic. She has been advised to remain in contact with her employer to ensure her job is secure and leave is approved.     She felt ready to return to work and returned 6/2021. 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 She was seen by  medical for CPE and was advised not to work until she was no longer dizzy, as she was a risk on the line, on the floor, and with forklift.     Hypoxia- improved. Not feeling as bad with SOA but still notices some.   lowest has been about 90s% and maybe 88% if pushing it. Improving gradually  She had hypoxia and tachycardia with ambulation, SOA, chest tightness, weakness, fatigue, brain fog, diarrhea, and nausea with resolution of severe rash.   She went to ER with oxygen saturation of 88%. CTA chest with mild pneumonia and negative for PE. Inflammatory markers were not performed. They did not evaluate symptoms with ambulation and resting vital signs were ok.    While on video visit, patient had oxygen saturation of 97% and pulse in 90s then with relatively little ambulation/ exertion, she had pulse 127 and oxygen saturation down to 88%. In office, she had a normal resting pulse that increased to 124 with ambulation and oxygen saturation decreased from 95% to 90% with ambulation.   Tachycardia/ elevated BP- had more controlled pulse on atenolol. Heart rate has improved. She tried to get off and worsens. She does not have to see cardiology any longer. PRN follow up.  She was started on labetalol with pregnancy.  Pulse with activity- at PT around 120, she stops her and if pushes self at home she gets up to 150. BP and pulse have improved some but continues with pulse that is elevated but oxygen has stayed above 90.   She has persistent tachycardia and hypoxia with walking short distances (that is slowly improving), elevated BP that is improving  She called cardiology to schedule testing ordered at appt prior to Covid 19 then scheduled follow up evaluation of post-Covid tachycardia/ arrhythmia as directed.   He changed her propranolol to atenolol for tachycardia and is awaiting Holter monitor and echocardiogram. She is tolerating medication well.   Cardiology- changed inderal to atenolol.   Echo- ok.   Fatigue- still  "struggling. Now having to fight to get up and do things but not as much. She has had improvement but not resolution. Worse with Wegovy.   Cognitive deficit/dysfunction- worse with verapamil. The 1st week she took it, she felt like she should not be driving. Now back to wheere she was prior to the new medication but has not improved. Different from previous brain fog. Things slip away quickly. She will  her phone to do something and will forget   has been one of the worst things too- cannot remember things. She will be getting ready to do something and forgets. Sometimes she will get it back and sometimes she will not. She reports she tries to think of something and totally forgets. Not as severe as right after COvid but continues to be a problem.   Still \"brain farts\", short term memory issues, headaches, oxygen levels- not sure if Topamax has amplified brain fog but has been prevalent.   This symptom was her most worrisome issue.  She was struggling with cognitive issues which cause increased anxiety.  Headaches-difficult to determine with decreased sleep and recent hospitalization with blood pressure.  She had headaches that were not migraine headache but all over nagging headaches. Started about when starting Wegovy.    has had a few headaches since stopping Topamax. She was able to stop quickly. Did not have to taper as long. Not nearly as bad as prior to Topamax. No improvement in cognition.   She had right sided headache and headache behind her eyes, nausea with vomiting, eye was red and watering, right sided facial and eyelid drooping, eyebrow was not normal. She reported it woke her up from sleep.   She went to ER 3/11/2021 with drooping eyelid, bloodshot eye, watering. She was in tears due to severe pain but they did no testing. By the time they gave her a headache cocktail, her headache was already improving. She was ready to go home because she did not feel she was getting any help. They discharged " her with Fiorcet and Zofran that have not helped her headaches.   She was given pain medication from previous foot surgery and never took it. She tried it and got sick but did not help her pain.  I referred for MRI brain and to neurology for severe, debilitating headaches.    I initially discussed beta blocker, Elavil, and Topamax, however, we were concerned about possible AE with medication, as fatigue, arrhythmia, and cognitive dysfunction were already some of her biggest issues. She was unable to tolerate her headaches and felt the possible AE with medication were worth the risk for improvement in headaches.   She tried Inderal LA 60 mg nightly without improvement.   Headaches occurred almost always when she was asleep. She took Tylenol and tried Excedrin Migraine. They were waking her up at night, which was abnormal for her.    She was seen by neurology 3/30/2021 started on Topamax 25 mg twice daily and Imitrex as needed for headaches by neurology. She has had significant symptoms with this. I discussed possibly cutting dose in 1/2 initially to 25 mg at bedtime then possible increase in dosage as tolerated.   I referred to sleep medicine as recommended by neurology.   Topamax helped headaches. She had a headache all day once but otherwise had no other bad headaches. She had paresthesias and worsening cognitive funciton, brain fog, ability to think clearly. I asked that she decrease to nightly dosing only for a couple weeks then could increase to twice daily. She then tolerated better.   She had improvement in headaches on Topamax. Decreased as directed then was able to increase to twice daily again and had improved headaches and no worsening neurological/cognitive symptoms. She stopped having severe, debilitating headaches and stopped waking up with headaches. Now following with neurology.   She then had to stop Topamax after developing kidney stones.   Snoring- does not think she is snoring as bad.   Has never  "been someone who snored but since Covid, she is snoring loud, sometimes waking her . Negative Sleep study- no EVERETT.   Hair loss- slowed down.   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 When she started to feel better, something mentally started crashing down. As she started to feel better physically, she started \"freaking out\". She was having panic attacks.   Patient related the feeling to being [place] a plane [place] years ago, she felt like she was back on the plane and ready to crash.   She worried about going back to work without FMLA and worries about losing her job. She reports her friend at work told her they are firing people \"left and right\". She thinks she will lose her job and does not have FMLA  (She has to acquire so much leave to qualify for FMLA).    She had anxiety doing anything, worst at night with laying down. She thought something could be wrong with her heart. Cardiology cleared her but she feels like something is wrong with her heart when she has episodes. She tries to hold it in.   She was doing well on Lexapro. She increased to 10 mg once daily. She did have significant fatigue upon starting medication, however, this improved. She continued with anxiety, however, she was able to calm herself down many times. She was working with mental health therapist twice weekly and was starting to improve. She felt she should try to go back to work slowly. There may be a layoff but she could go in to work but the line will not be moving, all the people were not there, and she could come and go- did not have to work full 12 hours. She thought this would be more helpful to try to go in and start trying to do her job without the pressure and interaction with others. She reported she would be able to leave if she had a panic attack. Despite still having anxiety, she thought this would improve some if she could get back to work and on a " routine. She wanted to try.  She then had increased depression- she has had decreased interest in anything, exhaustion, not wanting to be around people, sleeping a lot, not keeping up with home chores or things she would normally do at home. It was not abnormal to be in her feelings or in a funk for a few days but she had severe symptoms. Not wanting to shower or take care of herself. She has had increased depression. Talked to therapist about her moods and they discussed possible changes in medication. She felt some better after talking but she still has depression. Therapist was out a week but follow up 10/19/2021. She thought that contributes to her depression- she is tired of trying to figure out what is contributing to her symptoms since Covid. She previously knew her body and when something was going on. She does not now and is bothersome.    Joint pain, back and neck pain- bottoms of both feet have been hurting really bad and aching. She has the most pain at the ball of the foot. Propping up helps some at night. Very tender.   she still has joint pain. Not as bad with back and neck pain. Her knees are more sore sometimes. Right foot with previous surgery hurts more.    She had improvement in the pain. She was still not back to baseline but improved.   Foot that had tendon release surgery hurt the worst- some improvement in other pain.   When she was a kid, she had a tubing accident but had hip pain after that. He hip hurt more after Covid. No recommendations from PT. States they did not have any additional treatment.      Therapy- still in mental health counseling.   Physical therapy- doing PT with [facility] as directed by [facility]  Once weekly once she went back to work. When she was off work, she went 2-3 x. Has been walking with her dog per PT but she was scared of triggering a migraine because she was scared that a bump or anything would cause migraine.   Speech and cognitive therapy- There was  initially a mistake on their part. Speech therapy- told her none of their other patients went back to full time. Other people had gone back 4 hours per day and gradually increased to 6 then 8 hours. They were concerned they would not be able to go back 12 hours.    Mental health counseling- seeing her therapist and she has her using an joaquim for relaxation and mind sharpness- doing that once daily.    Return to work- 5/11/2021 -she tried to return to work again and had a panic attack.  She started having severe anxiety about doing her job, remembering, and being able to function at work.  She has never had anything this severe in the past.  She went to work but had to leave. 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 She was seen for physical by medical and was advised she could not work with dizziness due to the risks.   She previously went back to work and had to leave after 2 hours. She reports they asked her to tell someone something and she couldn't remember who that was. They thought this was someone she should know but she had no idea. Also, the process of going into work- line, temperature, getting mask- couldn't remember how to do it. She states it was like she was a new hire. Things that she thought should have come back as soon as she saw it did not come back. They had her sit at a picnic table x 2 hours until they decided she should go home on no work available. They still have the heat on and with the heat and her mask, she felt claustrophobic. No other symptoms that she is aware.    Morbid obesity-Medication was stopped.  She was nervous- Wegovy- made her exhausted all day every day. She has never experienced that for this long. Started 4/20/2023 and has lost 6 lbs. Same time-  and mother have been on it. Mother gained weight and  did not lose a lb. She is not as active because she is exhausted. Even with  dizziness, she tries to fight through it but cannot. Will take a 4 hour nap.    Hypertension-I advised she see cardiology for recommendations for medication.  They continued labetalol 200 mg twice daily. Back to baseline dizziness since decreasing.   OBGYN wanted her to talk with us about labetalol for long term.  She was seen by cardiology who did not recommend changing her medication.  She continues labetalol 200 mg twice daily.  Hypertension, Postpartum preeclampsia-following with OB/GYN tomorrow.  Blood pressure was stable.  Seen by cardiology without medication regimen change.  She did decrease from 3 times daily to 2 times daily and feels better.  Patient was hospitalized 2024 through 2024 for hypertension-blood pressure 162/82. Per discharge summary, she was on labetalol 200 mg twice daily and was ordered to increase to 3 times daily prior.  Pharmacy was unable to fill due to needing to get in touch with physician.  She denied headache, vision problems, or epigastric pain.  Baby decreased fetal movement.  Blood pressure improved at the hospital.  Preeclampsia labs were sent.  Return to ER warnings given.  Patient was hospitalized 2024 through 2024 for  section with uncomplicated recovery, chronic hypertension affecting pregnancy, HSV-2, MDD, 37-week gestation pregnancy, chronic hypertension, diet-controlled gestational diabetes.  She was doing well without complications.  Patient was discharged home.  Feet swelling and Bp to 190 systolic. She almost did not go to ER and was initially admit to ICU.   Mg drip for 24 hours- felt like someone was trying to kill her and was scary.   Patient was hospitalized 2024 through 2024 for abnormality of thoracic aorta, chronic hypertension affecting pregnancy, intrauterine pregnancy at 38 weeks s/p  section, lung nodule. Per discharge summary, she was admitted for elevated blood pressure and chest pain.  At the time of admission,  she had imaging that was concerning for possible thoracic aortic dissection, however, on CTA, imaging was normal.  Due to concern for dissection, she was initially admitted to ICU and cardiothoracic surgery was consulted.  They have reviewed imaging and are not concerned for ongoing problems.  Blood pressure was diagnosed with postpartum preeclampsia and was given 24 hours of magnesium.  Following the magnesium, her symptoms of chest pain improved and blood pressure was normotensive.  She will continue to labetalol 200 mg 3 times daily on discharge.  Labs-hemoglobin 11.3 and increased to 12.4 4/28/2024, potassium decreased to 3.3.  CTA chest-4/26/2024-extremely subtle contour involving the mid descending colon-flattening of the contrast column anteriorly extending 20 mm craniocaudal-could represent intimal flap dissection or mural thrombus formation.  Caliber of the ascending, arch, and descending aorta was normal.  No PE.  Vague induration of the anterior mediastinal fat suggest residual thymic tissue.  Bilateral breast implants in position, right upper lobe 6 mm nodule.  Advised 6-month follow-up.  Lap-Band in position.  Likely left renal cyst-13 mm.  Mild multilevel thoracic disc degeneration.  CTA chest 4/27/2024-bilateral breast implants in place, no thoracic aortic aneurysm or dissection.  Residual thymus.  Trace bilateral pleural fluid, solid pulmonary nodule inferior right upper lobe-7 mm, small amount of posterior dependent and bibasilar subsegmental atelectasis, possible subsegmental airway impaction inferior right upper lobe, minimal air trapping left lung base.  Possible gallbladder sludge or small gallstones, small cyst in the left mid kidney, gastric band.  Patient is back to her routine dizziness episodes.  Decreasing labetalol from 3 times daily to twice daily seem to help return to her baseline but continues with episodes of dizziness that are unexplained.  5/2024-feeling dizzy daily- different than  "the dizziness you had prior.  It was a constant dizzy- more like how you feel when you stand up too fast. Mixed with the feeling with car sickness as well.   The other dizzy spells are intense and hard and these are less intense.   Myasthenia gravis.   She has had some low /60 only once. Most of the time, has been about 120/70- no elevated levels.   Tired-  is off as well and is able to help. She is eating every 2 hours. When she is asleep, she is worried about baby. Not able to relax. She is happy and no postpartum depression but more anxiety. Moods are \"pretty good for the most part\". Emotional- can cry easily.  She is emotional.  Chest pain-patient was seen by cardiology exam 2024.  Echo 6/2024 was normal.  She will follow-up 1/2025 5/2024-she was not sure if anxiety- she was out of Lexapro-her pharmacy reported they did not receive it. She had episodes that remind her of anxiety- feeling pressure on chest and tightness.  She would take deep breaths to relieve but it felt like a weight on her chest.  She was having CP and SOA when she went to the hospital. This is the same CP she had when going to the hospital but no associated SOA.     Mixed hyperlipidemia-on no medications.  She is not plant based at this point. She only had fast food once and felt bad and was otherwise been eating healthy and working hard. She has exercised as tolerated. She lost about 15 lbs.   Watched [place]- she tried no eating meat and changed to almond milk. She has not stopped cheese/dairy completely yet. Feels she can do it. She is not completely plant based but is her goal. 1/2022- worsening cholesterol- elevated T Chol, LDL, and TG.   Slipped up through holidays and gained 10 lbs. She has been back on track for 2 weeks. She got excited and thought a few cheats and has now gotten better. Started a challenge- 5 days a week and doing a workout program to try to help with working out. Enjoying it and working on " different parts of her body. Slowly but surely making lifestyle changes.  Prediabetes, gestational diabetes-she required insulin for the last few days of her pregnancy but otherwise was able to diet-controlled gestational diabetes.  On no medications.  She continues with diarrhea with metformin-BM 5-10 x daily. Prior to metformin, she was going about 2-3 x daily. It is urgent and is sudden onset.   Stopped Metformin and had significant improvement. She had not had solid BM in a long time but had improvement in BM. She felt terrible on medication.  Stopped having menses when she stopped Metformin. She was having normal menses while on Metformin. She had menses 3/13/2022.  1/2022- A1C up to 5.8%. She restarted Metformin - fertility specialist refilled. Metformin gives diarrhea and she has had more nausea since restarting. Stopped having menses when she stopped Metformin. She was having normal menses while on Metformin. She had menses 3/13/2022.  She was doing well- she worked on diet and exercise as tolerated. She had no beef, pork from 5/2022 and fried foods 2-3 x 5/2022. French fries 1-2 x since 5/2022. She decreased sweets but still eats.   10/2022- She noted trouble swallowing pills lately. She is not sure but has almost got sick with nightly medications.    Vitamin D deficiency- taking 5000 IU 4 x weekly and a MTV vitamin. Also fish oil.   She was taking vitamin D 5000IU every other day and prenatal vitamin and Folic acid. Then was on Vitamin D 3 x weekly and prenatal vitamin.   B12 deficiency- not taking B12.   B 6- continuing to take after neurology advised to take it. Taking 4-5 x weekly.   All reports of B12 supplement was actually taking B6.  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  She is taking prenatal vitamin and just delivered a baby.  Elevated liver function tests- patient without regular NSAIDS, Tylenol, or alcohol. Seen by GI- they ordered CT  abd/pelvis. 1/2022- normal LFT.   Hyperuricemia- 1/2022- elevated uric acid. Prior to last labs- she ate a bunch of cocktail shrimp.   Taking folic acid 400 mcg daily in addition to prenatal vitamin.     Multiple UTI postpartum- she had catheter and was having urinary symptoms. Antibiotic- she was on something that started with C and took for 7 days. Finished 4 days ago. Got some yeast from it and they had already called in yeast medication.   Still bleeding some from giving birth.   She is improving some.     Cholelithiasis, abnormal imaging of gallbladder-seen by general surgery 8/2024 for cholelithiasis.  Imaging with sludge or gallstones.  With episode of typical biliary disease earlier in the year and minimal symptoms otherwise, underwent gallbladder ultrasound with hepatomegaly with hepatic steatosis and cholelithiasis.  Proceeding with cholecystectomy recommended and scheduled 11/12/2024.  While pregnant, she had episode of excruciating pain in back. Someone mentioned could be GB.  Patient had chest pain and was seen by cardiology exam 2024 and will have echocardiogram 6/20/2024.  Some better. 5/2024-she was not sure if anxiety- she was out of Lexapro-her pharmacy reported they did not receive it. She had episodes that remind her of anxiety- feeling pressure on chest and tightness.  She would take deep breaths to relieve but it felt like a weight on her chest.  She was having CP and SOA when she went to the hospital. This is the same CP she had when going to the hospital but no associated SOA    Menses-currently bleeding postpartum.   She had a few menses in the last year with severe cramping and feeling like she could faint. Will take anything to feel better at that point. She took 5 Tylenol. No change with taking or not taking Metformin.  She did have bleeding with pregnancy.  History of pregnancy with bleeding in pregnancy s/p delivery-delivered healthy baby.  Has had several complications since  "delivery.  4 big bleeds with golf ball size clots. Last bleed was 9 days ago. Heavy bleeding for 3-5 hours- fills a couple pads then spotting. She had spotting for a few weeks after initial bleed at 7 weeks. She has bled for 7 weeks throughout her pregnancy. Has worn a pad for more days than not.   When on her feet extended period of time, she bleeds. Cramping and tugging- not extreme.  Nausea- has lost weight because feeling full. Has lost about 8-9 lbs. Gained no weight and has lost. As soon as she starts eating, she is very full. Eating small meals frequently. Small bowl of shredded wheat. Big snacks. Protein- not the best choices- meat has been disgusting. Certain things sound discussed. Main things- bagels, shredded wheat, and yogurt- activia with constipation. Watermelon and oranges.   On prenatal and extra folic acid.   Seen by MFM- Will go back in 1 month- she will be 23 weeks and 2 days at her next follow up.   Because of blood pressure issues, they will not allow longer than 38 weeks.   After high risk again in 1 month, they will release back to her GYN   Insurance has a program and they will give her a nurse that she can talk to through her insurance  She has had increased light-headedness. Not more dizziness- having no more dizzy spells but having light-headedness between dizzy spells.     Skin lesions- Dermatology removed a lesion right face and left axilla- both  Benign. The spot on her right forearm- it is a scar. They scheduled removal 1/10/2022.   She was concerned about a spot on her face that has been there- looked like a dark, Aging\" spot. However, she noticed it to be larger, more raise, and bothersome. She wanted to consider dermatology for right forearm scar that is raised to see about removal. Dermatology a long time ago but unsure who she saw.     Ingrown toenails- Her 1 great toenails removed for ingrown toenails. Cannot wear socks or shoes. Left toenail 1 month ago- still healing. He " advised it looked good when he did right toenail.     Patient's specialists:  Bariatrics- Dr Noah Koroma- last appt 5/2012 in follow up of Lab Band.   Cardiology -Arlet Damico, MONICA-last appointment 6/2024 for hypertension, palpitations, and chest pain.  Blood pressure is controlled on labetalol 200 mg twice daily.  Continue regimen.  Check echocardiogram with postpartum preeclampsia.  Follow-up 6 months.  9/2023 for chest pain and tachycardia, palpitations, chronic hypertension during pregnancy.  Agrees OB/GYN's approach to switching her over to labetalol.  Follow-up 6 months shortly after pregnancy and could always either continue labetalol or switch back to atenolol.  5/2021 for palpitations, hypertension, and chest pain. Hold off on stress test since she was improving. Continue medication for a couple months then re-evaluate. Consider weaning atenolol but if she needs medication for BP, she may need to continue beta blocker and consider labetolol with desire to conceive. Follow up in 3 months. Appt scheduled 11/29/2021  She had been seen 11/2020 for atypical chest pain. Echo, Holter, and stress test ordered.    She was seen again after Covid 19 with tachycardia, SOA. They held stress test with post Covid symptoms. Started atenolol 25 mg daily for elevated blood pressure and tachycardia.    4/2021- Holter monitor normal.   3/2021- Echo normal.   GI- Dr Jensen- last appt 11/18/2021 for elevated LFT and hemochromatosis carrier state. Referred for CT abd/pelvis. Advised diet and consideration of lipid medication. Consider additional labs if CT is negative and enzymes continue to increase.    Infectious disease-MONICA Sawyer-last appointment 4/2021 in follow-up of COVID-19 infection.  Diagnosed with post viral fatigue syndrome and brain fog.  Advised physical therapy, cognitive therapy, and advised follow-up with cardiology for tachycardia.  Performed additional labs. Advised scould  return to work but not operate heavy machinery- re-evaluate in 2 weeks.  Follow-up PRN.  Neurology-Dr. Jose Patel-last appointment 2023 for dizziness, headaches, possible migraine activity.  Start indomethacin 50 mg 2 times daily with food to help with dizziness.  Contact after her trial has been completed to see if she had improvement.  She had no improvement and they were considering cyproheptadine for migraine activity without headache-thought to be related to dizziness.  Advised could influence Lexapro levels and would need to monitor.  Prior Dr Hogan-last appointment 2021 for migraine headache-headaches post Covid and dizziness.  Headaches improved on Topamax 25 mg twice daily- continue medication.  Also given Imitrex. Neagative sleep study. Consider vestibular migraines- trial increase Topamax, referred for vestibular therapy, and given Meclizine for dizziness. Follow-up in 4 months.  OB/GYN-Dr. Neda Penn-last appointment 2024 for post partum visit.  S/p primary low-transverse  section.  She was doing well.  Blood pressure was good-continue labetalol.  Will need 2-hour glucose tolerance test 6-week appointment.  Pap up-to-date.  Follow-up 2024.  Patient had to cancel appointment and will reschedule.  Prior Dr. Giles-last appointment 2020 for annual exam/well woman exam.  Pap completed.  Follow-up in 1 year.  Sleep medicine-Dr. Pantoja-last appt 5/10/2021 for evaluation. Home sleep study 2021- no [facility].   Speech therapy-started 2021 for speech and cognitive therapy. She was discharged the end of 2021. She has noticed improvement.   Ophthalmology-Dr. Heydi Vera-last appointment 2021 for abnormal MRI orbit.  Referred to Dr. Bowman.  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 Follow up PRN.    ENT-Dr. Devang Izquierdo-last appointment 2022 for dizziness, migraine headache.  He was  previously treated with Topamax that was stopped.  Formal vestibular testing including rotary chair showing only slight asymmetry on velocity step potation but no concern for real peripheral abnormality.  Minimal relief from verapamil but experienced brain fog from the medication.  To see [facility] for dizziness.  Follow-up 6 months.  Prior Rita Johnson, MONICA- last appt 10/26/2021 for dizziness. Epley maneuver did not work. Advised she get a mouth guard and take Magnesium oxide 400 mg QHS. Follow up if persistent symptoms.   Allergist- Dr Shelia Mc- went 4/19/2021- and they wanted to change Atenolol to Nadolol and gave Zyrtec, Nasacort, and albuterol as needed. Allergy testing- allergic to outdoor issues and not indoor things. He asked her worst symptoms. He was concerned about Topamax- she did cut back to nightly. She had worsening headaches (not severe but continued with headaches). States when she increased again, she feels back to baseline prior to starting Topamax.     The following portions of the patient's history were reviewed and updated as appropriate: allergies, current medications, past family history, past medical history, past social history, past surgical history and problem list.    Review of Systems   Constitutional:  Positive for fatigue (improving).   Eyes:  Positive for visual disturbance.   Respiratory:  Shortness of breath: improving.    Cardiovascular:  Positive for palpitations (improving).   Gastrointestinal:  Nausea: no constant nausea.   Genitourinary: Negative.    Musculoskeletal:  Positive for gait problem (falls). Arthralgias: improving. Myalgias: improving.  Skin: Negative.    Neurological:  Positive for dizziness. Negative for seizures and syncope. Facial asymmetry: improved. Weakness: improving. Light-headedness: improved. Headaches: improved with treatment.  Psychiatric/Behavioral:  Positive for confusion (improving), decreased concentration (improving), dysphoric  mood (improving) and sleep disturbance (new- increased nightly snoring since Covid 19). Negative for self-injury and suicidal ideas. The patient is nervous/anxious (worse with concerns of surgery).         Brain fog improving       Objective   Vitals:    09/18/24 1259   BP: 118/70   Pulse: 75   Resp: 18   Temp: 98.6 °F (37 °C)   SpO2: 99%     Body mass index is 41.22 kg/m².  Class 2 Severe Obesity (BMI >=35 and <=39.9). Obesity-related health conditions include the following: hypertension, dyslipidemias, and GERD. Obesity is unchanged. BMI is is above average; BMI management plan is completed. We discussed portion control and increasing exercise.    Physical Exam  Constitutional:       General: She is not in acute distress.     Appearance: She is well-developed.   HENT:      Head: Normocephalic and atraumatic.   Eyes:      General:         Right eye: No discharge.         Left eye: No discharge.   Pulmonary:      Effort: Pulmonary effort is normal. No respiratory distress.   Skin:     General: Skin is dry.   Psychiatric:         Attention and Perception: She is attentive.         Speech: Speech normal.         Behavior: Behavior normal.         Assessment & Plan   Diagnoses and all orders for this visit:    1. 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 Brain fog  -     MRI Brain With & Without Contrast; Future    3. Persistent fatigue after COVID-19    4. Persistent neurologic symptoms after COVID-19    5. Dizziness  -     MRI Brain With & Without Contrast; Future    6. Major depressive disorder, recurrent, in full remission    7. Anxiety    8. Adjustment disorder with anxious mood    9. Class 3 severe obesity with body mass index (BMI) of 40.0 to 44.9 in adult, unspecified obesity type, unspecified whether serious comorbidity present    10. Hypertension, unspecified type  -     Comprehensive Metabolic Panel  -     Urinalysis With Culture If Indicated -    11. Chest pain, unspecified type    12. Mixed hyperlipidemia  -      Comprehensive Metabolic Panel  -     CK  -     Lipid Panel With LDL / HDL Ratio    13. Prediabetes  -     Comprehensive Metabolic Panel  -     Hemoglobin A1c  -     TSH  -     T4, free  -     T3, Free    14. Vitamin D deficiency  -     Comprehensive Metabolic Panel  -     Vitamin D,25-Hydroxy    15. B12 deficiency  -     CBC & Differential  -     Vitamin B12 & Folate  -     Vitamin B6  -     Vitamin B7 (Biotin)    16. Vitamin B6 deficiency  -     CBC & Differential  -     Vitamin B12 & Folate  -     Vitamin B6  -     Vitamin B7 (Biotin)    17. Hemochromatosis carrier  -     CBC & Differential  -     Iron and TIBC  -     Ferritin    18. Elevated liver enzymes  -     Comprehensive Metabolic Panel    19. Hyperuricemia  -     Uric Acid    20. History of UTI  -     Urinalysis With Culture If Indicated -    21. Calculus of gallbladder without cholecystitis without obstruction    22. PCOS (polycystic ovarian syndrome)- was on Metformin to regulate cycles    23. S/P  section    24. Preeclampsia in postpartum period    25. Chronic migraine without aura without status migrainosus, not intractable  -     MRI Brain With & Without Contrast; Future    26. Disorder of thymus    27. Pulmonary nodule    28. Pleural effusion    29. Family history of stroke  -     MRI Brain With & Without Contrast; Future    30. Reading difficulty  -     MRI Brain With & Without Contrast; Future    31. Cognitive change      Assessment and Plan  Patient had significant long term Covid 19 symptoms. She completed physical therapy, speech, and cognitive therapy, and continues follow up with mental health counselor weekly, cardiology, neurology, infectious disease/ long term Covid clinic, ophthalmology, sleep medicine, and allergist as directed by them. She has restarted PT at the request of [facility]. She is trying to transition to a whole food plant based diet. Information given. She was seen by dizziness specialist, Dr Izquierdo, with  intolerance to CCB. She was seen in follow up with no further treatment indicated. She has been following with [facility]. She has been started on a couple medications without success, restarted PT, and has had no improvement.      Dizziness- Jocelyn has had dizziness with looking up, bending, and doing things. Unfortunately, she has had worsening and has dizziness with closing her eyes and newly a sensory overload with noises and light.  She had dizziness following Covid 19 infection, however, following an orbital mass removal, she had increasing dizziness symptoms. She has some lightheadedness and some spinning dizziness. She has undergone MRI brain, MRI brain and IAC and been evaluated by ENT and neurology. She was seen by ophthalmology and cleared for PRN follow up. ENT advised mouthguard and Magnesium oxide 400 mg QHS (then reported she should be taking a different Mg supplement). Without improvement, she advised follow up with neurology. Neurology advised trial of increasing Topamax (which she did not tolerate), trial Meclizine (no improvement in dizziness with Meclizine), and referred to PT for vestibular therapy. I agreed with vestibular therapy. She is also having vision issues in the peripheral vision. I advised PT through [place] [facility] long term Covid PT program, as they have developed therapy program for vision and dizziness post-Covid. She was unable to get into this program. She was seen by Dr Izquierdo at [facility], who has specialty in dizziness. They started Verapamil  mg once daily to see if this helped with dizziness. She did not tolerate verapamil, and it did not help the dizziness. There was nothing further they felt they could do to help. I referred to tertiary center for evaluation and treatment who advised Mg, possible migraine variant, and to complete PT with specific PT and follow up with them after completing PT. She then tried Gabapentin and could not tolerate AE but also had no  improvement in dizziness with medication. She had no improvement with cyproheptadine. She completed PT. I also referred for warm water PT for strengthening, pain, and to see if this helped with sensory, balance issues.  Consideration of endocrinology if we are able to find any endocrine commonalities with long COVID patients.  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 She did have a couple accidents at work. I will have her off work until she is cleared by specialists for for 6 months.  Follow up with me in 6 months. She will see [facility] as directed by them. If she has resolution, she can be cleared by specialists.    Depression with anxious mood- Patient has had some underlying anxiety in the past, however, she developed very severe, debilitating anxiety following Covid 19 infection and rehab, causing panic attacks.  She is seeing her mental health counselor.  She was resistant to medication in the past because she was always able to control her moods, calm down herself, and continue with working.  However, following her COVID-19 infection, she had more severe symptoms that were worsened with trying to return to work.  I started Lexapro 5 mg once daily. Once she was tolerating medication better, she increased to Lexapro 10 mg once daily. She had improved anxiety with therapy and medication. However, she had increased depressive symptoms, fatigue, decreased motivation. I had her continue Lexapro 10 mg once daily and added Wellbutrin  mg daily. She has not noted significant difference.  She thinks moods are pretty well-controlled.  She will need to monitor for postpartum depression and anxiety.  Patient to let me know if uncontrolled moods or any concerns.  To be seen ASAP if worsening moods or AE with medication.  Otherwise, continue medication until follow-up.  Orbital dermoid tumor- She should follow up with ophthalmology if any concerns or  vision changes.   Nocturnal cough- Patient to be seen if persistent symptoms. She can continue Pepcid 20 mg before her evening meal. She can try Mucinex DM twice daily as well. She may also need albuterol inhaler if needed. To be seen if worsening, new or changing symptoms or no resolution of cough.   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    Hypoxia following Covid 19 infection-  She had slow, gradual improvement and completed PT. She did have improvement in oxygenation.   Tachycardia/ elevated BP, post-Covid arrhythmia- Continue follow up with cardiology as directed by them. Echocardiogram and Holter monitor were ok. Patient to continue Atenolol 25 mg once daily and monitor BP, pulse. Blood pressure was a little low and she decreased Atenolol by 1/2 has her BP returns to normal. Allergist gave her Nadolol. Cardiology to determine any changes from atenolol to nadolol. She has had no increased SOA with beta blocker but is having increased fatigue and decreased motivation. We will consider consulting cardiology and decreasing medication.   Fatigue following Covid 19 infection- Patient to continue mental health therapy. She completed physical therapy, and cognitive therapy.   Cognitive deficit/dysfunction- Continue home exercises. Continue follow up with neurology as directed.   Depression and anxiety- Patient previously had some depression and anxiety that were managed with counseling and techniques. However, she had severe anxiety following Covid 19 then had improvement with medication but has had severe depression. Continue Mental health counseling, Lexapro, and Wellbutrin.  She must be seen ASAP if any concerning symptoms, worsening depression or anxiety.  Persistent headaches following Covid 19 infection- Patient to follow up with neurology and treatment as directed  by them. Her allergist wanted her to stop Topamax due to fatigue and cognitive impairment, however, headaches are controlled, and she did not want to stop medication. She did try to stop medication, and headaches recurred. She then developed kidney stones and was able to wean off Topamax.  I would not recommend Imitrex any further for acute headaches and would recommend Ubrelvy or Nurtec if cleared by pediatrician and OB/GYN, follow up with neurology if worsening headaches, new, or changing neurological symptoms.   Snoring- Since having Covid 19, she developed significant snoring, headaches that woke her up at night, tachycardia, hypoxia, and significant brain fog and fatigue. Negative sleep study. Snoring has not resolved but has improved gradually.  Generalized body aches, back aches, neck pain, and joint pain- Elevated uric acid but otherwise negative autoimmune testing 4/2021. PT and home exercises as needed. I also referred to allergist, with symptoms similar to MIS-A. No diagnosis of MIS-A with allergist. Patient to decrease purine rich foods. I will refer for warm water PT.   History of back pain, chest pain-She did have episode of pain that was a little concerning for gallbladder pathology.  She was seen by cardiology and will undergo echocardiogram.  I discussed we will need to consider workup for gallbladder.  I will place referral to general surgery for evaluation and for them to review images and discuss symptoms.  To be seen immediately here or ER if she has any recurrence of symptoms.    In follow up of chronic, non-Covid related symptoms. Patient will have fasting labs. Call if no results in 1 week. Stability of conditions, plan, follow up, and further recommendations pending labs. Follow up in 6 months or sooner if needed.     Pregnancy with history of recurrent loss, vaginal bleeding in pregnancy, placental lakes, history of low lying placenta resolved, HTN, gestational diabetes, postpartum  preeclampsia-  She should continue follow-up and treatment by OB/GYN as directed.  Continue follow-up with cardiology as directed by them.   Chest pain, palpitations, dizziness, recent postpartum preeclampsia- She underwent echocardiogram and will continue follow-up with cardiology for evaluation and treatment recommendations.  Mixed hyperlipidemia- Patient to continue to be compliant with diet/ exercise as approved by OB/GYN and cardiology. She will restart strict diet and exercise. Await labs for further recommendations.    Prediabetes- A1C increased off Metformin and she stopped having menses. She restarted Metformin and menses was more regular again but she had severe GI AE. She did not tolerate GLP1's now postpartum.  We will monitor labs and make further recommendations.   Vitamin D deficiency- Level was slightly higher than needed.  Decreased vitamin D 5000 IU from every other day to 2 times weekly through the summer and continue prenatal vitamin then increase again to every other day in November.   B6 deficiency- I will check today for further recommendations.  B12 excess- Patient with elevated B12 levels with prior labs.  We will continue to monitor and make recommendations.  Hemochromatosis carrier- I will continue to monitor and make further recommendations.  Elevated liver function tests- Most recent LFT very slightly elevated. Avoid NSAIDS and alcohol and limit Tylenol to < 2 grams daily.  I will recheck today.  Follow up with GI as directed.   Abnormal creatinine-I will recheck today and make further recommendations.  Hyperuricemia- Decrease purine rich foods. I will monitor and make recommendations.   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 Follow up with dermatology as directed by them.   History of recurrent UTI postpartum-I will recheck labs and make further recommendations.  Pulmonary nodule-I will refer again to pulmonology for evaluation  and follow-up and to continue follow-up with them for any repeat CT recommendations.  Residual thymus, weakness, dizziness- Patient with significant weakness, dizziness, and intermittent brain fog.  I recommended seeing neurology at [facility] for evaluation of myasthenia gravis or similar neurological condition.  I referred again.  Abnormal gallbladder CT- Patient did have an episode of chest pain that could have been related to gallbladder and had an episode of back pain that was thought to have possibly been related to gallbladder.  CTA chest with gallbladder sludge versus stones.  She will undergo cholecystectomy 11/2024.  Reading difficulty, memory symptoms, transient altered mental status- I will check MRI brain to ensure no CVA with recent complications post- partum with severe elevation in BP. Await labs and imaging and consideration of follow up with neurology if persistent, worsening, new, or changing symptoms.     Patient continues to see specialists as noted above.  I have reviewed available records, including consult notes, labs, and imaging/testing.  Patient to continue follow-up with specialists as directed by them.    I spent 35 minutes for video visit for Jocelyn Merlos on this date of service. This time includes time spent by me in the following activities as necessary: preparing for the visit, reviewing tests, specialists records and previous visits, obtaining and/or reviewing a separately obtained history, counseling and educating the patient, referring and/or communicating with other healthcare professionals, documenting information in the medical record, independently interpreting results and communicating that information with the patient, family, caregiver, and developing a medically appropriate treatment plan with consideration of other conditions, medications, and treatments.       [facility]# [id number]

## 2024-09-19 ENCOUNTER — TELEPHONE (OUTPATIENT)
Dept: OBSTETRICS AND GYNECOLOGY | Age: 34
End: 2024-09-19

## 2024-09-19 NOTE — TELEPHONE ENCOUNTER
Caller: Jocelyn Merlos    Relationship to patient: Self    Best call back number: [phone]    Patient is needing: PATIENT CALLED AND CANCELED SAME DAY APPT FOR TODAY

## 2024-09-23 ENCOUNTER — PATIENT MESSAGE (OUTPATIENT)
Dept: FAMILY MEDICINE CLINIC | Facility: CLINIC | Age: 34
End: 2024-09-23
Payer: COMMERCIAL

## 2024-09-23 ENCOUNTER — TELEPHONE (OUTPATIENT)
Dept: FAMILY MEDICINE CLINIC | Facility: CLINIC | Age: 34
End: 2024-09-23

## 2024-09-23 NOTE — TELEPHONE ENCOUNTER
Caller: Jocelyn Merlos    Relationship: Self    Best call back number: [phone]    What form or medical record are you requesting: LONG TERM DISABILITY    Who is requesting this form or medical record from you: PATIENT    How would you like to receive the form or medical records (pick-up, mail, fax): FAX  If fax, what is the fax number: ON PAPERWORK    Timeframe paperwork needed: AS SOON AS POSSIBLE    Additional notes: PATIENT STATED PAPERWORK WAS TO BE FAXED BACK TO [employer].    PATIENT STATED COMPANY HAS NOT RECEIVED IT AS OF YET.

## 2024-09-24 NOTE — TELEPHONE ENCOUNTER
PATIENT CALLING BACK FOR STATUS ON GETTING HER PAPERWORK.   PLEASE CALL THE PATIENT WITH A TIME LINE.

## 2024-09-28 ENCOUNTER — HOSPITAL ENCOUNTER (OUTPATIENT)
Facility: HOSPITAL | Age: 34
Discharge: HOME | End: 2024-09-28
Admitting: PHYSICIAN ASSISTANT
Payer: COMMERCIAL

## 2024-09-28 DIAGNOSIS — F81.0 READING DIFFICULTY: ICD-10-CM

## 2024-09-28 DIAGNOSIS — Z82.3 FAMILY HISTORY OF STROKE: ICD-10-CM

## 2024-09-28 DIAGNOSIS — G43.709 CHRONIC MIGRAINE WITHOUT AURA WITHOUT STATUS MIGRAINOSUS, NOT INTRACTABLE: ICD-10-CM

## 2024-09-28 DIAGNOSIS — R42 DIZZINESS: ICD-10-CM

## 2024-09-28 DIAGNOSIS — R41.89 BRAIN FOG: ICD-10-CM

## 2024-09-28 PROCEDURE — A9577 INJ MULTIHANCE: HCPCS | Performed by: PHYSICIAN ASSISTANT

## 2024-09-28 PROCEDURE — 0 GADOBENATE DIMEGLUMINE 529 MG/ML SOLUTION: Performed by: PHYSICIAN ASSISTANT

## 2024-09-28 PROCEDURE — 70553 MRI BRAIN STEM W/O & W/DYE: CPT

## 2024-09-28 RX ADMIN — GADOBENATE DIMEGLUMINE 20 ML: 529 INJECTION, SOLUTION INTRAVENOUS at 11:00

## 2024-10-28 ENCOUNTER — HOSPITAL ENCOUNTER (OUTPATIENT)
Dept: CT IMAGING | Facility: HOSPITAL | Age: 34
Discharge: HOME | End: 2024-10-28
Admitting: INTERNAL MEDICINE
Payer: COMMERCIAL

## 2024-10-28 DIAGNOSIS — R91.1 LUNG NODULE: ICD-10-CM

## 2024-10-28 PROCEDURE — 71250 CT THORAX DX C-: CPT

## 2024-11-05 ENCOUNTER — PRE-ADMISSION TESTING (OUTPATIENT)
Dept: PREADMISSION TESTING | Facility: HOSPITAL | Age: 34
End: 2024-11-05
Payer: COMMERCIAL

## 2024-11-05 VITALS
WEIGHT: 274.3 LBS | DIASTOLIC BLOOD PRESSURE: 91 MMHG | RESPIRATION RATE: 16 BRPM | BODY MASS INDEX: 41.57 KG/M2 | HEIGHT: 68 IN | SYSTOLIC BLOOD PRESSURE: 149 MMHG | OXYGEN SATURATION: 97 % | HEART RATE: 80 BPM

## 2024-11-05 DIAGNOSIS — R10.11 RUQ PAIN: ICD-10-CM

## 2024-11-05 LAB
ALBUMIN SERPL-MCNC: 4.2 G/DL (ref 3.5–5.2)
ALBUMIN/GLOB SERPL: 1.6 G/DL
ALP SERPL-CCNC: 79 U/L (ref 39–117)
ALT SERPL W P-5'-P-CCNC: 30 U/L (ref 1–33)
ANION GAP SERPL CALCULATED.3IONS-SCNC: 12.4 MMOL/L (ref 5–15)
AST SERPL-CCNC: 21 U/L (ref 1–32)
BILIRUB SERPL-MCNC: 0.3 MG/DL (ref 0–1.2)
BUN SERPL-MCNC: 16 MG/DL (ref 6–20)
BUN/CREAT SERPL: 14 (ref 7–25)
CALCIUM SPEC-SCNC: 9.4 MG/DL (ref 8.6–10.5)
CHLORIDE SERPL-SCNC: 103 MMOL/L (ref 98–107)
CO2 SERPL-SCNC: 23.6 MMOL/L (ref 22–29)
CREAT SERPL-MCNC: 1.14 MG/DL (ref 0.57–1)
DEPRECATED RDW RBC AUTO: 42.6 FL (ref 37–54)
EGFRCR SERPLBLD CKD-EPI 2021: 64.9 ML/MIN/1.73
ERYTHROCYTE [DISTWIDTH] IN BLOOD BY AUTOMATED COUNT: 12.3 % (ref 12.3–15.4)
GLOBULIN UR ELPH-MCNC: 2.6 GM/DL
GLUCOSE SERPL-MCNC: 104 MG/DL (ref 65–99)
HCT VFR BLD AUTO: 41.8 % (ref 34–46.6)
HGB BLD-MCNC: 13.2 G/DL (ref 12–15.9)
MCH RBC QN AUTO: 29.7 PG (ref 26.6–33)
MCHC RBC AUTO-ENTMCNC: 31.6 G/DL (ref 31.5–35.7)
MCV RBC AUTO: 94.1 FL (ref 79–97)
PLATELET # BLD AUTO: 316 10*3/MM3 (ref 140–450)
PMV BLD AUTO: 10.4 FL (ref 6–12)
POTASSIUM SERPL-SCNC: 4.4 MMOL/L (ref 3.5–5.2)
PROT SERPL-MCNC: 6.8 G/DL (ref 6–8.5)
RBC # BLD AUTO: 4.44 10*6/MM3 (ref 3.77–5.28)
SODIUM SERPL-SCNC: 139 MMOL/L (ref 136–145)
WBC NRBC COR # BLD AUTO: 8.85 10*3/MM3 (ref 3.4–10.8)

## 2024-11-05 PROCEDURE — 36415 COLL VENOUS BLD VENIPUNCTURE: CPT

## 2024-11-05 PROCEDURE — 85027 COMPLETE CBC AUTOMATED: CPT

## 2024-11-05 PROCEDURE — 80053 COMPREHEN METABOLIC PANEL: CPT

## 2024-11-05 RX ORDER — LANOLIN ALCOHOL/MO/W.PET/CERES
1000 CREAM (GRAM) TOPICAL
COMMUNITY

## 2024-11-05 NOTE — DISCHARGE INSTRUCTIONS
Take the following medications the morning of surgery:    WELLBUTRIN, LEXAPRO, LABETALOL, CLARITIN    If you are on prescription narcotic pain medication to control your pain you may also take that medication the morning of surgery.      General Instructions:     Do not eat solid food after midnight the night before surgery.  Clear liquids day of surgery are allowed but must be stopped at least two hours before your hospital arrival time.       Allowed clear liquids      Water, sodas, and tea or coffee with no cream or milk added.       12 to 20 ounces of a clear liquid that contains carbohydrates is recommended.  If non-diabetic, have Gatorade or Powerade.  If diabetic, have G2 or Powerade Zero.     Do not have liquids red in color.  Do not consume chicken, beef, pork or vegetable broth or bouillon cubes of any variety as they are not considered clear liquids and are not allowed.        Patients who avoid smoking, chewing tobacco and alcohol for 4 weeks prior to surgery have a reduced risk of post-operative complications.  Quit smoking as many days before surgery as you can.  Do not smoke, use chewing tobacco or drink alcohol the day of surgery.   Bring any papers given to you in the doctor’s office.  Wear clean comfortable clothes.  Do not wear contact lenses, false eyelashes or make-up.  Bring a case for your glasses.   Remove all piercings.  Leave jewelry and any other valuables at home.  Hair extensions with metal clips must be removed prior to surgery.  The Pre-Admission Testing nurse will instruct you to bring medications if unable to obtain an accurate list in Pre-Admission Testing.          Preventing a Surgical Site Infection:  For 2 to 3 days before surgery, avoid shaving with a razor because the razor can irritate skin and make it easier to develop an infection.    Any areas of open skin can increase the risk of a post-operative wound infection by allowing bacteria to enter and travel throughout the  body.  Notify your surgeon if you have any skin wounds / rashes even if it is not near the expected surgical site.  The area will need assessed to determine if surgery should be delayed until it is healed.  The night prior to surgery shower using a fresh bar of anti-bacterial soap (such as Dial) and clean washcloth.  Sleep in a clean bed with clean clothing.  Do not allow pets to sleep with you.  Shower on the morning of surgery using a fresh bar of anti-bacterial soap (such as Dial) and clean washcloth.  Dry with a clean towel and dress in clean clothing.  Ask your surgeon if you will be receiving antibiotics prior to surgery.  Make sure you, your family, and all healthcare providers clean their hands with soap and water or an alcohol based hand  before caring for you or your wound.    Day of surgery:  Your arrival time is approximately two hours before your scheduled surgery time.  Please note if you have an early arrival time the surgery doors do not open before 5:00 AM.  Upon arrival, a Pre-op nurse and Anesthesiologist will review your health history, obtain vital signs, and answer questions you may have.  The only belongings needed at this time will be a list of your home medications and if applicable your C-PAP/BI-PAP machine.  A Pre-op nurse will start an IV and you may receive medication in preparation for surgery, including something to help you relax.     Please be aware that surgery does come with discomfort.  We want to make every effort to control your discomfort so please discuss any uncontrolled symptoms with your nurse.   Your doctor will most likely have prescribed pain medications.      If you are going home after surgery you will receive individualized written care instructions before being discharged.  A responsible adult must drive you to and from the hospital on the day of your surgery and ideally stay with you through the night.   .  Discharge prescriptions can be filled by the  hospital pharmacy during regular pharmacy hours.  If you are having surgery late in the day/evening your prescription may be e-prescribed to your pharmacy.  Please verify your pharmacy hours or chose a 24 hour pharmacy to avoid not having access to your prescription because your pharmacy has closed for the day.    CHLORHEXIDINE CLOTH INSTRUCTIONS  The morning of surgery follow these instructions using the Chlorhexidine cloths you've been given.  These steps reduce bacteria on the body.  Do not use the cloths near your eyes, ears mouth, genitalia or on open wounds.  Throw the cloths away after use but do not try to flush them down a toilet.      Open and remove one cloth at a time from the package.    Leave the cloth unfolded and begin the bathing.  Massage the skin with the cloths using gentle pressure to remove bacteria.  Do not scrub harshly.   Follow the steps below with one 2% CHG cloth per area (6 total cloths).  One cloth for neck, shoulders and chest.  One cloth for both arms, hands, fingers and underarms (do underarms last).  One cloth for the abdomen followed by groin.  One cloth for right leg and foot including between the toes.  One cloth for left leg and foot including between the toes.  The last cloth is to be used for the back of the neck, back and buttocks.    Allow the CHG to air dry 3 minutes on the skin which will give it time to work and decrease the chance of irritation.  The skin may feel sticky until it is dry.  Do not rinse with water or any other liquid or you will lose the beneficial effects of the CHG.  If mild skin irritation occurs, do rinse the skin to remove the CHG.  Report this to the nurse at time of admission.  Do not apply lotions, creams, ointments, deodorants or perfumes after using the clothes. Dress in clean clothes before coming to the hospital.    If you have any questions please call Pre-Admission Testing at [phone].  Deductibles and co-payments are collected on the  day of service. Please be prepared to pay the required co-pay, deductible or deposit on the day of service as defined by your plan.    Call your surgeon immediately if you experience any of the following symptoms:  Sore Throat  Shortness of Breath or difficulty breathing  Cough  Chills  Body soreness or muscle pain  Headache  Fever  New loss of taste or smell  Do not arrive for your surgery ill.  Your procedure will need to be rescheduled to another time.  You will need to call your physician before the day of surgery to avoid any unnecessary exposure to hospital staff as well as other patients.

## 2024-11-12 ENCOUNTER — APPOINTMENT (OUTPATIENT)
Dept: GENERAL RADIOLOGY | Facility: HOSPITAL | Age: 34
End: 2024-11-12
Payer: COMMERCIAL

## 2024-11-12 ENCOUNTER — ANESTHESIA (OUTPATIENT)
Dept: PERIOP | Facility: HOSPITAL | Age: 34
End: 2024-11-12
Payer: COMMERCIAL

## 2024-11-12 ENCOUNTER — ANESTHESIA EVENT (OUTPATIENT)
Dept: PERIOP | Facility: HOSPITAL | Age: 34
End: 2024-11-12
Payer: COMMERCIAL

## 2024-11-12 ENCOUNTER — HOSPITAL ENCOUNTER (OUTPATIENT)
Facility: HOSPITAL | Age: 34
Setting detail: HOSPITAL OUTPATIENT SURGERY
Discharge: HOME | End: 2024-11-12
Attending: SURGERY | Admitting: SURGERY
Payer: COMMERCIAL

## 2024-11-12 VITALS
RESPIRATION RATE: 16 BRPM | OXYGEN SATURATION: 95 % | WEIGHT: 273.37 LBS | TEMPERATURE: 98.5 F | DIASTOLIC BLOOD PRESSURE: 86 MMHG | HEART RATE: 88 BPM | SYSTOLIC BLOOD PRESSURE: 149 MMHG | HEIGHT: 68 IN | BODY MASS INDEX: 41.43 KG/M2

## 2024-11-12 DIAGNOSIS — K80.20 CALCULUS OF GALLBLADDER WITHOUT CHOLECYSTITIS WITHOUT OBSTRUCTION: Primary | ICD-10-CM

## 2024-11-12 DIAGNOSIS — R10.11 RUQ PAIN: ICD-10-CM

## 2024-11-12 LAB
B-HCG UR QL: NEGATIVE
EXPIRATION DATE: NORMAL
GLUCOSE BLDC GLUCOMTR-MCNC: 100 MG/DL (ref 70–130)
INTERNAL NEGATIVE CONTROL: NORMAL
INTERNAL POSITIVE CONTROL: NORMAL
Lab: NORMAL

## 2024-11-12 PROCEDURE — 25010000002 MIDAZOLAM PER 1 MG: Performed by: ANESTHESIOLOGY

## 2024-11-12 PROCEDURE — 25810000003 SODIUM CHLORIDE PER 500 ML: Performed by: SURGERY

## 2024-11-12 PROCEDURE — C1758 CATHETER, URETERAL: HCPCS | Performed by: SURGERY

## 2024-11-12 PROCEDURE — 25010000002 KETOROLAC TROMETHAMINE PER 15 MG: Performed by: ANESTHESIOLOGY

## 2024-11-12 PROCEDURE — 25810000003 LACTATED RINGERS PER 1000 ML: Performed by: ANESTHESIOLOGY

## 2024-11-12 PROCEDURE — 25010000002 ONDANSETRON PER 1 MG: Performed by: ANESTHESIOLOGY

## 2024-11-12 PROCEDURE — 25010000002 PHENYLEPHRINE 10 MG/ML SOLUTION: Performed by: ANESTHESIOLOGY

## 2024-11-12 PROCEDURE — 25010000002 INDOCYANINE GREEN 25 MG RECONSTITUTED SOLUTION: Performed by: SURGERY

## 2024-11-12 PROCEDURE — 25010000002 CEFAZOLIN 3 G RECONSTITUTED SOLUTION 1 EACH VIAL: Performed by: SURGERY

## 2024-11-12 PROCEDURE — 82948 REAGENT STRIP/BLOOD GLUCOSE: CPT

## 2024-11-12 PROCEDURE — 25010000002 LIDOCAINE 2% SOLUTION: Performed by: ANESTHESIOLOGY

## 2024-11-12 PROCEDURE — 88304 TISSUE EXAM BY PATHOLOGIST: CPT | Performed by: SURGERY

## 2024-11-12 PROCEDURE — 25010000002 SUGAMMADEX 200 MG/2ML SOLUTION: Performed by: ANESTHESIOLOGY

## 2024-11-12 PROCEDURE — 25010000002 FENTANYL CITRATE (PF) 50 MCG/ML SOLUTION: Performed by: ANESTHESIOLOGY

## 2024-11-12 PROCEDURE — 25010000002 PROPOFOL 10 MG/ML EMULSION: Performed by: ANESTHESIOLOGY

## 2024-11-12 PROCEDURE — 81025 URINE PREGNANCY TEST: CPT | Performed by: ANESTHESIOLOGY

## 2024-11-12 DEVICE — HEMOLOK ML 6 CLIPS/CART
Type: IMPLANTABLE DEVICE | Site: ABDOMEN | Status: FUNCTIONAL
Brand: WECK

## 2024-11-12 RX ORDER — EPHEDRINE SULFATE 50 MG/ML
5 INJECTION, SOLUTION INTRAVENOUS ONCE AS NEEDED
Status: DISCONTINUED | OUTPATIENT
Start: 2024-11-12 | End: 2024-11-12 | Stop reason: HOSPADM

## 2024-11-12 RX ORDER — LIDOCAINE HYDROCHLORIDE 40 MG/ML
SOLUTION TOPICAL AS NEEDED
Status: DISCONTINUED | OUTPATIENT
Start: 2024-11-12 | End: 2024-11-12 | Stop reason: SURG

## 2024-11-12 RX ORDER — DROPERIDOL 2.5 MG/ML
0.62 INJECTION, SOLUTION INTRAMUSCULAR; INTRAVENOUS
Status: DISCONTINUED | OUTPATIENT
Start: 2024-11-12 | End: 2024-11-12 | Stop reason: HOSPADM

## 2024-11-12 RX ORDER — SODIUM CHLORIDE 0.9 % (FLUSH) 0.9 %
10 SYRINGE (ML) INJECTION EVERY 12 HOURS SCHEDULED
Status: DISCONTINUED | OUTPATIENT
Start: 2024-11-12 | End: 2024-11-12 | Stop reason: HOSPADM

## 2024-11-12 RX ORDER — LIDOCAINE HYDROCHLORIDE 10 MG/ML
0.5 INJECTION, SOLUTION INFILTRATION; PERINEURAL ONCE AS NEEDED
Status: DISCONTINUED | OUTPATIENT
Start: 2024-11-12 | End: 2024-11-12 | Stop reason: HOSPADM

## 2024-11-12 RX ORDER — INDOCYANINE GREEN AND WATER 25 MG
2.5 KIT INJECTION ONCE
Status: COMPLETED | OUTPATIENT
Start: 2024-11-12 | End: 2024-11-12

## 2024-11-12 RX ORDER — HYDROCODONE BITARTRATE AND ACETAMINOPHEN 5; 325 MG/1; MG/1
1 TABLET ORAL EVERY 6 HOURS PRN
Qty: 15 TABLET | Refills: 0 | Status: SHIPPED | OUTPATIENT
Start: 2024-11-12

## 2024-11-12 RX ORDER — SCOLOPAMINE TRANSDERMAL SYSTEM 1 MG/1
1 PATCH, EXTENDED RELEASE TRANSDERMAL ONCE
Status: DISCONTINUED | OUTPATIENT
Start: 2024-11-12 | End: 2024-11-12 | Stop reason: HOSPADM

## 2024-11-12 RX ORDER — PROMETHAZINE HYDROCHLORIDE 25 MG/1
25 TABLET ORAL ONCE AS NEEDED
Status: DISCONTINUED | OUTPATIENT
Start: 2024-11-12 | End: 2024-11-12 | Stop reason: HOSPADM

## 2024-11-12 RX ORDER — SODIUM CHLORIDE, SODIUM LACTATE, POTASSIUM CHLORIDE, CALCIUM CHLORIDE 600; 310; 30; 20 MG/100ML; MG/100ML; MG/100ML; MG/100ML
9 INJECTION, SOLUTION INTRAVENOUS CONTINUOUS
Status: DISCONTINUED | OUTPATIENT
Start: 2024-11-12 | End: 2024-11-12 | Stop reason: HOSPADM

## 2024-11-12 RX ORDER — PHENYLEPHRINE HYDROCHLORIDE 10 MG/ML
INJECTION INTRAVENOUS AS NEEDED
Status: DISCONTINUED | OUTPATIENT
Start: 2024-11-12 | End: 2024-11-12 | Stop reason: SURG

## 2024-11-12 RX ORDER — PROPOFOL 10 MG/ML
VIAL (ML) INTRAVENOUS AS NEEDED
Status: DISCONTINUED | OUTPATIENT
Start: 2024-11-12 | End: 2024-11-12 | Stop reason: SURG

## 2024-11-12 RX ORDER — KETOROLAC TROMETHAMINE 30 MG/ML
INJECTION, SOLUTION INTRAMUSCULAR; INTRAVENOUS AS NEEDED
Status: DISCONTINUED | OUTPATIENT
Start: 2024-11-12 | End: 2024-11-12 | Stop reason: SURG

## 2024-11-12 RX ORDER — ONDANSETRON 2 MG/ML
INJECTION INTRAMUSCULAR; INTRAVENOUS AS NEEDED
Status: DISCONTINUED | OUTPATIENT
Start: 2024-11-12 | End: 2024-11-12 | Stop reason: SURG

## 2024-11-12 RX ORDER — SODIUM CHLORIDE 9 MG/ML
40 INJECTION, SOLUTION INTRAVENOUS AS NEEDED
Status: DISCONTINUED | OUTPATIENT
Start: 2024-11-12 | End: 2024-11-12 | Stop reason: HOSPADM

## 2024-11-12 RX ORDER — SODIUM CHLORIDE 0.9 % (FLUSH) 0.9 %
3 SYRINGE (ML) INJECTION EVERY 12 HOURS SCHEDULED
Status: DISCONTINUED | OUTPATIENT
Start: 2024-11-12 | End: 2024-11-12 | Stop reason: HOSPADM

## 2024-11-12 RX ORDER — MIDAZOLAM HYDROCHLORIDE 1 MG/ML
1 INJECTION, SOLUTION INTRAMUSCULAR; INTRAVENOUS
Status: DISCONTINUED | OUTPATIENT
Start: 2024-11-12 | End: 2024-11-12 | Stop reason: HOSPADM

## 2024-11-12 RX ORDER — IPRATROPIUM BROMIDE AND ALBUTEROL SULFATE 2.5; .5 MG/3ML; MG/3ML
3 SOLUTION RESPIRATORY (INHALATION) ONCE AS NEEDED
Status: DISCONTINUED | OUTPATIENT
Start: 2024-11-12 | End: 2024-11-12 | Stop reason: HOSPADM

## 2024-11-12 RX ORDER — DIPHENHYDRAMINE HYDROCHLORIDE 50 MG/ML
12.5 INJECTION INTRAMUSCULAR; INTRAVENOUS
Status: DISCONTINUED | OUTPATIENT
Start: 2024-11-12 | End: 2024-11-12 | Stop reason: HOSPADM

## 2024-11-12 RX ORDER — PROMETHAZINE HYDROCHLORIDE 25 MG/1
25 SUPPOSITORY RECTAL ONCE AS NEEDED
Status: DISCONTINUED | OUTPATIENT
Start: 2024-11-12 | End: 2024-11-12 | Stop reason: HOSPADM

## 2024-11-12 RX ORDER — ACETAMINOPHEN 500 MG
1000 TABLET ORAL ONCE
Status: DISCONTINUED | OUTPATIENT
Start: 2024-11-12 | End: 2024-11-12 | Stop reason: HOSPADM

## 2024-11-12 RX ORDER — HYDRALAZINE HYDROCHLORIDE 20 MG/ML
5 INJECTION INTRAMUSCULAR; INTRAVENOUS
Status: DISCONTINUED | OUTPATIENT
Start: 2024-11-12 | End: 2024-11-12 | Stop reason: HOSPADM

## 2024-11-12 RX ORDER — SODIUM CHLORIDE 0.9 % (FLUSH) 0.9 %
10 SYRINGE (ML) INJECTION AS NEEDED
Status: DISCONTINUED | OUTPATIENT
Start: 2024-11-12 | End: 2024-11-12 | Stop reason: HOSPADM

## 2024-11-12 RX ORDER — HYDROCODONE BITARTRATE AND ACETAMINOPHEN 5; 325 MG/1; MG/1
1 TABLET ORAL ONCE AS NEEDED
Status: COMPLETED | OUTPATIENT
Start: 2024-11-12 | End: 2024-11-12

## 2024-11-12 RX ORDER — LIDOCAINE HYDROCHLORIDE 20 MG/ML
INJECTION, SOLUTION INFILTRATION; PERINEURAL AS NEEDED
Status: DISCONTINUED | OUTPATIENT
Start: 2024-11-12 | End: 2024-11-12 | Stop reason: SURG

## 2024-11-12 RX ORDER — FENTANYL CITRATE 50 UG/ML
50 INJECTION, SOLUTION INTRAMUSCULAR; INTRAVENOUS
Status: DISCONTINUED | OUTPATIENT
Start: 2024-11-12 | End: 2024-11-12 | Stop reason: HOSPADM

## 2024-11-12 RX ORDER — LABETALOL HYDROCHLORIDE 5 MG/ML
5 INJECTION, SOLUTION INTRAVENOUS
Status: DISCONTINUED | OUTPATIENT
Start: 2024-11-12 | End: 2024-11-12 | Stop reason: HOSPADM

## 2024-11-12 RX ORDER — FENTANYL CITRATE 50 UG/ML
INJECTION, SOLUTION INTRAMUSCULAR; INTRAVENOUS AS NEEDED
Status: DISCONTINUED | OUTPATIENT
Start: 2024-11-12 | End: 2024-11-12 | Stop reason: SURG

## 2024-11-12 RX ORDER — OXYCODONE AND ACETAMINOPHEN 7.5; 325 MG/1; MG/1
1 TABLET ORAL EVERY 4 HOURS PRN
Status: DISCONTINUED | OUTPATIENT
Start: 2024-11-12 | End: 2024-11-12 | Stop reason: HOSPADM

## 2024-11-12 RX ORDER — ROCURONIUM BROMIDE 10 MG/ML
INJECTION, SOLUTION INTRAVENOUS AS NEEDED
Status: DISCONTINUED | OUTPATIENT
Start: 2024-11-12 | End: 2024-11-12 | Stop reason: SURG

## 2024-11-12 RX ORDER — HYDROMORPHONE HYDROCHLORIDE 1 MG/ML
0.5 INJECTION, SOLUTION INTRAMUSCULAR; INTRAVENOUS; SUBCUTANEOUS
Status: DISCONTINUED | OUTPATIENT
Start: 2024-11-12 | End: 2024-11-12 | Stop reason: HOSPADM

## 2024-11-12 RX ORDER — FLUMAZENIL 0.1 MG/ML
0.2 INJECTION INTRAVENOUS AS NEEDED
Status: DISCONTINUED | OUTPATIENT
Start: 2024-11-12 | End: 2024-11-12 | Stop reason: HOSPADM

## 2024-11-12 RX ORDER — ONDANSETRON 2 MG/ML
4 INJECTION INTRAMUSCULAR; INTRAVENOUS ONCE AS NEEDED
Status: DISCONTINUED | OUTPATIENT
Start: 2024-11-12 | End: 2024-11-12 | Stop reason: HOSPADM

## 2024-11-12 RX ORDER — EPHEDRINE SULFATE 50 MG/ML
INJECTION, SOLUTION INTRAVENOUS AS NEEDED
Status: DISCONTINUED | OUTPATIENT
Start: 2024-11-12 | End: 2024-11-12 | Stop reason: SURG

## 2024-11-12 RX ORDER — FAMOTIDINE 10 MG/ML
20 INJECTION, SOLUTION INTRAVENOUS ONCE
Status: COMPLETED | OUTPATIENT
Start: 2024-11-12 | End: 2024-11-12

## 2024-11-12 RX ORDER — BUPIVACAINE HYDROCHLORIDE AND EPINEPHRINE 2.5; 5 MG/ML; UG/ML
INJECTION, SOLUTION EPIDURAL; INFILTRATION; INTRACAUDAL; PERINEURAL AS NEEDED
Status: DISCONTINUED | OUTPATIENT
Start: 2024-11-12 | End: 2024-11-12 | Stop reason: HOSPADM

## 2024-11-12 RX ORDER — IOPAMIDOL 612 MG/ML
INJECTION, SOLUTION INTRAVASCULAR AS NEEDED
Status: DISCONTINUED | OUTPATIENT
Start: 2024-11-12 | End: 2024-11-12 | Stop reason: HOSPADM

## 2024-11-12 RX ORDER — SODIUM CHLORIDE 9 MG/ML
INJECTION, SOLUTION INTRAVENOUS AS NEEDED
Status: DISCONTINUED | OUTPATIENT
Start: 2024-11-12 | End: 2024-11-12 | Stop reason: HOSPADM

## 2024-11-12 RX ORDER — SODIUM CHLORIDE 0.9 % (FLUSH) 0.9 %
3-10 SYRINGE (ML) INJECTION AS NEEDED
Status: DISCONTINUED | OUTPATIENT
Start: 2024-11-12 | End: 2024-11-12 | Stop reason: HOSPADM

## 2024-11-12 RX ORDER — NALOXONE HCL 0.4 MG/ML
0.2 VIAL (ML) INJECTION AS NEEDED
Status: DISCONTINUED | OUTPATIENT
Start: 2024-11-12 | End: 2024-11-12 | Stop reason: HOSPADM

## 2024-11-12 RX ADMIN — PHENYLEPHRINE HYDROCHLORIDE 100 MCG: 10 INJECTION INTRAVENOUS at 10:51

## 2024-11-12 RX ADMIN — SCOPALAMINE 1 PATCH: 1 PATCH, EXTENDED RELEASE TRANSDERMAL at 08:46

## 2024-11-12 RX ADMIN — SODIUM CHLORIDE 3000 MG: 900 INJECTION INTRAVENOUS at 09:46

## 2024-11-12 RX ADMIN — ROCURONIUM BROMIDE 70 MG: 10 INJECTION, SOLUTION INTRAVENOUS at 09:58

## 2024-11-12 RX ADMIN — EPHEDRINE SULFATE 20 MG: 50 INJECTION INTRAVENOUS at 11:11

## 2024-11-12 RX ADMIN — LIDOCAINE HYDROCHLORIDE 100 MG: 20 INJECTION, SOLUTION INFILTRATION; PERINEURAL at 09:58

## 2024-11-12 RX ADMIN — LIDOCAINE HYDROCHLORIDE 1 EACH: 40 SOLUTION TOPICAL at 10:02

## 2024-11-12 RX ADMIN — SODIUM CHLORIDE, SODIUM LACTATE, POTASSIUM CHLORIDE, CALCIUM CHLORIDE 9 ML/HR: 20; 30; 600; 310 INJECTION, SOLUTION INTRAVENOUS at 08:46

## 2024-11-12 RX ADMIN — FENTANYL CITRATE 50 MCG: 50 INJECTION, SOLUTION INTRAMUSCULAR; INTRAVENOUS at 09:58

## 2024-11-12 RX ADMIN — Medication 3 ML: at 08:49

## 2024-11-12 RX ADMIN — ROCURONIUM BROMIDE 30 MG: 10 INJECTION, SOLUTION INTRAVENOUS at 10:17

## 2024-11-12 RX ADMIN — MIDAZOLAM 1 MG: 1 INJECTION INTRAMUSCULAR; INTRAVENOUS at 08:48

## 2024-11-12 RX ADMIN — EPHEDRINE SULFATE 20 MG: 50 INJECTION INTRAVENOUS at 11:08

## 2024-11-12 RX ADMIN — HYDROCODONE BITARTRATE AND ACETAMINOPHEN 1 TABLET: 5; 325 TABLET ORAL at 12:55

## 2024-11-12 RX ADMIN — FENTANYL CITRATE 50 MCG: 50 INJECTION, SOLUTION INTRAMUSCULAR; INTRAVENOUS at 11:03

## 2024-11-12 RX ADMIN — INDOCYANINE GREEN AND WATER 2.5 MG: KIT at 08:28

## 2024-11-12 RX ADMIN — FENTANYL CITRATE 50 MCG: 50 INJECTION, SOLUTION INTRAMUSCULAR; INTRAVENOUS at 11:01

## 2024-11-12 RX ADMIN — EPHEDRINE SULFATE 20 MG: 50 INJECTION INTRAVENOUS at 11:06

## 2024-11-12 RX ADMIN — EPHEDRINE SULFATE 10 MG: 50 INJECTION INTRAVENOUS at 11:03

## 2024-11-12 RX ADMIN — PHENYLEPHRINE HYDROCHLORIDE 200 MCG: 10 INJECTION INTRAVENOUS at 11:11

## 2024-11-12 RX ADMIN — KETOROLAC TROMETHAMINE 30 MG: 30 INJECTION, SOLUTION INTRAMUSCULAR at 10:55

## 2024-11-12 RX ADMIN — ONDANSETRON 4 MG: 2 INJECTION INTRAMUSCULAR; INTRAVENOUS at 10:54

## 2024-11-12 RX ADMIN — PROPOFOL 200 MG: 10 INJECTION, EMULSION INTRAVENOUS at 09:58

## 2024-11-12 RX ADMIN — SUGAMMADEX 400 MG: 100 INJECTION, SOLUTION INTRAVENOUS at 10:57

## 2024-11-12 RX ADMIN — Medication 10 ML: at 08:49

## 2024-11-12 RX ADMIN — FAMOTIDINE 20 MG: 10 INJECTION INTRAVENOUS at 08:46

## 2024-11-12 NOTE — ANESTHESIA POSTPROCEDURE EVALUATION
Patient: Jocelyn Merlos    Procedure Summary       Date: 11/12/24 Room / Location: [facility] OR [address] [place] [address] MAIN OR    Anesthesia Start: 0950 Anesthesia Stop: 1131    Procedure: CHOLECYSTECTOMY LAPAROSCOPIC WITH DAVINCI ROBOT (Abdomen) Diagnosis:       RUQ pain      (RUQ pain [R10.11])    Surgeons: Avel Worrell MD Provider: Martin De MD    Anesthesia Type: general ASA Status: 3            Anesthesia Type: general    Vitals  Vitals Value Taken Time   /97 11/12/24 1245   Temp 36.9 °C (98.5 °F) 11/12/24 1128   Pulse 84 11/12/24 1255   Resp 24 11/12/24 1245   SpO2 95 % 11/12/24 1255   Vitals shown include unfiled device data.        Post Anesthesia Care and Evaluation    Patient location during evaluation: PHASE II  Patient participation: complete - patient participated  Level of consciousness: awake and alert  Pain management: adequate    Airway patency: patent  Anesthetic complications: No anesthetic complications  PONV Status: none  Cardiovascular status: acceptable and hemodynamically stable  Respiratory status: acceptable, nonlabored ventilation and spontaneous ventilation  Hydration status: acceptable

## 2024-11-12 NOTE — H&P
Cc: Abnormal CT of the chest, concern for chronic cholecystitis     History of presenting illness:   This is a nice 34-year-old female, recently postpartum who was recently evaluated in the emergency department with some chest pain.  She had a CT scan which was read as potentially having small stones versus sludge in the gallbladder.  During the midportion of her pregnancy she does admit to having an episode of epigastric pain which radiated through into her back, but since that time she is really not had any further similar episodes.  She denies any significant bloating or abdominal pain and is able to eat fairly normally.     Past Medical History: Gestational diabetes, obesity, depression, anxiety     Past Surgical History: The patient reports undergoing laparoscopic gastric banding in , abdominoplasty I believe around ,  section 2024.     Medications: Bupropion, Lexapro, labetalol, vitamin B6, vitamin D     Allergies: None known     Social History: She is a former smoker who quit in      Family History: Negative for colon or rectal cancer, no significant family history of gallbladder disease     Review of Systems:  Constitutional: Denies fever, chills, change in weight  Neck: no swollen glands or dysphagia or odynophagia  Respiratory: negative for SOB, cough, hemoptysis or wheezing  Cardiovascular: negative for chest pain, palpitations or peripheral edema  Gastrointestinal: No ongoing abdominal pain, nausea, change in bowel habits or bloating        Physical Exam:  BMI: 41.7, weight 274 pounds  General: alert and oriented, appropriate, no acute distress  Eyes: No scleral icterus, extraocular movements are intact  Neck: Supple without lymphadenopathy or thyromegaly, trachea is in the midline  Respiratory: There is good bilateral chest expansion, no use of accessory muscles is noted  Cardiovascular: No jugular venous distention or peripheral edema is seen  Gastrointestinal: Obese but soft,  no obvious tenderness, no guarding, no hernia felt     Laboratory data: recent liver function studies unremarkable with normal LFTs and total bilirubin of 0.3     Imaging data: Lower cuts of a CT of the chest done 4/27/2024 suggest possible sludge or small stones.  Gastric band in place.  Gallbladder ultrasound demonstrates evidence of cholelithiasis, uncomplicated.        Assessment and plan:   -Possible sludge or gallstones, minimally symptomatic at this point although the patient did have an episode which sounds typical for that of biliary disease earlier this year  -After discussion with patient, we agreed to proceed with robotic cholecystectomy and cholangiogram.     Risks associated with the procedure are noted to include, but not be limited to, bleeding, infection, injury to intra-abdominal structures including the liver, small and large intestine, stomach, duodenum, common bile duct and major vascular structures.  Possible need for conversion to open surgery, further revisional surgery, postoperative ERCP discussed.        Avel Worrell MD, FACS  General, Minimally Invasive and Endoscopic Surgery  [facility]     [address], [address]  [place], [place], [address]  P: [phone]  F: [phone]    History and physical is copied, brought forward, pasted, edited and updated as appropriate from prior office visit.  No significant changes.

## 2024-11-12 NOTE — ANESTHESIA PROCEDURE NOTES
Airway  Urgency: elective    Date/Time: 11/12/2024 10:02 AM  Airway not difficult    General Information and Staff    Patient location during procedure: OR  Anesthesiologist: Martin De MD    Indications and Patient Condition  Indications for airway management: airway protection    Preoxygenated: yes  MILS maintained throughout  Mask difficulty assessment: 1 - vent by mask    Final Airway Details  Final airway type: endotracheal airway      Successful airway: ETT  Cuffed: yes   Successful intubation technique: direct laryngoscopy  Endotracheal tube insertion site: oral  Blade: Donavon  Blade size: 3  ETT size (mm): 7.0  Cormack-Lehane Classification: grade I - full view of glottis  Placement verified by: chest auscultation and capnometry   Measured from: lips  ETT/EBT  to lips (cm): 21  Number of attempts at approach: 1  Assessment: lips, teeth, and gum same as pre-op and atraumatic intubation

## 2024-11-12 NOTE — ANESTHESIA PREPROCEDURE EVALUATION
Anesthesia Evaluation     Patient summary reviewed and Nursing notes reviewed   history of anesthetic complications:  PONV  NPO Solid Status: > 6 hours  NPO Liquid Status: > 2 hours           Airway   Mallampati: II  TM distance: >3 FB  Neck ROM: full  Dental - normal exam     Pulmonary    (-) COPD, asthma, not a smoker  Cardiovascular   Exercise tolerance: good (4-7 METS)    ECG reviewed    (+) hypertension, hyperlipidemia  (-) past MI, dysrhythmias, angina    ROS comment: Echo 6/2024 nl LV/RV fxn, no significant valve issues     Neuro/Psych  (+) psychiatric history Anxiety and Depression  (-) seizures, CVA  GI/Hepatic/Renal/Endo    (+) morbid obesity, diabetes mellitus (predm. not on meds yet.)  (-) GERD, liver disease, no renal disease, no thyroid disorder    Musculoskeletal     Abdominal    Substance History      OB/GYN          Other                          Anesthesia Plan    ASA 3     general       Anesthetic plan, risks, benefits, and alternatives have been provided, discussed and informed consent has been obtained with: patient.        CODE STATUS:

## 2024-11-12 NOTE — OP NOTE
Operative Note :   Avel Worrell MD    Jocelyn Merlos  1990    Procedure Date: 11/12/24    Pre-op Diagnosis:  Symptomatic cholelithiasis    Post-Op Diagnosis:  Same    Procedure:   Laparoscopic cholecystectomy with da Kade robot assistance    Surgeon: Avel Worrell MD    Assistant: Anita Steele CSA was responsible for performing the following activities: Irrigation, suction, retraction, manipulation of the camera, closure of skin and placement of dressings as well as exchange of instruments at bedside and their skilled assistance was necessary for the success of this case.    Anesthesia:  General    EBL:   minimal    Specimens:   Gallbladder and contents    Indications:  34-year-old female recently postpartum with abdominal pain and cholelithiasis and symptoms consistent with that of biliary disease    Findings:   Minimally inflamed gallbladder  Small stones noted  Diminutive cystic duct, unable to complete cholangiogram    Recommendations:   Routine postcholecystectomy care    Description of procedure:    After obtaining informed consent, patient was brought to the operating room and placed under a general anesthetic.  The abdomen was sterilely prepped and draped.  Hickman's point was identified and anesthetized with a Marcaine solution.  8 mm skin incision made and then 0 degree scope and Optiview trocar was used with direct access technique, placed through the abdominal wall layers into the abdominal cavity.  The abdomen was then insufflated and inspection of the abdomen demonstrated no evidence of intra-abdominal injury.  Additional 8 mm robotic trocars were then introduced into the abdomen with an 8 mm right lateral trocar, 8 mm right mid abdomen trocar and 8 mm left midabdomen trocar.  The patient was positioned with the head up and right side up.  The da Kade Xi robot was then brought up and docked.  From right to left the fenestrated bipolar grasper, 30 degree scope, monopolar hook and ProGrasp  forceps were introduced under direct visualization.  I then moved to the console.  The fundus of the gallbladder was grasped and retracted cephalad.  Any adhesions to the fundus or infundibulum were taken down with a combination of sharp and blunt dissection, exposing the infundibulum.  The infundibulum was then grasped and retracted laterally.  The peritoneum overlying the cystic triangle was incised.  Next using a combination of sharp and blunt dissection and using fluorescent cholangiography, the cystic duct was dissected and identified completely.  The cystic artery was identified and dissected completely.  The remainder of the cystic triangle was cleared out, creating the retrocystic window and then the lower one third of the gallbladder was mobilized off the bed of the liver, giving the critical view of safety.  Again the fluoroscopic cholangiography was used to confirm impression of the anatomy.  2 clips were placed proximally on the cystic artery.  A clip was placed on the gallbladder side of the gallbladder cystic duct junction.  Incision was made in the cystic duct.  The cystic duct was very diminutive.  I made multiple attempts to introduce the cholangiocatheter and eventually was able to get it in a couple of millimeters, but we were unable to get the clip to hold it in position.  I elected to abandon the cholangiogram since very clearly no stones of any consequence that passed through here and with ICG the anatomy was quite clear.  The cystic duct was then clipped and then divided between the clips.  The cystic artery was divided between clips.  The gallbladder was removed from the bed of the liver with electrocautery and then placed in an Endo Catch bag.  The bed of the liver was inspected and any small bleeders were cauterized.  The clips on the cystic duct and cystic artery were inspected and appeared to be in good position.  The abdomen was irrigated.  Trocars were withdrawn under direct  visualization after removal of the gallbladder.  Gallbladder was removed to the left mid abdominal wall port site.  Skin incisions were closed with 4-0 Monocryl.  Patient tolerated this well.    Avel Worrell MD  General and Endoscopic Surgery  [facility]    [address], [address]  [place], [place], [address]  P: [phone]

## 2024-11-13 LAB
CYTO UR: NORMAL
LAB AP CASE REPORT: NORMAL
PATH REPORT.FINAL DX SPEC: NORMAL
PATH REPORT.GROSS SPEC: NORMAL

## 2024-11-19 ENCOUNTER — TRANSCRIBE ORDERS (OUTPATIENT)
Dept: ADMINISTRATIVE | Facility: HOSPITAL | Age: 34
End: 2024-11-19
Payer: COMMERCIAL

## 2024-11-19 DIAGNOSIS — R91.8 LUNG NODULES: Primary | ICD-10-CM

## 2024-11-25 ENCOUNTER — OFFICE VISIT (OUTPATIENT)
Dept: SURGERY | Facility: CLINIC | Age: 34
End: 2024-11-25
Payer: COMMERCIAL

## 2024-11-25 VITALS
HEIGHT: 68 IN | SYSTOLIC BLOOD PRESSURE: 140 MMHG | WEIGHT: 276 LBS | OXYGEN SATURATION: 98 % | HEART RATE: 75 BPM | DIASTOLIC BLOOD PRESSURE: 80 MMHG | BODY MASS INDEX: 41.83 KG/M2

## 2024-11-25 DIAGNOSIS — F33.42 MAJOR DEPRESSIVE DISORDER, RECURRENT, IN FULL REMISSION: ICD-10-CM

## 2024-11-25 DIAGNOSIS — R41.89 BRAIN FOG: ICD-10-CM

## 2024-11-25 DIAGNOSIS — F41.9 ANXIETY: ICD-10-CM

## 2024-11-25 DIAGNOSIS — K80.20 CALCULUS OF GALLBLADDER WITHOUT CHOLECYSTITIS WITHOUT OBSTRUCTION: Primary | ICD-10-CM

## 2024-11-25 DIAGNOSIS — F43.22 ADJUSTMENT DISORDER WITH ANXIOUS MOOD: ICD-10-CM

## 2024-11-25 DIAGNOSIS — U09.9 POST-COVID-19 SYNDROME: ICD-10-CM

## 2024-11-25 PROCEDURE — 99024 POSTOP FOLLOW-UP VISIT: CPT | Performed by: SURGERY

## 2024-11-25 RX ORDER — BUPROPION HYDROCHLORIDE 150 MG/1
150 TABLET ORAL DAILY
Qty: 90 TABLET | Refills: 0 | Status: SHIPPED | OUTPATIENT
Start: 2024-11-25

## 2024-11-25 RX ORDER — ESCITALOPRAM OXALATE 10 MG/1
10 TABLET ORAL DAILY
Qty: 90 TABLET | Refills: 1 | Status: SHIPPED | OUTPATIENT
Start: 2024-11-25

## 2024-11-25 NOTE — PROGRESS NOTES
Postop robotic cholecystectomy    Subjective:  Feels well, she is having some loose stools and a little bit more frequent, but otherwise no significant complicating factors.  Appetite is okay.    Objective:  BMI 41.9  General: Awake and alert without distress  Eyes: No icterus  Abdomen: Soft and benign, trocar sites are healing nicely    Pathology reviewed with patient and demonstrates typical calculus cholecystitis    Assessment and plan:  -Postop robotic cholecystectomy for cholelithiasis, doing well to this point  -Activity as tolerated  -Suggested increasing fiber in diet for looser stools, could also try antidiarrheals as needed  -Follow-up as needed    Avel Worrell MD  General and Endoscopic Surgery  [facility]    [address], [address]  [place], [place], [address]  P: [phone]  F: [phone]

## 2025-01-17 LAB
25(OH)D3+25(OH)D2 SERPL-MCNC: 45 NG/ML (ref 30–100)
ALBUMIN SERPL-MCNC: 4.5 G/DL (ref 3.9–4.9)
ALP SERPL-CCNC: 79 IU/L (ref 44–121)
ALT SERPL-CCNC: 35 IU/L (ref 0–32)
APPEARANCE UR: ABNORMAL
AST SERPL-CCNC: 26 IU/L (ref 0–40)
BACTERIA #/AREA URNS HPF: ABNORMAL /[HPF]
BACTERIA UR CULT: NORMAL
BACTERIA UR CULT: NORMAL
BASOPHILS # BLD AUTO: 0 X10E3/UL (ref 0–0.2)
BASOPHILS NFR BLD AUTO: 1 %
BILIRUB SERPL-MCNC: 0.4 MG/DL (ref 0–1.2)
BIOTIN SERPL-MCNC: 0.22 NG/ML (ref 0.05–0.83)
BUN SERPL-MCNC: 14 MG/DL (ref 6–20)
BUN/CREAT SERPL: 14 (ref 9–23)
CALCIUM SERPL-MCNC: 9.3 MG/DL (ref 8.7–10.2)
CASTS URNS QL MICRO: ABNORMAL /LPF
CHLORIDE SERPL-SCNC: 104 MMOL/L (ref 96–106)
CHOLEST SERPL-MCNC: 172 MG/DL (ref 100–199)
CK SERPL-CCNC: 76 U/L (ref 32–182)
CO2 SERPL-SCNC: 22 MMOL/L (ref 20–29)
COLOR UR: ABNORMAL
CREAT SERPL-MCNC: 0.98 MG/DL (ref 0.57–1)
EGFRCR SERPLBLD CKD-EPI 2021: 78 ML/MIN/1.73
EOSINOPHIL # BLD AUTO: 0.2 X10E3/UL (ref 0–0.4)
EOSINOPHIL NFR BLD AUTO: 3 %
EPI CELLS #/AREA URNS HPF: ABNORMAL /HPF (ref 0–10)
ERYTHROCYTE [DISTWIDTH] IN BLOOD BY AUTOMATED COUNT: 12.5 % (ref 11.7–15.4)
FERRITIN SERPL-MCNC: 103 NG/ML (ref 15–150)
FOLATE SERPL-MCNC: >20 NG/ML
GLOBULIN SER CALC-MCNC: 2.1 G/DL (ref 1.5–4.5)
GLUCOSE SERPL-MCNC: 109 MG/DL (ref 70–99)
GLUCOSE UR QL STRIP: ABNORMAL
HBA1C MFR BLD: 6 % (ref 4.8–5.6)
HCT VFR BLD AUTO: 44.4 % (ref 34–46.6)
HDLC SERPL-MCNC: 37 MG/DL
HGB BLD-MCNC: 14.1 G/DL (ref 11.1–15.9)
IMM GRANULOCYTES # BLD AUTO: 0 X10E3/UL (ref 0–0.1)
IMM GRANULOCYTES NFR BLD AUTO: 0 %
IRON SATN MFR SERPL: 30 % (ref 15–55)
IRON SERPL-MCNC: 92 UG/DL (ref 27–159)
KETONES UR QL STRIP: ABNORMAL
LDLC SERPL CALC-MCNC: 118 MG/DL (ref 0–99)
LDLC/HDLC SERPL: 3.2 RATIO (ref 0–3.2)
LYMPHOCYTES # BLD AUTO: 2.1 X10E3/UL (ref 0.7–3.1)
LYMPHOCYTES NFR BLD AUTO: 29 %
MCH RBC QN AUTO: 30.7 PG (ref 26.6–33)
MCHC RBC AUTO-ENTMCNC: 31.8 G/DL (ref 31.5–35.7)
MCV RBC AUTO: 97 FL (ref 79–97)
MICRO URNS: ABNORMAL
MICRO URNS: ABNORMAL
MONOCYTES # BLD AUTO: 0.5 X10E3/UL (ref 0.1–0.9)
MONOCYTES NFR BLD AUTO: 7 %
NEUTROPHILS # BLD AUTO: 4.2 X10E3/UL (ref 1.4–7)
NEUTROPHILS NFR BLD AUTO: 60 %
PH UR STRIP: ABNORMAL [PH]
PLATELET # BLD AUTO: 318 X10E3/UL (ref 150–450)
POTASSIUM SERPL-SCNC: 4.3 MMOL/L (ref 3.5–5.2)
PROT SERPL-MCNC: 6.6 G/DL (ref 6–8.5)
PROT UR QL STRIP: ABNORMAL
PYRIDOXAL PHOS SERPL-MCNC: 17.5 UG/L (ref 3.4–65.2)
RBC # BLD AUTO: 4.59 X10E6/UL (ref 3.77–5.28)
RBC #/AREA URNS HPF: >30 /HPF (ref 0–2)
SODIUM SERPL-SCNC: 140 MMOL/L (ref 134–144)
SP GR UR STRIP: ABNORMAL
T3FREE SERPL-MCNC: 3.5 PG/ML (ref 2–4.4)
T4 FREE SERPL-MCNC: 1.01 NG/DL (ref 0.82–1.77)
TIBC SERPL-MCNC: 307 UG/DL (ref 250–450)
TRIGL SERPL-MCNC: 94 MG/DL (ref 0–149)
TSH SERPL DL<=0.005 MIU/L-ACNC: 1.14 UIU/ML (ref 0.45–4.5)
UIBC SERPL-MCNC: 215 UG/DL (ref 131–425)
URATE SERPL-MCNC: 5.8 MG/DL (ref 2.6–6.2)
URINALYSIS REFLEX: ABNORMAL
VIT B12 SERPL-MCNC: 1429 PG/ML (ref 232–1245)
VLDLC SERPL CALC-MCNC: 17 MG/DL (ref 5–40)
WBC # BLD AUTO: 7.1 X10E3/UL (ref 3.4–10.8)
WBC #/AREA URNS HPF: ABNORMAL /HPF (ref 0–5)

## 2025-01-23 ENCOUNTER — OFFICE VISIT (OUTPATIENT)
Dept: CARDIOLOGY | Facility: CLINIC | Age: 35
End: 2025-01-23
Payer: COMMERCIAL

## 2025-01-23 VITALS
HEIGHT: 68 IN | DIASTOLIC BLOOD PRESSURE: 70 MMHG | HEART RATE: 74 BPM | SYSTOLIC BLOOD PRESSURE: 118 MMHG | WEIGHT: 277 LBS | BODY MASS INDEX: 41.98 KG/M2

## 2025-01-23 DIAGNOSIS — R00.2 PALPITATIONS: Primary | ICD-10-CM

## 2025-01-23 DIAGNOSIS — E78.2 MIXED HYPERLIPIDEMIA: ICD-10-CM

## 2025-01-23 DIAGNOSIS — Q25.40 ABNORMALITY OF THORACIC AORTA: ICD-10-CM

## 2025-01-23 PROCEDURE — 99214 OFFICE O/P EST MOD 30 MIN: CPT | Performed by: NURSE PRACTITIONER

## 2025-01-23 PROCEDURE — 93000 ELECTROCARDIOGRAM COMPLETE: CPT | Performed by: NURSE PRACTITIONER

## 2025-01-23 NOTE — PROGRESS NOTES
Subjective:     Encounter Date:01/23/2025      Patient ID: Jocelyn Merlos is a 34 y.o. female.    Chief Complaint: Follow-up hypertension  History of Present Illness  This is a 34-year-old who follows with Dr. Negrete and is new to me today.  She has a past medical history of hypertension and palpitations along with hemochromatosis.    She saw Dr. Negrete in 2023 at which time she reported she was newly pregnant and her OB had to switch her from atenolol to labetalol.  No changes were made.    She saw MONICA Florence in June 2024 and had delivered her baby about 6 weeks prior.  She did develop postpartum preeclampsia and was discharged on 200 mg of labetalol 3 times daily.  She was asked to follow back up with cardiology for blood pressure management and concerns of possible pregnancy-induced cardiomyopathy.  At the time of her visit with Arlet she reported she was only taking the labetalol twice a day but her blood pressures are staying pretty well-controlled.  An echocardiogram was obtained that showed normal LV systolic function and preserved EF with no significant valvular disease.    She is here today for follow-up visit. She is still on labetalol BID. Her blood pressure has been very well controlled. No complaints of chest pain, shortness of breath, palpitations, dizziness or syncope.     I have reviewed and updated as appropriate allergies, current medications, past family history, past medical history, past surgical history and problem list.    Review of Systems   Constitutional: Negative for fever, malaise/fatigue, weight gain and weight loss.   HENT:  Negative for congestion, hoarse voice and sore throat.    Eyes:  Negative for blurred vision and double vision.   Cardiovascular:  Negative for chest pain, dyspnea on exertion, leg swelling, orthopnea, palpitations and syncope.   Respiratory:  Negative for cough, shortness of breath and wheezing.    Gastrointestinal:  Negative for abdominal  pain, hematemesis, hematochezia and melena.   Genitourinary:  Negative for dysuria and hematuria.   Neurological:  Negative for dizziness, headaches, light-headedness and numbness.   Psychiatric/Behavioral:  Negative for depression. The patient is not nervous/anxious.          Current Outpatient Medications:     buPROPion XL (WELLBUTRIN XL) 150 MG 24 hr tablet, TAKE 1 TABLET BY MOUTH EVERY DAY, Disp: 90 tablet, Rfl: 0    escitalopram (LEXAPRO) 10 MG tablet, TAKE 1 TABLET BY MOUTH EVERY DAY, Disp: 90 tablet, Rfl: 1    labetalol (NORMODYNE) 200 MG tablet, Take 1 tablet by mouth 2 (Two) Times a Day., Disp: 180 tablet, Rfl: 2    loratadine (Claritin) 10 MG tablet, Take 1 tablet by mouth Daily., Disp: , Rfl:     multivitamin with minerals (WOMENS MULTIVITAMIN PO), Take 1 tablet by mouth Daily., Disp: , Rfl:     Omega-3 Fatty Acids (fish oil) 1000 MG capsule capsule, Take 1,200 mg by mouth 2 (Two) Times a Day With Meals., Disp: , Rfl:     vitamin B-12 (CYANOCOBALAMIN) 1000 MCG tablet, Take 1 tablet by mouth 4 (Four) Times a Week. TAKES MONDAY WED FRIDAY SUNDAY, Disp: , Rfl:     vitamin D3 (Vitamin D) 125 MCG (5000 UT) capsule capsule, Take 1 capsule by mouth 4 (Four) Times a Week. K-W-V-SUNDAY, Disp: , Rfl:     Past Medical History:   Diagnosis Date    Abnormal Pap smear of cervix     Adjustment disorder with mixed anxiety and depressed mood 04/06/2016    Allergic     Anxiety     Cholelithiasis     COVID-19 12/28/2020    Depression     Female infertility     Gestational diabetes     Headache     HSV-2 infection     valtrex for supression during pregnancy    Hyperlipidemia     Hypertension     CHRONIC - Since 2020- currently on medication    Kidney stone 2021    Obesity     Palpitations     occasionally with pregnancy    Polycystic ovary syndrome     PONV (postoperative nausea and vomiting)     Urinary tract infection        Past Surgical History:   Procedure Laterality Date    BREAST AUGMENTATION Bilateral 2014    BREAST  "SURGERY      CERVICAL BIOPSY  W/ LOOP ELECTRODE EXCISION       SECTION N/A 2024    Procedure:  SECTION PRIMARY;  Surgeon: Neda Penn MD;  Location: [facility] LABOR DELIVERY;  Service: Obstetrics;  Laterality: N/A;    CHOLECYSTECTOMY N/A 2024    Procedure: CHOLECYSTECTOMY LAPAROSCOPIC WITH DAVINCI ROBOT;  Surgeon: Avel Worrell MD;  Location: [facility] MAIN OR;  Service: Robotics - DaVinci;  Laterality: N/A;    COSMETIC SURGERY      EYE SURGERY      FOOT SURGERY Right     plantar fascitis tendon release    LAPAROSCOPIC GASTRIC BANDING      LEE      OTHER SURGICAL HISTORY      removal of eye tumor    OTHER SURGICAL HISTORY      tummy tuck    TONSILLECTOMY      WISDOM TOOTH EXTRACTION         Family History   Problem Relation Age of Onset    Skin cancer Mother     Depression Mother     Anxiety disorder Mother     Stroke Mother     Aneurysm Mother     Alcohol abuse Mother     Migraines Mother     Hemochromatosis Mother     Stroke Maternal Grandmother     Diabetes Maternal Grandfather     Malig Hyperthermia Neg Hx        Social History     Tobacco Use    Smoking status: Former     Current packs/day: 0.00     Average packs/day: 0.3 packs/day for 1.1 years (0.3 ttl pk-yrs)     Types: Cigarettes     Start date: 10/5/2014     Quit date: 2016     Years since quittin.0    Smokeless tobacco: Never   Vaping Use    Vaping status: Never Used   Substance Use Topics    Alcohol use: Not Currently     Alcohol/week: 4.0 - 5.0 standard drinks of alcohol     Comment: Socially./caffeine use     Drug use: Never         ECG 12 Lead    Date/Time: 2025 1:43 PM  Performed by: Nia Gunn APRN    Authorized by: Nia Gunn APRN  Comparison: compared with previous ECG from 2024  Similar to previous ECG  Rhythm: sinus rhythm             Objective:     Visit Vitals  /70 (BP Location: Left arm, Patient Position: Sitting, Cuff Size: Adult)   Pulse 74   Ht 172.7 cm (68\") "   Wt 126 kg (277 lb)   BMI 42.12 kg/m²             Physical Exam  Constitutional:       Appearance: Normal appearance. She is normal weight.   HENT:      Head: Normocephalic.   Neck:      Vascular: No carotid bruit.   Cardiovascular:      Rate and Rhythm: Normal rate and regular rhythm.      Chest Wall: PMI is not displaced.      Pulses: Normal pulses.           Radial pulses are 2+ on the right side and 2+ on the left side.        Posterior tibial pulses are 2+ on the right side and 2+ on the left side.      Heart sounds: Normal heart sounds. No murmur heard.     No friction rub. No gallop.   Pulmonary:      Effort: Pulmonary effort is normal.      Breath sounds: Normal breath sounds.   Abdominal:      General: Bowel sounds are normal. There is no distension.      Palpations: Abdomen is soft.   Musculoskeletal:      Right lower leg: No edema.      Left lower leg: No edema.   Skin:     General: Skin is warm and dry.      Capillary Refill: Capillary refill takes less than 2 seconds.   Neurological:      Mental Status: She is alert and oriented to person, place, and time.   Psychiatric:         Mood and Affect: Mood normal.         Behavior: Behavior normal.         Thought Content: Thought content normal.          Lab Review:   Lipid Panel          6/7/2024    13:05 1/13/2025    09:16   Lipid Panel   Total Cholesterol 210  172    Triglycerides 118  94    HDL Cholesterol 46  37    VLDL Cholesterol 21  17    LDL Cholesterol  143  118    LDL/HDL Ratio 3.1  3.2          Cardiac Procedures:   Echocardiogram 06/20/2024:    Left ventricular systolic function is normal. Calculated left ventricular EF = 55.7%    Left ventricular diastolic function was normal.    Estimated right ventricular systolic pressure from tricuspid regurgitation is normal (<35 mmHg). Calculated right ventricular systolic pressure from tricuspid regurgitation is 20 mmHg.       Assessment:         Diagnoses and all orders for this visit:    1.  Palpitations (Primary)  -     ECG 12 Lead    2. Mixed hyperlipidemia    3. Abnormality of thoracic aorta            Plan:       HTN: blood pressure well controlled on current regimen. No changes at this time. Recommend continued monitoring of BP intermittently at home.  HLD: Most recent . Managed by PCP  Palpitations: controlled on labetalol.    Thank you for allowing me to participate in this patient's care. Please call with any questions or concerns. Mrs Merlos will follow up with Dr. Negrete in 1 year.          Your medication list            Accurate as of January 23, 2025  1:58 PM. If you have any questions, ask your nurse or doctor.                CONTINUE taking these medications        Instructions Last Dose Given Next Dose Due   buPROPion  MG 24 hr tablet  Commonly known as: WELLBUTRIN XL      TAKE 1 TABLET BY MOUTH EVERY DAY       Claritin 10 MG tablet  Generic drug: loratadine      Take 1 tablet by mouth Daily.       escitalopram 10 MG tablet  Commonly known as: LEXAPRO      TAKE 1 TABLET BY MOUTH EVERY DAY       fish oil 1000 MG capsule capsule      Take 1,200 mg by mouth 2 (Two) Times a Day With Meals.       labetalol 200 MG tablet  Commonly known as: NORMODYNE      Take 1 tablet by mouth 2 (Two) Times a Day.       multivitamin with minerals tablet tablet      Take 1 tablet by mouth Daily.       vitamin B-12 1000 MCG tablet  Commonly known as: CYANOCOBALAMIN      Take 1 tablet by mouth 4 (Four) Times a Week. TAKES MONDAY WED FRIDAY SUNDAY       vitamin D3 125 MCG (5000 UT) capsule capsule      Take 1 capsule by mouth 4 (Four) Times a Week. M-R-O-SUNDAY                  MONICA Langford  01/23/25  12:40 PM EST

## 2025-02-19 ENCOUNTER — TELEPHONE (OUTPATIENT)
Dept: OBSTETRICS AND GYNECOLOGY | Age: 35
End: 2025-02-19

## 2025-02-19 NOTE — TELEPHONE ENCOUNTER
Caller: NicolaonMelissa    Relationship:  Mother    Best call back number:[phone]     PATIENT CALLED REQUESTING TO CANCEL SAME DAY APPT.    Did the patient call AFTER the start time of their scheduled appointment?  []YES  [x]NO    Was the patient's appointment rescheduled? []YES  [x]NO    Any additional information: MELISSA CALLED TO CANCEL TODAY'S GYN FOLLOW UP WITH  AT 9:15 AM. MELISSA STATED APPT IS NO LONGER NEEDED AND REQUESTED TO SCHEDULE FOR ANNUAL EXAM.     SAW  05/02/24 FOR POSTPARTUM VISIT. HUB SCHEDULED ANNUAL FOR 05/08/25.

## 2025-03-03 ENCOUNTER — OFFICE VISIT (OUTPATIENT)
Dept: FAMILY MEDICINE CLINIC | Facility: CLINIC | Age: 35
End: 2025-03-03
Payer: COMMERCIAL

## 2025-03-03 VITALS
SYSTOLIC BLOOD PRESSURE: 126 MMHG | HEART RATE: 84 BPM | DIASTOLIC BLOOD PRESSURE: 88 MMHG | TEMPERATURE: 98.2 F | HEIGHT: 68 IN | RESPIRATION RATE: 17 BRPM | BODY MASS INDEX: 42.44 KG/M2 | OXYGEN SATURATION: 97 % | WEIGHT: 280 LBS

## 2025-03-03 DIAGNOSIS — G43.709 CHRONIC MIGRAINE WITHOUT AURA WITHOUT STATUS MIGRAINOSUS, NOT INTRACTABLE: ICD-10-CM

## 2025-03-03 DIAGNOSIS — F33.42 MAJOR DEPRESSIVE DISORDER, RECURRENT, IN FULL REMISSION: ICD-10-CM

## 2025-03-03 DIAGNOSIS — Z98.891 S/P CESAREAN SECTION: ICD-10-CM

## 2025-03-03 DIAGNOSIS — E55.9 VITAMIN D DEFICIENCY: ICD-10-CM

## 2025-03-03 DIAGNOSIS — E53.8 B12 DEFICIENCY: ICD-10-CM

## 2025-03-03 DIAGNOSIS — E53.1 VITAMIN B6 DEFICIENCY: ICD-10-CM

## 2025-03-03 DIAGNOSIS — F81.0 READING DIFFICULTY: ICD-10-CM

## 2025-03-03 DIAGNOSIS — R91.1 PULMONARY NODULE: ICD-10-CM

## 2025-03-03 DIAGNOSIS — Z14.8 HEMOCHROMATOSIS CARRIER: ICD-10-CM

## 2025-03-03 DIAGNOSIS — R29.90 PERSISTENT NEUROLOGIC SYMPTOMS AFTER COVID-19: ICD-10-CM

## 2025-03-03 DIAGNOSIS — E66.01 CLASS 3 SEVERE OBESITY WITH BODY MASS INDEX (BMI) OF 40.0 TO 44.9 IN ADULT, UNSPECIFIED OBESITY TYPE, UNSPECIFIED WHETHER SERIOUS COMORBIDITY PRESENT: ICD-10-CM

## 2025-03-03 DIAGNOSIS — F41.9 ANXIETY: ICD-10-CM

## 2025-03-03 DIAGNOSIS — F43.22 ADJUSTMENT DISORDER WITH ANXIOUS MOOD: ICD-10-CM

## 2025-03-03 DIAGNOSIS — Z87.440 HISTORY OF UTI: ICD-10-CM

## 2025-03-03 DIAGNOSIS — I10 HYPERTENSION, UNSPECIFIED TYPE: ICD-10-CM

## 2025-03-03 DIAGNOSIS — E28.2 PCOS (POLYCYSTIC OVARIAN SYNDROME): ICD-10-CM

## 2025-03-03 DIAGNOSIS — E66.813 CLASS 3 SEVERE OBESITY WITH BODY MASS INDEX (BMI) OF 40.0 TO 44.9 IN ADULT, UNSPECIFIED OBESITY TYPE, UNSPECIFIED WHETHER SERIOUS COMORBIDITY PRESENT: ICD-10-CM

## 2025-03-03 DIAGNOSIS — Z82.3 FAMILY HISTORY OF STROKE: ICD-10-CM

## 2025-03-03 DIAGNOSIS — E32.9: ICD-10-CM

## 2025-03-03 DIAGNOSIS — Z80.0 FAMILY HISTORY OF COLON CANCER: ICD-10-CM

## 2025-03-03 DIAGNOSIS — U09.9 POST-COVID-19 SYNDROME: Primary | ICD-10-CM

## 2025-03-03 DIAGNOSIS — K80.20 CALCULUS OF GALLBLADDER WITHOUT CHOLECYSTITIS WITHOUT OBSTRUCTION: ICD-10-CM

## 2025-03-03 DIAGNOSIS — E79.0 HYPERURICEMIA: ICD-10-CM

## 2025-03-03 DIAGNOSIS — R53.83 PERSISTENT FATIGUE AFTER COVID-19: ICD-10-CM

## 2025-03-03 DIAGNOSIS — R07.9 CHEST PAIN, UNSPECIFIED TYPE: ICD-10-CM

## 2025-03-03 DIAGNOSIS — R74.8 ELEVATED LIVER ENZYMES: ICD-10-CM

## 2025-03-03 DIAGNOSIS — E78.2 MIXED HYPERLIPIDEMIA: ICD-10-CM

## 2025-03-03 DIAGNOSIS — R41.89 COGNITIVE CHANGE: ICD-10-CM

## 2025-03-03 DIAGNOSIS — R73.03 PREDIABETES: ICD-10-CM

## 2025-03-03 DIAGNOSIS — R19.8 CHANGE IN BOWEL MOVEMENT: ICD-10-CM

## 2025-03-03 DIAGNOSIS — J90 PLEURAL EFFUSION: ICD-10-CM

## 2025-03-03 DIAGNOSIS — U09.9 PERSISTENT FATIGUE AFTER COVID-19: ICD-10-CM

## 2025-03-03 DIAGNOSIS — U09.9 PERSISTENT NEUROLOGIC SYMPTOMS AFTER COVID-19: ICD-10-CM

## 2025-03-03 DIAGNOSIS — R41.89 BRAIN FOG: ICD-10-CM

## 2025-03-03 DIAGNOSIS — R42 DIZZINESS: ICD-10-CM

## 2025-03-03 PROCEDURE — 99214 OFFICE O/P EST MOD 30 MIN: CPT | Performed by: PHYSICIAN ASSISTANT

## 2025-03-03 NOTE — TELEPHONE ENCOUNTER
Pt was called and her labs and MRI were scheduled    Spoke with someone from [facility] and explained to them there wasn't a return to work date but I would find out when the patient is supposed to go to the COVID clinic, so we can get a better idea of return to work.      One of the other questions [facility] has is who is the over seeing physician? Is it Dr. Wilson still?  Please advise

## 2025-03-03 NOTE — PROGRESS NOTES
Subjective   Jocelyn Merlos is a 34 y.o. female who presents today in follow up of dizziness, moods, headaches, post-Covid syndrome/ long term symptoms, hyperlipidemia, vitamin D deficiency, elevated LFT, hemochromatosis carrier, skin lesions, and specialists. She is also pregnant with bleeding and increased lightheadedness.       Covid follow up  Symptoms are: recurrent.   Onset was 1 to 5 years.   Symptoms include: fatigue (improving).   Pertinent negative symptoms include no abdominal pain, no anorexia, no joint pain, no change in stool, no chest pain, no chills, no congestion, no cough, no diaphoresis, no fever, no joint swelling, no neck pain, no numbness, no rash, no sore throat, no swollen glands, no dysuria, no vertigo, no visual change and no vomiting. Headaches: improved with treatment. Myalgias: improving. Nausea: no constant nausea. Weakness: improving.         Sister is 43 and was just diagnosed with colon cancer. Partial colectomy and has to do chemo.  Patient has had change in bowel movements with diarrhea or urgent bowel movement soon as she eats.  She thinks this started with gallbladder, however, with her sisters new diagnosis, she is concerned.    [facility] changed to [place]  Prior to [facility]- Fax- [phone]- [place]. Direct # [phone].   Previously-  attn Ms Gregorio   She will eventually get a notice from [facility] and may change to long term disability- still keep insurance.    History of Covid 19 again 7/2022- she felt poorly, started to develop a rash. Has had resolution of symptoms back to baseline from initial Covid 19 infection.     She was working on diet- she is working on what she can control. She got a stationary bike and has used as she could tolerate. She had dizziness all day when she tried to exercise outdoors. Dizziness was more consistent and worse.   Neurology- has not heard about appt.   She was seen and felt like she could have a bit of dyslexia- not every day but if she  experienced, she does not remember.  She reads a lot of books and does not happen if reading. If a recipe and short term memory things, she will read the word ahead before the other word. She has with number transposition as well.   MGGM- CVA at age 32. MGM- CVA in 50s-60s, mother with CVA 40s.  Dizziness-still having symptoms. Dizziness is still the same without improvement.  Not improving-previously, neurology recommended cyproheptadine.  There was a possibility of changing Lexapro levels.  I discussed with neurologist and we advised could take medication.  No improvement in dizziness with the medication. Has not helped.  Noticed that she has sensory overload. When she has more light or sound or things going on, she has pressure in her head and everything is amplified. She notices even in her car alone- bright and with movement is overstimulated. Still dizziness with eyes closed and with being up and moving. She had increased nausea.    Seen by Dr Izquierdo- ENT [facility]- specializes in dizziness- he advised not sure but feels like it is similar to other long Covid patients. Tried verapamil  mg once  daily. Follow up 6 weeks 5/11/2022 at 2 pm and no further treatment that they could provide.   She had appt with [facility] 7/2022 but contracted Covid 19. She was told she would have to reschedule her appt- soonest was 10/13/2022.   Verapamil zoned out. She did not have improvement with medication. They advised she had exhausted all treatments and testing.   10/26/2022- Patient was seen at [facility] 10/13/2022 for peripheral vertigo, cervicocranial syndrome, intractable migraine, imbalance, vestibular neuritis.  Impression was complex issues of dizziness, imbalance, and intermittent migraine headache-likely overlap between peripheral vestibular disturbance abnormal cervical spine biomechanics, and tendency towards migraine.  They were concerned for migraine activity without headache.  Possible neuritic  irritation of the inner ear as a postviral syndrome.  Advised start B2 200 mg 2 times daily as migraine supplement and referred for cervical and vestibular physical therapy with Carlos Barba.  Patient to contact provider after physical therapy was completed.  The only PT that they recommended to is not covered ([facility]- Carlos Barba) and had to see someone else. The people she was seeing are now going to the office that does not take her insurance and has to change to [facility]. Last seen 12/2022 and had fallen down the steps. She did not think much about it and did not talk with them. Did not think about it until she fell in the tub.   [facility] told when done with PT, reach out on MyChart and they would start Gabapentin.   Has fallen twice- did not remember what caused the fall. 1st fell down the steps- thinks she missed a step and fell back. Had a bad bruise on her back.   She then fell in the bathtub. Thinks she slipped when getting up from the bathtub.   Has never fallen a lot and has fallen twice in 3 months.   She was on Neurontin- he advised to try 1 month. She did not tolerate and did not help.  She was seen by telehealth 6/5/2023 to determine any additional treatment.   No changes in dizziness. She tries to read about long term Covid- a lot of people with dizziness.    MRI brain w/wo 3/2021- ovoid mass left orbit. Otherwise, normal MRI brain.   MRI brain and IAC w/wo 10/21/2021- evidence of removal of orbital mass, scalp incision. Otherwise normal.   Eye Surgeon- Dr Bowman 10/28/2021- cleared for PRN follow up.  Neurology- Dr Hogan- has advised she see ENT, referred for sleep study- negative, treated with Topamax and Imitrex as needed, now increased Topamax and referred for vestibular therapy and given Meclizine PRN. She reports the Meclizine did not help, did not tolerate increased dose of Topamax, and is awaiting PT. She was advised to follow up in 4 months and is hesitant about this, as her symptoms are  debilitating and preventing safe return to work.    ENT- Dr Holt/ [facility]- last seen 12/2021 and was advised she took the wrong Mg (she took Magnesium oxide as listed in the chart), tried mouth guard without relief, and was advised this couldbe more neurological and to follow up with neurology.  She had dizziness prior to eye surgery but has had significant worsening after surgery. She went back after surgery ,told him she was dizzy, and he told her that was normal and that she lost a lot of blood in surgery.   She had 2 accidents on [employer].   The 1st was 8/31/2021- it was her 1st week back after her accident. She was picking up parts that were up high and she gets most dizzy and wobbly. She thinks she may have wobbled and dropped the parts.   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 When she lifted it, she has to tilt it back. 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 They have boxes hanging down from the line. The She has never hit it before. She was due for physical last night. They asked if she had dizziness or light-headedness. She was failed because she has dizziness. 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 No associated BP, palp.   Most dizzy when she is bending, looking up, doing thing around her house. Work advised she see me with a note that gave restrictions.   She has been cutting atenolol in 1/2- she then felt CP or heart beating fast. She had her BP checked and had elevated BP 16 systolic. After 5-6 days, she started taking whole Atenolol and had improvement in BP. No change in dizziness with elevated or normal BP.   She had palpitations- cleared by cardiology. She thought this could contribute but has improved.   They thought dizziness was positional and treated for vertigo- positional treatment. No improvement.   She went to ENT- they did hearing test and lay down and sit up without concerns. They  want to come back and do testing that takes 1 hour. They have seen a lot of people that struggle with migraines after Covid have dizziness.  10/2021 she had a terrible period- dizziness, nauseated, felt like she would not make it from the toilet to the bed, cold sweats, pale, felt like death for a full day then improved. She has always struggled with bad cramping. This was much worse. No contraception. Previously Nexplanon was not good. They could not get IUD in and has felt poorly on birth control. Patient's step daughter had mood issues on patch.   11/2021- She reported she was using mouthguard. Also took Mg and developed a rash under chin/neck. She estimated about 5-20 episodes of dizziness per day- last 20-30 seconds. Long enough to disrupt what she is doing. It is spinning dizziness- feels like if you were drunk. She has also had the sensation of shakiness- things shaking in her vision- intermittent but not every day. She had a couple episodes with nausea with the dizziness but not bad. Sometimes has wondered if it is associated- is not every time. Told that it was the wrong Mg but ENT thought it should be a neurological issue  Seen by neurology- they gave Meclizine- did not help. She also doubled Topamax as directed- flt poorly but did not help dizziness. She has decreased again to 50 mg.   She kept a tally sheet to ensure she could answer the amount of episodes per day rather than estimating- is having 12-13 episodes per day.   Constantly seeing things out of the corner of both of her eyes. There are times that she feels like it is there and is surprised when it is not there- size ranges between a mouse and a person. Has every day at different points.  1/2022- She noted worsening dizziness and was having dizziness with her eyes closed.  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 She had follow up with Dr Bowman and was advised PRN follow up 10/28/2021.  "He did not seem to think her dizziness was related to her surgery. She has had lazy eye more- she reports she had mild lazy eye on the right since she was a child. Now the left eye is wider and now it made the right eye more lazy.     Patient was seen by Dr Bowman who recommended removal of mass. She was very nervous about surgery. Patient reports she was so shocked that she could not think of questions then had questions about incisions, recovery, and others.   Surgeon told her not a big deal and 1-2 hour surgery. She was in surgery for 3-4 hours. Had trouble waking up. They advised they could keep her but that she would do better at home. Still numb above her eye and could not raise her left eyebrow. She was told that it should improve in 6 months.     Depression and anxiety-  5/2024-she was tired-  is off as well and is able to help. She is eating every 2 hours. When she is asleep, she is worried about baby. Not able to relax. She is happy and no postpartum depression but more anxiety. Moods are \"pretty good for the most part\". Emotional- can cry easily.  She is emotional.  Patient has been on Lexapro and Wellbutrin.  She had a positive pregnancy test and was advised to stop Wellbutrin.  She was advised to talk with her OB/GYN about continuing Lexapro or the need to stop it.  They were okay with her continuing Lexapro and Wellbutrin throughout her pregnancy.  Lexapro- for a while, having bad dreams. The dreams improved but she still has them intermittent that she dreams constantly. She has had improvement in anxiety daily but if something happens and she has a stressful situation, she still has some panic and emotional issues. Still having counseling every week. She is working on things to do when she has these episodes. Reminding herself that it will be ok and why it will be ok and why it was a good thing. She mentioned increasing Lexapro.   She previously reported she had more depression- she gets down " on herself about her year. Her therapist thought that she may need to increase Lexapro. Patient reported she was not depressed or sad all the time but had days that she was more down. No SI/HI.   She then had increased depression- she has had decreased interest in anything, exhaustion, not wanting to be around people, sleeping a lot, not keeping up with home chores or things she would normally do at home. It was not abnormal to be in her feelings or in a funk for a few days but she had severe symptoms. Not wanting to shower or take care of herself. She has had increased depression. Talked to therapist about her moods and they discussed possible changes in medication. She felt some better after talking but she still has depression. Therapist was out a week but follow up 10/19/2021. She thought that contributes to her depression- she is tired of trying to figure out what is contributing to her symptoms since Covid. She previously knew her body and when something was going on. She does not now and is bothersome.  Patient knows her father struggled with his mental health, and she worried that she could have issues.   She has had bad anxiety since her grandfather , when she has anxiety, playing her grandfather's death over and over.   She moved a couple years ago and started worrying her house will burn down.   With anxiety in the past (prior to Covid 19 infection), she was seeing a therapist and reported she was doing much better. She would occasionally miss work with anxiety/depression but had FMLA. She usually did well and was able to work, do things socially.  Moods were difficult to  with feeling poorly all the time, inability to return to normal life and work, and uncertainty of prognosis. She felt the Lexapro helped-felt like it does not help with major situations but does helped with daily symptoms. Wellbutrin- not sure she adjusted to it and continues to feel tired in the day. She thought it may help  "daily when things are normal. Since she had increased stressors and uncertainties, she was not sure it is controlled. She was seeing her therapist weekly which helped- feels better upon leaving every week. If it starts to build up again through the week. It is almost time to see her again.   She felt medication and counseling 1-2 x weekly helped moods. She had episodes of being hopeless and being anxious. However, she was able to feel better if with a counseling session. Her animals also help moods- emotional support.   She got really down for a while. Still seeing therapist weekly. She could not do well with future and taking things day by day.  She was unsure if fatigue but was more grumpy. Still seeing therapist a little more spread out. She has migraines and has to reschedule.  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  Work was laid off for 2 weeks-she attempted to go back to work after layoff. When she started to feel better, something mentally started crashing down. As she started to feel better physically, she started \"freaking out\". She was having panic attacks.   Patient related the feeling to being on a plane to [place] years ago, she felt like she was back on the plane and ready to crash.   She worried about going back to work without FMLA and worries about losing her job. She reports her friend at work told her they are firing people \"left and right\". She thinks she will lose her job and does not have FMLA  (She has to acquire so much leave to qualify for FMLA).    She had anxiety doing anything, worst at night with laying down. She thought something could be wrong with her heart. Cardiology cleared her but she feels like something is wrong with her heart when she has episodes. She tries to hold it in.   She was doing well on Lexapro. She increased to 10 mg once daily. She did have significant fatigue upon starting medication, however, this " improved. She continued with anxiety, however, she was able to calm herself down many times. She was working with mental health therapist twice weekly and was starting to improve. She felt she should try to go back to work slowly. There may be a layoff but she could go in to work but the line will not be moving, all the people were not there, and she could come and go- did not have to work full 12 hours. She thought this would be more helpful to try to go in and start trying to do her job without the pressure and interaction with others. She reported she would be able to leave if she had a panic attack. Despite still having anxiety, she thought this would improve some if she could get back to work and on a routine. She wanted to try.  She then had increased depression- she has had decreased interest in anything, exhaustion, not wanting to be around people, sleeping a lot, not keeping up with home chores or things she would normally do at home. It was not abnormal to be in her feelings or in a funk for a few days but she had severe symptoms. Not wanting to shower or take care of herself. She has had increased depression. Talked to therapist about her moods and they discussed possible changes in medication. She felt some better after talking but she still has depression. Therapist was out a week but follow up 10/19/2021. She thought that contributes to her depression- she is tired of trying to figure out what is contributing to her symptoms since Covid. She previously knew her body and when something was going on. She does not now and is bothersome.    Cough- no complaints today.   11/17/2021- she had a tickle cough at night only that started about 1 month ago. No coughing in the day. No SOA or other symptoms, signs of illness.   She was advised can treat GERD and use Mucinex DM if needed. Otherwise consider albuterol as needed.     Covid 19 vaccine (Pfizer)- got initial vaccine 4/12/2021. She was tired that day then  back to baseline.   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  Patient had significant symptoms following Covid 19 infection. She developed symptoms of Covid 19 12/24/2020 and tested positive 12/28/2020. She had hypoxia and tachycardia with ambulation, SOA, chest tightness, weakness, fatigue, brain fog, diarrhea, and nausea with resolution of severe rash. She went to ER with oxygen saturation of 88%. CTA chest with mild pneumonia and negative for PE. Inflammatory markers were not performed. They did not evaluate symptoms with ambulation and resting vital signs were ok.  While on video visit, patient had oxygen saturation of 97% and pulse in 90s then with relatively little ambulation/ exertion, she had pulse 127 and oxygen saturation down to 88%. In office, she had a normal resting pulse that increased to 124 with ambulation and oxygen saturation decreased from 95% to 90% with ambulation.     She had persistent tachycardia and hypoxia with walking short distances (that is slowly improving), elevated BP that improved, cognitive impairment, fatigue, headaches, and feeling overall poorly. She was seen by the long term Covid infectious disease clinic, had additional labs, was referred to physical therapy, speech, and cognitive therapy, and was advised to contact her cardiologist for follow up with persistent tachycardia with ambulation. She underwent PTand speech/cognitive therapy. She called cardiology to schedule [facility] prior to Covid 19 then scheduled follow up evaluation of post-Covid tachycardia/ arrhythmia as directed. He changed her propranolol to atenolol for tachycardia and is awaiting Holter monitor and echocardiogram. She is tolerating medication well.     I referred for MRI brain and to neurology for severe headaches. She had severe, debilitating headaches. She had to go to ER but had no  testing performed there. Excedrin Migraine, Fiorcet, Zofran, and narcotic pain medication have not helped headaches. I discussed beta blocker, Elavil, and Topamax at her last visit, however, she was concerned about possible AE with medication, as fatigue, arrhythmia, and cognitive dysfunction are already some of her biggest issues. She was unable to tolerate her headaches and felt the possible AE with medication were worth the risk for improvement in headaches. She tried Inderal LA 60 mg nightly without improvement and was started on Topamax by neurology. She has had significant AE with medication. I had her decrease to 25 mg at bedtime then she was able to increase again to BID and tolerated better.      I contacted infectious disease provider for recommendations for return to work vs work restrictions. She recommended either waiting to return to work until she had some improvement with therapy or may try light duty if available to see if she will tolerate this until she has improvement. 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    She returned to work and noted multiple cognitive issues. She reports they had her work 2 hours then sent her home with no work available. 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 She  felt she needed more cognitive therapy to safely return to work. I had her remain off work until her follow up with long term Covid clinic. She has been advised to remain in contact with her employer to ensure her job is secure and leave is approved.     She felt ready to return to work and returned 6/2021. 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 She was seen by medical for CPE and was advised not to work until she was no longer dizzy, as she was a risk on the line, on the floor, and with forklift.     Hypoxia- improved. Not feeling as bad with SOA but still notices some.   lowest has been about 90s% and maybe 88% if pushing it. Improving gradually  She had hypoxia and tachycardia with ambulation, SOA, chest tightness, weakness, fatigue, brain fog, diarrhea, and nausea with resolution of severe rash.   She went to ER with oxygen saturation of 88%. CTA chest with mild pneumonia and negative for PE. Inflammatory markers were not performed. They did not evaluate symptoms with ambulation and resting vital signs were ok.    While on video visit, patient had oxygen saturation of 97% and pulse in 90s then with relatively little ambulation/ exertion, she had pulse 127 and oxygen saturation down to 88%. In office, she had a normal resting pulse that increased to 124 with ambulation and oxygen saturation decreased from 95% to 90% with ambulation.   Tachycardia/ elevated BP- had more controlled pulse on atenolol. Heart rate has improved. She tried to get off and worsens. She does not have to see cardiology any longer. PRN follow up.  She was started on labetalol with pregnancy.  Pulse with activity- at PT around 120, she stops her and if pushes self at home she gets up to 150. BP and pulse have improved some but continues with pulse that is elevated but oxygen has stayed above 90.   She has persistent tachycardia and hypoxia with walking short distances (that is slowly improving), elevated BP that  "is improving  She [facility] to [facility] then scheduled follow up evaluation of post-Covid tachycardia/ arrhythmia as directed.   He changed her propranolol to atenolol for tachycardia and is awaiting Holter monitor and echocardiogram. She is tolerating medication well.   Cardiology- changed inderal to atenolol.   Echo- ok.   Fatigue- still struggling. Now having to fight to get up and do things but not as much. She has had improvement but not resolution. Worse with Wegovy.   Cognitive deficit/dysfunction- worse with verapamil. The 1st week she took it, she felt like she should not be driving. Now back to wheere she was prior to the new medication but has not improved. Different from previous brain fog. Things slip away quickly. She will  her phone to do something and will forget   has been one of the worst things too- cannot remember things. She will be getting ready to do something and forgets. Sometimes she will get it back and sometimes she will not. She reports she tries to think of something and totally forgets. Not as severe as right after COvid but continues to be a problem.   Still \"brain farts\", short term memory issues, headaches, oxygen levels- not sure if Topamax has amplified brain fog but has been prevalent.   This symptom was her most worrisome issue.  She was struggling with cognitive issues which cause increased anxiety.  Headaches-  It has been difficult to determine with decreased sleep and recent hospitalization with blood pressure.  She had headaches that were not migraine headache but all over nagging headaches. Started about when starting Wegovy.    has had a few headaches since stopping Topamax. She was able to stop quickly. Did not have to taper as long. Not nearly as bad as prior to Topamax. No improvement in cognition.   She had right sided headache and headache behind her eyes, nausea with vomiting, eye was red and watering, right " sided facial and eyelid drooping, eyebrow was not normal. She reported it woke her up from sleep.   She went to ER 3/11/2021 with drooping eyelid, bloodshot eye, watering. She was in tears due to severe pain but they did no testing. By the time they gave her a headache cocktail, her headache was already improving. She was ready to go home because she did not feel she was getting any help. They discharged her with Fiorcet and Zofran that have not helped her headaches.   She was given pain medication from previous foot surgery and never took it. She tried it and got sick but did not help her pain.  I referred for MRI brain and to neurology for severe, debilitating headaches.    I initially discussed beta blocker, Elavil, and Topamax, however, we were concerned about possible AE with medication, as fatigue, arrhythmia, and cognitive dysfunction were already some of her biggest issues. She was unable to tolerate her headaches and felt the possible AE with medication were worth the risk for improvement in headaches.   She tried Inderal LA 60 mg nightly without improvement.   Headaches occurred almost always when she was asleep. She took Tylenol and tried Excedrin Migraine. They were waking her up at night, which was abnormal for her.    She was seen by neurology 3/30/2021 started on Topamax 25 mg twice daily and Imitrex as needed for headaches by neurology. She has had significant symptoms with this. I discussed possibly cutting dose in 1/2 initially to 25 mg at bedtime then possible increase in dosage as tolerated.   I referred to sleep medicine as recommended by neurology.   Topamax helped headaches. She had a headache all day once but otherwise had no other bad headaches. She had paresthesias and worsening cognitive funciton, brain fog, ability to think clearly. I asked that she decrease to nightly dosing only for a couple weeks then could increase to twice daily. She then tolerated better.   She had improvement in  "headaches on Topamax. Decreased as directed then was able to increase to twice daily again and had improved headaches and no worsening neurological/cognitive symptoms. She stopped having severe, debilitating headaches and stopped waking up with headaches. Now following with neurology.   She then had to stop Topamax after developing kidney stones.   Snoring- does not think she is snoring as bad.   Has never been someone who snored but since Covid, she is snoring loud, sometimes waking her . Negative Sleep study- no EVERETT.   Hair loss- slowed down.   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 When she started to feel better, something mentally started crashing down. As she started to feel better physically, she started \"freaking out\". She was having panic attacks.   Patient related the feeling to being [place] a [place], she felt like she was back on the plane and ready to crash.   She worried about going back to work without FMLA and worries about losing her job. She reports her friend at work told her they are firing people \"left and right\". She thinks she will lose her job and does not have FMLA  (She has to acquire so much leave to qualify for FMLA).    She had anxiety doing anything, worst at night with laying down. She thought something could be wrong with her heart. Cardiology cleared her but she feels like something is wrong with her heart when she has episodes. She tries to hold it in.   She was doing well on Lexapro. She increased to 10 mg once daily. She did have significant fatigue upon starting medication, however, this improved. She continued with anxiety, however, she was able to calm herself down many times. She was working with mental health therapist twice weekly and was starting to improve. She felt she should try to go back to work slowly. There may be a layoff but she could go in to work but the line will not be " moving, all the people were not there, and she could come and go- did not have to work full 12 hours. She thought this would be more helpful to try to go in and start trying to do her job without the pressure and interaction with others. She reported she would be able to leave if she had a panic attack. Despite still having anxiety, she thought this would improve some if she could get back to work and on a routine. She wanted to try.  She then had increased depression- she has had decreased interest in anything, exhaustion, not wanting to be around people, sleeping a lot, not keeping up with home chores or things she would normally do at home. It was not abnormal to be in her feelings or in a funk for a few days but she had severe symptoms. Not wanting to shower or take care of herself. She has had increased depression. Talked to therapist about her moods and they discussed possible changes in medication. She felt some better after talking but she still has depression. Therapist was out a week but follow up 10/19/2021. She thought that contributes to her depression- she is tired of trying to figure out what is contributing to her symptoms since Covid. She previously knew her body and when something was going on. She does not now and is bothersome.    Joint pain, back and neck pain- bottoms of both feet have been hurting really bad and aching. She has the most pain at the ball of the foot. Propping up helps some at night. Very tender.   she still has joint pain. Not as bad with back and neck pain. Her knees are more sore sometimes. Right foot with previous surgery hurts more.    She had improvement in the pain. She was still not back to baseline but improved.   Foot that had tendon release surgery hurt the worst- some improvement in other pain.   When she was a kid, she had a tubing accident but had hip pain after that. He hip hurt more after Covid. No recommendations from PT. States they did not have any additional  treatment.      Therapy- still in mental health counseling.   Physical therapy- doing PT with [facility] as directed by [facility]  Once weekly once she went back to work. When she was off work, she went 2-3 x. Has been walking with her dog per PT but she was scared of triggering a migraine because she was scared that a bump or anything would cause migraine.   Speech and cognitive therapy- There was initially a mistake on their part. Speech therapy- told her none of their other patients went back to full time. Other people had gone back 4 hours per day and gradually increased to 6 then 8 hours. They were concerned they would not be able to go back 12 hours.    Mental health counseling- seeing her therapist and she has her using an joaquim for relaxation and mind sharpness- doing that once daily.    Return to work- 5/11/2021 -she tried to return to work again and had a panic attack.  She started having severe anxiety about doing her job, remembering, and being able to function at work.  She has never had anything this severe in the past.  She went to work but had to leave. She then returned to work again 6/2021 and had 2 accidents with the [employer]- 1 dropping parts and another hitting a cable. She was seen for physical by medical and was advised she could not work with dizziness due to the risks.   She previously went back to work and had to leave after 2 hours. She reports they asked her to tell someone something and she couldn't remember who that was. They thought this was someone she should know but she had no idea. Also, the process of going into work- line, temperature, getting mask- couldn't remember how to do it. She states it was like she was a new hire. Things that she thought should have come back as soon as she saw it did not come back. They had her sit at a picnic table x 2 hours until they decided she should go home on no work available. They still have the heat on and with the heat and her mask, she felt  claustrophobic. No other symptoms that she is aware.    Morbid obesity-Medication was stopped. Weight gain- reports she has gained all the weight she lost plus some.   She was nervous- Wegovy- made her exhausted all day every day. She has never experienced that for this long. Started 2023 and has lost 6 lbs. Same time-  and mother have been on it. Mother gained weight and  did not lose a lb. She is not as active because she is exhausted. Even with dizziness, she tries to fight through it but cannot. Will take a 4 hour nap.    Hypertension-I advised she see cardiology for recommendations for medication.  They continued labetalol 200 mg twice daily. Back to baseline dizziness since decreasing.   OBGYN wanted her to talk with us about labetalol for long term.  She was seen by cardiology who did not recommend changing her medication.  She continues labetalol 200 mg twice daily.  Hypertension, Postpartum preeclampsia-following with OB/GYN tomorrow.  Blood pressure was stable.  Seen by cardiology without medication regimen change.  She did decrease from 3 times daily to 2 times daily and feels better.  Patient was hospitalized 2024 through 2024 for hypertension-blood pressure 162/82. Per discharge summary, she was on labetalol 200 mg twice daily and was ordered to increase to 3 times daily prior.  Pharmacy was unable to fill due to needing to get in touch with physician.  She denied headache, vision problems, or epigastric pain.  Baby decreased fetal movement.  Blood pressure improved at the hospital.  Preeclampsia labs were sent.  Return to ER warnings given.  Patient was hospitalized 2024 through 2024 for  section with uncomplicated recovery, chronic hypertension affecting pregnancy, HSV-2, MDD, 37-week gestation pregnancy, chronic hypertension, diet-controlled gestational diabetes.  She was doing well without complications.  Patient was discharged home.  Feet swelling and Bp  to 190 systolic. She almost did not go to ER and was initially admit to ICU.   Mg drip for 24 hours- felt like someone was trying to kill her and was scary.   Patient was hospitalized 2024 through 2024 for abnormality of thoracic aorta, chronic hypertension affecting pregnancy, intrauterine pregnancy at 38 weeks s/p  section, lung nodule. Per discharge summary, she was admitted for elevated blood pressure and chest pain.  At the time of admission, she had imaging that was concerning for possible thoracic aortic dissection, however, on CTA, imaging was normal.  Due to concern for dissection, she was initially admitted to ICU and cardiothoracic surgery was consulted.  They have reviewed imaging and are not concerned for ongoing problems.  Blood pressure was diagnosed with postpartum preeclampsia and was given 24 hours of magnesium.  Following the magnesium, her symptoms of chest pain improved and blood pressure was normotensive.  She will continue to labetalol 200 mg 3 times daily on discharge.  Labs-hemoglobin 11.3 and increased to 12.4 2024, potassium decreased to 3.3.  CTA chest-2024-extremely subtle contour involving the mid descending colon-flattening of the contrast column anteriorly extending 20 mm craniocaudal-could represent intimal flap dissection or mural thrombus formation.  Caliber of the ascending, arch, and descending aorta was normal.  No PE.  Vague induration of the anterior mediastinal fat suggest residual thymic tissue.  Bilateral breast implants in position, right upper lobe 6 mm nodule.  Advised 6-month follow-up.  Lap-Band in position.  Likely left renal cyst-13 mm.  Mild multilevel thoracic disc degeneration.  CTA chest 2024-bilateral breast implants in place, no thoracic aortic aneurysm or dissection.  Residual thymus.  Trace bilateral pleural fluid, solid pulmonary nodule inferior right upper lobe-7 mm, small amount of posterior dependent and bibasilar  "subsegmental atelectasis, possible subsegmental airway impaction inferior right upper lobe, minimal air trapping left lung base.  Possible gallbladder sludge or small gallstones, small cyst in the left mid kidney, gastric band.  Patient is back to her routine dizziness episodes.  Decreasing labetalol from 3 times daily to twice daily seem to help return to her baseline but continues with episodes of dizziness that are unexplained.  5/2024-feeling dizzy daily- different than the dizziness you had prior.  It was a constant dizzy- more like how you feel when you stand up too fast. Mixed with the feeling with car sickness as well.   The other dizzy spells are intense and hard and these are less intense.   Myasthenia gravis.   She has had some low /60 only once. Most of the time, has been about 120/70- no elevated levels.   Tired-  is off as well and is able to help. She is eating every 2 hours. When she is asleep, she is worried about baby. Not able to relax. She is happy and no postpartum depression but more anxiety. Moods are \"pretty good for the most part\". Emotional- can cry easily.  She is emotional.  Chest pain-patient was seen by cardiology exam 2024.  Echo 6/2024 was normal.  She will follow-up 1/2025 5/2024-she was not sure if anxiety- she was out of Lexapro-her pharmacy reported they did not receive it. She had episodes that remind her of anxiety- feeling pressure on chest and tightness.  She would take deep breaths to relieve but it felt like a weight on her chest.  She was having CP and SOA when she went to the hospital. This is the same CP she had when going to the hospital but no associated SOA.     Mixed hyperlipidemia-on no medications.  She is not plant based at this point. She only had fast food once and felt bad and was otherwise been eating healthy and working hard. She has exercised as tolerated. She lost about 15 lbs.   Watched [employer]- she tried no eating meat and changed " to almond milk. She has not stopped cheese/dairy completely yet. Feels she can do it. She is not completely plant based but is her goal. 1/2022- worsening cholesterol- elevated T Chol, LDL, and TG.   Slipped up through holidays and gained 10 lbs. She has been back on track for 2 weeks. She got excited and thought a few cheats and has now gotten better. Started a challenge- 5 days a week and doing a workout program to try to help with working out. Enjoying it and working on different parts of her body. Slowly but surely making lifestyle changes.  Prediabetes, gestational diabetes-she required insulin for the last few days of her pregnancy but otherwise was able to diet-controlled gestational diabetes.  On no medications.  She continues with diarrhea with metformin-BM 5-10 x daily. Prior to metformin, she was going about 2-3 x daily. It is urgent and is sudden onset.   Stopped Metformin and had significant improvement. She had not had solid BM in a long time but had improvement in BM. She felt terrible on medication.  Stopped having menses when she stopped Metformin. She was having normal menses while on Metformin. She had menses 3/13/2022.  1/2022- A1C up to 5.8%. She restarted Metformin - fertility specialist refilled. Metformin gives diarrhea and she has had more nausea since restarting. Stopped having menses when she stopped Metformin. She was having normal menses while on Metformin. She had menses 3/13/2022.  She was doing well- she worked on diet and exercise as tolerated. She had no beef, pork from 5/2022 and fried foods 2-3 x 5/2022. French fries 1-2 x since 5/2022. She decreased sweets but still eats.   10/2022- She noted trouble swallowing pills lately. She is not sure but has almost got sick with nightly medications.    Vitamin D deficiency- taking 5000 IU 4 x weekly and a MTV vitamin. Also fish oil.   She was taking vitamin D 5000IU every other day and prenatal vitamin and Folic acid. Then was on Vitamin D  3 x weekly and prenatal vitamin.   B12 deficiency- not taking B12.   B 6- continuing to take after neurology advised to take it. Taking 4-5 x weekly.   All reports of B12 supplement was actually taking B6.  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  She is taking prenatal vitamin and just delivered a baby.  Elevated liver function tests- patient without regular NSAIDS, Tylenol, or alcohol. Seen by GI- they ordered CT abd/pelvis. 1/2022- normal LFT.   Hyperuricemia- 1/2022- elevated uric acid. Prior to last labs- she ate a bunch of cocktail shrimp.   Taking folic acid 400 mcg daily in addition to prenatal vitamin.     Multiple UTI postpartum- she had catheter and was having urinary symptoms. Antibiotic- she was on something that started with C and took for 7 days. Finished 4 days ago. Got some yeast from it and they had already called in yeast medication.   Still bleeding some from giving birth.   She is improving some.     Cholelithiasis, abnormal imaging of gallbladder-patient underwent cholecystectomy 11/2024. Diarrhea more since GB removed.  Ports she has to have a bowel movement as soon as she eats.  Seen by general surgery 8/2024 for cholelithiasis.  Imaging with sludge or gallstones.  With episode of typical biliary disease earlier in the year and minimal symptoms otherwise, underwent gallbladder ultrasound with hepatomegaly with hepatic steatosis and cholelithiasis.  Underwent cholecystectomy recommended and scheduled 11/12/2024.  While pregnant, she had episode of excruciating pain in back. Someone mentioned could be GB.  Patient had chest pain and was seen by cardiology exam 2024 and will have echocardiogram 6/20/2024.  Some better. 5/2024-she was not sure if anxiety- she was out of Lexapro-her pharmacy reported they did not receive it. She had episodes that remind her of anxiety- feeling pressure on chest and tightness.  She would take deep breaths to  relieve but it felt like a weight on her chest.  She was having CP and SOA when she went to the hospital. This is the same CP she had when going to the hospital but no associated SOA    Menses-currently bleeding postpartum.   She had a few menses in the last year with severe cramping and feeling like she could faint. Will take anything to feel better at that point. She took 5 Tylenol. No change with taking or not taking Metformin.  She did have bleeding with pregnancy.  History of pregnancy with bleeding in pregnancy s/p delivery-delivered healthy baby.  Has had several complications since delivery.  4 big bleeds with golf ball size clots. Last bleed was 9 days ago. Heavy bleeding for 3-5 hours- fills a couple pads then spotting. She had spotting for a few weeks after initial bleed at 7 weeks. She has bled for 7 weeks throughout her pregnancy. Has worn a pad for more days than not.   When on her feet extended period of time, she bleeds. Cramping and tugging- not extreme.  Nausea- has lost weight because feeling full. Has lost about 8-9 lbs. Gained no weight and has lost. As soon as she starts eating, she is very full. Eating small meals frequently. Small bowl of shredded wheat. Big snacks. Protein- not the best choices- meat has been disgusting. Certain things sound discussed. Main things- bagels, shredded wheat, and yogurt- activia with constipation. Watermelon and oranges.   On prenatal and extra folic acid.   Seen by MFM- Will go back in 1 month- she will be 23 weeks and 2 days at her next follow up.   Because of blood pressure issues, they will not allow longer than 38 weeks.   After high risk again in 1 month, they will release back to her GYN   Insurance has a program and they will give her a nurse that she can talk to through her insurance  She has had increased light-headedness. Not more dizziness- having no more dizzy spells but having light-headedness between dizzy spells.     Nasal lesion-no complaints  "today.  She had a sore in her nose from June to September. Not resolving. Blood in nose.   Also soreness and irritation on outside of right > left ear canal  Skin lesions- Dermatology removed a lesion right face and left axilla- both  Benign. The spot on her right forearm- it is a scar. They scheduled removal 1/10/2022.   She was concerned about a spot on her face that has been there- looked like a dark, Aging\" spot. However, she noticed it to be larger, more raise, and bothersome. She wanted to consider dermatology for right forearm scar that is raised to see about removal. Dermatology a long time ago but unsure who she saw.     Ingrown toenails- Her 1 great toenails removed for ingrown toenails. Cannot wear socks or shoes. Left toenail 1 month ago- still healing. He advised it looked good when he did right toenail.     Patient's specialists:  Bariatrics- Dr Noah Koroma- last appt 5/2012 in follow up of Lab Band.   Cardiology -Dr. Negrete, MONICA Florence, MONICA Anthony-last appointment 1/2025 for hypertension, palpitations, abnormality of thoracic aorta, and chest pain.  Blood pressure and palpitations are controlled on labetalol 200 mg twice daily.  Continue regimen.  Follow-up 1 year.  9/2023 for chest pain and tachycardia, palpitations, chronic hypertension during pregnancy.  Agrees OB/GYN's approach to switching her over to labetalol.  Follow-up 6 months shortly after pregnancy and could always either continue labetalol or switch back to atenolol.  5/2021 for palpitations, hypertension, and chest pain. Hold off on stress test since she was improving. Continue medication for a couple months then re-evaluate. Consider weaning atenolol but if she needs medication for BP, she may need to continue beta blocker and consider labetolol with desire to conceive. Follow up in 3 months. Appt scheduled 11/29/2021  She had been seen 11/2020 for atypical chest pain. Echo, Holter, and stress test ordered.    She " was seen again after Covid 19 with tachycardia, SOA. They held stress test with post Covid symptoms. Started atenolol 25 mg daily for elevated blood pressure and tachycardia.    4/2021- Holter monitor normal.   3/2021- Echo normal.   GI- Dr Jensen- last appt 11/18/2021 for elevated LFT and hemochromatosis carrier state. Referred for CT abd/pelvis. Advised diet and consideration of lipid medication. Consider additional labs if CT is negative and enzymes continue to increase.    General surgery-Dr. Avel Worrell-last appointment 11/2025 for postop visit from cholecystectomy for cholelithiasis without cholecystitis without obstruction.  Cholecystectomy 11/12/2024.  She was improving and advised increased fiber in diet for loose stools.  Follow-up as needed.  Infectious disease-MONICA Sawyer-last appointment 4/2021 in follow-up of COVID-19 infection.  Diagnosed with post viral fatigue syndrome and brain fog.  Advised physical therapy, cognitive therapy, and advised follow-up with cardiology for tachycardia.  Performed additional labs. 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  Follow-up PRN.  Neurology-Dr. Jose Patel-last appointment 6/2023 for dizziness, headaches, possible migraine activity.  Start indomethacin 50 mg 2 times daily with food to help with dizziness.  Contact after her trial has been completed to see if she had improvement.  She had no improvement and they were considering cyproheptadine for migraine activity without headache-thought to be related to dizziness.  Advised could influence Lexapro levels and would need to monitor.  Prior Dr Hogan-last appointment 12/2021 for migraine headache-headaches post Covid and dizziness.  Headaches improved on Topamax 25 mg twice daily- continue medication.  Also given Imitrex. Neagative sleep study. Consider vestibular migraines- trial increase Topamax, referred for vestibular therapy, and given Meclizine  for dizziness. Follow-up in 4 months.  OB/GYN-Dr. Neda Penn-last appointment 2024 for post partum visit.  S/p primary low-transverse  section.  She was doing well.  Blood pressure was good-continue labetalol.  Will need 2-hour glucose tolerance test 6-week appointment.  Pap up-to-date.  Follow-up 2024.  Patient had to cancel appointment and will reschedule.  Prior Dr. Giles-last appointment 2020 for annual exam/well woman exam.  Pap completed.  Follow-up in 1 year.  Sleep medicine-Dr. Pantoja-last appt 5/10/2021 for evaluation. Home sleep study 2021- no EVERETT.   Speech therapy-started 2021 for speech and cognitive therapy. She was discharged the end of 2021. She has noticed improvement.   Ophthalmology-Dr. Heydi Vera-last appointment 2021 for abnormal MRI orbit.  Referred to Dr. Bowman.  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 Follow up PRN.    ENT-Dr. Devang Izquierdo-last appointment 2022 for dizziness, migraine headache.  He was previously treated with Topamax that was stopped.  Formal vestibular testing including rotary chair showing only slight asymmetry on velocity step potation but no concern for real peripheral abnormality.  Minimal relief from verapamil but experienced brain fog from the medication.  To see [facility] for dizziness.  Follow-up 6 months.  Prior Rita Johnson APRN- last appt 10/26/2021 for dizziness. Epley maneuver did not work. Advised she get a mouth guard and take Magnesium oxide 400 mg QHS. Follow up if persistent symptoms.   Allergist- Dr Shelia Mc- went 2021- and they wanted to change Atenolol to Nadolol and gave Zyrtec, Nasacort, and albuterol as needed. Allergy testing- allergic to outdoor issues and not indoor things. He asked her worst symptoms. He was concerned about Topamax- she did cut back to nightly. She had worsening headaches (not severe  but continued with headaches). States when she increased again, she feels back to baseline prior to starting Topamax.     The following portions of the patient's history were reviewed and updated as appropriate: allergies, current medications, past family history, past medical history, past social history, past surgical history and problem list.    Review of Systems   Constitutional:  Positive for fatigue (improving). Negative for chills, diaphoresis and fever.   HENT:  Negative for congestion and sore throat.    Eyes:  Positive for visual disturbance.   Respiratory:  Negative for cough. Shortness of breath: improving.   Cardiovascular:  Positive for palpitations (improving). Negative for chest pain.   Gastrointestinal:  Negative for abdominal pain, anorexia and vomiting. Nausea: no constant nausea.  Genitourinary: Negative.  Negative for dysuria.   Musculoskeletal:  Positive for gait problem (falls). Negative for joint pain and neck pain. Arthralgias: improving. Myalgias: improving.  Skin: Negative.  Negative for rash.   Neurological:  Positive for dizziness. Negative for vertigo, seizures, syncope and numbness. Facial asymmetry: improved. Weakness: improving. Light-headedness: improved. Headaches: improved with treatment.  Psychiatric/Behavioral:  Positive for confusion (improving), decreased concentration (improving), dysphoric mood (improving) and sleep disturbance (new- increased nightly snoring since Covid 19). Negative for self-injury and suicidal ideas. The patient is nervous/anxious (worse with concerns of surgery).         Brain fog improving       Objective   Vitals:    03/03/25 1440   BP: 126/88   Pulse: 84   Resp: 17   Temp: 98.2 °F (36.8 °C)   SpO2: 97%       Body mass index is 42.57 kg/m².  Class 2 Severe Obesity (BMI >=35 and <=39.9). Obesity-related health conditions include the following: hypertension, dyslipidemias, and GERD. Obesity is unchanged. BMI is is above average; BMI management plan is  completed. We discussed portion control and increasing exercise.    Physical Exam  Constitutional:       General: She is not in acute distress.     Appearance: She is well-developed.   HENT:      Head: Normocephalic and atraumatic.   Eyes:      General:         Right eye: No discharge.         Left eye: No discharge.   Pulmonary:      Effort: Pulmonary effort is normal. No respiratory distress.   Skin:     General: Skin is dry.   Psychiatric:         Attention and Perception: She is attentive.         Speech: Speech normal.         Behavior: Behavior normal.         Assessment & Plan   Diagnoses and all orders for this visit:    1. 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 Brain fog    3. Persistent fatigue after COVID-19    4. Persistent neurologic symptoms after COVID-19    5. Dizziness    6. Major depressive disorder, recurrent, in full remission    7. Anxiety    8. Adjustment disorder with anxious mood    9. Class 3 severe obesity with body mass index (BMI) of 40.0 to 44.9 in adult, unspecified obesity type, unspecified whether serious comorbidity present    10. Hypertension, unspecified type    11. Chest pain, unspecified type    12. Preeclampsia in postpartum period    13. Mixed hyperlipidemia    14. Prediabetes    15. Vitamin D deficiency    16. B12 deficiency    17. Vitamin B6 deficiency    18. Hemochromatosis carrier    19. Elevated liver enzymes  -     Comprehensive Metabolic Panel    20. Hyperuricemia    21. History of UTI    22. Calculus of gallbladder without cholecystitis without obstruction    23. PCOS (polycystic ovarian syndrome)- was on Metformin to regulate cycles    24. S/P  section    25. Chronic migraine without aura without status migrainosus, not intractable    26. Disorder of thymus    27. Pulmonary nodule    28. Pleural effusion    29. Family history of stroke    30. Reading difficulty    31. Cognitive change    32. Change in bowel movement  -     Ambulatory Referral to  Gastroenterology    33. Family history of colon cancer  -     Ambulatory Referral to Gastroenterology        Assessment and Plan  Patient had significant long term Covid 19 symptoms. She completed physical therapy, speech, and cognitive therapy, and continues follow up with mental health counselor weekly, cardiology, neurology, infectious disease/ long term Covid clinic, ophthalmology, sleep medicine, and allergist as directed by them. She previously restarted PT at the request of [facility]. She tried to transition to a whole food plant based diet. Information given. She was seen by dizziness specialist, Dr Izquierdo, with intolerance to CCB and no improvement with other medications. She was seen in follow up with no further treatment indicated. She was following with [facility]. She has been started on a couple medications without success, restarted PT, and has had no improvement.      Dizziness- Jocelyn has had dizziness with looking up, bending, and doing things. Unfortunately, she has had worsening and has dizziness with closing her eyes and newly a sensory overload with noises and light.  She had dizziness following Covid 19 infection, however, following an orbital mass removal, she had increasing dizziness symptoms. She has some lightheadedness and some spinning dizziness. She has undergone MRI brain, MRI brain and IAC and been evaluated by ENT and neurology. She was seen by ophthalmology and cleared for PRN follow up. ENT advised mouthguard and Magnesium oxide 400 mg QHS (then reported she should be taking a different Mg supplement). Without improvement, she advised follow up with neurology. Neurology advised trial of increasing Topamax (which she did not tolerate), trial Meclizine (no improvement in dizziness with Meclizine), and referred to PT for vestibular therapy. I agreed with vestibular therapy. She is also having vision issues in the peripheral vision. I advised PT through [facility]  long term Covid PT program, as they have developed therapy program for vision and dizziness post-Covid. She was unable to get into this program. She was seen by Dr Izquierdo at [facility], who has specialty in dizziness. They started Verapamil  mg once daily to see if this helped with dizziness. She did not tolerate verapamil, and it did not help the dizziness. There was nothing further they felt they could do to help. I referred to tertiary center for evaluation and treatment who advised Mg, possible migraine variant, and to complete PT with specific PT and follow up with them after completing PT. She then tried Gabapentin and could not tolerate AE but also had no improvement in dizziness with medication. She had no improvement with cyproheptadine. She completed PT. I also referred for warm water PT for strengthening, pain, and to see if this helped with sensory, balance issues.  Consideration of endocrinology if we are able to find any endocrine commonalities with long COVID patients.  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 She did have a couple accidents at work. I will have her off work until she is cleared by specialists for for 6 months.  Follow up with me in 6 months. She will see [facility] as directed by them. If she has resolution, she can be cleared by specialists.    Depression with anxious mood- Patient has had some underlying anxiety in the past, however, she developed very severe, debilitating anxiety following Covid 19 infection and rehab, causing panic attacks.  She is seeing her mental health counselor.  She was resistant to medication in the past because she was always able to control her moods, calm down herself, and continue with working.  However, following her COVID-19 infection, she had more severe symptoms that were worsened with trying to return to work.  I started Lexapro 5 mg once daily. Once she was tolerating medication better,  she increased to Lexapro 10 mg once daily. She had improved anxiety with therapy and medication. However, she had increased depressive symptoms, fatigue, decreased motivation. I had her continue Lexapro 10 mg once daily and added Wellbutrin  mg daily. She has not noted significant difference.  She thinks moods are pretty well-controlled.  She will need to monitor for postpartum depression and anxiety.  Patient to let me know if uncontrolled moods or any concerns.  To be seen ASAP if worsening moods or AE with medication.  Otherwise, continue medication until follow-up.  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   Nocturnal cough- Patient to be seen if persistent symptoms. She can continue Pepcid 20 mg before her evening meal. She can try Mucinex DM twice daily as well. She may also need albuterol inhaler if needed. To be seen if worsening, new or changing symptoms or no resolution of cough.   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    Hypoxia following Covid 19 infection-  She had slow, gradual improvement and completed PT. She did have improvement in oxygenation.   Tachycardia/ elevated BP, post-Covid arrhythmia- Continue follow up with cardiology as directed by them. Echocardiogram and Holter monitor were ok. Patient to continue Atenolol 25 mg once daily and monitor BP, pulse. Blood pressure was a little low and she decreased Atenolol by 1/2 has her BP returns to normal. Allergist gave her Nadolol. Cardiology to determine any changes from atenolol to nadolol. She has had no increased SOA with beta blocker but is having increased fatigue and decreased motivation. We will consider consulting cardiology and decreasing medication.   Fatigue following Covid 19 infection- Patient to continue mental health  therapy. She completed physical therapy, and cognitive therapy.   Cognitive deficit/dysfunction- Continue home exercises. Continue follow up with neurology as directed.   Depression and anxiety- Patient previously had some depression and anxiety that were managed with counseling and techniques. However, she had severe anxiety following Covid 19 then had improvement with medication but has had severe depression. Continue Mental health counseling, Lexapro, and Wellbutrin.  She must be seen ASAP if any concerning symptoms, worsening depression or anxiety.  Persistent headaches following Covid 19 infection- Patient to follow up with neurology and treatment as directed by them. Her allergist wanted her to stop Topamax due to fatigue and cognitive impairment, however, headaches are controlled, and she did not want to stop medication. She did try to stop medication, and headaches recurred. She then developed kidney stones and was able to wean off Topamax.  I would not recommend Imitrex any further for acute headaches and would recommend Ubrelvy or Nurtec if cleared by pediatrician and OB/GYN, follow up with neurology if worsening headaches, new, or changing neurological symptoms.   Snoring- Since having Covid 19, she developed significant snoring, headaches that woke her up at night, tachycardia, hypoxia, and significant brain fog and fatigue. Negative sleep study. Snoring has not resolved but has improved gradually.  Generalized body aches, back aches, neck pain, and joint pain- Elevated uric acid but otherwise negative autoimmune testing 4/2021. PT and home exercises as needed. I also referred to allergist, with symptoms similar to MIS-A. No diagnosis of MIS-A with allergist. Patient to decrease purine rich foods. I will refer for warm water PT.   History of back pain, chest pain-She did have episode of pain that was a little concerning for gallbladder pathology.  She was seen by cardiology and will undergo  echocardiogram.  I discussed we will need to consider workup for gallbladder.  I will place referral to general surgery for evaluation and for them to review images and discuss symptoms.  To be seen immediately here or ER if she has any recurrence of symptoms.    In follow up of chronic, non-Covid related symptoms. Patient will have fasting labs. Call if no results in 1 week. Stability of conditions, plan, follow up, and further recommendations pending labs. Follow up in 6 months or sooner if needed.     Pregnancy with history of recurrent loss, vaginal bleeding in pregnancy, placental lakes, history of low lying placenta resolved, HTN, gestational diabetes, postpartum preeclampsia-  She we will continue follow-up with OB/GYN as directed.  Continue follow-up with cardiology as directed by them.   Chest pain, palpitations, dizziness, recent postpartum preeclampsia- She underwent echocardiogram and will continue follow-up with cardiology for evaluation and treatment recommendations.  Mixed hyperlipidemia- Patient to continue to be compliant with diet/ exercise as approved by OB/GYN and cardiology. She will restart strict diet and exercise. Await labs for further recommendations.    Prediabetes- A1C increased off Metformin and she stopped having menses. She restarted Metformin and menses was more regular again but she had severe GI AE. She did not tolerate GLP1's now postpartum.  She is off metformin with having significant diarrhea postcholecystectomy.  We will monitor labs and make further recommendations at follow-up.   Vitamin D deficiency- Level was slightly higher than needed.  Decreased vitamin D 5000 IU from every other day to 2 times weekly through the summer and continue prenatal vitamin then increase again to every other day in November.   B6 deficiency- Levels are stable.  Continue supplement as directed by neurology.  B12 excess- Patient with elevated B12 levels with prior labs.  We will continue to  monitor and make recommendations.  Hemochromatosis carrier- I will continue to monitor and make further recommendations.  Elevated liver function tests- Most recent LFT very slightly elevated. Avoid NSAIDS and alcohol and limit Tylenol to < 2 grams daily.  I will recheck today.  Follow up with GI as directed.   Abnormal creatinine-I will recheck today and make further recommendations.  Hyperuricemia- Decrease purine rich foods. I will monitor and make recommendations.   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 Follow up with dermatology as directed by them.   History of recurrent UTI postpartum-I will recheck labs and make further recommendations.  Pulmonary nodule-I referred again to pulmonology for evaluation and follow-up and to continue follow-up with them for any repeat CT recommendations.  Residual thymus, weakness, dizziness- Patient with significant weakness, dizziness, and intermittent brain fog.  I recommended seeing neurology at [facility] for evaluation of myasthenia gravis or similar neurological condition.  I referred again since she did not receive appointment with last referral.  Cholelithiasis, chest pain, change in bowel movement, family history colon cancer- Patient did have an episode of chest pain that could have been related to gallbladder and had an episode of back pain that was thought to have possibly been related to gallbladder.  CTA chest with gallbladder sludge versus stones.  She underwent cholecystectomy 11/2024 and has had significant diarrhea with meals since cholecystectomy.  Patient's sister was just diagnosed with stage III colon cancer at the age of 43.  Patient has not had a colonoscopy.  While it is possible the postprandial diarrhea is related to cholecystectomy, with change in bowel movement, she does need colonoscopy with new family history of colon cancer.  I will refer back to GI for evaluation,  consideration of colonoscopy, and consideration of further treatment of diarrhea.  Reading difficulty, memory symptoms, transient altered mental status- MRI brain to with no CVA with complications post- partum with severe elevation in BP.  Consideration of neuropsych testing pending symptoms.     Patient continues to see specialists as noted above.  I have reviewed available records, including consult notes, labs, and imaging/testing.  Patient to continue follow-up with specialists as directed by them.    I spent 35 minutes caring for Jocelyn Merlos on this date of service. This time includes time spent by me in the following activities as necessary: preparing for the visit, reviewing tests, specialists records and previous visits, obtaining and/or reviewing a separately obtained history, performing a medically appropriate exam and/or evaluation, counseling and educating the patient, family, caregiver, referring and/or communicating with other healthcare professionals, documenting information in the medical record, independently interpreting results and communicating that information with the patient, family, caregiver, and developing a medically appropriate treatment plan with consideration of other conditions, medications, and treatments.     [facility] Claim# [id number]

## 2025-03-04 LAB
ALBUMIN SERPL-MCNC: 4.6 G/DL (ref 3.9–4.9)
ALP SERPL-CCNC: 87 IU/L (ref 44–121)
ALT SERPL-CCNC: 43 IU/L (ref 0–32)
AST SERPL-CCNC: 27 IU/L (ref 0–40)
BILIRUB SERPL-MCNC: <0.2 MG/DL (ref 0–1.2)
BUN SERPL-MCNC: 13 MG/DL (ref 6–20)
BUN/CREAT SERPL: 14 (ref 9–23)
CALCIUM SERPL-MCNC: 9.8 MG/DL (ref 8.7–10.2)
CHLORIDE SERPL-SCNC: 102 MMOL/L (ref 96–106)
CO2 SERPL-SCNC: 24 MMOL/L (ref 20–29)
CREAT SERPL-MCNC: 0.93 MG/DL (ref 0.57–1)
EGFRCR SERPLBLD CKD-EPI 2021: 83 ML/MIN/1.73
GLOBULIN SER CALC-MCNC: 2.1 G/DL (ref 1.5–4.5)
GLUCOSE SERPL-MCNC: 87 MG/DL (ref 70–99)
POTASSIUM SERPL-SCNC: 4.6 MMOL/L (ref 3.5–5.2)
PROT SERPL-MCNC: 6.7 G/DL (ref 6–8.5)
SODIUM SERPL-SCNC: 140 MMOL/L (ref 134–144)

## 2025-03-08 DIAGNOSIS — F33.42 MAJOR DEPRESSIVE DISORDER, RECURRENT, IN FULL REMISSION: ICD-10-CM

## 2025-03-08 DIAGNOSIS — R41.89 BRAIN FOG: ICD-10-CM

## 2025-03-10 RX ORDER — BUPROPION HYDROCHLORIDE 150 MG/1
150 TABLET ORAL DAILY
Qty: 90 TABLET | Refills: 1 | Status: SHIPPED | OUTPATIENT
Start: 2025-03-10

## 2025-03-11 ENCOUNTER — TELEPHONE (OUTPATIENT)
Dept: FAMILY MEDICINE CLINIC | Facility: CLINIC | Age: 35
End: 2025-03-11

## 2025-03-11 NOTE — TELEPHONE ENCOUNTER
Caller: Jocelyn Merlos    Relationship: Self    Best call back number: [phone]     What form or medical record are you requesting:      Who is requesting this form or medical record from you:      How would you like to receive the form or medical records (pick-up, mail, fax):    If fax, what is the fax number:    If mail, what is the address:    If pick-up, provide patient with address and location details    Timeframe paperwork needed:      Additional notes: PATIENT CALLED AND NEEDS HER LAST OFFICE VISIT NOTES FROM 3/03/25 EMAILED TO HER TODAY AT [email or web address].

## 2025-03-20 ENCOUNTER — OFFICE VISIT (OUTPATIENT)
Dept: GASTROENTEROLOGY | Facility: CLINIC | Age: 35
End: 2025-03-20
Payer: COMMERCIAL

## 2025-03-20 ENCOUNTER — TELEPHONE (OUTPATIENT)
Dept: GASTROENTEROLOGY | Facility: CLINIC | Age: 35
End: 2025-03-20
Payer: COMMERCIAL

## 2025-03-20 VITALS
BODY MASS INDEX: 41.24 KG/M2 | HEART RATE: 82 BPM | DIASTOLIC BLOOD PRESSURE: 69 MMHG | SYSTOLIC BLOOD PRESSURE: 99 MMHG | TEMPERATURE: 96.9 F | WEIGHT: 272.1 LBS | HEIGHT: 68 IN

## 2025-03-20 DIAGNOSIS — K52.9 POSTPRANDIAL DIARRHEA: Primary | ICD-10-CM

## 2025-03-20 DIAGNOSIS — Z90.49 HISTORY OF CHOLECYSTECTOMY: ICD-10-CM

## 2025-03-20 DIAGNOSIS — K76.0 METABOLIC DYSFUNCTION-ASSOCIATED STEATOTIC LIVER DISEASE (MASLD): ICD-10-CM

## 2025-03-20 DIAGNOSIS — Z80.0 FAMILY HISTORY OF COLON CANCER: ICD-10-CM

## 2025-03-20 PROCEDURE — 99204 OFFICE O/P NEW MOD 45 MIN: CPT | Performed by: PHYSICIAN ASSISTANT

## 2025-03-20 RX ORDER — COLESTIPOL HYDROCHLORIDE 1 G/1
TABLET ORAL
Qty: 60 TABLET | Refills: 11 | Status: SHIPPED | OUTPATIENT
Start: 2025-03-20

## 2025-03-20 NOTE — PROGRESS NOTES
"Chief Complaint  Diarrhea    Subjective          History Of Present Illness:    Jocelyn Merlos is a  34 y.o. female patient with a history of HTN, HLD, PCOS who presents as a new patient for evaluation of diarrhea.     Patients sister is 43 and was diagnosed with stage III colon cancer this year. She thinks her paternal grandmother had crohns disease.     Patient reprots she has been experiencing issues with post prandial diarrhea. She did have a cholecystectomy removed back in November with Dr. Worrell.  Patient feels like she was having some diarrhea before this but it has certainly been exacerbated since the removal of her gallbladder.  Patient denies any melena or hematochezia. Patient denies abdominal pain, nausea, vomiting. She denies any nocturnal diarrhea.  No abnormal weight loss or poor appetite.    She does have a history of lap band.    Seen by Dr. Jensen in 2021 for elevated LFTs.     Prior smoker.  Social alcohol use.  No illicit drug use    Additional data reviewed:   US 8/12/24 -hepatomegaly with hepatic steatosis, cholelithiasis  S/p CCY with Dr. Worrell on 11/12/24    Objective   Vital Signs:   BP 99/69   Pulse 82   Temp 96.9 °F (36.1 °C)   Ht 172.7 cm (68\")   Wt 123 kg (272 lb 1.6 oz)   BMI 41.37 kg/m²       Physical Exam  Vitals reviewed.   Constitutional:       General: She is not in acute distress.     Appearance: Normal appearance. She is not ill-appearing.   HENT:      Head: Normocephalic and atraumatic.      Nose: Nose normal.      Mouth/Throat:      Pharynx: Oropharynx is clear.   Eyes:      Conjunctiva/sclera: Conjunctivae normal.   Pulmonary:      Effort: Pulmonary effort is normal.   Abdominal:      General: There is no distension.      Palpations: Abdomen is soft. There is no mass.      Tenderness: There is no abdominal tenderness.   Musculoskeletal:         General: No swelling. Normal range of motion.      Cervical back: Normal range of motion.   Skin:     General: Skin is " warm and dry.      Findings: No bruising or rash.   Neurological:      General: No focal deficit present.      Mental Status: She is alert and oriented to person, place, and time.      Motor: No weakness.      Gait: Gait normal.   Psychiatric:         Mood and Affect: Mood normal.          Result Review :   The following data was reviewed by: Dianna Willis PA-C on 03/20/2025:  CMP          11/5/2024    14:16 1/13/2025    09:16 3/3/2025    15:41   CMP   Glucose 104  109  87    BUN 16  14  13    Creatinine 1.14  0.98  0.93    EGFR 64.9  78  83    Sodium 139  140  140    Potassium 4.4  4.3  4.6    Chloride 103  104  102    Calcium 9.4  9.3  9.8    Total Protein 6.8  6.6  6.7    Albumin 4.2  4.5  4.6    Globulin 2.6  2.1  2.1    Total Bilirubin 0.3  0.4  <0.2    Alkaline Phosphatase 79  79  87    AST (SGOT) 21  26  27    ALT (SGPT) 30  35  43    Albumin/Globulin Ratio 1.6      BUN/Creatinine Ratio 14.0  14  14    Anion Gap 12.4        CBC          6/7/2024    13:05 11/5/2024    14:16 1/13/2025    09:16   CBC   WBC 6.9  8.85  7.1    RBC 4.58  4.44  4.59    Hemoglobin 13.5  13.2  14.1    Hematocrit 41.3  41.8  44.4    MCV 90  94.1  97    MCH 29.5  29.7  30.7    MCHC 32.7  31.6  31.8    RDW 12.3  12.3  12.5    Platelets 293  316  318            Assessment and Plan    Diagnoses and all orders for this visit:    1. Postprandial diarrhea (Primary)  -     colestipol (COLESTID) 1 g tablet; Start with one pill daily and increase to 2 daily if no improvement after 2 weeks  Dispense: 60 tablet; Refill: 11  -     Case Request; Standing  -     Implement Anesthesia orders day of procedure.; Standing  -     Follow Anesthesia Guidelines / Protocol; Future  -     Verify bowel prep was successful; Standing  -     Give tap water enema if bowel prep was insufficient; Standing  -     POC Urine Pregnancy; Standing  -     Case Request    2. History of cholecystectomy  -     colestipol (COLESTID) 1 g tablet; Start with one pill daily  and increase to 2 daily if no improvement after 2 weeks  Dispense: 60 tablet; Refill: 11    3. Family history of colon cancer  -     colestipol (COLESTID) 1 g tablet; Start with one pill daily and increase to 2 daily if no improvement after 2 weeks  Dispense: 60 tablet; Refill: 11    4. Metabolic dysfunction-associated steatotic liver disease (MASLD)  -     US Liver; Future  -     US Elastography Parenchyma; Future  -     US Derived Fat Fraction; Future       Patient has a first-degree relative (sister) who was diagnosed with colon cancer at the age of 43.  In the setting of these findings she would be due for screening colorectal cancer (10 years younger than the earliest diagnosis). Will proceed with colonoscopy with Dr. Villanueva  Patient has had some change in her bowel patterns which is more likely secondary to bile salt diarrhea following her cholecystectomy.  Recommend a trial of colestipol before meals.  She is advised to take this at least 1 hour after her other medications.  She should hold this medication the week of her colonoscopy to ensure she is prepped sufficiently.  Patient with history of known fatty liver disease likely secondary to MASLD.  We did discuss it is more difficult to lose weight with PCOS.  Encouraged her to continue to attempt diet modifications and encouraged regular exercise.  Avoid alcohol.  Liver enzymes have been reviewed and are stable.  Recommend US elastography in 2026 to assess for fibrosis - could be a candidate for [facility]     Follow Up   Return for Colonoscopy.    Dragon dictation used throughout this note.            Dianna Lewis PA-C   [facility]  [address] [place] [address]  Office: [phone]

## 2025-03-20 NOTE — TELEPHONE ENCOUNTER
Email sent to EP  for COLONOSCOPY on 5/29/25  arrive at 8:00  . Gave prep instructions to pt in office ....miralax

## 2025-03-21 RX ORDER — LABETALOL 200 MG/1
200 TABLET, FILM COATED ORAL 2 TIMES DAILY
Qty: 180 TABLET | Refills: 3 | Status: SHIPPED | OUTPATIENT
Start: 2025-03-21

## 2025-05-06 ENCOUNTER — HOSPITAL ENCOUNTER (OUTPATIENT)
Dept: CT IMAGING | Facility: HOSPITAL | Age: 35
Discharge: HOME | End: 2025-05-06
Admitting: INTERNAL MEDICINE
Payer: COMMERCIAL

## 2025-05-06 DIAGNOSIS — R91.8 LUNG NODULES: ICD-10-CM

## 2025-05-06 PROCEDURE — 71250 CT THORAX DX C-: CPT

## 2025-05-08 ENCOUNTER — OFFICE VISIT (OUTPATIENT)
Dept: OBSTETRICS AND GYNECOLOGY | Age: 35
End: 2025-05-08
Payer: COMMERCIAL

## 2025-05-08 VITALS
SYSTOLIC BLOOD PRESSURE: 124 MMHG | DIASTOLIC BLOOD PRESSURE: 90 MMHG | WEIGHT: 277 LBS | BODY MASS INDEX: 41.98 KG/M2 | HEIGHT: 68 IN

## 2025-05-08 DIAGNOSIS — Z12.4 CERVICAL CANCER SCREENING: ICD-10-CM

## 2025-05-08 DIAGNOSIS — Z01.419 ENCOUNTER FOR ANNUAL ROUTINE GYNECOLOGICAL EXAMINATION: Primary | ICD-10-CM

## 2025-05-08 NOTE — PROGRESS NOTES
[facility]   Obstetrics and Gynecology     2025    Patient: Jocelyn Merlos          MR#:[id number]    History of Present Illness    Chief Complaint   Patient presents with    Gynecologic Exam     CC: annual. Last pap 2/10/22 (-) hpv (-).        34 y.o. female  who presents for annual exam. She is doing well. Her daughter just turned [age] and she is also doing very well. No new concerns or complaints today.    Relevant data reviewed:    Patient's last menstrual period was 2025 (exact date).  Obstetric History:  OB History          4    Para   1    Term   1       0    AB   3    Living   1         SAB   3    IAB   0    Ectopic   0    Molar   0    Multiple   0    Live Births   1               Menstrual History:     Patient's last menstrual period was 2025 (exact date).       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    Suppurative hidradenitis    Headache, migraine    Family history of hemochromatosis    Vitamin D deficiency    Hemochromatosis carrier    Major depressive disorder, recurrent, in full remission    Brain fog    Palpitations    Dizziness    PCOS (polycystic ovarian syndrome)- was on Metformin to regulate cycles    Rubella non-immune, needs MMR PP    Maternal varicella, non-immune, needs Varivax PP    Anxiety and depression    Nausea without vomiting    Insomnia    Obesity affecting pregnancy, antepartum    Recurrent pregnancy loss with current pregnancy    37 weeks gestation of pregnancy    Chronic hypertension in pregnancy    Diet controlled gestational diabetes mellitus (GDM) in third trimester    Chronic hypertension affecting pregnancy    Abnormality of thoracic aorta    S/P  section     Pulmonary nodule    Post-COVID-19 syndrome    Persistent fatigue after COVID-19    Persistent neurologic symptoms after COVID-19    Adjustment disorder with anxious mood    Class 3 severe obesity with body mass index (BMI) of 40.0 to 44.9 in adult    Mixed hyperlipidemia    Prediabetes    Vitamin B6 deficiency    Hyperuricemia    Disorder of thymus    Abnormal CT scan, gallbladder    Pleural effusion    RUQ pain    B12 deficiency    Calculus of gallbladder without cholecystitis without obstruction    Postprandial diarrhea     Past Medical History:   Diagnosis Date    Abnormal Pap smear of cervix     Adjustment disorder with mixed anxiety and depressed mood 2016    Allergic     Anxiety     Cholelithiasis     COVID-19 2020    Depression     Endometriosis     Fatty liver     Female infertility     Gestational diabetes     Gestational hypertension     Headache     HPV (human papilloma virus) infection     HSV-2 infection     valtrex for supression during pregnancy    Hyperlipidemia     Hypertension     CHRONIC - Since - currently on medication    Kidney stone     Migraine     Obesity     Palpitations     occasionally with pregnancy    Placenta previa     PMS (premenstrual syndrome)     Polycystic ovary syndrome     PONV (postoperative nausea and vomiting)     Preeclampsia     Postpartum    Recurrent pregnancy loss, antepartum condition or complication , ,     Urinary tract infection      Past Surgical History:   Procedure Laterality Date    ABDOMINAL SURGERY      Lap band, tummy tuck, c section, gallbladder removal    BARIATRIC SURGERY  2011    Lapband    BREAST AUGMENTATION Bilateral 2014    BREAST SURGERY      CERVICAL BIOPSY  W/ LOOP ELECTRODE EXCISION       SECTION N/A 2024    Procedure:  SECTION PRIMARY;  Surgeon: Neda Penn MD;  Location: [facility] LABOR DELIVERY;  Service: Obstetrics;  Laterality: N/A;    CHOLECYSTECTOMY N/A  2024    Procedure: CHOLECYSTECTOMY LAPAROSCOPIC WITH DAVINCI ROBOT;  Surgeon: Avel Worrell MD;  Location: [facility] MAIN OR;  Service: Robotics - DaVinci;  Laterality: N/A;    COSMETIC SURGERY      EYE SURGERY      FOOT SURGERY Right     plantar fascitis tendon release    GASTRIC SLEEVE LAPAROSCOPIC      Lapband    LAPAROSCOPIC GASTRIC BANDING      LEEP      OTHER SURGICAL HISTORY      removal of eye tumor    OTHER SURGICAL HISTORY      tummy tuck    TONSILLECTOMY      WISDOM TOOTH EXTRACTION       Social History     Tobacco Use   Smoking Status Former    Current packs/day: 0.00    Average packs/day: 0.3 packs/day for 3.1 years (0.9 ttl pk-yrs)    Types: Cigarettes    Start date: 10/5/2014    Quit date: 2016    Years since quittin.3   Smokeless Tobacco Never     Family History   Problem Relation Age of Onset    Skin cancer Mother     Depression Mother     Anxiety disorder Mother     Stroke Mother     Aneurysm Mother     Alcohol abuse Mother     Migraines Mother     Hemochromatosis Mother     Stroke Maternal Grandmother     Diabetes Maternal Grandfather     Crohn's disease Paternal Grandmother     Colon polyps Sister         Colorectal cancer    Malig Hyperthermia Neg Hx      Prior to Admission medications    Medication Sig Start Date End Date Taking? Authorizing Provider   buPROPion XL (WELLBUTRIN XL) 150 MG 24 hr tablet TAKE 1 TABLET BY MOUTH EVERY DAY 3/10/25  Yes Mirta Bergeron PA   colestipol (COLESTID) 1 g tablet Start with one pill daily and increase to 2 daily if no improvement after 2 weeks 3/20/25  Yes Dianna Lewis PA-C   escitalopram (LEXAPRO) 10 MG tablet TAKE 1 TABLET BY MOUTH EVERY DAY 24  Yes Mirta Bergeron PA   labetalol (NORMODYNE) 200 MG tablet Take 1 tablet by mouth 2 (Two) Times a Day. 3/21/25  Yes Immanuel Negrete MD   loratadine (Claritin) 10 MG tablet Take 1 tablet by mouth Daily.   Yes Provider, MD Naz   multivitamin with  minerals (WOMENS MULTIVITAMIN PO) Take 1 tablet by mouth Daily.   Yes ProviderNaz MD   Omega-3 Fatty Acids (fish oil) 1000 MG capsule capsule Take 1,200 mg by mouth 2 (Two) Times a Day With Meals.   Yes Naz Melendrez MD   vitamin D3 (Vitamin D) 125 MCG (5000 UT) capsule capsule Take 1 capsule by mouth 4 (Four) Times a Week. M-W-F-SUNDAY   Yes Naz Melendrez MD     _______________________________________    Current contraception: none  History of abnormal Pap smear: yes - had a LEEP   Family history of uterine or ovarian cancer: no  Family History of colon cancer/colon polyps: no  History of abnormal mammogram: no  History of abnormal lipids: yes - managed by PCP     The following portions of the patient's history were reviewed and updated as appropriate: allergies, current medications, past family history, past medical history, past social history, past surgical history, and problem list.        Pertinent items are noted in HPI.       Objective   Physical Exam  Vitals and nursing note reviewed. Exam conducted with a chaperone present.   Constitutional:       Appearance: Normal appearance.   HENT:      Head: Normocephalic and atraumatic.   Pulmonary:      Effort: Pulmonary effort is normal.   Chest:   Breasts:     Right: Normal.      Left: Normal.   Abdominal:      General: Abdomen is flat.      Palpations: Abdomen is soft.   Genitourinary:     Exam position: Lithotomy position.      Labia:         Right: No rash or lesion.         Left: No rash or lesion.       Vagina: Normal. No vaginal discharge or lesions.      Cervix: Normal. No lesion.      Uterus: Normal. Not tender.       Adnexa: Right adnexa normal and left adnexa normal.        Right: No mass or tenderness.          Left: No mass or tenderness.     Lymphadenopathy:      Upper Body:      Right upper body: No supraclavicular, axillary or pectoral adenopathy.      Left upper body: No supraclavicular, axillary or pectoral adenopathy.  "  Skin:     General: Skin is warm and dry.   Neurological:      Mental Status: She is alert and oriented to person, place, and time.   Psychiatric:         Mood and Affect: Mood normal.         /90   Ht 172 cm (67.72\")   Wt 126 kg (277 lb)   LMP 2025 (Exact Date)   BMI 42.47 kg/m²    BP Readings from Last 3 Encounters:   25 124/90   25 99/69   25 126/88      Wt Readings from Last 3 Encounters:   25 126 kg (277 lb)   25 123 kg (272 lb 1.6 oz)   25 127 kg (280 lb)        BMI: Estimated body mass index is 42.47 kg/m² as calculated from the following:    Height as of this encounter: 172 cm (67.72\").    Weight as of this encounter: 126 kg (277 lb).    As part of wellness and prevention, the following topics were discussed with the patient:  Encouraged self breast exam  Sexual transmitted disease prevention   Contraception discussed.   Dental health discussed  Vaccinations/immunizations addressed.           Problem List   Meds  History  Prep for Surg   Imagin}    Assessment:  34 y.o. female  who presents for annual exam.  Diagnoses and all orders for this visit:    1. Encounter for annual routine gynecological examination (Primary)    2. Cervical cancer screening  -     IGP, Apt HPV,rfx 16 / 18,45    - Pap collected today  - Mammo/Colonoscopy not yet indicated  - Vaccine recs reviewed  - STI screening declined   _    Plan:  Return in about 1 year (around 2026).    Neda Penn MD  2025 15:39 EDT  "

## 2025-05-13 LAB
CYTOLOGIST CVX/VAG CYTO: NORMAL
CYTOLOGY CVX/VAG DOC CYTO: NORMAL
CYTOLOGY CVX/VAG DOC THIN PREP: NORMAL
DX ICD CODE: NORMAL
HPV I/H RISK 4 DNA CVX QL PROBE+SIG AMP: NEGATIVE
OTHER STN SPEC: NORMAL
SERVICE CMNT-IMP: NORMAL
STAT OF ADQ CVX/VAG CYTO-IMP: NORMAL

## 2025-05-20 ENCOUNTER — TRANSCRIBE ORDERS (OUTPATIENT)
Dept: ADMINISTRATIVE | Facility: HOSPITAL | Age: 35
End: 2025-05-20
Payer: COMMERCIAL

## 2025-05-20 DIAGNOSIS — R91.8 LUNG NODULES: Primary | ICD-10-CM

## 2025-05-28 ENCOUNTER — TELEPHONE (OUTPATIENT)
Dept: GASTROENTEROLOGY | Facility: CLINIC | Age: 35
End: 2025-05-28

## 2025-05-28 NOTE — TELEPHONE ENCOUNTER
Caller: Jocelyn Merlos    Relationship to patient: Self    Best call back number: [phone]     Patient is needing: PATIENT IS SCHEDULED FOR A COLONOSCOPY IN THE MORNING AND NEEDS TO CANCEL AND RESCHEDULE.  PATIENT STATES HER  IS HER RIDE AND THEY DIDN'T REALIZE HE NEEDS TO STAY FOR THE ENTIRE PROCEDURE.  THEY HAVE A 1 YEAR OLD AND DO NOT HAVE A .

## 2025-05-29 ENCOUNTER — TELEPHONE (OUTPATIENT)
Dept: GASTROENTEROLOGY | Facility: CLINIC | Age: 35
End: 2025-05-29
Payer: COMMERCIAL

## 2025-05-29 NOTE — TELEPHONE ENCOUNTER
CALLED TO SW PT TO R/S NO ANSWER LVM COMMUNICATING TO GIVE PRACTICE A CALL WHEN SHE IS READY TO R/S OK FOR THE HUB TO RELAY

## 2025-05-29 NOTE — TELEPHONE ENCOUNTER
This was the patient I spoke to you about. The [facility]UB (Brody Dsouza [employer] Rep) sent the procedure cancellation to the wrong office.     Msg sent from the hub email sent to  ep for md ann

## 2025-06-10 DIAGNOSIS — F41.9 ANXIETY: ICD-10-CM

## 2025-06-10 DIAGNOSIS — F33.42 MAJOR DEPRESSIVE DISORDER, RECURRENT, IN FULL REMISSION: ICD-10-CM

## 2025-06-10 DIAGNOSIS — U09.9 POST-COVID-19 SYNDROME: ICD-10-CM

## 2025-06-10 DIAGNOSIS — F43.22 ADJUSTMENT DISORDER WITH ANXIOUS MOOD: ICD-10-CM

## 2025-06-12 RX ORDER — ESCITALOPRAM OXALATE 10 MG/1
10 TABLET ORAL DAILY
Qty: 90 TABLET | Refills: 1 | Status: SHIPPED | OUTPATIENT
Start: 2025-06-12

## (undated) DEVICE — ANTIBACTERIAL UNDYED BRAIDED (POLYGLACTIN 910), SYNTHETIC ABSORBABLE SURGICAL SUTURE: Brand: COATED VICRYL

## (undated) DEVICE — APPL CHLORAPREP HI/LITE 26ML ORNG

## (undated) DEVICE — THE STERILE LIGHT HANDLE COVER IS USED WITH STERIS SURGICAL LIGHTING AND VISUALIZATION SYSTEMS.

## (undated) DEVICE — SYR LL TP 10ML STRL

## (undated) DEVICE — DRSNG WND BORDR/ADHS NONADHR/GZ LF 2X2IN STRL

## (undated) DEVICE — SYR LUERLOK 20CC BX/50

## (undated) DEVICE — SYR LUERLOK 30CC

## (undated) DEVICE — ADHS LIQ MASTISOL 2/3ML

## (undated) DEVICE — LAPAROVUE VISIBILITY SYSTEM LAPAROSCOPIC SOLUTIONS: Brand: LAPAROVUE

## (undated) DEVICE — SUT MNCRYL PLS ANTIB UD 4/0 PS2 18IN

## (undated) DEVICE — LOU LAP CHOLE: Brand: MEDLINE INDUSTRIES, INC.

## (undated) DEVICE — GOWN,NON-REINFORCED,SIRUS,SET IN SLV,XL: Brand: MEDLINE

## (undated) DEVICE — GLV SURG BIOGEL LTX PF 6

## (undated) DEVICE — CATH IV INSYTE AUTOGARD 14G 1 1/2IN ORNG

## (undated) DEVICE — BLADELESS OBTURATOR: Brand: WECK VISTA

## (undated) DEVICE — GLV SURG BIOGEL LTX PF 8 1/2

## (undated) DEVICE — SUT MNCRYL 0/0 CTX 36IN Y398H

## (undated) DEVICE — TROC BLADLES AIRSEAL/OPTI THRD 8X120MM 1P/U

## (undated) DEVICE — SOL IRR NACL 0.9PCT BT 1000ML

## (undated) DEVICE — SUT VIC 0 CTX 36IN J978H

## (undated) DEVICE — 3M™ TEGADERM™ TRANSPARENT FILM DRESSING FRAME STYLE, 1627, 4 IN X 10 IN (10 CM X 25 CM), 20/CT 4CT/CASE: Brand: 3M™ TEGADERM™

## (undated) DEVICE — ARM DRAPE

## (undated) DEVICE — SEAL

## (undated) DEVICE — SLV SCD CALF HEMOFORCE DVT THERP REPROC MD

## (undated) DEVICE — SUT MONOCRYL PLS 3/0 CT1 UD/MF 90CM MCP944H

## (undated) DEVICE — ST TBG AIRSEAL BIF FLTR W/ACT/CHARCOAL/FLTR

## (undated) DEVICE — DRAPE,REIN 53X77,STERILE: Brand: MEDLINE

## (undated) DEVICE — COVER,C-ARM,41X74: Brand: MEDLINE

## (undated) DEVICE — TISSUE RETRIEVAL SYSTEM: Brand: INZII RETRIEVAL SYSTEM

## (undated) DEVICE — STPCK 3WY D201 DISCOFIX

## (undated) DEVICE — EXTENSION SET, MALE LUER LOCK ADAPTER WITH RETRACTABLE COLLAR

## (undated) DEVICE — CATH URETRL SOF/FLEX OPN/END 4F 70CM

## (undated) DEVICE — COLUMN DRAPE